# Patient Record
Sex: MALE | Race: WHITE | NOT HISPANIC OR LATINO | Employment: OTHER | URBAN - NONMETROPOLITAN AREA
[De-identification: names, ages, dates, MRNs, and addresses within clinical notes are randomized per-mention and may not be internally consistent; named-entity substitution may affect disease eponyms.]

---

## 2019-11-04 ENCOUNTER — HOSPITAL ENCOUNTER (INPATIENT)
Facility: HOSPITAL | Age: 64
LOS: 9 days | Discharge: HOME | End: 2019-11-13
Attending: EMERGENCY MEDICINE | Admitting: INTERNAL MEDICINE

## 2019-11-04 ENCOUNTER — APPOINTMENT (OUTPATIENT)
Dept: GENERAL RADIOLOGY | Facility: HOSPITAL | Age: 64
End: 2019-11-04

## 2019-11-04 DIAGNOSIS — I50.20 SYSTOLIC CONGESTIVE HEART FAILURE, UNSPECIFIED HF CHRONICITY (HCC): ICD-10-CM

## 2019-11-04 DIAGNOSIS — N17.9 ACUTE RENAL FAILURE, UNSPECIFIED ACUTE RENAL FAILURE TYPE (HCC): ICD-10-CM

## 2019-11-04 DIAGNOSIS — I21.4 NSTEMI (NON-ST ELEVATED MYOCARDIAL INFARCTION) (HCC): Primary | ICD-10-CM

## 2019-11-04 PROBLEM — E66.9 OBESITY (BMI 30-39.9): Chronic | Status: ACTIVE | Noted: 2019-11-04

## 2019-11-04 PROBLEM — N19 RENAL FAILURE: Status: ACTIVE | Noted: 2019-11-04

## 2019-11-04 PROBLEM — I50.9 CHF EXACERBATION: Status: ACTIVE | Noted: 2019-11-04

## 2019-11-04 PROBLEM — J96.01 ACUTE RESPIRATORY FAILURE WITH HYPOXIA (HCC): Status: ACTIVE | Noted: 2019-11-04

## 2019-11-04 PROBLEM — R73.9 HYPERGLYCEMIA: Status: ACTIVE | Noted: 2019-11-04

## 2019-11-04 PROBLEM — R94.31 PROLONGED Q-T INTERVAL ON ECG: Status: ACTIVE | Noted: 2019-11-04

## 2019-11-04 LAB
ALBUMIN SERPL-MCNC: 4.05 G/DL (ref 3.5–5.2)
ALBUMIN/GLOB SERPL: 1.3 G/DL
ALP SERPL-CCNC: 74 U/L (ref 39–117)
ALT SERPL W P-5'-P-CCNC: 18 U/L (ref 1–41)
ANION GAP SERPL CALCULATED.3IONS-SCNC: 12.1 MMOL/L (ref 5–15)
APTT PPP: 30.9 SECONDS (ref 23.8–36.1)
AST SERPL-CCNC: 18 U/L (ref 1–40)
B PERT DNA SPEC QL NAA+PROBE: NOT DETECTED
BASOPHILS # BLD AUTO: 0.04 10*3/MM3 (ref 0–0.2)
BASOPHILS NFR BLD AUTO: 0.6 % (ref 0–1.5)
BILIRUB SERPL-MCNC: 0.9 MG/DL (ref 0.2–1.2)
BILIRUB UR QL STRIP: NEGATIVE
BUN BLD-MCNC: 18 MG/DL (ref 8–23)
BUN/CREAT SERPL: 14.6 (ref 7–25)
C PNEUM DNA NPH QL NAA+NON-PROBE: NOT DETECTED
CALCIUM SPEC-SCNC: 9.9 MG/DL (ref 8.6–10.5)
CHLORIDE SERPL-SCNC: 103 MMOL/L (ref 98–107)
CLARITY UR: CLEAR
CO2 SERPL-SCNC: 29.9 MMOL/L (ref 22–29)
COLOR UR: YELLOW
CREAT BLD-MCNC: 1.23 MG/DL (ref 0.76–1.27)
D-LACTATE SERPL-SCNC: 1.4 MMOL/L (ref 0.5–2)
DEPRECATED RDW RBC AUTO: 48.7 FL (ref 37–54)
EOSINOPHIL # BLD AUTO: 0.04 10*3/MM3 (ref 0–0.4)
EOSINOPHIL NFR BLD AUTO: 0.6 % (ref 0.3–6.2)
ERYTHROCYTE [DISTWIDTH] IN BLOOD BY AUTOMATED COUNT: 13.7 % (ref 12.3–15.4)
FLUAV AG NPH QL: NEGATIVE
FLUAV H1 2009 PAND RNA NPH QL NAA+PROBE: NOT DETECTED
FLUAV H1 HA GENE NPH QL NAA+PROBE: NOT DETECTED
FLUAV H3 RNA NPH QL NAA+PROBE: NOT DETECTED
FLUAV SUBTYP SPEC NAA+PROBE: NOT DETECTED
FLUBV AG NPH QL IA: NEGATIVE
FLUBV RNA ISLT QL NAA+PROBE: NOT DETECTED
GFR SERPL CREATININE-BSD FRML MDRD: 59 ML/MIN/1.73
GLOBULIN UR ELPH-MCNC: 3.2 GM/DL
GLUCOSE BLD-MCNC: 123 MG/DL (ref 65–99)
GLUCOSE UR STRIP-MCNC: NEGATIVE MG/DL
HADV DNA SPEC NAA+PROBE: NOT DETECTED
HBA1C MFR BLD: 6.7 % (ref 4.8–5.6)
HCOV 229E RNA SPEC QL NAA+PROBE: NOT DETECTED
HCOV HKU1 RNA SPEC QL NAA+PROBE: NOT DETECTED
HCOV NL63 RNA SPEC QL NAA+PROBE: NOT DETECTED
HCOV OC43 RNA SPEC QL NAA+PROBE: NOT DETECTED
HCT VFR BLD AUTO: 52.5 % (ref 37.5–51)
HGB BLD-MCNC: 16.1 G/DL (ref 13–17.7)
HGB UR QL STRIP.AUTO: NEGATIVE
HMPV RNA NPH QL NAA+NON-PROBE: NOT DETECTED
HPIV1 RNA SPEC QL NAA+PROBE: NOT DETECTED
HPIV2 RNA SPEC QL NAA+PROBE: NOT DETECTED
HPIV3 RNA NPH QL NAA+PROBE: NOT DETECTED
HPIV4 P GENE NPH QL NAA+PROBE: NOT DETECTED
IMM GRANULOCYTES # BLD AUTO: 0.01 10*3/MM3 (ref 0–0.05)
IMM GRANULOCYTES NFR BLD AUTO: 0.1 % (ref 0–0.5)
INR PPP: 1.11 (ref 0.9–1.1)
KETONES UR QL STRIP: NEGATIVE
L PNEUMO1 AG UR QL IA: NEGATIVE
LEUKOCYTE ESTERASE UR QL STRIP.AUTO: NEGATIVE
LYMPHOCYTES # BLD AUTO: 1.67 10*3/MM3 (ref 0.7–3.1)
LYMPHOCYTES NFR BLD AUTO: 24.2 % (ref 19.6–45.3)
M PNEUMO IGG SER IA-ACNC: NOT DETECTED
MAGNESIUM SERPL-MCNC: 2.1 MG/DL (ref 1.6–2.4)
MCH RBC QN AUTO: 29.6 PG (ref 26.6–33)
MCHC RBC AUTO-ENTMCNC: 30.7 G/DL (ref 31.5–35.7)
MCV RBC AUTO: 96.5 FL (ref 79–97)
MONOCYTES # BLD AUTO: 0.75 10*3/MM3 (ref 0.1–0.9)
MONOCYTES NFR BLD AUTO: 10.9 % (ref 5–12)
NEUTROPHILS # BLD AUTO: 4.38 10*3/MM3 (ref 1.7–7)
NEUTROPHILS NFR BLD AUTO: 63.6 % (ref 42.7–76)
NITRITE UR QL STRIP: NEGATIVE
NRBC BLD AUTO-RTO: 0 /100 WBC (ref 0–0.2)
NT-PROBNP SERPL-MCNC: 6938 PG/ML (ref 5–900)
PH UR STRIP.AUTO: 6 [PH] (ref 5–8)
PLATELET # BLD AUTO: 151 10*3/MM3 (ref 140–450)
PMV BLD AUTO: 11.5 FL (ref 6–12)
POTASSIUM BLD-SCNC: 4.6 MMOL/L (ref 3.5–5.2)
PROT SERPL-MCNC: 7.2 G/DL (ref 6–8.5)
PROT UR QL STRIP: NEGATIVE
PROTHROMBIN TIME: 14.9 SECONDS (ref 11–15.4)
RBC # BLD AUTO: 5.44 10*6/MM3 (ref 4.14–5.8)
RHINOVIRUS RNA SPEC NAA+PROBE: NOT DETECTED
RSV RNA NPH QL NAA+NON-PROBE: NOT DETECTED
SODIUM BLD-SCNC: 145 MMOL/L (ref 136–145)
SP GR UR STRIP: 1.01 (ref 1–1.03)
TROPONIN T SERPL-MCNC: 0.03 NG/ML (ref 0–0.03)
TROPONIN T SERPL-MCNC: 0.04 NG/ML (ref 0–0.03)
TSH SERPL DL<=0.05 MIU/L-ACNC: 5.07 UIU/ML (ref 0.27–4.2)
UROBILINOGEN UR QL STRIP: NORMAL
WBC NRBC COR # BLD: 6.89 10*3/MM3 (ref 3.4–10.8)

## 2019-11-04 PROCEDURE — 99223 1ST HOSP IP/OBS HIGH 75: CPT | Performed by: PHYSICIAN ASSISTANT

## 2019-11-04 PROCEDURE — 25010000002 FUROSEMIDE PER 20 MG: Performed by: EMERGENCY MEDICINE

## 2019-11-04 PROCEDURE — 84484 ASSAY OF TROPONIN QUANT: CPT | Performed by: EMERGENCY MEDICINE

## 2019-11-04 PROCEDURE — 71046 X-RAY EXAM CHEST 2 VIEWS: CPT | Performed by: RADIOLOGY

## 2019-11-04 PROCEDURE — 80053 COMPREHEN METABOLIC PANEL: CPT | Performed by: EMERGENCY MEDICINE

## 2019-11-04 PROCEDURE — 85025 COMPLETE CBC W/AUTO DIFF WBC: CPT | Performed by: EMERGENCY MEDICINE

## 2019-11-04 PROCEDURE — 87804 INFLUENZA ASSAY W/OPTIC: CPT | Performed by: PHYSICIAN ASSISTANT

## 2019-11-04 PROCEDURE — 85730 THROMBOPLASTIN TIME PARTIAL: CPT | Performed by: EMERGENCY MEDICINE

## 2019-11-04 PROCEDURE — 99254 IP/OBS CNSLTJ NEW/EST MOD 60: CPT | Performed by: INTERNAL MEDICINE

## 2019-11-04 PROCEDURE — 87899 AGENT NOS ASSAY W/OPTIC: CPT | Performed by: PHYSICIAN ASSISTANT

## 2019-11-04 PROCEDURE — 71046 X-RAY EXAM CHEST 2 VIEWS: CPT

## 2019-11-04 PROCEDURE — 83605 ASSAY OF LACTIC ACID: CPT | Performed by: EMERGENCY MEDICINE

## 2019-11-04 PROCEDURE — 83880 ASSAY OF NATRIURETIC PEPTIDE: CPT | Performed by: EMERGENCY MEDICINE

## 2019-11-04 PROCEDURE — 25010000002 FUROSEMIDE PER 20 MG: Performed by: PHYSICIAN ASSISTANT

## 2019-11-04 PROCEDURE — 93005 ELECTROCARDIOGRAM TRACING: CPT | Performed by: EMERGENCY MEDICINE

## 2019-11-04 PROCEDURE — 93005 ELECTROCARDIOGRAM TRACING: CPT | Performed by: PHYSICIAN ASSISTANT

## 2019-11-04 PROCEDURE — 81003 URINALYSIS AUTO W/O SCOPE: CPT | Performed by: EMERGENCY MEDICINE

## 2019-11-04 PROCEDURE — 25010000002 ENOXAPARIN PER 10 MG: Performed by: FAMILY MEDICINE

## 2019-11-04 PROCEDURE — 83036 HEMOGLOBIN GLYCOSYLATED A1C: CPT | Performed by: PHYSICIAN ASSISTANT

## 2019-11-04 PROCEDURE — 87040 BLOOD CULTURE FOR BACTERIA: CPT | Performed by: EMERGENCY MEDICINE

## 2019-11-04 PROCEDURE — 0099U HC BIOFIRE FILMARRAY RESP PANEL 1: CPT | Performed by: PHYSICIAN ASSISTANT

## 2019-11-04 PROCEDURE — 85610 PROTHROMBIN TIME: CPT | Performed by: EMERGENCY MEDICINE

## 2019-11-04 PROCEDURE — 99284 EMERGENCY DEPT VISIT MOD MDM: CPT

## 2019-11-04 PROCEDURE — 83735 ASSAY OF MAGNESIUM: CPT | Performed by: EMERGENCY MEDICINE

## 2019-11-04 PROCEDURE — 84443 ASSAY THYROID STIM HORMONE: CPT | Performed by: PHYSICIAN ASSISTANT

## 2019-11-04 PROCEDURE — 84484 ASSAY OF TROPONIN QUANT: CPT | Performed by: PHYSICIAN ASSISTANT

## 2019-11-04 RX ORDER — ACETAMINOPHEN 650 MG/1
650 SUPPOSITORY RECTAL EVERY 4 HOURS PRN
Status: DISCONTINUED | OUTPATIENT
Start: 2019-11-04 | End: 2019-11-04

## 2019-11-04 RX ORDER — RAMIPRIL 2.5 MG/1
2.5 CAPSULE ORAL
Status: DISCONTINUED | OUTPATIENT
Start: 2019-11-04 | End: 2019-11-04

## 2019-11-04 RX ORDER — FUROSEMIDE 10 MG/ML
40 INJECTION INTRAMUSCULAR; INTRAVENOUS EVERY 12 HOURS
Status: DISCONTINUED | OUTPATIENT
Start: 2019-11-04 | End: 2019-11-05

## 2019-11-04 RX ORDER — SODIUM CHLORIDE 0.9 % (FLUSH) 0.9 %
10 SYRINGE (ML) INJECTION AS NEEDED
Status: DISCONTINUED | OUTPATIENT
Start: 2019-11-04 | End: 2019-11-13 | Stop reason: HOSPADM

## 2019-11-04 RX ORDER — NITROGLYCERIN 0.4 MG/1
0.4 TABLET SUBLINGUAL
Status: DISCONTINUED | OUTPATIENT
Start: 2019-11-04 | End: 2019-11-13 | Stop reason: HOSPADM

## 2019-11-04 RX ORDER — FAMOTIDINE 20 MG/1
20 TABLET, FILM COATED ORAL DAILY
Status: DISCONTINUED | OUTPATIENT
Start: 2019-11-04 | End: 2019-11-13 | Stop reason: HOSPADM

## 2019-11-04 RX ORDER — ACETAMINOPHEN 160 MG/5ML
650 SOLUTION ORAL EVERY 4 HOURS PRN
Status: DISCONTINUED | OUTPATIENT
Start: 2019-11-04 | End: 2019-11-04

## 2019-11-04 RX ORDER — ATORVASTATIN CALCIUM 20 MG/1
20 TABLET, FILM COATED ORAL NIGHTLY
Status: DISCONTINUED | OUTPATIENT
Start: 2019-11-04 | End: 2019-11-12

## 2019-11-04 RX ORDER — CALCIUM CARBONATE 200(500)MG
2 TABLET,CHEWABLE ORAL 3 TIMES DAILY PRN
Status: DISCONTINUED | OUTPATIENT
Start: 2019-11-04 | End: 2019-11-13 | Stop reason: HOSPADM

## 2019-11-04 RX ORDER — ACETAMINOPHEN 325 MG/1
650 TABLET ORAL EVERY 4 HOURS PRN
Status: DISCONTINUED | OUTPATIENT
Start: 2019-11-04 | End: 2019-11-04

## 2019-11-04 RX ORDER — METOPROLOL SUCCINATE 25 MG/1
25 TABLET, EXTENDED RELEASE ORAL
Status: DISCONTINUED | OUTPATIENT
Start: 2019-11-04 | End: 2019-11-12

## 2019-11-04 RX ORDER — SODIUM CHLORIDE 0.9 % (FLUSH) 0.9 %
10 SYRINGE (ML) INJECTION EVERY 12 HOURS SCHEDULED
Status: DISCONTINUED | OUTPATIENT
Start: 2019-11-04 | End: 2019-11-13 | Stop reason: HOSPADM

## 2019-11-04 RX ORDER — ONDANSETRON 2 MG/ML
4 INJECTION INTRAMUSCULAR; INTRAVENOUS EVERY 6 HOURS PRN
Status: DISCONTINUED | OUTPATIENT
Start: 2019-11-04 | End: 2019-11-04

## 2019-11-04 RX ORDER — ASPIRIN 81 MG/1
81 TABLET, CHEWABLE ORAL DAILY
Status: DISCONTINUED | OUTPATIENT
Start: 2019-11-05 | End: 2019-11-13 | Stop reason: HOSPADM

## 2019-11-04 RX ORDER — ACETAMINOPHEN 325 MG/1
650 TABLET ORAL EVERY 6 HOURS PRN
Status: DISCONTINUED | OUTPATIENT
Start: 2019-11-04 | End: 2019-11-13 | Stop reason: HOSPADM

## 2019-11-04 RX ORDER — ASPIRIN 81 MG/1
324 TABLET, CHEWABLE ORAL ONCE
Status: COMPLETED | OUTPATIENT
Start: 2019-11-04 | End: 2019-11-04

## 2019-11-04 RX ORDER — FUROSEMIDE 10 MG/ML
40 INJECTION INTRAMUSCULAR; INTRAVENOUS ONCE
Status: COMPLETED | OUTPATIENT
Start: 2019-11-04 | End: 2019-11-04

## 2019-11-04 RX ADMIN — SODIUM CHLORIDE, PRESERVATIVE FREE 10 ML: 5 INJECTION INTRAVENOUS at 20:10

## 2019-11-04 RX ADMIN — ATORVASTATIN CALCIUM 20 MG: 20 TABLET, FILM COATED ORAL at 20:09

## 2019-11-04 RX ADMIN — FUROSEMIDE 40 MG: 10 INJECTION, SOLUTION INTRAMUSCULAR; INTRAVENOUS at 20:09

## 2019-11-04 RX ADMIN — FUROSEMIDE 40 MG: 10 INJECTION, SOLUTION INTRAMUSCULAR; INTRAVENOUS at 10:39

## 2019-11-04 RX ADMIN — ASPIRIN 324 MG: 81 TABLET, CHEWABLE ORAL at 11:51

## 2019-11-04 RX ADMIN — ACETAMINOPHEN 650 MG: 325 TABLET ORAL at 17:41

## 2019-11-04 RX ADMIN — ENOXAPARIN SODIUM 40 MG: 40 INJECTION SUBCUTANEOUS at 20:09

## 2019-11-04 RX ADMIN — FAMOTIDINE 20 MG: 20 TABLET, FILM COATED ORAL at 17:34

## 2019-11-04 RX ADMIN — RAMIPRIL 2.5 MG: 2.5 CAPSULE ORAL at 11:51

## 2019-11-04 RX ADMIN — METOPROLOL SUCCINATE 25 MG: 25 TABLET, FILM COATED, EXTENDED RELEASE ORAL at 17:34

## 2019-11-04 NOTE — PROGRESS NOTES
Discharge Planning Assessment   [facility]     Patient Name: Damián Myers  MRN: [id number]  Today's Date: 11/4/2019    Admit Date: 11/4/2019    Discharge Needs Assessment     Row Name 11/04/19 1418       Living Environment    Lives With  spouse    Name(s) of Who Lives With Patient  LIVES WITH SPOUSE MICHELLE MYERS    Current Living Arrangements  home/apartment/condo    Primary Care Provided by  self    Quality of Family Relationships  helpful;involved;supportive       Resource/Environmental Concerns    Resource/Environmental Concerns  none       Transition Planning    Patient/Family Anticipates Transition to  home with family    Patient/Family Anticipated Services at Transition  none    Transportation Anticipated  family or friend will provide       Discharge Needs Assessment    Readmission Within the Last 30 Days  no previous admission in last 30 days    Concerns to be Addressed  no discharge needs identified;denies needs/concerns at this time    Equipment Currently Used at Home  none    Anticipated Changes Related to Illness  none    Equipment Needed After Discharge  none        Discharge Plan     Row Name 11/04/19 1419       Plan    Plan  PT LIVES AT HOME WITH SPOUSE MICHELLE MYERS WHO IS AT BEDSIDE AND PLANS TO RETURN HOME UPON DISCHARGE, FAMILY TO PROVIDE TRANSPORTATION. PCP IS DR PARSONS, HE USES [facility] AND DOES NOT CURRENTLY HAVE INSURANCE. PT WAS GIVEN INFORMATION ON INSURANCE BY [employer] PER PT. PT IS INDEPENDENT WITH ADL'S AND DOES NOT USE ANY DME'S, HOME HEALTH OR HOME O2. PT DOES HAVE A POA/LIVING WILL BUT DOES NOT HAVE COPIES. NO NEEDS IDENTIFIED AT THIS TIME.     Patient/Family in Agreement with Plan  yes        Destination      No service coordination in this encounter.      Durable Medical Equipment      No service coordination in this encounter.      Dialysis/Infusion      No service coordination in this encounter.      Home Medical Care      No service coordination in this encounter.      Therapy       No service coordination in this encounter.      Community Resources      No service coordination in this encounter.          Demographic Summary     Row Name 11/04/19 1417       General Information    Admission Type  inpatient    Arrived From  home    Referral Source  emergency department    Reason for Consult  discharge planning    Preferred Language  English        Functional Status     Row Name 11/04/19 1418       Functional Status    Usual Activity Tolerance  good    Current Activity Tolerance  good       Mental Status    General Appearance WDL  WDL        Psychosocial     Row Name 11/04/19 1418       Behavior WDL    Behavior WDL  WDL       Emotion Mood WDL    Emotion/Mood/Affect WDL  WDL       Speech WDL    Speech WDL  WDL        Abuse/Neglect    No documentation.       Legal    No documentation.       Substance Abuse    No documentation.       Patient Forms    No documentation.           Zara Dejesus RN

## 2019-11-04 NOTE — ED PROVIDER NOTES
Subjective   Patient sent to ER by family doctor for evaluation of his shortness of breath        Shortness of Breath   Severity:  Moderate  Onset quality:  Gradual  Timing:  Constant  Progression:  Worsening  Chronicity:  New  Context: activity    Relieved by:  Nothing  Worsened by:  Exertion and movement  Ineffective treatments:  Position changes and sitting up      Review of Systems   Constitutional: Positive for activity change and fatigue.   HENT: Negative.    Eyes: Negative.    Respiratory: Positive for shortness of breath.    Cardiovascular: Positive for palpitations and leg swelling.   Gastrointestinal: Negative.    Endocrine: Negative.    Genitourinary: Negative.    Musculoskeletal: Negative.    Skin: Negative.    Allergic/Immunologic: Negative.    Neurological: Negative.    Hematological: Negative.    Psychiatric/Behavioral: Negative.        Past Medical History:   Diagnosis Date   • Obesity (BMI 30-39.9) 11/4/2019       No Known Allergies    History reviewed. No pertinent surgical history.    Family History   Problem Relation Age of Onset   • Heart valve disorder Mother    • Heart valve disorder Brother    • Diabetes type I Son        Social History     Socioeconomic History   • Marital status:      Spouse name: Not on file   • Number of children: Not on file   • Years of education: Not on file   • Highest education level: Not on file   Tobacco Use   • Smoking status: Never Smoker   • Smokeless tobacco: Never Used   Substance and Sexual Activity   • Alcohol use: No     Frequency: Never   • Drug use: No   • Sexual activity: Defer           Objective   Physical Exam   Constitutional: He appears well-developed and well-nourished.   HENT:   Head: Normocephalic and atraumatic.   Mouth/Throat: Oropharynx is clear and moist.   Eyes: Pupils are equal, round, and reactive to light.   Neck: Normal range of motion. Neck supple.   Cardiovascular:   Tachycardic upon initial presentation   Pulmonary/Chest: He is  in respiratory distress. He has decreased breath sounds in the right lower field and the left lower field. He has rales in the right middle field and the left middle field.   Abdominal: Soft.   Musculoskeletal: Normal range of motion.        Right lower leg: He exhibits edema.        Left lower leg: He exhibits edema.   Neurological: He is alert.   Skin: Skin is warm.   Psychiatric: He has a normal mood and affect.   Nursing note and vitals reviewed.      Procedures           ED Course                  MDM    Final diagnoses:   NSTEMI (non-ST elevated myocardial infarction) (CMS/[facility])              Robert Mayo MD  11/05/19 1301       Robert Mayo MD  11/05/19 1309

## 2019-11-04 NOTE — PLAN OF CARE
Problem: Fall Risk (Adult)  Goal: Identify Related Risk Factors and Signs and Symptoms  Outcome: Ongoing (interventions implemented as appropriate)    Goal: Absence of Fall  Outcome: Ongoing (interventions implemented as appropriate)      Problem: Patient Care Overview  Goal: Plan of Care Review  Outcome: Ongoing (interventions implemented as appropriate)    Goal: Individualization and Mutuality  Outcome: Ongoing (interventions implemented as appropriate)    Goal: Discharge Needs Assessment  Outcome: Ongoing (interventions implemented as appropriate)    Goal: Interprofessional Rounds/Family Conf  Outcome: Ongoing (interventions implemented as appropriate)      Problem: Cardiac: ACS (Acute Coronary Syndrome) (Adult)  Goal: Signs and Symptoms of Listed Potential Problems Will be Absent, Minimized or Managed (Cardiac: ACS)  Outcome: Ongoing (interventions implemented as appropriate)

## 2019-11-04 NOTE — CONSULTS
Date of Admit: 11/4/2019  Date of Consult: 11/04/19  No ref. provider found        CHF exacerbation (CMS/[facility])    NSTEMI (non-ST elevated myocardial infarction) (CMS/[facility])    Hyperglycemia    Obesity (BMI 30-39.9)    Prolonged Q-T interval on ECG    Renal failure    Acute respiratory failure with hypoxia (CMS/[facility])      Assessment      1. Acute decompensated heart failure (diastolic versus systolic)  2. No previous history of known coronary artery disease, hypertension or diabetes mellitus.  3. Mildly elevated troponin of up to 0.037 with no anginal symptoms or any ischemic changes on EKG.  4. Acute kidney injury versus chronic kidney disease.  5. Mild elevated TSH, questionable hypothyroidism.      Recommendations     1. Treat with IV diuretics as needed and tolerated and monitor the kidney function closely.  2. Treat his troponin elevation with aspirin and carvedilol and statin.  3. Evaluate his heart failure further with an echo Doppler study.  4. Continue to follow the troponin trend and consider further cardiac evaluation for ischemic heart disease/coronary artery disease with a Lexiscan sestamibi study.  5. I have discussed with him and his wife about salt restricted diet and the foods to avoid such as canned vegetables, canned soups, potato chips, pickles, processed meats etc.  They expressed understanding.        Reason for consultation: Acute heart failure.    Subjective       Subjective     Damián Myers is a 64 y.o. male with problems as listed above presents with    History of Present Illness: Mr. Myers is a pleasant 64-year  male with no history of known heart disease or coronary artery disease or previous history of congestive heart failure, presented to the emergency department with complaints of progressive dyspnea for about a year but has gotten worse in the last few weeks.  He was noted to be in acute decompensated heart failure by clinical and radiological criteria as well as an elevated proBNP  of more than 6000.  He is hence admitted for further cardiac evaluation management.  I have already discussed with Dr. Mayo in the emergency department.  He has received a dose of IV furosemide and has been initiated on aspirin and carvedilol.  He denies any complaints of chest pains.  His EKG has revealed normal sinus rhythm with no electric cardiographic changes of myocardial ischemia.  His chest x-ray is consistent with acute decompensated heart failure.  His troponin has revealed mild elevation up to 0.037.  He is nonhypertensive and nondiabetic.  He says he has never had any previous history of medical problems and has not seen a doctor in 12 years.  He denies any fever, chills, cough, wheezing or hemoptysis.    Cardiac risk factors:Age and male gender.      Past Medical History:   Diagnosis Date   • Obesity (BMI 30-39.9) 11/4/2019     History reviewed. No pertinent surgical history.  Family History   Problem Relation Age of Onset   • Heart valve disorder Mother    • Heart valve disorder Brother    • Diabetes type I Son      Social History     Tobacco Use   • Smoking status: Never Smoker   • Smokeless tobacco: Never Used   Substance Use Topics   • Alcohol use: No     Frequency: Never   • Drug use: No     No medications prior to admission.     Allergies:  Patient has no known allergies.    Review of Systems   Constitutional: Negative for appetite change, chills and fever.   HENT: Negative for congestion, ear discharge, ear pain and sore throat.    Eyes: Negative for pain and redness.   Respiratory: Positive for shortness of breath. Negative for cough and wheezing.    Cardiovascular: Negative for palpitations and leg swelling.   Gastrointestinal: Negative for abdominal pain, diarrhea, nausea and vomiting.   Endocrine: Negative for cold intolerance, heat intolerance, polydipsia and polyuria.   Genitourinary: Negative for dysuria and hematuria.   Skin: Negative for rash.   Neurological: Negative for seizures,  syncope and headaches.   Psychiatric/Behavioral: Negative for confusion. The patient is not nervous/anxious.    All other systems reviewed and are negative.      Objective       Objective      Vital Signs  Temp:  [97.6 °F (36.4 °C)-97.9 °F (36.6 °C)] 97.6 °F (36.4 °C)  Heart Rate:  [90-99] 99  Resp:  [20] 20  BP: (115-149)/() 138/96  Vital Signs (last 72 hrs)       11/01 0700  -  11/02 0659 11/02 0700  -  11/03 0659 11/03 0700  -  11/04 0659 11/04 0700  -  11/04 1617   Most Recent    Temp (°F)       97.6 -  97.9     97.6 (36.4)    Heart Rate       90 -  99     99    Resp         20     20    BP       115/100 -  (!) 149/35     138/96    SpO2 (%)       (!)89 -  98     91        Body mass index is 35.95 kg/m².  No intake or output data in the 24 hours ending 11/04/19 1617  Physical Exam   Constitutional: He appears well-developed and well-nourished.   HENT:   Head: Normocephalic and atraumatic.   Eyes: EOM are normal. Right eye exhibits no discharge. Left eye exhibits no discharge.   Neck: No JVD present. No tracheal deviation present. No thyromegaly present.   Cardiovascular: S1 normal and S2 normal. PMI is not displaced. Exam reveals no gallop, no S3, no S4 and no friction rub.   Pulses:       Dorsalis pedis pulses are 1+ on the right side, and 1+ on the left side.        Posterior tibial pulses are 1+ on the right side, and 1+ on the left side.   Pulmonary/Chest: No stridor. No respiratory distress. He has no wheezes. He has rales (Has bilateral rales right worse than the left.). He exhibits no tenderness.   Abdominal: Soft. He exhibits no distension and no mass. There is no tenderness. There is no guarding.   Musculoskeletal: He exhibits edema (Has 2+ edema on both legs with pigmentation and scaling of skin.).   Neurological: He is alert.   Skin: Skin is warm and dry. No erythema.         Results review     Results Review:    I reviewed the patient's new clinical results.  Results from last 7 days   Lab Units  11/04/19  1256 11/04/19  1035   TROPONIN T ng/mL 0.037* 0.034*     Results from last 7 days   Lab Units 11/04/19  1035   WBC 10*3/mm3 6.89   HEMOGLOBIN g/dL 16.1   PLATELETS 10*3/mm3 151     Results from last 7 days   Lab Units 11/04/19  1035   SODIUM mmol/L 145   POTASSIUM mmol/L 4.6   CHLORIDE mmol/L 103   CO2 mmol/L 29.9*   BUN mg/dL 18   CREATININE mg/dL 1.23   CALCIUM mg/dL 9.9   GLUCOSE mg/dL 123*   ALT (SGPT) U/L 18   AST (SGOT) U/L 18     Lab Results   Component Value Date    INR 1.11 (H) 11/04/2019     Lab Results   Component Value Date    MG 2.1 11/04/2019     Lab Results   Component Value Date    TSH 5.070 (H) 11/04/2019      Lab Results   Component Value Date    PROBNP 6,938.0 (H) 11/04/2019       ECG        ECG/EMG Results (last 24 hours)     Procedure Component Value Units Date/Time    ECG 12 Lead [[id number]] Collected:  11/04/19 1000     Updated:  11/04/19 1029    Narrative:       Test Reason : shortness of breath  Blood Pressure : **/** mmHG  Vent. Rate : 106 BPM     Atrial Rate : 106 BPM     P-R Int : 184 ms          QRS Dur : 086 ms      QT Int : 348 ms       P-R-T Axes : 038 029 052 degrees     QTc Int : 462 ms    Sinus tachycardia with occasional premature ventricular complexes  Otherwise normal ECG  No previous ECGs available  Confirmed by Fercho Cornelius ([id number]) on 11/4/2019 10:29:22 AM    Referred By:  AMELIA           Confirmed By:Fercho Cornelius          I have personally reviewed the chest x-ray which reveals cephalization and bilateral interstitial pattern consistent with acute decompensated heart failure.    Imaging Results (Last 72 Hours)     Procedure Component Value Units Date/Time    XR Chest 2 View [[id number]] Collected:  11/04/19 1113     Updated:  11/04/19 1144    Narrative:       EXAMINATION: XR CHEST 2 VW-      CLINICAL INDICATION: shortness of breath        COMPARISON: None available      TECHNIQUE: XR CHEST 2 VW-      FINDINGS:   Minimal airspace disease in the right lung  base. I cannot exclude  right-sided pneumonia   Heart and mediastinal contours are unremarkable.   No pneumothorax.   Bony and soft tissue structures are unremarkable.          Impression:       Appearance concerning for right-sided pneumonia     This report was finalized on 11/4/2019 11:14 AM by Dr. Jordy Jhaveri MD.             I have discussed my impression and recommendations with the patient and family.    Thank you very much for asking us to be involved in this patient's care.  We will follow along with you.      Mahendra Amador MD,[facility]  11/04/19  4:17 PM    Please note that portions of this note were completed with a voice recognition program.

## 2019-11-04 NOTE — H&P
[facility] Medicine Services  HISTORY & PHYSICAL    Patient Identification:  Name:  Damián Myers  Age:  64 y.o.  Sex:  male  :  1955  MRN:  [id number]   Visit Number:  [id number]  Primary Care Physician:  Spencer Saeed MD     Subjective     Chief complaint:   Chief Complaint   Patient presents with   • Sent from PCP   • Shortness of Breath     History of presenting illness:   Patient is a 64 y.o. male with past medical history significant for obesity that presented to the [facility] emergency department for evaluation of shortness of breath at request of his PCP.  Patient states that for the past year, he has had progressively worsening dyspnea.  He reports significant dyspnea on exertion.  He states even if he tries to tie his shoes or eat his dyspnea is worsened.  Wife present at bedside also endorses this history.  His wife states that over the past week his symptoms have significantly worsened.  He reports dry cough.  No fevers or chills.  They were seen by their PCP earlier today who recommended ED evaluation.  Patient reports increasing lower extremity edema, pitting.  Denies any chest pain.  Does not wear oxygen at home.  He also reports difficulty sleeping and diaphoresis intermittently.  He states he has not been seen by  since .  Ports family history of valvular heart disease in his mother and brother, brother recently had open heart surgery for valvular repair.  He has never been treated for heart failure in the past, no heart stents.  He does not take any home medications.  He is a non-smoker.  After diuresis in ED, patient states he feels significantly better.  Denies any abdominal pain, nausea, vomiting or diarrhea.  He denies any urinary symptoms.    Upon arrival to the ED, vitals were temperature 97.9, heart rate 90, respiratory rate 20, blood pressure 140/113, and oxygen saturation 89% on room air.  Initial troponin 0 0.034 with repeat 0.037.  proBNP  six 938.0.  CMP with glucose 123, CO2 29.9, and EGFR 59.  Lactic acid within normal limits.  Magnesium 2.1.  CBC unremarkable.  Chest x-ray with minimal airspace disease in the right lung base, right-sided pneumonia cannot be excluded.  Urinalysis unremarkable.  EKG with sinus tachycardia with frequent PVC,  ms, heart rate 106 bpm, no significant ST-T wave abnormality.  While in the emergency department, patient was administered 324 mg p.o. aspirin as well as 40 mg IV Lasix.    Patient has been admitted to the telemetry floor for further evaluation and treatment.   ---------------------------------------------------------------------------------------------------------------------   Review of Systems   Constitutional: Positive for activity change, diaphoresis and fatigue. Negative for appetite change, chills and fever.   HENT: Negative for congestion, postnasal drip, rhinorrhea, sinus pain, sore throat and trouble swallowing.    Eyes: Negative for discharge and visual disturbance.   Respiratory: Positive for cough and shortness of breath. Negative for chest tightness and wheezing.    Cardiovascular: Positive for leg swelling. Negative for chest pain and palpitations.   Gastrointestinal: Negative for abdominal pain, constipation, diarrhea, nausea and vomiting.   Endocrine: Negative for cold intolerance and heat intolerance.   Genitourinary: Negative for decreased urine volume, dysuria, frequency and urgency.   Musculoskeletal: Negative for arthralgias, gait problem and myalgias.   Skin: Negative for color change, rash and wound.   Allergic/Immunologic: Negative for environmental allergies and immunocompromised state.   Neurological: Negative for dizziness, syncope, weakness, light-headedness and headaches.   Hematological: Negative for adenopathy. Does not bruise/bleed easily.   Psychiatric/Behavioral: Positive for sleep disturbance. Negative for confusion and decreased concentration. The patient is not  nervous/anxious.       ---------------------------------------------------------------------------------------------------------------------   Past Medical History:   Diagnosis Date   • Obesity (BMI 30-39.9) 11/4/2019     History reviewed. No pertinent surgical history.  Family History   Problem Relation Age of Onset   • Heart valve disorder Mother    • Heart valve disorder Brother    • Diabetes type I Son      Social History     Socioeconomic History   • Marital status:      Spouse name: Not on file   • Number of children: Not on file   • Years of education: Not on file   • Highest education level: Not on file   Tobacco Use   • Smoking status: Never Smoker   • Smokeless tobacco: Never Used   Substance and Sexual Activity   • Alcohol use: No     Frequency: Never   • Drug use: No   • Sexual activity: Defer     ---------------------------------------------------------------------------------------------------------------------   Allergies:  Patient has no known allergies.  ---------------------------------------------------------------------------------------------------------------------   Medications below are reported home medications pulling from within the system; at this time, these medications have not been reconciled unless otherwise specified and are in the verification process for further verifcation as current home medications.    Prior to Admission Medications     None        ---------------------------------------------------------------------------------------------------------------------    Objective     Hospital Scheduled Meds:    [START ON 11/5/2019] aspirin 81 mg Oral Daily   atorvastatin 20 mg Oral Nightly   enoxaparin 40 mg Subcutaneous Nightly   furosemide 40 mg Intravenous Q12H   metoprolol succinate XL 25 mg Oral Q24H   sodium chloride 10 mL Intravenous Q12H          Current listed hospital scheduled medications may not yet reflect those currently placed in orders that are signed and  held, awaiting patient's arrival to floor/unit.    ---------------------------------------------------------------------------------------------------------------------   Vital Signs:  Temp:  [97.6 °F (36.4 °C)-97.9 °F (36.6 °C)] 97.6 °F (36.4 °C)  Heart Rate:  [90-99] 99  Resp:  [20] 20  BP: (115-149)/() 138/96  Mean Arterial Pressure (Non-Invasive) for the past 24 hrs (Last 3 readings):   Noninvasive MAP (mmHg)   11/04/19 1401 109   11/04/19 1346 104   11/04/19 1247 117     SpO2 Percentage    11/04/19 1346 11/04/19 1401 11/04/19 1605   SpO2: 92% 92% 91%     SpO2:  [89 %-98 %] 91 %  on  Flow (L/min):  [2] 2;   Device (Oxygen Therapy): nasal cannula    Body mass index is 35.95 kg/m².  Wt Readings from Last 3 Encounters:   11/04/19 127 kg (280 lb)     ---------------------------------------------------------------------------------------------------------------------   Physical Exam:  Physical Exam   Constitutional: He is oriented to person, place, and time. He appears well-developed and well-nourished.  Non-toxic appearance. No distress. Nasal cannula in place.   HENT:   Head: Normocephalic and atraumatic.   Right Ear: External ear normal.   Left Ear: External ear normal.   Nose: Nose normal.   Mouth/Throat: Oropharynx is clear and moist and mucous membranes are normal. No oropharyngeal exudate.   Eyes: Conjunctivae and EOM are normal. Pupils are equal, round, and reactive to light. No scleral icterus.   Neck: Trachea normal and normal range of motion. Neck supple. No JVD present. No muscular tenderness present. Carotid bruit is not present. No tracheal deviation present. No thyromegaly present.   Cardiovascular: Regular rhythm, normal heart sounds and intact distal pulses. Tachycardia present. Exam reveals no gallop and no friction rub.   No murmur heard.  Pulses:       Dorsalis pedis pulses are 2+ on the right side, and 2+ on the left side.        Posterior tibial pulses are 2+ on the right side, and 2+ on  the left side.   Pulmonary/Chest: Effort normal. No accessory muscle usage. No tachypnea. No respiratory distress. He has decreased breath sounds in the right middle field, the right lower field, the left middle field and the left lower field. He has no wheezes. He has no rhonchi. He has no rales. He exhibits no tenderness.   Abdominal: Soft. Bowel sounds are normal. He exhibits distension. He exhibits no mass. There is no hepatosplenomegaly (Difficult to assess due to body habitus). There is no tenderness. There is no rebound and no guarding. No hernia.   Obese   Genitourinary:   Genitourinary Comments: No Rose catheter in place   Musculoskeletal: He exhibits no tenderness or deformity.        Right lower leg: He exhibits edema (2+ pitting ).        Left lower leg: He exhibits edema (2+ pitting).     Vascular Status -  His right foot exhibits abnormal foot edema. His right foot exhibits normal foot vasculature . His left foot exhibits abnormal foot edema. His left foot exhibits normal foot vasculature .  Neurological: He is alert and oriented to person, place, and time. He has normal strength. No cranial nerve deficit or sensory deficit. GCS eye subscore is 4. GCS verbal subscore is 5. GCS motor subscore is 6.   Health commands.  Answers questions appropriately.  Moves all extremities equally.  Sensation and strength intact.  No focal neuro deficits on exam.   Skin: Skin is warm and dry. Capillary refill takes less than 2 seconds. No rash noted. No erythema. No pallor.   Psychiatric: He has a normal mood and affect. His speech is normal and behavior is normal. Judgment and thought content normal. Cognition and memory are normal.   Nursing note and vitals reviewed.    ---------------------------------------------------------------------------------------------------------------------  EKG:      Telemetry:    Sinus/Sinus tach 90s-100s    I have personally reviewed the EKG/Telemetry  strip  ---------------------------------------------------------------------------------------------------------------------   Results from last 7 days   Lab Units 11/04/19  1256 11/04/19  1035   TROPONIN T ng/mL 0.037* 0.034*     Results from last 7 days   Lab Units 11/04/19  1035   PROBNP pg/mL 6,938.0*         Results from last 7 days   Lab Units 11/04/19  1035   LACTATE mmol/L 1.4   WBC 10*3/mm3 6.89   HEMOGLOBIN g/dL 16.1   HEMATOCRIT % 52.5*   MCV fL 96.5   MCHC g/dL 30.7*   PLATELETS 10*3/mm3 151   INR  1.11*     Results from last 7 days   Lab Units 11/04/19  1035   SODIUM mmol/L 145   POTASSIUM mmol/L 4.6   MAGNESIUM mg/dL 2.1   CHLORIDE mmol/L 103   CO2 mmol/L 29.9*   BUN mg/dL 18   CREATININE mg/dL 1.23   EGFR IF NONAFRICN AM mL/min/1.73 59*   CALCIUM mg/dL 9.9   GLUCOSE mg/dL 123*   ALBUMIN g/dL 4.05   BILIRUBIN mg/dL 0.9   ALK PHOS U/L 74   AST (SGOT) U/L 18   ALT (SGPT) U/L 18   Estimated Creatinine Clearance: 85.8 mL/min (by C-G formula based on SCr of 1.23 mg/dL).    Lab Results   Component Value Date    HGBA1C 6.70 (H) 11/04/2019     No results found for: TSH, FREET4    Pain Management Panel     There is no flowsheet data to display.        I have personally reviewed the above laboratory results.   ---------------------------------------------------------------------------------------------------------------------  Imaging Results (Last 7 Days)     Procedure Component Value Units Date/Time    XR Chest 2 View [[id number]] Collected:  11/04/19 1113     Updated:  11/04/19 1144    Narrative:       EXAMINATION: XR CHEST 2 VW-      CLINICAL INDICATION: shortness of breath        COMPARISON: None available      TECHNIQUE: XR CHEST 2 VW-      FINDINGS:   Minimal airspace disease in the right lung base. I cannot exclude  right-sided pneumonia   Heart and mediastinal contours are unremarkable.   No pneumothorax.   Bony and soft tissue structures are unremarkable.          Impression:       Appearance concerning  for right-sided pneumonia     This report was finalized on 11/4/2019 11:14 AM by Dr. Jordy Jhaveri MD.           I have personally reviewed the above radiology results.   ---------------------------------------------------------------------------------------------------------------------    Assessment & Plan      Active Hospital Problems    Diagnosis POA   • **CHF exacerbation (CMS/[facility]) [I50.9] Yes   • NSTEMI (non-ST elevated myocardial infarction) (CMS/[facility]) [I21.4] Yes   • Hyperglycemia [R73.9] Yes   • Obesity (BMI 30-39.9) [E66.9] Yes   • Prolonged Q-T interval on ECG [R94.31] Yes   • Renal failure [N19] Yes   • Acute respiratory failure with hypoxia (CMS/[facility]) [J96.01] Yes     · Acute exacerbation of CHF: Undetermined type. Lasix given in ED, continue with IV lasix BID. Strict Is and Os, daily weights. CXR with mention of right sided airspace disease, likely fluid, no other signs of infection. Will rule out atypical pneumonia with respiratory panel and legionella testing. Obtain transthoracic echocardiogram to evaluate LVEF and cardiac wall motion. Pt also with elevated troponins, will likely benefit from ischemic work up. Cardiology on case in ED, input/assistance is appreciated. Start PO Metoprolol XL for now. He does not have any home meds. Tele monitoring. Supplemental O2 as necessary to titrate SpO2 > 90%. Incentive spirometry.   · NSTEMI: Trend troponin. Cont daily aspirin, add statin while lipid panel pending. Tele monitoring. Serial EKG, no ST-T wave abnormality prominent on admission H&P. Cardiology on board, input/assistance appreciated. Echo ordered.   · Acute hypoxic respiratory failure: Likely due to volume overload. Diuresis as previously mentioned. Supp O2 as necessary to titrate SpO2 > 90%.   · Renal failure: Unknown baseline creatinine. EGFR on admission 59. Obtain records from PCP office. Likely acute renal failure in setting of volume overload. Avoid nephrotoxins. Closely monitor UOP, repeat  chem panel in AM.   · Prolonged QTc: Serial EKG. Avoid any further prolonging agents. Monitor electrolytes. Cont tele monitoring.   · Obesity, BMI 35.95   · F/E/N: No IV fluids. Replace electrolytes per protocol as necessary. Cardiac diet.    ---------------------------------------------------  DVT Prophylaxis: Sq Lovenox   GI Prophylaxis: Pepcid   Activity: Up with assistance as tolerated    The patient is considered to be a high risk patient due to: CHF exacerbation, NSTEMI, respiratory failure, renal failure, obesity, prolonged QTC    INPATIENT status due to the need for care which can only be reasonably provided in an hospital setting such as aggressive/expedited ancillary services and/or consultation services, the necessity for IV medications, close physician monitoring and/or the possible need for procedures.  In such, I feel patient’s risk for adverse outcomes and need for care warrant INPATIENT evaluation and predict the patient’s care encounter to likely last beyond 2 midnights.    Code Status: FULL CODE     I have discussed the patient's assessment and plan with the patient, patient's wife present at bedside, and nursing staff.       Joelle Morales PA-C  Hospitalist Service -- [facility]   Pager: [phone]    11/04/19  4:11 PM    Attending Physician: Roel Martines MD      ---------------------------------------------------------------------------------------------------------------------

## 2019-11-04 NOTE — ED NOTES
Patient refusing aspirin and raimpril at this time. Dr. Mayo aware. To bedside and speaking with patient regarding test results.      Sandra Gastelum RN  11/04/19 1124

## 2019-11-05 ENCOUNTER — APPOINTMENT (OUTPATIENT)
Dept: CARDIOLOGY | Facility: HOSPITAL | Age: 64
End: 2019-11-05

## 2019-11-05 LAB
ALBUMIN SERPL-MCNC: 3.81 G/DL (ref 3.5–5.2)
ALBUMIN/GLOB SERPL: 1.2 G/DL
ALP SERPL-CCNC: 70 U/L (ref 39–117)
ALT SERPL W P-5'-P-CCNC: 17 U/L (ref 1–41)
ANION GAP SERPL CALCULATED.3IONS-SCNC: 13.8 MMOL/L (ref 5–15)
AST SERPL-CCNC: 17 U/L (ref 1–40)
BASOPHILS # BLD AUTO: 0.04 10*3/MM3 (ref 0–0.2)
BASOPHILS NFR BLD AUTO: 0.7 % (ref 0–1.5)
BH CV ECHO MEAS - % IVS THICK: 24.8 %
BH CV ECHO MEAS - % LVPW THICK: -8.3 %
BH CV ECHO MEAS - ACS: 2.5 CM
BH CV ECHO MEAS - AO ROOT AREA (BSA CORRECTED): 1.6
BH CV ECHO MEAS - AO ROOT AREA: 11.9 CM^2
BH CV ECHO MEAS - AO ROOT DIAM: 3.9 CM
BH CV ECHO MEAS - BSA(HAYCOCK): 2.6 M^2
BH CV ECHO MEAS - BSA: 2.5 M^2
BH CV ECHO MEAS - BZI_BMI: 36.1 KILOGRAMS/M^2
BH CV ECHO MEAS - BZI_METRIC_HEIGHT: 188 CM
BH CV ECHO MEAS - BZI_METRIC_WEIGHT: 127.5 KG
BH CV ECHO MEAS - EDV(CUBED): 199.2 ML
BH CV ECHO MEAS - EDV(MOD-SP4): 126 ML
BH CV ECHO MEAS - EDV(TEICH): 169.2 ML
BH CV ECHO MEAS - EF(CUBED): 28.6 %
BH CV ECHO MEAS - EF(MOD-SP4): 22.2 %
BH CV ECHO MEAS - EF(TEICH): 22.8 %
BH CV ECHO MEAS - ESV(CUBED): 142.2 ML
BH CV ECHO MEAS - ESV(MOD-SP4): 98 ML
BH CV ECHO MEAS - ESV(TEICH): 130.7 ML
BH CV ECHO MEAS - FS: 10.6 %
BH CV ECHO MEAS - IVS/LVPW: 1.1
BH CV ECHO MEAS - IVSD: 1.4 CM
BH CV ECHO MEAS - IVSS: 1.7 CM
BH CV ECHO MEAS - LA DIMENSION: 5.7 CM
BH CV ECHO MEAS - LA/AO: 1.4
BH CV ECHO MEAS - LV DIASTOLIC VOL/BSA (35-75): 50.2 ML/M^2
BH CV ECHO MEAS - LV MASS(C)D: 331.5 GRAMS
BH CV ECHO MEAS - LV MASS(C)DI: 132 GRAMS/M^2
BH CV ECHO MEAS - LV MASS(C)S: 314.7 GRAMS
BH CV ECHO MEAS - LV MASS(C)SI: 125.3 GRAMS/M^2
BH CV ECHO MEAS - LV SYSTOLIC VOL/BSA (12-30): 39 ML/M^2
BH CV ECHO MEAS - LVIDD: 5.8 CM
BH CV ECHO MEAS - LVIDS: 5.2 CM
BH CV ECHO MEAS - LVLD AP4: 9 CM
BH CV ECHO MEAS - LVLS AP4: 8.6 CM
BH CV ECHO MEAS - LVOT AREA (M): 3.5 CM^2
BH CV ECHO MEAS - LVOT AREA: 3.5 CM^2
BH CV ECHO MEAS - LVOT DIAM: 2.1 CM
BH CV ECHO MEAS - LVPWD: 1.2 CM
BH CV ECHO MEAS - LVPWS: 1.1 CM
BH CV ECHO MEAS - MV A MAX VEL: 25.7 CM/SEC
BH CV ECHO MEAS - MV E MAX VEL: 101 CM/SEC
BH CV ECHO MEAS - MV E/A: 3.9
BH CV ECHO MEAS - PA ACC SLOPE: 1049 CM/SEC^2
BH CV ECHO MEAS - PA ACC TIME: 0.06 SEC
BH CV ECHO MEAS - PA PR(ACCEL): 50.7 MMHG
BH CV ECHO MEAS - RAP SYSTOLE: 10 MMHG
BH CV ECHO MEAS - RVDD: 4.4 CM
BH CV ECHO MEAS - RVSP: 38.5 MMHG
BH CV ECHO MEAS - SI(CUBED): 22.7 ML/M^2
BH CV ECHO MEAS - SI(MOD-SP4): 11.2 ML/M^2
BH CV ECHO MEAS - SI(TEICH): 15.3 ML/M^2
BH CV ECHO MEAS - SV(CUBED): 56.9 ML
BH CV ECHO MEAS - SV(MOD-SP4): 28 ML
BH CV ECHO MEAS - SV(TEICH): 38.5 ML
BH CV ECHO MEAS - TR MAX VEL: 267 CM/SEC
BILIRUB SERPL-MCNC: 0.8 MG/DL (ref 0.2–1.2)
BUN BLD-MCNC: 20 MG/DL (ref 8–23)
BUN/CREAT SERPL: 14.9 (ref 7–25)
CALCIUM SPEC-SCNC: 9.3 MG/DL (ref 8.6–10.5)
CHLORIDE SERPL-SCNC: 101 MMOL/L (ref 98–107)
CHOLEST SERPL-MCNC: 120 MG/DL (ref 0–200)
CO2 SERPL-SCNC: 30.2 MMOL/L (ref 22–29)
CREAT BLD-MCNC: 1.34 MG/DL (ref 0.76–1.27)
DEPRECATED RDW RBC AUTO: 49.5 FL (ref 37–54)
EOSINOPHIL # BLD AUTO: 0.04 10*3/MM3 (ref 0–0.4)
EOSINOPHIL NFR BLD AUTO: 0.7 % (ref 0.3–6.2)
ERYTHROCYTE [DISTWIDTH] IN BLOOD BY AUTOMATED COUNT: 13.6 % (ref 12.3–15.4)
GFR SERPL CREATININE-BSD FRML MDRD: 54 ML/MIN/1.73
GLOBULIN UR ELPH-MCNC: 3.2 GM/DL
GLUCOSE BLD-MCNC: 137 MG/DL (ref 65–99)
HCT VFR BLD AUTO: 52 % (ref 37.5–51)
HDLC SERPL-MCNC: 31 MG/DL (ref 40–60)
HGB BLD-MCNC: 15.4 G/DL (ref 13–17.7)
IMM GRANULOCYTES # BLD AUTO: 0.02 10*3/MM3 (ref 0–0.05)
IMM GRANULOCYTES NFR BLD AUTO: 0.3 % (ref 0–0.5)
LDLC SERPL CALC-MCNC: 73 MG/DL (ref 0–100)
LDLC/HDLC SERPL: 2.37 {RATIO}
LYMPHOCYTES # BLD AUTO: 1.24 10*3/MM3 (ref 0.7–3.1)
LYMPHOCYTES NFR BLD AUTO: 21.1 % (ref 19.6–45.3)
MAGNESIUM SERPL-MCNC: 2 MG/DL (ref 1.6–2.4)
MAXIMAL PREDICTED HEART RATE: 156 BPM
MCH RBC QN AUTO: 29.3 PG (ref 26.6–33)
MCHC RBC AUTO-ENTMCNC: 29.6 G/DL (ref 31.5–35.7)
MCV RBC AUTO: 98.9 FL (ref 79–97)
MONOCYTES # BLD AUTO: 0.59 10*3/MM3 (ref 0.1–0.9)
MONOCYTES NFR BLD AUTO: 10.1 % (ref 5–12)
NEUTROPHILS # BLD AUTO: 3.94 10*3/MM3 (ref 1.7–7)
NEUTROPHILS NFR BLD AUTO: 67.1 % (ref 42.7–76)
NRBC BLD AUTO-RTO: 0 /100 WBC (ref 0–0.2)
NT-PROBNP SERPL-MCNC: 6124 PG/ML (ref 5–900)
PHOSPHATE SERPL-MCNC: 6 MG/DL (ref 2.5–4.5)
PLATELET # BLD AUTO: 131 10*3/MM3 (ref 140–450)
PMV BLD AUTO: 11.2 FL (ref 6–12)
POTASSIUM BLD-SCNC: 4.3 MMOL/L (ref 3.5–5.2)
PROT SERPL-MCNC: 7 G/DL (ref 6–8.5)
RBC # BLD AUTO: 5.26 10*6/MM3 (ref 4.14–5.8)
SODIUM BLD-SCNC: 145 MMOL/L (ref 136–145)
STRESS TARGET HR: 133 BPM
T4 FREE SERPL-MCNC: 1.05 NG/DL (ref 0.93–1.7)
TRIGL SERPL-MCNC: 78 MG/DL (ref 0–150)
TROPONIN T SERPL-MCNC: 0.03 NG/ML (ref 0–0.03)
TROPONIN T SERPL-MCNC: 0.04 NG/ML (ref 0–0.03)
TROPONIN T SERPL-MCNC: 0.05 NG/ML (ref 0–0.03)
VLDLC SERPL-MCNC: 15.6 MG/DL
WBC NRBC COR # BLD: 5.87 10*3/MM3 (ref 3.4–10.8)

## 2019-11-05 PROCEDURE — 25010000002 FUROSEMIDE PER 20 MG: Performed by: INTERNAL MEDICINE

## 2019-11-05 PROCEDURE — 93306 TTE W/DOPPLER COMPLETE: CPT

## 2019-11-05 PROCEDURE — 99232 SBSQ HOSP IP/OBS MODERATE 35: CPT | Performed by: FAMILY MEDICINE

## 2019-11-05 PROCEDURE — 99233 SBSQ HOSP IP/OBS HIGH 50: CPT | Performed by: INTERNAL MEDICINE

## 2019-11-05 PROCEDURE — 93005 ELECTROCARDIOGRAM TRACING: CPT | Performed by: FAMILY MEDICINE

## 2019-11-05 PROCEDURE — 93005 ELECTROCARDIOGRAM TRACING: CPT | Performed by: PHYSICIAN ASSISTANT

## 2019-11-05 PROCEDURE — 84484 ASSAY OF TROPONIN QUANT: CPT | Performed by: PHYSICIAN ASSISTANT

## 2019-11-05 PROCEDURE — 84439 ASSAY OF FREE THYROXINE: CPT | Performed by: PHYSICIAN ASSISTANT

## 2019-11-05 PROCEDURE — 84484 ASSAY OF TROPONIN QUANT: CPT | Performed by: INTERNAL MEDICINE

## 2019-11-05 PROCEDURE — 83880 ASSAY OF NATRIURETIC PEPTIDE: CPT | Performed by: PHYSICIAN ASSISTANT

## 2019-11-05 PROCEDURE — 83735 ASSAY OF MAGNESIUM: CPT | Performed by: PHYSICIAN ASSISTANT

## 2019-11-05 PROCEDURE — 80053 COMPREHEN METABOLIC PANEL: CPT | Performed by: FAMILY MEDICINE

## 2019-11-05 PROCEDURE — 93306 TTE W/DOPPLER COMPLETE: CPT | Performed by: INTERNAL MEDICINE

## 2019-11-05 PROCEDURE — 25010000002 FUROSEMIDE PER 20 MG: Performed by: PHYSICIAN ASSISTANT

## 2019-11-05 PROCEDURE — 93005 ELECTROCARDIOGRAM TRACING: CPT | Performed by: INTERNAL MEDICINE

## 2019-11-05 PROCEDURE — 84100 ASSAY OF PHOSPHORUS: CPT | Performed by: PHYSICIAN ASSISTANT

## 2019-11-05 PROCEDURE — 85025 COMPLETE CBC W/AUTO DIFF WBC: CPT | Performed by: FAMILY MEDICINE

## 2019-11-05 PROCEDURE — 80061 LIPID PANEL: CPT | Performed by: PHYSICIAN ASSISTANT

## 2019-11-05 RX ORDER — FUROSEMIDE 10 MG/ML
20 INJECTION INTRAMUSCULAR; INTRAVENOUS EVERY 12 HOURS
Status: DISCONTINUED | OUTPATIENT
Start: 2019-11-05 | End: 2019-11-06

## 2019-11-05 RX ORDER — HYDRALAZINE HYDROCHLORIDE 10 MG/1
10 TABLET, FILM COATED ORAL EVERY 8 HOURS SCHEDULED
Status: DISCONTINUED | OUTPATIENT
Start: 2019-11-05 | End: 2019-11-13 | Stop reason: HOSPADM

## 2019-11-05 RX ORDER — ISOSORBIDE MONONITRATE 30 MG/1
30 TABLET, EXTENDED RELEASE ORAL
Status: DISCONTINUED | OUTPATIENT
Start: 2019-11-05 | End: 2019-11-09

## 2019-11-05 RX ADMIN — FAMOTIDINE 20 MG: 20 TABLET, FILM COATED ORAL at 08:50

## 2019-11-05 RX ADMIN — ACETAMINOPHEN 650 MG: 325 TABLET ORAL at 23:33

## 2019-11-05 RX ADMIN — ISOSORBIDE MONONITRATE 30 MG: 30 TABLET, EXTENDED RELEASE ORAL at 12:08

## 2019-11-05 RX ADMIN — FUROSEMIDE 20 MG: 10 INJECTION, SOLUTION INTRAMUSCULAR; INTRAVENOUS at 20:46

## 2019-11-05 RX ADMIN — SODIUM CHLORIDE, PRESERVATIVE FREE 10 ML: 5 INJECTION INTRAVENOUS at 08:50

## 2019-11-05 RX ADMIN — METOPROLOL SUCCINATE 25 MG: 25 TABLET, FILM COATED, EXTENDED RELEASE ORAL at 08:50

## 2019-11-05 RX ADMIN — SODIUM CHLORIDE, PRESERVATIVE FREE 10 ML: 5 INJECTION INTRAVENOUS at 20:48

## 2019-11-05 RX ADMIN — ATORVASTATIN CALCIUM 20 MG: 20 TABLET, FILM COATED ORAL at 20:44

## 2019-11-05 RX ADMIN — FUROSEMIDE 40 MG: 10 INJECTION, SOLUTION INTRAMUSCULAR; INTRAVENOUS at 08:50

## 2019-11-05 RX ADMIN — ASPIRIN 81 MG: 81 TABLET, CHEWABLE ORAL at 08:50

## 2019-11-05 RX ADMIN — HYDRALAZINE HYDROCHLORIDE 10 MG: 10 TABLET ORAL at 12:08

## 2019-11-05 NOTE — PROGRESS NOTES
LOS: 1 day     Name: Damián Myers  Age/Sex: 64 y.o. male  :  1955        PCP: Spencer Saeed MD  REF: No ref. provider found    Principal Problem:    CHF exacerbation (CMS/[facility])  Active Problems:    NSTEMI (non-ST elevated myocardial infarction) (CMS/[facility])    Hyperglycemia    Obesity (BMI 30-39.9)    Prolonged Q-T interval on ECG    Renal failure    Acute respiratory failure with hypoxia (CMS/[facility])      Reason for follow-up: Acute heart failure.    Subjective       Subjective  Mr. Myers is a pleasant 64-year  male with no history of known heart disease or coronary artery disease or previous history of congestive heart failure, presented to the emergency department with complaints of progressive dyspnea for about a year but has gotten worse in the last few weeks.  He was noted to be in acute decompensated heart failure by clinical and radiological criteria as well as an elevated proBNP of more than 6000.  He is hence admitted for further cardiac evaluation management.  I have already discussed with Dr. Mayo in the emergency department.  He has received a dose of IV furosemide and has been initiated on aspirin and carvedilol.  He denies any complaints of chest pains.  His EKG has revealed normal sinus rhythm with no electric cardiographic changes of myocardial ischemia.  His chest x-ray is consistent with acute decompensated heart failure.  His troponin has revealed mild elevation up to 0.037.  He is nonhypertensive and nondiabetic.  He says he has never had any previous history of medical problems and has not seen a doctor in 12 years.  He denies any fever, chills, cough, wheezing or hemoptysis.      Interval History: Patient states that he is breathing much better.  He denies any chest pains.  He had fairly good urine output in the last 18hours with a negative fluid balance of 1940 cc.    ROS    Vital Signs  Temp:  [97.6 °F (36.4 °C)-99 °F (37.2 °C)] 97.9 °F (36.6 °C)  Heart Rate:  [77-99] 77  Resp:   [18-20] 18  BP: (115-149)/() 123/56  Vital Signs (last 72 hrs)       11/02 0700  -  11/03 0659 11/03 0700  -  11/04 0659 11/04 0700  -  11/05 0659 11/05 0700  -  11/05 0911   Most Recent    Temp (°F)     97.6 -  99       97.9 (36.6)    Heart Rate     77 -  99       77    Resp     18 -  20       18    BP     115/71 -  (!) 149/35       123/56    SpO2 (%)     (!)89 -  98       97        Body mass index is 36.08 kg/m².    Intake/Output Summary (Last 24 hours) at 11/5/2019 0911  Last data filed at 11/5/2019 0700  Gross per 24 hour   Intake 960 ml   Output 2900 ml   Net -1940 ml     Objective    Objective       Physical Exam:     General Appearance:    Alert, cooperative, in no acute distress   Head:    Normocephalic, without obvious abnormality, atraumatic   Eyes:            Conjunctivae and sclerae normal, no   icterus, no pallor, corneas clear.   Neck:   No adenopathy, supple, trachea midline, no thyromegaly, no   carotid bruit,  JVD plus   Lungs:    Rales of the right base,respirations regular, even and                  unlabored    Heart:    Regular rhythm and normal rate, normal S1 and S2, no            murmur, no gallop, no rub, no click   Chest Wall:    No abnormalities observed   Abdomen:     Normal bowel sounds, no masses, no organomegaly, soft        non-tender, non-distended, no guarding, no rebound                tenderness   Extremities:   Moves all extremities well, 2+ edema both legs, no cyanosis, no             redness   Pulses:   Pulses palpable and equal bilaterally   Skin:   No bleeding, bruising or rash              Procedures    Results review       Results Review:   Results from last 7 days   Lab Units 11/05/19  0439 11/04/19  1035   WBC 10*3/mm3 5.87 6.89   HEMOGLOBIN g/dL 15.4 16.1   PLATELETS 10*3/mm3 131* 151     Results from last 7 days   Lab Units 11/05/19  0439 11/04/19  1035   SODIUM mmol/L 145 145   POTASSIUM mmol/L 4.3 4.6   CHLORIDE mmol/L 101 103   CO2 mmol/L 30.2* 29.9*   BUN  mg/dL 20 18   CREATININE mg/dL 1.34* 1.23   CALCIUM mg/dL 9.3 9.9   GLUCOSE mg/dL 137* 123*   ALT (SGPT) U/L 17 18   AST (SGOT) U/L 17 18     Results from last 7 days   Lab Units 19  0709 19  2214 19  1655 19  1256 19  1035   TROPONIN T ng/mL 0.032* 0.038* 0.041* 0.037* 0.034*     Lab Results   Component Value Date    INR 1.11 (H) 2019     Lab Results   Component Value Date    MG 2.0 2019    MG 2.1 2019     Lab Results   Component Value Date    TSH 5.070 (H) 2019    TRIG 78 2019    HDL 31 (L) 2019    LDL 73 2019      Imaging Results (Last 48 Hours)     Procedure Component Value Units Date/Time    XR Chest 2 View [[id number]] Collected:  19 1113     Updated:  19 1144    Narrative:       EXAMINATION: XR CHEST 2 VW-      CLINICAL INDICATION: shortness of breath        COMPARISON: None available      TECHNIQUE: XR CHEST 2 VW-      FINDINGS:   Minimal airspace disease in the right lung base. I cannot exclude  right-sided pneumonia   Heart and mediastinal contours are unremarkable.   No pneumothorax.   Bony and soft tissue structures are unremarkable.          Impression:       Appearance concerning for right-sided pneumonia     This report was finalized on 2019 11:14 AM by Dr. Jordy Jhaveri MD.                     Damián Myers   Echocardiogram   Order# [id number]   Reading physician: Mahendra Amador MD Ordering physician: Mahendra Amador MD Study date: 19   Patient Information     Patient Name  Damián Myers MRN  [id number] Sex  Male  (Age)  1955 (64 y.o.)   Admission Information     Admission Date/Time Discharge Date/Time Room/Bed   19  0955  3303/2S   Interpretation Summary     · The left ventricular cavity is mildly dilated.  · Left ventricular systolic function is severely decreased.  · Estimated EF appears to be in the range of 31 - 35%.  · Right ventricular cavity is mild-to-moderately dilated.  · Moderately reduced  right ventricular systolic function noted.  · The aortic valve is structurally normal. Trace aortic valve regurgitation is present. No aortic valve stenosis is present.  · The mitral valve is normal in structure. Moderate mitral valve regurgitation is present with an eccentric jet noted. No significant mitral valve stenosis is present.  · The tricuspid valve is normal. Trace tricuspid valve regurgitation is present. Estimated right ventricular systolic pressure from tricuspid regurgitation is mildly elevated (35-45 mmHg).  · There is a small (<1cm) pericardial effusion adjacent to the left ventricle. There is no evidence of cardiac tamponade.  · Comments: No previous studies available for comparison.           I reviewed the patient's new clinical results.    Telemetry: Sinus rhythm with one 15 beat run of possible atrial flutter with rapid conduction earlier this morning.       Medication Review:     aspirin 81 mg Oral Daily   atorvastatin 20 mg Oral Nightly   enoxaparin 40 mg Subcutaneous Nightly   famotidine 20 mg Oral Daily   furosemide 40 mg Intravenous Q12H   metoprolol succinate XL 25 mg Oral Q24H   sodium chloride 10 mL Intravenous Q12H            Assessment      1. Acute decompensated systolic heart failure (new onset), clinically doing better.  2. Dilated cardiomyopathy with severely depressed global LV systolic function with an estimated allergic fraction of about 30 to 35% of unclear etiology.  3. Acute kidney injury with a worsening of his creatinine from 1.23-1.34 since admission.  This could be due to diuresis.  Rule out any intrinsic renal disease or a prostate enlargement.  4. Short run( 15 beat) of possible atrial flutter with rapid AV conduction ventricular rate around 150 bpm earlier this morning at about 6:39 AM.  Patient apparently was asymptomatic during this episode.  5. Mildly elevated troponin which peaked at 0.041 and is trending down.      Recommendations     1. Continue with cautious  diuresis and continue to monitor the kidney function closely.  2. Given the worsening of his kidney function since admission, would hold off on ACE inhibitors, ARB or Entresto for now and add hydralazine and oral nitrates for his cardiomyopathy for now and consider switching to Entresto later on once his kidney function improves adequately.  3. Will consider adding spironolactone later on once his kidney function stabilizes.  4. I have discussed with him and his wife about the echocardiographic findings as well as his kidney status and the plan of management for his heart failure including further evaluation with cardiac catheterization at some point when his heart failure and kidney function improves adequately.  Patient expressed understanding.  5. He may need nephrology/urology evaluation for his abnormal kidney function at baseline.      I discussed the patients findings and my recommendations with patient and family      DINAH Oseguera  11/05/19  9:11 AM    Please note that portions of this note were completed with a voice recognition program.

## 2019-11-05 NOTE — PROGRESS NOTES
[facility] HOSPITALIST PROGRESS NOTE     Patient Identification:  Name:  Damián Myers  Age:  64 y.o.  Sex:  male  :  1955  MRN:  [id number]  Visit Number:  [id number]  ROOM: [address] [place] [address]     Primary Care Provider:  Spencer Saeed MD    Length of stay in inpatient status:  1    Subjective     Chief Compliant:    Chief Complaint   Patient presents with   • Sent from PCP   • Shortness of Breath       History of Presenting Illness: 64-year-old gentleman who presented with congestive heart failure.  Patient has no prior medical history of heart disease.  Patient is found to have a EF of 30 to 35%.  Patient had gray zone troponins as well.  Denies any chest pain at this time but did have some chest discomfort prior to coming into the hospital.  Patient states he is feeling some better today.    Objective     Current Hospital Meds:  aspirin 81 mg Oral Daily   atorvastatin 20 mg Oral Nightly   enoxaparin 40 mg Subcutaneous Nightly   famotidine 20 mg Oral Daily   furosemide 20 mg Intravenous Q12H   hydrALAZINE 10 mg Oral Q8H   isosorbide mononitrate 30 mg Oral Q24H   metoprolol succinate XL 25 mg Oral Q24H   sodium chloride 10 mL Intravenous Q12H      ----------------------------------------------------------------------------------------------------------------------  Vital Signs:  Temp:  [97.6 °F (36.4 °C)-99 °F (37.2 °C)] 97.9 °F (36.6 °C)  Heart Rate:  [77-99] 77  Resp:  [18-20] 18  BP: (115-138)/() 124/69  SpO2:  [91 %-97 %] 96 %  on  Flow (L/min):  [2] 2;   Device (Oxygen Therapy): nasal cannula  Body mass index is 36.06 kg/m².    Wt Readings from Last 3 Encounters:   19 127 kg (281 lb)     Intake & Output (last 3 days)       701 -  07 -  07 -  07 -  0700    P.O.   960     Total Intake(mL/kg)   960 (7.6)     Urine (mL/kg/hr)   2900 200 (0.3)    Total Output   2900 200    Net   -1940 -200                Diet Regular;  Cardiac  ----------------------------------------------------------------------------------------------------------------------  Physical exam:  Constitutional: No acute distress  HEENT: Normocephalic atraumatic  Neck: Supple  Cardiovascular: Regular rate and rhythm ; 1-2+ edema in the lower extremities  Pulmonary/Chest: Crackles noted; more so on the left  Abdominal: Positive bowel sounds soft.   Musculoskeletal: No arthropathy  Neurological: No focal deficits  Skin: No rashes  Peripheral vascular:  Genitourinary:  ----------------------------------------------------------------------------------------------------------------------    Last echocardiogram:  Results for orders placed during the hospital encounter of 11/04/19   Adult Transthoracic Echo Complete W/ Cont if Necessary Per Protocol    Narrative · The left ventricular cavity is mildly dilated.  · Left ventricular systolic function is severely decreased.  · Estimated EF appears to be in the range of 31 - 35%.  · Right ventricular cavity is mild-to-moderately dilated.  · Moderately reduced right ventricular systolic function noted.  · The aortic valve is structurally normal. Trace aortic valve   regurgitation is present. No aortic valve stenosis is present.  · The mitral valve is normal in structure. Moderate mitral valve   regurgitation is present with an eccentric jet noted. No significant   mitral valve stenosis is present.  · The tricuspid valve is normal. Trace tricuspid valve regurgitation is   present. Estimated right ventricular systolic pressure from tricuspid   regurgitation is mildly elevated (35-45 mmHg).  · There is a small (<1cm) pericardial effusion adjacent to the left   ventricle. There is no evidence of cardiac tamponade.  · Comments: No previous studies available for comparison.        ----------------------------------------------------------------------------------------------------------------------  Results from last 7 days   Lab Units  11/05/19  0439 11/04/19  1035   LACTATE mmol/L  --  1.4   WBC 10*3/mm3 5.87 6.89   HEMOGLOBIN g/dL 15.4 16.1   HEMATOCRIT % 52.0* 52.5*   MCV fL 98.9* 96.5   MCHC g/dL 29.6* 30.7*   PLATELETS 10*3/mm3 131* 151   INR   --  1.11*         Results from last 7 days   Lab Units 11/05/19  0439 11/04/19  1035   SODIUM mmol/L 145 145   POTASSIUM mmol/L 4.3 4.6   MAGNESIUM mg/dL 2.0 2.1   CHLORIDE mmol/L 101 103   CO2 mmol/L 30.2* 29.9*   BUN mg/dL 20 18   CREATININE mg/dL 1.34* 1.23   EGFR IF NONAFRICN AM mL/min/1.73 54* 59*   CALCIUM mg/dL 9.3 9.9   PHOSPHORUS mg/dL 6.0*  --    GLUCOSE mg/dL 137* 123*   ALBUMIN g/dL 3.81 4.05   BILIRUBIN mg/dL 0.8 0.9   ALK PHOS U/L 70 74   AST (SGOT) U/L 17 18   ALT (SGPT) U/L 17 18   Estimated Creatinine Clearance: 78.8 mL/min (A) (by C-G formula based on SCr of 1.34 mg/dL (H)).  No results found for: AMMONIA  Results from last 7 days   Lab Units 11/05/19  1150 11/05/19  0709 11/04/19  2214   TROPONIN T ng/mL 0.038* 0.032* 0.038*     Results from last 7 days   Lab Units 11/05/19  0439 11/04/19  1035   PROBNP pg/mL 6,124.0* 6,938.0*     Results from last 7 days   Lab Units 11/05/19  0439   CHOLESTEROL mg/dL 120   TRIGLYCERIDES mg/dL 78   HDL CHOL mg/dL 31*   LDL CHOL mg/dL 73     Hemoglobin A1C   Date/Time Value Ref Range Status   11/04/2019 1035 6.70 (H) 4.80 - 5.60 % Final     Lab Results   Component Value Date    TSH 5.070 (H) 11/04/2019    FREET4 1.05 11/05/2019     No results found for: PREGTESTUR, PREGSERUM, HCG, HCGQUANT  Pain Management Panel     There is no flowsheet data to display.        Brief Urine Lab Results  (Last result in the past 365 days)      Color   Clarity   Blood   Leuk Est   Nitrite   Protein   CREAT   Urine HCG        11/04/19 1147 Yellow Clear Negative Negative Negative Negative             Blood Culture   Date Value Ref Range Status   11/04/2019 No growth at 24 hours  Preliminary   11/04/2019 No growth at 24 hours  Preliminary     Results from last 7 days   Lab  Units 11/04/19  1147   NITRITE UA  Negative              Results from last 7 days   Lab Units 11/04/19  1035   LACTATE mmol/L 1.4       I have personally looked at the labs and they are summarized above.  ----------------------------------------------------------------------------------------------------------------------  Detailed radiology reports for the last 24 hours:    Imaging Results (Last 24 Hours)     ** No results found for the last 24 hours. **        Final impressions for the last 30 days of radiology reports:    Xr Chest 2 View    Result Date: 11/4/2019  Appearance concerning for right-sided pneumonia  This report was finalized on 11/4/2019 11:14 AM by Dr. Jordy Jhaveri MD.      I have personally looked at the radiology images and read the final radiology report.    Assessment & Plan    CHF secondary to systolic dysfunction--we will need to rule out ischemic cardiomyopathy.  Patient will have left heart catheterization near future.  Continue diuresis at this time.    Acute kidney injury--patient slight bump in creatinine overnight.  Decrease Lasix to 20 mg every 12 hours.  Will monitor closely    Non-STEMI--medical management at this time plan for left heart catheterization    VTE Prophylaxis:   Mechanical Order History:     None      Pharmalogical Order History:     Ordered     Dose Route Frequency Stop    11/04/19 1541  enoxaparin (LOVENOX) syringe 40 mg      40 mg SC Nightly --          The patient is high risk due to the following diagnoses/reasons: CHF with possible ischemic cardiomyopathy    Roel Martines MD  [facility]  11/05/19  12:58 PM

## 2019-11-05 NOTE — NURSING NOTE
Time In 0850 Time Out 0910      Order received for Cardiac Rehab Consultation.     Patient stated at this time he does not have insurance and cannot afford the Phase III program either. He stated he will walk at home as he had been doing before his heart attack.       Information discussed with: Patient/Spouse        Educated on: Benefits of Exercise,  Educated on Cardiac Rehab and Program Protocol, Brochure and/or educational material provided, Contact information given and Teach Back Verified            Thank you for the referral. Please contact the Cardiac Rehab Dept. (ext. [phone]) with any further questions or concerns.

## 2019-11-05 NOTE — PLAN OF CARE
Problem: Fall Risk (Adult)  Goal: Identify Related Risk Factors and Signs and Symptoms  Outcome: Ongoing (interventions implemented as appropriate)    Goal: Absence of Fall  Outcome: Ongoing (interventions implemented as appropriate)      Problem: Patient Care Overview  Goal: Plan of Care Review  Outcome: Ongoing (interventions implemented as appropriate)    Goal: Discharge Needs Assessment  Outcome: Ongoing (interventions implemented as appropriate)      Problem: Cardiac: ACS (Acute Coronary Syndrome) (Adult)  Goal: Signs and Symptoms of Listed Potential Problems Will be Absent, Minimized or Managed (Cardiac: ACS)  Outcome: Ongoing (interventions implemented as appropriate)      Problem: Patient Care Overview  Goal: Plan of Care Review  Outcome: Ongoing (interventions implemented as appropriate)    Goal: Discharge Needs Assessment  Outcome: Ongoing (interventions implemented as appropriate)    Goal: Interprofessional Rounds/Family Conf  Outcome: Ongoing (interventions implemented as appropriate)

## 2019-11-05 NOTE — PLAN OF CARE
Problem: Fall Risk (Adult)  Goal: Identify Related Risk Factors and Signs and Symptoms  Outcome: Outcome(s) achieved Date Met: 11/05/19    Goal: Absence of Fall  Outcome: Ongoing (interventions implemented as appropriate)      Problem: Patient Care Overview  Goal: Plan of Care Review  Outcome: Ongoing (interventions implemented as appropriate)      Problem: Cardiac: ACS (Acute Coronary Syndrome) (Adult)  Goal: Signs and Symptoms of Listed Potential Problems Will be Absent, Minimized or Managed (Cardiac: ACS)  Outcome: Outcome(s) achieved Date Met: 11/05/19      Problem: Patient Care Overview  Goal: Plan of Care Review  Outcome: Ongoing (interventions implemented as appropriate)

## 2019-11-06 LAB
ANION GAP SERPL CALCULATED.3IONS-SCNC: 10.2 MMOL/L (ref 5–15)
BASOPHILS # BLD AUTO: 0.03 10*3/MM3 (ref 0–0.2)
BASOPHILS NFR BLD AUTO: 0.5 % (ref 0–1.5)
BUN BLD-MCNC: 27 MG/DL (ref 8–23)
BUN/CREAT SERPL: 18.9 (ref 7–25)
CALCIUM SPEC-SCNC: 9.2 MG/DL (ref 8.6–10.5)
CHLORIDE SERPL-SCNC: 98 MMOL/L (ref 98–107)
CO2 SERPL-SCNC: 37.8 MMOL/L (ref 22–29)
CREAT BLD-MCNC: 1.43 MG/DL (ref 0.76–1.27)
DEPRECATED RDW RBC AUTO: 48.1 FL (ref 37–54)
EOSINOPHIL # BLD AUTO: 0.08 10*3/MM3 (ref 0–0.4)
EOSINOPHIL NFR BLD AUTO: 1.2 % (ref 0.3–6.2)
ERYTHROCYTE [DISTWIDTH] IN BLOOD BY AUTOMATED COUNT: 13.4 % (ref 12.3–15.4)
GFR SERPL CREATININE-BSD FRML MDRD: 50 ML/MIN/1.73
GLUCOSE BLD-MCNC: 138 MG/DL (ref 65–99)
HCT VFR BLD AUTO: 49.1 % (ref 37.5–51)
HGB BLD-MCNC: 15 G/DL (ref 13–17.7)
IMM GRANULOCYTES # BLD AUTO: 0.01 10*3/MM3 (ref 0–0.05)
IMM GRANULOCYTES NFR BLD AUTO: 0.2 % (ref 0–0.5)
LYMPHOCYTES # BLD AUTO: 1.49 10*3/MM3 (ref 0.7–3.1)
LYMPHOCYTES NFR BLD AUTO: 22.4 % (ref 19.6–45.3)
MCH RBC QN AUTO: 29.8 PG (ref 26.6–33)
MCHC RBC AUTO-ENTMCNC: 30.5 G/DL (ref 31.5–35.7)
MCV RBC AUTO: 97.6 FL (ref 79–97)
MONOCYTES # BLD AUTO: 0.78 10*3/MM3 (ref 0.1–0.9)
MONOCYTES NFR BLD AUTO: 11.7 % (ref 5–12)
NEUTROPHILS # BLD AUTO: 4.26 10*3/MM3 (ref 1.7–7)
NEUTROPHILS NFR BLD AUTO: 64 % (ref 42.7–76)
NRBC BLD AUTO-RTO: 0 /100 WBC (ref 0–0.2)
PLATELET # BLD AUTO: 143 10*3/MM3 (ref 140–450)
PMV BLD AUTO: 11.7 FL (ref 6–12)
POTASSIUM BLD-SCNC: 4 MMOL/L (ref 3.5–5.2)
RBC # BLD AUTO: 5.03 10*6/MM3 (ref 4.14–5.8)
SODIUM BLD-SCNC: 146 MMOL/L (ref 136–145)
WBC NRBC COR # BLD: 6.65 10*3/MM3 (ref 3.4–10.8)

## 2019-11-06 PROCEDURE — 25010000002 DOBUTAMINE PER 250 MG: Performed by: INTERNAL MEDICINE

## 2019-11-06 PROCEDURE — 80048 BASIC METABOLIC PNL TOTAL CA: CPT | Performed by: INTERNAL MEDICINE

## 2019-11-06 PROCEDURE — 84153 ASSAY OF PSA TOTAL: CPT | Performed by: INTERNAL MEDICINE

## 2019-11-06 PROCEDURE — 99232 SBSQ HOSP IP/OBS MODERATE 35: CPT | Performed by: FAMILY MEDICINE

## 2019-11-06 PROCEDURE — 85007 BL SMEAR W/DIFF WBC COUNT: CPT | Performed by: FAMILY MEDICINE

## 2019-11-06 PROCEDURE — 99232 SBSQ HOSP IP/OBS MODERATE 35: CPT | Performed by: NURSE PRACTITIONER

## 2019-11-06 PROCEDURE — 85025 COMPLETE CBC W/AUTO DIFF WBC: CPT | Performed by: FAMILY MEDICINE

## 2019-11-06 PROCEDURE — 25010000002 ENOXAPARIN PER 10 MG: Performed by: FAMILY MEDICINE

## 2019-11-06 RX ORDER — DOBUTAMINE HYDROCHLORIDE 200 MG/100ML
3 INJECTION INTRAVENOUS
Status: DISCONTINUED | OUTPATIENT
Start: 2019-11-06 | End: 2019-11-07

## 2019-11-06 RX ORDER — HYDROCODONE BITARTRATE AND ACETAMINOPHEN 5; 325 MG/1; MG/1
1 TABLET ORAL ONCE AS NEEDED
Status: DISCONTINUED | OUTPATIENT
Start: 2019-11-06 | End: 2019-11-13 | Stop reason: HOSPADM

## 2019-11-06 RX ORDER — CHOLECALCIFEROL (VITAMIN D3) 125 MCG
5 CAPSULE ORAL NIGHTLY PRN
Status: DISCONTINUED | OUTPATIENT
Start: 2019-11-06 | End: 2019-11-11

## 2019-11-06 RX ADMIN — Medication 5 MG: at 01:08

## 2019-11-06 RX ADMIN — ENOXAPARIN SODIUM 40 MG: 40 INJECTION SUBCUTANEOUS at 21:24

## 2019-11-06 RX ADMIN — ACETAMINOPHEN 650 MG: 325 TABLET ORAL at 05:20

## 2019-11-06 RX ADMIN — FAMOTIDINE 20 MG: 20 TABLET, FILM COATED ORAL at 09:27

## 2019-11-06 RX ADMIN — ACETAMINOPHEN 650 MG: 325 TABLET ORAL at 14:47

## 2019-11-06 RX ADMIN — ASPIRIN 81 MG: 81 TABLET, CHEWABLE ORAL at 09:27

## 2019-11-06 RX ADMIN — SODIUM CHLORIDE, PRESERVATIVE FREE 10 ML: 5 INJECTION INTRAVENOUS at 09:27

## 2019-11-06 RX ADMIN — DOBUTAMINE HYDROCHLORIDE 3 MCG/KG/MIN: 200 INJECTION INTRAVENOUS at 14:03

## 2019-11-06 RX ADMIN — METOPROLOL SUCCINATE 25 MG: 25 TABLET, FILM COATED, EXTENDED RELEASE ORAL at 09:26

## 2019-11-06 RX ADMIN — ATORVASTATIN CALCIUM 20 MG: 20 TABLET, FILM COATED ORAL at 21:24

## 2019-11-06 RX ADMIN — ISOSORBIDE MONONITRATE 30 MG: 30 TABLET, EXTENDED RELEASE ORAL at 09:27

## 2019-11-06 NOTE — PLAN OF CARE
Problem: Fall Risk (Adult)  Goal: Absence of Fall  Outcome: Ongoing (interventions implemented as appropriate)      Problem: Patient Care Overview  Goal: Plan of Care Review  Outcome: Ongoing (interventions implemented as appropriate)    Goal: Individualization and Mutuality  Outcome: Ongoing (interventions implemented as appropriate)    Goal: Discharge Needs Assessment  Outcome: Ongoing (interventions implemented as appropriate)    Goal: Interprofessional Rounds/Family Conf  Outcome: Ongoing (interventions implemented as appropriate)      Problem: Patient Care Overview  Goal: Plan of Care Review  Outcome: Ongoing (interventions implemented as appropriate)    Goal: Individualization and Mutuality  Outcome: Ongoing (interventions implemented as appropriate)    Goal: Discharge Needs Assessment  Outcome: Ongoing (interventions implemented as appropriate)    Goal: Interprofessional Rounds/Family Conf  Outcome: Ongoing (interventions implemented as appropriate)

## 2019-11-06 NOTE — SIGNIFICANT NOTE
I was contacted by the patient's RN that his O2sats have been sitting in the low 90's and at times, falling to the 80s during sleep. His O2 rate had been increased from 2 to 2.5L without improvement. I asked his RN to increase his O2 to 3L and went to see the patient at bedside. He denies any current complaints and does not feel more short of breath than usual. No chest pains or discomfort. He had about 550cc urine output after his last dose of IV lasix around 2100. O2sat at bedside is 93-95%. On exam, he is resting comfortably, no acute distress. Heart is RRR, no murmur, rubs or gallops. He has few rales at the right base. No wheezes or rhonchi bilaterally. Breath sounds present throughout.     Will continue O2 at 3L for now, I have instructed his RN, Richard to titrate O2 as needed. Initially, a STAT ABG was ordered, however, since the patient is awake, alert, maintaining O2sats, is in no acute distress, and is somewhat agitated about being woken up repeatedly, will hold off on this for now. His lasix had been reduced due to worsening renal function the day prior. He may have some degree of sleep apnea as well as he reports that he does snore at night and has never been evaluated, would consider referral for sleep study at discharge.     Labs this morning show a continued increase in BUN/Creatinine this morning, will hold his scheduled lasix for now. Will notify his attending physician on day shift when available.     KATHRINE Long  11/06/19  5:12 AM

## 2019-11-06 NOTE — PLAN OF CARE
Problem: Fall Risk (Adult)  Goal: Absence of Fall  Outcome: Ongoing (interventions implemented as appropriate)      Problem: Patient Care Overview  Goal: Individualization and Mutuality  Outcome: Ongoing (interventions implemented as appropriate)    Goal: Interprofessional Rounds/Family Conf  Outcome: Ongoing (interventions implemented as appropriate)      Problem: Patient Care Overview  Goal: Plan of Care Review  Outcome: Ongoing (interventions implemented as appropriate)    Goal: Individualization and Mutuality  Outcome: Ongoing (interventions implemented as appropriate)    Goal: Discharge Needs Assessment  Outcome: Ongoing (interventions implemented as appropriate)    Goal: Interprofessional Rounds/Family Conf  Outcome: Ongoing (interventions implemented as appropriate)

## 2019-11-06 NOTE — NURSING NOTE
NOTIFIED DR. COLLADO THAT PT'S BP IS 94/54 RIGHT ARM AND 95/55 HR 74 IN THE LEFT ARM. PT. IS ASYMPTOMATIC. THE ONLY THING HE IS C/O IS A HA THAT HE HAS HAD OFF AND ON SINCE YESTERDAY. OK AS LONG AS SYSTOLIC IS ABOVE 90.           NOTIFIED DR. LEIJA OF BP AS WELL AND NOTIFIED HIM THAT PT. IS C/O OF HA SINCE  YESTERDAY. HE STARTED THE IMDUR YESTERDAY. ORDERS FOR NORCO 5MG ONCE NOTED.     PT. REFUSES THE NORCO AT THIS TIME BUT WILL TAKE THE TYLENOL WHEN IT IS DUE.

## 2019-11-06 NOTE — PROGRESS NOTES
[facility] HOSPITALIST PROGRESS NOTE     Patient Identification:  Name:  Damián Myers  Age:  64 y.o.  Sex:  male  :  1955  MRN:  [id number]  Visit Number:  [id number]  ROOM: [address] [place] [address]     Primary Care Provider:  Spencer Saeed MD    Length of stay in inpatient status:  2    Subjective     Chief Compliant:    Chief Complaint   Patient presents with   • Sent from PCP   • Shortness of Breath       History of Presenting Illness: 64-year-old gentleman who was admitted with acute CHF secondary to systolic dysfunction.  Patient is being evaluated for possible ischemic cardiomyopathy.  Patient was diuresed yesterday and did have some increase in creatinine and patient states he is felt lightheaded as well.  No new complaints otherwise.  Patient did have an episode of hypoxemia when he was sleeping last evening but apparently tends to mouth breathe.  His wife states he is a heavy snorer.    Objective     Current Hospital Meds:  aspirin 81 mg Oral Daily   atorvastatin 20 mg Oral Nightly   enoxaparin 40 mg Subcutaneous Nightly   famotidine 20 mg Oral Daily   hydrALAZINE 10 mg Oral Q8H   isosorbide mononitrate 30 mg Oral Q24H   metoprolol succinate XL 25 mg Oral Q24H   sodium chloride 10 mL Intravenous Q12H     DOBUTamine 3 mcg/kg/min     ----------------------------------------------------------------------------------------------------------------------  Vital Signs:  Temp:  [97.4 °F (36.3 °C)-98.6 °F (37 °C)] 97.4 °F (36.3 °C)  Heart Rate:  [66-84] 68  Resp:  [18-20] 18  BP: (102-136)/(61-91) 136/80  SpO2:  [91 %-97 %] 96 %  on  Flow (L/min):  [2] 2;   Device (Oxygen Therapy): nasal cannula  Body mass index is 35.86 kg/m².    Wt Readings from Last 3 Encounters:   19 127 kg (279 lb 6.4 oz)     Intake & Output (last 3 days)       701 -  -  -  - 700    P.O.  960 360 360    Total Intake(mL/kg)  960 (7.6) 360 (2.8) 360 (2.8)     Urine (mL/kg/hr)  2900 1250 (0.4) 550 (0.7)    Total Output  2900 1250 550    Net  -1940 -890 -190                Diet Regular; Cardiac  ----------------------------------------------------------------------------------------------------------------------  Physical exam:  Constitutional: Overweight gentleman in no distress  HEENT: Cephalic atraumatic  Neck:   Supple  Cardiovascular: Regular rate and rhythm; has a trace to 1+ edema in the lower extremities  Pulmonary/Chest: Few scattered crackles  Abdominal: Positive bowel sounds soft.   Musculoskeletal: No arthropathy  Neurological: No focal deficits  Skin: No rashes  Peripheral vascular:  Genitourinary:  ----------------------------------------------------------------------------------------------------------------------    Last echocardiogram:  Results for orders placed during the hospital encounter of 11/04/19   Adult Transthoracic Echo Complete W/ Cont if Necessary Per Protocol    Narrative · The left ventricular cavity is mildly dilated.  · Left ventricular systolic function is severely decreased.  · Estimated EF appears to be in the range of 31 - 35%.  · Right ventricular cavity is mild-to-moderately dilated.  · Moderately reduced right ventricular systolic function noted.  · The aortic valve is structurally normal. Trace aortic valve   regurgitation is present. No aortic valve stenosis is present.  · The mitral valve is normal in structure. Moderate mitral valve   regurgitation is present with an eccentric jet noted. No significant   mitral valve stenosis is present.  · The tricuspid valve is normal. Trace tricuspid valve regurgitation is   present. Estimated right ventricular systolic pressure from tricuspid   regurgitation is mildly elevated (35-45 mmHg).  · There is a small (<1cm) pericardial effusion adjacent to the left   ventricle. There is no evidence of cardiac tamponade.  · Comments: No previous studies available for comparison.         ----------------------------------------------------------------------------------------------------------------------  Results from last 7 days   Lab Units 11/06/19 0021 11/05/19 0439 11/04/19  1035   LACTATE mmol/L  --   --  1.4   WBC 10*3/mm3 6.65 5.87 6.89   HEMOGLOBIN g/dL 15.0 15.4 16.1   HEMATOCRIT % 49.1 52.0* 52.5*   MCV fL 97.6* 98.9* 96.5   MCHC g/dL 30.5* 29.6* 30.7*   PLATELETS 10*3/mm3 143 131* 151   INR   --   --  1.11*         Results from last 7 days   Lab Units 11/06/19 0021 11/05/19 0439 11/04/19  1035   SODIUM mmol/L 146* 145 145   POTASSIUM mmol/L 4.0 4.3 4.6   MAGNESIUM mg/dL  --  2.0 2.1   CHLORIDE mmol/L 98 101 103   CO2 mmol/L 37.8* 30.2* 29.9*   BUN mg/dL 27* 20 18   CREATININE mg/dL 1.43* 1.34* 1.23   EGFR IF NONAFRICN AM mL/min/1.73 50* 54* 59*   CALCIUM mg/dL 9.2 9.3 9.9   PHOSPHORUS mg/dL  --  6.0*  --    GLUCOSE mg/dL 138* 137* 123*   ALBUMIN g/dL  --  3.81 4.05   BILIRUBIN mg/dL  --  0.8 0.9   ALK PHOS U/L  --  70 74   AST (SGOT) U/L  --  17 18   ALT (SGPT) U/L  --  17 18   Estimated Creatinine Clearance: 73.8 mL/min (A) (by C-G formula based on SCr of 1.43 mg/dL (H)).  No results found for: AMMONIA  Results from last 7 days   Lab Units 11/05/19  2300 11/05/19  1150 11/05/19  0709   TROPONIN T ng/mL 0.047* 0.038* 0.032*     Results from last 7 days   Lab Units 11/05/19  0439 11/04/19  1035   PROBNP pg/mL 6,124.0* 6,938.0*     Results from last 7 days   Lab Units 11/05/19  0439   CHOLESTEROL mg/dL 120   TRIGLYCERIDES mg/dL 78   HDL CHOL mg/dL 31*   LDL CHOL mg/dL 73     Hemoglobin A1C   Date/Time Value Ref Range Status   11/04/2019 1035 6.70 (H) 4.80 - 5.60 % Final     Lab Results   Component Value Date    TSH 5.070 (H) 11/04/2019    FREET4 1.05 11/05/2019     No results found for: PREGTESTUR, PREGSERUM, HCG, HCGQUANT  Pain Management Panel     There is no flowsheet data to display.        Brief Urine Lab Results  (Last result in the past 365 days)      Color   Clarity   Blood    Leuk Est   Nitrite   Protein   CREAT   Urine HCG        11/04/19 1147 Yellow Clear Negative Negative Negative Negative             Blood Culture   Date Value Ref Range Status   11/04/2019 No growth at 2 days  Preliminary   11/04/2019 No growth at 2 days  Preliminary     Results from last 7 days   Lab Units 11/04/19  1147   NITRITE UA  Negative              Results from last 7 days   Lab Units 11/04/19  1035   LACTATE mmol/L 1.4       I have personally looked at the labs and they are summarized above.  ----------------------------------------------------------------------------------------------------------------------  Detailed radiology reports for the last 24 hours:    Imaging Results (Last 24 Hours)     ** No results found for the last 24 hours. **        Final impressions for the last 30 days of radiology reports:    Xr Chest 2 View    Result Date: 11/4/2019  Appearance concerning for right-sided pneumonia  This report was finalized on 11/4/2019 11:14 AM by Dr. Jordy Jhaveri MD.      I have personally looked at the radiology images and read the final radiology report.    Assessment & Plan    Acute CHF secondary to systolic dysfunction--possible ischemic mild cardiomyopathy.  Plan for cardiac catheterization in the near future.  Patient be placed on dobutamine drip today.  Hold Lasix secondary to acute kidney injury.    Acute kidney injury--hold diuretic today.    Possible obstructive sleep apnea--would recommend outpatient sleep study    VTE Prophylaxis:   Mechanical Order History:     None      Pharmalogical Order History:     Ordered     Dose Route Frequency Stop    11/04/19 1541  enoxaparin (LOVENOX) syringe 40 mg      40 mg SC Nightly --          The patient is high risk due to the following diagnoses/reasons: CHF    Roel Martines MD  [facility]  11/06/19  12:55 PM

## 2019-11-06 NOTE — PHARMACY PATIENT ASSISTANCE
Pharmacy was asked to look into pricing for entresto.  The patient does not have prescription insurance.  [facility] is aware.    Thank You;  Rona Fernandez RPH  11/06/19  11:33 AM

## 2019-11-06 NOTE — PROGRESS NOTES
LOS: 2 days     Name: Damián Myers  Age/Sex: 64 y.o. male  :  1955        PCP: Spencer Saeed MD  REF: No ref. provider found    Principal Problem:    CHF exacerbation (CMS/[facility])  Active Problems:    NSTEMI (non-ST elevated myocardial infarction) (CMS/[facility])    Hyperglycemia    Obesity (BMI 30-39.9)    Prolonged Q-T interval on ECG    Renal failure    Acute respiratory failure with hypoxia (CMS/[facility])      Reason for follow-up: Acute heart failure    Subjective       Subjective     Mr. Myers is a pleasant 64-year  male with no history of known heart disease or coronary artery disease or previous history of congestive heart failure, presented to the emergency department with complaints of progressive dyspnea for about a year but has gotten worse in the last few weeks.  He was noted to be in acute decompensated heart failure by clinical and radiological criteria as well as an elevated proBNP of more than 6000.  He is hence admitted for further cardiac evaluation management.  I have already discussed with Dr. Mayo in the emergency department.  He has received a dose of IV furosemide and has been initiated on aspirin and carvedilol.  He denies any complaints of chest pains.  His EKG has revealed normal sinus rhythm with no electric cardiographic changes of myocardial ischemia.  His chest x-ray is consistent with acute decompensated heart failure.  His troponin has revealed mild elevation up to 0.037.  He is nonhypertensive and nondiabetic.  He says he has never had any previous history of medical problems and has not seen a doctor in 12 years.  He denies any fever, chills, cough, wheezing or hemoptysis.    Interval History: Patient is resting in bed. He remains short of breath but it has improved some. He currently cannot lay flat. Pedal edema has improved. He denies chest pain. Creatinine slightly worse today at 1.43.       Vital Signs  Temp:  [98.1 °F (36.7 °C)-98.6 °F (37 °C)] 98.1 °F (36.7 °C)  Heart  Rate:  [66-84] 81  Resp:  [18-20] 18  BP: (102-129)/(61-91) 128/88     Vital Signs (last 72 hrs)       11/03 0700  -  11/04 0659 11/04 0700  -  11/05 0659 11/05 0700  -  11/06 0659 11/06 0700  -  11/06 1025   Most Recent    Temp (°F)   97.6 -  99    98.1 -  98.6       98.1 (36.7)    Heart Rate   77 -  99    66 -  84       81    Resp   18 -  20    18 -  20       18    BP   115/71 -  (!) 149/35    102/67 -  129/91       128/88    SpO2 (%)   (!)89 -  98    91 -  97       95        Body mass index is 35.86 kg/m².    Intake/Output Summary (Last 24 hours) at 11/6/2019 1025  Last data filed at 11/6/2019 0900  Gross per 24 hour   Intake 720 ml   Output 1050 ml   Net -330 ml     Objective    Objective       Physical Exam:     General Appearance:    Alert, cooperative, in no acute distress   Head:    Normocephalic, without obvious abnormality, atraumatic   Eyes:            Conjunctivae and sclerae normal, no   icterus, no pallor, corneas clear.   Neck:   No adenopathy, supple, trachea midline, no thyromegaly, no   carotid bruit, JVD   Lungs:     Rales at the bases,respirations regular, even and                    unlabored    Heart:    Regular rhythm and normal rate, normal S1 and S2, no            murmur, no gallop, no rub, no click   Chest Wall:    No abnormalities observed   Abdomen:     Normal bowel sounds, no masses, no organomegaly, soft        non-tender, non-distended, no guarding, no rebound                tenderness   Extremities:   Moves all extremities well, 2+ edema BLE, no cyanosis, no       redness   Pulses:   Pulses palpable and equal bilaterally   Skin:   No bleeding, bruising or rash       Neurologic:  Alert and oriented          Procedures    Results review       Results Review:   Results from last 7 days   Lab Units 11/06/19  0021 11/05/19  0439 11/04/19  1035   WBC 10*3/mm3 6.65 5.87 6.89   HEMOGLOBIN g/dL 15.0 15.4 16.1   PLATELETS 10*3/mm3 143 131* 151     Results from last 7 days   Lab Units  11/06/19  0021 11/05/19  0439 11/04/19  1035   SODIUM mmol/L 146* 145 145   POTASSIUM mmol/L 4.0 4.3 4.6   CHLORIDE mmol/L 98 101 103   CO2 mmol/L 37.8* 30.2* 29.9*   BUN mg/dL 27* 20 18   CREATININE mg/dL 1.43* 1.34* 1.23   CALCIUM mg/dL 9.2 9.3 9.9   GLUCOSE mg/dL 138* 137* 123*   ALT (SGPT) U/L  --  17 18   AST (SGOT) U/L  --  17 18     Results from last 7 days   Lab Units 11/05/19  2300 11/05/19  1150 11/05/19  0709 11/04/19  2214 11/04/19  1655   TROPONIN T ng/mL 0.047* 0.038* 0.032* 0.038* 0.041*     Lab Results   Component Value Date    INR 1.11 (H) 11/04/2019     Lab Results   Component Value Date    MG 2.0 11/05/2019    MG 2.1 11/04/2019     Lab Results   Component Value Date    TSH 5.070 (H) 11/04/2019    TRIG 78 11/05/2019    HDL 31 (L) 11/05/2019    LDL 73 11/05/2019      Imaging Results (Last 48 Hours)     Procedure Component Value Units Date/Time    XR Chest 2 View [[id number]] Collected:  11/04/19 1113     Updated:  11/04/19 1144    Narrative:       EXAMINATION: XR CHEST 2 VW-      CLINICAL INDICATION: shortness of breath        COMPARISON: None available      TECHNIQUE: XR CHEST 2 VW-      FINDINGS:   Minimal airspace disease in the right lung base. I cannot exclude  right-sided pneumonia   Heart and mediastinal contours are unremarkable.   No pneumothorax.   Bony and soft tissue structures are unremarkable.          Impression:       Appearance concerning for right-sided pneumonia     This report was finalized on 11/4/2019 11:14 AM by Dr. Jordy Jhaveri MD.           Echo   Results for orders placed during the hospital encounter of 11/04/19   Adult Transthoracic Echo Complete W/ Cont if Necessary Per Protocol    Narrative · The left ventricular cavity is mildly dilated.  · Left ventricular systolic function is severely decreased.  · Estimated EF appears to be in the range of 31 - 35%.  · Right ventricular cavity is mild-to-moderately dilated.  · Moderately reduced right ventricular systolic function  noted.  · The aortic valve is structurally normal. Trace aortic valve   regurgitation is present. No aortic valve stenosis is present.  · The mitral valve is normal in structure. Moderate mitral valve   regurgitation is present with an eccentric jet noted. No significant   mitral valve stenosis is present.  · The tricuspid valve is normal. Trace tricuspid valve regurgitation is   present. Estimated right ventricular systolic pressure from tricuspid   regurgitation is mildly elevated (35-45 mmHg).  · There is a small (<1cm) pericardial effusion adjacent to the left   ventricle. There is no evidence of cardiac tamponade.  · Comments: No previous studies available for comparison.         I reviewed the patient's new clinical results.    Telemetry: Normal sinus rhythm 70-80 bpm        Medication Review:     aspirin 81 mg Oral Daily   atorvastatin 20 mg Oral Nightly   enoxaparin 40 mg Subcutaneous Nightly   famotidine 20 mg Oral Daily   hydrALAZINE 10 mg Oral Q8H   isosorbide mononitrate 30 mg Oral Q24H   metoprolol succinate XL 25 mg Oral Q24H   sodium chloride 10 mL Intravenous Q12H            Assessment      Assessment:  1. New onset acute systolic congestive heart failure, improving  2. Dilated cardiomyopathy with severely depressed global LV systolic function with an EF of 30 to 35%, ischemic versus nonischemic  3. Acute kidney injury, recent creatinine 1.43 today, worsening  4. Short run (15 beat ) of possible atrial flutter with RVR, no recurrence  5. Mildly elevated troponin, patient asymptomatic    Plan     Recommendations:  1. Since his kidney function is getting worse, we will hold the diuretics and try low-dose dobutamine infusion and monitor for any significant arrhythmias.  I have discussed this with the patient and his wife and expressed understanding.  2. With regards to cardiac catheterization, I do not think he is ready yet for this since he is still orthopneic and his kidney function is gradually  worsening.    I discussed the patients findings and my recommendations with patient and family and Dr. Martines.      [facility] Basilio, [facility]AN[facility]  11/06/19  10:25 AM     I, Mahendra Amador MD, [facility], personally performed the services described in this documentation as documented by the above named individual in my presence, made necessary changes and the note is both accurate and complete.     Mahendra Amador MD, [facility]  11/6/2019  11:50 AM.    Please note that portions of this note were completed with a voice recognition program.

## 2019-11-07 LAB
ALBUMIN SERPL-MCNC: 3.77 G/DL (ref 3.5–5.2)
ALBUMIN/GLOB SERPL: 1.2 G/DL
ALP SERPL-CCNC: 65 U/L (ref 39–117)
ALT SERPL W P-5'-P-CCNC: 16 U/L (ref 1–41)
ANION GAP SERPL CALCULATED.3IONS-SCNC: 10.7 MMOL/L (ref 5–15)
AST SERPL-CCNC: 17 U/L (ref 1–40)
BASOPHILS # BLD AUTO: 0.03 10*3/MM3 (ref 0–0.2)
BASOPHILS NFR BLD AUTO: 0.4 % (ref 0–1.5)
BILIRUB SERPL-MCNC: 0.7 MG/DL (ref 0.2–1.2)
BUN BLD-MCNC: 21 MG/DL (ref 8–23)
BUN/CREAT SERPL: 17.1 (ref 7–25)
CALCIUM SPEC-SCNC: 9.4 MG/DL (ref 8.6–10.5)
CHLORIDE SERPL-SCNC: 97 MMOL/L (ref 98–107)
CO2 SERPL-SCNC: 34.3 MMOL/L (ref 22–29)
CREAT BLD-MCNC: 1.23 MG/DL (ref 0.76–1.27)
DEPRECATED RDW RBC AUTO: 49 FL (ref 37–54)
EOSINOPHIL # BLD AUTO: 0.02 10*3/MM3 (ref 0–0.4)
EOSINOPHIL NFR BLD AUTO: 0.3 % (ref 0.3–6.2)
ERYTHROCYTE [DISTWIDTH] IN BLOOD BY AUTOMATED COUNT: 13.2 % (ref 12.3–15.4)
GFR SERPL CREATININE-BSD FRML MDRD: 59 ML/MIN/1.73
GLOBULIN UR ELPH-MCNC: 3.1 GM/DL
GLUCOSE BLD-MCNC: 133 MG/DL (ref 65–99)
HCT VFR BLD AUTO: 50.9 % (ref 37.5–51)
HGB BLD-MCNC: 14.9 G/DL (ref 13–17.7)
IMM GRANULOCYTES # BLD AUTO: 0.02 10*3/MM3 (ref 0–0.05)
IMM GRANULOCYTES NFR BLD AUTO: 0.3 % (ref 0–0.5)
LYMPHOCYTES # BLD AUTO: 1.09 10*3/MM3 (ref 0.7–3.1)
LYMPHOCYTES NFR BLD AUTO: 15.4 % (ref 19.6–45.3)
MCH RBC QN AUTO: 29.2 PG (ref 26.6–33)
MCHC RBC AUTO-ENTMCNC: 29.3 G/DL (ref 31.5–35.7)
MCV RBC AUTO: 99.6 FL (ref 79–97)
MONOCYTES # BLD AUTO: 0.83 10*3/MM3 (ref 0.1–0.9)
MONOCYTES NFR BLD AUTO: 11.7 % (ref 5–12)
NEUTROPHILS # BLD AUTO: 5.11 10*3/MM3 (ref 1.7–7)
NEUTROPHILS NFR BLD AUTO: 71.9 % (ref 42.7–76)
NRBC BLD AUTO-RTO: 0 /100 WBC (ref 0–0.2)
PLATELET # BLD AUTO: 128 10*3/MM3 (ref 140–450)
PMV BLD AUTO: 11.5 FL (ref 6–12)
POTASSIUM BLD-SCNC: 4.7 MMOL/L (ref 3.5–5.2)
PROT SERPL-MCNC: 6.9 G/DL (ref 6–8.5)
PSA SERPL-MCNC: 0.43 NG/ML (ref 0–4)
RBC # BLD AUTO: 5.11 10*6/MM3 (ref 4.14–5.8)
SODIUM BLD-SCNC: 142 MMOL/L (ref 136–145)
WBC NRBC COR # BLD: 7.1 10*3/MM3 (ref 3.4–10.8)

## 2019-11-07 PROCEDURE — 99232 SBSQ HOSP IP/OBS MODERATE 35: CPT | Performed by: FAMILY MEDICINE

## 2019-11-07 PROCEDURE — 25010000002 ENOXAPARIN PER 10 MG: Performed by: FAMILY MEDICINE

## 2019-11-07 PROCEDURE — 85025 COMPLETE CBC W/AUTO DIFF WBC: CPT | Performed by: FAMILY MEDICINE

## 2019-11-07 PROCEDURE — 99231 SBSQ HOSP IP/OBS SF/LOW 25: CPT | Performed by: INTERNAL MEDICINE

## 2019-11-07 PROCEDURE — 80053 COMPREHEN METABOLIC PANEL: CPT | Performed by: INTERNAL MEDICINE

## 2019-11-07 RX ORDER — BUMETANIDE 0.25 MG/ML
2 INJECTION INTRAMUSCULAR; INTRAVENOUS ONCE
Status: COMPLETED | OUTPATIENT
Start: 2019-11-07 | End: 2019-11-07

## 2019-11-07 RX ORDER — MILRINONE LACTATE 0.2 MG/ML
0.25 INJECTION, SOLUTION INTRAVENOUS CONTINUOUS
Status: DISCONTINUED | OUTPATIENT
Start: 2019-11-07 | End: 2019-11-07

## 2019-11-07 RX ADMIN — ACETAMINOPHEN 650 MG: 325 TABLET ORAL at 01:29

## 2019-11-07 RX ADMIN — MILRINONE LACTATE IN DEXTROSE 0.25 MCG/KG/MIN: 200 INJECTION, SOLUTION INTRAVENOUS at 09:42

## 2019-11-07 RX ADMIN — ACETAMINOPHEN 650 MG: 325 TABLET ORAL at 09:47

## 2019-11-07 RX ADMIN — BUMETANIDE 2 MG: 0.25 INJECTION INTRAMUSCULAR; INTRAVENOUS at 15:55

## 2019-11-07 RX ADMIN — ATORVASTATIN CALCIUM 20 MG: 20 TABLET, FILM COATED ORAL at 21:16

## 2019-11-07 RX ADMIN — SODIUM CHLORIDE, PRESERVATIVE FREE 10 ML: 5 INJECTION INTRAVENOUS at 09:30

## 2019-11-07 RX ADMIN — HYDRALAZINE HYDROCHLORIDE 10 MG: 10 TABLET ORAL at 06:10

## 2019-11-07 RX ADMIN — METOPROLOL SUCCINATE 25 MG: 25 TABLET, FILM COATED, EXTENDED RELEASE ORAL at 09:29

## 2019-11-07 RX ADMIN — FAMOTIDINE 20 MG: 20 TABLET, FILM COATED ORAL at 09:30

## 2019-11-07 RX ADMIN — ISOSORBIDE MONONITRATE 30 MG: 30 TABLET, EXTENDED RELEASE ORAL at 09:29

## 2019-11-07 RX ADMIN — ENOXAPARIN SODIUM 40 MG: 40 INJECTION SUBCUTANEOUS at 21:14

## 2019-11-07 RX ADMIN — ASPIRIN 81 MG: 81 TABLET, CHEWABLE ORAL at 09:29

## 2019-11-07 RX ADMIN — ACETAMINOPHEN 650 MG: 325 TABLET ORAL at 21:15

## 2019-11-07 NOTE — NURSING NOTE
NOTIFIED DR. CLARK THAT PT. HAS HAD A 5 BEAT RUN OF ABBERANT CONDUCTION UP TO 'S AND HE IS HAVING FREQUENT PVC'S ACCORDING TO TELE. ORDERS NOTED TO DISCONTINUE THE MILRINONE DRIP.

## 2019-11-07 NOTE — PROGRESS NOTES
LOS: 3 days     Name: Damián Myers  Age/Sex: 64 y.o. male  :  1955        PCP: Spencer Saeed MD  REF: No ref. provider found    Principal Problem:    CHF exacerbation (CMS/[facility])  Active Problems:    NSTEMI (non-ST elevated myocardial infarction) (CMS/[facility])    Hyperglycemia    Obesity (BMI 30-39.9)    Prolonged Q-T interval on ECG    Renal failure    Acute respiratory failure with hypoxia (CMS/[facility])      Reason for follow-up: Acute systolic heart failure.    Subjective       Subjective  Mr. Myers is a pleasant 64-year  male with no history of known heart disease or coronary artery disease or previous history of congestive heart failure, presented to the emergency department with complaints of progressive dyspnea for about a year but has gotten worse in the last few weeks.  He was noted to be in acute decompensated heart failure by clinical and radiological criteria as well as an elevated proBNP of more than 6000.  He is hence admitted for further cardiac evaluation management.  I have already discussed with Dr. Mayo in the emergency department.  He has received a dose of IV furosemide and has been initiated on aspirin and carvedilol.  He denies any complaints of chest pains.  His EKG has revealed normal sinus rhythm with no electric cardiographic changes of myocardial ischemia.  His chest x-ray is consistent with acute decompensated heart failure.  His troponin has revealed mild elevation up to 0.037.  He is nonhypertensive and nondiabetic.  He says he has never had any previous history of medical problems and has not seen a doctor in 12 years.  He denies any fever, chills, cough, wheezing or hemoptysis.    Interval History: Patient states that he is breathing better.  He had fairly good urine output in the last 24 hours on IV dobutamine.  His kidney function is doing better.  Patient developed frequent PVCs with ventricular bigeminy last night which necessitated discontinuation of dobutamine  infusion.  He was getting dobutamine at 3 mcg/kg/min.    ROS    Vital Signs  Temp:  [97.4 °F (36.3 °C)-98.3 °F (36.8 °C)] (P) 98.3 °F (36.8 °C)  Heart Rate:  [] (P) 78  Resp:  [18-22] (P) 18  BP: ()/(52-93) (P) 125/76  Vital Signs (last 72 hrs)       11/04 0700  -  11/05 0659 11/05 0700  -  11/06 0659 11/06 0700  -  11/07 0659 11/07 0700  -  11/07 0853   Most Recent    Temp (°F) 97.6 -  99    98.1 -  98.6    97.4 -  98.1       98 (36.7)    Heart Rate 77 -  99    66 -  84    65 -  105       82    Resp 18 -  20    18 -  20    18 -  22       20    /71 -  (!) 149/35    102/67 -  129/91    94/54 -  136/80       127/77    SpO2 (%) (!)89 -  98    91 -  97    90 -  97       97        Body mass index is 35.81 kg/m².    Intake/Output Summary (Last 24 hours) at 11/7/2019 0853  Last data filed at 11/7/2019 0048  Gross per 24 hour   Intake 720 ml   Output 1250 ml   Net -530 ml     Objective    Objective       Physical Exam:     General Appearance:    Alert, cooperative, in no acute distress   Head:    Normocephalic, without obvious abnormality, atraumatic   Eyes:            Conjunctivae and sclerae normal, no   icterus, no pallor, corneas clear.   Neck:   No adenopathy, supple, trachea midline, no thyromegaly, no   carotid bruit, no JVD   Lungs:     Clearer to auscultation,respirations regular, even and                  unlabored    Heart:    Regular rhythm and normal rate, normal S1 and S2, no            murmur, no gallop, no rub, no click   Chest Wall:    No abnormalities observed   Abdomen:     Normal bowel sounds, no masses, no organomegaly, soft        non-tender, non-distended, no guarding, no rebound                tenderness   Extremities:   Moves all extremities well, no edema, no cyanosis, no             redness   Pulses:   Pulses palpable and equal bilaterally   Skin:   No bleeding, bruising or rash              Procedures    Results review       Results Review:   Results from last 7 days   Lab Units  11/07/19  0431 11/06/19  0021 11/05/19  0439 11/04/19  1035   WBC 10*3/mm3 7.10 6.65 5.87 6.89   HEMOGLOBIN g/dL 14.9 15.0 15.4 16.1   PLATELETS 10*3/mm3 128* 143 131* 151     Results from last 7 days   Lab Units 11/07/19  0431 11/06/19  0021 11/05/19  0439 11/04/19  1035   SODIUM mmol/L 142 146* 145 145   POTASSIUM mmol/L 4.7 4.0 4.3 4.6   CHLORIDE mmol/L 97* 98 101 103   CO2 mmol/L 34.3* 37.8* 30.2* 29.9*   BUN mg/dL 21 27* 20 18   CREATININE mg/dL 1.23 1.43* 1.34* 1.23   CALCIUM mg/dL 9.4 9.2 9.3 9.9   GLUCOSE mg/dL 133* 138* 137* 123*   ALT (SGPT) U/L 16  --  17 18   AST (SGOT) U/L 17  --  17 18     Results from last 7 days   Lab Units 11/05/19  2300 11/05/19  1150 11/05/19  0709 11/04/19  2214 11/04/19  1655   TROPONIN T ng/mL 0.047* 0.038* 0.032* 0.038* 0.041*     Lab Results   Component Value Date    INR 1.11 (H) 11/04/2019     Lab Results   Component Value Date    MG 2.0 11/05/2019    MG 2.1 11/04/2019     Lab Results   Component Value Date    TSH 5.070 (H) 11/04/2019    PSA 0.433 11/06/2019    TRIG 78 11/05/2019    HDL 31 (L) 11/05/2019    LDL 73 11/05/2019      Imaging Results (Last 48 Hours)     ** No results found for the last 48 hours. **        ECG      Echo   Results for orders placed during the hospital encounter of 11/04/19   Adult Transthoracic Echo Complete W/ Cont if Necessary Per Protocol    Narrative · The left ventricular cavity is mildly dilated.  · Left ventricular systolic function is severely decreased.  · Estimated EF appears to be in the range of 31 - 35%.  · Right ventricular cavity is mild-to-moderately dilated.  · Moderately reduced right ventricular systolic function noted.  · The aortic valve is structurally normal. Trace aortic valve   regurgitation is present. No aortic valve stenosis is present.  · The mitral valve is normal in structure. Moderate mitral valve   regurgitation is present with an eccentric jet noted. No significant   mitral valve stenosis is present.  · The  tricuspid valve is normal. Trace tricuspid valve regurgitation is   present. Estimated right ventricular systolic pressure from tricuspid   regurgitation is mildly elevated (35-45 mmHg).  · There is a small (<1cm) pericardial effusion adjacent to the left   ventricle. There is no evidence of cardiac tamponade.  · Comments: No previous studies available for comparison.          Nuclear Stress Test  .img     I reviewed the patient's new clinical results.    Telemetry: Patient will frequent PVCs with ventricular bigeminy last night, his rhythm has been stable since the dobutamine infusion has been discontinued.       Medication Review:     aspirin 81 mg Oral Daily   atorvastatin 20 mg Oral Nightly   enoxaparin 40 mg Subcutaneous Nightly   famotidine 20 mg Oral Daily   hydrALAZINE 10 mg Oral Q8H   isosorbide mononitrate 30 mg Oral Q24H   metoprolol succinate XL 25 mg Oral Q24H   sodium chloride 10 mL Intravenous Q12H         DOBUTamine 3 mcg/kg/min Last Rate: Stopped (11/07/19 0115)       Assessment      1. New onset acute systolic congestive heart failure, improving  2. Dilated cardiomyopathy with severely depressed global LV systolic function with an EF of 30 to 35%, ischemic versus nonischemic  3. Acute kidney injury, recent creatinine 1.23, improving.  4. Short run (15 beat ) of possible atrial flutter with RVR, no recurrence.  5. Patient had frequent PVCs on dobutamine at 3 mics per kilo per minute  6. Mildly elevated troponin, patient asymptomatic      Plan     1. I would like to try to use pressors again perhaps at a lower dose of dobutamine or milrinone if we can give it on the floor.  2. If his orthopnea and kidney function continues to improve then will perhaps be able to proceed with cardiac catheterization tomorrow.  I have discussed this with the patient and he is agreeable with this plan.  3. Increase Toprol-XL to 50 mg daily.      I discussed the patients findings and my recommendations with patient and  family      KARENA Oseguera  11/07/19  8:53 AM    Please note that portions of this note were completed with a voice recognition program.

## 2019-11-07 NOTE — PROGRESS NOTES
Discharge Planning Assessment   [place]     Patient Name: Damián Myers  MRN: [id number]  Today's Date: 11/7/2019    Admit Date: 11/4/2019    Discharge Needs Assessment    No documentation.       Discharge Plan     Row Name 11/07/19 1157       Plan    Plan  CM follow up visit: Pt lives at home with spouse, will return home at dc. He does not utilize home DME or HH services. Pt is currently requiring O2 at [facility], he may require home O2 at dc, to be determined.  CM will cont. to follow.    Plan Comments  Pt cont's to require acute care tx and cardiac monitoring. Cardiology following with plans for possible cardiac cath, once pt is stable enough to lye flat d/t resp. issues. Pt  currently requiring O2 at [facility]. He has been unable to bertram. Dobutamine & Milrinone drip.         Destination      No service coordination in this encounter.      Durable Medical Equipment      No service coordination in this encounter.      Dialysis/Infusion      No service coordination in this encounter.      Home Medical Care      No service coordination in this encounter.      Therapy      No service coordination in this encounter.      Community Resources      No service coordination in this encounter.        Expected Discharge Date and Time     Expected Discharge Date Expected Discharge Time    Nov 8, 2019         Demographic Summary    No documentation.       Functional Status    No documentation.       Psychosocial    No documentation.       Abuse/Neglect    No documentation.       Legal    No documentation.       Substance Abuse    No documentation.       Patient Forms    No documentation.           Daija Phillip RN

## 2019-11-07 NOTE — PROGRESS NOTES
[facility] HOSPITALIST PROGRESS NOTE     Patient Identification:  Name:  Damián Myers  Age:  64 y.o.  Sex:  male  :  1955  MRN:  [id number]  Visit Number:  [id number]  ROOM: [address] [place] [address]     Primary Care Provider:  Spencer Saeed MD    Length of stay in inpatient status:  3    Subjective     Chief Compliant:    Chief Complaint   Patient presents with   • Sent from PCP   • Shortness of Breath       History of Presenting Illness: 64-year-old gentleman with acute systolic congestive heart failure, possible ischemic cardiomyopathy, non-STEMI.  Patient was given a trial of dobutamine and also milrinone.  Patient did not tolerate either medication well secondary to palpitations and multiple PVCs.  Patient states that he still has difficulty lying flat at this time.  Chest pain    Objective     Current Hospital Meds:  aspirin 81 mg Oral Daily   atorvastatin 20 mg Oral Nightly   enoxaparin 40 mg Subcutaneous Nightly   famotidine 20 mg Oral Daily   hydrALAZINE 10 mg Oral Q8H   isosorbide mononitrate 30 mg Oral Q24H   metoprolol succinate XL 25 mg Oral Q24H   sodium chloride 10 mL Intravenous Q12H      ----------------------------------------------------------------------------------------------------------------------  Vital Signs:  Temp:  [97.7 °F (36.5 °C)-98.3 °F (36.8 °C)] (P) 98.3 °F (36.8 °C)  Heart Rate:  [] 78  Resp:  [18-22] (P) 18  BP: ()/(52-93) 125/76  SpO2:  [90 %-98 %] (P) 98 %  on  Flow (L/min):  [3] 3;   Device (Oxygen Therapy): nasal cannula  Body mass index is 35.81 kg/m².    Wt Readings from Last 3 Encounters:   19 127 kg (279 lb)     Intake & Output (last 3 days)       701 -  07 -  -  -  07    P.O. 960 360 720 120    Total Intake(mL/kg) 960 (7.6) 360 (2.8) 720 (5.7) 120 (0.9)    Urine (mL/kg/hr) 2900 1250 (0.4) 1250 (0.4) 250 (0.4)    Total Output 2900 1250 1250 250    Net -1940 -890 -530 -130                 Diet Regular; Cardiac  NPO Diet  ----------------------------------------------------------------------------------------------------------------------  Physical exam:  Constitutional: No acute distress  HEENT: Normocephalic atraumatic  Neck:   Supple  Cardiovascular: Regular rate and rhythm  Pulmonary/Chest: Scattered rales  Abdominal:  .  Positive bowel sounds soft  Musculoskeletal: No arthropathy  Neurological: No focal deficits  Skin: No rashes   peripheral vascular:  Genitourinary:  ----------------------------------------------------------------------------------------------------------------------    Last echocardiogram:  Results for orders placed during the hospital encounter of 11/04/19   Adult Transthoracic Echo Complete W/ Cont if Necessary Per Protocol    Narrative · The left ventricular cavity is mildly dilated.  · Left ventricular systolic function is severely decreased.  · Estimated EF appears to be in the range of 31 - 35%.  · Right ventricular cavity is mild-to-moderately dilated.  · Moderately reduced right ventricular systolic function noted.  · The aortic valve is structurally normal. Trace aortic valve   regurgitation is present. No aortic valve stenosis is present.  · The mitral valve is normal in structure. Moderate mitral valve   regurgitation is present with an eccentric jet noted. No significant   mitral valve stenosis is present.  · The tricuspid valve is normal. Trace tricuspid valve regurgitation is   present. Estimated right ventricular systolic pressure from tricuspid   regurgitation is mildly elevated (35-45 mmHg).  · There is a small (<1cm) pericardial effusion adjacent to the left   ventricle. There is no evidence of cardiac tamponade.  · Comments: No previous studies available for comparison.        ----------------------------------------------------------------------------------------------------------------------  Results from last 7 days   Lab Units 11/07/19  0439  11/06/19  0021 11/05/19 0439 11/04/19  1035   LACTATE mmol/L  --   --   --  1.4   WBC 10*3/mm3 7.10 6.65 5.87 6.89   HEMOGLOBIN g/dL 14.9 15.0 15.4 16.1   HEMATOCRIT % 50.9 49.1 52.0* 52.5*   MCV fL 99.6* 97.6* 98.9* 96.5   MCHC g/dL 29.3* 30.5* 29.6* 30.7*   PLATELETS 10*3/mm3 128* 143 131* 151   INR   --   --   --  1.11*         Results from last 7 days   Lab Units 11/07/19  0431 11/06/19 0021 11/05/19 0439 11/04/19  1035   SODIUM mmol/L 142 146* 145 145   POTASSIUM mmol/L 4.7 4.0 4.3 4.6   MAGNESIUM mg/dL  --   --  2.0 2.1   CHLORIDE mmol/L 97* 98 101 103   CO2 mmol/L 34.3* 37.8* 30.2* 29.9*   BUN mg/dL 21 27* 20 18   CREATININE mg/dL 1.23 1.43* 1.34* 1.23   EGFR IF NONAFRICN AM mL/min/1.73 59* 50* 54* 59*   CALCIUM mg/dL 9.4 9.2 9.3 9.9   PHOSPHORUS mg/dL  --   --  6.0*  --    GLUCOSE mg/dL 133* 138* 137* 123*   ALBUMIN g/dL 3.77  --  3.81 4.05   BILIRUBIN mg/dL 0.7  --  0.8 0.9   ALK PHOS U/L 65  --  70 74   AST (SGOT) U/L 17  --  17 18   ALT (SGPT) U/L 16  --  17 18   Estimated Creatinine Clearance: 85.8 mL/min (by C-G formula based on SCr of 1.23 mg/dL).  No results found for: AMMONIA  Results from last 7 days   Lab Units 11/05/19  2300 11/05/19  1150 11/05/19  0709   TROPONIN T ng/mL 0.047* 0.038* 0.032*     Results from last 7 days   Lab Units 11/05/19 0439 11/04/19  1035   PROBNP pg/mL 6,124.0* 6,938.0*     Results from last 7 days   Lab Units 11/05/19  0439   CHOLESTEROL mg/dL 120   TRIGLYCERIDES mg/dL 78   HDL CHOL mg/dL 31*   LDL CHOL mg/dL 73     No results found for: HGBA1C, POCGLU  Lab Results   Component Value Date    TSH 5.070 (H) 11/04/2019    FREET4 1.05 11/05/2019     No results found for: PREGTESTUR, PREGSERUM, HCG, HCGQUANT  Pain Management Panel     There is no flowsheet data to display.        Brief Urine Lab Results  (Last result in the past 365 days)      Color   Clarity   Blood   Leuk Est   Nitrite   Protein   CREAT   Urine HCG        11/04/19 1147 Yellow Clear Negative Negative  Negative Negative             Blood Culture   Date Value Ref Range Status   11/04/2019 No growth at 3 days  Preliminary   11/04/2019 No growth at 3 days  Preliminary     Results from last 7 days   Lab Units 11/04/19  1147   NITRITE UA  Negative              Results from last 7 days   Lab Units 11/04/19  1035   LACTATE mmol/L 1.4       I have personally looked at the labs and they are summarized above.  ----------------------------------------------------------------------------------------------------------------------  Detailed radiology reports for the last 24 hours:    Imaging Results (Last 24 Hours)     ** No results found for the last 24 hours. **        Final impressions for the last 30 days of radiology reports:    Xr Chest 2 View    Result Date: 11/4/2019  Appearance concerning for right-sided pneumonia  This report was finalized on 11/4/2019 11:14 AM by Dr. Jordy Jhaveri MD.      I have personally looked at the radiology images and read the final radiology report.    Assessment & Plan    CHF exacerbation--systolic dysfunction--currently on Imdur and hydralazine.  Patient refuses Toprol at this time.  Did not tolerate inotropes    Acute kidney injury--improved    Non-STEMI--continue antiplatelet, statin therapy.  Plan for cardiac catheterization when able to tolerate.    VTE Prophylaxis:   Mechanical Order History:     None      Pharmalogical Order History:     Ordered     Dose Route Frequency Stop    11/04/19 1541  enoxaparin (LOVENOX) syringe 40 mg      40 mg SC Nightly --          The patient is high risk due to the following diagnoses/reasons: Coronary artery disease and CHF    Roel Martines MD  [facility]  11/07/19  12:21 PM

## 2019-11-08 PROBLEM — R77.8 ELEVATED TROPONIN: Status: ACTIVE | Noted: 2019-11-08

## 2019-11-08 PROBLEM — R79.89 ELEVATED TROPONIN: Status: ACTIVE | Noted: 2019-11-08

## 2019-11-08 PROBLEM — I50.20 SYSTOLIC CONGESTIVE HEART FAILURE: Status: ACTIVE | Noted: 2019-11-04

## 2019-11-08 PROBLEM — I50.21 ACUTE SYSTOLIC CONGESTIVE HEART FAILURE: Status: ACTIVE | Noted: 2019-11-08

## 2019-11-08 LAB
ACT BLD: 263 SECONDS (ref 82–152)
ANION GAP SERPL CALCULATED.3IONS-SCNC: 9.4 MMOL/L (ref 5–15)
BUN BLD-MCNC: 20 MG/DL (ref 8–23)
BUN/CREAT SERPL: 18.7 (ref 7–25)
CALCIUM SPEC-SCNC: 9.3 MG/DL (ref 8.6–10.5)
CHLORIDE SERPL-SCNC: 96 MMOL/L (ref 98–107)
CO2 SERPL-SCNC: 35.6 MMOL/L (ref 22–29)
CREAT BLD-MCNC: 1.07 MG/DL (ref 0.76–1.27)
GFR SERPL CREATININE-BSD FRML MDRD: 70 ML/MIN/1.73
GLUCOSE BLD-MCNC: 123 MG/DL (ref 65–99)
POTASSIUM BLD-SCNC: 4.2 MMOL/L (ref 3.5–5.2)
SODIUM BLD-SCNC: 141 MMOL/L (ref 136–145)

## 2019-11-08 PROCEDURE — B2111ZZ FLUOROSCOPY OF MULTIPLE CORONARY ARTERIES USING LOW OSMOLAR CONTRAST: ICD-10-PCS | Performed by: INTERNAL MEDICINE

## 2019-11-08 PROCEDURE — C9600 PERC DRUG-EL COR STENT SING: HCPCS | Performed by: INTERNAL MEDICINE

## 2019-11-08 PROCEDURE — C1769 GUIDE WIRE: HCPCS | Performed by: INTERNAL MEDICINE

## 2019-11-08 PROCEDURE — 93454 CORONARY ARTERY ANGIO S&I: CPT | Performed by: INTERNAL MEDICINE

## 2019-11-08 PROCEDURE — 92928 PRQ TCAT PLMT NTRAC ST 1 LES: CPT | Performed by: INTERNAL MEDICINE

## 2019-11-08 PROCEDURE — 027034Z DILATION OF CORONARY ARTERY, ONE ARTERY WITH DRUG-ELUTING INTRALUMINAL DEVICE, PERCUTANEOUS APPROACH: ICD-10-PCS | Performed by: INTERNAL MEDICINE

## 2019-11-08 PROCEDURE — 94799 UNLISTED PULMONARY SVC/PX: CPT

## 2019-11-08 PROCEDURE — 99232 SBSQ HOSP IP/OBS MODERATE 35: CPT | Performed by: FAMILY MEDICINE

## 2019-11-08 PROCEDURE — 85347 COAGULATION TIME ACTIVATED: CPT

## 2019-11-08 PROCEDURE — 25010000002 EPTIFIBATIDE 20 MG/10ML SOLUTION: Performed by: INTERNAL MEDICINE

## 2019-11-08 PROCEDURE — 25010000002 HEPARIN (PORCINE) PER 1000 UNITS: Performed by: INTERNAL MEDICINE

## 2019-11-08 PROCEDURE — 80048 BASIC METABOLIC PNL TOTAL CA: CPT | Performed by: INTERNAL MEDICINE

## 2019-11-08 PROCEDURE — C1894 INTRO/SHEATH, NON-LASER: HCPCS | Performed by: INTERNAL MEDICINE

## 2019-11-08 PROCEDURE — C1760 CLOSURE DEV, VASC: HCPCS | Performed by: INTERNAL MEDICINE

## 2019-11-08 PROCEDURE — 25010000002 FENTANYL CITRATE (PF) 100 MCG/2ML SOLUTION: Performed by: INTERNAL MEDICINE

## 2019-11-08 PROCEDURE — 25010000002 MIDAZOLAM PER 1 MG: Performed by: INTERNAL MEDICINE

## 2019-11-08 PROCEDURE — C1874 STENT, COATED/COV W/DEL SYS: HCPCS | Performed by: INTERNAL MEDICINE

## 2019-11-08 PROCEDURE — 4A023N7 MEASUREMENT OF CARDIAC SAMPLING AND PRESSURE, LEFT HEART, PERCUTANEOUS APPROACH: ICD-10-PCS | Performed by: INTERNAL MEDICINE

## 2019-11-08 PROCEDURE — B2151ZZ FLUOROSCOPY OF LEFT HEART USING LOW OSMOLAR CONTRAST: ICD-10-PCS | Performed by: INTERNAL MEDICINE

## 2019-11-08 PROCEDURE — C1887 CATHETER, GUIDING: HCPCS | Performed by: INTERNAL MEDICINE

## 2019-11-08 PROCEDURE — 25010000002 ONDANSETRON PER 1 MG: Performed by: INTERNAL MEDICINE

## 2019-11-08 PROCEDURE — 99232 SBSQ HOSP IP/OBS MODERATE 35: CPT | Performed by: INTERNAL MEDICINE

## 2019-11-08 PROCEDURE — 25010000002 NALOXONE PER 1 MG: Performed by: INTERNAL MEDICINE

## 2019-11-08 PROCEDURE — 25010000002 FUROSEMIDE PER 20 MG: Performed by: INTERNAL MEDICINE

## 2019-11-08 DEVICE — XIENCE SIERRA™ EVEROLIMUS ELUTING CORONARY STENT SYSTEM 3.50 MM X 15 MM / RAPID-EXCHANGE
Type: IMPLANTABLE DEVICE | Status: FUNCTIONAL
Brand: XIENCE SIERRA™

## 2019-11-08 RX ORDER — MIDAZOLAM HYDROCHLORIDE 1 MG/ML
INJECTION INTRAMUSCULAR; INTRAVENOUS AS NEEDED
Status: DISCONTINUED | OUTPATIENT
Start: 2019-11-08 | End: 2019-11-08 | Stop reason: HOSPADM

## 2019-11-08 RX ORDER — LIDOCAINE HYDROCHLORIDE 20 MG/ML
INJECTION, SOLUTION INFILTRATION; PERINEURAL AS NEEDED
Status: DISCONTINUED | OUTPATIENT
Start: 2019-11-08 | End: 2019-11-08 | Stop reason: HOSPADM

## 2019-11-08 RX ORDER — SODIUM CHLORIDE 9 MG/ML
INJECTION, SOLUTION INTRAVENOUS CONTINUOUS PRN
Status: COMPLETED | OUTPATIENT
Start: 2019-11-08 | End: 2019-11-08

## 2019-11-08 RX ORDER — SODIUM CHLORIDE 9 MG/ML
100 INJECTION, SOLUTION INTRAVENOUS CONTINUOUS
Status: DISCONTINUED | OUTPATIENT
Start: 2019-11-08 | End: 2019-11-08

## 2019-11-08 RX ORDER — FLUMAZENIL 0.1 MG/ML
0.2 INJECTION INTRAVENOUS ONCE
Status: COMPLETED | OUTPATIENT
Start: 2019-11-08 | End: 2019-11-08

## 2019-11-08 RX ORDER — ONDANSETRON 2 MG/ML
INJECTION INTRAMUSCULAR; INTRAVENOUS AS NEEDED
Status: DISCONTINUED | OUTPATIENT
Start: 2019-11-08 | End: 2019-11-08 | Stop reason: HOSPADM

## 2019-11-08 RX ORDER — HEPARIN SODIUM 1000 [USP'U]/ML
INJECTION, SOLUTION INTRAVENOUS; SUBCUTANEOUS AS NEEDED
Status: DISCONTINUED | OUTPATIENT
Start: 2019-11-08 | End: 2019-11-08 | Stop reason: HOSPADM

## 2019-11-08 RX ORDER — NALOXONE HCL 0.4 MG/ML
VIAL (ML) INJECTION AS NEEDED
Status: DISCONTINUED | OUTPATIENT
Start: 2019-11-08 | End: 2019-11-08 | Stop reason: HOSPADM

## 2019-11-08 RX ORDER — EPTIFIBATIDE 20 MG/10ML
INJECTION INTRAVENOUS AS NEEDED
Status: DISCONTINUED | OUTPATIENT
Start: 2019-11-08 | End: 2019-11-08 | Stop reason: HOSPADM

## 2019-11-08 RX ORDER — FUROSEMIDE 10 MG/ML
INJECTION INTRAMUSCULAR; INTRAVENOUS AS NEEDED
Status: DISCONTINUED | OUTPATIENT
Start: 2019-11-08 | End: 2019-11-08 | Stop reason: HOSPADM

## 2019-11-08 RX ORDER — FENTANYL CITRATE 50 UG/ML
INJECTION, SOLUTION INTRAMUSCULAR; INTRAVENOUS AS NEEDED
Status: DISCONTINUED | OUTPATIENT
Start: 2019-11-08 | End: 2019-11-08 | Stop reason: HOSPADM

## 2019-11-08 RX ADMIN — FLUMAZENIL 0.2 MG: 0.1 INJECTION, SOLUTION INTRAVENOUS at 10:48

## 2019-11-08 RX ADMIN — TICAGRELOR 90 MG: 90 TABLET ORAL at 22:02

## 2019-11-08 RX ADMIN — SODIUM CHLORIDE, PRESERVATIVE FREE 10 ML: 5 INJECTION INTRAVENOUS at 19:44

## 2019-11-08 RX ADMIN — ATORVASTATIN CALCIUM 20 MG: 20 TABLET, FILM COATED ORAL at 19:43

## 2019-11-08 RX ADMIN — METOPROLOL SUCCINATE 25 MG: 25 TABLET, FILM COATED, EXTENDED RELEASE ORAL at 12:45

## 2019-11-08 RX ADMIN — Medication 5 MG: at 00:43

## 2019-11-08 RX ADMIN — HYDRALAZINE HYDROCHLORIDE 10 MG: 10 TABLET ORAL at 15:09

## 2019-11-08 RX ADMIN — ISOSORBIDE MONONITRATE 30 MG: 30 TABLET, EXTENDED RELEASE ORAL at 12:44

## 2019-11-08 RX ADMIN — TICAGRELOR 90 MG: 90 TABLET ORAL at 15:09

## 2019-11-08 NOTE — PLAN OF CARE
Problem: Fall Risk (Adult)  Goal: Absence of Fall  Outcome: Ongoing (interventions implemented as appropriate)      Problem: Patient Care Overview  Goal: Plan of Care Review  Outcome: Ongoing (interventions implemented as appropriate)    Goal: Individualization and Mutuality  Outcome: Ongoing (interventions implemented as appropriate)    Goal: Discharge Needs Assessment  Outcome: Ongoing (interventions implemented as appropriate)    Goal: Interprofessional Rounds/Family Conf  Outcome: Ongoing (interventions implemented as appropriate)      Problem: Patient Care Overview  Goal: Plan of Care Review  Outcome: Ongoing (interventions implemented as appropriate)    Goal: Individualization and Mutuality  Outcome: Ongoing (interventions implemented as appropriate)    Goal: Discharge Needs Assessment  Outcome: Ongoing (interventions implemented as appropriate)    Goal: Interprofessional Rounds/Family Conf  Outcome: Ongoing (interventions implemented as appropriate)      Problem: Cardiac: Heart Failure (Adult)  Goal: Signs and Symptoms of Listed Potential Problems Will be Absent, Minimized or Managed (Cardiac: Heart Failure)  Outcome: Ongoing (interventions implemented as appropriate)

## 2019-11-08 NOTE — NURSING NOTE
Time In 1632 Time Out 1640      Order received for Cardiac Rehab Consultation.     Patient spouse stated if they get him insurance that will cover she will all us back. that as of right now they have no insurance.       Information discussed with: Patient/Spouse        Educated on: Benefits of Exercise,  Educated on Cardiac Rehab and Program Protocol, Brochure and/or educational material provided, Contact information given and Teach Back Verified         Thank you for the referral. Please contact the Cardiac Rehab Dept. (ext. [phone]) with any further questions or concerns.

## 2019-11-08 NOTE — PROGRESS NOTES
LOS: 4 days     Name: Damián Myers  Age/Sex: 64 y.o. male  :  1955        PCP: Spencer Saeed MD  REF: No ref. provider found    Principal Problem:    CHF exacerbation (CMS/[facility])  Active Problems:    NSTEMI (non-ST elevated myocardial infarction) (CMS/[facility])    Hyperglycemia    Obesity (BMI 30-39.9)    Prolonged Q-T interval on ECG    Renal failure    Acute respiratory failure with hypoxia (CMS/[facility])      Reason for follow-up: Acute Systolic Heart Failure     Subjective     Interval History: Patient is resting in bed. He has had great urine output with IV diuresis. Orthopnea and breathing have improved. Kidney function is stable. Scheduled to have left heart cath today.       Vital Signs  Temp:  [97.2 °F (36.2 °C)-98.3 °F (36.8 °C)] (P) 98.3 °F (36.8 °C)  Heart Rate:  [69-88] (P) 86  Resp:  [18-22] (P) 18  BP: (107-128)/(51-83) (P) 125/88     Vital Signs (last 72 hrs)        0700  -   0659  0700  -   0659  07  -   0659  07  -   0851   Most Recent    Temp (°F) 98.1 -  98.6    97.4 -  98.1    97.2 -  98.2       98.2 (36.8)    Heart Rate 66 -  84    65 -  105    69 -  88       88    Resp 18 -  20    18 -  22    18 -  22       22    /67 -  129/91    94/54 -  136/80    107/51 -  128/78       128/78    SpO2 (%) 91 -  97    90 -  97    91 -  98       94        Body mass index is 35.4 kg/m².    Intake/Output Summary (Last 24 hours) at 2019 0909  Last data filed at 2019 0300  Gross per 24 hour   Intake 240 ml   Output 3900 ml   Net -3660 ml     Objective    Objective       Physical Exam:     General Appearance:    Alert, cooperative, in no acute distress   Head:    Normocephalic, without obvious abnormality, atraumatic   Eyes:            Conjunctivae and sclerae normal, no   icterus, no pallor, corneas clear.   Neck:   No adenopathy, supple, trachea midline, no thyromegaly, no   carotid bruit, no JVD   Lungs:     Clear to auscultation,respirations regular, even and                   unlabored    Heart:    Regular rhythm and normal rate, normal S1 and S2, no            murmur, no gallop, no rub, no click   Chest Wall:    No abnormalities observed   Abdomen:     Normal bowel sounds, no masses, no organomegaly, soft        non-tender, non-distended, no guarding, no rebound                tenderness   Extremities:   Moves all extremities well, 1+ edema BLE improving, no cyanosis, no redness   Pulses:   Pulses palpable and equal bilaterally   Skin:   No bleeding, bruising or rash       Neurologic:   Alert and oriented    **I have seen and performed a physical examination today. Changes noted in exam.          Procedures    Results review       Results Review:   Results from last 7 days   Lab Units 11/07/19  0431 11/06/19  0021 11/05/19  0439 11/04/19  1035   WBC 10*3/mm3 7.10 6.65 5.87 6.89   HEMOGLOBIN g/dL 14.9 15.0 15.4 16.1   PLATELETS 10*3/mm3 128* 143 131* 151     Results from last 7 days   Lab Units 11/08/19  0535 11/07/19  0431 11/06/19  0021 11/05/19  0439 11/04/19  1035   SODIUM mmol/L 141 142 146* 145 145   POTASSIUM mmol/L 4.2 4.7 4.0 4.3 4.6   CHLORIDE mmol/L 96* 97* 98 101 103   CO2 mmol/L 35.6* 34.3* 37.8* 30.2* 29.9*   BUN mg/dL 20 21 27* 20 18   CREATININE mg/dL 1.07 1.23 1.43* 1.34* 1.23   CALCIUM mg/dL 9.3 9.4 9.2 9.3 9.9   GLUCOSE mg/dL 123* 133* 138* 137* 123*   ALT (SGPT) U/L  --  16  --  17 18   AST (SGOT) U/L  --  17  --  17 18     Results from last 7 days   Lab Units 11/05/19  2300 11/05/19  1150 11/05/19  0709 11/04/19  2214 11/04/19  1655   TROPONIN T ng/mL 0.047* 0.038* 0.032* 0.038* 0.041*     Lab Results   Component Value Date    INR 1.11 (H) 11/04/2019     Lab Results   Component Value Date    MG 2.0 11/05/2019    MG 2.1 11/04/2019     Lab Results   Component Value Date    TSH 5.070 (H) 11/04/2019    PSA 0.433 11/06/2019    TRIG 78 11/05/2019    HDL 31 (L) 11/05/2019    LDL 73 11/05/2019      Imaging Results (Last 48 Hours)     ** No results found for  the last 48 hours. **        Echo   Results for orders placed during the hospital encounter of 11/04/19   Adult Transthoracic Echo Complete W/ Cont if Necessary Per Protocol    Narrative · The left ventricular cavity is mildly dilated.  · Left ventricular systolic function is severely decreased.  · Estimated EF appears to be in the range of 31 - 35%.  · Right ventricular cavity is mild-to-moderately dilated.  · Moderately reduced right ventricular systolic function noted.  · The aortic valve is structurally normal. Trace aortic valve   regurgitation is present. No aortic valve stenosis is present.  · The mitral valve is normal in structure. Moderate mitral valve   regurgitation is present with an eccentric jet noted. No significant   mitral valve stenosis is present.  · The tricuspid valve is normal. Trace tricuspid valve regurgitation is   present. Estimated right ventricular systolic pressure from tricuspid   regurgitation is mildly elevated (35-45 mmHg).  · There is a small (<1cm) pericardial effusion adjacent to the left   ventricle. There is no evidence of cardiac tamponade.  · Comments: No previous studies available for comparison.         I reviewed the patient's new clinical results.    Telemetry: Normal Sinus Rhythm 70-80 bpm        Medication Review:     aspirin 81 mg Oral Daily   atorvastatin 20 mg Oral Nightly   enoxaparin 40 mg Subcutaneous Nightly   famotidine 20 mg Oral Daily   hydrALAZINE 10 mg Oral Q8H   isosorbide mononitrate 30 mg Oral Q24H   metoprolol succinate XL 25 mg Oral Q24H   sodium chloride 10 mL Intravenous Q12H            Assessment      Assessment:    CHF exacerbation (CMS/HCC)    Systolic congestive heart failure (CMS/HCC)    NSTEMI (non-ST elevated myocardial infarction) (CMS/HCC)    Hyperglycemia    Obesity (BMI 30-39.9)    Prolonged Q-T interval on ECG    Renal failure    Acute respiratory failure with hypoxia (CMS/HCC)    Acute systolic congestive heart failure (CMS/HCC)     Elevated troponin    Plan     Recommendations:  1. Acute systolic heart  Failure, ischemic vs. Non-ischemic  · He has had excellent urine output with IV diuresis.  Kidney function has improved. Undergoing left heart catheterization today to evaluate for underlying ischemia. I have discussed with the patient about the procedure of cardiac catheterization, potential risks, benefits and alternative methods of management.  Patient expressed understanding of the same and is wanting to proceed. Dr. Hart with interventional cardiology will be performing the procedure. If kidney function remains stable will consider adding ACE/ARB or entresto later on. Continue with hydralazine, Imdur and Toprol-XL for cardiomyopathy.    2. Acute renal failure  · Kidney function improved with dobutamine drip. Will continue to monitor kidney function closely.    3.  Elevated troponin   · He is currently chest pain-free. Proceed with left heart catheterization today.  Continue with low-dose aspirin, Lipitor and Toprol-XL. Will follow up on cardiac cath results.     I discussed the patients findings and my recommendations with patient and family      SREEKANTH Bhatia  11/08/19  9:09 AM    Please note that portions of this note were completed with a voice recognition program.

## 2019-11-08 NOTE — PROGRESS NOTES
[facility] HOSPITALIST PROGRESS NOTE     Patient Identification:  Name:  Damián Myers  Age:  64 y.o.  Sex:  male  :  1955  MRN:  [id number]  Visit Number:  [id number]  ROOM: [address] [place] [address]     Primary Care Provider:  Spencer Saeed MD    Length of stay in inpatient status:  4    Subjective     Chief Compliant:    Chief Complaint   Patient presents with   • Sent from PCP   • Shortness of Breath       History of Presenting Illness: 64-year-old gentleman who was admitted with CHF secondary to systolic dysfunction, ischemic cardiomyopathy, coronary artery disease with history of heart catheterization today.  Patient was found to have 85% lesion in the LAD and did have stent placed.  Patient is orthopneic at this time but has no other complaints    Objective     Current Hospital Meds:  aspirin 81 mg Oral Daily   atorvastatin 20 mg Oral Nightly   famotidine 20 mg Oral Daily   hydrALAZINE 10 mg Oral Q8H   isosorbide mononitrate 30 mg Oral Q24H   metoprolol succinate XL 25 mg Oral Q24H   sodium chloride 10 mL Intravenous Q12H   ticagrelor 90 mg Oral BID      ----------------------------------------------------------------------------------------------------------------------  Vital Signs:  Temp:  [97.2 °F (36.2 °C)-98.3 °F (36.8 °C)] (P) 98.3 °F (36.8 °C)  Heart Rate:  [] 100  Resp:  [6-22] 15  BP: (107-154)/(59-83) 154/69  FiO2 (%):  [100 %] 100 %  SpO2:  [92 %-98 %] (P) 96 %  on  Flow (L/min):  [3-4] 4;   Device (Oxygen Therapy): nasal cannula  Body mass index is 35.4 kg/m².    Wt Readings from Last 3 Encounters:   19 125 kg (275 lb 12.8 oz)     Intake & Output (last 3 days)       701 -  -  -  - 700    P.O. 360 720 240     I.V. (mL/kg)    58.3 (0.5)    Total Intake(mL/kg) 360 (2.8) 720 (5.7) 240 (1.9) 58.3 (0.5)    Urine (mL/kg/hr) 1250 (0.4) 1250 (0.4) 3900 (1.3) 500 (0.7)    Total Output 1250 1250 3900 500    Net -890  -530 -2030 -441.7                Diet Regular; Cardiac  ----------------------------------------------------------------------------------------------------------------------  Physical exam:  Constitutional: Overweight gentleman in no distress  HEENT: Normocephalic atraumatic  Neck: Supple  Cardiovascular: Regular rate and rhythm  Pulmonary/Chest: Few scattered crackles noted bilaterally  Abdominal: Positive bowel sounds soft.   Musculoskeletal: No arthropathy  Neurological: No focal deficits  Skin: No rashes  Peripheral vascular:  Genitourinary:  ----------------------------------------------------------------------------------------------------------------------    Last echocardiogram:  Results for orders placed during the hospital encounter of 11/04/19   Adult Transthoracic Echo Complete W/ Cont if Necessary Per Protocol    Narrative · The left ventricular cavity is mildly dilated.  · Left ventricular systolic function is severely decreased.  · Estimated EF appears to be in the range of 31 - 35%.  · Right ventricular cavity is mild-to-moderately dilated.  · Moderately reduced right ventricular systolic function noted.  · The aortic valve is structurally normal. Trace aortic valve   regurgitation is present. No aortic valve stenosis is present.  · The mitral valve is normal in structure. Moderate mitral valve   regurgitation is present with an eccentric jet noted. No significant   mitral valve stenosis is present.  · The tricuspid valve is normal. Trace tricuspid valve regurgitation is   present. Estimated right ventricular systolic pressure from tricuspid   regurgitation is mildly elevated (35-45 mmHg).  · There is a small (<1cm) pericardial effusion adjacent to the left   ventricle. There is no evidence of cardiac tamponade.  · Comments: No previous studies available for comparison.         ----------------------------------------------------------------------------------------------------------------------  Results from last 7 days   Lab Units 11/07/19 0431 11/06/19 0021 11/05/19 0439 11/04/19  1035   LACTATE mmol/L  --   --   --  1.4   WBC 10*3/mm3 7.10 6.65 5.87 6.89   HEMOGLOBIN g/dL 14.9 15.0 15.4 16.1   HEMATOCRIT % 50.9 49.1 52.0* 52.5*   MCV fL 99.6* 97.6* 98.9* 96.5   MCHC g/dL 29.3* 30.5* 29.6* 30.7*   PLATELETS 10*3/mm3 128* 143 131* 151   INR   --   --   --  1.11*         Results from last 7 days   Lab Units 11/08/19 0535 11/07/19 0431 11/06/19 0021 11/05/19 0439 11/04/19  1035   SODIUM mmol/L 141 142 146* 145 145   POTASSIUM mmol/L 4.2 4.7 4.0 4.3 4.6   MAGNESIUM mg/dL  --   --   --  2.0 2.1   CHLORIDE mmol/L 96* 97* 98 101 103   CO2 mmol/L 35.6* 34.3* 37.8* 30.2* 29.9*   BUN mg/dL 20 21 27* 20 18   CREATININE mg/dL 1.07 1.23 1.43* 1.34* 1.23   EGFR IF NONAFRICN AM mL/min/1.73 70 59* 50* 54* 59*   CALCIUM mg/dL 9.3 9.4 9.2 9.3 9.9   PHOSPHORUS mg/dL  --   --   --  6.0*  --    GLUCOSE mg/dL 123* 133* 138* 137* 123*   ALBUMIN g/dL  --  3.77  --  3.81 4.05   BILIRUBIN mg/dL  --  0.7  --  0.8 0.9   ALK PHOS U/L  --  65  --  70 74   AST (SGOT) U/L  --  17  --  17 18   ALT (SGPT) U/L  --  16  --  17 18   Estimated Creatinine Clearance: 98 mL/min (by C-G formula based on SCr of 1.07 mg/dL).  No results found for: AMMONIA  Results from last 7 days   Lab Units 11/05/19  2300 11/05/19  1150 11/05/19  0709   TROPONIN T ng/mL 0.047* 0.038* 0.032*     Results from last 7 days   Lab Units 11/05/19  0439 11/04/19  1035   PROBNP pg/mL 6,124.0* 6,938.0*     Results from last 7 days   Lab Units 11/05/19  0439   CHOLESTEROL mg/dL 120   TRIGLYCERIDES mg/dL 78   HDL CHOL mg/dL 31*   LDL CHOL mg/dL 73     No results found for: HGBA1C, POCGLU  Lab Results   Component Value Date    TSH 5.070 (H) 11/04/2019    FREET4 1.05 11/05/2019     No results found for: PREGTESTUR, PREGSERUM, HCG, HCGQUANT  Pain  Management Panel     There is no flowsheet data to display.        Brief Urine Lab Results  (Last result in the past 365 days)      Color   Clarity   Blood   Leuk Est   Nitrite   Protein   CREAT   Urine HCG        11/04/19 1147 Yellow Clear Negative Negative Negative Negative             Blood Culture   Date Value Ref Range Status   11/04/2019 No growth at 4 days  Preliminary   11/04/2019 No growth at 4 days  Preliminary     Results from last 7 days   Lab Units 11/04/19  1147   NITRITE UA  Negative              Results from last 7 days   Lab Units 11/04/19  1035   LACTATE mmol/L 1.4       I have personally looked at the labs and they are summarized above.  ----------------------------------------------------------------------------------------------------------------------  Detailed radiology reports for the last 24 hours:    Imaging Results (Last 24 Hours)     ** No results found for the last 24 hours. **        Final impressions for the last 30 days of radiology reports:    Xr Chest 2 View    Result Date: 11/4/2019  Appearance concerning for right-sided pneumonia  This report was finalized on 11/4/2019 11:14 AM by Dr. Jordy Jhaveri MD.      I have personally looked at the radiology images and read the final radiology report.    Assessment & Plan    CHF with systolic dysfunction secondary to ischemic cardiomyopathy--10 you hydralazine and nitrates.  We will look to add Aldactone if renal function will tolerate.    Coronary artery disease--continue statin, antiplatelet therapy, metoprolol    Acute kidney injury--improved    VTE Prophylaxis:   Mechanical Order History:     None      Pharmalogical Order History:     Ordered     Dose Route Frequency Stop    11/08/19 1120  heparin (porcine) injection  Status:  Discontinued      -- -- As Needed 11/08/19 1201    11/04/19 1541  enoxaparin (LOVENOX) syringe 40 mg  Status:  Discontinued      40 mg SC Nightly 11/08/19 1156          The patient is high risk due to the  following diagnoses/reasons: CHF//cad  Roel Martines MD  [facility] Hospitalist  11/08/19  1:07 PM

## 2019-11-09 LAB
ANION GAP SERPL CALCULATED.3IONS-SCNC: 7.2 MMOL/L (ref 5–15)
BACTERIA SPEC AEROBE CULT: NORMAL
BACTERIA SPEC AEROBE CULT: NORMAL
BUN BLD-MCNC: 18 MG/DL (ref 8–23)
BUN/CREAT SERPL: 17 (ref 7–25)
CALCIUM SPEC-SCNC: 9.1 MG/DL (ref 8.6–10.5)
CHLORIDE SERPL-SCNC: 93 MMOL/L (ref 98–107)
CHOLEST SERPL-MCNC: 92 MG/DL (ref 0–200)
CO2 SERPL-SCNC: 39.8 MMOL/L (ref 22–29)
CREAT BLD-MCNC: 1.06 MG/DL (ref 0.76–1.27)
DEPRECATED RDW RBC AUTO: 48 FL (ref 37–54)
ERYTHROCYTE [DISTWIDTH] IN BLOOD BY AUTOMATED COUNT: 12.8 % (ref 12.3–15.4)
GFR SERPL CREATININE-BSD FRML MDRD: 70 ML/MIN/1.73
GLUCOSE BLD-MCNC: 134 MG/DL (ref 65–99)
HCT VFR BLD AUTO: 51.2 % (ref 37.5–51)
HDLC SERPL-MCNC: 34 MG/DL (ref 40–60)
HGB BLD-MCNC: 15 G/DL (ref 13–17.7)
LDLC SERPL CALC-MCNC: 45 MG/DL (ref 0–100)
LDLC/HDLC SERPL: 1.34 {RATIO}
MCH RBC QN AUTO: 29.5 PG (ref 26.6–33)
MCHC RBC AUTO-ENTMCNC: 29.3 G/DL (ref 31.5–35.7)
MCV RBC AUTO: 100.8 FL (ref 79–97)
PLATELET # BLD AUTO: 123 10*3/MM3 (ref 140–450)
PMV BLD AUTO: 11.6 FL (ref 6–12)
POTASSIUM BLD-SCNC: 4.4 MMOL/L (ref 3.5–5.2)
RBC # BLD AUTO: 5.08 10*6/MM3 (ref 4.14–5.8)
SODIUM BLD-SCNC: 140 MMOL/L (ref 136–145)
TRIGL SERPL-MCNC: 63 MG/DL (ref 0–150)
VLDLC SERPL-MCNC: 12.6 MG/DL
WBC NRBC COR # BLD: 13.31 10*3/MM3 (ref 3.4–10.8)

## 2019-11-09 PROCEDURE — 94799 UNLISTED PULMONARY SVC/PX: CPT

## 2019-11-09 PROCEDURE — 80061 LIPID PANEL: CPT | Performed by: INTERNAL MEDICINE

## 2019-11-09 PROCEDURE — 85027 COMPLETE CBC AUTOMATED: CPT | Performed by: INTERNAL MEDICINE

## 2019-11-09 PROCEDURE — 99233 SBSQ HOSP IP/OBS HIGH 50: CPT | Performed by: INTERNAL MEDICINE

## 2019-11-09 PROCEDURE — 99232 SBSQ HOSP IP/OBS MODERATE 35: CPT | Performed by: FAMILY MEDICINE

## 2019-11-09 PROCEDURE — 25010000002 ENOXAPARIN PER 10 MG: Performed by: FAMILY MEDICINE

## 2019-11-09 PROCEDURE — 80048 BASIC METABOLIC PNL TOTAL CA: CPT | Performed by: INTERNAL MEDICINE

## 2019-11-09 PROCEDURE — 25010000002 PROMETHAZINE PER 50 MG: Performed by: PHYSICIAN ASSISTANT

## 2019-11-09 RX ORDER — ONDANSETRON 2 MG/ML
4 INJECTION INTRAMUSCULAR; INTRAVENOUS EVERY 6 HOURS PRN
Status: DISCONTINUED | OUTPATIENT
Start: 2019-11-09 | End: 2019-11-13 | Stop reason: HOSPADM

## 2019-11-09 RX ORDER — BUMETANIDE 1 MG/1
1 TABLET ORAL DAILY
Status: DISCONTINUED | OUTPATIENT
Start: 2019-11-09 | End: 2019-11-11

## 2019-11-09 RX ADMIN — ENOXAPARIN SODIUM 40 MG: 40 INJECTION SUBCUTANEOUS at 20:19

## 2019-11-09 RX ADMIN — SODIUM CHLORIDE, PRESERVATIVE FREE 10 ML: 5 INJECTION INTRAVENOUS at 20:18

## 2019-11-09 RX ADMIN — ASPIRIN 81 MG: 81 TABLET, CHEWABLE ORAL at 09:03

## 2019-11-09 RX ADMIN — Medication: at 20:18

## 2019-11-09 RX ADMIN — METOPROLOL SUCCINATE 25 MG: 25 TABLET, FILM COATED, EXTENDED RELEASE ORAL at 09:03

## 2019-11-09 RX ADMIN — PROMETHAZINE HYDROCHLORIDE 12.5 MG: 25 INJECTION INTRAMUSCULAR; INTRAVENOUS at 14:39

## 2019-11-09 RX ADMIN — FAMOTIDINE 20 MG: 20 TABLET, FILM COATED ORAL at 09:03

## 2019-11-09 RX ADMIN — ACETAMINOPHEN 650 MG: 325 TABLET ORAL at 20:18

## 2019-11-09 RX ADMIN — TICAGRELOR 90 MG: 90 TABLET ORAL at 09:03

## 2019-11-09 RX ADMIN — BUMETANIDE 1 MG: 1 TABLET ORAL at 14:39

## 2019-11-09 RX ADMIN — SODIUM CHLORIDE, PRESERVATIVE FREE 10 ML: 5 INJECTION INTRAVENOUS at 09:03

## 2019-11-09 RX ADMIN — TICAGRELOR 90 MG: 90 TABLET ORAL at 20:18

## 2019-11-09 RX ADMIN — ISOSORBIDE MONONITRATE 30 MG: 30 TABLET, EXTENDED RELEASE ORAL at 09:03

## 2019-11-09 RX ADMIN — HYDRALAZINE HYDROCHLORIDE 10 MG: 10 TABLET ORAL at 05:19

## 2019-11-09 RX ADMIN — ATORVASTATIN CALCIUM 20 MG: 20 TABLET, FILM COATED ORAL at 20:18

## 2019-11-09 NOTE — PROGRESS NOTES
Patient Identification:  Name:  Damián Myers  Age:  64 y.o.  Sex:  male  :  1955  MRN:  [id number]  Visit Number:  [id number]    Chief Complaint:   No chest pain  Little bit nauseated this morning  Did eat his lunch  No vomiting    Subjective: Not in any distress now  ----------------------------------------------------------------------------------------------------------------------  Current Hospital Meds:    aspirin 81 mg Oral Daily   atorvastatin 20 mg Oral Nightly   bumetanide 1 mg Oral Daily   famotidine 20 mg Oral Daily   hydrALAZINE 10 mg Oral Q8H   metoprolol succinate XL 25 mg Oral Q24H   sodium chloride 10 mL Intravenous Q12H   ticagrelor 90 mg Oral BID        ----------------------------------------------------------------------------------------------------------------------  Vital Signs:  Temp:  [97.4 °F (36.3 °C)-98.4 °F (36.9 °C)] 97.8 °F (36.6 °C)  Heart Rate:  [] 76  Resp:  [16-18] 18  BP: (101-178)/() 101/65      19  0455 19  0500 19  0500   Weight: 127 kg (279 lb 6.4 oz) 127 kg (279 lb) 125 kg (275 lb 12.8 oz)     Body mass index is 35.4 kg/m².    Intake/Output Summary (Last 24 hours) at 2019 1309  Last data filed at 2019 0900  Gross per 24 hour   Intake 480 ml   Output 2340 ml   Net -1860 ml     Diet Regular; Cardiac  ----------------------------------------------------------------------------------------------------------------------  Physical exam:  Constitutional:  Well-developed and well-nourished.  No respiratory distress.      HENT:  Head:  Normocephalic and atraumatic.  Mouth:  Moist mucous membranes.    Eyes:  Conjunctivae and EOM are normal.  Pupils are equal, round, and reactive to light.  No scleral icterus.    Neck:  Neck supple.  No JVD present.    Cardiovascular:  Normal rate, regular rhythm and normal heart sounds with no murmur.  Pulmonary/Chest:  No respiratory distress, no wheezes, no crackles, with normal breath sounds and good air  movement.  Abdominal:  Soft.  Bowel sounds are normal.  No distension and no tenderness.   Musculoskeletal:  No edema, no tenderness, and no deformity.  No red or swollen joints anywhere.    Neurological:  Alert and oriented to person, place, and time.  No cranial nerve deficit.  No tongue deviation.  No facial droop.  No slurred speech.   Skin:  Skin is warm and dry. No rash noted. No pallor.   Peripheral vascular:  Strong pulses in all 4 extremities with no clubbing, no cyanosis, 2 plus edema.  ----------------------------------------------------------------------------------------------------------------------  Tele: Sinus rhythm  ----------------------------------------------------------------------------------------------------------------------  Results from last 7 days   Lab Units 11/05/19  2300 11/05/19  1150 11/05/19  0709   TROPONIN T ng/mL 0.047* 0.038* 0.032*     Results from last 7 days   Lab Units 11/09/19  0429 11/07/19  0431 11/06/19  0021  11/04/19  1035   LACTATE mmol/L  --   --   --   --  1.4   WBC 10*3/mm3 13.31* 7.10 6.65   < > 6.89   HEMOGLOBIN g/dL 15.0 14.9 15.0   < > 16.1   HEMATOCRIT % 51.2* 50.9 49.1   < > 52.5*   MCV fL 100.8* 99.6* 97.6*   < > 96.5   MCHC g/dL 29.3* 29.3* 30.5*   < > 30.7*   PLATELETS 10*3/mm3 123* 128* 143   < > 151   INR   --   --   --   --  1.11*    < > = values in this interval not displayed.         Results from last 7 days   Lab Units 11/09/19  0429 11/08/19  0535 11/07/19  0431 11/05/19  0439 11/04/19  1035   SODIUM mmol/L 140 141 142   < > 145 145   POTASSIUM mmol/L 4.4 4.2 4.7   < > 4.3 4.6   MAGNESIUM mg/dL  --   --   --   --  2.0 2.1   CHLORIDE mmol/L 93* 96* 97*   < > 101 103   CO2 mmol/L 39.8* 35.6* 34.3*   < > 30.2* 29.9*   BUN mg/dL 18 20 21   < > 20 18   CREATININE mg/dL 1.06 1.07 1.23   < > 1.34* 1.23   EGFR IF NONAFRICN AM mL/min/1.73 70 70 59*   < > 54* 59*   CALCIUM mg/dL 9.1 9.3 9.4   < > 9.3 9.9   GLUCOSE mg/dL 134* 123* 133*   < > 137* 123*    ALBUMIN g/dL  --   --  3.77  --  3.81 4.05   BILIRUBIN mg/dL  --   --  0.7  --  0.8 0.9   ALK PHOS U/L  --   --  65  --  70 74   AST (SGOT) U/L  --   --  17  --  17 18   ALT (SGPT) U/L  --   --  16  --  17 18    < > = values in this interval not displayed.   Estimated Creatinine Clearance: 98.9 mL/min (by C-G formula based on SCr of 1.06 mg/dL).    No results found for: AMMONIA  Results from last 7 days   Lab Units 11/09/19  0429   CHOLESTEROL mg/dL 92   TRIGLYCERIDES mg/dL 63   HDL CHOL mg/dL 34*   LDL CHOL mg/dL 45     Blood Culture   Date Value Ref Range Status   11/04/2019 No growth at 5 days  Final   11/04/2019 No growth at 5 days  Final                I have personally looked at the labs and they are summarized above.  ----------------------------------------------------------------------------------------------------------------------  Imaging Results (Last 24 Hours)     ** No results found for the last 24 hours. **        ----------------------------------------------------------------------------------------------------------------------    Assessment:  CHF exacerbation  Left ventricular dysfunction  CAD  Status post left anterior descending artery stent    Plan:  Continue   aspirin  Brilinta  Statin  Beta-blocker  Strict fluid control and salt intake  DC Imdur  Once patient blood pressure stays more than 110 then add low-dose ACE inhibitors  Discussed with patient and family at bedside      Sherrell Hart MD  11/09/19  1:09 PM

## 2019-11-09 NOTE — PLAN OF CARE
Problem: Fall Risk (Adult)  Goal: Identify Related Risk Factors and Signs and Symptoms  Outcome: Ongoing (interventions implemented as appropriate)    Goal: Absence of Fall  Outcome: Ongoing (interventions implemented as appropriate)      Problem: Patient Care Overview  Goal: Plan of Care Review  Outcome: Ongoing (interventions implemented as appropriate)    Goal: Discharge Needs Assessment  Outcome: Ongoing (interventions implemented as appropriate)      Problem: Patient Care Overview  Goal: Plan of Care Review  Outcome: Ongoing (interventions implemented as appropriate)    Goal: Discharge Needs Assessment  Outcome: Ongoing (interventions implemented as appropriate)      Problem: Cardiac: Heart Failure (Adult)  Goal: Signs and Symptoms of Listed Potential Problems Will be Absent, Minimized or Managed (Cardiac: Heart Failure)  Outcome: Ongoing (interventions implemented as appropriate)

## 2019-11-09 NOTE — PROGRESS NOTES
[facility] HOSPITALIST PROGRESS NOTE     Patient Identification:  Name:  Damián Myers  Age:  64 y.o.  Sex:  male  :  1955  MRN:  [id number]  Visit Number:  [id number]  ROOM: [address] [place] [address]     Primary Care Provider:  Spencer Saeed MD    Length of stay in inpatient status:  5    Subjective     Chief Compliant:    Chief Complaint   Patient presents with   • Sent from PCP   • Shortness of Breath       History of Presenting Illness: 64-year-old gentleman who presented with new onset CHF secondary to systolic dysfunction found to be secondary to ischemic cardiomyopathy.  Patient also found to have coronary artery disease with a 85% lesion in the LAD and did have stent placement yesterday.  Patient slightly nauseated this morning.  Has continued to have some orthopnea.  No chest pain this morning    Objective     Current Hospital Meds:  aspirin 81 mg Oral Daily   atorvastatin 20 mg Oral Nightly   famotidine 20 mg Oral Daily   hydrALAZINE 10 mg Oral Q8H   isosorbide mononitrate 30 mg Oral Q24H   metoprolol succinate XL 25 mg Oral Q24H   sodium chloride 10 mL Intravenous Q12H   ticagrelor 90 mg Oral BID      ----------------------------------------------------------------------------------------------------------------------  Vital Signs:  Temp:  [97.4 °F (36.3 °C)-98.6 °F (37 °C)] 98.4 °F (36.9 °C)  Heart Rate:  [] 76  Resp:  [14-18] 18  BP: (101-178)/() 101/65  SpO2:  [92 %-98 %] 93 %  on  Flow (L/min):  [4.5-6] 4.5;   Device (Oxygen Therapy): humidified;nasal cannula  Body mass index is 35.4 kg/m².    Wt Readings from Last 3 Encounters:   11/08/19 125 kg (275 lb 12.8 oz)     Intake & Output (last 3 days)       701 -  07 -  -  - 11/10 07    P.O. 720 240 240 240    I.V. (mL/kg)   58.3 (0.5)     Total Intake(mL/kg) 720 (5.7) 240 (1.9) 298.3 (2.4) 240 (1.9)    Urine (mL/kg/hr) 1250 (0.4) 3900 (1.3) 2840 (0.9)     Total Output 1250  4254 1190     Net -243 -4677 -1770.7 +240            Urine Unmeasured Occurrence   1 x         Diet Regular; Cardiac  ----------------------------------------------------------------------------------------------------------------------  Physical exam:  Constitutional: Overweight gentleman in no distress  HEENT: Normocephalic atraumatic  Neck: Supple  Cardiovascular: Regular rate and rhythm without murmurs rubs gallops; patient has 1+ edema in the lower extremities  Pulmonary/Chest: Few scattered crackles  Abdominal: Positive bowel sounds soft.   Musculoskeletal: No arthropathy  Neurological: No focal deficits  Skin: No rashes  Peripheral vascular:  Genitourinary:  ----------------------------------------------------------------------------------------------------------------------    Last echocardiogram:  Results for orders placed during the hospital encounter of 11/04/19   Adult Transthoracic Echo Complete W/ Cont if Necessary Per Protocol    Narrative · The left ventricular cavity is mildly dilated.  · Left ventricular systolic function is severely decreased.  · Estimated EF appears to be in the range of 31 - 35%.  · Right ventricular cavity is mild-to-moderately dilated.  · Moderately reduced right ventricular systolic function noted.  · The aortic valve is structurally normal. Trace aortic valve   regurgitation is present. No aortic valve stenosis is present.  · The mitral valve is normal in structure. Moderate mitral valve   regurgitation is present with an eccentric jet noted. No significant   mitral valve stenosis is present.  · The tricuspid valve is normal. Trace tricuspid valve regurgitation is   present. Estimated right ventricular systolic pressure from tricuspid   regurgitation is mildly elevated (35-45 mmHg).  · There is a small (<1cm) pericardial effusion adjacent to the left   ventricle. There is no evidence of cardiac tamponade.  · Comments: No previous studies available for comparison.         ----------------------------------------------------------------------------------------------------------------------  Results from last 7 days   Lab Units 11/09/19 0429 11/07/19 0431 11/06/19 0021  11/04/19  1035   LACTATE mmol/L  --   --   --   --  1.4   WBC 10*3/mm3 13.31* 7.10 6.65   < > 6.89   HEMOGLOBIN g/dL 15.0 14.9 15.0   < > 16.1   HEMATOCRIT % 51.2* 50.9 49.1   < > 52.5*   MCV fL 100.8* 99.6* 97.6*   < > 96.5   MCHC g/dL 29.3* 29.3* 30.5*   < > 30.7*   PLATELETS 10*3/mm3 123* 128* 143   < > 151   INR   --   --   --   --  1.11*    < > = values in this interval not displayed.         Results from last 7 days   Lab Units 11/09/19 0429 11/08/19 0535 11/07/19 0431 11/05/19 0439 11/04/19  1035   SODIUM mmol/L 140 141 142   < > 145 145   POTASSIUM mmol/L 4.4 4.2 4.7   < > 4.3 4.6   MAGNESIUM mg/dL  --   --   --   --  2.0 2.1   CHLORIDE mmol/L 93* 96* 97*   < > 101 103   CO2 mmol/L 39.8* 35.6* 34.3*   < > 30.2* 29.9*   BUN mg/dL 18 20 21   < > 20 18   CREATININE mg/dL 1.06 1.07 1.23   < > 1.34* 1.23   EGFR IF NONAFRICN AM mL/min/1.73 70 70 59*   < > 54* 59*   CALCIUM mg/dL 9.1 9.3 9.4   < > 9.3 9.9   PHOSPHORUS mg/dL  --   --   --   --  6.0*  --    GLUCOSE mg/dL 134* 123* 133*   < > 137* 123*   ALBUMIN g/dL  --   --  3.77  --  3.81 4.05   BILIRUBIN mg/dL  --   --  0.7  --  0.8 0.9   ALK PHOS U/L  --   --  65  --  70 74   AST (SGOT) U/L  --   --  17  --  17 18   ALT (SGPT) U/L  --   --  16  --  17 18    < > = values in this interval not displayed.   Estimated Creatinine Clearance: 98.9 mL/min (by C-G formula based on SCr of 1.06 mg/dL).  No results found for: AMMONIA  Results from last 7 days   Lab Units 11/05/19  2300 11/05/19  1150 11/05/19  0709   TROPONIN T ng/mL 0.047* 0.038* 0.032*     Results from last 7 days   Lab Units 11/05/19  0439 11/04/19  1035   PROBNP pg/mL 6,124.0* 6,938.0*     Results from last 7 days   Lab Units 11/09/19  0429   CHOLESTEROL mg/dL 92   TRIGLYCERIDES mg/dL 63   HDL  CHOL mg/dL 34*   LDL CHOL mg/dL 45     No results found for: HGBA1C, POCGLU  Lab Results   Component Value Date    TSH 5.070 (H) 11/04/2019    FREET4 1.05 11/05/2019     No results found for: PREGTESTUR, PREGSERUM, HCG, HCGQUANT  Pain Management Panel     There is no flowsheet data to display.        Brief Urine Lab Results  (Last result in the past 365 days)      Color   Clarity   Blood   Leuk Est   Nitrite   Protein   CREAT   Urine HCG        11/04/19 1147 Yellow Clear Negative Negative Negative Negative             Blood Culture   Date Value Ref Range Status   11/04/2019 No growth at 4 days  Preliminary   11/04/2019 No growth at 4 days  Preliminary     Results from last 7 days   Lab Units 11/04/19  1147   NITRITE UA  Negative              Results from last 7 days   Lab Units 11/04/19  1035   LACTATE mmol/L 1.4       I have personally looked at the labs and they are summarized above.  ----------------------------------------------------------------------------------------------------------------------  Detailed radiology reports for the last 24 hours:    Imaging Results (Last 24 Hours)     ** No results found for the last 24 hours. **        Final impressions for the last 30 days of radiology reports:    Xr Chest 2 View    Result Date: 11/4/2019  Appearance concerning for right-sided pneumonia  This report was finalized on 11/4/2019 11:14 AM by Dr. Jordy Jhaveri MD.      I have personally looked at the radiology images and read the final radiology report.    Assessment & Plan    CHF with systolic dysfunction secondary to ischemic cardiomyopathy--currently on nitrate and hydralazine therapy.  Patient also on metoprolol.    Acute kidney injury--much improved    Coronary artery disease--patient is status post stent placement yesterday in the LAD.  Continue medical management.  This includes statin therapy, antiplatelet therapy, and beta-blocker.    VTE Prophylaxis:   Mechanical Order History:     None       Pharmalogical Order History:     Ordered     Dose Route Frequency Stop    11/08/19 1120  heparin (porcine) injection  Status:  Discontinued      -- -- As Needed 11/08/19 1201    11/04/19 1541  enoxaparin (LOVENOX) syringe 40 mg  Status:  Discontinued      40 mg SC Nightly 11/08/19 1156          The patient is high risk due to the following diagnoses/reasons: CAD    Roel Martines MD  [facility]  11/09/19  11:05 AM

## 2019-11-10 LAB
ANION GAP SERPL CALCULATED.3IONS-SCNC: 7.9 MMOL/L (ref 5–15)
BASOPHILS # BLD AUTO: 0.03 10*3/MM3 (ref 0–0.2)
BASOPHILS NFR BLD AUTO: 0.3 % (ref 0–1.5)
BUN BLD-MCNC: 21 MG/DL (ref 8–23)
BUN/CREAT SERPL: 18.9 (ref 7–25)
CALCIUM SPEC-SCNC: 9 MG/DL (ref 8.6–10.5)
CHLORIDE SERPL-SCNC: 90 MMOL/L (ref 98–107)
CO2 SERPL-SCNC: 40.1 MMOL/L (ref 22–29)
CREAT BLD-MCNC: 1.11 MG/DL (ref 0.76–1.27)
DEPRECATED RDW RBC AUTO: 47.8 FL (ref 37–54)
EOSINOPHIL # BLD AUTO: 0.02 10*3/MM3 (ref 0–0.4)
EOSINOPHIL NFR BLD AUTO: 0.2 % (ref 0.3–6.2)
ERYTHROCYTE [DISTWIDTH] IN BLOOD BY AUTOMATED COUNT: 12.9 % (ref 12.3–15.4)
GFR SERPL CREATININE-BSD FRML MDRD: 67 ML/MIN/1.73
GLUCOSE BLD-MCNC: 122 MG/DL (ref 65–99)
HCT VFR BLD AUTO: 46.9 % (ref 37.5–51)
HGB BLD-MCNC: 13.7 G/DL (ref 13–17.7)
IMM GRANULOCYTES # BLD AUTO: 0.03 10*3/MM3 (ref 0–0.05)
IMM GRANULOCYTES NFR BLD AUTO: 0.3 % (ref 0–0.5)
LYMPHOCYTES # BLD AUTO: 1.04 10*3/MM3 (ref 0.7–3.1)
LYMPHOCYTES NFR BLD AUTO: 11.2 % (ref 19.6–45.3)
MCH RBC QN AUTO: 29.4 PG (ref 26.6–33)
MCHC RBC AUTO-ENTMCNC: 29.2 G/DL (ref 31.5–35.7)
MCV RBC AUTO: 100.6 FL (ref 79–97)
MONOCYTES # BLD AUTO: 1.36 10*3/MM3 (ref 0.1–0.9)
MONOCYTES NFR BLD AUTO: 14.6 % (ref 5–12)
NEUTROPHILS # BLD AUTO: 6.83 10*3/MM3 (ref 1.7–7)
NEUTROPHILS NFR BLD AUTO: 73.4 % (ref 42.7–76)
NRBC BLD AUTO-RTO: 0 /100 WBC (ref 0–0.2)
PLATELET # BLD AUTO: 113 10*3/MM3 (ref 140–450)
PMV BLD AUTO: 12.2 FL (ref 6–12)
POTASSIUM BLD-SCNC: 4.3 MMOL/L (ref 3.5–5.2)
RBC # BLD AUTO: 4.66 10*6/MM3 (ref 4.14–5.8)
SODIUM BLD-SCNC: 138 MMOL/L (ref 136–145)
WBC NRBC COR # BLD: 9.31 10*3/MM3 (ref 3.4–10.8)

## 2019-11-10 PROCEDURE — 25010000002 ENOXAPARIN PER 10 MG: Performed by: FAMILY MEDICINE

## 2019-11-10 PROCEDURE — 80048 BASIC METABOLIC PNL TOTAL CA: CPT | Performed by: INTERNAL MEDICINE

## 2019-11-10 PROCEDURE — 94799 UNLISTED PULMONARY SVC/PX: CPT

## 2019-11-10 PROCEDURE — 25010000002 PROMETHAZINE PER 50 MG: Performed by: PHYSICIAN ASSISTANT

## 2019-11-10 PROCEDURE — 99232 SBSQ HOSP IP/OBS MODERATE 35: CPT | Performed by: FAMILY MEDICINE

## 2019-11-10 PROCEDURE — 99233 SBSQ HOSP IP/OBS HIGH 50: CPT | Performed by: INTERNAL MEDICINE

## 2019-11-10 PROCEDURE — 85025 COMPLETE CBC W/AUTO DIFF WBC: CPT | Performed by: FAMILY MEDICINE

## 2019-11-10 RX ORDER — OXYMETAZOLINE HYDROCHLORIDE 0.05 G/100ML
2 SPRAY NASAL 2 TIMES DAILY
Status: DISCONTINUED | OUTPATIENT
Start: 2019-11-10 | End: 2019-11-12

## 2019-11-10 RX ADMIN — TICAGRELOR 90 MG: 90 TABLET ORAL at 20:17

## 2019-11-10 RX ADMIN — HYDRALAZINE HYDROCHLORIDE 10 MG: 10 TABLET ORAL at 14:05

## 2019-11-10 RX ADMIN — ASPIRIN 81 MG: 81 TABLET, CHEWABLE ORAL at 09:13

## 2019-11-10 RX ADMIN — Medication: at 09:14

## 2019-11-10 RX ADMIN — TICAGRELOR 90 MG: 90 TABLET ORAL at 09:13

## 2019-11-10 RX ADMIN — SODIUM CHLORIDE, PRESERVATIVE FREE 10 ML: 5 INJECTION INTRAVENOUS at 09:14

## 2019-11-10 RX ADMIN — ATORVASTATIN CALCIUM 20 MG: 20 TABLET, FILM COATED ORAL at 20:17

## 2019-11-10 RX ADMIN — Medication 5 MG: at 23:32

## 2019-11-10 RX ADMIN — METOPROLOL SUCCINATE 25 MG: 25 TABLET, FILM COATED, EXTENDED RELEASE ORAL at 09:13

## 2019-11-10 RX ADMIN — FAMOTIDINE 20 MG: 20 TABLET, FILM COATED ORAL at 09:13

## 2019-11-10 RX ADMIN — Medication: at 17:13

## 2019-11-10 RX ADMIN — BUMETANIDE 1 MG: 1 TABLET ORAL at 09:13

## 2019-11-10 RX ADMIN — Medication: at 20:19

## 2019-11-10 RX ADMIN — PROMETHAZINE HYDROCHLORIDE 12.5 MG: 25 INJECTION INTRAMUSCULAR; INTRAVENOUS at 20:18

## 2019-11-10 RX ADMIN — Medication: at 12:00

## 2019-11-10 RX ADMIN — ENOXAPARIN SODIUM 40 MG: 40 INJECTION SUBCUTANEOUS at 20:16

## 2019-11-10 RX ADMIN — HYDRALAZINE HYDROCHLORIDE 10 MG: 10 TABLET ORAL at 20:17

## 2019-11-10 RX ADMIN — OXYMETAZOLINE HYDROCHLORIDE 2 SPRAY: 5 SPRAY NASAL at 17:13

## 2019-11-10 NOTE — PLAN OF CARE
Problem: Fall Risk (Adult)  Goal: Identify Related Risk Factors and Signs and Symptoms  Outcome: Ongoing (interventions implemented as appropriate)    Goal: Absence of Fall  Outcome: Ongoing (interventions implemented as appropriate)      Problem: Patient Care Overview  Goal: Plan of Care Review  Outcome: Ongoing (interventions implemented as appropriate)    Goal: Individualization and Mutuality  Outcome: Ongoing (interventions implemented as appropriate)    Goal: Discharge Needs Assessment  Outcome: Ongoing (interventions implemented as appropriate)    Goal: Interprofessional Rounds/Family Conf  Outcome: Ongoing (interventions implemented as appropriate)      Problem: Cardiac: ACS (Acute Coronary Syndrome) (Adult)  Goal: Signs and Symptoms of Listed Potential Problems Will be Absent, Minimized or Managed (Cardiac: ACS)  Outcome: Ongoing (interventions implemented as appropriate)      Problem: Patient Care Overview  Goal: Plan of Care Review  Outcome: Ongoing (interventions implemented as appropriate)    Goal: Individualization and Mutuality  Outcome: Ongoing (interventions implemented as appropriate)    Goal: Discharge Needs Assessment  Outcome: Ongoing (interventions implemented as appropriate)    Goal: Interprofessional Rounds/Family Conf  Outcome: Ongoing (interventions implemented as appropriate)      Problem: Cardiac: Heart Failure (Adult)  Goal: Signs and Symptoms of Listed Potential Problems Will be Absent, Minimized or Managed (Cardiac: Heart Failure)  Outcome: Ongoing (interventions implemented as appropriate)

## 2019-11-10 NOTE — PLAN OF CARE
Problem: Fall Risk (Adult)  Goal: Identify Related Risk Factors and Signs and Symptoms  Outcome: Ongoing (interventions implemented as appropriate)    Goal: Absence of Fall  Outcome: Ongoing (interventions implemented as appropriate)      Problem: Patient Care Overview  Goal: Plan of Care Review  Outcome: Ongoing (interventions implemented as appropriate)    Goal: Discharge Needs Assessment  Outcome: Ongoing (interventions implemented as appropriate)      Problem: Cardiac: ACS (Acute Coronary Syndrome) (Adult)  Goal: Signs and Symptoms of Listed Potential Problems Will be Absent, Minimized or Managed (Cardiac: ACS)  Outcome: Ongoing (interventions implemented as appropriate)      Problem: Patient Care Overview  Goal: Plan of Care Review  Outcome: Ongoing (interventions implemented as appropriate)    Goal: Discharge Needs Assessment  Outcome: Ongoing (interventions implemented as appropriate)      Problem: Cardiac: Heart Failure (Adult)  Goal: Signs and Symptoms of Listed Potential Problems Will be Absent, Minimized or Managed (Cardiac: Heart Failure)  Outcome: Ongoing (interventions implemented as appropriate)

## 2019-11-10 NOTE — PROGRESS NOTES
[facility] HOSPITALIST PROGRESS NOTE     Patient Identification:  Name:  Damián Myers  Age:  64 y.o.  Sex:  male  :  1955  MRN:  [id number]  Visit Number:  [id number]  ROOM: [address] [place] [address]     Primary Care Provider:  Spencer Saeed MD    Length of stay in inpatient status:  6    Subjective     Chief Compliant:    Chief Complaint   Patient presents with   • Sent from PCP   • Shortness of Breath       History of Presenting Illness: Patient 64-year-old gentleman with a history of coronary artery disease with status post stent placement, CHF secondary to systolic dysfunction likely secondary to ischemic cardiomyopathy.  Patient did have acute kidney injury earlier in the admission but this is since improved.  Patient is doing better today but did become slightly hypoxic with ambulation and he is a little bitAnxious about being discharged today.    Objective     Current Hospital Meds:  aspirin 81 mg Oral Daily   atorvastatin 20 mg Oral Nightly   bumetanide 1 mg Oral Daily   enoxaparin 40 mg Subcutaneous Nightly   famotidine 20 mg Oral Daily   hydrALAZINE 10 mg Oral Q8H   magic barrier cream  Topical 4x Daily   metoprolol succinate XL 25 mg Oral Q24H   sodium chloride 10 mL Intravenous Q12H   ticagrelor 90 mg Oral BID      ----------------------------------------------------------------------------------------------------------------------  Vital Signs:  Temp:  [97.5 °F (36.4 °C)-98.2 °F (36.8 °C)] 97.7 °F (36.5 °C)  Heart Rate:  [72-83] 78  Resp:  [18] 18  BP: ()/(59-73) 119/66  SpO2:  [93 %-97 %] 93 %  on  Flow (L/min):  [2.5-4.5] 2.5;   Device (Oxygen Therapy): nasal cannula;humidified  Body mass index is 34.32 kg/m².    Wt Readings from Last 3 Encounters:   11/10/19 121 kg (267 lb 6.4 oz)     Intake & Output (last 3 days)       701 -  -  - 11/10 0700 11/10 0701 - 700    P.O. 240 240 480 120    I.V. (mL/kg)  58.3 (0.5)      Total Intake(mL/kg)  240 (1.9) 298.3 (2.4) 480 (4) 120 (1)    Urine (mL/kg/hr) 3900 (1.3) 3190 (1.1) 1800 (0.6)     Total Output 3900 3190 1800     Net -3660 -2891.7 -1320 +120            Urine Unmeasured Occurrence  1 x          Diet Regular; Cardiac  ----------------------------------------------------------------------------------------------------------------------  Physical exam:  Constitutional: No acute distress  HEENT: Normocephalic atraumatic  Neck: Supple  Cardiovascular: Regular rate and rhythm without murmurs rubs gallops  Pulmonary/Chest: Few sparse crackles but much improved  Abdominal:  .  Positive bowel sounds soft nontender palpation  Musculoskeletal: No arthropathy  Neurological: No focal deficits  Skin: No rashes  Peripheral vascular:  Genitourinary:  ----------------------------------------------------------------------------------------------------------------------    Last echocardiogram:  Results for orders placed during the hospital encounter of 11/04/19   Adult Transthoracic Echo Complete W/ Cont if Necessary Per Protocol    Narrative · The left ventricular cavity is mildly dilated.  · Left ventricular systolic function is severely decreased.  · Estimated EF appears to be in the range of 31 - 35%.  · Right ventricular cavity is mild-to-moderately dilated.  · Moderately reduced right ventricular systolic function noted.  · The aortic valve is structurally normal. Trace aortic valve   regurgitation is present. No aortic valve stenosis is present.  · The mitral valve is normal in structure. Moderate mitral valve   regurgitation is present with an eccentric jet noted. No significant   mitral valve stenosis is present.  · The tricuspid valve is normal. Trace tricuspid valve regurgitation is   present. Estimated right ventricular systolic pressure from tricuspid   regurgitation is mildly elevated (35-45 mmHg).  · There is a small (<1cm) pericardial effusion adjacent to the left   ventricle. There is no evidence of  cardiac tamponade.  · Comments: No previous studies available for comparison.        ----------------------------------------------------------------------------------------------------------------------  Results from last 7 days   Lab Units 11/10/19  0052 11/09/19  0429 11/07/19  0431  11/04/19  1035   LACTATE mmol/L  --   --   --   --  1.4   WBC 10*3/mm3 9.31 13.31* 7.10   < > 6.89   HEMOGLOBIN g/dL 13.7 15.0 14.9   < > 16.1   HEMATOCRIT % 46.9 51.2* 50.9   < > 52.5*   MCV fL 100.6* 100.8* 99.6*   < > 96.5   MCHC g/dL 29.2* 29.3* 29.3*   < > 30.7*   PLATELETS 10*3/mm3 113* 123* 128*   < > 151   INR   --   --   --   --  1.11*    < > = values in this interval not displayed.         Results from last 7 days   Lab Units 11/10/19  0052 11/09/19  0429 11/08/19  0535 11/07/19 0431 11/05/19 0439 11/04/19  1035   SODIUM mmol/L 138 140 141 142   < > 145 145   POTASSIUM mmol/L 4.3 4.4 4.2 4.7   < > 4.3 4.6   MAGNESIUM mg/dL  --   --   --   --   --  2.0 2.1   CHLORIDE mmol/L 90* 93* 96* 97*   < > 101 103   CO2 mmol/L 40.1* 39.8* 35.6* 34.3*   < > 30.2* 29.9*   BUN mg/dL 21 18 20 21   < > 20 18   CREATININE mg/dL 1.11 1.06 1.07 1.23   < > 1.34* 1.23   EGFR IF NONAFRICN AM mL/min/1.73 67 70 70 59*   < > 54* 59*   CALCIUM mg/dL 9.0 9.1 9.3 9.4   < > 9.3 9.9   PHOSPHORUS mg/dL  --   --   --   --   --  6.0*  --    GLUCOSE mg/dL 122* 134* 123* 133*   < > 137* 123*   ALBUMIN g/dL  --   --   --  3.77  --  3.81 4.05   BILIRUBIN mg/dL  --   --   --  0.7  --  0.8 0.9   ALK PHOS U/L  --   --   --  65  --  70 74   AST (SGOT) U/L  --   --   --  17  --  17 18   ALT (SGPT) U/L  --   --   --  16  --  17 18    < > = values in this interval not displayed.   Estimated Creatinine Clearance: 92.9 mL/min (by C-G formula based on SCr of 1.11 mg/dL).  No results found for: AMMONIA  Results from last 7 days   Lab Units 11/05/19  2300 11/05/19  1150 11/05/19  0709   TROPONIN T ng/mL 0.047* 0.038* 0.032*     Results from last 7 days   Lab Units  11/05/19  0439 11/04/19  1035   PROBNP pg/mL 6,124.0* 6,938.0*     Results from last 7 days   Lab Units 11/09/19  0429   CHOLESTEROL mg/dL 92   TRIGLYCERIDES mg/dL 63   HDL CHOL mg/dL 34*   LDL CHOL mg/dL 45     No results found for: HGBA1C, POCGLU  Lab Results   Component Value Date    TSH 5.070 (H) 11/04/2019    FREET4 1.05 11/05/2019     No results found for: PREGTESTUR, PREGSERUM, HCG, HCGQUANT  Pain Management Panel     There is no flowsheet data to display.        Brief Urine Lab Results  (Last result in the past 365 days)      Color   Clarity   Blood   Leuk Est   Nitrite   Protein   CREAT   Urine HCG        11/04/19 1147 Yellow Clear Negative Negative Negative Negative             Blood Culture   Date Value Ref Range Status   11/04/2019 No growth at 5 days  Final   11/04/2019 No growth at 5 days  Final     Results from last 7 days   Lab Units 11/04/19  1147   NITRITE UA  Negative              Results from last 7 days   Lab Units 11/04/19  1035   LACTATE mmol/L 1.4       I have personally looked at the labs and they are summarized above.  ----------------------------------------------------------------------------------------------------------------------  Detailed radiology reports for the last 24 hours:    Imaging Results (Last 24 Hours)     ** No results found for the last 24 hours. **        Final impressions for the last 30 days of radiology reports:    Xr Chest 2 View    Result Date: 11/4/2019  Appearance concerning for right-sided pneumonia  This report was finalized on 11/4/2019 11:14 AM by Dr. Jordy Jhaveri MD.      I have personally looked at the radiology images and read the final radiology report.    Assessment & Plan     CHF secondary to systolic dysfunction--continue hydralazine,, Bumex, beta-blocker.  Would recommend low-dose ACE inhibitor in the near future    Coronary artery disease--status post stent placement    Recent was slightly hypoxic with ambulation today.  We will continue current  treatment.  Continue ambulation at this time.  Hopefully can be discharged in the next 24 hours    VTE Prophylaxis:   Mechanical Order History:     None      Pharmalogical Order History:     Ordered     Dose Route Frequency Stop    11/09/19 1710  enoxaparin (LOVENOX) syringe 40 mg      40 mg SC Nightly --    11/08/19 1120  heparin (porcine) injection  Status:  Discontinued      -- -- As Needed 11/08/19 1201    11/04/19 1541  enoxaparin (LOVENOX) syringe 40 mg  Status:  Discontinued      40 mg SC Nightly 11/08/19 1156          The patient is high risk due to the following diagnoses/reasons: CHF secondary to ischemic cardiomyopathy    Roel Martines MD  [facility]  11/10/19  12:43 PM

## 2019-11-10 NOTE — PHARMACY PATIENT ASSISTANCE
Pharmacy checked on pricing for new medication for brilinta.  Patient does not have prescription coverage.  We will profile a free trial card and discuss with him the assistance program available through the .  I have also discussed other alternative medication that may be used after the free trial of brilinta.    Thank you;  Rona Fernandez RPH  11/10/19  4:12 PM

## 2019-11-10 NOTE — PROGRESS NOTES
Patient Identification:  Name:  Damián Myers  Age:  64 y.o.  Sex:  male  :  1955  MRN:  [id number]  Visit Number:  [id number]    Chief Complaint:   No complaint of chest pain  Walking in the hallway without any symptoms    Subjective: Not in any distress  ----------------------------------------------------------------------------------------------------------------------  Current Hospital Meds:    aspirin 81 mg Oral Daily   atorvastatin 20 mg Oral Nightly   bumetanide 1 mg Oral Daily   enoxaparin 40 mg Subcutaneous Nightly   famotidine 20 mg Oral Daily   hydrALAZINE 10 mg Oral Q8H   magic barrier cream  Topical 4x Daily   metoprolol succinate XL 25 mg Oral Q24H   sodium chloride 10 mL Intravenous Q12H   ticagrelor 90 mg Oral BID        ----------------------------------------------------------------------------------------------------------------------  Vital Signs:  Temp:  [97.5 °F (36.4 °C)-98.2 °F (36.8 °C)] 97.7 °F (36.5 °C)  Heart Rate:  [72-83] 78  Resp:  [18] 18  BP: ()/(59-73) 119/66      19  0500 19  0500 11/10/19  0500   Weight: 127 kg (279 lb) 125 kg (275 lb 12.8 oz) 121 kg (267 lb 6.4 oz)     Body mass index is 34.32 kg/m².    Intake/Output Summary (Last 24 hours) at 11/10/2019 1113  Last data filed at 11/10/2019 0800  Gross per 24 hour   Intake 360 ml   Output 1800 ml   Net -1440 ml     Diet Regular; Cardiac  ----------------------------------------------------------------------------------------------------------------------  Physical exam:  Constitutional:  Well-developed and well-nourished.  No respiratory distress.      HENT:  Head:  Normocephalic and atraumatic.  Mouth:  Moist mucous membranes.    Eyes:  Conjunctivae and EOM are normal.  Pupils are equal, round, and reactive to light.  No scleral icterus.    Neck:  Neck supple.  No JVD present.    Cardiovascular:  Normal rate, regular rhythm and normal heart sounds with no murmur.  Pulmonary/Chest:  No respiratory distress,  no wheezes, no crackles, with normal breath sounds and good air movement.  Abdominal:  Soft.  Bowel sounds are normal.  No distension and no tenderness.   Musculoskeletal:  No edema, no tenderness, and no deformity.  No red or swollen joints anywhere.    Neurological:  Alert and oriented to person, place, and time.  No cranial nerve deficit.  No tongue deviation.  No facial droop.  No slurred speech.   Skin:  Skin is warm and dry. No rash noted. No pallor.   Peripheral vascular:  Strong pulses in all 4 extremities with no clubbing, no cyanosis, no edema.  ----------------------------------------------------------------------------------------------------------------------  Tele: Sinus rhythm  ----------------------------------------------------------------------------------------------------------------------  Results from last 7 days   Lab Units 11/05/19  2300 11/05/19  1150 11/05/19  0709   TROPONIN T ng/mL 0.047* 0.038* 0.032*     Results from last 7 days   Lab Units 11/10/19  0052 11/09/19  0429 11/07/19  0431  11/04/19  1035   LACTATE mmol/L  --   --   --   --  1.4   WBC 10*3/mm3 9.31 13.31* 7.10   < > 6.89   HEMOGLOBIN g/dL 13.7 15.0 14.9   < > 16.1   HEMATOCRIT % 46.9 51.2* 50.9   < > 52.5*   MCV fL 100.6* 100.8* 99.6*   < > 96.5   MCHC g/dL 29.2* 29.3* 29.3*   < > 30.7*   PLATELETS 10*3/mm3 113* 123* 128*   < > 151   INR   --   --   --   --  1.11*    < > = values in this interval not displayed.         Results from last 7 days   Lab Units 11/10/19  0052 11/09/19  0429 11/08/19  0535 11/07/19  0431  11/05/19  0439 11/04/19  1035   SODIUM mmol/L 138 140 141 142   < > 145 145   POTASSIUM mmol/L 4.3 4.4 4.2 4.7   < > 4.3 4.6   MAGNESIUM mg/dL  --   --   --   --   --  2.0 2.1   CHLORIDE mmol/L 90* 93* 96* 97*   < > 101 103   CO2 mmol/L 40.1* 39.8* 35.6* 34.3*   < > 30.2* 29.9*   BUN mg/dL 21 18 20 21   < > 20 18   CREATININE mg/dL 1.11 1.06 1.07 1.23   < > 1.34* 1.23   EGFR IF NONAFRICN AM mL/min/1.73 67 70 70  59*   < > 54* 59*   CALCIUM mg/dL 9.0 9.1 9.3 9.4   < > 9.3 9.9   GLUCOSE mg/dL 122* 134* 123* 133*   < > 137* 123*   ALBUMIN g/dL  --   --   --  3.77  --  3.81 4.05   BILIRUBIN mg/dL  --   --   --  0.7  --  0.8 0.9   ALK PHOS U/L  --   --   --  65  --  70 74   AST (SGOT) U/L  --   --   --  17  --  17 18   ALT (SGPT) U/L  --   --   --  16  --  17 18    < > = values in this interval not displayed.   Estimated Creatinine Clearance: 92.9 mL/min (by C-G formula based on SCr of 1.11 mg/dL).    No results found for: AMMONIA  Results from last 7 days   Lab Units 11/09/19  0429   CHOLESTEROL mg/dL 92   TRIGLYCERIDES mg/dL 63   HDL CHOL mg/dL 34*   LDL CHOL mg/dL 45     Blood Culture   Date Value Ref Range Status   11/04/2019 No growth at 5 days  Final   11/04/2019 No growth at 5 days  Final                I have personally looked at the labs and they are summarized above.  ----------------------------------------------------------------------------------------------------------------------  Imaging Results (Last 24 Hours)     ** No results found for the last 24 hours. **        ----------------------------------------------------------------------------------------------------------------------    Assessment:  CAD status post left anterior descending artery stent  Trace positive troponin  New onset CHF  Systolic CHF  Obesity    Plan:  Okay to DC home from cardiac standpoint  Aspirin  Brilinta  Statin  Beta-blocker  Patient blood pressure is borderline  Therefore will not recommend ACE inhibitors at the present time  On follow-up patient home blood pressure is good then patient should be started on low-dose ACE inhibitors  Discussed with wife at bedside the plan of care  Follow-up next week with local cardiologist      Sherrell Hart MD  11/10/19  11:13 AM     5

## 2019-11-11 LAB
ANION GAP SERPL CALCULATED.3IONS-SCNC: 8.7 MMOL/L (ref 5–15)
BUN BLD-MCNC: 20 MG/DL (ref 8–23)
BUN/CREAT SERPL: 18.7 (ref 7–25)
CALCIUM SPEC-SCNC: 9.1 MG/DL (ref 8.6–10.5)
CHLORIDE SERPL-SCNC: 91 MMOL/L (ref 98–107)
CO2 SERPL-SCNC: 37.3 MMOL/L (ref 22–29)
CREAT BLD-MCNC: 1.07 MG/DL (ref 0.76–1.27)
GFR SERPL CREATININE-BSD FRML MDRD: 70 ML/MIN/1.73
GLUCOSE BLD-MCNC: 133 MG/DL (ref 65–99)
MAGNESIUM SERPL-MCNC: 2 MG/DL (ref 1.6–2.4)
NT-PROBNP SERPL-MCNC: 3335 PG/ML (ref 5–900)
POTASSIUM BLD-SCNC: 4.9 MMOL/L (ref 3.5–5.2)
SODIUM BLD-SCNC: 137 MMOL/L (ref 136–145)

## 2019-11-11 PROCEDURE — 83880 ASSAY OF NATRIURETIC PEPTIDE: CPT | Performed by: HOSPITALIST

## 2019-11-11 PROCEDURE — 25010000002 PROMETHAZINE PER 50 MG: Performed by: PHYSICIAN ASSISTANT

## 2019-11-11 PROCEDURE — 25010000002 ACETAZOLAMIDE PER 500 MG: Performed by: HOSPITALIST

## 2019-11-11 PROCEDURE — 82607 VITAMIN B-12: CPT | Performed by: HOSPITALIST

## 2019-11-11 PROCEDURE — 83735 ASSAY OF MAGNESIUM: CPT | Performed by: HOSPITALIST

## 2019-11-11 PROCEDURE — 25010000002 ENOXAPARIN PER 10 MG: Performed by: FAMILY MEDICINE

## 2019-11-11 PROCEDURE — 99231 SBSQ HOSP IP/OBS SF/LOW 25: CPT | Performed by: INTERNAL MEDICINE

## 2019-11-11 PROCEDURE — 80048 BASIC METABOLIC PNL TOTAL CA: CPT | Performed by: FAMILY MEDICINE

## 2019-11-11 PROCEDURE — 99233 SBSQ HOSP IP/OBS HIGH 50: CPT | Performed by: HOSPITALIST

## 2019-11-11 PROCEDURE — 82746 ASSAY OF FOLIC ACID SERUM: CPT | Performed by: HOSPITALIST

## 2019-11-11 RX ORDER — BUMETANIDE 0.25 MG/ML
2 INJECTION INTRAMUSCULAR; INTRAVENOUS ONCE
Status: COMPLETED | OUTPATIENT
Start: 2019-11-11 | End: 2019-11-11

## 2019-11-11 RX ORDER — PROMETHAZINE HYDROCHLORIDE 12.5 MG/1
12.5 TABLET ORAL EVERY 6 HOURS PRN
Status: DISCONTINUED | OUTPATIENT
Start: 2019-11-11 | End: 2019-11-13 | Stop reason: HOSPADM

## 2019-11-11 RX ORDER — CHOLECALCIFEROL (VITAMIN D3) 125 MCG
5 CAPSULE ORAL NIGHTLY
Status: DISCONTINUED | OUTPATIENT
Start: 2019-11-11 | End: 2019-11-13 | Stop reason: HOSPADM

## 2019-11-11 RX ADMIN — OXYMETAZOLINE HYDROCHLORIDE 2 SPRAY: 5 SPRAY NASAL at 07:57

## 2019-11-11 RX ADMIN — TICAGRELOR 90 MG: 90 TABLET ORAL at 07:57

## 2019-11-11 RX ADMIN — Medication: at 07:57

## 2019-11-11 RX ADMIN — Medication: at 16:35

## 2019-11-11 RX ADMIN — ACETAZOLAMIDE 250 MG: 500 INJECTION, POWDER, LYOPHILIZED, FOR SOLUTION INTRAVENOUS at 12:35

## 2019-11-11 RX ADMIN — HYDRALAZINE HYDROCHLORIDE 10 MG: 10 TABLET ORAL at 05:30

## 2019-11-11 RX ADMIN — PROMETHAZINE HYDROCHLORIDE 12.5 MG: 25 INJECTION INTRAMUSCULAR; INTRAVENOUS at 21:20

## 2019-11-11 RX ADMIN — FAMOTIDINE 20 MG: 20 TABLET, FILM COATED ORAL at 07:57

## 2019-11-11 RX ADMIN — HYDRALAZINE HYDROCHLORIDE 10 MG: 10 TABLET ORAL at 22:03

## 2019-11-11 RX ADMIN — HYDRALAZINE HYDROCHLORIDE 10 MG: 10 TABLET ORAL at 14:13

## 2019-11-11 RX ADMIN — ATORVASTATIN CALCIUM 20 MG: 20 TABLET, FILM COATED ORAL at 20:54

## 2019-11-11 RX ADMIN — Medication: at 12:36

## 2019-11-11 RX ADMIN — BUMETANIDE 1 MG: 1 TABLET ORAL at 07:57

## 2019-11-11 RX ADMIN — BUMETANIDE 2 MG: 0.25 INJECTION INTRAMUSCULAR; INTRAVENOUS at 14:13

## 2019-11-11 RX ADMIN — Medication 5 MG: at 20:54

## 2019-11-11 RX ADMIN — SODIUM CHLORIDE, PRESERVATIVE FREE 10 ML: 5 INJECTION INTRAVENOUS at 20:55

## 2019-11-11 RX ADMIN — Medication: at 20:55

## 2019-11-11 RX ADMIN — ASPIRIN 81 MG: 81 TABLET, CHEWABLE ORAL at 07:56

## 2019-11-11 RX ADMIN — TICAGRELOR 90 MG: 90 TABLET ORAL at 20:54

## 2019-11-11 RX ADMIN — METOPROLOL SUCCINATE 25 MG: 25 TABLET, FILM COATED, EXTENDED RELEASE ORAL at 07:56

## 2019-11-11 NOTE — PROGRESS NOTES
LOS: 7 days     Name: Damián Myers  Age/Sex: 64 y.o. male  :  1955        PCP: Spencer Saeed MD  REF: No ref. provider found    Principal Problem:    CHF exacerbation (CMS/[facility])  Active Problems:    NSTEMI (non-ST elevated myocardial infarction) (CMS/HCC)    Hyperglycemia    Obesity (BMI 30-39.9)    Prolonged Q-T interval on ECG    Renal failure    Acute respiratory failure with hypoxia (CMS/HCC)    Acute systolic congestive heart failure (CMS/HCC)    Systolic congestive heart failure (CMS/[facility])    Elevated troponin      Reason for follow-up: Acute systolic heart failure and ASCVD , status post stenting    Subjective       Subjective Mr. Myers is a 64-year-old  male was admitted with acute decompensated systolic heart failure and was found to have newly diagnosed advanced dilated cardiomyopathy.  He has responded well to IV diuretic therapy.  He was subsequently found to have severe stenosis in the mid LAD which was stented on 2019.    Interval History: Says he is breathing much better and has been ambulating the hallway without oxygen and apparently done well.  He denies any chest pains.    ROS    Vital Signs  Temp:  [97.8 °F (36.6 °C)] 97.8 °F (36.6 °C)  Heart Rate:  [76-86] 81  Resp:  [16] 16  BP: (130-160)/(84-95) 130/95  Vital Signs (last 72 hrs)       700  -   0659 700  -  11/10 0659 11/10 0700  -   0659 700  -   1853   Most Recent    Temp (°F) 97.4 -  98.6    97.5 -  98.2    97.7 -  99.4       97.8 (36.6)    Heart Rate 81 -  104    72 -  83    76 -  89    81 -  86     81    Resp (!)6 -  18      18    16 -  18       16    /66 -  (!) 178/118    98/59 -  115/67    117/71 -  160/85    130/95 -  139/84     130/95    SpO2 (%) 92 -  98    93 -  97    91 -  96    93 -  96     93        Body mass index is 34.37 kg/m².    Intake/Output Summary (Last 24 hours) at 2019 1853  Last data filed at 2019 0900  Gross per 24 hour   Intake 240 ml   Output  1700 ml   Net -1460 ml     Objective    Objective       Physical Exam:     General Appearance:    Alert, cooperative, in no acute distress   Head:    Normocephalic, without obvious abnormality, atraumatic   Eyes:            Conjunctivae and sclerae normal, no   icterus, no pallor, corneas clear.   Neck:   No adenopathy, supple, trachea midline, no thyromegaly, no   carotid bruit, no JVD   Lungs:     Clear to auscultation,respirations regular, even and                  unlabored    Heart:    Regular rhythm and normal rate, normal S1 and S2, no            murmur, no gallop, no rub, no click   Chest Wall:    No abnormalities observed   Abdomen:     Normal bowel sounds, no masses, no organomegaly, soft        non-tender, non-distended, no guarding, no rebound                tenderness   Extremities:   Moves all extremities well, no edema, no cyanosis, no             redness   Pulses:   Pulses palpable and equal bilaterally   Skin:   No bleeding, bruising or rash              Procedures    Results review       Results Review:   Results from last 7 days   Lab Units 11/10/19  0052 11/09/19 0429 11/07/19 0431 11/06/19  0021 11/05/19 0439   WBC 10*3/mm3 9.31 13.31* 7.10 6.65 5.87   HEMOGLOBIN g/dL 13.7 15.0 14.9 15.0 15.4   PLATELETS 10*3/mm3 113* 123* 128* 143 131*     Results from last 7 days   Lab Units 11/11/19  0427 11/10/19  0052 11/09/19  0429 11/08/19  0535 11/07/19 0431 11/06/19  0021 11/05/19 0439   SODIUM mmol/L 137 138 140 141 142 146* 145   POTASSIUM mmol/L 4.9 4.3 4.4 4.2 4.7 4.0 4.3   CHLORIDE mmol/L 91* 90* 93* 96* 97* 98 101   CO2 mmol/L 37.3* 40.1* 39.8* 35.6* 34.3* 37.8* 30.2*   BUN mg/dL 20 21 18 20 21 27* 20   CREATININE mg/dL 1.07 1.11 1.06 1.07 1.23 1.43* 1.34*   CALCIUM mg/dL 9.1 9.0 9.1 9.3 9.4 9.2 9.3   GLUCOSE mg/dL 133* 122* 134* 123* 133* 138* 137*   ALT (SGPT) U/L  --   --   --   --  16  --  17   AST (SGOT) U/L  --   --   --   --  17  --  17     Results from last 7 days   Lab Units  11/05/19  2300 11/05/19  1150 11/05/19  0709 11/04/19  2214   TROPONIN T ng/mL 0.047* 0.038* 0.032* 0.038*     Lab Results   Component Value Date    INR 1.11 (H) 11/04/2019     Lab Results   Component Value Date    MG 2.0 11/11/2019    MG 2.0 11/05/2019    MG 2.1 11/04/2019     Lab Results   Component Value Date    TSH 5.070 (H) 11/04/2019    PSA 0.433 11/06/2019    TRIG 63 11/09/2019    HDL 34 (L) 11/09/2019    LDL 45 11/09/2019      Imaging Results (Last 48 Hours)     ** No results found for the last 48 hours. **        No results found for: BNP      ECG      Echo   Results for orders placed during the hospital encounter of 11/04/19   Adult Transthoracic Echo Complete W/ Cont if Necessary Per Protocol    Narrative · The left ventricular cavity is mildly dilated.  · Left ventricular systolic function is severely decreased.  · Estimated EF appears to be in the range of 31 - 35%.  · Right ventricular cavity is mild-to-moderately dilated.  · Moderately reduced right ventricular systolic function noted.  · The aortic valve is structurally normal. Trace aortic valve   regurgitation is present. No aortic valve stenosis is present.  · The mitral valve is normal in structure. Moderate mitral valve   regurgitation is present with an eccentric jet noted. No significant   mitral valve stenosis is present.  · The tricuspid valve is normal. Trace tricuspid valve regurgitation is   present. Estimated right ventricular systolic pressure from tricuspid   regurgitation is mildly elevated (35-45 mmHg).  · There is a small (<1cm) pericardial effusion adjacent to the left   ventricle. There is no evidence of cardiac tamponade.  · Comments: No previous studies available for comparison.           I reviewed the patient's new clinical results.    Telemetry: Sinus in the 80s.     Medication Review:     aspirin 81 mg Oral Daily   atorvastatin 20 mg Oral Nightly   enoxaparin 40 mg Subcutaneous Nightly   famotidine 20 mg Oral Daily    hydrALAZINE 10 mg Oral Q8H   magic barrier cream  Topical 4x Daily   melatonin 5 mg Oral Nightly   metoprolol succinate XL 25 mg Oral Q24H   oxymetazoline 2 spray Each Nare BID   sodium chloride 10 mL Intravenous Q12H   ticagrelor 90 mg Oral BID            Assessment      1. Acute decompensated systolic heart failure, resolved.  2. Advance dilated ischemic cardiomyopathy with LV ejection fraction of about 30 to 35%.  3. ASCVD with severe stenosis of the mid LAD, status post stenting on 11/8/2019, clinically stable.  4. Acute renal failure at admission, resolved.    Plan     1. Since his blood pressure has been more stable and kidney function is better, we will initiate him on an ARB such as losartan 25 mg daily for now and consider switching to Entresto if he can get this at affordable price for the patient.  2. We will have the pharmacy to check on the cost of ticagrelor regular for him to see if he can continue this for the long-term or if he have to switch this to clopidogrel.        I discussed the patients findings and my recommendations with patient and family      Mahendra Amador [facility]  11/11/19  6:53 PM    Please note that portions of this note were completed with a voice recognition program.

## 2019-11-11 NOTE — PROGRESS NOTES
Discharge Planning Assessment   [place]     Patient Name: Damián Myers  MRN: [id number]  Today's Date: 11/11/2019    Admit Date: 11/4/2019    Discharge Needs Assessment    No documentation.       Discharge Plan     Row Name 11/11/19 1219       Plan    Plan  CM follow up visit: Pt lives at home with spouse, has no DME or HH services. He plans to return home with supportive spouse at [place]. He cont's to require acute care tx and monitoring. Pt may require home O2, this can be arranged by SW, with qualifying O2 sats and MD order. CM will follow.     Plan Comments  Pt is s/p LHC on 11/8/19 with cardiac stent placement. Pt is sitting up on bedside chair, as he had been up to restroom. Pt had O2 off and sats noted around 84%. O2 reapplied and sats up to 92%. Pt reports good Uop with diuretics and excessive trips to restroom. CM suggested pt consider using urinal at bedside to prevent excessive trips to restroom and he wishes to wait until his wife arrives. CM assisted pt back to bed to comfort before CM departure. Pt expressed appreciation for CM assistance. Nurse,Jose Roberto DANIELS, made aware of desat on RA while up in chair..        Destination      No service coordination in this encounter.      Durable Medical Equipment      No service coordination in this encounter.      Dialysis/Infusion      No service coordination in this encounter.      Home Medical Care      No service coordination in this encounter.      Therapy      No service coordination in this encounter.      Community Resources      No service coordination in this encounter.        Expected Discharge Date and Time     Expected Discharge Date Expected Discharge Time    Nov 8, 2019         Demographic Summary    No documentation.       Functional Status    No documentation.       Psychosocial    No documentation.       Abuse/Neglect    No documentation.       Legal    No documentation.       Substance Abuse    No documentation.       Patient Forms    No  documentation.           Daija Phillip RN

## 2019-11-12 PROBLEM — R77.8 ELEVATED TROPONIN: Status: RESOLVED | Noted: 2019-11-08 | Resolved: 2019-11-12

## 2019-11-12 PROBLEM — I21.4 NSTEMI (NON-ST ELEVATED MYOCARDIAL INFARCTION): Status: RESOLVED | Noted: 2019-11-04 | Resolved: 2019-11-12

## 2019-11-12 PROBLEM — I50.9 CHF EXACERBATION: Status: RESOLVED | Noted: 2019-11-04 | Resolved: 2019-11-12

## 2019-11-12 PROBLEM — R94.31 PROLONGED Q-T INTERVAL ON ECG: Status: RESOLVED | Noted: 2019-11-04 | Resolved: 2019-11-12

## 2019-11-12 PROBLEM — J96.01 ACUTE RESPIRATORY FAILURE WITH HYPOXIA: Status: RESOLVED | Noted: 2019-11-04 | Resolved: 2019-11-12

## 2019-11-12 PROBLEM — I50.21 ACUTE SYSTOLIC CONGESTIVE HEART FAILURE: Status: RESOLVED | Noted: 2019-11-08 | Resolved: 2019-11-12

## 2019-11-12 PROBLEM — R79.89 ELEVATED TROPONIN: Status: RESOLVED | Noted: 2019-11-08 | Resolved: 2019-11-12

## 2019-11-12 PROBLEM — N19 RENAL FAILURE: Status: RESOLVED | Noted: 2019-11-04 | Resolved: 2019-11-12

## 2019-11-12 PROBLEM — I50.20 SYSTOLIC CONGESTIVE HEART FAILURE: Status: RESOLVED | Noted: 2019-11-04 | Resolved: 2019-11-12

## 2019-11-12 LAB
ANION GAP SERPL CALCULATED.3IONS-SCNC: 11.7 MMOL/L (ref 5–15)
BUN BLD-MCNC: 20 MG/DL (ref 8–23)
BUN/CREAT SERPL: 16.4 (ref 7–25)
CALCIUM SPEC-SCNC: 9.6 MG/DL (ref 8.6–10.5)
CHLORIDE SERPL-SCNC: 95 MMOL/L (ref 98–107)
CO2 SERPL-SCNC: 34.3 MMOL/L (ref 22–29)
CREAT BLD-MCNC: 1.22 MG/DL (ref 0.76–1.27)
FOLATE SERPL-MCNC: 7.47 NG/ML (ref 4.78–24.2)
GFR SERPL CREATININE-BSD FRML MDRD: 60 ML/MIN/1.73
GLUCOSE BLD-MCNC: 131 MG/DL (ref 65–99)
MAGNESIUM SERPL-MCNC: 2.1 MG/DL (ref 1.6–2.4)
NT-PROBNP SERPL-MCNC: 4445 PG/ML (ref 5–900)
POTASSIUM BLD-SCNC: 4 MMOL/L (ref 3.5–5.2)
SODIUM BLD-SCNC: 141 MMOL/L (ref 136–145)
VIT B12 BLD-MCNC: 317 PG/ML (ref 211–946)

## 2019-11-12 PROCEDURE — 83880 ASSAY OF NATRIURETIC PEPTIDE: CPT | Performed by: HOSPITALIST

## 2019-11-12 PROCEDURE — 94799 UNLISTED PULMONARY SVC/PX: CPT

## 2019-11-12 PROCEDURE — 83735 ASSAY OF MAGNESIUM: CPT | Performed by: HOSPITALIST

## 2019-11-12 PROCEDURE — 25010000002 ACETAZOLAMIDE PER 500 MG: Performed by: HOSPITALIST

## 2019-11-12 PROCEDURE — 99232 SBSQ HOSP IP/OBS MODERATE 35: CPT | Performed by: HOSPITALIST

## 2019-11-12 PROCEDURE — 25010000002 ENOXAPARIN PER 10 MG: Performed by: FAMILY MEDICINE

## 2019-11-12 PROCEDURE — 99231 SBSQ HOSP IP/OBS SF/LOW 25: CPT | Performed by: NURSE PRACTITIONER

## 2019-11-12 PROCEDURE — 80048 BASIC METABOLIC PNL TOTAL CA: CPT | Performed by: HOSPITALIST

## 2019-11-12 RX ORDER — CARVEDILOL 6.25 MG/1
12.5 TABLET ORAL 2 TIMES DAILY WITH MEALS
Status: DISCONTINUED | OUTPATIENT
Start: 2019-11-12 | End: 2019-11-13 | Stop reason: HOSPADM

## 2019-11-12 RX ORDER — BUMETANIDE 1 MG/1
1 TABLET ORAL DAILY
Qty: 30 TABLET | Refills: 0 | Status: CANCELLED | OUTPATIENT
Start: 2019-11-12 | End: 2019-12-12

## 2019-11-12 RX ORDER — ATORVASTATIN CALCIUM 40 MG/1
40 TABLET, FILM COATED ORAL NIGHTLY
Qty: 30 TABLET | Refills: 0 | Status: CANCELLED | OUTPATIENT
Start: 2019-11-12 | End: 2019-12-12

## 2019-11-12 RX ORDER — CLOPIDOGREL BISULFATE 75 MG/1
300 TABLET ORAL ONCE
Status: COMPLETED | OUTPATIENT
Start: 2019-11-12 | End: 2019-11-12

## 2019-11-12 RX ORDER — CLOPIDOGREL BISULFATE 75 MG/1
75 TABLET ORAL DAILY
Status: DISCONTINUED | OUTPATIENT
Start: 2019-11-13 | End: 2019-11-13 | Stop reason: HOSPADM

## 2019-11-12 RX ORDER — LOSARTAN POTASSIUM 25 MG/1
25 TABLET ORAL
Status: DISCONTINUED | OUTPATIENT
Start: 2019-11-12 | End: 2019-11-12

## 2019-11-12 RX ORDER — BUMETANIDE 1 MG/1
1 TABLET ORAL DAILY
Status: DISCONTINUED | OUTPATIENT
Start: 2019-11-12 | End: 2019-11-13 | Stop reason: HOSPADM

## 2019-11-12 RX ORDER — ATORVASTATIN CALCIUM 40 MG/1
40 TABLET, FILM COATED ORAL NIGHTLY
Status: DISCONTINUED | OUTPATIENT
Start: 2019-11-12 | End: 2019-11-13 | Stop reason: HOSPADM

## 2019-11-12 RX ADMIN — Medication: at 12:13

## 2019-11-12 RX ADMIN — ATORVASTATIN CALCIUM 40 MG: 20 TABLET, FILM COATED ORAL at 20:35

## 2019-11-12 RX ADMIN — CARVEDILOL 12.5 MG: 6.25 TABLET, FILM COATED ORAL at 17:30

## 2019-11-12 RX ADMIN — HYDRALAZINE HYDROCHLORIDE 10 MG: 10 TABLET ORAL at 05:31

## 2019-11-12 RX ADMIN — TICAGRELOR 90 MG: 90 TABLET ORAL at 07:48

## 2019-11-12 RX ADMIN — Medication: at 17:32

## 2019-11-12 RX ADMIN — ASPIRIN 81 MG: 81 TABLET, CHEWABLE ORAL at 07:48

## 2019-11-12 RX ADMIN — CLOPIDOGREL 300 MG: 75 TABLET, FILM COATED ORAL at 17:31

## 2019-11-12 RX ADMIN — FAMOTIDINE 20 MG: 20 TABLET, FILM COATED ORAL at 07:48

## 2019-11-12 RX ADMIN — Medication: at 07:49

## 2019-11-12 RX ADMIN — HYDRALAZINE HYDROCHLORIDE 10 MG: 10 TABLET ORAL at 12:13

## 2019-11-12 RX ADMIN — SODIUM CHLORIDE, PRESERVATIVE FREE 10 ML: 5 INJECTION INTRAVENOUS at 20:36

## 2019-11-12 RX ADMIN — METOPROLOL SUCCINATE 25 MG: 25 TABLET, FILM COATED, EXTENDED RELEASE ORAL at 07:48

## 2019-11-12 RX ADMIN — Medication 5 MG: at 20:36

## 2019-11-12 RX ADMIN — ACETAZOLAMIDE 250 MG: 500 INJECTION, POWDER, LYOPHILIZED, FOR SOLUTION INTRAVENOUS at 12:13

## 2019-11-12 RX ADMIN — BUMETANIDE 1 MG: 1 TABLET ORAL at 12:13

## 2019-11-12 RX ADMIN — Medication: at 20:36

## 2019-11-12 RX ADMIN — OXYMETAZOLINE HYDROCHLORIDE 2 SPRAY: 5 SPRAY NASAL at 07:49

## 2019-11-12 NOTE — PLAN OF CARE
Problem: Fall Risk (Adult)  Goal: Identify Related Risk Factors and Signs and Symptoms  Outcome: Ongoing (interventions implemented as appropriate)      Problem: Patient Care Overview  Goal: Plan of Care Review  Outcome: Ongoing (interventions implemented as appropriate)    Goal: Individualization and Mutuality  Outcome: Ongoing (interventions implemented as appropriate)    Goal: Discharge Needs Assessment  Outcome: Ongoing (interventions implemented as appropriate)    Goal: Interprofessional Rounds/Family Conf  Outcome: Ongoing (interventions implemented as appropriate)      Problem: Cardiac: ACS (Acute Coronary Syndrome) (Adult)  Goal: Signs and Symptoms of Listed Potential Problems Will be Absent, Minimized or Managed (Cardiac: ACS)  Outcome: Ongoing (interventions implemented as appropriate)      Problem: Patient Care Overview  Goal: Plan of Care Review  Outcome: Ongoing (interventions implemented as appropriate)    Goal: Individualization and Mutuality  Outcome: Ongoing (interventions implemented as appropriate)    Goal: Discharge Needs Assessment  Outcome: Ongoing (interventions implemented as appropriate)    Goal: Interprofessional Rounds/Family Conf  Outcome: Ongoing (interventions implemented as appropriate)      Problem: Cardiac: Heart Failure (Adult)  Goal: Signs and Symptoms of Listed Potential Problems Will be Absent, Minimized or Managed (Cardiac: Heart Failure)  Outcome: Ongoing (interventions implemented as appropriate)

## 2019-11-12 NOTE — PROGRESS NOTES
[facility] HOSPITALIST PROGRESS NOTE     Patient Identification:  Name:  Damián Myers  Age:  64 y.o.  Sex:  male  :  1955  MRN:  [id number]  Visit Number:  [id number]  ROOM: [address] [place] [address]     Primary Care Provider:  Spencer Saeed MD    Length of stay:  8    Subjective        Chief Compliant Follow up for CHF    Patient seen and examined with wife at the bedside. Doing better. [place] continues to improve. Ambulating. Denies chest pain. SOB at rest resolved. Denies nausea, vomiting. On RA. Afebrile and no events overnight.     Objective     Current Hospital Meds:    aspirin 81 mg Oral Daily   atorvastatin 40 mg Oral Nightly   bumetanide 1 mg Oral Daily   carvedilol 12.5 mg Oral BID With Meals   clopidogrel 300 mg Oral Once   [START ON 2019] clopidogrel 75 mg Oral Daily   enoxaparin 40 mg Subcutaneous Nightly   famotidine 20 mg Oral Daily   hydrALAZINE 10 mg Oral Q8H   magic barrier cream  Topical 4x Daily   melatonin 5 mg Oral Nightly   oxymetazoline 2 spray Each Nare BID   sodium chloride 10 mL Intravenous Q12H      ----------------------------------------------------------------------------------------------------------------------  Vital Signs:  Temp:  [97.8 °F (36.6 °C)-99.1 °F (37.3 °C)] 97.8 °F (36.6 °C)  Heart Rate:  [75-87] 85  Resp:  [18] 18  BP: (103-152)/(55-90) 103/62  SpO2:  [90 %-97 %] 97 %  on   ;   Device (Oxygen Therapy): room air  Body mass index is 32.21 kg/m².    Wt Readings from Last 3 Encounters:   19 114 kg (251 lb)       Intake/Output Summary (Last 24 hours) at 2019 1510  Last data filed at 2019 1300  Gross per 24 hour   Intake 600 ml   Output 450 ml   Net 150 ml     Diet Regular; Cardiac, Consistent Carbohydrate  ----------------------------------------------------------------------------------------------------------------------  Physical exam:  General: Comfortable, Not in distress.  Well-developed and well-nourished.   HENT:  Head:  Normocephalic and  atraumatic.  Mouth:  Moist mucous membranes.    Eyes:  Conjunctivae and EOM are normal.  Pupils are equal, round, and reactive to light.  No scleral icterus.    Neck:  Neck supple.  No JVD present.    Cardiovascular:  Normal rate, regular rhythm with no murmur.  Pulmonary/Chest:  No respiratory distress, no wheezes, + crackles b/l, improved, R>L, with normal breath sounds and good air movement.  Abdomen:  Soft.  Bowel sounds are normal.  No distension and no tenderness.   Musculoskeletal:  no tenderness, and no deformity.  No red or swollen joints anywhere.    Neurological:  Alert and oriented to person, place, and time.  No cranial nerve deficit. No focal deficits. No facial droop.  No slurred speech.   Skin:  Skin is warm and dry. No rash noted. No pallor.   Peripheral vascular:  Strong pulses in all 4 extremities with no clubbing, no cyanosis, NO edema.  Genitourinary: no dudley  ----------------------------------------------------------------------------------------------------------------------  ----------------------------------------------------------------------------------------------------------------------  Results from last 7 days   Lab Units 11/10/19  0052 11/09/19  0429 11/07/19  0431   WBC 10*3/mm3 9.31 13.31* 7.10   HEMOGLOBIN g/dL 13.7 15.0 14.9   HEMATOCRIT % 46.9 51.2* 50.9   MCV fL 100.6* 100.8* 99.6*   MCHC g/dL 29.2* 29.3* 29.3*   PLATELETS 10*3/mm3 113* 123* 128*     Results from last 7 days   Lab Units 11/12/19  0253 11/11/19  0427 11/10/19  0052  11/07/19  0431   SODIUM mmol/L 141 137 138   < > 142   POTASSIUM mmol/L 4.0 4.9 4.3   < > 4.7   MAGNESIUM mg/dL 2.1 2.0  --   --   --    CHLORIDE mmol/L 95* 91* 90*   < > 97*   CO2 mmol/L 34.3* 37.3* 40.1*   < > 34.3*   BUN mg/dL 20 20 21   < > 21   CREATININE mg/dL 1.22 1.07 1.11   < > 1.23   EGFR IF NONAFRICN AM mL/min/1.73 60* 70 67   < > 59*   CALCIUM mg/dL 9.6 9.1 9.0   < > 9.4   GLUCOSE mg/dL 131* 133* 122*   < > 133*   ALBUMIN g/dL  --   --    --   --  3.77   BILIRUBIN mg/dL  --   --   --   --  0.7   ALK PHOS U/L  --   --   --   --  65   AST (SGOT) U/L  --   --   --   --  17   ALT (SGPT) U/L  --   --   --   --  16    < > = values in this interval not displayed.   Estimated Creatinine Clearance: 82.1 mL/min (by C-G formula based on SCr of 1.22 mg/dL).  Results from last 7 days   Lab Units 11/05/19  2300   TROPONIN T ng/mL 0.047*     No results found for: HGBA1C, POCGLU  No results found for: AMMONIA  Results from last 7 days   Lab Units 11/09/19  0429   CHOLESTEROL mg/dL 92   TRIGLYCERIDES mg/dL 63   HDL CHOL mg/dL 34*   LDL CHOL mg/dL 45               I have personally looked at the labs and they are summarized above.  ----------------------------------------------------------------------------------------------------------------------  Imaging Results (Last 24 Hours)     ** No results found for the last 24 hours. **        I have personally reviewed the radiology images and read the final radiology report.    Assessment & Plan      Assessment:  Acute systolic CHF  NSTEMI, S/P PCI to LAD  Ischemic CMP with EF of 31-35%  Acute hypoxic respiratory failure  DAMIAN  Metabolic alkalosis  New onset DM2, A1C 6.7  Essential HTN  Macrocytosis  Obesity    Plan:  For acute systolic CHF exacerbation, start on p.o. Bumex 1 mg daily.  BNP trending up.  Weight trended down.  Continue to monitor renal function electrolytes second intake output.  2D echo with EF of 31 to 35%.  For ischemic cardia myopathy, currently on beta-blocker and hydralazine.  losartan on hold per Dr. Amador.  Does not have insurance for Entresto or the life vest.  Discussed in detail regarding diet and medication compliance and fluid restriction and weight monitoring.  Patient and his wife verbalized understanding.    NSTEMI status post PCI to LAD.  Continue with aspirin, plavix statin. Brilinta dced since no insurance. Will receive a loading dose this evening.     Acute hypoxic respiratory failure,  resolved.    Acute kidney injury, Cr trended up to 1.2.  Monitor    Metabolic alkalosis, improving. will repeat a dose of Diamox.    New onset DM2, A1C 6.7, currently blood sugar controlled.  Monitor. ADA diet and diabetic educator consult.     Essential hypertension, currently controlled.  Continue with current regimen. Toprol XL changed to coreg.    Lovenox subcu for DVT prophylaxis.    DC to home in am if stable.     Management discussed in detail with patient and wife and nursing.    The patient is high risk due to the following diagnoses/reasons:    Acute systolic CHF, NSTEMI, S/P PCI to LAD, Ischemic CMP with EF of 31-35%, Acute hypoxic respiratory failure    Tasha Whittington MD  11/12/19  3:10 PM

## 2019-11-12 NOTE — NURSING NOTE
Patient ambulated in hallway so that we could monitor his O2 sat with activity. Patient's O2 sat did not drop below 90% while up ambulating in hallway.    Robert ROBERTO

## 2019-11-12 NOTE — PROGRESS NOTES
Discharge Planning Assessment   [place]     Patient Name: Damián Myers  MRN: [id number]  Today's Date: 11/12/2019    Admit Date: 11/4/2019    Discharge Needs Assessment    No documentation.       Discharge Plan     Row Name 11/12/19 1300       Plan    Plan  CM follow up visit: Pt reports tolerated ambulating hallway without use of O2. Nurse, Jose Roberto, reports ambulatory pulse ox didnt drop below 90%. Pt reports feeling improvement since admission.  Pt is close to discharge. Cardiology is managing med adjustments, before dc. Pt & wife  wishes to fill meds here at [facility] and is enrolled with [facility] program. Pt will return home with spouse providing transportation at [facility]. Pt currently denies any new needs. CM will follow.     Plan Comments  Pharm checked cost of Entresto & Brilinta. Pt has no Rx drug coverage, cardiology aware and med changes noted.         Destination      No service coordination in this encounter.      Durable Medical Equipment      No service coordination in this encounter.      Dialysis/Infusion      No service coordination in this encounter.      Home Medical Care      No service coordination in this encounter.      Therapy      No service coordination in this encounter.      Community Resources      No service coordination in this encounter.        Expected Discharge Date and Time     Expected Discharge Date Expected Discharge Time    Nov 8, 2019         Demographic Summary    No documentation.       Functional Status    No documentation.       Psychosocial    No documentation.       Abuse/Neglect    No documentation.       Legal    No documentation.       Substance Abuse    No documentation.       Patient Forms    No documentation.           Daija Phillip RN

## 2019-11-12 NOTE — PROGRESS NOTES
[facility] HOSPITALIST PROGRESS NOTE     Patient Identification:  Name:  Damián Myers  Age:  64 y.o.  Sex:  male  :  1955  MRN:  [id number]  Visit Number:  [id number]  ROOM: [address] [place] [address]     Primary Care Provider:  Spencer Saeed MD    Length of stay:  7    Subjective        Chief Compliant Follow up for CHF    Patient seen and examined with wife at the bedside. [place] improving and continues to have orthopnea. Denies chest pain. SOB at rest resolved. Did not sleep well last night. Has nausea, but no vomiting. On 1.5 L NC. Afebrile and no events overnight.     Objective     Current Hospital Meds:  aspirin 81 mg Oral Daily   atorvastatin 20 mg Oral Nightly   enoxaparin 40 mg Subcutaneous Nightly   famotidine 20 mg Oral Daily   hydrALAZINE 10 mg Oral Q8H   magic barrier cream  Topical 4x Daily   melatonin 5 mg Oral Nightly   metoprolol succinate XL 25 mg Oral Q24H   oxymetazoline 2 spray Each Nare BID   sodium chloride 10 mL Intravenous Q12H   ticagrelor 90 mg Oral BID      ----------------------------------------------------------------------------------------------------------------------  Vital Signs:  Temp:  [97.8 °F (36.6 °C)-99.1 °F (37.3 °C)] 99.1 °F (37.3 °C)  Heart Rate:  [76-86] 84  Resp:  [16-18] 18  BP: (109-160)/(68-95) 109/68  SpO2:  [90 %-96 %] 90 %  on  Flow (L/min):  [2.5] 2.5;   Device (Oxygen Therapy): room air  Body mass index is 34.37 kg/m².    Wt Readings from Last 3 Encounters:   19 121 kg (267 lb 12.8 oz)       Intake/Output Summary (Last 24 hours) at 2019  Last data filed at 2019 0900  Gross per 24 hour   Intake 240 ml   Output 1700 ml   Net -1460 ml     Diet Regular; Cardiac  ----------------------------------------------------------------------------------------------------------------------  Physical exam:  General: Comfortable, Not in distress.  Well-developed and well-nourished.   HENT:  Head:  Normocephalic and atraumatic.  Mouth:  Moist mucous  membranes.    Eyes:  Conjunctivae and EOM are normal.  Pupils are equal, round, and reactive to light.  No scleral icterus.    Neck:  Neck supple.  No JVD present.    Cardiovascular:  Normal rate, regular rhythm with no murmur.  Pulmonary/Chest:  No respiratory distress, no wheezes, + crackles b/l, with normal breath sounds and good air movement.  Abdomen:  Soft.  Bowel sounds are normal.  No distension and no tenderness.   Musculoskeletal:  no tenderness, and no deformity.  No red or swollen joints anywhere.    Neurological:  Alert and oriented to person, place, and time.  No cranial nerve deficit. No focal deficits. No facial droop.  No slurred speech.   Skin:  Skin is warm and dry. No rash noted. No pallor.   Peripheral vascular:  Strong pulses in all 4 extremities with no clubbing, no cyanosis, trace edema.  Genitourinary: no dudley  ----------------------------------------------------------------------------------------------------------------------  ----------------------------------------------------------------------------------------------------------------------Results from last 7 days   Lab Units 11/10/19  0052 11/09/19  0429 11/07/19  0431   WBC 10*3/mm3 9.31 13.31* 7.10   HEMOGLOBIN g/dL 13.7 15.0 14.9   HEMATOCRIT % 46.9 51.2* 50.9   MCV fL 100.6* 100.8* 99.6*   MCHC g/dL 29.2* 29.3* 29.3*   PLATELETS 10*3/mm3 113* 123* 128*     Results from last 7 days   Lab Units 11/11/19  0427 11/10/19  0052 11/09/19  0429  11/07/19  0431  11/05/19  0439   SODIUM mmol/L 137 138 140   < > 142   < > 145   POTASSIUM mmol/L 4.9 4.3 4.4   < > 4.7   < > 4.3   MAGNESIUM mg/dL 2.0  --   --   --   --   --  2.0   CHLORIDE mmol/L 91* 90* 93*   < > 97*   < > 101   CO2 mmol/L 37.3* 40.1* 39.8*   < > 34.3*   < > 30.2*   BUN mg/dL 20 21 18   < > 21   < > 20   CREATININE mg/dL 1.07 1.11 1.06   < > 1.23   < > 1.34*   PHOSPHORUS mg/dL  --   --   --   --   --   --  6.0*   EGFR IF NONAFRICN AM mL/min/1.73 70 67 70   < > 59*   < > 54*    CALCIUM mg/dL 9.1 9.0 9.1   < > 9.4   < > 9.3   GLUCOSE mg/dL 133* 122* 134*   < > 133*   < > 137*   ALBUMIN g/dL  --   --   --   --  3.77  --  3.81   BILIRUBIN mg/dL  --   --   --   --  0.7  --  0.8   ALK PHOS U/L  --   --   --   --  65  --  70   AST (SGOT) U/L  --   --   --   --  17  --  17   ALT (SGPT) U/L  --   --   --   --  16  --  17    < > = values in this interval not displayed.   Estimated Creatinine Clearance: 96.4 mL/min (by C-G formula based on SCr of 1.07 mg/dL).  Results from last 7 days   Lab Units 11/05/19  2300 11/05/19  1150 11/05/19  0709   TROPONIN T ng/mL 0.047* 0.038* 0.032*     No results found for: HGBA1C, POCGLU  No results found for: AMMONIA  Results from last 7 days   Lab Units 11/09/19  0429   CHOLESTEROL mg/dL 92   TRIGLYCERIDES mg/dL 63   HDL CHOL mg/dL 34*   LDL CHOL mg/dL 45               I have personally looked at the labs and they are summarized above.  ----------------------------------------------------------------------------------------------------------------------  Imaging Results (Last 24 Hours)     ** No results found for the last 24 hours. **        I have personally reviewed the radiology images and read the final radiology report.    Assessment & Plan      Assessment:  Acute systolic CHF  NSTEMI, S/P PCI to LAD  Ischemic CMP with EF of 31-35%  Acute hypoxic respiratory failure  DAMIAN  Metabolic alkalosis  New onset DM2, A1C 6.7  Essential HTN  Obesity    Plan:  For acute systolic CHF exacerbation, currently on p.o. Bumex 1 mg daily.  We will do 1 dose of IV Bumex on this afternoon.  BNP trending down.  Weight trended down.  Continue to monitor renal function electrolytes second intake output.  2D echo with EF of 31 to 35%.  For ischemic cardia myopathy, currently on beta-blocker and hydralazine.  Started on losartan per Dr. Amador.  Does not have insurance for Entresto or the life vest.  Discussed in detail regarding diet and medication compliance and fluid restriction  and weight monitoring.  Patient and his wife verbalized understanding.    NSTEMI status post PCI to LAD.  Continue with aspirin Brilinta statin.     Acute hypoxic respiratory failure, continue to wean FiO2.    Acute kidney injury, resolved.  Monitor    Metabolic alkalosis, will do a dose of Diamox.    New onset DM2, A1C 6.7, currently blood sugar controlled.  Monitor.    Essential hypertension, currently controlled.  Continue with current regimen.    Lovenox subcu for DVT prophylaxis.    Scheduled melatonin for insomnia.    Management discussed in detail with patient and wife and nursing.    The patient is high risk due to the following diagnoses/reasons:    Acute systolic CHF, NSTEMI, S/P PCI to LAD, Ischemic CMP with EF of 31-35%, Acute hypoxic respiratory failure    Tasha Whittington MD  11/11/19  8:09 PM

## 2019-11-12 NOTE — PROGRESS NOTES
LOS: 8 days     Name: Damián Myers  Age/Sex: 64 y.o. male  :  1955        PCP: Spencer Saeed MD  REF: No ref. provider found    Principal Problem:    CHF exacerbation (CMS/[facility])  Active Problems:    Systolic congestive heart failure (CMS/HCC)    NSTEMI (non-ST elevated myocardial infarction) (CMS/[facility])    Hyperglycemia    Obesity (BMI 30-39.9)    Prolonged Q-T interval on ECG    Renal failure    Acute respiratory failure with hypoxia (CMS/HCC)    Acute systolic congestive heart failure (CMS/[facility])    Elevated troponin      Reason for follow-up: Acute systolic heart failure and ASCVD    Subjective       Subjective      Mr. Myers is a 64-year-old  male was admitted with acute decompensated systolic heart failure and was found to have newly diagnosed advanced dilated cardiomyopathy.  He has responded well to IV diuretic therapy.  He was subsequently found to have severe stenosis in the mid LAD which was stented on 2019.  He has been improving since receiving his PCI.     Interval History: Patient is sitting up in a chair.  He reports that he feels well.  He denies chest pain or shortness of breath.  It is noted that he has lost 36 pounds since admission.  Pedal edema greatly improved.  He has ambulated in the hallways and has not required his oxygen.  Oxygen saturation remained in the 90's with ambulation.       Vital Signs  Temp:  [97.8 °F (36.6 °C)-99.1 °F (37.3 °C)] 97.8 °F (36.6 °C)  Heart Rate:  [75-87] 75  Resp:  [18] 18  BP: (109-152)/(55-95) 152/90     Vital Signs (last 72 hrs)        07  -  11/10 0659 11/10 07  -   0659  07  -   0659  07  -   1025   Most Recent    Temp (°F) 97.5 -  98.2    97.7 -  99.4    97.8 -  99.1       97.8 (36.6)    Heart Rate 72 -  83    76 -  89    75 -  87       75    Resp   18    16 -  18      18       18    BP 98/59 -  115/67    117/71 -  160/85    109/68 -  152/90       152/90    SpO2 (%) 93 -  97    91 -  96    90 -  96        91        Body mass index is 32.21 kg/m².    Intake/Output Summary (Last 24 hours) at 11/12/2019 1025  Last data filed at 11/12/2019 0840  Gross per 24 hour   Intake 360 ml   Output 450 ml   Net -90 ml     Objective    Objective       Physical Exam:     General Appearance:    Alert, cooperative, in no acute distress   Head:    Normocephalic, without obvious abnormality, atraumatic   Eyes:            Conjunctivae and sclerae normal, no   icterus, no pallor, corneas clear.   Neck:   No adenopathy, supple, trachea midline, no thyromegaly, no   carotid bruit, no JVD   Lungs:     Clear to auscultation,respirations regular, even and                  unlabored    Heart:    Regular rhythm and normal rate, normal S1 and S2, no            murmur, no gallop, no rub, no click   Chest Wall:    No abnormalities observed   Abdomen:     Normal bowel sounds, no masses, no organomegaly, soft        non-tender, non-distended, no guarding, no rebound                tenderness   Extremities:   Moves all extremities well, no edema, no cyanosis, no             redness   Pulses:   Pulses palpable and equal bilaterally   Skin:   No bleeding, bruising or rash       Neurologic:   Alert and oriented    I have seen and performed a physical examination today. No changes noted from previous physical exam.          Procedures    Results review       Results Review:   Results from last 7 days   Lab Units 11/10/19  0052 11/09/19  0429 11/07/19  0431 11/06/19  0021   WBC 10*3/mm3 9.31 13.31* 7.10 6.65   HEMOGLOBIN g/dL 13.7 15.0 14.9 15.0   PLATELETS 10*3/mm3 113* 123* 128* 143     Results from last 7 days   Lab Units 11/12/19  0253 11/11/19  0427 11/10/19  0052 11/09/19  0429 11/08/19  0535 11/07/19  0431 11/06/19  0021   SODIUM mmol/L 141 137 138 140 141 142 146*   POTASSIUM mmol/L 4.0 4.9 4.3 4.4 4.2 4.7 4.0   CHLORIDE mmol/L 95* 91* 90* 93* 96* 97* 98   CO2 mmol/L 34.3* 37.3* 40.1* 39.8* 35.6* 34.3* 37.8*   BUN mg/dL 20 20 21 18 20 21 27*    CREATININE mg/dL 1.22 1.07 1.11 1.06 1.07 1.23 1.43*   CALCIUM mg/dL 9.6 9.1 9.0 9.1 9.3 9.4 9.2   GLUCOSE mg/dL 131* 133* 122* 134* 123* 133* 138*   ALT (SGPT) U/L  --   --   --   --   --  16  --    AST (SGOT) U/L  --   --   --   --   --  17  --      Results from last 7 days   Lab Units 11/05/19  2300 11/05/19  1150   TROPONIN T ng/mL 0.047* 0.038*     Lab Results   Component Value Date    INR 1.11 (H) 11/04/2019     Lab Results   Component Value Date    MG 2.1 11/12/2019    MG 2.0 11/11/2019    MG 2.0 11/05/2019     Lab Results   Component Value Date    TSH 5.070 (H) 11/04/2019    PSA 0.433 11/06/2019    TRIG 63 11/09/2019    HDL 34 (L) 11/09/2019    LDL 45 11/09/2019      Imaging Results (Last 48 Hours)     ** No results found for the last 48 hours. **        Echo   Results for orders placed during the hospital encounter of 11/04/19   Adult Transthoracic Echo Complete W/ Cont if Necessary Per Protocol    Narrative · The left ventricular cavity is mildly dilated.  · Left ventricular systolic function is severely decreased.  · Estimated EF appears to be in the range of 31 - 35%.  · Right ventricular cavity is mild-to-moderately dilated.  · Moderately reduced right ventricular systolic function noted.  · The aortic valve is structurally normal. Trace aortic valve   regurgitation is present. No aortic valve stenosis is present.  · The mitral valve is normal in structure. Moderate mitral valve   regurgitation is present with an eccentric jet noted. No significant   mitral valve stenosis is present.  · The tricuspid valve is normal. Trace tricuspid valve regurgitation is   present. Estimated right ventricular systolic pressure from tricuspid   regurgitation is mildly elevated (35-45 mmHg).  · There is a small (<1cm) pericardial effusion adjacent to the left   ventricle. There is no evidence of cardiac tamponade.  · Comments: No previous studies available for comparison.         I reviewed the patient's new clinical  results.    Telemetry: Normal sinus rhythm 70-80 bpm        Medication Review:     acetaZOLAMIDE (DIAMOX) IVPB 250 mg Intravenous Once   aspirin 81 mg Oral Daily   atorvastatin 20 mg Oral Nightly   enoxaparin 40 mg Subcutaneous Nightly   famotidine 20 mg Oral Daily   hydrALAZINE 10 mg Oral Q8H   magic barrier cream  Topical 4x Daily   melatonin 5 mg Oral Nightly   metoprolol succinate XL 25 mg Oral Q24H   oxymetazoline 2 spray Each Nare BID   sodium chloride 10 mL Intravenous Q12H   ticagrelor 90 mg Oral BID            Assessment      Assessment:  1. Acute decompensated systolic heart failure, resolved  2. Advance dilated ischemic cardiomyopathy with LV ejection fraction of about 30 to 35%.  3. ASCVD with severe stenosis of the mid LAD, status post stenting on 11/8/2019, clinically stable.  4. Acute renal failure at admission, improved     Plan     Recommendations:  1. Acute systolic congestive heart failure  · He appears compensated at this time.  Breathing and pedal edema have significantly improved.  Due to not having any insurance, Entresto is not affordable at this time.    · Will switch the metoprolol succinate to carvedilol to have a better effect on his blood pressure and probably hold off on the losartan for now due to worsening of his kidney function since yesterday.  We will try to reinitiate this as an outpatient if his kidney function improves and remains stable.     · Discussed with him about the importance of salt restricted diet.  He verbalizes understanding.  · He needs close monitoring of his kidney function as an outpatient.  May order BMP in a couple of days as an outpatient and have the report sent to our office.    2. Coronary artery disease with recent PCI  · He denies any chest pain.  We will continue with guideline directed medical therapy for his CAD with low-dose aspirin, Lipitor, losartan and metoprolol succinate.  · Brilinta is not affordable for the patient so we will switch him to  Plavix.  · Discussed with him but the importance of taking his medications as prescribed.  He verbalizes understanding.  · He is stable from a cardiac standpoint for discharge home once he receives his loading dose of plavix.   · He will need to follow-up in our office in 1 week.    I discussed the patients findings and my recommendations with patient and family      Keyana SREEKANTH [place]  11/12/19  10:25 AM     I, Mahendra Amador MD, FACC, personally performed the services described in this documentation as documented by the above named individual in my presence, made necessary changes and the note is both accurate and complete.     Mahendra Amador MD, FACC  11/12/2019  11:30 AM    Please note that portions of this note were completed with a voice recognition program.

## 2019-11-13 VITALS
BODY MASS INDEX: 32.03 KG/M2 | RESPIRATION RATE: 18 BRPM | HEART RATE: 74 BPM | TEMPERATURE: 97.9 F | DIASTOLIC BLOOD PRESSURE: 59 MMHG | OXYGEN SATURATION: 91 % | SYSTOLIC BLOOD PRESSURE: 137 MMHG | WEIGHT: 249.6 LBS | HEIGHT: 74 IN

## 2019-11-13 LAB
ANION GAP SERPL CALCULATED.3IONS-SCNC: 12.1 MMOL/L (ref 5–15)
BUN BLD-MCNC: 27 MG/DL (ref 8–23)
BUN/CREAT SERPL: 21.8 (ref 7–25)
CALCIUM SPEC-SCNC: 9.4 MG/DL (ref 8.6–10.5)
CHLORIDE SERPL-SCNC: 98 MMOL/L (ref 98–107)
CO2 SERPL-SCNC: 29.9 MMOL/L (ref 22–29)
CREAT BLD-MCNC: 1.24 MG/DL (ref 0.76–1.27)
GFR SERPL CREATININE-BSD FRML MDRD: 59 ML/MIN/1.73
GLUCOSE BLD-MCNC: 122 MG/DL (ref 65–99)
MAGNESIUM SERPL-MCNC: 2.3 MG/DL (ref 1.6–2.4)
NT-PROBNP SERPL-MCNC: 1181 PG/ML (ref 5–900)
POTASSIUM BLD-SCNC: 4.1 MMOL/L (ref 3.5–5.2)
SODIUM BLD-SCNC: 140 MMOL/L (ref 136–145)

## 2019-11-13 PROCEDURE — 83735 ASSAY OF MAGNESIUM: CPT | Performed by: HOSPITALIST

## 2019-11-13 PROCEDURE — 83880 ASSAY OF NATRIURETIC PEPTIDE: CPT | Performed by: HOSPITALIST

## 2019-11-13 PROCEDURE — 99239 HOSP IP/OBS DSCHRG MGMT >30: CPT | Performed by: HOSPITALIST

## 2019-11-13 PROCEDURE — 80048 BASIC METABOLIC PNL TOTAL CA: CPT | Performed by: HOSPITALIST

## 2019-11-13 PROCEDURE — 99231 SBSQ HOSP IP/OBS SF/LOW 25: CPT | Performed by: INTERNAL MEDICINE

## 2019-11-13 RX ORDER — ASPIRIN 81 MG/1
81 TABLET ORAL DAILY
Qty: 30 TABLET | Refills: 0 | Status: SHIPPED | OUTPATIENT
Start: 2019-11-13 | End: 2019-12-13

## 2019-11-13 RX ORDER — BUMETANIDE 1 MG/1
1 TABLET ORAL DAILY
Qty: 30 TABLET | Refills: 0 | Status: SHIPPED | OUTPATIENT
Start: 2019-11-13 | End: 2019-11-13

## 2019-11-13 RX ORDER — CARVEDILOL 6.25 MG/1
6.25 TABLET ORAL 2 TIMES DAILY WITH MEALS
Qty: 60 TABLET | Refills: 0 | OUTPATIENT
Start: 2019-11-13 | End: 2021-10-29 | Stop reason: HOSPADM

## 2019-11-13 RX ORDER — CLOPIDOGREL BISULFATE 75 MG/1
75 TABLET ORAL DAILY
Qty: 30 TABLET | Refills: 11 | Status: SHIPPED | OUTPATIENT
Start: 2019-11-13 | End: 2020-11-07

## 2019-11-13 RX ORDER — ISOSORBIDE MONONITRATE 30 MG/1
30 TABLET, EXTENDED RELEASE ORAL
Status: DISCONTINUED | OUTPATIENT
Start: 2019-11-13 | End: 2019-11-13 | Stop reason: HOSPADM

## 2019-11-13 RX ORDER — CARVEDILOL 12.5 MG/1
12.5 TABLET ORAL 2 TIMES DAILY WITH MEALS
Qty: 60 TABLET | Refills: 0 | Status: SHIPPED | OUTPATIENT
Start: 2019-11-13 | End: 2019-11-13 | Stop reason: HOSPADM

## 2019-11-13 RX ORDER — BUMETANIDE 1 MG/1
1 TABLET ORAL
Qty: 30 TABLET | Refills: 0 | OUTPATIENT
Start: 2019-11-13 | End: 2021-10-29 | Stop reason: HOSPADM

## 2019-11-13 RX ORDER — ISOSORBIDE MONONITRATE 30 MG/1
30 TABLET, EXTENDED RELEASE ORAL
Qty: 30 TABLET | Refills: 0 | Status: SHIPPED | OUTPATIENT
Start: 2019-11-13 | End: 2019-12-13

## 2019-11-13 RX ORDER — HYDRALAZINE HYDROCHLORIDE 10 MG/1
10 TABLET, FILM COATED ORAL EVERY 8 HOURS SCHEDULED
Qty: 90 TABLET | Refills: 0 | Status: SHIPPED | OUTPATIENT
Start: 2019-11-13 | End: 2019-12-13

## 2019-11-13 RX ORDER — ATORVASTATIN CALCIUM 40 MG/1
40 TABLET, FILM COATED ORAL NIGHTLY
Qty: 30 TABLET | Refills: 0 | Status: SHIPPED | OUTPATIENT
Start: 2019-11-13 | End: 2019-12-13

## 2019-11-13 RX ADMIN — CLOPIDOGREL 75 MG: 75 TABLET, FILM COATED ORAL at 09:09

## 2019-11-13 RX ADMIN — ASPIRIN 81 MG: 81 TABLET, CHEWABLE ORAL at 09:10

## 2019-11-13 RX ADMIN — HYDRALAZINE HYDROCHLORIDE 10 MG: 10 TABLET ORAL at 15:18

## 2019-11-13 RX ADMIN — Medication: at 12:00

## 2019-11-13 RX ADMIN — Medication: at 09:16

## 2019-11-13 RX ADMIN — CARVEDILOL 12.5 MG: 6.25 TABLET, FILM COATED ORAL at 09:10

## 2019-11-13 RX ADMIN — FAMOTIDINE 20 MG: 20 TABLET, FILM COATED ORAL at 09:10

## 2019-11-13 RX ADMIN — SODIUM CHLORIDE, PRESERVATIVE FREE 10 ML: 5 INJECTION INTRAVENOUS at 09:16

## 2019-11-13 RX ADMIN — BUMETANIDE 1 MG: 1 TABLET ORAL at 09:10

## 2019-11-13 NOTE — CONSULTS
"Patient reports, \" I do not need diabetes education, I am not a diabetic and even if I was, I know what to do.  My son has been a type one diabetic since he was 12.\"  "

## 2019-11-13 NOTE — NURSING NOTE
Telemetry called stating that patient has dropped 3x times to the low 80% and bounced back, placed 2L/NC on patient and patient kept sats above 90% the rest of the night

## 2019-11-13 NOTE — PROGRESS NOTES
LOS: 9 days     Name: Damián Myers  Age/Sex: 64 y.o. male  :  1955        PCP: Spencer Saeed MD  REF: No ref. provider found    Active Problems:    Hyperglycemia    Obesity (BMI 30-39.9)      Reason for follow-up: Acute systolic heart failure and coronary artery disease.    Subjective       Subjective  Mr. Myers is a 64-year-old  male was admitted with acute decompensated systolic heart failure and was found to have newly diagnosed advanced dilated cardiomyopathy.  He has responded well to IV diuretic therapy.  He was subsequently found to have severe stenosis in the mid LAD which was stented on 2019.  He has been improving since receiving his PCI.      Interval History: Patient denies any shortness of breath or chest pains.  He is very anxious to go home.  He apparently dropped his O2 sats last night into the 80s.  Patient said he slept good through the night.  He feels refreshed this morning.    ROS    Vital Signs  Temp:  [97.5 °F (36.4 °C)-99.2 °F (37.3 °C)] 97.5 °F (36.4 °C)  Heart Rate:  [69-85] 70  Resp:  [18] 18  BP: (101-116)/(49-74) 106/67  Vital Signs (last 72 hrs)       11/10 0700  -  0659 700  -   0659 700  -   0659  07  -   0903   Most Recent    Temp (°F) 97.7 -  99.4    97.8 -  99.1    97.5 -  99.2       97.5 (36.4)    Heart Rate 76 -  89    75 -  87    69 -  85       70    Resp 16 -  18      18      18       18    /71 -  160/85    109/68 -  152/90    101/69 -  116/49       106/67    SpO2 (%) 91 -  96    90 -  96    93 -  97       96        11/10 0700  11/11 0659 11/11 0700  11/12 0659  0659  07 0924  Most Recent      Temp (°F)     97.7-99.4 97.8-99.1 97.5-99.2    97.5 (36.4)    Heart Rate     76-89 75-87 69-85 77  77    Resp     16-18 18 18    18    BP     117//85 109//90 101//49 122/56  122/56            Body mass index is 32.03 kg/m².    Intake/Output Summary (Last 24 hours) at  11/13/2019 0903  Last data filed at 11/13/2019 0851  Gross per 24 hour   Intake 1200 ml   Output 800 ml   Net 400 ml     Objective    Objective       Physical Exam:    Patient is alert, oriented x3 and is in no apparent distress at this time.  Neck revealed no JVD or carotid bruits.  Lungs are clear to auscultation bilaterally.  Cardiac examination reveals regular rhythm with normal S1 and S2.  No S3 or S4 noted.  No significant murmur, gallop or rub noted.  Abdomen is soft and nontender.  Extremities reveal no edema.            Procedures    Results review       Results Review:   Results from last 7 days   Lab Units 11/10/19  0052 11/09/19 0429 11/07/19  0431   WBC 10*3/mm3 9.31 13.31* 7.10   HEMOGLOBIN g/dL 13.7 15.0 14.9   PLATELETS 10*3/mm3 113* 123* 128*     Results from last 7 days   Lab Units 11/13/19  0608 11/12/19  0253 11/11/19  0427 11/10/19  0052 11/09/19  0429 11/08/19  0535 11/07/19  0431   SODIUM mmol/L 140 141 137 138 140 141 142   POTASSIUM mmol/L 4.1 4.0 4.9 4.3 4.4 4.2 4.7   CHLORIDE mmol/L 98 95* 91* 90* 93* 96* 97*   CO2 mmol/L 29.9* 34.3* 37.3* 40.1* 39.8* 35.6* 34.3*   BUN mg/dL 27* 20 20 21 18 20 21   CREATININE mg/dL 1.24 1.22 1.07 1.11 1.06 1.07 1.23   CALCIUM mg/dL 9.4 9.6 9.1 9.0 9.1 9.3 9.4   GLUCOSE mg/dL 122* 131* 133* 122* 134* 123* 133*   ALT (SGPT) U/L  --   --   --   --   --   --  16   AST (SGOT) U/L  --   --   --   --   --   --  17         Lab Results   Component Value Date    INR 1.11 (H) 11/04/2019     Lab Results   Component Value Date    MG 2.3 11/13/2019    MG 2.1 11/12/2019    MG 2.0 11/11/2019     Lab Results   Component Value Date    TSH 5.070 (H) 11/04/2019    PSA 0.433 11/06/2019    TRIG 63 11/09/2019    HDL 34 (L) 11/09/2019    LDL 45 11/09/2019        ECG      Echo   Results for orders placed during the hospital encounter of 11/04/19   Adult Transthoracic Echo Complete W/ Cont if Necessary Per Protocol    Narrative · The left ventricular cavity is mildly dilated.  ·  Left ventricular systolic function is severely decreased.  · Estimated EF appears to be in the range of 31 - 35%.  · Right ventricular cavity is mild-to-moderately dilated.  · Moderately reduced right ventricular systolic function noted.  · The aortic valve is structurally normal. Trace aortic valve   regurgitation is present. No aortic valve stenosis is present.  · The mitral valve is normal in structure. Moderate mitral valve   regurgitation is present with an eccentric jet noted. No significant   mitral valve stenosis is present.  · The tricuspid valve is normal. Trace tricuspid valve regurgitation is   present. Estimated right ventricular systolic pressure from tricuspid   regurgitation is mildly elevated (35-45 mmHg).  · There is a small (<1cm) pericardial effusion adjacent to the left   ventricle. There is no evidence of cardiac tamponade.  · Comments: No previous studies available for comparison.             I reviewed the patient's new clinical results.    Telemetry:sinus rhythm 60s-70s.       Medication Review:     aspirin 81 mg Oral Daily   atorvastatin 40 mg Oral Nightly   bumetanide 1 mg Oral Daily   carvedilol 12.5 mg Oral BID With Meals   clopidogrel 75 mg Oral Daily   enoxaparin 40 mg Subcutaneous Nightly   famotidine 20 mg Oral Daily   hydrALAZINE 10 mg Oral Q8H   magic barrier cream  Topical 4x Daily   melatonin 5 mg Oral Nightly   sodium chloride 10 mL Intravenous Q12H            Assessment      1. Acute decompensated systolic heart failure, resolved  2. Advance dilated ischemic cardiomyopathy with LV ejection fraction of about 30 to 35%.  3. ASCVD with severe stenosis of the mid LAD, status post stenting on 11/8/2019, clinically stable.  4. Acute renal failure at admission, improved     Plan     1. For his acute systolic heart failure, since patient appears to be fairly well compensated at this time, would continue with carvedilol and hydralazine and add oral nitrate.  2. Continue with Bumex at 1  mg daily for now and monitor the kidney function as an outpatient.  3. I have discussed with him about the desaturations that he had at night and the need for nocturnal oxygen at home.  Patient does not want to have this as he is concerned about that this will add to his medical expenses.  This was discussed in front of his wife.  I also told him that he may potentially have some element of sleep apnea which may have to be studied as well as an outpatient and is also reluctant to have this for the same reason.  He is very anxious to go home today.  I have asked him to ambulate in the hallways again to see if he has any desaturation with ambulation and if he does okay then he could probably just be discharged home today and follow-up in our office in 1 to 2 weeks.  4. I have discussed several times with him and his wife about salt restriction in his diet and the foods to avoid.    I discussed the patients findings and my recommendations with patient and family      DINAH Oseguera  11/13/19  9:03 AM    Please note that portions of this note were completed with a voice recognition program.

## 2019-11-13 NOTE — PROGRESS NOTES
Discharge Planning Assessment   [facility]     Patient Name: Damián Myers  MRN: [id number]  Today's Date: 11/13/2019    Admit Date: 11/4/2019    Discharge Needs Assessment    No documentation.       Discharge Plan     Row Name 11/13/19 1241       Plan    Plan  Pt to be dc'd home today. MD has ordered home O2 for pt d/t desat. on ambulatory pulseox. SS has visited with pt and wife and currently following up for possible arrangements. No additional needs noted.         Destination      No service coordination in this encounter.      Durable Medical Equipment      No service coordination in this encounter.      Dialysis/Infusion      No service coordination in this encounter.      Home Medical Care      No service coordination in this encounter.      Therapy      No service coordination in this encounter.      Community Resources      No service coordination in this encounter.        Expected Discharge Date and Time     Expected Discharge Date Expected Discharge Time    Nov 13, 2019         Demographic Summary    No documentation.       Functional Status    No documentation.       Psychosocial    No documentation.       Abuse/Neglect    No documentation.       Legal    No documentation.       Substance Abuse    No documentation.       Patient Forms    No documentation.           Daija Phillip RN

## 2019-11-13 NOTE — DISCHARGE PLACEMENT REQUEST
"Damián Myers (64 y.o. Male)     Date of Birth Social Security Number Address Home Phone MRN    1955  [address]   [place] [id number] [phone] [id number]    Religious Marital Status          [place]        Admission Date Admission Type Admitting Provider Attending Provider Department, Room/Bed    19 Emergency Roel Martines MD Srinivas, Priyanka, MD [address], 3303/2S    Discharge Date Discharge Disposition Discharge Destination         Home or Self Care              Attending Provider:  Tasha Whittington MD    Allergies:  No Known Allergies    Isolation:  None   Infection:  None   Code Status:  CPR    Ht:  188 cm (74.02\")   Wt:  113 kg (249 lb 9.6 oz)    Admission Cmt:  None   Principal Problem:  CHF exacerbation (CMS/HCC) [I50.9]                 Active Insurance as of 2019     Patient has no active insurance coverage on file for 2019.          Emergency Contacts      (Rel.) Home Phone Work Phone Mobile Phone    MICHELLE MYERS (Spouse) -- -- [phone]        [address] [address]  Dept. Phone:  [phone]  Dept. Fax:   Date Ordered: 2019         Patient:  Damián Myers MRN:  [id number]   [address]   [facility] [address] :  1955  SSN:    Phone: [phone] Sex:  M     Weight: 113 kg (249 lb 9.6 oz)         Ht Readings from Last 1 Encounters:   19 188 cm (74.02\")         Home Oxygen Therapy          (Order ID: [id number])    Diagnosis:  Systolic congestive heart failure, unspecified HF chronicity (CMS/HCC) (I50.20 [ICD-10-CM] 428.20,428.0 [ICD-9-CM])   Quantity:  1     Delivery Modality: Nasal Cannula  Liters Per Minute: 2  Duration: Continuous  Equipment:  Oxygen Concentrator &  &  Portable Gaseous Oxygen System & Portable Oxygen Contents Gaseous &  Conserving Regulator  Length of Need (99 Months = Lifetime): 99 Months = Lifetime        Authorizing " Provider's Phone: [phone]   Authorizing Provider:Tasha Whittington MD  Authorizing Provider's NPI: [id number]  Order Entered By: Tasha Whittington MD 11/13/2019 11:07 AM     Electronically signed by: Tasha Whittington MD 11/13/2019 11:07 AM        Value Information      86 % Abnormal   during ambulation      Taken by:   Chantel Ludwig, RN at 11/13/19 1246 (today)   Mins/Maxes      Max: 100 %   Warn Max: 100 %   Warn Min: 90 %   Min: 0 %   Last Filed Values (24 hours)      86 % Abnormal   during ambulation   by Chantel Ludwig, RN at 11/13/19 1246   No Value  during ambulation   by Chantel Ludwig, RN at 11/13/19 1100   97 %   by Stephanie Ramos at 11/13/19 1059   96 %   by Stephanie Ramos at 11/13/19 0625   96 %   by Geraldine Timmons at 11/13/19 0253   94 %   by Yanna York, RRT at 11/12/19 1817   93 %   by Geraldine Timmons at 11/12/19 1815   97 %   by Leann Brink at 11/12/19 1600   97 %   by Jacquelin Low at 11/12/19 1416   First Filed Value      89 % Abnormal    by         Emergency Contact Information     Name Relation Home Work Mobile    MICHELLE LOVE Spouse   [phone]          Insurance Information          No coverages.          Discharge Order (From admission, onward)    Start     Ordered    11/13/19 0803  Discharge patient  Once     Expected Discharge Date:  11/13/19    Discharge Disposition:  Home or Self Care    Physician of Record for Attribution - Please select from Treatment Team:  TASHA WHITTINGTON [[id number]]    Review needed by CMO to determine Physician of Record:  No       Question Answer Comment   Physician of Record for Attribution - Please select from Treatment Team TASHA WHITTINGTON    Review needed by CMO to determine Physician of Record No        11/13/19 2634

## 2019-11-13 NOTE — PROGRESS NOTES
Discharge Planning Assessment  [facility]     Patient Name: Damián Myers  MRN: [id number]  Today's Date: 11/13/2019    Admit Date: 11/4/2019  Discharge Plan     Row Name 11/13/19 1328       Plan    Plan  Ss spoke with pt's spouse about his O2 needs. Spouse requesting [facility]. SS spoke with Marilee at [facility] and gave her pt's information. SS sent qualifying SAT and MD order. Marilee states that O2 will be brought to [facility]. No other needs identified.     Patient/Family in Agreement with Plan  yes    Row Name 11/13/19 6840     SHERYL PurvisW

## 2019-11-13 NOTE — DISCHARGE INSTR - APPOINTMENTS
Pt  Has  Apt  With  Dr Christophe Palmer  For  Nov 19  At  9  Am   With  Bunny  And  elroy  For  Nov 22 at  8 :30

## 2019-11-13 NOTE — DISCHARGE SUMMARY
[facility]    Patient Identification:  Name:  Damián Myers  Age:  64 y.o.  Sex:  male  :  1955  MRN:  [id number]  Visit Number:  [id number]    Date of Admission: 2019  Date of Discharge:  2019     PCP: Spencer Saeed MD    DISCHARGE DIAGNOSIS  Acute systolic CHF  NSTEMI, S/P PCI to LAD  Ischemic CMP with EF of 31-35%  Acute hypoxic respiratory failure  DAMIAN  Metabolic alkalosis  New onset DM2, A1C 6.7  Essential HTN  Macrocytosis  Obesity    CONSULTS   Cardiology    PROCEDURES PERFORMED  Cardiac Catheterization done on 2019    HOSPITAL COURSE  Mr. Myers 63 yo male with past medical history significant for obesity, admitted with NSTEMI, Acute systolic CHF with and Acute hypoxic respiratory failure.  Please see the admitting history and physical for further details. Patient was started on IV diuretics, aspirin and statin and BB for NSTEMI.  2D echocardiogram was done which showed EF of  31-35%. Did have DAMIAN but Cr improved and the patient underwent cardiac catheterization with PCI to LAD.  Started on Brilinta post PCI.  Oral hydralazine was added for Cardiomyopathy.  Patient continued to diurese well and IV diuretics changed to p.o. Bumex 1 mg daily.  Toprol-XL was changed to Coreg and dose adjusted.  ACE inhibitor/ARB was not started because of fluctuating renal function and borderline blood pressure.  Patient did not have insurance for Entresto and LifeVest. patient continued to require oxygen 2L nasal cannula. New onset DM2, A1C 6.7, ADA diet and diabetic educator consult done.    Patient continued to improve clinically and ambulating with resolution of pedal edema dyspnea on exertion. Brilinta was changed to Plavix before discharge since patient did not have insurance.  Patient and his wife were discussed in detail regarding lifestyle modification with diet control, medication compliance, weight monitoring, fluid restriction, exercise.  Patient was  also advised regarding getting a sleep study done as an outpatient. They verbalized understanding. Home O2 evaluation was done and he was desaturating to 87 to 88% on room air.  Also noted to have nocturnal hypoxemia.  Patient will have repeat blood work done BMP in 2 weeks for follow-up of creatinine. Since this patient was vitally stable, improved symptomatically and would like to go home and cleared by cardiology, it was decided to discharge the patient to home.  Discharge disposition stable.        VITAL SIGNS:  Temp:  [97.5 °F (36.4 °C)-99.2 °F (37.3 °C)] 97.8 °F (36.6 °C)  Heart Rate:  [69-85] 77  Resp:  [18] 18  BP: ()/(49-71) 92/60  SpO2:  [93 %-97 %] 97 %  on   ;   Device (Oxygen Therapy): nasal cannula    Body mass index is 32.03 kg/m².  Wt Readings from Last 3 Encounters:   11/13/19 113 kg (249 lb 9.6 oz)       PHYSICAL EXAM:  General: Comfortable, Not in distress.  Well-developed and well-nourished.   HENT:  Head:  Normocephalic and atraumatic.  Mouth:  Moist mucous membranes. Eyes:  Conjunctivae and EOM are normal.  Pupils are equal, round, and reactive to light.  No scleral icterus.    Neck:  Neck supple.  No JVD present.  trachea midline.   Cardiovascular:  Normal rate, regular rhythm with no murmur.  Pulmonary/Chest:  No respiratory distress, no wheezes, no crackles with normal breath sounds and good air movement.  Abdomen:  Soft.  Bowel sounds are normal.  No distension and no tenderness.   Musculoskeletal:  no tenderness, and no deformity.  No red or swollen joints anywhere.    Neurological:  Alert and oriented to person, place, and time.  No cranial nerve deficit. No focal deficits. No facial droop.  No slurred speech.   Skin:  Skin is warm and dry. No rash noted. No pallor.   Peripheral vascular:  Strong pulses in all 4 extremities with no clubbing, no cyanosis, NO edema.  Genitourinary: no dudley    DISCHARGE DISPOSITION   Home     DISCHARGE MEDICATIONS:     Discharge Medications      New  Medications      Instructions Start Date   ASPIRIN LOW DOSE 81 MG EC tablet  Generic drug:  aspirin   81 mg, Oral, Daily      atorvastatin 40 MG tablet  Commonly known as:  LIPITOR   40 mg, Oral, Nightly      bumetanide 1 MG tablet  Commonly known as:  BUMEX   1 mg, Oral, Daily With Lunch      carvedilol 6.25 MG tablet  Commonly known as:  COREG   6.25 mg, Oral, 2 Times Daily With Meals      clopidogrel 75 MG tablet  Commonly known as:  PLAVIX   75 mg, Oral, Daily      hydrALAZINE 10 MG tablet  Commonly known as:  APRESOLINE   10 mg, Oral, Every 8 Hours Scheduled      isosorbide mononitrate 30 MG 24 hr tablet  Commonly known as:  IMDUR   30 mg, Oral, Every 24 Hours Scheduled             Diet Instructions     Diet: Consistent Carbohydrate, Cardiac      Discharge Diet:   Consistent Carbohydrate  Cardiac       Fluid Restriction per day:  Other (See comments)    2000 ml/day        Activity Instructions     Activity as Tolerated          Future Appointments   Date Time Provider Department Center   11/22/2019  8:30 AM Elroy Leos PA-C [facility] None     Your Scheduled Appointments    Nov 22, 2019  8:30 AM EST  Follow Up with Elroy Leos PA-C  [facility] CARDIOLOGY (--) [address] [facility] [place] [address] [phone]   Arrive 15 minutes prior to appointment.    Additional instructions:      Pt  Has  Apt  With  Dr Christophe Palmer  For  Nov 19  At  9  Am   With  Bmp  And  elroy  For  Nov 22 at  8 :30                  Additional Instructions for the Follow-ups that You Need to Schedule     Discharge Follow-up with PCP   As directed       Currently Documented PCP:    Spencer Saeed MD    PCP Phone Number:    [phone]     Follow Up Details:  Within 1 week         Discharge Follow-up with Specialty: Dr. Amador Cardiology; 1 Week   As directed      Specialty:  Dr. [facility]iology    Follow Up:  1 Week         Basic Metabolic Panel    Nov 15, 2019 (Approximate)      Fax report To Dr. Mccray  [facility]    Order Comments:  Fax report To Dr. Mahendra Amador, Cardiologist            Follow-up Information     Spencer Saeed MD .    Specialty:  Family Medicine  Why:  Within 1 week  Contact information:  [address] [place] [address] [facility] [place] [address] [phone]                    TEST  RESULTS PENDING AT DISCHARGE       CODE STATUS  Code Status and Medical Interventions:   Ordered at: 11/04/19 1400     Level Of Support Discussed With:    Patient     Code Status:    CPR     Medical Interventions (Level of Support Prior to Arrest):    Full       Tasha Whittington MD  11/13/19  11:16 AM    Please note that this discharge summary required more than 30 minutes to complete.    Please send a copy of this dictation to the following providers:  Spencer Saeed MD

## 2019-11-14 ENCOUNTER — READMISSION MANAGEMENT (OUTPATIENT)
Dept: CALL CENTER | Facility: HOSPITAL | Age: 64
End: 2019-11-14

## 2019-11-14 NOTE — OUTREACH NOTE
Prep Survey      Responses   Facility patient discharged from?  [facility]   Is patient eligible?  Yes   Discharge diagnosis  CHF   Does the patient have one of the following disease processes/diagnoses(primary or secondary)?  CHF   Does the patient have Home health ordered?  No   Is there a DME ordered?  Yes   What DME was ordered?  O2 - [facility]   Comments regarding appointments  see AVS   Medication alerts for this patient  aspirin, lipitor, bumex, coreg, plavix, apresoline, imdur   Prep survey completed?  Yes          Virgie Shah RN

## 2019-11-15 ENCOUNTER — LAB (OUTPATIENT)
Dept: LAB | Facility: HOSPITAL | Age: 64
End: 2019-11-15

## 2019-11-15 ENCOUNTER — READMISSION MANAGEMENT (OUTPATIENT)
Dept: CALL CENTER | Facility: HOSPITAL | Age: 64
End: 2019-11-15

## 2019-11-15 DIAGNOSIS — N17.9 ACUTE RENAL FAILURE, UNSPECIFIED ACUTE RENAL FAILURE TYPE (HCC): ICD-10-CM

## 2019-11-15 LAB
ANION GAP SERPL CALCULATED.3IONS-SCNC: 10.6 MMOL/L (ref 5–15)
BUN BLD-MCNC: 27 MG/DL (ref 8–23)
BUN/CREAT SERPL: 23.7 (ref 7–25)
CALCIUM SPEC-SCNC: 9.7 MG/DL (ref 8.6–10.5)
CHLORIDE SERPL-SCNC: 96 MMOL/L (ref 98–107)
CO2 SERPL-SCNC: 35.4 MMOL/L (ref 22–29)
CREAT BLD-MCNC: 1.14 MG/DL (ref 0.76–1.27)
GFR SERPL CREATININE-BSD FRML MDRD: 65 ML/MIN/1.73
GLUCOSE BLD-MCNC: 121 MG/DL (ref 65–99)
POTASSIUM BLD-SCNC: 4.1 MMOL/L (ref 3.5–5.2)
SODIUM BLD-SCNC: 142 MMOL/L (ref 136–145)

## 2019-11-15 PROCEDURE — 80048 BASIC METABOLIC PNL TOTAL CA: CPT

## 2019-11-15 PROCEDURE — 36415 COLL VENOUS BLD VENIPUNCTURE: CPT

## 2019-11-15 NOTE — OUTREACH NOTE
CHF Week 1 Survey      Responses   Facility patient discharged from?  [facility]   Does the patient have one of the following disease processes/diagnoses(primary or secondary)?  CHF   Is there a successful TCM telephone encounter documented?  No   CHF Week 1 attempt successful?  Yes   Call start time  1558   Call end time  1604   Discharge diagnosis  CHF   Is patient permission given to speak with other caregiver?  Yes   List who call center can speak with  wife   Person spoke with today (if not patient) and relationship  wife   Meds reviewed with patient/caregiver?  Yes   Is the patient having any side effects they believe may be caused by any medication additions or changes?  No   Does the patient have all medications ordered at discharge?  Yes   Is the patient taking all medications as directed (includes completed medication regime)?  Yes   Does the patient have a primary care provider?   Yes   Does the patient have an appointment with their PCP within 7 days of discharge?  Yes   Has the patient kept scheduled appointments due by today?  N/A   Has home health visited the patient within 72 hours of discharge?  N/A   What DME was ordered?  O2 - [facility]   Has all DME been delivered?  Yes   Psychosocial issues?  No   Did the patient receive a copy of their discharge instructions?  Yes   Nursing interventions  Reviewed instructions with patient   What is the patient's perception of their health status since discharge?  Improving   Is the patient weighing daily?  Yes   Does the patient have scales?  Yes   Is the patient able to teach back Heart Failure diet management?  Yes   Is the patient able to teach back Heart Failure Zones?  Yes   Is the patient able to teach back signs and symptoms of worsening condition? (i.e. weight gain, shortness of air, etc.)  Yes   Is the patient/caregiver able to teach back the hierarchy of who to call/visit for symptoms/problems? PCP, Specialist, Home health nurse, Urgent  [facility], ED, 911  Yes    CHF Week 1 call completed?  Yes   Wrap up additional comments  Wife states that he is doing great.  He is weighing twice daily, watching his diet, and being compliant with his medicines.  She states that his edema has resolved.          Blanche Garcia RN

## 2019-11-22 ENCOUNTER — READMISSION MANAGEMENT (OUTPATIENT)
Dept: CALL CENTER | Facility: HOSPITAL | Age: 64
End: 2019-11-22

## 2019-11-22 ENCOUNTER — OFFICE VISIT (OUTPATIENT)
Dept: CARDIOLOGY | Facility: CLINIC | Age: 64
End: 2019-11-22

## 2019-11-22 VITALS
HEART RATE: 83 BPM | BODY MASS INDEX: 32.98 KG/M2 | HEIGHT: 74 IN | WEIGHT: 257 LBS | SYSTOLIC BLOOD PRESSURE: 101 MMHG | DIASTOLIC BLOOD PRESSURE: 74 MMHG

## 2019-11-22 DIAGNOSIS — I25.10 ASCVD (ARTERIOSCLEROTIC CARDIOVASCULAR DISEASE): ICD-10-CM

## 2019-11-22 DIAGNOSIS — I50.22 CHRONIC SYSTOLIC HEART FAILURE (HCC): Primary | ICD-10-CM

## 2019-11-22 PROCEDURE — 99213 OFFICE O/P EST LOW 20 MIN: CPT | Performed by: PHYSICIAN ASSISTANT

## 2019-11-22 NOTE — OUTREACH NOTE
CHF Week 2 Survey      Responses   Facility patient discharged from?  [facility]   Does the patient have one of the following disease processes/diagnoses(primary or secondary)?  CHF   Week 2 attempt successful?  Yes   Call start time  1124   Call end time  1129   Discharge diagnosis  CHF   Is patient permission given to speak with other caregiver?  Yes   List who call center can speak with  wife, Anette   Person spoke with today (if not patient) and relationship  wife   Meds reviewed with patient/caregiver?  Yes   Is the patient taking all medications as directed (includes completed medication regime)?  Yes   Medication comments  Wife denies any medication issues or questions.    Does the patient have a primary care provider?   Yes   Does the patient have an appointment with their PCP within 7 days of discharge?  Yes   Comments regarding PCP  Wife reports patient has seen PCP since discharge.    Has the patient kept scheduled appointments due by today?  Yes   Comments  Wife reports patient had cardiology appt this am.    Has home health visited the patient within 72 hours of discharge?  N/A   Psychosocial issues?  No   Did the patient receive a copy of their discharge instructions?  Yes   Nursing interventions  Reviewed instructions with patient   What is the patient's perception of their health status since discharge?  Improving   Nursing interventions  Nurse provided patient education   Is the patient weighing daily?  Yes   Does the patient have scales?  Yes   Daily weight interventions  Education provided on importance of daily weight   Is the patient able to teach back Heart Failure diet management?  Yes [Wife reports patient compliance with fluid restriction. ]   Is the patient able to teach back Heart Failure Zones?  Yes   Is the patient able to teach back signs and symptoms of worsening condition? (i.e. weight gain, shortness of air, etc.)  Yes   Is the patient/caregiver able to teach back the hierarchy of who  to call/visit for symptoms/problems? PCP, Specialist, Home health nurse, Urgent Care, ED, 911  Yes   Additional teach back comments  Wife reports patient has stable weight.    CHF Week 2 call completed?  Yes   Revoked  No further contact(revokes)-requires comment   Wrap up additional comments  Wife states patient doing very well. Patient has followed up with PCP and cardiology. No medication issues. Stable weight. Goals met.           Taylor Davidson RN

## 2019-11-22 NOTE — PROGRESS NOTES
Spencer Saeed MD  Damián Myers  1955 11/22/2019    Patient Active Problem List   Diagnosis   • Hyperglycemia   • Obesity (BMI 30-39.9)   • Chronic systolic heart failure (CMS/HCC)   • ASCVD (arteriosclerotic cardiovascular disease)       Dear Spencer Saeed MD:    Subjective     History of Present Illness:    Chief Complaint   Patient presents with   • Hospital follow up     [facility]- CHF; NSTEMI   • Med Management       Damián Myers is a pleasant 64 y.o. male being seen in our office for management of chronic systolic heart failure and coronary artery disease and dyslipidemia.  These were all recently diagnosed at hospitalization roughly 2 weeks ago where he received a stent in his LAD and also had some mild nonobstructive disease with 20% stenosis in the RCA.  He comes in today for hospital follow-up.    Patient reports since being discharged from the hospital he has been doing great and clinically has no concerns to bring up.  Upon further questioning he denies shortness of breath, orthopnea, pedal edema.  He also denies any PND, chest pains, dizziness, or syncope.  He reports he has been able to  tolerate all of his medications and has not missed any doses of his dual antiplatelet therapy and denies any bleeding issues with them.      No Known Allergies:      Current Outpatient Medications:   •  aspirin 81 MG EC tablet, Take 1 tablet by mouth Daily for 30 days., Disp: 30 tablet, Rfl: 0  •  atorvastatin (LIPITOR) 40 MG tablet, Take 1 tablet by mouth Every Night for 30 days., Disp: 30 tablet, Rfl: 0  •  bumetanide (BUMEX) 1 MG tablet, Take 1 tablet by mouth Daily With Lunch for 30 days., Disp: 30 tablet, Rfl: 0  •  carvedilol (COREG) 6.25 MG tablet, Take 1 tablet by mouth 2 (Two) Times a Day With Meals for 30 days., Disp: 60 tablet, Rfl: 0  •  clopidogrel (PLAVIX) 75 MG tablet, Take 1 tablet by mouth Daily for 360 days., Disp: 30 tablet, Rfl: 11  •  hydrALAZINE (APRESOLINE) 10 MG tablet, Take 1 tablet by mouth Every 8  "(Eight) Hours for 30 days., Disp: 90 tablet, Rfl: 0  •  isosorbide mononitrate (IMDUR) 30 MG 24 hr tablet, Take 1 tablet by mouth Daily for 30 days., Disp: 30 tablet, Rfl: 0    The following portions of the patient's history were reviewed and updated as appropriate: allergies, current medications, past family history, past medical history, past social history, past surgical history and problem list.    Social History     Tobacco Use   • Smoking status: Never Smoker   • Smokeless tobacco: Never Used   Substance Use Topics   • Alcohol use: No     Frequency: Never   • Drug use: No       Review of Systems   Constitution: Negative for weakness and malaise/fatigue.   Cardiovascular: Negative for chest pain, dyspnea on exertion and irregular heartbeat.   Respiratory: Negative for cough and shortness of breath.    Hematologic/Lymphatic: Negative for bleeding problem. Does not bruise/bleed easily.   Gastrointestinal: Negative for nausea and vomiting.       Objective   Vitals:    11/22/19 0832   BP: 101/74   BP Location: Right arm   Patient Position: Sitting   Cuff Size: Adult   Pulse: 83   Weight: 117 kg (257 lb)   Height: 188 cm (74.02\")     Body mass index is 32.98 kg/m².    Physical Exam   Constitutional: He is oriented to person, place, and time. He appears well-developed and well-nourished. No distress.   HENT:   Head: Normocephalic and atraumatic.   Cardiovascular: Normal rate, regular rhythm and normal heart sounds.   Pulmonary/Chest: Effort normal and breath sounds normal. No respiratory distress.   Musculoskeletal: He exhibits edema ( 1+ pedal edema bilaterally. ).   Neurological: He is alert and oriented to person, place, and time.   Skin: He is not diaphoretic.     Lab Results   Component Value Date     11/15/2019    K 4.1 11/15/2019    CL 96 (L) 11/15/2019    CO2 35.4 (H) 11/15/2019    BUN 27 (H) 11/15/2019    CREATININE 1.14 11/15/2019    GLUCOSE 121 (H) 11/15/2019    CALCIUM 9.7 11/15/2019    AST 17 " 11/07/2019    ALT 16 11/07/2019    ALKPHOS 65 11/07/2019     No results found for: CKTOTAL  Lab Results   Component Value Date    WBC 9.31 11/10/2019    HGB 13.7 11/10/2019    HCT 46.9 11/10/2019     (L) 11/10/2019     Lab Results   Component Value Date    INR 1.11 (H) 11/04/2019     Lab Results   Component Value Date    MG 2.3 11/13/2019     Lab Results   Component Value Date    TSH 5.070 (H) 11/04/2019    PSA 0.433 11/06/2019    TRIG 63 11/09/2019    HDL 34 (L) 11/09/2019    LDL 45 11/09/2019      No results found for: BNP    During this visit the following were done:  Labs Reviewed [x]    Labs Ordered []    Radiology Reports Reviewed [x]    Radiology Ordered []    PCP Records Reviewed []    Referring Provider Records Reviewed []    ER Records Reviewed []    Hospital Records Reviewed []    History Obtained From Family []    Radiology Images Reviewed []    Other Reviewed []    Records Requested []       Procedures    Assessment/Plan    Diagnosis Plan   1. Chronic systolic heart failure (CMS/HCC)     2. ASCVD (arteriosclerotic cardiovascular disease)              Recommendations:  1. From cardiac standpoint patient does appear to be stable.  I did review with him that his hemoglobin A1c was borderline between prediabetes and diabetes and encouraged him to adjust lifestyle to prevent diabetes such as walking daily with a goal of walking at least 30 minutes daily however I did encourage him to start out with only a few minutes at a time and slowly work up to 30 minutes.  I will continue Imdur, hydralazine, Plavix, Coreg, Bumex, Lipitor, and aspirin.  I did discuss with him plans in the future for his cardiac care such as repeating transthoracic echocardiogram to reevaluate his LV function he expressed understanding.    Patient's Body mass index is 32.98 kg/m². BMI is above normal parameters. Recommendations include: exercise counseling           Return in about 3 months (around 2/22/2020).    As always, I  appreciate very much the opportunity to participate in the cardiovascular care of your patients.      With Best Regards,    Yasmany Leos PA-C

## 2020-01-10 ENCOUNTER — APPOINTMENT (OUTPATIENT)
Dept: GENERAL RADIOLOGY | Facility: HOSPITAL | Age: 65
End: 2020-01-10

## 2020-01-10 ENCOUNTER — HOSPITAL ENCOUNTER (EMERGENCY)
Facility: HOSPITAL | Age: 65
Discharge: HOME | End: 2020-01-10
Attending: EMERGENCY MEDICINE | Admitting: EMERGENCY MEDICINE

## 2020-01-10 VITALS
HEART RATE: 76 BPM | DIASTOLIC BLOOD PRESSURE: 97 MMHG | SYSTOLIC BLOOD PRESSURE: 124 MMHG | BODY MASS INDEX: 34 KG/M2 | HEIGHT: 72 IN | RESPIRATION RATE: 18 BRPM | WEIGHT: 251 LBS | OXYGEN SATURATION: 96 % | TEMPERATURE: 98 F

## 2020-01-10 DIAGNOSIS — R07.89 CHEST DISCOMFORT: Primary | ICD-10-CM

## 2020-01-10 LAB
ALBUMIN SERPL-MCNC: 3.85 G/DL (ref 3.5–5.2)
ALBUMIN/GLOB SERPL: 1.3 G/DL
ALP SERPL-CCNC: 77 U/L (ref 39–117)
ALT SERPL W P-5'-P-CCNC: 15 U/L (ref 1–41)
ANION GAP SERPL CALCULATED.3IONS-SCNC: 8.4 MMOL/L (ref 5–15)
AST SERPL-CCNC: 16 U/L (ref 1–40)
BASOPHILS # BLD AUTO: 0.02 10*3/MM3 (ref 0–0.2)
BASOPHILS NFR BLD AUTO: 0.4 % (ref 0–1.5)
BILIRUB SERPL-MCNC: 0.5 MG/DL (ref 0.2–1.2)
BUN BLD-MCNC: 12 MG/DL (ref 8–23)
BUN/CREAT SERPL: 11.1 (ref 7–25)
CALCIUM SPEC-SCNC: 9 MG/DL (ref 8.6–10.5)
CHLORIDE SERPL-SCNC: 99 MMOL/L (ref 98–107)
CO2 SERPL-SCNC: 29.6 MMOL/L (ref 22–29)
CREAT BLD-MCNC: 1.08 MG/DL (ref 0.76–1.27)
DEPRECATED RDW RBC AUTO: 44.7 FL (ref 37–54)
EOSINOPHIL # BLD AUTO: 0.12 10*3/MM3 (ref 0–0.4)
EOSINOPHIL NFR BLD AUTO: 2.3 % (ref 0.3–6.2)
ERYTHROCYTE [DISTWIDTH] IN BLOOD BY AUTOMATED COUNT: 13 % (ref 12.3–15.4)
GFR SERPL CREATININE-BSD FRML MDRD: 69 ML/MIN/1.73
GLOBULIN UR ELPH-MCNC: 3.1 GM/DL
GLUCOSE BLD-MCNC: 95 MG/DL (ref 65–99)
HCT VFR BLD AUTO: 47.9 % (ref 37.5–51)
HGB BLD-MCNC: 14.8 G/DL (ref 13–17.7)
HOLD SPECIMEN: NORMAL
HOLD SPECIMEN: NORMAL
IMM GRANULOCYTES # BLD AUTO: 0.01 10*3/MM3 (ref 0–0.05)
IMM GRANULOCYTES NFR BLD AUTO: 0.2 % (ref 0–0.5)
LYMPHOCYTES # BLD AUTO: 1.62 10*3/MM3 (ref 0.7–3.1)
LYMPHOCYTES NFR BLD AUTO: 30.5 % (ref 19.6–45.3)
MCH RBC QN AUTO: 28.8 PG (ref 26.6–33)
MCHC RBC AUTO-ENTMCNC: 30.9 G/DL (ref 31.5–35.7)
MCV RBC AUTO: 93.2 FL (ref 79–97)
MONOCYTES # BLD AUTO: 0.65 10*3/MM3 (ref 0.1–0.9)
MONOCYTES NFR BLD AUTO: 12.2 % (ref 5–12)
NEUTROPHILS # BLD AUTO: 2.89 10*3/MM3 (ref 1.7–7)
NEUTROPHILS NFR BLD AUTO: 54.4 % (ref 42.7–76)
NRBC BLD AUTO-RTO: 0 /100 WBC (ref 0–0.2)
PLATELET # BLD AUTO: 154 10*3/MM3 (ref 140–450)
PMV BLD AUTO: 9.9 FL (ref 6–12)
POTASSIUM BLD-SCNC: 4.2 MMOL/L (ref 3.5–5.2)
PROT SERPL-MCNC: 6.9 G/DL (ref 6–8.5)
RBC # BLD AUTO: 5.14 10*6/MM3 (ref 4.14–5.8)
SODIUM BLD-SCNC: 137 MMOL/L (ref 136–145)
TROPONIN T SERPL-MCNC: 0.01 NG/ML (ref 0–0.03)
TROPONIN T SERPL-MCNC: <0.01 NG/ML (ref 0–0.03)
WBC NRBC COR # BLD: 5.31 10*3/MM3 (ref 3.4–10.8)
WHOLE BLOOD HOLD SPECIMEN: NORMAL
WHOLE BLOOD HOLD SPECIMEN: NORMAL

## 2020-01-10 PROCEDURE — 71045 X-RAY EXAM CHEST 1 VIEW: CPT | Performed by: RADIOLOGY

## 2020-01-10 PROCEDURE — 85025 COMPLETE CBC W/AUTO DIFF WBC: CPT | Performed by: PHYSICIAN ASSISTANT

## 2020-01-10 PROCEDURE — 80053 COMPREHEN METABOLIC PANEL: CPT | Performed by: PHYSICIAN ASSISTANT

## 2020-01-10 PROCEDURE — 93005 ELECTROCARDIOGRAM TRACING: CPT | Performed by: EMERGENCY MEDICINE

## 2020-01-10 PROCEDURE — 71045 X-RAY EXAM CHEST 1 VIEW: CPT

## 2020-01-10 PROCEDURE — 84484 ASSAY OF TROPONIN QUANT: CPT | Performed by: PHYSICIAN ASSISTANT

## 2020-01-10 PROCEDURE — 93005 ELECTROCARDIOGRAM TRACING: CPT | Performed by: PHYSICIAN ASSISTANT

## 2020-01-10 PROCEDURE — 99284 EMERGENCY DEPT VISIT MOD MDM: CPT

## 2020-01-10 PROCEDURE — 36415 COLL VENOUS BLD VENIPUNCTURE: CPT

## 2020-01-10 RX ORDER — ASPIRIN 81 MG/1
324 TABLET, CHEWABLE ORAL ONCE
Status: COMPLETED | OUTPATIENT
Start: 2020-01-10 | End: 2020-01-10

## 2020-01-10 RX ORDER — SODIUM CHLORIDE 0.9 % (FLUSH) 0.9 %
10 SYRINGE (ML) INJECTION AS NEEDED
Status: DISCONTINUED | OUTPATIENT
Start: 2020-01-10 | End: 2020-01-10 | Stop reason: HOSPADM

## 2020-01-10 RX ADMIN — ASPIRIN 243 MG: 81 TABLET, CHEWABLE ORAL at 11:58

## 2020-01-10 NOTE — ED PROVIDER NOTES
"Subjective   64-year-old white male presents secondary to chest discomfort.  Patient states that he had a stent placed 2 months ago.  He states that recently he has been more active.  He denies any exertional discomfort.  No exertional shortness of breath.  He denies any pain pressure tightness or squeezing though states his chest is felt \"odd\" a few times in the past week.  He states that he is also had a few episodes where it felt like his left face could tingle though had not had numbness or tingling.  No lateralizing weakness.  No fever cough or congestion.  No other complaints.          Review of Systems   Constitutional: Negative.  Negative for fever.   HENT: Negative.    Respiratory: Negative.    Cardiovascular: Negative.  Negative for chest pain.   Gastrointestinal: Negative.  Negative for abdominal pain.   Endocrine: Negative.    Genitourinary: Negative.  Negative for dysuria.   Skin: Negative.    Neurological: Negative.    Psychiatric/Behavioral: Negative.    All other systems reviewed and are negative.      Past Medical History:   Diagnosis Date   • Obesity (BMI 30-39.9) 11/4/2019       Allergies   Allergen Reactions   • Other Unknown - High Severity     Pt had a reaction to anesthesia when placing stents          Past Surgical History:   Procedure Laterality Date   • CARDIAC CATHETERIZATION N/A 11/8/2019    Procedure: Left Heart Cath;  Surgeon: Sherrell Hart MD;  Location: [facility] INVASIVE LOCATION;  Service: Cardiology       Family History   Problem Relation Age of Onset   • Heart valve disorder Mother    • Heart valve disorder Brother    • Diabetes type I Son        Social History     Socioeconomic History   • Marital status:      Spouse name: Not on file   • Number of children: Not on file   • Years of education: Not on file   • Highest education level: Not on file   Tobacco Use   • Smoking status: Never Smoker   • Smokeless tobacco: Never Used   Substance and Sexual Activity   • Alcohol " use: No     Frequency: Never   • Drug use: No   • Sexual activity: Defer           Objective   Physical Exam   Constitutional: He is oriented to person, place, and time. He appears well-developed and well-nourished. No distress.   HENT:   Head: Normocephalic and atraumatic.   Right Ear: External ear normal.   Left Ear: External ear normal.   Nose: Nose normal.   Eyes: Pupils are equal, round, and reactive to light. Conjunctivae and EOM are normal.   Neck: Normal range of motion. Neck supple. No JVD present. No tracheal deviation present.   Cardiovascular: Normal rate, regular rhythm and normal heart sounds.   No murmur heard.  Pulmonary/Chest: Effort normal and breath sounds normal. No respiratory distress. He has no wheezes.   Abdominal: Soft. Bowel sounds are normal. There is no tenderness.   Musculoskeletal: Normal range of motion. He exhibits no edema or deformity.   Neurological: He is alert and oriented to person, place, and time. No cranial nerve deficit.   Skin: Skin is warm and dry. No rash noted. He is not diaphoretic. No erythema. No pallor.   Psychiatric: He has a normal mood and affect. His behavior is normal. Thought content normal.   Nursing note and vitals reviewed.      Procedures           ED Course  ED Course as of Edwin 10 1445   Fri Edwin 10, 2020   1105 Normal sinus rhythm no acute ST changes per Dr. hawkins    [JI]   1234 Pt seen with PA.  Reviewed findings.  Agree with assessment and plan.    [BC]   1443 Patient remained asymptomatic throughout the entire ER course.  He was counseled at the signs and symptoms of worsening along with appropriate follow-up.  He voiced understanding.  He denied having actual chest pain or pressure.  He states he just had a few odd episodes.  No shortness of breath.  No sweats.  He was counseled about returning to the ER with any change of symptoms or concern.    [JI]      ED Course User Index  [BC] Jace Hawkins MD  [JI] Raul Healy PA                                                MDM  Number of Diagnoses or Management Options  Chest discomfort: new and requires workup     Amount and/or Complexity of Data Reviewed  Clinical lab tests: reviewed and ordered  Tests in the radiology section of CPT®: reviewed and ordered  Tests in the medicine section of CPT®: reviewed and ordered  Discuss the patient with other providers: yes  Independent visualization of images, tracings, or specimens: yes    Risk of Complications, Morbidity, and/or Mortality  Presenting problems: moderate        Final diagnoses:   Chest discomfort            Raul Healy PA  01/10/20 1441

## 2020-01-31 ENCOUNTER — TELEPHONE (OUTPATIENT)
Dept: CARDIOLOGY | Facility: CLINIC | Age: 65
End: 2020-01-31

## 2020-01-31 NOTE — TELEPHONE ENCOUNTER
Called patient no answer VM was full. We did not order his 02 after looking in chart he will need to contact who ever did and have them to cancel the order.

## 2020-01-31 NOTE — TELEPHONE ENCOUNTER
Patient called and said that he was put on oxygen. Patient says he don't need the oxygen anymore. Patient says in order for them to pick it up that we have to let them know.     Thanks!

## 2020-02-21 ENCOUNTER — OFFICE VISIT (OUTPATIENT)
Dept: CARDIOLOGY | Facility: CLINIC | Age: 65
End: 2020-02-21

## 2020-02-21 VITALS
SYSTOLIC BLOOD PRESSURE: 139 MMHG | WEIGHT: 260 LBS | DIASTOLIC BLOOD PRESSURE: 89 MMHG | OXYGEN SATURATION: 93 % | HEART RATE: 72 BPM | BODY MASS INDEX: 35.21 KG/M2 | HEIGHT: 72 IN

## 2020-02-21 DIAGNOSIS — I50.22 CHRONIC SYSTOLIC HEART FAILURE (HCC): ICD-10-CM

## 2020-02-21 DIAGNOSIS — I25.10 ASCVD (ARTERIOSCLEROTIC CARDIOVASCULAR DISEASE): Primary | ICD-10-CM

## 2020-02-21 PROCEDURE — 99214 OFFICE O/P EST MOD 30 MIN: CPT | Performed by: PHYSICIAN ASSISTANT

## 2020-02-21 RX ORDER — CARVEDILOL 6.25 MG/1
6.25 TABLET ORAL 2 TIMES DAILY WITH MEALS
Qty: 60 TABLET | Refills: 5 | OUTPATIENT
Start: 2020-02-21 | End: 2021-10-29 | Stop reason: HOSPADM

## 2020-02-21 RX ORDER — VALSARTAN 40 MG/1
40 TABLET ORAL DAILY
Qty: 90 TABLET | Refills: 3 | Status: SHIPPED | OUTPATIENT
Start: 2020-02-21 | End: 2020-02-21 | Stop reason: SDUPTHER

## 2020-02-21 RX ORDER — LOSARTAN POTASSIUM 25 MG/1
25 TABLET ORAL DAILY
Qty: 90 TABLET | Refills: 3 | Status: SHIPPED | OUTPATIENT
Start: 2020-02-21 | End: 2021-01-04

## 2020-02-21 NOTE — PROGRESS NOTES
Spencer Saeed MD  Damián Myers  1955 02/21/2020    Patient Active Problem List   Diagnosis   • Hyperglycemia   • Obesity (BMI 30-39.9)   • Chronic systolic heart failure (CMS/HCC)   • ASCVD (arteriosclerotic cardiovascular disease)       Dear Spencer Saeed MD:    Subjective     History of Present Illness:    Chief Complaint   Patient presents with   • Chronic systolic heart failure   • ASCVD (arteriosclerotic cardiovascular disease)       Damián Myers is a pleasant 64 y.o. male with a past medical history significant for CAD with recent stenting in the LAD on 11/8/2019.  He also has history of ischemic cardiomyopathy with most recent echocardiogram revealing left ventricular ejection fraction of 30 to 35%.  He was recommended to wear a LifeVest however declined this due to cost as he has no health insurance.  He comes in today for routine cardiology follow-up.    Clinically patient has no concerns today and has been essentially symptom-free.  He denies any shortness of breath including orthopnea, PND, or pedal edema.  He reports he is able to perform all of his activities of daily living around his house and outside he reports he recently attended a conference in [place] which required a significant amount of walking and denies any issues with this.  However he does still not have any health insurance which has been limiting us in his therapy.  He does report he has been taking aspirin and Plavix without missing any dosages.  He is also been taking carvedilol and Imdur.    Allergies   Allergen Reactions   • Other Unknown - High Severity     Pt had a reaction to anesthesia when placing stents      :      Current Outpatient Medications:   •  clopidogrel (PLAVIX) 75 MG tablet, Take 1 tablet by mouth Daily for 360 days., Disp: 30 tablet, Rfl: 11  •  aspirin 81 MG tablet, Take 1 tablet by mouth Daily., Disp: 30 tablet, Rfl: 11  •  carvedilol (COREG) 6.25 MG tablet, Take 1 tablet by mouth 2 (Two) Times a Day With  "Meals for 30 days., Disp: 60 tablet, Rfl: 5  •  losartan (COZAAR) 25 MG tablet, Take 1 tablet by mouth Daily., Disp: 90 tablet, Rfl: 3    The following portions of the patient's history were reviewed and updated as appropriate: allergies, current medications, past family history, past medical history, past social history, past surgical history and problem list.    Social History     Tobacco Use   • Smoking status: Never Smoker   • Smokeless tobacco: Never Used   Substance Use Topics   • Alcohol use: No     Frequency: Never   • Drug use: No       Review of Systems   Constitution: Negative for chills, fever and malaise/fatigue.   HENT: Negative for congestion, ear pain and sore throat.    Eyes: Negative for pain.   Cardiovascular: Negative for chest pain, claudication, cyanosis, dyspnea on exertion, irregular heartbeat, leg swelling, near-syncope, palpitations, paroxysmal nocturnal dyspnea and syncope.   Respiratory: Negative for cough and shortness of breath.    Endocrine: Negative for cold intolerance and heat intolerance.   Hematologic/Lymphatic: Does not bruise/bleed easily.   Neurological: Negative for dizziness, focal weakness, headaches, light-headedness, seizures and weakness.       Objective   Vitals:    02/21/20 0810   BP: 139/89   BP Location: Right arm   Patient Position: Sitting   Cuff Size: Adult   Pulse: 72   SpO2: 93%   Weight: 118 kg (260 lb)   Height: 182.9 cm (72.01\")     Body mass index is 35.25 kg/m².    Physical Exam   Constitutional: He is oriented to person, place, and time. He appears well-developed and well-nourished. No distress.   HENT:   Head: Normocephalic and atraumatic.   Cardiovascular: Normal rate, regular rhythm and normal heart sounds.   Soft systolic murmur 2 out of 6 best heard at the apex   Pulmonary/Chest: Effort normal and breath sounds normal. No respiratory distress.   Musculoskeletal: He exhibits edema ( 1+ pedal edema bilaterally).   Neurological: He is alert and oriented " to person, place, and time.   Skin: He is not diaphoretic.       Lab Results   Component Value Date     01/10/2020    K 4.2 01/10/2020    CL 99 01/10/2020    CO2 29.6 (H) 01/10/2020    BUN 12 01/10/2020    CREATININE 1.08 01/10/2020    GLUCOSE 95 01/10/2020    CALCIUM 9.0 01/10/2020    AST 16 01/10/2020    ALT 15 01/10/2020    ALKPHOS 77 01/10/2020     No results found for: CKTOTAL  Lab Results   Component Value Date    WBC 5.31 01/10/2020    HGB 14.8 01/10/2020    HCT 47.9 01/10/2020     01/10/2020     Lab Results   Component Value Date    INR 1.11 (H) 11/04/2019     Lab Results   Component Value Date    MG 2.3 11/13/2019     Lab Results   Component Value Date    TSH 5.070 (H) 11/04/2019    PSA 0.433 11/06/2019    TRIG 63 11/09/2019    HDL 34 (L) 11/09/2019    LDL 45 11/09/2019      No results found for: BNP    During this visit the following were done:  Labs Reviewed [x]    Labs Ordered []    Radiology Reports Reviewed [x]    Radiology Ordered []    PCP Records Reviewed []    Referring Provider Records Reviewed []    ER Records Reviewed []    Hospital Records Reviewed []    History Obtained From Family []    Radiology Images Reviewed []    Other Reviewed []    Records Requested []       Procedures    Assessment/Plan    Diagnosis Plan   1. ASCVD (arteriosclerotic cardiovascular disease)  Basic Metabolic Panel   2. Chronic systolic heart failure (CMS/HCC)              Recommendations:  1. I will initiate losartan 25 mg today to optimize patient's medications for his cardiomyopathy.  This was originally not prescribed due to his acute renal injury but this has since resolved.  I will obtain a BMP in 1 week.  2. I also recommended Lipitor given his recent NSTEMI however he declined to take this medication.  3. Patient would benefit from repeat echocardiogram to evaluate for improvement in his left ventricular ejection fraction however this would be difficult for him as he is still cash pay without health  insurance and will be difficult for him to pay for it.  He is willing to consider it however will hold off for another couple months as we continue to optimize medical therapy before ordering it.  4. Continue carvedilol, aspirin, and Plavix.  5. I advised patient he can hold hydralazine since we are initiating losartan.    Return in about 3 months (around 5/21/2020).    As always, I appreciate very much the opportunity to participate in the cardiovascular care of your patients.      With Best Regards,    Yasmany Leos PA-C

## 2020-03-11 DIAGNOSIS — I25.10 ASCVD (ARTERIOSCLEROTIC CARDIOVASCULAR DISEASE): Primary | ICD-10-CM

## 2020-05-23 ENCOUNTER — APPOINTMENT (OUTPATIENT)
Dept: GENERAL RADIOLOGY | Facility: HOSPITAL | Age: 65
End: 2020-05-23

## 2020-05-23 ENCOUNTER — HOSPITAL ENCOUNTER (OUTPATIENT)
Facility: HOSPITAL | Age: 65
Setting detail: OBSERVATION
LOS: 1 days | Discharge: HOME | End: 2020-05-24
Attending: INTERNAL MEDICINE | Admitting: INTERNAL MEDICINE

## 2020-05-23 ENCOUNTER — APPOINTMENT (OUTPATIENT)
Dept: CT IMAGING | Facility: HOSPITAL | Age: 65
End: 2020-05-23

## 2020-05-23 ENCOUNTER — HOSPITAL ENCOUNTER (EMERGENCY)
Facility: HOSPITAL | Age: 65
Discharge: ANOTHER HOSPITAL | End: 2020-05-23
Attending: EMERGENCY MEDICINE | Admitting: EMERGENCY MEDICINE

## 2020-05-23 VITALS
DIASTOLIC BLOOD PRESSURE: 88 MMHG | BODY MASS INDEX: 32.98 KG/M2 | SYSTOLIC BLOOD PRESSURE: 126 MMHG | OXYGEN SATURATION: 93 % | HEART RATE: 67 BPM | RESPIRATION RATE: 16 BRPM | TEMPERATURE: 98 F | HEIGHT: 74 IN | WEIGHT: 257 LBS

## 2020-05-23 DIAGNOSIS — R07.9 CHEST PAIN, UNSPECIFIED TYPE: ICD-10-CM

## 2020-05-23 DIAGNOSIS — G45.9 TIA (TRANSIENT ISCHEMIC ATTACK): Primary | ICD-10-CM

## 2020-05-23 PROBLEM — I63.9 STROKE (CEREBRUM): Status: ACTIVE | Noted: 2020-05-23

## 2020-05-23 PROBLEM — E78.5 HLD (HYPERLIPIDEMIA): Status: ACTIVE | Noted: 2020-05-23

## 2020-05-23 PROBLEM — I10 HTN (HYPERTENSION): Status: ACTIVE | Noted: 2020-05-23

## 2020-05-23 PROBLEM — J81.0 ACUTE PULMONARY EDEMA: Status: ACTIVE | Noted: 2020-05-23

## 2020-05-23 LAB
ALBUMIN SERPL-MCNC: 4.1 G/DL (ref 3.5–5.2)
ALBUMIN/GLOB SERPL: 1.3 G/DL
ALP SERPL-CCNC: 79 U/L (ref 39–117)
ALT SERPL W P-5'-P-CCNC: 18 U/L (ref 1–41)
ANION GAP SERPL CALCULATED.3IONS-SCNC: 11.1 MMOL/L (ref 5–15)
APTT PPP: 33.3 SECONDS (ref 23.8–36.1)
AST SERPL-CCNC: 17 U/L (ref 1–40)
BASOPHILS # BLD AUTO: 0.03 10*3/MM3 (ref 0–0.2)
BASOPHILS NFR BLD AUTO: 0.5 % (ref 0–1.5)
BILIRUB SERPL-MCNC: 0.6 MG/DL (ref 0.2–1.2)
BILIRUB UR QL STRIP: NEGATIVE
BUN BLD-MCNC: 12 MG/DL (ref 8–23)
BUN/CREAT SERPL: 10.8 (ref 7–25)
CALCIUM SPEC-SCNC: 9.3 MG/DL (ref 8.6–10.5)
CHLORIDE SERPL-SCNC: 101 MMOL/L (ref 98–107)
CLARITY UR: ABNORMAL
CO2 SERPL-SCNC: 28.9 MMOL/L (ref 22–29)
COLOR UR: YELLOW
CREAT BLD-MCNC: 1.11 MG/DL (ref 0.76–1.27)
DEPRECATED RDW RBC AUTO: 42.5 FL (ref 37–54)
EOSINOPHIL # BLD AUTO: 0.11 10*3/MM3 (ref 0–0.4)
EOSINOPHIL NFR BLD AUTO: 1.8 % (ref 0.3–6.2)
ERYTHROCYTE [DISTWIDTH] IN BLOOD BY AUTOMATED COUNT: 12.1 % (ref 12.3–15.4)
GFR SERPL CREATININE-BSD FRML MDRD: 67 ML/MIN/1.73
GLOBULIN UR ELPH-MCNC: 3.1 GM/DL
GLUCOSE BLD-MCNC: 104 MG/DL (ref 65–99)
GLUCOSE BLDC GLUCOMTR-MCNC: 96 MG/DL (ref 70–130)
GLUCOSE UR STRIP-MCNC: NEGATIVE MG/DL
HCT VFR BLD AUTO: 52 % (ref 37.5–51)
HGB BLD-MCNC: 16.3 G/DL (ref 13–17.7)
HGB UR QL STRIP.AUTO: NEGATIVE
HOLD SPECIMEN: NORMAL
HOLD SPECIMEN: NORMAL
IMM GRANULOCYTES # BLD AUTO: 0.02 10*3/MM3 (ref 0–0.05)
IMM GRANULOCYTES NFR BLD AUTO: 0.3 % (ref 0–0.5)
INR PPP: 0.88 (ref 0.9–1.1)
KETONES UR QL STRIP: NEGATIVE
LEUKOCYTE ESTERASE UR QL STRIP.AUTO: NEGATIVE
LYMPHOCYTES # BLD AUTO: 1.92 10*3/MM3 (ref 0.7–3.1)
LYMPHOCYTES NFR BLD AUTO: 30.8 % (ref 19.6–45.3)
MAGNESIUM SERPL-MCNC: 2 MG/DL (ref 1.6–2.4)
MCH RBC QN AUTO: 29.5 PG (ref 26.6–33)
MCHC RBC AUTO-ENTMCNC: 31.3 G/DL (ref 31.5–35.7)
MCV RBC AUTO: 94 FL (ref 79–97)
MONOCYTES # BLD AUTO: 0.67 10*3/MM3 (ref 0.1–0.9)
MONOCYTES NFR BLD AUTO: 10.8 % (ref 5–12)
NEUTROPHILS # BLD AUTO: 3.48 10*3/MM3 (ref 1.7–7)
NEUTROPHILS NFR BLD AUTO: 55.8 % (ref 42.7–76)
NITRITE UR QL STRIP: NEGATIVE
NRBC BLD AUTO-RTO: 0 /100 WBC (ref 0–0.2)
PH UR STRIP.AUTO: 5.5 [PH] (ref 5–8)
PLATELET # BLD AUTO: 165 10*3/MM3 (ref 140–450)
PMV BLD AUTO: 10.1 FL (ref 6–12)
POTASSIUM BLD-SCNC: 4.8 MMOL/L (ref 3.5–5.2)
PROT SERPL-MCNC: 7.2 G/DL (ref 6–8.5)
PROT UR QL STRIP: NEGATIVE
PROTHROMBIN TIME: 12.4 SECONDS (ref 11–15.4)
RBC # BLD AUTO: 5.53 10*6/MM3 (ref 4.14–5.8)
SARS-COV-2 RNA RESP QL NAA+PROBE: NOT DETECTED
SODIUM BLD-SCNC: 141 MMOL/L (ref 136–145)
SP GR UR STRIP: 1.02 (ref 1–1.03)
TROPONIN T SERPL-MCNC: <0.01 NG/ML (ref 0–0.03)
TROPONIN T SERPL-MCNC: <0.01 NG/ML (ref 0–0.03)
TSH SERPL DL<=0.05 MIU/L-ACNC: 4.16 UIU/ML (ref 0.27–4.2)
UROBILINOGEN UR QL STRIP: ABNORMAL
WBC NRBC COR # BLD: 6.23 10*3/MM3 (ref 3.4–10.8)
WHOLE BLOOD HOLD SPECIMEN: NORMAL
WHOLE BLOOD HOLD SPECIMEN: NORMAL

## 2020-05-23 PROCEDURE — 36415 COLL VENOUS BLD VENIPUNCTURE: CPT

## 2020-05-23 PROCEDURE — 85730 THROMBOPLASTIN TIME PARTIAL: CPT | Performed by: PHYSICIAN ASSISTANT

## 2020-05-23 PROCEDURE — 96360 HYDRATION IV INFUSION INIT: CPT

## 2020-05-23 PROCEDURE — 80053 COMPREHEN METABOLIC PANEL: CPT | Performed by: PHYSICIAN ASSISTANT

## 2020-05-23 PROCEDURE — G0378 HOSPITAL OBSERVATION PER HR: HCPCS

## 2020-05-23 PROCEDURE — 96361 HYDRATE IV INFUSION ADD-ON: CPT

## 2020-05-23 PROCEDURE — 84484 ASSAY OF TROPONIN QUANT: CPT | Performed by: PHYSICIAN ASSISTANT

## 2020-05-23 PROCEDURE — 83735 ASSAY OF MAGNESIUM: CPT | Performed by: PHYSICIAN ASSISTANT

## 2020-05-23 PROCEDURE — 93005 ELECTROCARDIOGRAM TRACING: CPT | Performed by: PHYSICIAN ASSISTANT

## 2020-05-23 PROCEDURE — 70450 CT HEAD/BRAIN W/O DYE: CPT

## 2020-05-23 PROCEDURE — 93010 ELECTROCARDIOGRAM REPORT: CPT | Performed by: INTERNAL MEDICINE

## 2020-05-23 PROCEDURE — 70498 CT ANGIOGRAPHY NECK: CPT

## 2020-05-23 PROCEDURE — 70496 CT ANGIOGRAPHY HEAD: CPT

## 2020-05-23 PROCEDURE — 71275 CT ANGIOGRAPHY CHEST: CPT

## 2020-05-23 PROCEDURE — 85025 COMPLETE CBC W/AUTO DIFF WBC: CPT | Performed by: PHYSICIAN ASSISTANT

## 2020-05-23 PROCEDURE — 81003 URINALYSIS AUTO W/O SCOPE: CPT | Performed by: PHYSICIAN ASSISTANT

## 2020-05-23 PROCEDURE — 71045 X-RAY EXAM CHEST 1 VIEW: CPT | Performed by: RADIOLOGY

## 2020-05-23 PROCEDURE — 93005 ELECTROCARDIOGRAM TRACING: CPT | Performed by: INTERNAL MEDICINE

## 2020-05-23 PROCEDURE — 87635 SARS-COV-2 COVID-19 AMP PRB: CPT | Performed by: PHYSICIAN ASSISTANT

## 2020-05-23 PROCEDURE — 85610 PROTHROMBIN TIME: CPT | Performed by: PHYSICIAN ASSISTANT

## 2020-05-23 PROCEDURE — 99285 EMERGENCY DEPT VISIT HI MDM: CPT

## 2020-05-23 PROCEDURE — 82962 GLUCOSE BLOOD TEST: CPT

## 2020-05-23 PROCEDURE — 99220 PR INITIAL OBSERVATION CARE/DAY 70 MINUTES: CPT | Performed by: INTERNAL MEDICINE

## 2020-05-23 PROCEDURE — 71045 X-RAY EXAM CHEST 1 VIEW: CPT

## 2020-05-23 PROCEDURE — 70450 CT HEAD/BRAIN W/O DYE: CPT | Performed by: RADIOLOGY

## 2020-05-23 PROCEDURE — 84443 ASSAY THYROID STIM HORMONE: CPT | Performed by: PHYSICIAN ASSISTANT

## 2020-05-23 RX ORDER — ATORVASTATIN CALCIUM 40 MG/1
80 TABLET, FILM COATED ORAL NIGHTLY
Status: DISCONTINUED | OUTPATIENT
Start: 2020-05-23 | End: 2020-05-24 | Stop reason: HOSPADM

## 2020-05-23 RX ORDER — CARVEDILOL 6.25 MG/1
6.25 TABLET ORAL 2 TIMES DAILY WITH MEALS
Status: ON HOLD | COMMUNITY
End: 2021-10-29 | Stop reason: SDUPTHER

## 2020-05-23 RX ORDER — SODIUM CHLORIDE 9 MG/ML
75 INJECTION, SOLUTION INTRAVENOUS CONTINUOUS
Status: DISCONTINUED | OUTPATIENT
Start: 2020-05-23 | End: 2020-05-23 | Stop reason: HOSPADM

## 2020-05-23 RX ORDER — MORPHINE SULFATE 2 MG/ML
2 INJECTION, SOLUTION INTRAMUSCULAR; INTRAVENOUS EVERY 4 HOURS PRN
Status: DISCONTINUED | OUTPATIENT
Start: 2020-05-23 | End: 2020-05-24 | Stop reason: HOSPADM

## 2020-05-23 RX ORDER — ACETAMINOPHEN 325 MG/1
650 TABLET ORAL EVERY 4 HOURS PRN
Status: DISCONTINUED | OUTPATIENT
Start: 2020-05-23 | End: 2020-05-24 | Stop reason: HOSPADM

## 2020-05-23 RX ORDER — SODIUM CHLORIDE 0.9 % (FLUSH) 0.9 %
10 SYRINGE (ML) INJECTION AS NEEDED
Status: DISCONTINUED | OUTPATIENT
Start: 2020-05-23 | End: 2020-05-24 | Stop reason: HOSPADM

## 2020-05-23 RX ORDER — ASPIRIN 81 MG/1
81 TABLET ORAL DAILY
Status: DISCONTINUED | OUTPATIENT
Start: 2020-05-24 | End: 2020-05-24 | Stop reason: HOSPADM

## 2020-05-23 RX ORDER — NALOXONE HCL 0.4 MG/ML
0.4 VIAL (ML) INJECTION
Status: DISCONTINUED | OUTPATIENT
Start: 2020-05-23 | End: 2020-05-24 | Stop reason: HOSPADM

## 2020-05-23 RX ORDER — SODIUM CHLORIDE 0.9 % (FLUSH) 0.9 %
10 SYRINGE (ML) INJECTION EVERY 12 HOURS SCHEDULED
Status: DISCONTINUED | OUTPATIENT
Start: 2020-05-23 | End: 2020-05-24 | Stop reason: HOSPADM

## 2020-05-23 RX ORDER — ONDANSETRON 2 MG/ML
4 INJECTION INTRAMUSCULAR; INTRAVENOUS EVERY 6 HOURS PRN
Status: DISCONTINUED | OUTPATIENT
Start: 2020-05-23 | End: 2020-05-24 | Stop reason: HOSPADM

## 2020-05-23 RX ORDER — ASPIRIN 81 MG/1
243 TABLET, CHEWABLE ORAL ONCE
Status: COMPLETED | OUTPATIENT
Start: 2020-05-23 | End: 2020-05-23

## 2020-05-23 RX ORDER — CLOPIDOGREL BISULFATE 75 MG/1
75 TABLET ORAL DAILY
Status: DISCONTINUED | OUTPATIENT
Start: 2020-05-24 | End: 2020-05-24 | Stop reason: HOSPADM

## 2020-05-23 RX ORDER — SODIUM CHLORIDE 0.9 % (FLUSH) 0.9 %
10 SYRINGE (ML) INJECTION AS NEEDED
Status: DISCONTINUED | OUTPATIENT
Start: 2020-05-23 | End: 2020-05-23 | Stop reason: HOSPADM

## 2020-05-23 RX ADMIN — SODIUM CHLORIDE 75 ML/HR: 9 INJECTION, SOLUTION INTRAVENOUS at 11:30

## 2020-05-23 RX ADMIN — ASPIRIN 243 MG: 81 TABLET, CHEWABLE ORAL at 11:29

## 2020-05-23 RX ADMIN — NITROGLYCERIN 1 INCH: 20 OINTMENT TOPICAL at 11:27

## 2020-05-24 ENCOUNTER — APPOINTMENT (OUTPATIENT)
Dept: CARDIOLOGY | Facility: HOSPITAL | Age: 65
End: 2020-05-24

## 2020-05-24 VITALS
BODY MASS INDEX: 33.32 KG/M2 | RESPIRATION RATE: 18 BRPM | SYSTOLIC BLOOD PRESSURE: 141 MMHG | HEIGHT: 75 IN | OXYGEN SATURATION: 95 % | HEART RATE: 83 BPM | DIASTOLIC BLOOD PRESSURE: 90 MMHG | WEIGHT: 268 LBS | TEMPERATURE: 97.4 F

## 2020-05-24 LAB
ASCENDING AORTA: 3.6 CM
BH CV ECHO MEAS - AO MAX PG (FULL): 3 MMHG
BH CV ECHO MEAS - AO MAX PG: 5.3 MMHG
BH CV ECHO MEAS - AO MEAN PG (FULL): 1.6 MMHG
BH CV ECHO MEAS - AO MEAN PG: 2.8 MMHG
BH CV ECHO MEAS - AO ROOT AREA (BSA CORRECTED): 1.8
BH CV ECHO MEAS - AO ROOT AREA: 15.4 CM^2
BH CV ECHO MEAS - AO ROOT DIAM: 4.4 CM
BH CV ECHO MEAS - AO V2 MAX: 115.6 CM/SEC
BH CV ECHO MEAS - AO V2 MEAN: 76.6 CM/SEC
BH CV ECHO MEAS - AO V2 VTI: 20.6 CM
BH CV ECHO MEAS - ASC AORTA: 3.6 CM
BH CV ECHO MEAS - AVA(I,A): 3.1 CM^2
BH CV ECHO MEAS - AVA(I,D): 3.1 CM^2
BH CV ECHO MEAS - AVA(V,A): 3.3 CM^2
BH CV ECHO MEAS - AVA(V,D): 3.3 CM^2
BH CV ECHO MEAS - BSA(HAYCOCK): 2.6 M^2
BH CV ECHO MEAS - BSA: 2.5 M^2
BH CV ECHO MEAS - BZI_BMI: 34.4 KILOGRAMS/M^2
BH CV ECHO MEAS - BZI_METRIC_HEIGHT: 188 CM
BH CV ECHO MEAS - BZI_METRIC_WEIGHT: 121.6 KG
BH CV ECHO MEAS - EDV(CUBED): 212.8 ML
BH CV ECHO MEAS - EDV(MOD-SP2): 146 ML
BH CV ECHO MEAS - EDV(MOD-SP4): 260 ML
BH CV ECHO MEAS - EDV(TEICH): 178 ML
BH CV ECHO MEAS - EF(CUBED): 67 %
BH CV ECHO MEAS - EF(MOD-BP): 62 %
BH CV ECHO MEAS - EF(MOD-SP2): 58.2 %
BH CV ECHO MEAS - EF(MOD-SP4): 64.2 %
BH CV ECHO MEAS - EF(TEICH): 57.7 %
BH CV ECHO MEAS - ESV(CUBED): 70.2 ML
BH CV ECHO MEAS - ESV(MOD-SP2): 61 ML
BH CV ECHO MEAS - ESV(MOD-SP4): 93 ML
BH CV ECHO MEAS - ESV(TEICH): 75.3 ML
BH CV ECHO MEAS - FS: 30.9 %
BH CV ECHO MEAS - IVS/LVPW: 1.1
BH CV ECHO MEAS - IVSD: 1.5 CM
BH CV ECHO MEAS - LA DIMENSION: 4.9 CM
BH CV ECHO MEAS - LA/AO: 1.1
BH CV ECHO MEAS - LAD MAJOR: 7.5 CM
BH CV ECHO MEAS - LAT PEAK E' VEL: 11.1 CM/SEC
BH CV ECHO MEAS - LATERAL E/E' RATIO: 6.4
BH CV ECHO MEAS - LV DIASTOLIC VOL/BSA (35-75): 105.6 ML/M^2
BH CV ECHO MEAS - LV MASS(C)D: 389.7 GRAMS
BH CV ECHO MEAS - LV MASS(C)DI: 158.4 GRAMS/M^2
BH CV ECHO MEAS - LV MAX PG: 2.4 MMHG
BH CV ECHO MEAS - LV MEAN PG: 1.1 MMHG
BH CV ECHO MEAS - LV SYSTOLIC VOL/BSA (12-30): 37.8 ML/M^2
BH CV ECHO MEAS - LV V1 MAX: 76.7 CM/SEC
BH CV ECHO MEAS - LV V1 MEAN: 48.6 CM/SEC
BH CV ECHO MEAS - LV V1 VTI: 12.7 CM
BH CV ECHO MEAS - LVIDD: 6 CM
BH CV ECHO MEAS - LVIDS: 4.1 CM
BH CV ECHO MEAS - LVLD AP2: 9 CM
BH CV ECHO MEAS - LVLD AP4: 10.4 CM
BH CV ECHO MEAS - LVLS AP2: 7.7 CM
BH CV ECHO MEAS - LVLS AP4: 8.7 CM
BH CV ECHO MEAS - LVOT AREA (M): 4.9 CM^2
BH CV ECHO MEAS - LVOT AREA: 5 CM^2
BH CV ECHO MEAS - LVOT DIAM: 2.5 CM
BH CV ECHO MEAS - LVPWD: 1.4 CM
BH CV ECHO MEAS - MED PEAK E' VEL: 8.8 CM/SEC
BH CV ECHO MEAS - MEDIAL E/E' RATIO: 8.1
BH CV ECHO MEAS - MV A MAX VEL: 72.5 CM/SEC
BH CV ECHO MEAS - MV DEC SLOPE: 373 CM/SEC^2
BH CV ECHO MEAS - MV E MAX VEL: 72.5 CM/SEC
BH CV ECHO MEAS - MV E/A: 1
BH CV ECHO MEAS - MV MAX PG: 3.9 MMHG
BH CV ECHO MEAS - MV MEAN PG: 1.8 MMHG
BH CV ECHO MEAS - MV V2 MAX: 98.2 CM/SEC
BH CV ECHO MEAS - MV V2 MEAN: 62.6 CM/SEC
BH CV ECHO MEAS - MV V2 VTI: 24.5 CM
BH CV ECHO MEAS - MVA(VTI): 2.6 CM^2
BH CV ECHO MEAS - PA ACC SLOPE: 765.4 CM/SEC^2
BH CV ECHO MEAS - PA ACC TIME: 0.08 SEC
BH CV ECHO MEAS - PA MAX PG: 4.1 MMHG
BH CV ECHO MEAS - PA PR(ACCEL): 44.5 MMHG
BH CV ECHO MEAS - PA V2 MAX: 101.6 CM/SEC
BH CV ECHO MEAS - RAP SYSTOLE: 3 MMHG
BH CV ECHO MEAS - RVSP: 13 MMHG
BH CV ECHO MEAS - SI(AO): 129 ML/M^2
BH CV ECHO MEAS - SI(CUBED): 57.9 ML/M^2
BH CV ECHO MEAS - SI(LVOT): 25.8 ML/M^2
BH CV ECHO MEAS - SI(MOD-SP2): 34.5 ML/M^2
BH CV ECHO MEAS - SI(MOD-SP4): 67.9 ML/M^2
BH CV ECHO MEAS - SI(TEICH): 41.7 ML/M^2
BH CV ECHO MEAS - SV(AO): 317.4 ML
BH CV ECHO MEAS - SV(CUBED): 142.6 ML
BH CV ECHO MEAS - SV(LVOT): 63.4 ML
BH CV ECHO MEAS - SV(MOD-SP2): 85 ML
BH CV ECHO MEAS - SV(MOD-SP4): 167 ML
BH CV ECHO MEAS - SV(TEICH): 102.7 ML
BH CV ECHO MEAS - TAPSE (>1.6): 2 CM2
BH CV ECHO MEAS - TR MAX PG: 10 MMHG
BH CV ECHO MEAS - TR MAX VEL: 159 CM/SEC
BH CV ECHO MEASUREMENTS AVERAGE E/E' RATIO: 7.29
BH CV VAS BP RIGHT ARM: NORMAL MMHG
BH CV XLRA - RV BASE: 3.4 CM
BH CV XLRA - RV LENGTH: 6 CM
BH CV XLRA - RV MID: 2.5 CM
BH CV XLRA - TDI S': 14.3 CM/SEC
CHOLEST SERPL-MCNC: 158 MG/DL (ref 0–200)
GLUCOSE BLDC GLUCOMTR-MCNC: 123 MG/DL (ref 70–130)
HBA1C MFR BLD: 6.2 % (ref 4.8–5.6)
HDLC SERPL-MCNC: 27 MG/DL (ref 40–60)
LDLC SERPL CALC-MCNC: 104 MG/DL (ref 0–100)
LDLC/HDLC SERPL: 3.87 {RATIO}
LEFT ATRIUM VOLUME INDEX: 46.7 ML/M^2
LEFT ATRIUM VOLUME: 115 ML
LV EF 2D ECHO EST: 65 %
MAXIMAL PREDICTED HEART RATE: 156 BPM
PA ADP PRP-ACNC: 129 PRU
STRESS TARGET HR: 133 BPM
TRIGL SERPL-MCNC: 133 MG/DL (ref 0–150)
TROPONIN T SERPL-MCNC: <0.01 NG/ML (ref 0–0.03)
VLDLC SERPL-MCNC: 26.6 MG/DL

## 2020-05-24 PROCEDURE — 92523 SPEECH SOUND LANG COMPREHEN: CPT

## 2020-05-24 PROCEDURE — G0378 HOSPITAL OBSERVATION PER HR: HCPCS

## 2020-05-24 PROCEDURE — 93306 TTE W/DOPPLER COMPLETE: CPT

## 2020-05-24 PROCEDURE — 83036 HEMOGLOBIN GLYCOSYLATED A1C: CPT | Performed by: INTERNAL MEDICINE

## 2020-05-24 PROCEDURE — 80061 LIPID PANEL: CPT | Performed by: INTERNAL MEDICINE

## 2020-05-24 PROCEDURE — 85576 BLOOD PLATELET AGGREGATION: CPT | Performed by: NURSE PRACTITIONER

## 2020-05-24 PROCEDURE — 99217 PR OBSERVATION CARE DISCHARGE MANAGEMENT: CPT | Performed by: INTERNAL MEDICINE

## 2020-05-24 PROCEDURE — 84484 ASSAY OF TROPONIN QUANT: CPT | Performed by: INTERNAL MEDICINE

## 2020-05-24 PROCEDURE — 82962 GLUCOSE BLOOD TEST: CPT

## 2020-05-24 PROCEDURE — 99204 OFFICE O/P NEW MOD 45 MIN: CPT | Performed by: PSYCHIATRY & NEUROLOGY

## 2020-05-24 PROCEDURE — 93306 TTE W/DOPPLER COMPLETE: CPT | Performed by: INTERNAL MEDICINE

## 2020-05-24 PROCEDURE — 0 IOPAMIDOL PER 1 ML: Performed by: INTERNAL MEDICINE

## 2020-05-24 PROCEDURE — 97161 PT EVAL LOW COMPLEX 20 MIN: CPT

## 2020-05-24 PROCEDURE — 97167 OT EVAL HIGH COMPLEX 60 MIN: CPT

## 2020-05-24 RX ADMIN — SODIUM CHLORIDE, PRESERVATIVE FREE 10 ML: 5 INJECTION INTRAVENOUS at 00:30

## 2020-05-24 RX ADMIN — ASPIRIN 81 MG: 81 TABLET, COATED ORAL at 08:56

## 2020-05-24 RX ADMIN — CLOPIDOGREL BISULFATE 75 MG: 75 TABLET ORAL at 08:56

## 2020-05-24 RX ADMIN — IOPAMIDOL 132 ML: 755 INJECTION, SOLUTION INTRAVENOUS at 00:45

## 2020-05-24 RX ADMIN — ATORVASTATIN CALCIUM 80 MG: 40 TABLET, FILM COATED ORAL at 00:13

## 2020-05-24 RX ADMIN — SODIUM CHLORIDE, PRESERVATIVE FREE 10 ML: 5 INJECTION INTRAVENOUS at 08:56

## 2020-05-24 NOTE — NURSING NOTE
"Patient refused to wear mask upon entry to hospital. Security stated to this nurse that he was unsure of policy and that patient reported respiratory issues as the reason for refusal. He refused to wear mask  Up transfer to 3E and stated, \"It will make me anxious and you don't want to see me get anxious.\" Patient was encouraged to wear mask and educated on hospital policy, yet continued to refuse. [facility] was notified and patient transferred without mask to 3E.  "

## 2020-05-24 NOTE — PROGRESS NOTES
Case Management Discharge Note      Final Note: Plan is home.         Destination      No service has been selected for the patient.      Durable Medical Equipment      No service has been selected for the patient.      Dialysis/Infusion      No service has been selected for the patient.      Home Medical Care      No service has been selected for the patient.      Therapy      No service has been selected for the patient.      Community Resources      No service has been selected for the patient.             Final Discharge Disposition Code: 01 - home or self-care

## 2020-05-24 NOTE — PLAN OF CARE
Problem: Patient Care Overview  Goal: Plan of Care Review  Outcome: Ongoing (interventions implemented as appropriate)  Flowsheets  Taken 5/24/2020 1417 by Geraldine Naqvi MS CCC-SLP  Progress: improving  Taken 5/24/2020 0850 by Blanche Herron OTR/L  Plan of Care Reviewed With: patient  Note:   SLP evaluation completed. Will sign-off communication. Please see note for further details and recommendations.

## 2020-05-24 NOTE — PLAN OF CARE
Problem: Patient Care Overview  Goal: Plan of Care Review  Flowsheets (Taken 5/24/2020 8915)  Outcome Summary: OT Eval complete: Pt does not demo any significant impairments or difficulty w/ ADL or mobility independence at this time. He was able to complete standing, functional mobility, and LBD independently w/o difficulty or LOB. Recommend home w/ family once medically stable for d/c.

## 2020-05-24 NOTE — DISCHARGE SUMMARY
[facility] Medicine Services  DISCHARGE SUMMARY    Patient Name: Damián Myers  : 1955  MRN: [id number]    Date of Admission: 2020  8:37 PM  Date of Discharge:  2020  Primary Care Physician: Spencer Saeed MD    Consults     Date and Time Order Name Status Description    2020 2209 Inpatient Neurology Consult Stroke Completed           Hospital Course     Presenting Problem:   Stroke (cerebrum) (CMS/HCC) [I63.9]  TIA (transient ischemic attack) [G45.9]    Active Hospital Problems    Diagnosis  POA   • **TIA (transient ischemic attack) [G45.9]  Yes   • Stroke (cerebrum) (CMS/HCC) [I63.9]  Yes   • Acute pulmonary edema (CMS/HCC) [J81.0]  Unknown   • HLD (hyperlipidemia) [E78.5]  Unknown   • HTN (hypertension) [I10]  Unknown   • HLD (hyperlipidemia) [E78.5]  Unknown   • ASCVD (arteriosclerotic cardiovascular disease) [I25.10]  Yes   • Chronic systolic heart failure (CMS/HCC) [I50.22]  Yes   • Obesity (BMI 30-39.9) [E66.9]  Yes      Resolved Hospital Problems    Diagnosis Date Resolved POA   • Transient ischemic attack (TIA) [G45.9] 2020 Unknown          Hospital Course:  Damián Myers is a 64 y.o. male  with past medical history of coronary artery disease with stenting in 2019, chronic systolic heart failure with EF to be 31 to 35%, hypertension, hyperlipidemia, and obesity.  Presented to [facility] with brief episode of sharp left-sided rib cage pain, mild persistent chest pressure, left sided neck pain and stiffness, left face and left arm numbness/tingling, and one episode of word finding difficulty.  He was transferred to [facility] for stroke workup.     He was asymptomatic on arrival and had no further pains or neurological symptoms.  Workup was reassuring.  Notably, his echo showed EF of 65%, improved from previous.  He has a history of intermittent neck/left shoulder pains for which he has seen a chiropractor in the past.  On reflection, he thinks that explains  his symptoms.     Hgb A1C 6.2.  This will need watching.       Discharge Follow Up Recommendations for outpatient labs/diagnostics   *PCP please recheck hgb A1C every 6 - 12 months.      Day of Discharge     HPI:   Feels fine, no chest pain or arm numbness    Review of Systems  Gen- No fevers, chills  CV- No chest pain, palpitations  Resp- No cough, dyspnea  GI- No N/V/D, abd pain        Vital Signs:   Temp:  [97.4 °F (36.3 °C)-98.6 °F (37 °C)] 97.4 °F (36.3 °C)  Heart Rate:  [62-84] 83  Resp:  [18] 18  BP: (112-170)/() 141/90     Physical Exam:  Gen:  WD/WN man sitting up in chair on RA, wife present.  NAD and cheerful  Neuro: alert and oriented, clear speech, follows commands, grossly nonfocal  HEENT:  NC/AT PERRL, OP benign  Neck:  Supple, no LAD  Heart RRR no murmur, rub, or gallop  Lungs cTA nonlabored  Abd:  Soft, nontender, no rebound or guarding, pos BS  Extrem:  No c/c/e      Pertinent  and/or Most Recent Results     Results from last 7 days   Lab Units 05/23/20  1113   WBC 10*3/mm3 6.23   HEMOGLOBIN g/dL 16.3   HEMATOCRIT % 52.0*   PLATELETS 10*3/mm3 165   SODIUM mmol/L 141   POTASSIUM mmol/L 4.8   CHLORIDE mmol/L 101   CO2 mmol/L 28.9   BUN mg/dL 12   CREATININE mg/dL 1.11   GLUCOSE mg/dL 104*   CALCIUM mg/dL 9.3     Results from last 7 days   Lab Units 05/23/20  1113   BILIRUBIN mg/dL 0.6   ALK PHOS U/L 79   ALT (SGPT) U/L 18   AST (SGOT) U/L 17   PROTIME Seconds 12.4   INR  0.88*   APTT seconds 33.3     Results from last 7 days   Lab Units 05/24/20  0513   CHOLESTEROL mg/dL 158   TRIGLYCERIDES mg/dL 133   HDL CHOL mg/dL 27*     Results from last 7 days   Lab Units 05/24/20  0513 05/23/20  1259 05/23/20  1113   TSH uIU/mL  --   --  4.160   HEMOGLOBIN A1C % 6.20*  --   --    TROPONIN T ng/mL <0.010 <0.010 <0.010       Brief Urine Lab Results  (Last result in the past 365 days)      Color   Clarity   Blood   Leuk Est   Nitrite   Protein   CREAT   Urine HCG        05/23/20 1131 Yellow Cloudy Negative  Negative Negative Negative               Microbiology Results Abnormal     None          Imaging Results (All)     Procedure Component Value Units Date/Time    CT Angiogram Head [[id number]] Collected:  05/24/20 0041     Updated:  05/24/20 0043    Narrative:       CT ANGIOGRAM, HEAD AND NECK, 5/23/2020    HISTORY:  64-year-old male hospital inpatient undergoing assessment for stroke versus TIA. Episode of left side numbness and left-sided chest pain.    TECHNIQUE:  CT angiogram of the head and neck with contrast. 3-D postprocessing was performed and reviewed. Evaluation for a significant carotid arterial stenosis is based on NASCET criteria. Radiation dose reduction techniques included automated exposure control.  Radiation audit for known CT and nuclear cardiology exams in the last 12 months: 0.    COMPARISON:  CT head, noncontrast, 5/23/2020.    CTA NECK:  Normal aortic arch branching pattern with no proximal great vessel stenosis. The vertebral arteries are widely patent and codominant. Cervical carotid bifurcations are normal, without evidence of carotid plaque and 0% stenosis in both internal carotid  arteries by NASCET criteria.    CTA HEAD:  Intracranially, there is symmetric distal vascular contrast distribution in the anterior, middle and posterior cerebral artery territories. No compelling evidence of intracranial arterial flow limiting stenosis. No intracranial aneurysm or vascular  malformation is identified. The dural venous sinuses appear normal. No intracranial mass or abnormal contrast enhancement.      Impression:       Negative CT angiogram of the head and neck. No intracranial or extracranial arterial flow limiting stenosis or aneurysm. Widely patent cervical carotid bifurcations bilaterally.    Signer Name: Kayode Conner MD   Signed: 5/24/2020 12:41 AM   Workstation Name: [email or web address]    [facility]    CT Angiogram Neck [[id number]] Collected:  05/24/20 0041      Updated:  05/24/20 0043    Narrative:       CT ANGIOGRAM, HEAD AND NECK, 5/23/2020    HISTORY:  64-year-old male hospital inpatient undergoing assessment for stroke versus TIA. Episode of left side numbness and left-sided chest pain.    TECHNIQUE:  CT angiogram of the head and neck with contrast. 3-D postprocessing was performed and reviewed. Evaluation for a significant carotid arterial stenosis is based on NASCET criteria. Radiation dose reduction techniques included automated exposure control.  Radiation audit for known CT and nuclear cardiology exams in the last 12 months: 0.    COMPARISON:  CT head, noncontrast, 5/23/2020.    CTA NECK:  Normal aortic arch branching pattern with no proximal great vessel stenosis. The vertebral arteries are widely patent and codominant. Cervical carotid bifurcations are normal, without evidence of carotid plaque and 0% stenosis in both internal carotid  arteries by NASCET criteria.    CTA HEAD:  Intracranially, there is symmetric distal vascular contrast distribution in the anterior, middle and posterior cerebral artery territories. No compelling evidence of intracranial arterial flow limiting stenosis. No intracranial aneurysm or vascular  malformation is identified. The dural venous sinuses appear normal. No intracranial mass or abnormal contrast enhancement.      Impression:       Negative CT angiogram of the head and neck. No intracranial or extracranial arterial flow limiting stenosis or aneurysm. Widely patent cervical carotid bifurcations bilaterally.    Signer Name: Kayode Conner MD   Signed: 5/24/2020 12:41 AM   Workstation Name: [email or web address]    [facility]    CT Angiogram Chest With & Without Contrast [[id number]] Collected:  05/24/20 0009     Updated:  05/24/20 0011    Narrative:       CT CHEST WITH CONTRAST, PE PROTOCOL, 5/23/2020    HISTORY:  64-year-old male admitted with chest pain and acute stroke presentation.    TECHNIQUE:  CT  examination of the chest with IV contrast. CTA MIP multiplanar pulmonary artery images were reformatted with 3-D postprocessing. Radiation dose reduction techniques included automated exposure control. Radiation audit for CT and nuclear cardiology  exams in the last 12 months: 0.    FINDINGS:  No pulmonary embolism is demonstrated. Thoracic aorta is normal in caliber with no aneurysm or dissection. Aortic arch vessels are normal in caliber and widely patent. The heart is mildly enlarged, but there is no pericardial effusion.    Likely chronic elevation right hemidiaphragm with scarring and atelectasis in the right lung base. The lungs are otherwise clear. No visible pulmonary edema, and no focal or multifocal pulmonary infiltrate. No CT findings present to indicate pneumonia.  Note: CT may be negative in the earliest stages of COVID-19.    No pleural effusion. No suspicious mass or adenopathy within the mediastinum or pulmonary yvette. No visible chest wall abnormality. Limited upper abdominal images show no active disease.      Impression:       1.  No evidence of pulmonary embolism.  2.  Normal caliber thoracic aorta. No aortic aneurysm or dissection. Widely patent aortic arch vessels.  3.  Chronic elevation right hemidiaphragm with right basilar scarring or atelectasis.    Signer Name: Kayode Conner MD   Signed: 5/24/2020 12:09 AM   [facility]station [facility]: [email or web address]    [facility]                    Results for orders placed during the hospital encounter of 05/23/20   Adult Transthoracic Echo Complete W/ Cont if Necessary Per Protocol (With Agitated Saline)    Narrative · Left ventricular systolic function is normal.  · Left atrial cavity size is moderately dilated.  · Left ventricular wall thickness is consistent with mild-to-moderate   concentric hypertrophy.  · Moderate-to-severe mitral valve regurgitation is present.  · Calculated right ventricular systolic pressure from tricuspid      regurgitation is 13 mmHg.  · Mild dilation of the aortic root and of the sinuses of Valsalva present.  · Estimated EF = 65%.  · There is mild calcification of the aortic valve mainly affecting the non   coronary cusp(s).  · There is mild bileaflet mitral valve prolapse present.  · Normal right ventricular cavity size, wall thickness, systolic function   and septal motion noted.  · No evidence of a patent foramen ovale.  · No evidence of pulmonary hypertension is present.  · There is no evidence of pericardial effusion.            Discharge Details        Discharge Medications      Continue These Medications      Instructions Start Date   aspirin 81 MG tablet   81 mg, Oral, Daily      carvedilol 6.25 MG tablet  Commonly known as:  COREG   6.25 mg, Oral, 2 Times Daily With Meals      clopidogrel 75 MG tablet  Commonly known as:  PLAVIX   75 mg, Oral, Daily      losartan 25 MG tablet  Commonly known as:  Cozaar   25 mg, Oral, Daily             Allergies   Allergen Reactions   • Other Unknown - High Severity     Pt had a reaction to anesthesia when placing stents            Discharge Disposition:  Home or Self Care    Diet:  Hospital:  Diet Order   Procedures   • Diet Regular; Cardiac, Consistent Carbohydrate       Activity:  Routine.   He may return to his chiropractor if desired.        CODE STATUS:    Code Status and Medical Interventions:   Ordered at: 05/23/20 1070     Level Of Support Discussed With:    Patient     Code Status:    CPR     Medical Interventions (Level of Support Prior to Arrest):    Full     Keep routine PCP appointments      Time Spent on Discharge:  30 minutes    Electronically signed by Cheli Harper MD, 05/24/20, 3:25 PM.

## 2020-05-24 NOTE — THERAPY DISCHARGE NOTE
Acute Care - Occupational Therapy Initial Eval/Discharge   Page     Patient Name: Damián Myers  : 1955  MRN: [id number]  Today's Date: 2020     Date of Referral to OT: 20         Admit Date: 2020     No diagnosis found.  Patient Active Problem List   Diagnosis   • Hyperglycemia   • Obesity (BMI 30-39.9)   • Chronic systolic heart failure (CMS/HCC)   • ASCVD (arteriosclerotic cardiovascular disease)   • Stroke (cerebrum) (CMS/HCC)   • Acute pulmonary edema (CMS/HCC)   • HLD (hyperlipidemia)   • TIA (transient ischemic attack)   • HTN (hypertension)   • HLD (hyperlipidemia)     Past Medical History:   Diagnosis Date   • Obesity (BMI 30-39.9) 2019     Past Surgical History:   Procedure Laterality Date   • CARDIAC CATHETERIZATION N/A 2019    Procedure: Left Heart Cath;  Surgeon: Sherrell Hart MD;  Location:  [facility];  Service: Cardiology          OT ASSESSMENT FLOWSHEET (last 12 hours)      Occupational Therapy Evaluation     Row Name 20 0850                   OT Evaluation Time/Intention    Document Type  discharge evaluation/summary  -SG        Mode of Treatment  occupational therapy  -SG           General Information    Patient Profile Reviewed?  yes  -SG        Prior Level of Function  independent:;ADL's;all household mobility;home management;driving;shopping  -SG        Existing Precautions/Restrictions  no known precautions/restrictions  -SG        Barriers to Rehab  none identified  -SG           Relationship/Environment    Lives With  spouse;child(yoel), adult  -SG           Resource/Environmental Concerns    Current Living Arrangements  home/apartment/condo  -SG           Home Main Entrance    Number of Stairs, Main Entrance  none  -SG           Stairs Within Home, Primary    Number of Stairs, Within Home, Primary  none  -SG           Cognitive Assessment/Intervention- PT/OT    Orientation Status (Cognition)  oriented x 4  -SG        Follows  Commands (Cognition)  WNL  -SG           Bed Mobility Assessment/Treatment    Bed Mobility Assessment/Treatment  supine-sit  -SG        Supine-Sit [place] (Bed Mobility)  independent  -SG           Functional Mobility    Functional Mobility- Ind. Level  independent  -SG        Functional Mobility- Comment  Pt simulated short household distances in room independently w/o any difficulty  -SG           Transfer Assessment/Treatment    Transfer Assessment/Treatment  sit-stand transfer  -SG        Comment (Transfers)  Pt stood from EOB w/o prompting, but no LOB or difficulty. After ambulation, transferred to chair.  -SG           Sit-Stand Transfer    Sit-Stand [place] (Transfers)  independent  -SG           ADL Assessment/Intervention    BADL Assessment/Intervention  lower body dressing  -SG        Additional Documentation  Comment, IADL Assessment/Training (Row)  -SG           Lower Body Dressing Assessment/Training    Lower Body Dressing [place] Level  doff;don;socks;independent  -SG        Lower Body Dressing Position  edge of bed sitting  -SG           General ROM    GENERAL ROM COMMENTS  BUE AROM WFL  -SG           MMT (Manual Muscle Testing)    General MMT Comments  BUE MMT WFL 5/5  -SG           Motor Assessment/Interventions    Additional Documentation  Balance (Group)  -SG           Balance    Balance  static sitting balance;static standing balance;dynamic sitting balance;dynamic standing balance  -SG           Static Sitting Balance    Level of [place] (Unsupported Sitting, Static Balance)  independent  -SG        Sitting Position (Unsupported Sitting, Static Balance)  sitting on edge of bed  -SG           Dynamic Sitting Balance    Level of [place], Reaches Outside Midline (Sitting, Dynamic Balance)  independent  -SG        Sitting Position, Reaches Outside Midline (Sitting, Dynamic Balance)  sitting on edge of bed  -SG           Static Standing Balance    Level of [place]  (Supported Standing, Static Balance)  independent  -SG        Assistive Device Utilized (Supported Standing, Static Balance)  other (see comments) gait belt  -SG           Dynamic Standing Balance    Level of [place], Reaches Outside Midline (Standing, Dynamic Balance)  independent  -SG        Assistive Device Utilized (Supported Standing, Dynamic Balance)  other (see comments) gait belt  -SG           Sensory Assessment/Intervention    Sensory General Assessment  no sensation deficits identified  -SG           Positioning and Restraints    Pre-Treatment Position  in bed  -SG        Post Treatment Position  chair  -SG        In Chair  notified nsg;sitting;call light within reach;encouraged to call for assist;exit alarm on  -SG           Pain Scale: Numbers Pre/Post-Treatment    Pain Scale: Numbers, Pretreatment  0/10 - no pain  -SG        Pain Scale: Numbers, Post-Treatment  0/10 - no pain  -SG           Plan of Care Review    Plan of Care Reviewed With  patient  -SG        Progress  no change OT eval  -SG           Clinical Impression (OT)    Date of Referral to OT  05/24/20  -SG        Therapy Frequency (OT Eval)  evaluation only  -SG        Anticipated Discharge Disposition (OT)  home;home with assist  -SG           Vital Signs    Pre Systolic BP Rehab  -- VSS; RN cleared for tx  -SG        Pre Patient Position  Supine  -SG        Intra Patient Position  Standing  -SG        Post Patient Position  Sitting  -SG           Discharge Summary (Occupational Therapy)    Additional Documentation  Discharge Summary, OT Eval (Group)  -SG           Discharge Summary, OT Eval    Reason for Discharge (OT Discharge Summary)  no further needs identified Pt is ind w/ ADLs and mobility   -SG          User Key  (r) = Recorded By, (t) = Taken By, (c) = Cosigned By    Initials Name Effective Dates    Blanche Mckee OTR/L 01/20/20 -           Occupational Therapy Education                 Title: PT OT SLP Therapies (In  Progress)     Topic: Occupational Therapy (In Progress)     Point: ADL training (Done)     Description:   Instruct learner(s) on proper safety adaptation and remediation techniques during self care or transfers.   Instruct in proper use of assistive devices.              Learning Progress Summary           Patient Acceptance, E, VU by  at 5/24/2020 0850                   Point: Home exercise program (Not Started)     Description:   Instruct learner(s) on appropriate technique for monitoring, assisting and/or progressing therapeutic exercises/activities.              Learner Progress:   Not documented in this visit.          Point: Precautions (Done)     Description:   Instruct learner(s) on prescribed precautions during self-care and functional transfers.              Learning Progress Summary           Patient Acceptance, E, VU by  at 5/24/2020 0850                   Point: Body mechanics (Done)     Description:   Instruct learner(s) on proper positioning and spine alignment during self-care, functional mobility activities and/or exercises.              Learning Progress Summary           Patient Acceptance, E, VU by  at 5/24/2020 0850                               User Key     Initials Effective Dates Name Provider Type Discipline     01/20/20 -  Blanche Herron OTR/L Occupational Therapist OT                OT Recommendation and Plan  Outcome Summary/Treatment Plan (OT)  Anticipated Discharge Disposition (OT): home, home with assist  Reason for Discharge (OT Discharge Summary): no further needs identified(Pt is ind w/ ADLs and mobility )  Therapy Frequency (OT Eval): evaluation only  Plan of Care Review  Plan of Care Reviewed With: patient  Plan of Care Reviewed With: patient  Outcome Summary: OT Eval complete: Pt does not demo any significant impairments or difficulty w/ ADL or mobility independence at this time. He was able to complete standing, functional mobility, and LBD independently w/o difficulty  or LOB. Recommend home w/ family once medically stable for d/c..         Outcome Measures     Row Name 05/24/20 0850             How much help from another is currently needed...    Putting on and taking off regular lower body clothing?  4  -SG      Bathing (including washing, rinsing, and drying)  4  -SG      Toileting (which includes using toilet bed pan or urinal)  4  -SG      Putting on and taking off regular upper body clothing  4  -SG      Taking care of personal grooming (such as brushing teeth)  4  -SG      Eating meals  4  -SG      AM-PAC 6 Clicks Score (OT)  24  -SG         Modified McKenney Scale    Modified McKenney Scale  1 - No significant disability despite symptoms.  Able to carry out all usual duties and activities.  -SG         Functional Assessment    Outcome Measure Options  AM-PAC 6 Clicks Daily Activity (OT);Modified McKenney  -SG        User Key  (r) = Recorded By, (t) = Taken By, (c) = Cosigned By    Initials Name Provider Type    Blanche Mckee OTR/L Occupational Therapist          Time Calculation:   Time Calculation- OT     Row Name 05/24/20 0850             Time Calculation- OT    OT Start Time  0850  -SG      OT Received On  05/24/20  -        User Key  (r) = Recorded By, (t) = Taken By, (c) = Cosigned By    Initials Name Provider Type    Blanche Mckee OTR/L Occupational Therapist        Therapy Suggested Charges     Code   Minutes Charges    None           Therapy Charges for Today     Code Description Service Date Service Provider Modifiers Qty    [id number]  OT EVAL HIGH COMPLEXITY 1 5/24/2020 Blanche Herron OTR/L GO 1               OT Discharge Summary  Anticipated Discharge Disposition (OT): home, home with assist    FROYLAN Borjas/CHILO  5/24/2020

## 2020-05-24 NOTE — THERAPY DISCHARGE NOTE
Patient Name: Damián Myers  : 1955    MRN: [id number]                              Today's Date: 2020       Admit Date: 2020    Visit Dx: No diagnosis found.  Patient Active Problem List   Diagnosis   • Hyperglycemia   • Obesity (BMI 30-39.9)   • Chronic systolic heart failure (CMS/HCC)   • ASCVD (arteriosclerotic cardiovascular disease)   • Stroke (cerebrum) (CMS/HCC)   • Acute pulmonary edema (CMS/HCC)   • HLD (hyperlipidemia)   • TIA (transient ischemic attack)   • HTN (hypertension)   • HLD (hyperlipidemia)     Past Medical History:   Diagnosis Date   • Obesity (BMI 30-39.9) 2019     Past Surgical History:   Procedure Laterality Date   • CARDIAC CATHETERIZATION N/A 2019    Procedure: Left Heart Cath;  Surgeon: Sherrell Hart MD;  Location: [facility] CATH INVASIVE LOCATION;  Service: Cardiology     General Information     Row Name 20 0845          PT Evaluation Time/Intention    Document Type  discharge evaluation/summary  -NS     Mode of Treatment  physical therapy  -NS     Row Name 20 0845          General Information    Patient Profile Reviewed?  yes  -NS     Prior Level of Function  independent:;all household mobility;community mobility;gait;transfer;bed mobility;ADL's  -NS     Existing Precautions/Restrictions  no known precautions/restrictions  -NS     Barriers to Rehab  none identified  -NS     Row Name 20 0845          Relationship/Environment    Lives With  spouse;child(yoel), adult  -NS     Row Name 20 0845          Resource/Environmental Concerns    Current Living Arrangements  home/apartment/condo  -NS     Row Name 20 0845          Home Main Entrance    Number of Stairs, Main Entrance  none  -NS     Row Name 20 0845          Stairs Within Home, Primary    Number of Stairs, Within Home, Primary  none  -NS       User Key  (r) = Recorded By, (t) = Taken By, (c) = Cosigned By    Initials Name Provider Type    Dayanara Ricks PT Physical Therapist         Mobility     Row Name 05/24/20 0845          Bed Mobility Assessment/Treatment    Bed Mobility Assessment/Treatment  supine-sit  -NS     Supine-Sit [place] (Bed Mobility)  independent  -NS     Row Name 05/24/20 0845          Transfer Assessment/Treatment    Comment (Transfers)  Patient stood impulsively prior to PT being ready. No unsteadiness or dizziness upon standing.   -NS     Row Name 05/24/20 0845          Sit-Stand Transfer    Sit-Stand [place] (Transfers)  independent  -NS     Row Name 05/24/20 0845          Gait/Stairs Assessment/Training    Gait/Stairs Assessment/Training  gait/ambulation independence  -NS     [place] Level (Gait)  independent  -NS     Assistive Device (Gait)  other (see comments) gait belt  -NS     Distance in Feet (Gait)  75  -NS     Pattern (Gait)  step-through  -NS     Deviations/Abnormal Patterns (Gait)  base of support, wide  -NS     Comment (Gait/Stairs)  Patient ambulated around room due to refusing to wear a mask and therefore, being unable to ambulate in hallway. He demonstrated no episodes of LOB or dizziness with foward/backwards walking or side stepping.   -NS       User Key  (r) = Recorded By, (t) = Taken By, (c) = Cosigned By    Initials Name Provider Type    Dayanara Ricks, PT Physical Therapist        Obj/Interventions     Row Name 05/24/20 0845          General ROM    GENERAL ROM COMMENTS  BLEs: WFL  -NS     [facility] Name 05/24/20 0845          MMT (Manual Muscle Testing)    General MMT Comments  BLEs: grossly 5/5  -NS     [facility] Name 05/24/20 0845          Static Sitting Balance    Level of [place] (Unsupported Sitting, Static Balance)  independent  -NS     Sitting Position (Unsupported Sitting, Static Balance)  sitting on edge of bed  -NS     Time Able to Maintain Position (Unsupported Sitting, Static Balance)  30 to 45 seconds  -NS     Row Name 05/24/20 0845          Dynamic Sitting Balance    Level of [place], Reaches Outside Midline  (Sitting, Dynamic Balance)  independent  -NS     Sitting Position, Reaches Outside Midline (Sitting, Dynamic Balance)  sitting on edge of bed  -NS     Comment, Reaches Outside Midline (Sitting, Dynamic Balance)  MMT  -NS     Row Name 05/24/20 08          Static Standing Balance    Level of [place] (Supported Standing, Static Balance)  independent  -NS     Time Able to Maintain Position (Supported Standing, Static Balance)  less than 15 seconds  -NS     Assistive Device Utilized (Supported Standing, Static Balance)  other (see comments) gait belt  -NS     Row Name 05/24/20 08          Dynamic Standing Balance    Level of [place], Reaches Outside Midline (Standing, Dynamic Balance)  independent  -NS     Time Able to Maintain Position, Reaches Outside Midline (Standing, Dynamic Balance)  less than 15 seconds  -NS     Assistive Device Utilized (Supported Standing, Dynamic Balance)  other (see comments) gait belt  -NS     Comment, Reaches Outside Midline (Standing, Dynamic Balance)  picking up object off the floor  -NS     Row Name 05/24/20 0849          Sensory Assessment/Intervention    Sensory General Assessment  no sensation deficits identified  -NS       User Key  (r) = Recorded By, (t) = Taken By, (c) = Cosigned By    Initials Name Provider Type    Dayanara Ricks, PT Physical Therapist        Goals/Plan    No documentation.       Clinical Impression     [facility] Name 05/24/20 0856          Pain Assessment    Additional Documentation  Pain Scale: Numbers Pre/Post-Treatment (Group)  -NS     Row Name 05/24/20 08          Pain Scale: Numbers Pre/Post-Treatment    Pain Scale: Numbers, Pretreatment  0/10 - no pain  -NS     Pain Scale: Numbers, Post-Treatment  0/10 - no pain  -NS     Row Name 05/24/20 0875          Plan of Care Review    Plan of Care Reviewed With  patient  -NS     Progress  no change PT eval  -NS     Row Name 05/24/20 67          Physical Therapy Clinical Impression    Criteria for Skilled  Interventions Met (PT Clinical Impression)  no;no problems identified which require skilled intervention  -NS     Row Name 05/24/20 0845          Vital Signs    Pre Systolic BP Rehab  -- VSS- RN cleared for PT  -NS     Pre Patient Position  Supine  -NS     Intra Patient Position  Standing  -NS     Post Patient Position  Sitting  -NS     Row Name 05/24/20 0845          Positioning and Restraints    Pre-Treatment Position  in bed  -NS     Post Treatment Position  chair  -NS     In Chair  sitting;call light within reach;encouraged to call for assist  -NS       User Key  (r) = Recorded By, (t) = Taken By, (c) = Cosigned By    Initials Name Provider Type    Dayanara Ricks PT Physical Therapist        Outcome Measures     Row Name 05/24/20 0845          How much help from another person do you currently need...    Turning from your back to your side while in flat bed without using bedrails?  4  -NS     Moving from lying on back to sitting on the side of a flat bed without bedrails?  4  -NS     Moving to and from a bed to a chair (including a wheelchair)?  4  -NS     Standing up from a chair using your arms (e.g., wheelchair, bedside chair)?  4  -NS     Climbing 3-5 steps with a railing?  4  -NS     To walk in hospital room?  4  -NS     AM-PAC 6 Clicks Score (PT)  24  -NS     Row Name 05/24/20 0845          Modified [place] Scale    Modified Burak Scale  1 - No significant disability despite symptoms.  Able to carry out all usual duties and activities.  -NS     Row Name 05/24/20 0845          Functional Assessment    Outcome Measure Options  AM-PAC 6 Clicks Basic Mobility (PT);Modified Burak  -NS       User Key  (r) = Recorded By, (t) = Taken By, (c) = Cosigned By    Initials Name Provider Type    Dayanara Ricks PT Physical Therapist        Physical Therapy Education                 Title: PT OT SLP Therapies (Done)     Topic: Physical Therapy (Done)     Point: Mobility training (Done)     Description:   Instruct  learner(s) on safety and technique for assisting patient out of bed, chair or wheelchair.  Instruct in the proper use of assistive devices, such as walker, crutches, cane or brace.              Patient Friendly Description:   It's important to get you on your feet again, but we need to do so in a way that is safe for you. Falling has serious consequences, and your personal safety is the most important thing of all.        When it's time to get out of bed, one of us or a family member will sit next to you on the bed to give you support.     If your doctor or nurse tells you to use a walker, crutches, a cane, or a brace, be sure you use it every time you get out of bed, even if you think you don't need it.    Learning Progress Summary           Patient Acceptance, E, SOLANGE,KRISTI by NS at 5/24/2020 1101    Comment:  Patient was educated about safety with mobility.                   Point: Body mechanics (Done)     Description:   Instruct learner(s) on proper positioning and spine alignment for patient and/or caregiver during mobility tasks and/or exercises.              Learning Progress Summary           Patient Acceptance, E, SOLANGE,DU by NS at 5/24/2020 1101    Comment:  Patient was educated about safety with mobility.                   Point: Precautions (Done)     Description:   Instruct learner(s) on prescribed precautions during mobility and gait tasks              Learning Progress Summary           Patient Acceptance, E, SOLANGE,KRISTI by NS at 5/24/2020 1101    Comment:  Patient was educated about safety with mobility.                               User Key     Initials Effective Dates Name Provider Type Discipline    NS 09/10/19 -  Dayanara Shah, PT Physical Therapist PT              PT Recommendation and Plan     Outcome Summary/Treatment Plan (PT)  Anticipated Discharge Disposition (PT): home, home with assist  Plan of Care Reviewed With: patient  Progress: no change(PT eval)  Outcome Summary: Patient presents with no  significant deficits in strength, balance, or motor control affecting mobility. He transferred supine>sit and STS independently and ambulated 75ft in room independently without LOB or fatigue. Skilled IPPT not warranted at this time. Recommend home with family at discharge.     Time Calculation:   PT Charges     Row Name 05/24/20 0845             Time Calculation    Start Time  0845  -NS      PT Received On  05/24/20  -NS        User Key  (r) = Recorded By, (t) = Taken By, (c) = Cosigned By    Initials Name Provider Type    Dayanara Ricks, MARITZA Physical Therapist        Therapy Charges for Today     Code Description Service Date Service Provider Modifiers Qty    31628660853  PT EVAL LOW COMPLEXITY 4 5/24/2020 Dayanara Shah, PT GP 1          PT G-Codes  Outcome Measure Options: AM-PAC 6 Clicks Basic Mobility (PT), Modified [place]  AM-PAC 6 Clicks Score (PT): 24  Modified Burak Scale: 1 - No significant disability despite symptoms.  Able to carry out all usual duties and activities.    PT Discharge Summary  Anticipated Discharge Disposition (PT): home, home with assist  Reason for Discharge: Independent    Dayanara Shah PT  5/24/2020

## 2020-05-24 NOTE — THERAPY EVALUATION
Acute Care - Speech Language Pathology Initial Evaluation   Radha     Patient Name: Damián Myers  : 1955  MRN: [id number]  Today's Date: 2020               Admit Date: 2020     Visit Dx:  No diagnosis found.  Patient Active Problem List   Diagnosis   • Hyperglycemia   • Obesity (BMI 30-39.9)   • Chronic systolic heart failure (CMS/HCC)   • ASCVD (arteriosclerotic cardiovascular disease)   • Stroke (cerebrum) (CMS/HCC)   • Acute pulmonary edema (CMS/HCC)   • HLD (hyperlipidemia)   • TIA (transient ischemic attack)   • HTN (hypertension)   • HLD (hyperlipidemia)     Past Medical History:   Diagnosis Date   • Obesity (BMI 30-39.9) 2019     Past Surgical History:   Procedure Laterality Date   • CARDIAC CATHETERIZATION N/A 2019    Procedure: Left Heart Cath;  Surgeon: Sherrell Hart MD;  Location:  [facility] CATH INVASIVE LOCATION;  Service: Cardiology        SLP EVALUATION (last 72 hours)      SLP SLC Evaluation     Row Name 20 1400                   Communication Assessment/Intervention    Document Type  evaluation  -AW        Subjective Information  no complaints  -AW        Patient Observations  alert;cooperative  -AW        Patient/Family Observations  wife present  -AW        Patient Effort  good  -AW        Symptoms Noted During/After Treatment  none  -AW           General Information    Patient Profile Reviewed  yes  -AW        Pertinent History Of Current Problem  TIA  -AW        Precautions/Limitations, Vision  WFL  -AW        Precautions/Limitations, Hearing  WFL  -AW        Prior Level of Function-Communication  WFL  -AW        Plans/Goals Discussed with  patient  -AW        Barriers to Rehab  none identified  -AW        Patient's Goals for Discharge  return to home  -AW           Pain Assessment    Additional Documentation  Pain Scale: Numbers Pre/Post-Treatment (Group)  -AW           Pain Scale: Numbers Pre/Post-Treatment    Pain Scale: Numbers, Pretreatment  0/10 - no  pain  -AW        Pain Scale: Numbers, Post-Treatment  0/10 - no pain  -AW           Comprehension Assessment/Intervention    Comprehension Assessment/Intervention  Auditory Comprehension  -AW           Auditory Comprehension Assessment/Intervention    Auditory Comprehension (Communication)  WFL  -AW        Answers Questions (Communication)  WFL;complex  -AW        Able to Follow Commands (Communication)  WFL  -AW        Narrative Discourse  WFL  -AW           Expression Assessment/Intervention    Expression Assessment/Intervention  verbal expression  -AW           Verbal Expression Assessment/Intervention    Verbal Expression  WFL  -AW        Sentence Formulation  WFL  -AW        Conversational Discourse/Fluency  WFL  -AW           Oral Motor Structure and Function    Oral Motor Structure and Function  WFL  -AW        Dentition Assessment  natural, present and adequate  -AW           Oral Musculature and Cranial Nerve Assessment    Oral Motor General Assessment  WFL  -AW           Motor Speech Assessment/Intervention    Motor Speech Function  WFL  -AW           Cognitive Assessment Intervention- SLP    Cognitive Function (Cognition)  WFL  -AW        Orientation Status (Cognition)  awareness of basic personal information;person;place;time;situation;WFL  -AW        Memory (Cognitive)  WFL  -AW        Attention (Cognitive)  WFL  -AW        Thought Organization (Cognitive)  WFL  -AW           SLP Clinical Impressions    SLP Diagnosis  WFL  -AW        SLC Criteria for Skilled Therapy Interventions Met  no problems identified which require skilled intervention  -AW        Functional Impact  no impact on function  -AW           Recommendations    Therapy Frequency (SLP SLC)  evaluation only  -AW        Anticipated Dischage Disposition (SLP)  home  -AW          User Key  (r) = Recorded By, (t) = Taken By, (c) = Cosigned By    Initials Name Effective Dates    Geraldine Hallman MS CCC-SLP 06/22/15 -               EDUCATION  The patient has been educated in the following areas:     Communication Impairment.    SLP Recommendation and Plan  SLP Diagnosis: WFL     [facility] Criteria for Skilled Therapy Interventions Met: no problems identified which require skilled intervention  Anticipated Dischage Disposition (SLP): home          Progress: improving                  Time Calculation:     Time Calculation- SLP     Row Name 05/24/20 1418             Time Calculation- SLP    SLP Start Time  1345  -AW      SLP Received On  05/24/20  -AW        User Key  (r) = Recorded By, (t) = Taken By, (c) = Cosigned By    Initials Name Provider Type     Geraldine Naqvi MS CCC-SLP Speech and Language Pathologist          Therapy Charges for Today     Code Description Service Date Service Provider Modifiers Qty    66486089061 HC ST EVAL SPEECH AND PROD W LANG  2 5/24/2020 Geraldine Naqvi MS CCC-SLP GN 1                     Geraldine Naqvi MS CCC-LYNNE  5/24/2020

## 2020-05-24 NOTE — CONSULTS
Stroke Consult Note    Patient Name: Damián Myers   MRN: [id number]  Age: 64 y.o.  Sex: male  : 1955    Primary Care Physician: Spencer Saeed MD  Referring Physician:  Dr. Abraham  TIME STROKE TEAM CALLED:  EST (transfer patient)    TIME PATIENT SEEN:  EST    Handedness: Right  Race:      Chief Complaint/Reason for Consultation: Left sided numbness to face and arm, aphasia    HPI: Santiago Hsu is a 64-year-old, right-handed,  male with known diagnosis of CAD, NSTEMI in , chronic systolic heart failure with EF 31 to 35%, hypertension, hyperlipidemia, and obesity who presented to [facility] ED with sudden onset left-sided numbness and intermittent expressive aphasia.  The patient reports that on 2020 he felt a sharp pain in the left side of his chest at approximately 0330 that lasted a few seconds and resolved, but he has subsequently had constant mild chest pressure since.  Patient states he awoke this morning at 0800 and noticed the left side of his face and arm felt numb with associated left neck numbness and pain.  This morning patient's wife had noted intermittent mild expressive aphasia described as difficulty finding my words.  Patient went to [facility] for evaluation.  CT head was obtained demonstrated no acute abnormality.  Patient was transferred to [facility] for further evaluation of possible stroke symptoms.    Patient states he has overall felt sluggish over the past 2 days.  He is on aspirin and Plavix and is consistent with taking his medications.  He tells me that per his cardiologist he was supposed to begin Entresto and wear a LifeVest which he was unable to do due to lack of insurance.  Patient reports that while at [facility] his symptoms completely resolved at approximately 1430.  He currently denies any numbness, tingling, or abnormal sensation, denies chest pain, denies neck pain.      Last Known Normal  Date/Time: 0800 on 5/23/2020 EST     Review of Systems   Constitutional: Negative.    HENT: Negative.    Eyes: Negative.    Respiratory: Negative.    Cardiovascular: Negative.    Gastrointestinal: Negative.    Endocrine: Negative.    Genitourinary: Negative.    Musculoskeletal: Negative.    Skin: Negative.    Neurological: Negative.    Psychiatric/Behavioral: Negative.         Temp:  [98 °F (36.7 °C)-98.6 °F (37 °C)] 98.6 °F (37 °C)  Heart Rate:  [64-78] 67  Resp:  [16-18] 18  BP: (109-170)/() 160/112    Neurological Exam  Mental Status  Alert. Oriented to person, place, time and situation. Speech is normal. Language is fluent with no aphasia. Attention and concentration are normal. Fund of knowledge is appropriate for level of education.    Cranial Nerves  CN II: Visual fields full to confrontation.  CN III, IV, VI: Extraocular movements intact bilaterally. Extraocular movements intact bilaterally. Normal lids and orbits bilaterally. Pupils equal round and reactive to light bilaterally.  CN V: Facial sensation is normal.  CN VII: Full and symmetric facial movement.  CN VIII: Hearing is normal.  CN IX, X: Palate elevates symmetrically  CN XI: Shoulder shrug strength is normal.  CN XII: Tongue midline without atrophy or fasciculations.    Motor  Normal muscle bulk throughout. No fasciculations present. Normal muscle tone. No abnormal involuntary movements. Strength is 5/5 throughout all four extremities.    Sensory  Sensation is intact to light touch, pinprick, vibration and proprioception in all four extremities.    Reflexes  Deep tendon reflexes are 2+ and symmetric in all four extremities with downgoing toes bilaterally.    Coordination  Finger-to-nose, rapid alternating movements and heel-to-shin normal bilaterally without dysmetria.    Gait  Defer.      Physical Exam   Constitutional: He is oriented to person, place, and time. He appears well-developed and well-nourished.   HENT:   Head: Normocephalic and  atraumatic.   Eyes: Pupils are equal, round, and reactive to light. EOM and lids are normal.   Neck: Normal range of motion. Neck supple.   Cardiovascular: Normal rate.   Pulmonary/Chest: Effort normal.   Abdominal: Soft.   Musculoskeletal: Normal range of motion.   Neurological: He is alert and oriented to person, place, and time. He has normal strength and normal reflexes. Coordination normal.   Skin: Skin is warm and dry.   Psychiatric: He has a normal mood and affect. His speech is normal and behavior is normal.   Vitals reviewed.      Acute Stroke Data    Alteplase (tPA) Inclusion / Exclusion Criteria    Time: 2100  Person Administering Scale: SREEKANTH Alejandre    Inclusion Criteria  [x]   18 years of age or greater   []   Onset of symptoms < 4.5 hours before beginning treatment (stroke onset = time patient was last seen well or without symptoms).   []   Diagnosis of acute ischemic stroke causing measurable disabling deficit (Complete Hemianopia, Any Aphasia, Visual or Sensory Extinction, Any weakness limiting sustained effort against gravity)   []   Any remaining deficit considered potentially disabling in view of patient and practitioner   Exclusion criteria (Do not proceed with Alteplase if any are checked under exclusion criteria)  [x]   Onset unknown or GREATER than 4.5 hours   []   ICH on CT/MRI   []   CT demonstrates hypodensity representing acute or subacute infarct   []   Significant head trauma or prior stroke in the previous 3 months   []   Symptoms suggestive of subarachnoid hemorrhage   []   History of un-ruptured intracranial aneurysm GREATER than 10 mm   []   Recent intracranial or intraspinal surgery within the last 3 months   []   Arterial puncture at a non-compressible site in the previous 7 days   []   Active internal bleeding   []   Acute bleeding tendency   []   Platelet count LESS than 100,000 for known hematological diseases such as leukemia, thrombocytopenia or chronic cirrhosis   []    Current use of anticoagulant with INR GREATER than 1.7 or PT GREATER than 15 seconds, aPTT GREATER than 40 seconds   []   Heparin received within 48 hours, resulting in abnormally elevated aPTT GREATER than upper limit of normal   []   Current use of direct thrombin inhibitors or direct factor Xa inhibitors in the past 48 hours   []   Elevated blood pressure refractory to treatment (systolic GREATER than 185 mm/Hg or diastolic  GREATER than 110 mm/Hg   []   Suspected infective endocarditis and aortic arch dissection   []   Current use of therapeutic treatment dose of low-molecular-weight heparin (LMWH) within the previous 24 hours   []   Structural GI malignancy or bleed   Relative exclusion for all patients  []   Only minor non-disabling symptoms   []   Pregnancy   []   Seizure at onset with postictal residual neurological impairments   []   Major surgery or previous trauma within past 14 days   []   History of previous spontaneous ICH, intracranial neoplasm, or AV malformation   []   Postpartum (within previous 14 days)   []   Recent GI or urinary tract hemorrhage (within previous 21 days)   []   Recent acute MI (within previous 3 months)   []   History of un-ruptured intracranial aneurysm LESS than 10 mm   []   History of ruptured intracranial aneurysm   []   Blood glucose LESS than 50 mg/dL (2.7 mmol/L)   []   Dural puncture within the last 7 days   []   Known GREATER than 10 cerebral microbleeds   Additional exclusions for patients with symptoms onset between 3 and 4.5 hours.  []   Age > 80.   []   On any anticoagulants regardless of INR  >>> Warfarin (Coumadin), Heparin, Enoxaparin (Lovenox), fondaparinux (Arixtra), bivalirudin (Angiomax), Argatroban, dabigatran (Pradaxa), rivaroxaban (Xarelto), or apixaban (Eliquis)   []   Severe stroke (NIHSS > 25).   []   History of BOTH diabetes and previous ischemic stroke.   []   The risks and benefits have been discussed with the patient or family related to the  administration of IV Alteplase for stroke symptoms.   []   I have discussed and reviewed the patient's case and imaging with the attending prior to IV Alteplase.    Time Alteplase administered       Past Medical History:   Diagnosis Date   • Obesity (BMI 30-39.9) 11/4/2019     Past Surgical History:   Procedure Laterality Date   • CARDIAC CATHETERIZATION N/A 11/8/2019    Procedure: Left Heart Cath;  Surgeon: Sherrell Hart MD;  Location: [facility] CATH INVASIVE LOC[facility];  Service: Cardiology     Family History   Problem Relation Age of Onset   • Heart valve disorder Mother    • Heart valve disorder Brother    • Diabetes type I Son      Social History     Socioeconomic History   • Marital status:      Spouse name: Not on file   • Number of children: Not on file   • Years of education: Not on file   • Highest education level: Not on file   Tobacco Use   • Smoking status: Never Smoker   • Smokeless tobacco: Never Used   Substance and Sexual Activity   • Alcohol use: No     Frequency: Never   • Drug use: No   • Sexual activity: Defer     Allergies   Allergen Reactions   • Other Unknown - High Severity     Pt had a reaction to anesthesia when placing stents        Prior to Admission medications    Medication Sig Start Date End Date Taking? Authorizing Provider   aspirin 81 MG tablet Take 1 tablet by mouth Daily.   Yes Yasmany Leos PA-C   carvedilol (COREG) 6.25 MG tablet Take 6.25 mg by mouth 2 (Two) Times a Day With Meals.   Yes Provider, MD Enrique   clopidogrel (PLAVIX) 75 MG tablet Take 1 tablet by mouth Daily for 360 days. 11/13/19 11/7/20 Yes Tasha Whittington MD   losartan (COZAAR) 25 MG tablet Take 1 tablet by mouth Daily. 2/21/20   Yasmany Leos PA-C       Hospital Meds:  Scheduled-   aspirin 81 mg Oral Daily   atorvastatin 80 mg Oral Nightly   clopidogrel 75 mg Oral Daily   enoxaparin 40 mg Subcutaneous Q24H   sodium chloride 10 mL Intravenous Q12H     Infusions-     PRNs- •   acetaminophen  •  Morphine **AND** naloxone  •  ondansetron  •  sodium chloride    Functional Status Prior to Current Stroke/Burak Score: 0    NIH Stroke Scale  Time: 2100  Person Administering Scale: SREEKANTH Alejandre    1a  Level of consciousness: 0=alert; keenly responsive   1b. LOC questions:  0=Performs both tasks correctly   1c. LOC commands: 0=Performs both tasks correctly   2.  Best Gaze: 0=normal   3.  Visual: 0=No visual loss   4. Facial Palsy: 0=Normal symmetric movement   5a.  Motor left arm: 0=No drift, limb holds 90 (or 45) degrees for full 10 seconds   5b.  Motor right arm: 0=No drift, limb holds 90 (or 45) degrees for full 10 seconds   6a. motor left le=No drift, limb holds 90 (or 45) degrees for full 10 seconds   6b  Motor right le=No drift, limb holds 90 (or 45) degrees for full 10 seconds   7. Limb Ataxia: 0=Absent   8.  Sensory: 0=Normal; no sensory loss   9. Best Language:  0=No aphasia, normal   10. Dysarthria: 0=Normal   11. Extinction and Inattention: 0=No abnormality    Total:   0       Results Reviewed:  I have personally reviewed current lab, radiology, and data and agree with results.  Lab Results (last 24 hours)     ** No results found for the last 24 hours. **        Imaging Results (Last 24 Hours)     Procedure Component Value Units Date/Time    CT Angiogram Head [[id number]] Collected:  20     Updated:  20    Narrative:       CT ANGIOGRAM, HEAD AND NECK, 2020    HISTORY:  64-year-old male hospital inpatient undergoing assessment for stroke versus TIA. Episode of left side numbness and left-sided chest pain.    TECHNIQUE:  CT angiogram of the head and neck with contrast. 3-D postprocessing was performed and reviewed. Evaluation for a significant carotid arterial stenosis is based on NASCET criteria. Radiation dose reduction techniques included automated exposure control.  Radiation audit for known CT and nuclear cardiology exams in the last 12  months: 0.    COMPARISON:  CT head, noncontrast, 5/23/2020.    CTA NECK:  Normal aortic arch branching pattern with no proximal great vessel stenosis. The vertebral arteries are widely patent and codominant. Cervical carotid bifurcations are normal, without evidence of carotid plaque and 0% stenosis in both internal carotid  arteries by NASCET criteria.    CTA HEAD:  Intracranially, there is symmetric distal vascular contrast distribution in the anterior, middle and posterior cerebral artery territories. No compelling evidence of intracranial arterial flow limiting stenosis. No intracranial aneurysm or vascular  malformation is identified. The dural venous sinuses appear normal. No intracranial mass or abnormal contrast enhancement.      Impression:       Negative CT angiogram of the head and neck. No intracranial or extracranial arterial flow limiting stenosis or aneurysm. Widely patent cervical carotid bifurcations bilaterally.    Signer Name: Kayode Conner MD   Signed: 5/24/2020 12:41 AM   Workstation Name: [email or web address]    [facility]    CT Angiogram Neck [[id number]] Collected:  05/24/20 0041     Updated:  05/24/20 0043    Narrative:       CT ANGIOGRAM, HEAD AND NECK, 5/23/2020    HISTORY:  64-year-old male hospital inpatient undergoing assessment for stroke versus TIA. Episode of left side numbness and left-sided chest pain.    TECHNIQUE:  CT angiogram of the head and neck with contrast. 3-D postprocessing was performed and reviewed. Evaluation for a significant carotid arterial stenosis is based on NASCET criteria. Radiation dose reduction techniques included automated exposure control.  Radiation audit for known CT and nuclear cardiology exams in the last 12 months: 0.    COMPARISON:  CT head, noncontrast, 5/23/2020.    CTA NECK:  Normal aortic arch branching pattern with no proximal great vessel stenosis. The vertebral arteries are widely patent and codominant. Cervical carotid  bifurcations are normal, without evidence of carotid plaque and 0% stenosis in both internal carotid  arteries by NASCET criteria.    CTA HEAD:  Intracranially, there is symmetric distal vascular contrast distribution in the anterior, middle and posterior cerebral artery territories. No compelling evidence of intracranial arterial flow limiting stenosis. No intracranial aneurysm or vascular  malformation is identified. The dural venous sinuses appear normal. No intracranial mass or abnormal contrast enhancement.      Impression:       Negative CT angiogram of the head and neck. No intracranial or extracranial arterial flow limiting stenosis or aneurysm. Widely patent cervical carotid bifurcations bilaterally.    Signer Name: Kayode Conner MD   Signed: 5/24/2020 12:41 AM   Workstation Name: [email or web address]    [facility]    CT Angiogram Chest With & Without Contrast [[id number]] Collected:  05/24/20 0009     Updated:  05/24/20 0011    Narrative:       CT CHEST WITH CONTRAST, PE PROTOCOL, 5/23/2020    HISTORY:  64-year-old male admitted with chest pain and acute stroke presentation.    TECHNIQUE:  CT examination of the chest with IV contrast. CTA MIP multiplanar pulmonary artery images were reformatted with 3-D postprocessing. Radiation dose reduction techniques included automated exposure control. Radiation audit for CT and nuclear cardiology  exams in the last 12 months: 0.    FINDINGS:  No pulmonary embolism is demonstrated. Thoracic aorta is normal in caliber with no aneurysm or dissection. Aortic arch vessels are normal in caliber and widely patent. The heart is mildly enlarged, but there is no pericardial effusion.    Likely chronic elevation right hemidiaphragm with scarring and atelectasis in the right lung base. The lungs are otherwise clear. No visible pulmonary edema, and no focal or multifocal pulmonary infiltrate. No CT findings present to indicate pneumonia.  Note: CT may be  negative in the earliest stages of COVID-19.    No pleural effusion. No suspicious mass or adenopathy within the mediastinum or pulmonary yvette. No visible chest wall abnormality. Limited upper abdominal images show no active disease.      Impression:       1.  No evidence of pulmonary embolism.  2.  Normal caliber thoracic aorta. No aortic aneurysm or dissection. Widely patent aortic arch vessels.  3.  Chronic elevation right hemidiaphragm with right basilar scarring or atelectasis.    Signer Name: Kayode Conner MD   Signed: 5/24/2020 12:09 AM   Workstation Name: [id number] Volume Wizard App    [facility]        Results for orders placed during the hospital encounter of 11/04/19   Adult Transthoracic Echo Complete W/ Cont if Necessary Per Protocol    Narrative · The left ventricular cavity is mildly dilated.  · Left ventricular systolic function is severely decreased.  · Estimated EF appears to be in the range of 31 - 35%.  · Right ventricular cavity is mild-to-moderately dilated.  · Moderately reduced right ventricular systolic function noted.  · The aortic valve is structurally normal. Trace aortic valve   regurgitation is present. No aortic valve stenosis is present.  · The mitral valve is normal in structure. Moderate mitral valve   regurgitation is present with an eccentric jet noted. No significant   mitral valve stenosis is present.  · The tricuspid valve is normal. Trace tricuspid valve regurgitation is   present. Estimated right ventricular systolic pressure from tricuspid   regurgitation is mildly elevated (35-45 mmHg).  · There is a small (<1cm) pericardial effusion adjacent to the left   ventricle. There is no evidence of cardiac tamponade.  · Comments: No previous studies available for comparison.          Assessment/Plan: 64-year-old, right-handed,  male with known diagnosis of CAD, NSTEMI, CHF hypertension, hyperlipidemia, obesity who presented to [facility] with one  episode of sharp left-sided chest pain, chest pressure, left face and arm numbness, neck pain left-sided, and intermittent expressive aphasia.  Transferred to [facility] for further workup.  At this time symptoms have all resolved, NIHSS 0.        1. Possible CVA/TIA:  CTA head and neck negative, no arterial flow-limiting stenosis or aneurysm.  Widely patent cervical carotid bifurcations.  Patient refused MRI tonight related to anxiety regarding enclosed spaces.  Patient reported requiring bag ventilation after receiving a benzo for heart cath.       -Initiate TIA/ischemic without thrombolytics order set   -Discuss possible MRI in the near future with patient   -Echo with bubble study   -Aspirin   -P2Y12 to check if Plavix hyporesponder   -High-dose statin     2.  Hypertension   -Permissive hypertension for now   -Hold home BP meds      I have discussed plan of care with patient, nursing, and hospitalist team.      Roseanne Swanson, SREEKANTH  May 24, 2020  3:04 AM

## 2020-05-24 NOTE — H&P
"    [facility] Medicine Services  HISTORY AND PHYSICAL    Patient Name: Damián Myers  : 1955  MRN: [id number]  Primary Care Physician: Spencer Saeed MD  Date of admission: 2020      Subjective   Subjective     Chief Complaint:  \" Mirella had numbness to my face and left arm, left sided neck pain, and speech difficulty.\"    HPI:  Damián Myers is a 64 y.o. male with past medical history of coronary artery disease with stenting in 2019, chronic systolic heart failure with EF to be 31 to 35%, hypertension, hyperlipidemia, and obesity who presents to [facility] with complaints of one episode of sharp left-sided rib cage pain, mild persistent chest pressure, left sided neck pain and stiffness, left face and left arm numbness/tingling, and one episode of word finding difficulty.    Patient states that he woke up at 3:30 AM on Thursday morning with sharp left-sided rib cage pain laterally that lasted several seconds and resolved.  He has had a constant mild chest pressure centrally since that time.  He is also noticed progressive worsening left-sided neck pain and stiffness with intermittent headache.  He reports experiencing left-sided facial numbness and tingling with left arm numbness that lasted several minutes and resolved on its own.  He reports that today he began having word finding difficulty lasting several minutes and again resolving on its own.  His wife urged him to go to [facility] for further evaluation.    CT head was without any acute abnormality.  Troponin negative x2.  EKG with no acute ischemic changes.  He was transferred to our facility for further evaluation.    Review of Systems   Gen- No fevers, chills  CV- No chest pain, palpitations  Resp- No cough, dyspnea  GI- No N/V/D, abd pain      All other systems reviewed and are negative.     Personal History     Past Medical History:   Diagnosis Date   • Obesity (BMI 30-39.9) 2019       Past Surgical History:   "   Procedure Laterality Date   • CARDIAC CATHETERIZATION N/A 11/8/2019    Procedure: Left Heart Cath;  Surgeon: Sherrell Hart MD;  Location: [facility] CATH INVASIVE LOC[facility];  Service: Cardiology       Family History: family history includes Diabetes type I in his son; Heart valve disorder in his brother and mother. Otherwise pertinent FHx was reviewed and unremarkable.     Social History:  reports that he has never smoked. He has never used smokeless tobacco. He reports that he does not drink alcohol or use drugs.  Social History     Social History Narrative   • Not on file       Medications:  Available home medication information reviewed.  Medications Prior to Admission   Medication Sig Dispense Refill Last Dose   • aspirin 81 MG tablet Take 1 tablet by mouth Daily. 30 tablet 11 5/23/2020 at Unknown time   • carvedilol (COREG) 6.25 MG tablet Take 6.25 mg by mouth 2 (Two) Times a Day With Meals.      • clopidogrel (PLAVIX) 75 MG tablet Take 1 tablet by mouth Daily for 360 days. 30 tablet 11 5/23/2020 at Unknown time   • losartan (COZAAR) 25 MG tablet Take 1 tablet by mouth Daily. 90 tablet 3        Allergies   Allergen Reactions   • Other Unknown - High Severity     Pt had a reaction to anesthesia when placing stents          Objective   Objective     Vital Signs:   Temp:  [98 °F (36.7 °C)-98.6 °F (37 °C)] 98.6 °F (37 °C)  Heart Rate:  [64-78] 67  Resp:  [16-18] 18  BP: (109-170)/() 160/112   Total (NIH Stroke Scale): 0    Physical Exam   Constitutional: Awake, alert  Eyes: PERRLA, sclerae anicteric, no conjunctival injection  HENT: NCAT, mucous membranes moist  Neck: Supple, no thyromegaly, no lymphadenopathy, trachea midline  Respiratory: Clear to auscultation bilaterally, nonlabored respirations   Cardiovascular: RRR, no murmurs, rubs, or gallops, palpable pedal pulses bilaterally  Gastrointestinal: Positive bowel sounds, soft, nontender, nondistended  Musculoskeletal: No bilateral ankle edema, no clubbing  or cyanosis to extremities  Psychiatric: Appropriate affect, cooperative, anxious, pressured speech  Neurologic: Oriented x 3, strength symmetric in all extremities, Cranial Nerves grossly intact to confrontation, speech clear  Skin: No rashes      Results Reviewed:  I have personally reviewed current lab and radiology data.    Results from last 7 days   Lab Units 05/23/20  1113   WBC 10*3/mm3 6.23   HEMOGLOBIN g/dL 16.3   HEMATOCRIT % 52.0*   PLATELETS 10*3/mm3 165   INR  0.88*     Results from last 7 days   Lab Units 05/23/20  1259 05/23/20  1113   SODIUM mmol/L  --  141   POTASSIUM mmol/L  --  4.8   CHLORIDE mmol/L  --  101   CO2 mmol/L  --  28.9   BUN mg/dL  --  12   CREATININE mg/dL  --  1.11   GLUCOSE mg/dL  --  104*   CALCIUM mg/dL  --  9.3   ALT (SGPT) U/L  --  18   AST (SGOT) U/L  --  17   TROPONIN T ng/mL <0.010 <0.010     Estimated Creatinine Clearance: 93.3 mL/min (by C-G formula based on SCr of 1.11 mg/dL).  Brief Urine Lab Results  (Last result in the past 365 days)      Color   Clarity   Blood   Leuk Est   Nitrite   Protein   CREAT   Urine HCG        05/23/20 1131 Yellow Cloudy Negative Negative Negative Negative             Imaging Results (Last 24 Hours)     ** No results found for the last 24 hours. **        Results for orders placed during the hospital encounter of 11/04/19   Adult Transthoracic Echo Complete W/ Cont if Necessary Per Protocol    Narrative · The left ventricular cavity is mildly dilated.  · Left ventricular systolic function is severely decreased.  · Estimated EF appears to be in the range of 31 - 35%.  · Right ventricular cavity is mild-to-moderately dilated.  · Moderately reduced right ventricular systolic function noted.  · The aortic valve is structurally normal. Trace aortic valve   regurgitation is present. No aortic valve stenosis is present.  · The mitral valve is normal in structure. Moderate mitral valve   regurgitation is present with an eccentric jet noted. No  significant   mitral valve stenosis is present.  · The tricuspid valve is normal. Trace tricuspid valve regurgitation is   present. Estimated right ventricular systolic pressure from tricuspid   regurgitation is mildly elevated (35-45 mmHg).  · There is a small (<1cm) pericardial effusion adjacent to the left   ventricle. There is no evidence of cardiac tamponade.  · Comments: No previous studies available for comparison.          Assessment/Plan   Assessment & Plan     Active Hospital Problems    Diagnosis POA   • **TIA (transient ischemic attack) [G45.9] Yes   • Stroke (cerebrum) (CMS/HCC) [I63.9] Yes   • Acute pulmonary edema (CMS/HCC) [J81.0] Unknown   • HLD (hyperlipidemia) [E78.5] Unknown   • ASCVD (arteriosclerotic cardiovascular disease) [I25.10] Yes   • Chronic systolic heart failure (CMS/HCC) [I50.22] Yes   • Obesity (BMI 30-39.9) [E66.9] Yes       Patient is a 64-year-old male with past medical history of coronary artery disease with stenting in November 2019, chronic systolic heart failure with EF to be 31 to 35%, hypertension, hyperlipidemia, and obesity who presents to [facility] with complaints of one episode of sharp left-sided rib cage pain, mild persistent chest pressure, left sided neck pain and stiffness, left face and left arm numbness/tingling, and one episode of word finding difficulty.    1.  Possible TIA  --Neuro consulted  --Patient unable to tolerate closed MRI (passes out and stops breathing when confined in closed spaces. Had to be bagged after given sedation for heart cath)  --CTA head and neck pending  --CTA chest to rule out PE and dissection  --Continue aspirin, Plavix, start statin   --Lipid panel, A1c  --Echo with bubble  --probable anxiety component    2.  Atypical Sharp left-sided rib cage pain, chest pressure centrally, left-sided neck pain, CAD with hx of stent  --CTA chest to rule out dissection or PE  --EKG without changes compared to prior with nonspecific ST changes in  "aVL  --Repeat troponin  --Consider stress test versus cardiology consult  --probable anxiety component    3.  Hypertension  --Hold antihypertensives until acute stroke ruled out    4.  Pulmonary edema, chronic systolic heart failure  --Repeat echo  --Patient declined Lasix    5.  Hyperlipidemia  --Statin for plaque stability acutely, reports he will not take this long-term as he \"does not like medications\"      DVT prophylaxis:  lovenox      Admission Communication  Due to current limited visitation policies, an attempt will be made to update patient's identified best point-of-contact(s) at admission to discuss plan of care.   Contact: defer to am   Relation:    Time of communication:    Notes (if applicable):          CODE STATUS:    Code Status and Medical Interventions:   Ordered at: 05/23/20 7103     Level Of Support Discussed With:    Patient     Code Status:    CPR     Medical Interventions (Level of Support Prior to Arrest):    Full       Admission Status:  I believe this patient meets INPATIENT status due to TIA VS STROKE, chest pain.  I feel patient’s risk for adverse outcomes and need for care warrant INPATIENT evaluation and I predict the patient’s care encounter to likely last beyond 2 midnights.      Electronically signed by Baljinder Abraham DO, 05/23/20, 10:35 PM.      COVID-19 RISK SCREEN     1. Has the patient had close contact without PPE with a lab confirmed COVID-19 (+) person or a person under investigation (PUI) for COVID-19 infection?  -- No     2. Has the patient had respiratory symptoms, worsened/new cough and/or SOA, unexplained fever, or sudden loss of smell and/or taste in the past 7 days? --  No    3. Does the patient have baseline higher exposure risk such as working in healthcare field, currently residing in healthcare facility, or ongoing hemodialysis?  --  No       "

## 2020-05-24 NOTE — CONSULTS
Consults    Patient Care Team:  Spencer Saeed MD as PCP - General (Family Medicine)    Chief complaint: numbness left face/arm, neck pain and speech difficulty     Subjective     History of Present Illness    64 y.o. male referred by Dr Abraham for stroke sx.  Presented to [facility] ED for sharp left sided rib pain, CP, left sided neck pain and stiffness, left F/A N/T.      Awoke at 0330 on 5/21/20 with left sided rib pain lasting for several seconds then resolved.  Also developed left sided neck pain and HA, left facial N/T.      Day of admission had several minutes of word finding difficulties.      HCT/CTA.H & N, my review of films, mild atrophy, small vessel disease, no sig stenosis     EF 31 - 35%, HTN, HLP, obesity     Unable to do MRI do to fear of confined spaces     Pt sx completely resolved at 1430 in [facility] ED.     Review of Systems   Constitutional: Negative for activity change, fatigue and unexpected weight change.   HENT: Negative for facial swelling, hearing loss, tinnitus, trouble swallowing and voice change.    Eyes: Negative for photophobia, pain and visual disturbance.   Respiratory: Negative for apnea, cough and choking.    Cardiovascular: Positive for chest pain.   Gastrointestinal: Negative for constipation, nausea and vomiting.   Endocrine: Negative for cold intolerance.   Genitourinary: Negative for difficulty urinating, frequency and urgency.   Musculoskeletal: Positive for neck pain. Negative for arthralgias, back pain, gait problem, myalgias and neck stiffness.   Skin: Negative for rash.   Allergic/Immunologic: Negative for immunocompromised state.   Neurological: Positive for speech difficulty, numbness and headaches. Negative for dizziness, tremors, seizures, syncope, facial asymmetry, weakness and light-headedness.   Hematological: Negative for adenopathy.   Psychiatric/Behavioral: Negative for confusion, decreased concentration, hallucinations and sleep disturbance. The patient is not  nervous/anxious.         Past Medical History:   Diagnosis Date   • Obesity (BMI 30-39.9) 11/4/2019   ,   Past Surgical History:   Procedure Laterality Date   • CARDIAC CATHETERIZATION N/A 11/8/2019    Procedure: Left Heart Cath;  Surgeon: Sherrell Hart MD;  Location:  [facility] CATH INVASIVE LOCATION;  Service: Cardiology   ,   Family History   Problem Relation Age of Onset   • Heart valve disorder Mother    • Heart valve disorder Brother    • Diabetes type I Son    ,   Social History     Tobacco Use   • Smoking status: Never Smoker   • Smokeless tobacco: Never Used   Substance Use Topics   • Alcohol use: No     Frequency: Never   • Drug use: No   ,   Medications Prior to Admission   Medication Sig Dispense Refill Last Dose   • aspirin 81 MG tablet Take 1 tablet by mouth Daily. 30 tablet 11 5/23/2020 at Unknown time   • carvedilol (COREG) 6.25 MG tablet Take 6.25 mg by mouth 2 (Two) Times a Day With Meals.      • clopidogrel (PLAVIX) 75 MG tablet Take 1 tablet by mouth Daily for 360 days. 30 tablet 11 5/23/2020 at Unknown time   • losartan (COZAAR) 25 MG tablet Take 1 tablet by mouth Daily. 90 tablet 3    , Scheduled Meds:      aspirin 81 mg Oral Daily   atorvastatin 80 mg Oral Nightly   clopidogrel 75 mg Oral Daily   enoxaparin 40 mg Subcutaneous Q24H   sodium chloride 10 mL Intravenous Q12H   , Continuous Infusions:   , PRN Meds:  •  acetaminophen  •  Morphine **AND** naloxone  •  ondansetron  •  sodium chloride and Allergies:  Other    Objective      Vital Signs  Temp:  [97.6 °F (36.4 °C)-98.6 °F (37 °C)] 97.6 °F (36.4 °C)  Heart Rate:  [62-84] 84  Resp:  [16-18] 18  BP: (109-170)/() 135/78    Neurologic Exam     Mental Status   Oriented to person, place, and time.   Registration: recalls 3 of 3 objects. Recall at 5 minutes: recalls 3 of 3 objects. Follows 3 step commands.   Attention: normal. Concentration: normal.   Speech: speech is normal   Level of consciousness: alert  Knowledge: good and consistent  with education.   Able to name object. Able to read. Able to repeat. Able to write. Normal comprehension.     Cranial Nerves     CN II   Visual fields full to confrontation.   Visual acuity: normal  Right visual field deficit: none  Left visual field deficit: none     CN III, IV, VI   Pupils are equal, round, and reactive to light.  Extraocular motions are normal.   Right pupil: Shape: regular. Reactivity: brisk. Consensual response: intact.   Left pupil: Shape: regular. Reactivity: brisk. Consensual response: intact.   Nystagmus: none   Diplopia: none  Ophthalmoparesis: none  Upgaze: normal  Downgaze: normal  Conjugate gaze: present  Vestibulo-ocular reflex: present    CN V   Facial sensation intact.   Right corneal reflex: normal  Left corneal reflex: normal    CN VII   Right facial weakness: none  Left facial weakness: none    CN VIII   Hearing: intact    CN IX, X   Palate: symmetric  Right gag reflex: normal  Left gag reflex: normal    CN XI   Right sternocleidomastoid strength: normal  Left sternocleidomastoid strength: normal    CN XII   Tongue: not atrophic  Fasciculations: absent  Tongue deviation: none    Motor Exam   Muscle bulk: normal  Overall muscle tone: normal  Right arm tone: normal  Left arm tone: normal  Right leg tone: normal  Left leg tone: normal    Strength   Strength 5/5 throughout.     Sensory Exam   Light touch normal.   Vibration normal.   Proprioception normal.   Pinprick normal.     Gait, Coordination, and Reflexes     Gait  Gait: normal    Coordination   Romberg: negative  Finger to nose coordination: normal  Heel to shin coordination: normal  Tandem walking coordination: normal    Tremor   Resting tremor: absent  Intention tremor: absent  Action tremor: absent    Reflexes   Reflexes 2+ except as noted.         Physical Exam   Constitutional: He is oriented to person, place, and time. Vital signs are normal. He appears well-developed and well-nourished.   HENT:   Head: Normocephalic and  atraumatic.   Eyes: Pupils are equal, round, and reactive to light. EOM and lids are normal.   Fundoscopic exam:       The right eye shows no exudate, no hemorrhage and no papilledema. The right eye shows venous pulsations.        The left eye shows no exudate, no hemorrhage and no papilledema. The left eye shows venous pulsations.   Neck: Normal range of motion and phonation normal. Normal carotid pulses present. Carotid bruit is not present. No thyroid mass and no thyromegaly present.   Cardiovascular: Normal rate, regular rhythm and normal heart sounds.   Pulmonary/Chest: Effort normal.   Neurological: He is oriented to person, place, and time. He has normal strength. He has a normal Finger-Nose-Finger Test, a normal Heel to Shin Test, a normal Romberg Test and a normal Tandem Gait Test. Gait normal.   Skin: Skin is warm and dry.   Psychiatric: He has a normal mood and affect. His speech is normal.   Nursing note and vitals reviewed.      Results Review:    I reviewed the patient's new clinical results.  I reviewed the patient's new imaging results and agree with the interpretation.  I reviewed the patient's other test results and agree with the interpretation    A1C 6.2, Chol 158   P2Y12 129    CMP, CBC - NCS       Assessment/Plan       TIA (transient ischemic attack)    Obesity (BMI 30-39.9)    Chronic systolic heart failure (CMS/HCC)    ASCVD (arteriosclerotic cardiovascular disease)    Stroke (cerebrum) (CMS/HCC)    Acute pulmonary edema (CMS/HCC)    HLD (hyperlipidemia)    HTN (hypertension)    HLD (hyperlipidemia)    1.  TIA - unable to tolerate MRI, Echo pending, ASA/plavix/statin.  No sig stenosis on CTA H & N,     2.  HTN - allow permissive HTN      Fawad Nunez MD  05/24/20  10:00

## 2020-05-24 NOTE — PLAN OF CARE
Problem: Patient Care Overview  Goal: Plan of Care Review  Outcome: Ongoing (interventions implemented as appropriate)  Flowsheets (Taken 5/24/2020 3802)  Outcome Summary: Patient presents with no significant deficits in strength, balance, or motor control affecting mobility. He transferred supine>sit and STS independently and ambulated 75ft in room independently without LOB or fatigue. Skilled IPPT not warranted at this time. Recommend home with family at discharge.

## 2020-05-25 ENCOUNTER — READMISSION MANAGEMENT (OUTPATIENT)
Dept: CALL CENTER | Facility: HOSPITAL | Age: 65
End: 2020-05-25

## 2020-05-25 NOTE — OUTREACH NOTE
Prep Survey      Responses   [facility] facility patient discharged from?  [place]   Is LACE score < 7 ?  No   Eligibility  Readm Mgmt   Discharge diagnosis  TiA, Stroke, Acute pulmonary edema, HLD, HTN, ASCVD, Chronic systolic HF, obesity   COVID-19 Test Status  Negative   Does the patient have one of the following disease processes/diagnoses(primary or secondary)?  Stroke (TIA) [CHF is chronic with no AE this admission]   Does the patient have Home health ordered?  No   Is there a DME ordered?  No   Comments regarding appointments  Pt to schedule F/U with PCP   Prep survey completed?  Yes          Taylor Dean RN

## 2020-05-27 ENCOUNTER — READMISSION MANAGEMENT (OUTPATIENT)
Dept: CALL CENTER | Facility: HOSPITAL | Age: 65
End: 2020-05-27

## 2020-05-27 NOTE — OUTREACH NOTE
Stroke Week 1 Survey      Responses   [facility] patient discharged from?  [place]   Does the patient have one of the following disease processes/diagnoses(primary or secondary)?  Stroke (TIA)   Is there a successful TCM telephone encounter documented?  No   Week 1 attempt successful?  Yes   Call start time  1133   Call end time  1138   Discharge diagnosis  TiA, Stroke, Acute pulmonary edema, HLD, HTN, ASCVD, Chronic systolic HF, obesity   Meds reviewed with patient/caregiver?  Yes   Is the patient having any side effects they believe may be caused by any medication additions or changes?  No   Does the patient have all medications ordered at discharge?  N/A   Prescription comments  no new meds   Is the patient taking all medications as directed (includes completed medication regime)?  Yes   Does the patient have an appointment with their PCP within 7 days of discharge?  No   What is preventing the patient from scheduling follow up appointments within 7 days of discharge?  Haven't had time   Nursing Interventions  Verified appointment date/time/provider, Educated patient on importance of making appointment   Has the patient kept scheduled appointments due by today?  N/A   Has home health visited the patient within 72 hours of discharge?  N/A   Psychosocial issues?  No   Does the patient require any assistance with activities of daily living such as eating, bathing, dressing, walking, etc.?  No   Does the patient have any residual symptoms from stroke/TIA?  No   Does the patient understand the diet ordered at discharge?  Yes   Comments  discussed low CHO diet and pt to get A1C rechecked in a few months   Did the patient receive a copy of their discharge instructions?  Yes   Nursing interventions  Reviewed instructions with patient   What is the patient's perception of their health status since discharge?  Improving   Nursing interventions  Nurse provided patient education   Is the patient able to teach back  FAST for Stroke?  Yes   Is the patient/caregiver able to teach back the risk factors for a stroke?  High blood pressure-goal below 120/80, High Cholesterol, Physical inactivity and obesity   Is the patient/caregiver able to teach back signs and symptoms related to disease process for when to call PCP?  Yes   Is the patient/caregiver able to teach back signs and symptoms related to disease process for when to call 911?  Yes   Is the patient/caregiver able to teach back the hierarchy of who to call/visit for symptoms/problems? PCP, Specialist, Home health nurse, Urgent Care, ED, 911  Yes   Week 1 call completed?  Yes          Brittany Pratt RN

## 2020-06-01 ENCOUNTER — READMISSION MANAGEMENT (OUTPATIENT)
Dept: CALL CENTER | Facility: HOSPITAL | Age: 65
End: 2020-06-01

## 2020-06-01 NOTE — OUTREACH NOTE
Stroke Week 2 Survey      Responses   [facility] patient discharged from?  [place]   Does the patient have one of the following disease processes/diagnoses(primary or secondary)?  Stroke (TIA)   Week 2 attempt successful?  Yes   Call start time  1549   Call end time  1554   Discharge diagnosis  TiA, Stroke, Acute pulmonary edema, HLD, HTN, ASCVD, Chronic systolic HF, obesity   Is patient permission given to speak with other caregiver?  Yes   Meds reviewed with patient/caregiver?  Yes   Is the patient having any side effects they believe may be caused by any medication additions or changes?  No   Does the patient have all medications ordered at discharge?  N/A   Prescription comments  no new meds   Is the patient taking all medications as directed (includes completed medication regime)?  Yes   Does the patient have a primary care provider?   Yes   Does the patient have an appointment with their PCP within 7 days of discharge?  Greater than 7 days   What is preventing the patient from scheduling follow up appointments within 7 days of discharge?  Haven't had time   Nursing Interventions  Educated patient on importance of making appointment   Has the patient kept scheduled appointments due by today?  N/A   Comments  He needs to make an appt.    Has home health visited the patient within 72 hours of discharge?  N/A   Psychosocial issues?  No   Does the patient require any assistance with activities of daily living such as eating, bathing, dressing, walking, etc.?  No   Does the patient have any residual symptoms from stroke/TIA?  No [He states he did not have a stroke. ]   Does the patient understand the diet ordered at discharge?  Yes   Did the patient receive a copy of their discharge instructions?  Yes   Nursing interventions  Reviewed instructions with patient   What is the patient's perception of their health status since discharge?  Improving   Nursing interventions  Nurse provided patient education   Is  the patient able to teach back FAST for Stroke?  Yes   Is the patient/caregiver able to teach back the risk factors for a stroke?  High blood pressure-goal below 120/80, High Cholesterol, Physical inactivity and obesity   Is the patient/caregiver able to teach back signs and symptoms related to disease process for when to call PCP?  Yes   Is the patient/caregiver able to teach back signs and symptoms related to disease process for when to call 911?  Yes   Is the patient/caregiver able to teach back the hierarchy of who to call/visit for symptoms/problems? PCP, Specialist, Home health nurse, Urgent Care, ED, 911  Yes   Week 2 call completed?  Yes          Leann Serna RN

## 2020-06-09 ENCOUNTER — READMISSION MANAGEMENT (OUTPATIENT)
Dept: CALL CENTER | Facility: HOSPITAL | Age: 65
End: 2020-06-09

## 2020-06-09 NOTE — OUTREACH NOTE
Stroke Week 3 Survey      Responses   [facility] patient discharged from?  [place]   Does the patient have one of the following disease processes/diagnoses(primary or secondary)?  Stroke (TIA)   Week 3 attempt successful?  Yes   Call start time  1207   Call end time  1208   Discharge diagnosis  TiA, Stroke, Acute pulmonary edema, HLD, HTN, ASCVD, Chronic systolic HF, obesity   Meds reviewed with patient/caregiver?  Yes   Is the patient taking all medications as directed (includes completed medication regime)?  Yes   Has the patient kept scheduled appointments due by today?  N/A   Comments  He needs to make an appt.    Does the patient require any assistance with activities of daily living such as eating, bathing, dressing, walking, etc.?  No   Does the patient have any residual symptoms from stroke/TIA?  No   Does the patient understand the diet ordered at discharge?  Yes   What is the patient's perception of their health status since discharge?  Returned to baseline/stable   Is the patient able to teach back FAST for Stroke?  Yes   Week 3 call completed?  Yes   Revoked  No further contact(revokes)-requires comment   Graduated/Revoked comments  baseline          Rubina Haddad RN

## 2021-01-04 ENCOUNTER — OFFICE VISIT (OUTPATIENT)
Dept: CARDIOLOGY | Facility: CLINIC | Age: 66
End: 2021-01-04

## 2021-01-04 VITALS
OXYGEN SATURATION: 94 % | WEIGHT: 262 LBS | HEIGHT: 74 IN | TEMPERATURE: 97.1 F | SYSTOLIC BLOOD PRESSURE: 152 MMHG | HEART RATE: 68 BPM | DIASTOLIC BLOOD PRESSURE: 98 MMHG | BODY MASS INDEX: 33.62 KG/M2

## 2021-01-04 DIAGNOSIS — I50.22 CHRONIC SYSTOLIC HEART FAILURE (HCC): Primary | ICD-10-CM

## 2021-01-04 PROCEDURE — 99213 OFFICE O/P EST LOW 20 MIN: CPT | Performed by: PHYSICIAN ASSISTANT

## 2021-01-04 PROCEDURE — 93000 ELECTROCARDIOGRAM COMPLETE: CPT | Performed by: PHYSICIAN ASSISTANT

## 2021-01-04 NOTE — PROGRESS NOTES
Spencer Saeed MD  Damián Myers  1955 01/04/2021    Patient Active Problem List   Diagnosis   • Hyperglycemia   • Obesity (BMI 30-39.9)   • Chronic systolic heart failure (CMS/HCC)   • ASCVD (arteriosclerotic cardiovascular disease)   • Stroke (cerebrum) (CMS/HCC)   • Acute pulmonary edema (CMS/HCC)   • HLD (hyperlipidemia)   • TIA (transient ischemic attack)   • HTN (hypertension)   • HLD (hyperlipidemia)       Dear Spencer Saeed MD:    Subjective     History of Present Illness:    Chief Complaint   Patient presents with   • Med Management     Verbal.        Damián Myers is a pleasant 65 y.o. male with a past medical history significant for coronary artery disease with stenting of the LAD on 11/8/2019, advanced ischemic cardiomyopathy with original left ventricular ejection fraction as low as 31 to 35% however has recently recovered to within normal limits at 65% on echocardiogram on 5/24/2020.  Recently discovered moderate to severe mitral valve regurgitation was also present on echo in May 2020.    Clinically patient reports he has been doing well and has no complaints with open questions.  He does deny any shortness of breath, orthopnea, or PND.  He reports that he was in the hospital in [place] in May due to rotator cuff injury however there were concern for possible TIA where he did have an extensive work-up all of which came back unremarkable.  His blood pressure is elevated in the office today however he does check his blood pressure regularly at least once weekly and reports that his average blood pressure is around 133/88.  He does deny any chest pains with me today or syncopal episodes.  He does admit that he is not been taking losartan.    Allergies   Allergen Reactions   • Other Unknown - High Severity     Pt had a reaction to anesthesia when placing stents      :      Current Outpatient Medications:   •  aspirin 81 MG tablet, Take 1 tablet by mouth Daily., Disp: 30 tablet, Rfl: 11  •  carvedilol  "(COREG) 6.25 MG tablet, Take 6.25 mg by mouth 2 (Two) Times a Day With Meals., Disp: , Rfl:     The following portions of the patient's history were reviewed and updated as appropriate: allergies, current medications, past family history, past medical history, past social history, past surgical history and problem list.    Social History     Tobacco Use   • Smoking status: Never Smoker   • Smokeless tobacco: Never Used   Substance Use Topics   • Alcohol use: No     Frequency: Never   • Drug use: No     Review of Systems   Constitution: Negative for malaise/fatigue.   Cardiovascular: Negative for chest pain, dyspnea on exertion and irregular heartbeat.   Respiratory: Negative for cough and shortness of breath.    Hematologic/Lymphatic: Negative for bleeding problem. Does not bruise/bleed easily.   Gastrointestinal: Negative for nausea and vomiting.   Neurological: Negative for weakness.     Objective   Vitals:    01/04/21 1135   BP: 152/98   BP Location: Left arm   Patient Position: Sitting   Cuff Size: Adult   Pulse: 68   Temp: 97.1 °F (36.2 °C)   TempSrc: Infrared   SpO2: 94%   Weight: 119 kg (262 lb)   Height: 188 cm (74\")     Body mass index is 33.64 kg/m².    Constitutional:       General: Not in acute distress.     Appearance: Healthy appearance. Well-developed and not in distress. Not diaphoretic.   Eyes:      Conjunctiva/sclera: Conjunctivae normal.      Pupils: Pupils are equal, round, and reactive to light.   HENT:      Head: Normocephalic and atraumatic.   Neck:      Vascular: No carotid bruit or JVD.   Pulmonary:      Effort: Pulmonary effort is normal. No respiratory distress.      Breath sounds: Normal breath sounds.   Cardiovascular:      Normal rate. Regular rhythm.   Skin:     General: Skin is cool.   Neurological:      Mental Status: Alert, oriented to person, place, and time and oriented to person, place and time.         Lab Results   Component Value Date     05/23/2020    K 4.8 05/23/2020 "     05/23/2020    CO2 28.9 05/23/2020    BUN 12 05/23/2020    CREATININE 1.11 05/23/2020    GLUCOSE 104 (H) 05/23/2020    CALCIUM 9.3 05/23/2020    AST 17 05/23/2020    ALT 18 05/23/2020    ALKPHOS 79 05/23/2020     No results found for: CKTOTAL  Lab Results   Component Value Date    WBC 6.23 05/23/2020    HGB 16.3 05/23/2020    HCT 52.0 (H) 05/23/2020     05/23/2020     Lab Results   Component Value Date    INR 0.88 (L) 05/23/2020    INR 1.11 (H) 11/04/2019     Lab Results   Component Value Date    MG 2.0 05/23/2020     Lab Results   Component Value Date    TSH 4.160 05/23/2020    PSA 0.433 11/06/2019    TRIG 133 05/24/2020    HDL 27 (L) 05/24/2020     (H) 05/24/2020      No results found for: BNP    During this visit the following were done:  Labs Reviewed [x]    Labs Ordered []    Radiology Reports Reviewed [x]    Radiology Ordered []    PCP Records Reviewed []    Referring Provider Records Reviewed []    ER Records Reviewed []    Hospital Records Reviewed []    History Obtained From Family []    Radiology Images Reviewed []    Other Reviewed []    Records Requested []         ECG 12 Lead    Date/Time: 1/4/2021 11:29 AM  Performed by: Yasmany Leos PA-C  Authorized by: Yasmany Leos PA-C   Comparison: compared with previous ECG   Similar to previous ECG  Rhythm: sinus rhythm  Conduction: conduction normal    Clinical impression: normal ECG            Assessment/Plan    Diagnosis Plan   1. Chronic systolic heart failure (CMS/[facility])              Recommendations:  1. Chronic systolic heart failure  1. Thankfully patient's left ventricular ejection fraction has recovered and is within normal limits now.  Clinically he is doing well and is asymptomatic.  I will continue aspirin and carvedilol.  2. Moderate to severe mitral valve regurgitation.  1. I did discuss this with him he was not aware that the echo showed mitral valve regurgitation.  I did offer possibilities of referral to  "cardiothoracic surgery as well as a KENNEY he is somewhat hesitant for KENNEY as he is completely asymptomatic and reports that he is even been walking over a mile every day.  We will continue to monitor for now but advised to contact our office if he develops any symptoms.  3. Medical noncompliance  1. Patient admits that he has been noncompliant with losartan reporting to me that he has never even filled the medication he does refuse any ACE/ARB medications or statins as he is \"not having any problems\".  I did discuss with him that these medications have been proven to reduce mortality & morbidity with coronary artery disease as well as heart failure. he still does not wish to be on either of these medications.      No follow-ups on file.    As always, I appreciate very much the opportunity to participate in the cardiovascular care of your patients.      With Best Regards,    Yasmany Leos PA-C          "

## 2021-01-06 ENCOUNTER — TELEPHONE (OUTPATIENT)
Dept: CARDIOLOGY | Facility: CLINIC | Age: 66
End: 2021-01-06

## 2021-01-06 NOTE — TELEPHONE ENCOUNTER
REQUESTED    ----- Message from Yasmany Leos PA-C sent at 1/4/2021 12:33 PM EST -----  Can we get recen tblood work from pcp?

## 2021-02-22 DIAGNOSIS — Z23 IMMUNIZATION DUE: ICD-10-CM

## 2021-06-14 NOTE — NURSING NOTE
Thank you for your referral to Cardiac Rehab.   Referral noted.  Will see patient today when Cardiac Rehab patient load allows.  If patient was to be discharged before being seen we will follow up with them at home.   "1/26/2021    Start time: 1:03 PM    Stop Time: 1:53 PM   Session # 4    Anand Felix is a 34 y.o. male who is being evaluated via a billable video visit.    Chief Complaint:    Major depression, severe   Generalized Anxiety Disorder  Chronic pain syndrome       The patient has been notified of the following:    \"This video visit will be conducted via a call between you and your provider. We have found that certain health care needs can be provided without the need for an in-person visit. This service lets us provide the care you need with a video conversation\".     \"Video visits are billed at different rates depending on your insurance coverage. Please reach out to your insurance provider with any questions\".     \"If during the course of the call the provider feels a video visit is not appropriate, you will not be charged for this service\".     Patient has been given verbal consent to a video visit: Yes    Patient would like the video invitation sent by: My Chart    Will anyone else be joining your video visit?: No    Video visit details    Type of service: Video visit    Originating Location (pt. Location): Home    Distant Location (provider location): [facility]    Platform used for Video Visit: [employer]    Functional Impairment:   Personal: 3  Family: 3  Work: 3  Social:3    Clinical assessment of mental status: Anand Felix presented on time.  He was open and cooperative, and dressed appropriately for this session and weather. His memory was in tact .  His  speech was appropriate.  Language was appropriate.  Concentration and focus is on task. Psychosis is not noted or reported. He reports his mood is depressed .  Affect is congruent with speech.  Fund of knowledge is adequate. Insight is adequate for therapy.     Suicidal/Homicidal Ideation present: Patient reports passive SI. He denies plan or means. He agrees to follow safety plan to call 911, go to the ER for evaluation and/or call the crisis line " if symptoms worsen or he is feeling unsafe. He describes protective factors, including his wife/family and spiritual belief.      Patient's impression of their current status: Patient reports continued difficulty with depression and anxiety symptoms. Patient struggles with stopping negative thoughts. Patient reports stressors related to social situations. Patient is working on acceptance of his chronic medical concerns. He is working on identifying future goals.     Therapist impression of patients current state:  Patient seems to continue to struggle with depression and anxiety/worry. Patient reports SI, but denies plan or means. He states SI is fleeting, but often.  He reports negative thoughts come and go, but are difficult to manage at times.  He continues to engage in some self study re: computer programming and he reports engaging in hobbies as he is able. Further discussed external and internal pressures to go back to work. Continued to discuss values clarification. Further discussed thoughts and feelings he has related to a recent talk with his cousin. Patient states it is difficult to sleep. He states medications help somewhat. Patient is working on exercising for short periods of time, as he is able. Fatigue and physical weakness persist, per his report. Patient is reaching out to his support system, as he is able. However, he reports he struggles in social situations as he has changed due to his medical issues. Patient seems to be making more effort towards using Mindfulness techniques in daily living, as well as in social situations. Gave patient praise for his efforts. Discussed challenges with acceptance and ways to re-evaluate what is important to him in his life now. Discussed grief/loss as related to physical changes in the past few years.    Type of psychotherapeutic technique provided: Client centered, CBT and Mindfulness    Progress toward short term goals: Patient is working on increasing coping  skills to manage symptoms related to mental health and medical concerns.       Review of long term goals: Not done at today's visit. Last review: 12/22/2020    Diagnosis: Major depression, severe   Generalized Anxiety Disorder  Chronic pain syndrome     Plan and Follow up: Follow pain center plan of care and overall medical plan. Schedule and attend psychotherapy every 1-2 weeks to further address mental health and chronic pain concerns. Patient understands that sessions are by telephone or video at this time, due to COVID-19 guidelines, until further notice. Practice meditations/coping resources as discussed. Continue to engage in hobbies and go outside daily, as appropriate. Continue to discuss future planning.       Discharge Criteria/Planning: Patient will continue with follow-up until therapy can be discontinued without return of signs and symptoms.    Alize Livingston 1/26/2021

## 2021-10-25 ENCOUNTER — HOSPITAL ENCOUNTER (OUTPATIENT)
Dept: INFUSION THERAPY | Facility: HOSPITAL | Age: 66
Discharge: HOME | End: 2021-10-25

## 2021-10-25 ENCOUNTER — APPOINTMENT (OUTPATIENT)
Dept: CT IMAGING | Facility: HOSPITAL | Age: 66
End: 2021-10-25

## 2021-10-25 ENCOUNTER — APPOINTMENT (OUTPATIENT)
Dept: GENERAL RADIOLOGY | Facility: HOSPITAL | Age: 66
End: 2021-10-25

## 2021-10-25 ENCOUNTER — HOSPITAL ENCOUNTER (INPATIENT)
Facility: HOSPITAL | Age: 66
LOS: 4 days | Discharge: HOME | End: 2021-10-29
Attending: EMERGENCY MEDICINE | Admitting: HOSPITALIST

## 2021-10-25 DIAGNOSIS — J96.01 ACUTE RESPIRATORY FAILURE WITH HYPOXIA AND HYPERCAPNIA (HCC): ICD-10-CM

## 2021-10-25 DIAGNOSIS — J96.02 ACUTE RESPIRATORY FAILURE WITH HYPOXIA AND HYPERCAPNIA (HCC): ICD-10-CM

## 2021-10-25 DIAGNOSIS — U07.1 COVID-19: ICD-10-CM

## 2021-10-25 DIAGNOSIS — I50.22 CHRONIC SYSTOLIC HEART FAILURE (HCC): ICD-10-CM

## 2021-10-25 DIAGNOSIS — J96.22 ACUTE ON CHRONIC RESPIRATORY FAILURE WITH HYPOXIA AND HYPERCAPNIA (HCC): Primary | ICD-10-CM

## 2021-10-25 DIAGNOSIS — J96.21 ACUTE ON CHRONIC RESPIRATORY FAILURE WITH HYPOXIA AND HYPERCAPNIA (HCC): Primary | ICD-10-CM

## 2021-10-25 DIAGNOSIS — U07.1 COVID-19: Primary | ICD-10-CM

## 2021-10-25 LAB
A-A DO2: 191.4 MMHG (ref 0–300)
A-A DO2: 193.3 MMHG (ref 0–300)
A-A DO2: 433.8 MMHG (ref 0–300)
A-A DO2: 521.2 MMHG (ref 0–300)
ALBUMIN SERPL-MCNC: 3.72 G/DL (ref 3.5–5.2)
ALBUMIN/GLOB SERPL: 1.2 G/DL
ALP SERPL-CCNC: 49 U/L (ref 39–117)
ALT SERPL W P-5'-P-CCNC: 22 U/L (ref 1–41)
ANION GAP SERPL CALCULATED.3IONS-SCNC: 9.1 MMOL/L (ref 5–15)
ARTERIAL PATENCY WRIST A: ABNORMAL
ARTERIAL PATENCY WRIST A: POSITIVE
AST SERPL-CCNC: 54 U/L (ref 1–40)
ATMOSPHERIC PRESS: 720 MMHG
ATMOSPHERIC PRESS: 721 MMHG
ATMOSPHERIC PRESS: 722 MMHG
ATMOSPHERIC PRESS: 724 MMHG
B PARAPERT DNA SPEC QL NAA+PROBE: NOT DETECTED
B PERT DNA SPEC QL NAA+PROBE: NOT DETECTED
BASE EXCESS BLDA CALC-SCNC: 5.4 MMOL/L (ref 0–2)
BASE EXCESS BLDA CALC-SCNC: 6.2 MMOL/L (ref 0–2)
BASE EXCESS BLDA CALC-SCNC: 6.4 MMOL/L (ref 0–2)
BASE EXCESS BLDA CALC-SCNC: 7.3 MMOL/L (ref 0–2)
BASOPHILS # BLD AUTO: 0.02 10*3/MM3 (ref 0–0.2)
BASOPHILS NFR BLD AUTO: 0.3 % (ref 0–1.5)
BDY SITE: ABNORMAL
BILIRUB SERPL-MCNC: 0.7 MG/DL (ref 0–1.2)
BODY TEMPERATURE: 0 C
BUN SERPL-MCNC: 18 MG/DL (ref 8–23)
BUN/CREAT SERPL: 18.9 (ref 7–25)
C PNEUM DNA NPH QL NAA+NON-PROBE: NOT DETECTED
CALCIUM SPEC-SCNC: 8.5 MG/DL (ref 8.6–10.5)
CHLORIDE SERPL-SCNC: 91 MMOL/L (ref 98–107)
CO2 BLDA-SCNC: 32.7 MMOL/L (ref 22–33)
CO2 BLDA-SCNC: 37.9 MMOL/L (ref 22–33)
CO2 BLDA-SCNC: 38.3 MMOL/L (ref 22–33)
CO2 BLDA-SCNC: 38.6 MMOL/L (ref 22–33)
CO2 SERPL-SCNC: 30.9 MMOL/L (ref 22–29)
COHGB MFR BLD: 0.7 % (ref 0–5)
COHGB MFR BLD: 1.3 % (ref 0–5)
COHGB MFR BLD: 1.4 % (ref 0–5)
COHGB MFR BLD: 1.4 % (ref 0–5)
CREAT SERPL-MCNC: 0.95 MG/DL (ref 0.76–1.27)
CRP SERPL-MCNC: 8.4 MG/DL (ref 0–0.5)
D DIMER PPP FEU-MCNC: 0.9 MCGFEU/ML (ref 0–0.5)
D-LACTATE SERPL-SCNC: 1.3 MMOL/L (ref 0.5–2)
DEPRECATED RDW RBC AUTO: 43.5 FL (ref 37–54)
EOSINOPHIL # BLD AUTO: 0.19 10*3/MM3 (ref 0–0.4)
EOSINOPHIL NFR BLD AUTO: 2.6 % (ref 0.3–6.2)
EPAP: 8
EPAP: 8
ERYTHROCYTE [DISTWIDTH] IN BLOOD BY AUTOMATED COUNT: 12.9 % (ref 12.3–15.4)
FERRITIN SERPL-MCNC: 394.4 NG/ML (ref 30–400)
FLUAV RNA RESP QL NAA+PROBE: NOT DETECTED
FLUAV SUBTYP SPEC NAA+PROBE: NOT DETECTED
FLUBV RNA ISLT QL NAA+PROBE: NOT DETECTED
FLUBV RNA RESP QL NAA+PROBE: NOT DETECTED
GAS FLOW AIRWAY: 15 LPM
GFR SERPL CREATININE-BSD FRML MDRD: 79 ML/MIN/1.73
GLOBULIN UR ELPH-MCNC: 3 GM/DL
GLUCOSE SERPL-MCNC: 107 MG/DL (ref 65–99)
HADV DNA SPEC NAA+PROBE: NOT DETECTED
HCO3 BLDA-SCNC: 31.5 MMOL/L (ref 20–26)
HCO3 BLDA-SCNC: 35.7 MMOL/L (ref 20–26)
HCO3 BLDA-SCNC: 36.1 MMOL/L (ref 20–26)
HCO3 BLDA-SCNC: 36.3 MMOL/L (ref 20–26)
HCOV 229E RNA SPEC QL NAA+PROBE: NOT DETECTED
HCOV HKU1 RNA SPEC QL NAA+PROBE: NOT DETECTED
HCOV NL63 RNA SPEC QL NAA+PROBE: NOT DETECTED
HCOV OC43 RNA SPEC QL NAA+PROBE: NOT DETECTED
HCT VFR BLD AUTO: 53.8 % (ref 37.5–51)
HCT VFR BLD CALC: 51.9 % (ref 38–51)
HCT VFR BLD CALC: 53.6 % (ref 38–51)
HCT VFR BLD CALC: 54 % (ref 38–51)
HCT VFR BLD CALC: 54.8 % (ref 38–51)
HGB BLD-MCNC: 17.1 G/DL (ref 13–17.7)
HGB BLDA-MCNC: 16.9 G/DL (ref 14–18)
HGB BLDA-MCNC: 17.5 G/DL (ref 14–18)
HGB BLDA-MCNC: 17.6 G/DL (ref 14–18)
HGB BLDA-MCNC: 17.9 G/DL (ref 14–18)
HMPV RNA NPH QL NAA+NON-PROBE: NOT DETECTED
HOLD SPECIMEN: NORMAL
HOLD SPECIMEN: NORMAL
HPIV1 RNA SPEC QL NAA+PROBE: NOT DETECTED
HPIV2 RNA SPEC QL NAA+PROBE: NOT DETECTED
HPIV3 RNA NPH QL NAA+PROBE: NOT DETECTED
HPIV4 P GENE NPH QL NAA+PROBE: NOT DETECTED
IMM GRANULOCYTES # BLD AUTO: 0.06 10*3/MM3 (ref 0–0.05)
IMM GRANULOCYTES NFR BLD AUTO: 0.8 % (ref 0–0.5)
INHALED O2 CONCENTRATION: 100 %
INHALED O2 CONCENTRATION: 50 %
INHALED O2 CONCENTRATION: 50 %
INHALED O2 CONCENTRATION: 80 %
IPAP: 24
IPAP: 24
L PNEUMO1 AG UR QL IA: NEGATIVE
LDH SERPL-CCNC: 380 U/L (ref 135–225)
LYMPHOCYTES # BLD AUTO: 0.81 10*3/MM3 (ref 0.7–3.1)
LYMPHOCYTES NFR BLD AUTO: 11.1 % (ref 19.6–45.3)
Lab: ABNORMAL
M PNEUMO IGG SER IA-ACNC: NOT DETECTED
MCH RBC QN AUTO: 29.4 PG (ref 26.6–33)
MCHC RBC AUTO-ENTMCNC: 31.8 G/DL (ref 31.5–35.7)
MCV RBC AUTO: 92.4 FL (ref 79–97)
METHGB BLD QL: 0.1 % (ref 0–3)
METHGB BLD QL: 0.2 % (ref 0–3)
METHGB BLD QL: 0.3 % (ref 0–3)
METHGB BLD QL: <-0.1 % (ref 0–3)
MODALITY: ABNORMAL
MONOCYTES # BLD AUTO: 0.79 10*3/MM3 (ref 0.1–0.9)
MONOCYTES NFR BLD AUTO: 10.9 % (ref 5–12)
NEUTROPHILS NFR BLD AUTO: 5.4 10*3/MM3 (ref 1.7–7)
NEUTROPHILS NFR BLD AUTO: 74.3 % (ref 42.7–76)
NOTE: ABNORMAL
NOTIFIED BY: ABNORMAL
NOTIFIED WHO: ABNORMAL
NRBC BLD AUTO-RTO: 0.4 /100 WBC (ref 0–0.2)
NT-PROBNP SERPL-MCNC: 1526 PG/ML (ref 0–900)
OXYHGB MFR BLDV: 88.7 % (ref 94–99)
OXYHGB MFR BLDV: 88.9 % (ref 94–99)
OXYHGB MFR BLDV: 92.4 % (ref 94–99)
OXYHGB MFR BLDV: 93.6 % (ref 94–99)
PCO2 BLDA: 41.3 MM HG (ref 35–45)
PCO2 BLDA: 71.3 MM HG (ref 35–45)
PCO2 BLDA: 73.5 MM HG (ref 35–45)
PCO2 BLDA: 74.7 MM HG (ref 35–45)
PCO2 TEMP ADJ BLD: ABNORMAL MM[HG]
PEEP RESPIRATORY: 5 CM[H2O]
PH BLDA: 7.29 PH UNITS (ref 7.35–7.45)
PH BLDA: 7.3 PH UNITS (ref 7.35–7.45)
PH BLDA: 7.31 PH UNITS (ref 7.35–7.45)
PH BLDA: 7.49 PH UNITS (ref 7.35–7.45)
PH, TEMP CORRECTED: ABNORMAL
PLATELET # BLD AUTO: 157 10*3/MM3 (ref 140–450)
PMV BLD AUTO: 10.5 FL (ref 6–12)
PO2 BLDA: 65.1 MM HG (ref 83–108)
PO2 BLDA: 65.2 MM HG (ref 83–108)
PO2 BLDA: 66 MM HG (ref 83–108)
PO2 BLDA: 79.1 MM HG (ref 83–108)
PO2 TEMP ADJ BLD: ABNORMAL MM[HG]
POTASSIUM SERPL-SCNC: 4.9 MMOL/L (ref 3.5–5.2)
PROT SERPL-MCNC: 6.7 G/DL (ref 6–8.5)
RBC # BLD AUTO: 5.82 10*6/MM3 (ref 4.14–5.8)
RHINOVIRUS RNA SPEC NAA+PROBE: NOT DETECTED
RSV RNA NPH QL NAA+NON-PROBE: NOT DETECTED
SAO2 % BLDCOA: 90.1 % (ref 94–99)
SAO2 % BLDCOA: 90.4 % (ref 94–99)
SAO2 % BLDCOA: 93.8 % (ref 94–99)
SAO2 % BLDCOA: 93.8 % (ref 94–99)
SARS-COV-2 RNA RESP QL NAA+PROBE: DETECTED
SET MECH RESP RATE: 24
SODIUM SERPL-SCNC: 131 MMOL/L (ref 136–145)
TROPONIN T SERPL-MCNC: <0.01 NG/ML (ref 0–0.03)
VENTILATOR MODE: ABNORMAL
VT ON VENT VENT: 550 ML
WBC # BLD AUTO: 7.27 10*3/MM3 (ref 3.4–10.8)
WHOLE BLOOD HOLD SPECIMEN: NORMAL
WHOLE BLOOD HOLD SPECIMEN: NORMAL

## 2021-10-25 PROCEDURE — 83615 LACTATE (LD) (LDH) ENZYME: CPT | Performed by: PHYSICIAN ASSISTANT

## 2021-10-25 PROCEDURE — 36600 WITHDRAWAL OF ARTERIAL BLOOD: CPT

## 2021-10-25 PROCEDURE — 82805 BLOOD GASES W/O2 SATURATION: CPT

## 2021-10-25 PROCEDURE — 0BH17EZ INSERTION OF ENDOTRACHEAL AIRWAY INTO TRACHEA, VIA NATURAL OR ARTIFICIAL OPENING: ICD-10-PCS | Performed by: EMERGENCY MEDICINE

## 2021-10-25 PROCEDURE — 71045 X-RAY EXAM CHEST 1 VIEW: CPT | Performed by: RADIOLOGY

## 2021-10-25 PROCEDURE — 94799 UNLISTED PULMONARY SVC/PX: CPT

## 2021-10-25 PROCEDURE — 25010000002 PROPOFOL 10 MG/ML EMULSION

## 2021-10-25 PROCEDURE — 80053 COMPREHEN METABOLIC PANEL: CPT | Performed by: PHYSICIAN ASSISTANT

## 2021-10-25 PROCEDURE — 84145 PROCALCITONIN (PCT): CPT | Performed by: PHYSICIAN ASSISTANT

## 2021-10-25 PROCEDURE — 5A1945Z RESPIRATORY VENTILATION, 24-96 CONSECUTIVE HOURS: ICD-10-PCS | Performed by: EMERGENCY MEDICINE

## 2021-10-25 PROCEDURE — 82728 ASSAY OF FERRITIN: CPT | Performed by: PHYSICIAN ASSISTANT

## 2021-10-25 PROCEDURE — 25010000002 DEXAMETHASONE PER 1 MG: Performed by: PHYSICIAN ASSISTANT

## 2021-10-25 PROCEDURE — 71045 X-RAY EXAM CHEST 1 VIEW: CPT

## 2021-10-25 PROCEDURE — 94002 VENT MGMT INPAT INIT DAY: CPT

## 2021-10-25 PROCEDURE — 99291 CRITICAL CARE FIRST HOUR: CPT

## 2021-10-25 PROCEDURE — 83880 ASSAY OF NATRIURETIC PEPTIDE: CPT | Performed by: PHYSICIAN ASSISTANT

## 2021-10-25 PROCEDURE — 70450 CT HEAD/BRAIN W/O DYE: CPT | Performed by: RADIOLOGY

## 2021-10-25 PROCEDURE — 82375 ASSAY CARBOXYHB QUANT: CPT

## 2021-10-25 PROCEDURE — 93005 ELECTROCARDIOGRAM TRACING: CPT | Performed by: PHYSICIAN ASSISTANT

## 2021-10-25 PROCEDURE — 83050 HGB METHEMOGLOBIN QUAN: CPT

## 2021-10-25 PROCEDURE — 94660 CPAP INITIATION&MGMT: CPT

## 2021-10-25 PROCEDURE — 25010000002 PROPOFOL 10 MG/ML EMULSION: Performed by: INTERNAL MEDICINE

## 2021-10-25 PROCEDURE — 87040 BLOOD CULTURE FOR BACTERIA: CPT | Performed by: INTERNAL MEDICINE

## 2021-10-25 PROCEDURE — 85025 COMPLETE CBC W/AUTO DIFF WBC: CPT | Performed by: PHYSICIAN ASSISTANT

## 2021-10-25 PROCEDURE — 83605 ASSAY OF LACTIC ACID: CPT | Performed by: PHYSICIAN ASSISTANT

## 2021-10-25 PROCEDURE — 99222 1ST HOSP IP/OBS MODERATE 55: CPT | Performed by: INTERNAL MEDICINE

## 2021-10-25 PROCEDURE — 70450 CT HEAD/BRAIN W/O DYE: CPT

## 2021-10-25 PROCEDURE — 71275 CT ANGIOGRAPHY CHEST: CPT | Performed by: RADIOLOGY

## 2021-10-25 PROCEDURE — 31500 INSERT EMERGENCY AIRWAY: CPT

## 2021-10-25 PROCEDURE — 85379 FIBRIN DEGRADATION QUANT: CPT | Performed by: PHYSICIAN ASSISTANT

## 2021-10-25 PROCEDURE — XW033E5 INTRODUCTION OF REMDESIVIR ANTI-INFECTIVE INTO PERIPHERAL VEIN, PERCUTANEOUS APPROACH, NEW TECHNOLOGY GROUP 5: ICD-10-PCS | Performed by: INTERNAL MEDICINE

## 2021-10-25 PROCEDURE — 86140 C-REACTIVE PROTEIN: CPT | Performed by: PHYSICIAN ASSISTANT

## 2021-10-25 PROCEDURE — 87899 AGENT NOS ASSAY W/OPTIC: CPT | Performed by: INTERNAL MEDICINE

## 2021-10-25 PROCEDURE — 25010000002 CEFTRIAXONE PER 250 MG: Performed by: INTERNAL MEDICINE

## 2021-10-25 PROCEDURE — 71275 CT ANGIOGRAPHY CHEST: CPT

## 2021-10-25 PROCEDURE — 84484 ASSAY OF TROPONIN QUANT: CPT | Performed by: PHYSICIAN ASSISTANT

## 2021-10-25 PROCEDURE — 0 IOPAMIDOL PER 1 ML: Performed by: EMERGENCY MEDICINE

## 2021-10-25 PROCEDURE — 93010 ELECTROCARDIOGRAM REPORT: CPT | Performed by: INTERNAL MEDICINE

## 2021-10-25 PROCEDURE — 25010000002 ENOXAPARIN PER 10 MG: Performed by: HOSPITALIST

## 2021-10-25 PROCEDURE — 87636 SARSCOV2 & INF A&B AMP PRB: CPT | Performed by: PHYSICIAN ASSISTANT

## 2021-10-25 PROCEDURE — 87633 RESP VIRUS 12-25 TARGETS: CPT | Performed by: INTERNAL MEDICINE

## 2021-10-25 RX ORDER — METHYLPREDNISOLONE SODIUM SUCCINATE 125 MG/2ML
125 INJECTION, POWDER, LYOPHILIZED, FOR SOLUTION INTRAMUSCULAR; INTRAVENOUS AS NEEDED
Status: DISCONTINUED | OUTPATIENT
Start: 2021-10-25 | End: 2021-10-25

## 2021-10-25 RX ORDER — PANTOPRAZOLE SODIUM 40 MG/10ML
40 INJECTION, POWDER, LYOPHILIZED, FOR SOLUTION INTRAVENOUS
Status: DISCONTINUED | OUTPATIENT
Start: 2021-10-26 | End: 2021-10-29 | Stop reason: HOSPADM

## 2021-10-25 RX ORDER — DIPHENHYDRAMINE HYDROCHLORIDE 50 MG/ML
50 INJECTION INTRAMUSCULAR; INTRAVENOUS ONCE AS NEEDED
Status: CANCELLED | OUTPATIENT
Start: 2021-10-25

## 2021-10-25 RX ORDER — DEXAMETHASONE SODIUM PHOSPHATE 4 MG/ML
6 INJECTION, SOLUTION INTRA-ARTICULAR; INTRALESIONAL; INTRAMUSCULAR; INTRAVENOUS; SOFT TISSUE ONCE
Status: COMPLETED | OUTPATIENT
Start: 2021-10-25 | End: 2021-10-25

## 2021-10-25 RX ORDER — EPINEPHRINE 1 MG/ML
0.3 INJECTION, SOLUTION INTRAMUSCULAR; SUBCUTANEOUS AS NEEDED
Status: DISCONTINUED | OUTPATIENT
Start: 2021-10-25 | End: 2021-10-25

## 2021-10-25 RX ORDER — SODIUM CHLORIDE 0.9 % (FLUSH) 0.9 %
10 SYRINGE (ML) INJECTION EVERY 12 HOURS SCHEDULED
Status: DISCONTINUED | OUTPATIENT
Start: 2021-10-25 | End: 2021-10-29 | Stop reason: HOSPADM

## 2021-10-25 RX ORDER — DEXAMETHASONE SODIUM PHOSPHATE 4 MG/ML
6 INJECTION, SOLUTION INTRA-ARTICULAR; INTRALESIONAL; INTRAMUSCULAR; INTRAVENOUS; SOFT TISSUE DAILY
Status: DISCONTINUED | OUTPATIENT
Start: 2021-10-26 | End: 2021-10-29 | Stop reason: HOSPADM

## 2021-10-25 RX ORDER — DIPHENHYDRAMINE HYDROCHLORIDE 50 MG/ML
50 INJECTION INTRAMUSCULAR; INTRAVENOUS ONCE AS NEEDED
Status: DISCONTINUED | OUTPATIENT
Start: 2021-10-25 | End: 2021-10-25

## 2021-10-25 RX ORDER — MIDAZOLAM IN NACL,ISO-OSMOT/PF 50 MG/50ML
1-10 INFUSION BOTTLE (ML) INTRAVENOUS
Status: DISCONTINUED | OUTPATIENT
Start: 2021-10-25 | End: 2021-10-28

## 2021-10-25 RX ORDER — EPINEPHRINE 1 MG/ML
0.3 INJECTION, SOLUTION INTRAMUSCULAR; SUBCUTANEOUS AS NEEDED
Status: CANCELLED | OUTPATIENT
Start: 2021-10-25

## 2021-10-25 RX ORDER — PROPOFOL 10 MG/ML
VIAL (ML) INTRAVENOUS
Status: COMPLETED
Start: 2021-10-25 | End: 2021-10-25

## 2021-10-25 RX ORDER — SODIUM CHLORIDE 0.9 % (FLUSH) 0.9 %
10 SYRINGE (ML) INJECTION AS NEEDED
Status: DISCONTINUED | OUTPATIENT
Start: 2021-10-25 | End: 2021-10-29 | Stop reason: HOSPADM

## 2021-10-25 RX ORDER — DIPHENHYDRAMINE HCL 50 MG
50 CAPSULE ORAL ONCE AS NEEDED
Status: CANCELLED | OUTPATIENT
Start: 2021-10-25

## 2021-10-25 RX ORDER — METHYLPREDNISOLONE SODIUM SUCCINATE 125 MG/2ML
125 INJECTION, POWDER, LYOPHILIZED, FOR SOLUTION INTRAMUSCULAR; INTRAVENOUS AS NEEDED
Status: CANCELLED | OUTPATIENT
Start: 2021-10-25

## 2021-10-25 RX ORDER — DIPHENHYDRAMINE HCL 50 MG
50 CAPSULE ORAL ONCE AS NEEDED
Status: DISCONTINUED | OUTPATIENT
Start: 2021-10-25 | End: 2021-10-25

## 2021-10-25 RX ADMIN — SODIUM CHLORIDE, PRESERVATIVE FREE 10 ML: 5 INJECTION INTRAVENOUS at 21:20

## 2021-10-25 RX ADMIN — PROPOFOL 50 MCG/KG/MIN: 10 INJECTION, EMULSION INTRAVENOUS at 21:17

## 2021-10-25 RX ADMIN — DEXAMETHASONE SODIUM PHOSPHATE 6 MG: 4 INJECTION, SOLUTION INTRA-ARTICULAR; INTRALESIONAL; INTRAMUSCULAR; INTRAVENOUS; SOFT TISSUE at 14:39

## 2021-10-25 RX ADMIN — REMDESIVIR 200 MG: 100 INJECTION, POWDER, LYOPHILIZED, FOR SOLUTION INTRAVENOUS at 22:34

## 2021-10-25 RX ADMIN — PROPOFOL 40 MCG/KG/MIN: 10 INJECTION, EMULSION INTRAVENOUS at 20:07

## 2021-10-25 RX ADMIN — Medication 1 MG/HR: at 21:16

## 2021-10-25 RX ADMIN — ENOXAPARIN SODIUM 60 MG: 100 INJECTION SUBCUTANEOUS at 22:34

## 2021-10-25 RX ADMIN — IOPAMIDOL 80 ML: 755 INJECTION, SOLUTION INTRAVENOUS at 15:42

## 2021-10-25 RX ADMIN — ETOMIDATE 15 MG: 2 INJECTION, SOLUTION INTRAVENOUS at 19:37

## 2021-10-25 RX ADMIN — SUCCINYLCHOLINE CHLORIDE 150 MG: 20 INJECTION, SOLUTION INTRAMUSCULAR; INTRAVENOUS at 19:39

## 2021-10-25 RX ADMIN — SODIUM CHLORIDE 2 G: 9 INJECTION, SOLUTION INTRAVENOUS at 21:41

## 2021-10-26 LAB
A-A DO2: 449.6 MMHG (ref 0–300)
A-A DO2: 449.7 MMHG (ref 0–300)
A-A DO2: 468.2 MMHG (ref 0–300)
A-A DO2: 480.2 MMHG (ref 0–300)
ALBUMIN SERPL-MCNC: 3.16 G/DL (ref 3.5–5.2)
ALBUMIN/GLOB SERPL: 1.1 G/DL
ALP SERPL-CCNC: 40 U/L (ref 39–117)
ALT SERPL W P-5'-P-CCNC: 22 U/L (ref 1–41)
ANION GAP SERPL CALCULATED.3IONS-SCNC: 15.9 MMOL/L (ref 5–15)
ARTERIAL PATENCY WRIST A: ABNORMAL
ARTERIAL PATENCY WRIST A: ABNORMAL
ARTERIAL PATENCY WRIST A: POSITIVE
ARTERIAL PATENCY WRIST A: POSITIVE
AST SERPL-CCNC: 58 U/L (ref 1–40)
ATMOSPHERIC PRESS: 725 MMHG
ATMOSPHERIC PRESS: 726 MMHG
ATMOSPHERIC PRESS: 726 MMHG
ATMOSPHERIC PRESS: 727 MMHG
BASE EXCESS BLDA CALC-SCNC: 6.9 MMOL/L (ref 0–2)
BASE EXCESS BLDA CALC-SCNC: 7.3 MMOL/L (ref 0–2)
BASE EXCESS BLDA CALC-SCNC: 7.6 MMOL/L (ref 0–2)
BASE EXCESS BLDA CALC-SCNC: 7.9 MMOL/L (ref 0–2)
BASOPHILS # BLD AUTO: 0.02 10*3/MM3 (ref 0–0.2)
BASOPHILS NFR BLD AUTO: 0.6 % (ref 0–1.5)
BDY SITE: ABNORMAL
BILIRUB SERPL-MCNC: 1 MG/DL (ref 0–1.2)
BODY TEMPERATURE: 0 C
BUN SERPL-MCNC: 17 MG/DL (ref 8–23)
BUN/CREAT SERPL: 17.2 (ref 7–25)
CALCIUM SPEC-SCNC: 8.4 MG/DL (ref 8.6–10.5)
CHLORIDE SERPL-SCNC: 96 MMOL/L (ref 98–107)
CK SERPL-CCNC: 110 U/L (ref 20–200)
CO2 BLDA-SCNC: 29.2 MMOL/L (ref 22–33)
CO2 BLDA-SCNC: 30.2 MMOL/L (ref 22–33)
CO2 BLDA-SCNC: 31.3 MMOL/L (ref 22–33)
CO2 BLDA-SCNC: 33.7 MMOL/L (ref 22–33)
CO2 SERPL-SCNC: 25.1 MMOL/L (ref 22–29)
COHGB MFR BLD: <0.2 % (ref 0–5)
CREAT SERPL-MCNC: 0.99 MG/DL (ref 0.76–1.27)
CRP SERPL-MCNC: 10.85 MG/DL (ref 0–0.5)
D DIMER PPP FEU-MCNC: 1.12 MCGFEU/ML (ref 0–0.5)
D-LACTATE SERPL-SCNC: 1.9 MMOL/L (ref 0.5–2)
DEPRECATED RDW RBC AUTO: 41.6 FL (ref 37–54)
EOSINOPHIL # BLD AUTO: 0 10*3/MM3 (ref 0–0.4)
EOSINOPHIL NFR BLD AUTO: 0 % (ref 0.3–6.2)
ERYTHROCYTE [DISTWIDTH] IN BLOOD BY AUTOMATED COUNT: 12.7 % (ref 12.3–15.4)
GFR SERPL CREATININE-BSD FRML MDRD: 76 ML/MIN/1.73
GLOBULIN UR ELPH-MCNC: 2.9 GM/DL
GLUCOSE BLDC GLUCOMTR-MCNC: 114 MG/DL (ref 70–130)
GLUCOSE BLDC GLUCOMTR-MCNC: 174 MG/DL (ref 70–130)
GLUCOSE SERPL-MCNC: 130 MG/DL (ref 65–99)
HBA1C MFR BLD: 6.6 % (ref 4.8–5.6)
HCO3 BLDA-SCNC: 28.4 MMOL/L (ref 20–26)
HCO3 BLDA-SCNC: 29.2 MMOL/L (ref 20–26)
HCO3 BLDA-SCNC: 30.2 MMOL/L (ref 20–26)
HCO3 BLDA-SCNC: 32.3 MMOL/L (ref 20–26)
HCT VFR BLD AUTO: 50.6 % (ref 37.5–51)
HCT VFR BLD CALC: 52.2 % (ref 38–51)
HCT VFR BLD CALC: 52.8 % (ref 38–51)
HCT VFR BLD CALC: 53.1 % (ref 38–51)
HCT VFR BLD CALC: 53.3 % (ref 38–51)
HGB BLD-MCNC: 16.5 G/DL (ref 13–17.7)
HGB BLDA-MCNC: 17 G/DL (ref 14–18)
HGB BLDA-MCNC: 17.2 G/DL (ref 14–18)
HGB BLDA-MCNC: 17.3 G/DL (ref 14–18)
HGB BLDA-MCNC: 17.4 G/DL (ref 14–18)
IMM GRANULOCYTES # BLD AUTO: 0.04 10*3/MM3 (ref 0–0.05)
IMM GRANULOCYTES NFR BLD AUTO: 1.2 % (ref 0–0.5)
INHALED O2 CONCENTRATION: 80 %
INHALED O2 CONCENTRATION: 85 %
LYMPHOCYTES # BLD AUTO: 0.65 10*3/MM3 (ref 0.7–3.1)
LYMPHOCYTES NFR BLD AUTO: 19.4 % (ref 19.6–45.3)
Lab: ABNORMAL
MCH RBC QN AUTO: 29.3 PG (ref 26.6–33)
MCHC RBC AUTO-ENTMCNC: 32.6 G/DL (ref 31.5–35.7)
MCV RBC AUTO: 89.9 FL (ref 79–97)
METHGB BLD QL: 0 % (ref 0–3)
METHGB BLD QL: 0.1 % (ref 0–3)
METHGB BLD QL: 0.1 % (ref 0–3)
METHGB BLD QL: <-0.1 % (ref 0–3)
MODALITY: ABNORMAL
MONOCYTES # BLD AUTO: 0.36 10*3/MM3 (ref 0.1–0.9)
MONOCYTES NFR BLD AUTO: 10.7 % (ref 5–12)
NEUTROPHILS NFR BLD AUTO: 2.28 10*3/MM3 (ref 1.7–7)
NEUTROPHILS NFR BLD AUTO: 68.1 % (ref 42.7–76)
NOTE: ABNORMAL
NOTIFIED BY: ABNORMAL
NOTIFIED WHO: ABNORMAL
NRBC BLD AUTO-RTO: 0 /100 WBC (ref 0–0.2)
NT-PROBNP SERPL-MCNC: 1068 PG/ML (ref 0–900)
NT-PROBNP SERPL-MCNC: 1302 PG/ML (ref 0–900)
OXYHGB MFR BLDV: 93.8 % (ref 94–99)
OXYHGB MFR BLDV: 94.3 % (ref 94–99)
OXYHGB MFR BLDV: 94.5 % (ref 94–99)
OXYHGB MFR BLDV: 94.7 % (ref 94–99)
PCO2 BLDA: 28.3 MM HG (ref 35–45)
PCO2 BLDA: 31.9 MM HG (ref 35–45)
PCO2 BLDA: 36.2 MM HG (ref 35–45)
PCO2 BLDA: 46.5 MM HG (ref 35–45)
PCO2 TEMP ADJ BLD: ABNORMAL MM[HG]
PEEP RESPIRATORY: 5 CM[H2O]
PH BLDA: 7.45 PH UNITS (ref 7.35–7.45)
PH BLDA: 7.53 PH UNITS (ref 7.35–7.45)
PH BLDA: 7.57 PH UNITS (ref 7.35–7.45)
PH BLDA: 7.61 PH UNITS (ref 7.35–7.45)
PH, TEMP CORRECTED: ABNORMAL
PLATELET # BLD AUTO: 151 10*3/MM3 (ref 140–450)
PMV BLD AUTO: 10.8 FL (ref 6–12)
PO2 BLDA: 63.6 MM HG (ref 83–108)
PO2 BLDA: 64.4 MM HG (ref 83–108)
PO2 BLDA: 72 MM HG (ref 83–108)
PO2 BLDA: 80.6 MM HG (ref 83–108)
PO2 TEMP ADJ BLD: ABNORMAL MM[HG]
POTASSIUM SERPL-SCNC: 4.3 MMOL/L (ref 3.5–5.2)
PROCALCITONIN SERPL-MCNC: 0.07 NG/ML (ref 0–0.25)
PROCALCITONIN SERPL-MCNC: 0.08 NG/ML (ref 0–0.25)
PROT SERPL-MCNC: 6.1 G/DL (ref 6–8.5)
QT INTERVAL: 380 MS
QTC INTERVAL: 472 MS
RBC # BLD AUTO: 5.63 10*6/MM3 (ref 4.14–5.8)
S PNEUM AG SPEC QL LA: NEGATIVE
SAO2 % BLDCOA: 93.7 % (ref 94–99)
SAO2 % BLDCOA: 94.3 % (ref 94–99)
SAO2 % BLDCOA: 94.3 % (ref 94–99)
SAO2 % BLDCOA: 94.8 % (ref 94–99)
SET MECH RESP RATE: 12
SET MECH RESP RATE: 16
SET MECH RESP RATE: 20
SET MECH RESP RATE: 24
SODIUM SERPL-SCNC: 137 MMOL/L (ref 136–145)
TROPONIN T SERPL-MCNC: <0.01 NG/ML (ref 0–0.03)
VENTILATOR MODE: ABNORMAL
VT ON VENT VENT: 550 ML
WBC # BLD AUTO: 3.35 10*3/MM3 (ref 3.4–10.8)

## 2021-10-26 PROCEDURE — 83880 ASSAY OF NATRIURETIC PEPTIDE: CPT | Performed by: INTERNAL MEDICINE

## 2021-10-26 PROCEDURE — 83036 HEMOGLOBIN GLYCOSYLATED A1C: CPT | Performed by: INTERNAL MEDICINE

## 2021-10-26 PROCEDURE — 94799 UNLISTED PULMONARY SVC/PX: CPT

## 2021-10-26 PROCEDURE — 86140 C-REACTIVE PROTEIN: CPT | Performed by: INTERNAL MEDICINE

## 2021-10-26 PROCEDURE — 85379 FIBRIN DEGRADATION QUANT: CPT | Performed by: INTERNAL MEDICINE

## 2021-10-26 PROCEDURE — 99222 1ST HOSP IP/OBS MODERATE 55: CPT | Performed by: INTERNAL MEDICINE

## 2021-10-26 PROCEDURE — 25010000002 PROPOFOL 10 MG/ML EMULSION: Performed by: EMERGENCY MEDICINE

## 2021-10-26 PROCEDURE — 94003 VENT MGMT INPAT SUBQ DAY: CPT

## 2021-10-26 PROCEDURE — 99291 CRITICAL CARE FIRST HOUR: CPT | Performed by: INTERNAL MEDICINE

## 2021-10-26 PROCEDURE — 82962 GLUCOSE BLOOD TEST: CPT

## 2021-10-26 PROCEDURE — 36600 WITHDRAWAL OF ARTERIAL BLOOD: CPT

## 2021-10-26 PROCEDURE — 84145 PROCALCITONIN (PCT): CPT | Performed by: INTERNAL MEDICINE

## 2021-10-26 PROCEDURE — 25010000002 ENOXAPARIN PER 10 MG: Performed by: HOSPITALIST

## 2021-10-26 PROCEDURE — 82550 ASSAY OF CK (CPK): CPT | Performed by: INTERNAL MEDICINE

## 2021-10-26 PROCEDURE — 83605 ASSAY OF LACTIC ACID: CPT | Performed by: INTERNAL MEDICINE

## 2021-10-26 PROCEDURE — 25010000002 DEXAMETHASONE PER 1 MG: Performed by: INTERNAL MEDICINE

## 2021-10-26 PROCEDURE — 25010000002 CEFTRIAXONE PER 250 MG: Performed by: INTERNAL MEDICINE

## 2021-10-26 PROCEDURE — 82375 ASSAY CARBOXYHB QUANT: CPT

## 2021-10-26 PROCEDURE — 82805 BLOOD GASES W/O2 SATURATION: CPT

## 2021-10-26 PROCEDURE — 83050 HGB METHEMOGLOBIN QUAN: CPT

## 2021-10-26 PROCEDURE — 25010000002 SUCCINYLCHOLINE PER 20 MG: Performed by: EMERGENCY MEDICINE

## 2021-10-26 PROCEDURE — 85025 COMPLETE CBC W/AUTO DIFF WBC: CPT | Performed by: INTERNAL MEDICINE

## 2021-10-26 PROCEDURE — 25010000002 PROPOFOL 10 MG/ML EMULSION: Performed by: INTERNAL MEDICINE

## 2021-10-26 PROCEDURE — 84484 ASSAY OF TROPONIN QUANT: CPT | Performed by: INTERNAL MEDICINE

## 2021-10-26 PROCEDURE — 99233 SBSQ HOSP IP/OBS HIGH 50: CPT | Performed by: INTERNAL MEDICINE

## 2021-10-26 PROCEDURE — 80053 COMPREHEN METABOLIC PANEL: CPT | Performed by: INTERNAL MEDICINE

## 2021-10-26 RX ORDER — ETOMIDATE 2 MG/ML
15 INJECTION INTRAVENOUS ONCE
Status: COMPLETED | OUTPATIENT
Start: 2021-10-26 | End: 2021-10-25

## 2021-10-26 RX ORDER — SUCCINYLCHOLINE CHLORIDE 20 MG/ML
150 INJECTION INTRAMUSCULAR; INTRAVENOUS ONCE
Status: COMPLETED | OUTPATIENT
Start: 2021-10-26 | End: 2021-10-25

## 2021-10-26 RX ORDER — ASPIRIN 81 MG/1
81 TABLET, CHEWABLE ORAL DAILY
Status: DISCONTINUED | OUTPATIENT
Start: 2021-10-26 | End: 2021-10-29 | Stop reason: HOSPADM

## 2021-10-26 RX ORDER — CARVEDILOL 6.25 MG/1
6.25 TABLET ORAL 2 TIMES DAILY WITH MEALS
Status: CANCELLED | OUTPATIENT
Start: 2021-10-26

## 2021-10-26 RX ORDER — ASPIRIN 81 MG/1
81 TABLET ORAL DAILY
Status: CANCELLED | OUTPATIENT
Start: 2021-10-26

## 2021-10-26 RX ADMIN — ENOXAPARIN SODIUM 60 MG: 100 INJECTION SUBCUTANEOUS at 19:33

## 2021-10-26 RX ADMIN — PROPOFOL 35 MCG/KG/MIN: 10 INJECTION, EMULSION INTRAVENOUS at 18:48

## 2021-10-26 RX ADMIN — DEXAMETHASONE SODIUM PHOSPHATE 6 MG: 4 INJECTION, SOLUTION INTRA-ARTICULAR; INTRALESIONAL; INTRAMUSCULAR; INTRAVENOUS; SOFT TISSUE at 08:43

## 2021-10-26 RX ADMIN — SODIUM CHLORIDE 2 G: 9 INJECTION, SOLUTION INTRAVENOUS at 19:32

## 2021-10-26 RX ADMIN — ASPIRIN 81 MG: 81 TABLET, CHEWABLE ORAL at 09:40

## 2021-10-26 RX ADMIN — SODIUM CHLORIDE, PRESERVATIVE FREE 10 ML: 5 INJECTION INTRAVENOUS at 08:42

## 2021-10-26 RX ADMIN — DOXYCYCLINE 100 MG: 100 INJECTION, POWDER, LYOPHILIZED, FOR SOLUTION INTRAVENOUS at 11:50

## 2021-10-26 RX ADMIN — PANTOPRAZOLE SODIUM 40 MG: 40 INJECTION, POWDER, FOR SOLUTION INTRAVENOUS at 04:29

## 2021-10-26 RX ADMIN — DOXYCYCLINE 100 MG: 100 INJECTION, POWDER, LYOPHILIZED, FOR SOLUTION INTRAVENOUS at 00:03

## 2021-10-26 RX ADMIN — PROPOFOL 40 MCG/KG/MIN: 10 INJECTION, EMULSION INTRAVENOUS at 08:42

## 2021-10-26 RX ADMIN — PROPOFOL 40 MCG/KG/MIN: 10 INJECTION, EMULSION INTRAVENOUS at 11:53

## 2021-10-26 RX ADMIN — REMDESIVIR 100 MG: 100 INJECTION, POWDER, LYOPHILIZED, FOR SOLUTION INTRAVENOUS at 19:32

## 2021-10-26 RX ADMIN — SODIUM CHLORIDE, PRESERVATIVE FREE 10 ML: 5 INJECTION INTRAVENOUS at 19:33

## 2021-10-26 RX ADMIN — PROPOFOL 40 MCG/KG/MIN: 10 INJECTION, EMULSION INTRAVENOUS at 04:28

## 2021-10-26 RX ADMIN — Medication 6 MG/HR: at 04:28

## 2021-10-26 RX ADMIN — PROPOFOL 35 MCG/KG/MIN: 10 INJECTION, EMULSION INTRAVENOUS at 23:05

## 2021-10-26 RX ADMIN — DOXYCYCLINE 100 MG: 100 INJECTION, POWDER, LYOPHILIZED, FOR SOLUTION INTRAVENOUS at 23:06

## 2021-10-26 RX ADMIN — PROPOFOL 50 MCG/KG/MIN: 10 INJECTION, EMULSION INTRAVENOUS at 01:19

## 2021-10-27 ENCOUNTER — APPOINTMENT (OUTPATIENT)
Dept: GENERAL RADIOLOGY | Facility: HOSPITAL | Age: 66
End: 2021-10-27

## 2021-10-27 ENCOUNTER — APPOINTMENT (OUTPATIENT)
Dept: CARDIOLOGY | Facility: HOSPITAL | Age: 66
End: 2021-10-27

## 2021-10-27 LAB
A-A DO2: 206.8 MMHG (ref 0–300)
A-A DO2: 321.1 MMHG (ref 0–300)
ALBUMIN SERPL-MCNC: 2.64 G/DL (ref 3.5–5.2)
ALBUMIN/GLOB SERPL: 1 G/DL
ALP SERPL-CCNC: 37 U/L (ref 39–117)
ALT SERPL W P-5'-P-CCNC: 17 U/L (ref 1–41)
ANION GAP SERPL CALCULATED.3IONS-SCNC: 8.9 MMOL/L (ref 5–15)
ARTERIAL PATENCY WRIST A: ABNORMAL
ARTERIAL PATENCY WRIST A: POSITIVE
AST SERPL-CCNC: 42 U/L (ref 1–40)
ATMOSPHERIC PRESS: 723 MMHG
ATMOSPHERIC PRESS: 726 MMHG
BASE EXCESS BLDA CALC-SCNC: 7.4 MMOL/L (ref 0–2)
BASE EXCESS BLDA CALC-SCNC: 7.8 MMOL/L (ref 0–2)
BASOPHILS # BLD AUTO: 0.01 10*3/MM3 (ref 0–0.2)
BASOPHILS NFR BLD AUTO: 0.2 % (ref 0–1.5)
BDY SITE: ABNORMAL
BDY SITE: ABNORMAL
BILIRUB SERPL-MCNC: 0.5 MG/DL (ref 0–1.2)
BODY TEMPERATURE: 0 C
BODY TEMPERATURE: 0 C
BUN SERPL-MCNC: 24 MG/DL (ref 8–23)
BUN/CREAT SERPL: 24.5 (ref 7–25)
CALCIUM SPEC-SCNC: 8.1 MG/DL (ref 8.6–10.5)
CHLORIDE SERPL-SCNC: 100 MMOL/L (ref 98–107)
CK SERPL-CCNC: 71 U/L (ref 20–200)
CO2 BLDA-SCNC: 34.9 MMOL/L (ref 22–33)
CO2 BLDA-SCNC: 37.5 MMOL/L (ref 22–33)
CO2 SERPL-SCNC: 28.1 MMOL/L (ref 22–29)
COHGB MFR BLD: 0.6 % (ref 0–5)
COHGB MFR BLD: <0.2 % (ref 0–5)
CREAT SERPL-MCNC: 0.98 MG/DL (ref 0.76–1.27)
CRP SERPL-MCNC: 7.37 MG/DL (ref 0–0.5)
DEPRECATED RDW RBC AUTO: 44.9 FL (ref 37–54)
EOSINOPHIL # BLD AUTO: 0 10*3/MM3 (ref 0–0.4)
EOSINOPHIL NFR BLD AUTO: 0 % (ref 0.3–6.2)
ERYTHROCYTE [DISTWIDTH] IN BLOOD BY AUTOMATED COUNT: 13.2 % (ref 12.3–15.4)
GAS FLOW AIRWAY: 8 LPM
GFR SERPL CREATININE-BSD FRML MDRD: 77 ML/MIN/1.73
GLOBULIN UR ELPH-MCNC: 2.8 GM/DL
GLUCOSE BLDC GLUCOMTR-MCNC: 133 MG/DL (ref 70–130)
GLUCOSE BLDC GLUCOMTR-MCNC: 136 MG/DL (ref 70–130)
GLUCOSE SERPL-MCNC: 179 MG/DL (ref 65–99)
HCO3 BLDA-SCNC: 33.4 MMOL/L (ref 20–26)
HCO3 BLDA-SCNC: 35.7 MMOL/L (ref 20–26)
HCT VFR BLD AUTO: 50.3 % (ref 37.5–51)
HCT VFR BLD CALC: 51.7 % (ref 38–51)
HCT VFR BLD CALC: 54.1 % (ref 38–51)
HGB BLD-MCNC: 16 G/DL (ref 13–17.7)
HGB BLDA-MCNC: 16.9 G/DL (ref 14–18)
HGB BLDA-MCNC: 17.7 G/DL (ref 14–18)
IMM GRANULOCYTES # BLD AUTO: 0.04 10*3/MM3 (ref 0–0.05)
IMM GRANULOCYTES NFR BLD AUTO: 0.7 % (ref 0–0.5)
INHALED O2 CONCENTRATION: 50 %
INHALED O2 CONCENTRATION: 65 %
LYMPHOCYTES # BLD AUTO: 0.54 10*3/MM3 (ref 0.7–3.1)
LYMPHOCYTES NFR BLD AUTO: 9.1 % (ref 19.6–45.3)
Lab: ABNORMAL
Lab: ABNORMAL
MCH RBC QN AUTO: 29.5 PG (ref 26.6–33)
MCHC RBC AUTO-ENTMCNC: 31.8 G/DL (ref 31.5–35.7)
MCV RBC AUTO: 92.8 FL (ref 79–97)
METHGB BLD QL: 0 % (ref 0–3)
METHGB BLD QL: 0.2 % (ref 0–3)
MODALITY: ABNORMAL
MODALITY: ABNORMAL
MONOCYTES # BLD AUTO: 0.5 10*3/MM3 (ref 0.1–0.9)
MONOCYTES NFR BLD AUTO: 8.4 % (ref 5–12)
NEUTROPHILS NFR BLD AUTO: 4.83 10*3/MM3 (ref 1.7–7)
NEUTROPHILS NFR BLD AUTO: 81.6 % (ref 42.7–76)
NOTE: ABNORMAL
NOTE: ABNORMAL
NRBC BLD AUTO-RTO: 0 /100 WBC (ref 0–0.2)
OXYHGB MFR BLDV: 91.4 % (ref 94–99)
OXYHGB MFR BLDV: 92.1 % (ref 94–99)
PCO2 BLDA: 49.6 MM HG (ref 35–45)
PCO2 BLDA: 59.7 MM HG (ref 35–45)
PCO2 TEMP ADJ BLD: ABNORMAL MM[HG]
PCO2 TEMP ADJ BLD: ABNORMAL MM[HG]
PEEP RESPIRATORY: 8 CM[H2O]
PH BLDA: 7.38 PH UNITS (ref 7.35–7.45)
PH BLDA: 7.44 PH UNITS (ref 7.35–7.45)
PH, TEMP CORRECTED: ABNORMAL
PH, TEMP CORRECTED: ABNORMAL
PLATELET # BLD AUTO: 160 10*3/MM3 (ref 140–450)
PMV BLD AUTO: 11.1 FL (ref 6–12)
PO2 BLDA: 66.6 MM HG (ref 83–108)
PO2 BLDA: 67.8 MM HG (ref 83–108)
PO2 TEMP ADJ BLD: ABNORMAL MM[HG]
PO2 TEMP ADJ BLD: ABNORMAL MM[HG]
POTASSIUM SERPL-SCNC: 4.2 MMOL/L (ref 3.5–5.2)
PROT SERPL-MCNC: 5.4 G/DL (ref 6–8.5)
RBC # BLD AUTO: 5.42 10*6/MM3 (ref 4.14–5.8)
SAO2 % BLDCOA: 92.1 % (ref 94–99)
SAO2 % BLDCOA: 92.2 % (ref 94–99)
SET MECH RESP RATE: 12
SODIUM SERPL-SCNC: 137 MMOL/L (ref 136–145)
VENTILATOR MODE: ABNORMAL
VENTILATOR MODE: ABNORMAL
VT ON VENT VENT: 550 ML
WBC # BLD AUTO: 5.92 10*3/MM3 (ref 3.4–10.8)

## 2021-10-27 PROCEDURE — 36600 WITHDRAWAL OF ARTERIAL BLOOD: CPT

## 2021-10-27 PROCEDURE — 85025 COMPLETE CBC W/AUTO DIFF WBC: CPT | Performed by: INTERNAL MEDICINE

## 2021-10-27 PROCEDURE — 94003 VENT MGMT INPAT SUBQ DAY: CPT

## 2021-10-27 PROCEDURE — 25010000002 PROPOFOL 10 MG/ML EMULSION: Performed by: INTERNAL MEDICINE

## 2021-10-27 PROCEDURE — 94799 UNLISTED PULMONARY SVC/PX: CPT

## 2021-10-27 PROCEDURE — 99291 CRITICAL CARE FIRST HOUR: CPT | Performed by: INTERNAL MEDICINE

## 2021-10-27 PROCEDURE — 82962 GLUCOSE BLOOD TEST: CPT

## 2021-10-27 PROCEDURE — 25010000002 DEXAMETHASONE PER 1 MG: Performed by: INTERNAL MEDICINE

## 2021-10-27 PROCEDURE — 25010000002 ENOXAPARIN PER 10 MG: Performed by: HOSPITALIST

## 2021-10-27 PROCEDURE — 99232 SBSQ HOSP IP/OBS MODERATE 35: CPT | Performed by: INTERNAL MEDICINE

## 2021-10-27 PROCEDURE — 71045 X-RAY EXAM CHEST 1 VIEW: CPT | Performed by: RADIOLOGY

## 2021-10-27 PROCEDURE — 82805 BLOOD GASES W/O2 SATURATION: CPT

## 2021-10-27 PROCEDURE — 94660 CPAP INITIATION&MGMT: CPT

## 2021-10-27 PROCEDURE — 82550 ASSAY OF CK (CPK): CPT | Performed by: INTERNAL MEDICINE

## 2021-10-27 PROCEDURE — 86140 C-REACTIVE PROTEIN: CPT | Performed by: INTERNAL MEDICINE

## 2021-10-27 PROCEDURE — 83050 HGB METHEMOGLOBIN QUAN: CPT

## 2021-10-27 PROCEDURE — 99232 SBSQ HOSP IP/OBS MODERATE 35: CPT | Performed by: SPECIALIST

## 2021-10-27 PROCEDURE — 25010000002 LORAZEPAM PER 2 MG

## 2021-10-27 PROCEDURE — 82375 ASSAY CARBOXYHB QUANT: CPT

## 2021-10-27 PROCEDURE — 25010000002 CEFTRIAXONE PER 250 MG: Performed by: INTERNAL MEDICINE

## 2021-10-27 PROCEDURE — 80053 COMPREHEN METABOLIC PANEL: CPT | Performed by: INTERNAL MEDICINE

## 2021-10-27 PROCEDURE — 71045 X-RAY EXAM CHEST 1 VIEW: CPT

## 2021-10-27 RX ORDER — ASCORBIC ACID 500 MG
1000 TABLET ORAL DAILY
Status: DISCONTINUED | OUTPATIENT
Start: 2021-10-27 | End: 2021-10-29 | Stop reason: HOSPADM

## 2021-10-27 RX ORDER — BUMETANIDE 0.25 MG/ML
1 INJECTION INTRAMUSCULAR; INTRAVENOUS ONCE
Status: DISCONTINUED | OUTPATIENT
Start: 2021-10-27 | End: 2021-10-28

## 2021-10-27 RX ORDER — LORAZEPAM 2 MG/ML
1 INJECTION INTRAMUSCULAR ONCE
Status: COMPLETED | OUTPATIENT
Start: 2021-10-27 | End: 2021-10-27

## 2021-10-27 RX ORDER — LORAZEPAM 2 MG/ML
INJECTION INTRAMUSCULAR
Status: COMPLETED
Start: 2021-10-27 | End: 2021-10-27

## 2021-10-27 RX ORDER — ZINC SULFATE 50(220)MG
220 CAPSULE ORAL 2 TIMES DAILY
Status: DISCONTINUED | OUTPATIENT
Start: 2021-10-27 | End: 2021-10-29 | Stop reason: HOSPADM

## 2021-10-27 RX ADMIN — DEXMEDETOMIDINE HYDROCHLORIDE 0.2 MCG/KG/HR: 100 INJECTION, SOLUTION INTRAVENOUS at 13:22

## 2021-10-27 RX ADMIN — REMDESIVIR 100 MG: 100 INJECTION, POWDER, LYOPHILIZED, FOR SOLUTION INTRAVENOUS at 21:02

## 2021-10-27 RX ADMIN — SODIUM CHLORIDE 2 G: 9 INJECTION, SOLUTION INTRAVENOUS at 21:03

## 2021-10-27 RX ADMIN — ASPIRIN 81 MG: 81 TABLET, CHEWABLE ORAL at 08:19

## 2021-10-27 RX ADMIN — PROPOFOL 35 MCG/KG/MIN: 10 INJECTION, EMULSION INTRAVENOUS at 01:27

## 2021-10-27 RX ADMIN — OXYCODONE HYDROCHLORIDE AND ACETAMINOPHEN 1000 MG: 500 TABLET ORAL at 09:45

## 2021-10-27 RX ADMIN — SODIUM CHLORIDE, PRESERVATIVE FREE 10 ML: 5 INJECTION INTRAVENOUS at 21:02

## 2021-10-27 RX ADMIN — SODIUM CHLORIDE, PRESERVATIVE FREE 10 ML: 5 INJECTION INTRAVENOUS at 08:31

## 2021-10-27 RX ADMIN — PROPOFOL 35 MCG/KG/MIN: 10 INJECTION, EMULSION INTRAVENOUS at 05:16

## 2021-10-27 RX ADMIN — LORAZEPAM 1 MG: 2 INJECTION INTRAMUSCULAR; INTRAVENOUS at 10:45

## 2021-10-27 RX ADMIN — LORAZEPAM 1 MG: 2 INJECTION INTRAMUSCULAR at 10:45

## 2021-10-27 RX ADMIN — Medication 220 MG: at 09:45

## 2021-10-27 RX ADMIN — DOXYCYCLINE 100 MG: 100 INJECTION, POWDER, LYOPHILIZED, FOR SOLUTION INTRAVENOUS at 11:25

## 2021-10-27 RX ADMIN — PANTOPRAZOLE SODIUM 40 MG: 40 INJECTION, POWDER, FOR SOLUTION INTRAVENOUS at 06:00

## 2021-10-27 RX ADMIN — DEXAMETHASONE SODIUM PHOSPHATE 6 MG: 4 INJECTION, SOLUTION INTRA-ARTICULAR; INTRALESIONAL; INTRAMUSCULAR; INTRAVENOUS; SOFT TISSUE at 08:19

## 2021-10-28 ENCOUNTER — APPOINTMENT (OUTPATIENT)
Dept: CARDIOLOGY | Facility: HOSPITAL | Age: 66
End: 2021-10-28

## 2021-10-28 LAB
ALBUMIN SERPL-MCNC: 3.08 G/DL (ref 3.5–5.2)
ALBUMIN/GLOB SERPL: 1.1 G/DL
ALP SERPL-CCNC: 42 U/L (ref 39–117)
ALT SERPL W P-5'-P-CCNC: 17 U/L (ref 1–41)
ANION GAP SERPL CALCULATED.3IONS-SCNC: 8.1 MMOL/L (ref 5–15)
AST SERPL-CCNC: 42 U/L (ref 1–40)
BH CV ECHO MEAS - % IVS THICK: -8 %
BH CV ECHO MEAS - % LVPW THICK: 21.1 %
BH CV ECHO MEAS - ACS: 2.6 CM
BH CV ECHO MEAS - AO MAX PG: 11.3 MMHG
BH CV ECHO MEAS - AO MEAN PG: 5 MMHG
BH CV ECHO MEAS - AO ROOT AREA (BSA CORRECTED): 1.7
BH CV ECHO MEAS - AO ROOT AREA: 13.2 CM^2
BH CV ECHO MEAS - AO ROOT DIAM: 4.1 CM
BH CV ECHO MEAS - AO V2 MAX: 168 CM/SEC
BH CV ECHO MEAS - AO V2 MEAN: 108 CM/SEC
BH CV ECHO MEAS - AO V2 VTI: 39.7 CM
BH CV ECHO MEAS - BSA(HAYCOCK): 2.5 M^2
BH CV ECHO MEAS - BSA: 2.5 M^2
BH CV ECHO MEAS - BZI_BMI: 33 KILOGRAMS/M^2
BH CV ECHO MEAS - BZI_METRIC_HEIGHT: 190.5 CM
BH CV ECHO MEAS - BZI_METRIC_WEIGHT: 119.8 KG
BH CV ECHO MEAS - EDV(CUBED): 165.5 ML
BH CV ECHO MEAS - EDV(MOD-SP4): 241 ML
BH CV ECHO MEAS - EDV(TEICH): 146.8 ML
BH CV ECHO MEAS - EF(CUBED): 74.1 %
BH CV ECHO MEAS - EF(MOD-SP4): 70.2 %
BH CV ECHO MEAS - EF(TEICH): 65.3 %
BH CV ECHO MEAS - ESV(CUBED): 42.9 ML
BH CV ECHO MEAS - ESV(MOD-SP4): 71.9 ML
BH CV ECHO MEAS - ESV(TEICH): 50.9 ML
BH CV ECHO MEAS - FS: 36.2 %
BH CV ECHO MEAS - IVS/LVPW: 0.94
BH CV ECHO MEAS - IVSD: 1.3 CM
BH CV ECHO MEAS - IVSS: 1.2 CM
BH CV ECHO MEAS - LA DIMENSION: 3.9 CM
BH CV ECHO MEAS - LA/AO: 0.94
BH CV ECHO MEAS - LV DIASTOLIC VOL/BSA (35-75): 97.6 ML/M^2
BH CV ECHO MEAS - LV MASS(C)D: 300.2 GRAMS
BH CV ECHO MEAS - LV MASS(C)DI: 121.6 GRAMS/M^2
BH CV ECHO MEAS - LV MASS(C)S: 169 GRAMS
BH CV ECHO MEAS - LV MASS(C)SI: 68.5 GRAMS/M^2
BH CV ECHO MEAS - LV SYSTOLIC VOL/BSA (12-30): 29.1 ML/M^2
BH CV ECHO MEAS - LVIDD: 5.5 CM
BH CV ECHO MEAS - LVIDS: 3.5 CM
BH CV ECHO MEAS - LVLD AP4: 8.6 CM
BH CV ECHO MEAS - LVLS AP4: 7.9 CM
BH CV ECHO MEAS - LVOT AREA (M): 5.7 CM^2
BH CV ECHO MEAS - LVOT AREA: 5.7 CM^2
BH CV ECHO MEAS - LVOT DIAM: 2.7 CM
BH CV ECHO MEAS - LVPWD: 1.3 CM
BH CV ECHO MEAS - LVPWS: 1.6 CM
BH CV ECHO MEAS - MV A MAX VEL: 50.1 CM/SEC
BH CV ECHO MEAS - MV E MAX VEL: 110 CM/SEC
BH CV ECHO MEAS - MV E/A: 2.2
BH CV ECHO MEAS - PA ACC TIME: 0.15 SEC
BH CV ECHO MEAS - PA PR(ACCEL): 12.4 MMHG
BH CV ECHO MEAS - RAP SYSTOLE: 10 MMHG
BH CV ECHO MEAS - RVSP: 27.5 MMHG
BH CV ECHO MEAS - SI(AO): 212.3 ML/M^2
BH CV ECHO MEAS - SI(CUBED): 49.6 ML/M^2
BH CV ECHO MEAS - SI(MOD-SP4): 68.5 ML/M^2
BH CV ECHO MEAS - SI(TEICH): 38.9 ML/M^2
BH CV ECHO MEAS - SV(AO): 524.1 ML
BH CV ECHO MEAS - SV(CUBED): 122.6 ML
BH CV ECHO MEAS - SV(MOD-SP4): 169.1 ML
BH CV ECHO MEAS - SV(TEICH): 95.9 ML
BH CV ECHO MEAS - TR MAX VEL: 209 CM/SEC
BILIRUB SERPL-MCNC: 0.4 MG/DL (ref 0–1.2)
BUN SERPL-MCNC: 22 MG/DL (ref 8–23)
BUN/CREAT SERPL: 26.8 (ref 7–25)
CALCIUM SPEC-SCNC: 8.4 MG/DL (ref 8.6–10.5)
CHLORIDE SERPL-SCNC: 104 MMOL/L (ref 98–107)
CK SERPL-CCNC: 68 U/L (ref 20–200)
CO2 SERPL-SCNC: 30.9 MMOL/L (ref 22–29)
CREAT SERPL-MCNC: 0.82 MG/DL (ref 0.76–1.27)
CRP SERPL-MCNC: 4.56 MG/DL (ref 0–0.5)
D DIMER PPP FEU-MCNC: 1.06 MCGFEU/ML (ref 0–0.5)
D-LACTATE SERPL-SCNC: 1.4 MMOL/L (ref 0.5–2)
DEPRECATED RDW RBC AUTO: 47 FL (ref 37–54)
ERYTHROCYTE [DISTWIDTH] IN BLOOD BY AUTOMATED COUNT: 13.5 % (ref 12.3–15.4)
GFR SERPL CREATININE-BSD FRML MDRD: 94 ML/MIN/1.73
GLOBULIN UR ELPH-MCNC: 2.8 GM/DL
GLUCOSE BLDC GLUCOMTR-MCNC: 115 MG/DL (ref 70–130)
GLUCOSE BLDC GLUCOMTR-MCNC: 124 MG/DL (ref 70–130)
GLUCOSE BLDC GLUCOMTR-MCNC: 126 MG/DL (ref 70–130)
GLUCOSE SERPL-MCNC: 135 MG/DL (ref 65–99)
HCT VFR BLD AUTO: 56 % (ref 37.5–51)
HGB BLD-MCNC: 16.8 G/DL (ref 13–17.7)
HYPOCHROMIA BLD QL: ABNORMAL
LYMPHOCYTES # BLD MANUAL: 0.71 10*3/MM3 (ref 0.7–3.1)
LYMPHOCYTES NFR BLD MANUAL: 10 % (ref 19.6–45.3)
LYMPHOCYTES NFR BLD MANUAL: 13 % (ref 5–12)
MCH RBC QN AUTO: 28.5 PG (ref 26.6–33)
MCHC RBC AUTO-ENTMCNC: 30 G/DL (ref 31.5–35.7)
MCV RBC AUTO: 95.1 FL (ref 79–97)
MONOCYTES # BLD AUTO: 0.92 10*3/MM3 (ref 0.1–0.9)
NEUTROPHILS # BLD AUTO: 5.47 10*3/MM3 (ref 1.7–7)
NEUTROPHILS NFR BLD MANUAL: 75 % (ref 42.7–76)
NEUTS BAND NFR BLD MANUAL: 2 % (ref 0–5)
NT-PROBNP SERPL-MCNC: 728.3 PG/ML (ref 0–900)
PLAT MORPH BLD: NORMAL
PLATELET # BLD AUTO: 178 10*3/MM3 (ref 140–450)
PMV BLD AUTO: 10.9 FL (ref 6–12)
POTASSIUM SERPL-SCNC: 4.8 MMOL/L (ref 3.5–5.2)
PROCALCITONIN SERPL-MCNC: 0.05 NG/ML (ref 0–0.25)
PROT SERPL-MCNC: 5.9 G/DL (ref 6–8.5)
RBC # BLD AUTO: 5.89 10*6/MM3 (ref 4.14–5.8)
SCAN SLIDE: NORMAL
SODIUM SERPL-SCNC: 143 MMOL/L (ref 136–145)
WBC # BLD AUTO: 7.11 10*3/MM3 (ref 3.4–10.8)

## 2021-10-28 PROCEDURE — 85379 FIBRIN DEGRADATION QUANT: CPT | Performed by: INTERNAL MEDICINE

## 2021-10-28 PROCEDURE — 94799 UNLISTED PULMONARY SVC/PX: CPT

## 2021-10-28 PROCEDURE — 92610 EVALUATE SWALLOWING FUNCTION: CPT

## 2021-10-28 PROCEDURE — 86140 C-REACTIVE PROTEIN: CPT | Performed by: INTERNAL MEDICINE

## 2021-10-28 PROCEDURE — 85007 BL SMEAR W/DIFF WBC COUNT: CPT | Performed by: INTERNAL MEDICINE

## 2021-10-28 PROCEDURE — 84145 PROCALCITONIN (PCT): CPT | Performed by: INTERNAL MEDICINE

## 2021-10-28 PROCEDURE — 82962 GLUCOSE BLOOD TEST: CPT

## 2021-10-28 PROCEDURE — 93306 TTE W/DOPPLER COMPLETE: CPT

## 2021-10-28 PROCEDURE — 99233 SBSQ HOSP IP/OBS HIGH 50: CPT | Performed by: INTERNAL MEDICINE

## 2021-10-28 PROCEDURE — 85025 COMPLETE CBC W/AUTO DIFF WBC: CPT | Performed by: INTERNAL MEDICINE

## 2021-10-28 PROCEDURE — 97162 PT EVAL MOD COMPLEX 30 MIN: CPT

## 2021-10-28 PROCEDURE — 80053 COMPREHEN METABOLIC PANEL: CPT | Performed by: INTERNAL MEDICINE

## 2021-10-28 PROCEDURE — 82550 ASSAY OF CK (CPK): CPT | Performed by: INTERNAL MEDICINE

## 2021-10-28 PROCEDURE — 83605 ASSAY OF LACTIC ACID: CPT | Performed by: INTERNAL MEDICINE

## 2021-10-28 PROCEDURE — 83880 ASSAY OF NATRIURETIC PEPTIDE: CPT | Performed by: INTERNAL MEDICINE

## 2021-10-28 PROCEDURE — 99232 SBSQ HOSP IP/OBS MODERATE 35: CPT | Performed by: SPECIALIST

## 2021-10-28 PROCEDURE — 99232 SBSQ HOSP IP/OBS MODERATE 35: CPT | Performed by: INTERNAL MEDICINE

## 2021-10-28 PROCEDURE — 25010000002 DEXAMETHASONE PER 1 MG: Performed by: INTERNAL MEDICINE

## 2021-10-28 PROCEDURE — 93306 TTE W/DOPPLER COMPLETE: CPT | Performed by: INTERNAL MEDICINE

## 2021-10-28 RX ORDER — ACETAMINOPHEN 325 MG/1
650 TABLET ORAL EVERY 6 HOURS PRN
Status: DISCONTINUED | OUTPATIENT
Start: 2021-10-28 | End: 2021-10-29 | Stop reason: HOSPADM

## 2021-10-28 RX ORDER — BUMETANIDE 1 MG/1
1 TABLET ORAL DAILY
Status: DISCONTINUED | OUTPATIENT
Start: 2021-10-28 | End: 2021-10-29 | Stop reason: HOSPADM

## 2021-10-28 RX ADMIN — PANTOPRAZOLE SODIUM 40 MG: 40 INJECTION, POWDER, FOR SOLUTION INTRAVENOUS at 05:00

## 2021-10-28 RX ADMIN — DEXAMETHASONE SODIUM PHOSPHATE 6 MG: 4 INJECTION, SOLUTION INTRA-ARTICULAR; INTRALESIONAL; INTRAMUSCULAR; INTRAVENOUS; SOFT TISSUE at 08:00

## 2021-10-28 RX ADMIN — SODIUM CHLORIDE, PRESERVATIVE FREE 10 ML: 5 INJECTION INTRAVENOUS at 08:00

## 2021-10-28 RX ADMIN — SODIUM CHLORIDE, PRESERVATIVE FREE 10 ML: 5 INJECTION INTRAVENOUS at 20:42

## 2021-10-28 RX ADMIN — BUMETANIDE 1 MG: 1 TABLET ORAL at 16:36

## 2021-10-28 RX ADMIN — Medication 220 MG: at 20:05

## 2021-10-28 RX ADMIN — Medication 220 MG: at 11:30

## 2021-10-28 RX ADMIN — ACETAMINOPHEN 650 MG: 325 TABLET ORAL at 17:56

## 2021-10-28 RX ADMIN — DOXYCYCLINE 100 MG: 100 INJECTION, POWDER, LYOPHILIZED, FOR SOLUTION INTRAVENOUS at 00:28

## 2021-10-28 RX ADMIN — ASPIRIN 81 MG: 81 TABLET, CHEWABLE ORAL at 11:30

## 2021-10-28 RX ADMIN — OXYCODONE HYDROCHLORIDE AND ACETAMINOPHEN 1000 MG: 500 TABLET ORAL at 11:30

## 2021-10-29 ENCOUNTER — READMISSION MANAGEMENT (OUTPATIENT)
Dept: CALL CENTER | Facility: HOSPITAL | Age: 66
End: 2021-10-29

## 2021-10-29 VITALS
HEART RATE: 73 BPM | HEIGHT: 75 IN | WEIGHT: 248 LBS | RESPIRATION RATE: 20 BRPM | TEMPERATURE: 98.1 F | SYSTOLIC BLOOD PRESSURE: 115 MMHG | OXYGEN SATURATION: 94 % | BODY MASS INDEX: 30.84 KG/M2 | DIASTOLIC BLOOD PRESSURE: 75 MMHG

## 2021-10-29 PROBLEM — D89.834 CYTOKINE RELEASE SYNDROME, GRADE 4: Status: ACTIVE | Noted: 2021-10-29

## 2021-10-29 LAB
ALBUMIN SERPL-MCNC: 3.18 G/DL (ref 3.5–5.2)
ALBUMIN/GLOB SERPL: 1.1 G/DL
ALP SERPL-CCNC: 44 U/L (ref 39–117)
ALT SERPL W P-5'-P-CCNC: 43 U/L (ref 1–41)
ANION GAP SERPL CALCULATED.3IONS-SCNC: 8 MMOL/L (ref 5–15)
AST SERPL-CCNC: 76 U/L (ref 1–40)
BASOPHILS # BLD AUTO: 0.03 10*3/MM3 (ref 0–0.2)
BASOPHILS NFR BLD AUTO: 0.4 % (ref 0–1.5)
BILIRUB SERPL-MCNC: 0.6 MG/DL (ref 0–1.2)
BUN SERPL-MCNC: 22 MG/DL (ref 8–23)
BUN/CREAT SERPL: 25 (ref 7–25)
CALCIUM SPEC-SCNC: 8.3 MG/DL (ref 8.6–10.5)
CHLORIDE SERPL-SCNC: 99 MMOL/L (ref 98–107)
CK SERPL-CCNC: 38 U/L (ref 20–200)
CO2 SERPL-SCNC: 31 MMOL/L (ref 22–29)
CREAT SERPL-MCNC: 0.88 MG/DL (ref 0.76–1.27)
CRP SERPL-MCNC: 2.28 MG/DL (ref 0–0.5)
DEPRECATED RDW RBC AUTO: 47 FL (ref 37–54)
EOSINOPHIL # BLD AUTO: 0 10*3/MM3 (ref 0–0.4)
EOSINOPHIL NFR BLD AUTO: 0 % (ref 0.3–6.2)
ERYTHROCYTE [DISTWIDTH] IN BLOOD BY AUTOMATED COUNT: 13.4 % (ref 12.3–15.4)
GFR SERPL CREATININE-BSD FRML MDRD: 87 ML/MIN/1.73
GLOBULIN UR ELPH-MCNC: 2.9 GM/DL
GLUCOSE BLDC GLUCOMTR-MCNC: 112 MG/DL (ref 70–130)
GLUCOSE SERPL-MCNC: 113 MG/DL (ref 65–99)
HCT VFR BLD AUTO: 55.8 % (ref 37.5–51)
HGB BLD-MCNC: 17.3 G/DL (ref 13–17.7)
HYPOCHROMIA BLD QL: NORMAL
IMM GRANULOCYTES # BLD AUTO: 0.03 10*3/MM3 (ref 0–0.05)
IMM GRANULOCYTES NFR BLD AUTO: 0.4 % (ref 0–0.5)
LYMPHOCYTES # BLD AUTO: 1.28 10*3/MM3 (ref 0.7–3.1)
LYMPHOCYTES NFR BLD AUTO: 17 % (ref 19.6–45.3)
MACROCYTES BLD QL SMEAR: NORMAL
MCH RBC QN AUTO: 29.3 PG (ref 26.6–33)
MCHC RBC AUTO-ENTMCNC: 31 G/DL (ref 31.5–35.7)
MCV RBC AUTO: 94.6 FL (ref 79–97)
MONOCYTES # BLD AUTO: 0.97 10*3/MM3 (ref 0.1–0.9)
MONOCYTES NFR BLD AUTO: 12.8 % (ref 5–12)
NEUTROPHILS NFR BLD AUTO: 5.24 10*3/MM3 (ref 1.7–7)
NEUTROPHILS NFR BLD AUTO: 69.4 % (ref 42.7–76)
NRBC BLD AUTO-RTO: 0.3 /100 WBC (ref 0–0.2)
PLAT MORPH BLD: NORMAL
PLATELET # BLD AUTO: 173 10*3/MM3 (ref 140–450)
PMV BLD AUTO: 10.9 FL (ref 6–12)
POTASSIUM SERPL-SCNC: 4.2 MMOL/L (ref 3.5–5.2)
PROT SERPL-MCNC: 6.1 G/DL (ref 6–8.5)
RBC # BLD AUTO: 5.9 10*6/MM3 (ref 4.14–5.8)
SODIUM SERPL-SCNC: 138 MMOL/L (ref 136–145)
WBC # BLD AUTO: 7.55 10*3/MM3 (ref 3.4–10.8)

## 2021-10-29 PROCEDURE — 82962 GLUCOSE BLOOD TEST: CPT

## 2021-10-29 PROCEDURE — 25010000002 DEXAMETHASONE PER 1 MG: Performed by: INTERNAL MEDICINE

## 2021-10-29 PROCEDURE — 85025 COMPLETE CBC W/AUTO DIFF WBC: CPT | Performed by: INTERNAL MEDICINE

## 2021-10-29 PROCEDURE — 99239 HOSP IP/OBS DSCHRG MGMT >30: CPT | Performed by: INTERNAL MEDICINE

## 2021-10-29 PROCEDURE — 86140 C-REACTIVE PROTEIN: CPT | Performed by: INTERNAL MEDICINE

## 2021-10-29 PROCEDURE — 85007 BL SMEAR W/DIFF WBC COUNT: CPT | Performed by: INTERNAL MEDICINE

## 2021-10-29 PROCEDURE — 80053 COMPREHEN METABOLIC PANEL: CPT | Performed by: INTERNAL MEDICINE

## 2021-10-29 PROCEDURE — 82550 ASSAY OF CK (CPK): CPT | Performed by: INTERNAL MEDICINE

## 2021-10-29 RX ORDER — CARVEDILOL 3.12 MG/1
3.12 TABLET ORAL 2 TIMES DAILY WITH MEALS
Qty: 60 TABLET | Refills: 0 | Status: SHIPPED | OUTPATIENT
Start: 2021-10-29 | End: 2022-06-28 | Stop reason: HOSPADM

## 2021-10-29 RX ORDER — BUMETANIDE 0.5 MG/1
TABLET ORAL
Qty: 10 TABLET | Refills: 0 | Status: ON HOLD | OUTPATIENT
Start: 2021-10-29 | End: 2022-06-25

## 2021-10-29 RX ORDER — DOXYCYCLINE HYCLATE 100 MG/1
100 CAPSULE ORAL 2 TIMES DAILY
Qty: 14 CAPSULE | Refills: 0 | Status: SHIPPED | OUTPATIENT
Start: 2021-10-29 | End: 2021-11-05

## 2021-10-29 RX ADMIN — OXYCODONE HYDROCHLORIDE AND ACETAMINOPHEN 1000 MG: 500 TABLET ORAL at 09:29

## 2021-10-29 RX ADMIN — ASPIRIN 81 MG: 81 TABLET, CHEWABLE ORAL at 09:28

## 2021-10-29 RX ADMIN — SODIUM CHLORIDE, PRESERVATIVE FREE 10 ML: 5 INJECTION INTRAVENOUS at 09:29

## 2021-10-29 RX ADMIN — DEXAMETHASONE SODIUM PHOSPHATE 6 MG: 4 INJECTION, SOLUTION INTRA-ARTICULAR; INTRALESIONAL; INTRAMUSCULAR; INTRAVENOUS; SOFT TISSUE at 09:32

## 2021-10-29 RX ADMIN — Medication 220 MG: at 09:29

## 2021-10-30 ENCOUNTER — READMISSION MANAGEMENT (OUTPATIENT)
Dept: CALL CENTER | Facility: HOSPITAL | Age: 66
End: 2021-10-30

## 2021-10-30 LAB
BACTERIA SPEC AEROBE CULT: NORMAL
BACTERIA SPEC AEROBE CULT: NORMAL

## 2021-10-30 NOTE — OUTREACH NOTE
COVID-19 Week 1 Survey      Responses   [facility] patient discharged from? [place]   Does the patient have one of the following disease processes/diagnoses(primary or secondary)? COVID-19   COVID-19 underlying condition? CHF   Call Number Call 1   Week 1 Call successful? Yes   Call start time 1000   Call end time 1004   Discharge diagnosis acute hypoxic resp failure d/t COVID-19 PNA, CHF, CAD   Is patient permission given to speak with other caregiver? Yes   List who call center can speak with MICHELLE   Person spoke with today (if not patient) and relationship WIFE   Medication alerts for this patient MARIBEL   Meds reviewed with patient/caregiver? Yes   Is the patient having any side effects they believe may be caused by any medication additions or changes? No   Does the patient have all medications ordered at discharge? Yes   Is the patient taking all medications as directed (includes completed medication regime)? Yes   Comments regarding appointments CALLING FOR APPT ON MONDAY   Does the patient have a primary care provider?  Yes   What is preventing the patient from scheduling follow up appointments within 7 days of discharge? Haven't had time   Nursing Interventions Educated patient on importance of making appointment,  Assisted with scheduling video appointment   Has the patient kept scheduled appointments due by today? N/A   What DME was ordered? O2 from [employer]   Has all DME been delivered? Yes   Psychosocial issues? No   Did the patient receive a copy of their discharge instructions? Yes   Nursing interventions Reviewed instructions with patient   What is the patient's perception of their health status since discharge? Improving   Does the patient have any of the following symptoms? Shortness of breath,  Cough  [SOA WITH ACTIVITY]   Pulse Ox monitoring Intermittent   Pulse Ox device source Hospital   O2 Sat comments SAT 94% ON 2l NC   O2 Sat: education provided Sat levels,   Monitoring frequency,  When to seek care   O2 Sat education comments <90% SEEK EMERGENT CARE OR CALL 911   Is the patient/caregiver able to teach back steps to recovery at home? Set small, achievable goals for return to baseline health,  Rest and rebuild strength, gradually increase activity,  Eat a well-balance diet   If the patient is a current smoker, are they able to teach back resources for cessation? Not a smoker   Is the patient/caregiver able to teach back the hierarchy of who to call/visit for symptoms/problems? PCP, Specialist, Home health nurse, Urgent Care, ED, 911 Yes   Does the patient have scales? Yes   Is the patient weighing daily? Yes   Daily weight interventions Education provided on importance of daily weight   Is the patient able to teach back Heart Failure diet management? Yes   Is the patient able to teach back Heart Failure Zones? Yes   Is the patient able to teach back signs and symptoms of worsening condition? (i.e. weight gain, shortness of air, etc.) Yes   COVID-19 call completed? Yes   Wrap up additional comments WIFE STATES PATIENT DOING WELL, REMAINING FATIGUE AND SOA WITH ACTIVITY, CONTINUES ON O2, CASLL MONDAY FOR FOLLOWUP APPT WITH PCP, MEDICATIONS AS ORDERED. TOLERATING PO INTAKEM DENIES QUESTIONS OR CONCERNS, NAD NOTED.          Ramona Ruiz RN

## 2021-10-31 ENCOUNTER — READMISSION MANAGEMENT (OUTPATIENT)
Dept: CALL CENTER | Facility: HOSPITAL | Age: 66
End: 2021-10-31

## 2021-10-31 NOTE — OUTREACH NOTE
COVID-19 Week 1 Survey      Responses   [facility] patient discharged from? [place]   Does the patient have one of the following disease processes/diagnoses(primary or secondary)? COVID-19   COVID-19 underlying condition? CHF   Call Number Call 2   Week 1 Call successful? No  [answering machine, no message left]   Discharge diagnosis acute hypoxic resp failure d/t COVID-19 PNA, CHF, CAD          Dilma Morse RN

## 2021-11-01 ENCOUNTER — READMISSION MANAGEMENT (OUTPATIENT)
Dept: CALL CENTER | Facility: HOSPITAL | Age: 66
End: 2021-11-01

## 2021-11-01 NOTE — OUTREACH NOTE
COVID-19 Week 1 Survey      Responses   [facility] patient discharged from? [place]   Does the patient have one of the following disease processes/diagnoses(primary or secondary)? COVID-19   COVID-19 underlying condition? CHF   Call Number Call 3   Week 1 Call successful? Yes   Call start time 0837   Call end time 0846   Meds reviewed with patient/caregiver? Yes   Is the patient having any side effects they believe may be caused by any medication additions or changes? No   Does the patient have all medications ordered at discharge? Yes   Is the patient taking all medications as directed (includes completed medication regime)? Yes   Does the patient have a primary care provider?  Yes   Does the patient have an appointment with their PCP or specialist within 7 days of discharge? No   What is preventing the patient from scheduling follow up appointments within 7 days of discharge? Haven't had time   Nursing Interventions Advised patient to make appointment,  Educated patient on importance of making appointment   Has the patient kept scheduled appointments due by today? N/A   Has home health visited the patient within 72 hours of discharge? N/A   Has all DME been delivered? Yes   DME comments Wearing home O2 PRN.   Psychosocial issues? No   Did the patient receive a copy of their discharge instructions? Yes   Did the patient receive a copy of COVID-19 specific instructions? Yes   Nursing interventions Reviewed instructions with patient   What is the patient's perception of their health status since discharge? Improving   Does the patient have any of the following symptoms? Cough   Nursing Interventions Nurse provided patient education   Pulse Ox monitoring Intermittent   Pulse Ox device source Hospital   O2 Sat comments 95% on RA at rest, using 2L home O2 PRN during day.   Is the patient/caregiver able to teach back the hierarchy of who to call/visit for symptoms/problems? PCP, Specialist, Home health nurse, Urgent  "Care, ED, 911 Yes   Does the patient have scales? Yes   Is the patient weighing daily? Yes   Is the patient able to teach back signs and symptoms of worsening condition? (i.e. weight gain, shortness of air, etc.) Yes   COVID-19 call completed? Yes   Wrap up additional comments States is feeling better-still occasional cough. Denies any SOA today. Denies any edema-states weight steady. Denies any needs today. States received \"really good care\" while in hospital.          Corrie Adames RN  "

## 2021-11-02 ENCOUNTER — TELEPHONE (OUTPATIENT)
Dept: CARDIOLOGY | Facility: HOSPITAL | Age: 66
End: 2021-11-02

## 2021-11-02 NOTE — TELEPHONE ENCOUNTER
Spoke to Ed in regards to referral to [facility]. He stated he did not want to be seen in [facility]. He had stents placed 3 years ago and see's cardiologist regularly, therefore he does not want to schedule to be seen here.

## 2021-11-05 ENCOUNTER — READMISSION MANAGEMENT (OUTPATIENT)
Dept: CALL CENTER | Facility: HOSPITAL | Age: 66
End: 2021-11-05

## 2021-11-05 NOTE — OUTREACH NOTE
COVID-19 Week 2 Survey      Responses   [facility] patient discharged from? [place]   Does the patient have one of the following disease processes/diagnoses(primary or secondary)? COVID-19   COVID-19 underlying condition? CHF   Call Number Call 1   COVID-19 Week 2: Call 1 attempt successful? Yes   Call start time 1152   Call end time 1157   Discharge diagnosis acute hypoxic resp failure d/t COVID-19 PNA, CHF, CAD   Meds reviewed with patient/caregiver? Yes   Is the patient having any side effects they believe may be caused by any medication additions or changes? No   Does the patient have all medications ordered at discharge? Yes   Is the patient taking all medications as directed (includes completed medication regime)? Yes   Does the patient have a primary care provider?  Yes   Comments regarding PCP 11/09/21   Does the patient have an appointment with their PCP or specialist within 7 days of discharge? Greater than 7 days   What is preventing the patient from scheduling follow up appointments within 7 days of discharge? Earlier appointment not available   Nursing Interventions Verified appointment date/time/provider   Has the patient kept scheduled appointments due by today? N/A   Has all DME been delivered? Yes   Psychosocial issues? No   Nursing interventions Reviewed instructions with patient   What is the patient's perception of their health status since discharge? Returned to baseline/stable   Does the patient have any of the following symptoms? Cough  [With activity or talks for a long time. ]   Nursing Interventions Nurse provided patient education   Pulse Ox monitoring Intermittent   Pulse Ox device source Hospital   O2 Sat comments 94% on RA uses O2 at 2L at night only.   O2 Sat: education provided Sat levels,  When to seek care,  Monitoring frequency   O2 Sat education comments Advised pt if O2 levels drop to 90% or below and does not rebound with deep breathing and rest to seek emergency medical  attention.   Is the patient/caregiver able to teach back steps to recovery at home? Eat a well-balance diet,  Set small, achievable goals for return to baseline health,  Rest and rebuild strength, gradually increase activity   COVID-19 call completed? Yes          Yoly Simental RN

## 2021-11-12 ENCOUNTER — READMISSION MANAGEMENT (OUTPATIENT)
Dept: CALL CENTER | Facility: HOSPITAL | Age: 66
End: 2021-11-12

## 2021-11-12 NOTE — OUTREACH NOTE
COVID-19 Week 3 Survey      Responses   [facility] patient discharged from? [place]   Does the patient have one of the following disease processes/diagnoses(primary or secondary)? COVID-19   COVID-19 underlying condition? CHF   Call Number Call 1   COVID-19 Week 3: Call 1 attempt successful? No  [Attempted both numbers listed]   Discharge diagnosis acute hypoxic resp failure d/t COVID-19 PNA, CHF, CAD          Melissa Lopez RN

## 2022-06-25 ENCOUNTER — HOSPITAL ENCOUNTER (OUTPATIENT)
Facility: HOSPITAL | Age: 67
Discharge: HOME | End: 2022-06-28
Attending: FAMILY MEDICINE | Admitting: INTERNAL MEDICINE

## 2022-06-25 ENCOUNTER — APPOINTMENT (OUTPATIENT)
Dept: GENERAL RADIOLOGY | Facility: HOSPITAL | Age: 67
End: 2022-06-25

## 2022-06-25 DIAGNOSIS — I50.22 CHRONIC SYSTOLIC HEART FAILURE: ICD-10-CM

## 2022-06-25 DIAGNOSIS — I50.9 ACUTE ON CHRONIC CONGESTIVE HEART FAILURE, UNSPECIFIED HEART FAILURE TYPE: Primary | ICD-10-CM

## 2022-06-25 DIAGNOSIS — I25.10 ASCVD (ARTERIOSCLEROTIC CARDIOVASCULAR DISEASE): ICD-10-CM

## 2022-06-25 LAB
A-A DO2: 33.7 MMHG (ref 0–300)
ABSOLUTE LUNG FLUID CONTENT: 39 % (ref 20–35)
ALBUMIN SERPL-MCNC: 4.25 G/DL (ref 3.5–5.2)
ALBUMIN/GLOB SERPL: 1.5 G/DL
ALP SERPL-CCNC: 72 U/L (ref 39–117)
ALT SERPL W P-5'-P-CCNC: 14 U/L (ref 1–41)
ANION GAP SERPL CALCULATED.3IONS-SCNC: 9.6 MMOL/L (ref 5–15)
ARTERIAL PATENCY WRIST A: POSITIVE
AST SERPL-CCNC: 31 U/L (ref 1–40)
ATMOSPHERIC PRESS: 729 MMHG
BASE EXCESS BLDA CALC-SCNC: 6.2 MMOL/L (ref 0–2)
BASOPHILS # BLD AUTO: 0.03 10*3/MM3 (ref 0–0.2)
BASOPHILS NFR BLD AUTO: 0.3 % (ref 0–1.5)
BDY SITE: ABNORMAL
BILIRUB SERPL-MCNC: 0.9 MG/DL (ref 0–1.2)
BODY TEMPERATURE: 0 C
BUN SERPL-MCNC: 14 MG/DL (ref 8–23)
BUN/CREAT SERPL: 14.6 (ref 7–25)
CALCIUM SPEC-SCNC: 9.5 MG/DL (ref 8.6–10.5)
CHLORIDE SERPL-SCNC: 103 MMOL/L (ref 98–107)
CO2 BLDA-SCNC: 34.3 MMOL/L (ref 22–33)
CO2 SERPL-SCNC: 27.4 MMOL/L (ref 22–29)
COHGB MFR BLD: 1.6 % (ref 0–5)
CREAT SERPL-MCNC: 0.96 MG/DL (ref 0.76–1.27)
D DIMER PPP FEU-MCNC: 0.31 MCGFEU/ML (ref 0–0.5)
DEPRECATED RDW RBC AUTO: 43.7 FL (ref 37–54)
EGFRCR SERPLBLD CKD-EPI 2021: 87.2 ML/MIN/1.73
EOSINOPHIL # BLD AUTO: 0.12 10*3/MM3 (ref 0–0.4)
EOSINOPHIL NFR BLD AUTO: 1.3 % (ref 0.3–6.2)
ERYTHROCYTE [DISTWIDTH] IN BLOOD BY AUTOMATED COUNT: 12.7 % (ref 12.3–15.4)
FLUAV SUBTYP SPEC NAA+PROBE: NOT DETECTED
FLUBV RNA ISLT QL NAA+PROBE: NOT DETECTED
GLOBULIN UR ELPH-MCNC: 2.8 GM/DL
GLUCOSE SERPL-MCNC: 109 MG/DL (ref 65–99)
HCO3 BLDA-SCNC: 32.7 MMOL/L (ref 20–26)
HCT VFR BLD AUTO: 49 % (ref 37.5–51)
HCT VFR BLD CALC: 47.9 % (ref 38–51)
HGB BLD-MCNC: 15.7 G/DL (ref 13–17.7)
HGB BLDA-MCNC: 15.6 G/DL (ref 14–18)
HOLD SPECIMEN: NORMAL
HOLD SPECIMEN: NORMAL
IMM GRANULOCYTES # BLD AUTO: 0.02 10*3/MM3 (ref 0–0.05)
IMM GRANULOCYTES NFR BLD AUTO: 0.2 % (ref 0–0.5)
INHALED O2 CONCENTRATION: 21 %
INR PPP: 1.01 (ref 0.9–1.1)
LYMPHOCYTES # BLD AUTO: 1.9 10*3/MM3 (ref 0.7–3.1)
LYMPHOCYTES NFR BLD AUTO: 21.2 % (ref 19.6–45.3)
Lab: ABNORMAL
Lab: ABNORMAL
MAGNESIUM SERPL-MCNC: 1.9 MG/DL (ref 1.6–2.4)
MCH RBC QN AUTO: 30.3 PG (ref 26.6–33)
MCHC RBC AUTO-ENTMCNC: 32 G/DL (ref 31.5–35.7)
MCV RBC AUTO: 94.4 FL (ref 79–97)
METHGB BLD QL: 0.3 % (ref 0–3)
MODALITY: ABNORMAL
MONOCYTES # BLD AUTO: 1 10*3/MM3 (ref 0.1–0.9)
MONOCYTES NFR BLD AUTO: 11.1 % (ref 5–12)
NEUTROPHILS NFR BLD AUTO: 5.91 10*3/MM3 (ref 1.7–7)
NEUTROPHILS NFR BLD AUTO: 65.9 % (ref 42.7–76)
NOTE: ABNORMAL
NOTIFIED BY: ABNORMAL
NOTIFIED WHO: ABNORMAL
NRBC BLD AUTO-RTO: 0 /100 WBC (ref 0–0.2)
NT-PROBNP SERPL-MCNC: 2085 PG/ML (ref 0–900)
OXYHGB MFR BLDV: 84.1 % (ref 94–99)
PCO2 BLDA: 52.7 MM HG (ref 35–45)
PCO2 TEMP ADJ BLD: ABNORMAL MM[HG]
PH BLDA: 7.4 PH UNITS (ref 7.35–7.45)
PH, TEMP CORRECTED: ABNORMAL
PLATELET # BLD AUTO: 166 10*3/MM3 (ref 140–450)
PMV BLD AUTO: 10.7 FL (ref 6–12)
PO2 BLDA: 50 MM HG (ref 83–108)
PO2 TEMP ADJ BLD: ABNORMAL MM[HG]
POTASSIUM SERPL-SCNC: 4.4 MMOL/L (ref 3.5–5.2)
PROT SERPL-MCNC: 7 G/DL (ref 6–8.5)
PROTHROMBIN TIME: 13.6 SECONDS (ref 12.1–14.7)
QT INTERVAL: 390 MS
QTC INTERVAL: 464 MS
RBC # BLD AUTO: 5.19 10*6/MM3 (ref 4.14–5.8)
SAO2 % BLDCOA: 85.7 % (ref 94–99)
SARS-COV-2 RNA PNL SPEC NAA+PROBE: NOT DETECTED
SODIUM SERPL-SCNC: 140 MMOL/L (ref 136–145)
TROPONIN T SERPL-MCNC: <0.01 NG/ML (ref 0–0.03)
VENTILATOR MODE: ABNORMAL
WBC NRBC COR # BLD: 8.98 10*3/MM3 (ref 3.4–10.8)
WHOLE BLOOD HOLD COAG: NORMAL
WHOLE BLOOD HOLD SPECIMEN: NORMAL

## 2022-06-25 PROCEDURE — 80053 COMPREHEN METABOLIC PANEL: CPT | Performed by: INTERNAL MEDICINE

## 2022-06-25 PROCEDURE — 83036 HEMOGLOBIN GLYCOSYLATED A1C: CPT | Performed by: INTERNAL MEDICINE

## 2022-06-25 PROCEDURE — 25010000002 FUROSEMIDE PER 20 MG: Performed by: FAMILY MEDICINE

## 2022-06-25 PROCEDURE — 99285 EMERGENCY DEPT VISIT HI MDM: CPT

## 2022-06-25 PROCEDURE — 94726 PLETHYSMOGRAPHY LUNG VOLUMES: CPT

## 2022-06-25 PROCEDURE — 85610 PROTHROMBIN TIME: CPT | Performed by: FAMILY MEDICINE

## 2022-06-25 PROCEDURE — 83735 ASSAY OF MAGNESIUM: CPT | Performed by: FAMILY MEDICINE

## 2022-06-25 PROCEDURE — 93005 ELECTROCARDIOGRAM TRACING: CPT | Performed by: FAMILY MEDICINE

## 2022-06-25 PROCEDURE — G0378 HOSPITAL OBSERVATION PER HR: HCPCS

## 2022-06-25 PROCEDURE — 82805 BLOOD GASES W/O2 SATURATION: CPT

## 2022-06-25 PROCEDURE — 83880 ASSAY OF NATRIURETIC PEPTIDE: CPT | Performed by: PHYSICIAN ASSISTANT

## 2022-06-25 PROCEDURE — 96374 THER/PROPH/DIAG INJ IV PUSH: CPT

## 2022-06-25 PROCEDURE — 84484 ASSAY OF TROPONIN QUANT: CPT | Performed by: PHYSICIAN ASSISTANT

## 2022-06-25 PROCEDURE — 71045 X-RAY EXAM CHEST 1 VIEW: CPT

## 2022-06-25 PROCEDURE — 80053 COMPREHEN METABOLIC PANEL: CPT | Performed by: PHYSICIAN ASSISTANT

## 2022-06-25 PROCEDURE — 99220 PR INITIAL OBSERVATION CARE/DAY 70 MINUTES: CPT | Performed by: INTERNAL MEDICINE

## 2022-06-25 PROCEDURE — 82375 ASSAY CARBOXYHB QUANT: CPT

## 2022-06-25 PROCEDURE — 85025 COMPLETE CBC W/AUTO DIFF WBC: CPT | Performed by: PHYSICIAN ASSISTANT

## 2022-06-25 PROCEDURE — 80061 LIPID PANEL: CPT | Performed by: INTERNAL MEDICINE

## 2022-06-25 PROCEDURE — 85379 FIBRIN DEGRADATION QUANT: CPT | Performed by: FAMILY MEDICINE

## 2022-06-25 PROCEDURE — 84484 ASSAY OF TROPONIN QUANT: CPT | Performed by: INTERNAL MEDICINE

## 2022-06-25 PROCEDURE — 36415 COLL VENOUS BLD VENIPUNCTURE: CPT

## 2022-06-25 PROCEDURE — C9803 HOPD COVID-19 SPEC COLLECT: HCPCS

## 2022-06-25 PROCEDURE — 83050 HGB METHEMOGLOBIN QUAN: CPT

## 2022-06-25 PROCEDURE — 84443 ASSAY THYROID STIM HORMONE: CPT | Performed by: INTERNAL MEDICINE

## 2022-06-25 PROCEDURE — 85025 COMPLETE CBC W/AUTO DIFF WBC: CPT | Performed by: INTERNAL MEDICINE

## 2022-06-25 PROCEDURE — 87636 SARSCOV2 & INF A&B AMP PRB: CPT | Performed by: FAMILY MEDICINE

## 2022-06-25 PROCEDURE — 36600 WITHDRAWAL OF ARTERIAL BLOOD: CPT

## 2022-06-25 RX ORDER — ACETAMINOPHEN 325 MG/1
650 TABLET ORAL EVERY 4 HOURS PRN
Status: DISCONTINUED | OUTPATIENT
Start: 2022-06-25 | End: 2022-06-28 | Stop reason: HOSPADM

## 2022-06-25 RX ORDER — ATORVASTATIN CALCIUM 40 MG/1
40 TABLET, FILM COATED ORAL NIGHTLY
Status: DISCONTINUED | OUTPATIENT
Start: 2022-06-25 | End: 2022-06-28 | Stop reason: HOSPADM

## 2022-06-25 RX ORDER — SODIUM CHLORIDE 0.9 % (FLUSH) 0.9 %
10 SYRINGE (ML) INJECTION AS NEEDED
Status: DISCONTINUED | OUTPATIENT
Start: 2022-06-25 | End: 2022-06-28 | Stop reason: HOSPADM

## 2022-06-25 RX ORDER — CARVEDILOL 3.12 MG/1
3.12 TABLET ORAL 2 TIMES DAILY WITH MEALS
Status: DISCONTINUED | OUTPATIENT
Start: 2022-06-25 | End: 2022-06-25

## 2022-06-25 RX ORDER — NITROGLYCERIN 0.4 MG/1
0.4 TABLET SUBLINGUAL
Status: DISCONTINUED | OUTPATIENT
Start: 2022-06-25 | End: 2022-06-28 | Stop reason: HOSPADM

## 2022-06-25 RX ORDER — ASPIRIN 81 MG/1
324 TABLET, CHEWABLE ORAL ONCE
Status: DISCONTINUED | OUTPATIENT
Start: 2022-06-25 | End: 2022-06-25

## 2022-06-25 RX ORDER — SODIUM CHLORIDE 0.9 % (FLUSH) 0.9 %
10 SYRINGE (ML) INJECTION EVERY 12 HOURS SCHEDULED
Status: DISCONTINUED | OUTPATIENT
Start: 2022-06-25 | End: 2022-06-28 | Stop reason: HOSPADM

## 2022-06-25 RX ORDER — FUROSEMIDE 10 MG/ML
40 INJECTION INTRAMUSCULAR; INTRAVENOUS ONCE
Status: COMPLETED | OUTPATIENT
Start: 2022-06-25 | End: 2022-06-25

## 2022-06-25 RX ORDER — CALCIUM CARBONATE 200(500)MG
2 TABLET,CHEWABLE ORAL 2 TIMES DAILY PRN
Status: DISCONTINUED | OUTPATIENT
Start: 2022-06-25 | End: 2022-06-28 | Stop reason: HOSPADM

## 2022-06-25 RX ORDER — ASPIRIN 81 MG/1
81 TABLET ORAL DAILY
Status: DISCONTINUED | OUTPATIENT
Start: 2022-06-26 | End: 2022-06-28 | Stop reason: HOSPADM

## 2022-06-25 RX ORDER — ASPIRIN 81 MG/1
324 TABLET, CHEWABLE ORAL ONCE
Status: COMPLETED | OUTPATIENT
Start: 2022-06-25 | End: 2022-06-25

## 2022-06-25 RX ORDER — ENOXAPARIN SODIUM 100 MG/ML
40 INJECTION SUBCUTANEOUS EVERY 24 HOURS
Status: DISCONTINUED | OUTPATIENT
Start: 2022-06-25 | End: 2022-06-27

## 2022-06-25 RX ADMIN — ASPIRIN 243 MG: 81 TABLET, CHEWABLE ORAL at 16:03

## 2022-06-25 RX ADMIN — Medication 10 ML: at 20:09

## 2022-06-25 RX ADMIN — ACETAMINOPHEN 650 MG: 325 TABLET ORAL at 20:09

## 2022-06-25 RX ADMIN — FUROSEMIDE 40 MG: 10 INJECTION, SOLUTION INTRAVENOUS at 16:42

## 2022-06-26 ENCOUNTER — APPOINTMENT (OUTPATIENT)
Dept: CARDIOLOGY | Facility: HOSPITAL | Age: 67
End: 2022-06-26

## 2022-06-26 LAB
ALBUMIN SERPL-MCNC: 4.24 G/DL (ref 3.5–5.2)
ALBUMIN/GLOB SERPL: 1.7 G/DL
ALP SERPL-CCNC: 73 U/L (ref 39–117)
ALT SERPL W P-5'-P-CCNC: 14 U/L (ref 1–41)
ANION GAP SERPL CALCULATED.3IONS-SCNC: 9.9 MMOL/L (ref 5–15)
AST SERPL-CCNC: 28 U/L (ref 1–40)
BASOPHILS # BLD AUTO: 0.04 10*3/MM3 (ref 0–0.2)
BASOPHILS NFR BLD AUTO: 0.5 % (ref 0–1.5)
BH CV ECHO MEAS - ACS: 2.8 CM
BH CV ECHO MEAS - AO MAX PG: 5.3 MMHG
BH CV ECHO MEAS - AO MEAN PG: 3 MMHG
BH CV ECHO MEAS - AO ROOT DIAM: 3.8 CM
BH CV ECHO MEAS - AO V2 MAX: 115 CM/SEC
BH CV ECHO MEAS - AO V2 VTI: 20.8 CM
BH CV ECHO MEAS - EDV(CUBED): 252.4 ML
BH CV ECHO MEAS - EDV(MOD-SP4): 210 ML
BH CV ECHO MEAS - EF(MOD-SP4): 64.8 %
BH CV ECHO MEAS - ESV(CUBED): 83.7 ML
BH CV ECHO MEAS - ESV(MOD-SP4): 73.9 ML
BH CV ECHO MEAS - FS: 30.8 %
BH CV ECHO MEAS - IVS/LVPW: 0.95 CM
BH CV ECHO MEAS - IVSD: 1.45 CM
BH CV ECHO MEAS - LA DIMENSION: 5.5 CM
BH CV ECHO MEAS - LAT PEAK E' VEL: 10.4 CM/SEC
BH CV ECHO MEAS - LV DIASTOLIC VOL/BSA (35-75): 87 CM2
BH CV ECHO MEAS - LV MASS(C)D: 457.6 GRAMS
BH CV ECHO MEAS - LV SYSTOLIC VOL/BSA (12-30): 30.6 CM2
BH CV ECHO MEAS - LVIDD: 6.3 CM
BH CV ECHO MEAS - LVIDS: 4.4 CM
BH CV ECHO MEAS - LVOT AREA: 4.5 CM2
BH CV ECHO MEAS - LVOT DIAM: 2.4 CM
BH CV ECHO MEAS - LVPWD: 1.52 CM
BH CV ECHO MEAS - MED PEAK E' VEL: 6.9 CM/SEC
BH CV ECHO MEAS - MV A MAX VEL: 59.6 CM/SEC
BH CV ECHO MEAS - MV E MAX VEL: 114 CM/SEC
BH CV ECHO MEAS - MV E/A: 1.91
BH CV ECHO MEAS - PA ACC TIME: 0.05 SEC
BH CV ECHO MEAS - PA PR(ACCEL): 55.2 MMHG
BH CV ECHO MEAS - RAP SYSTOLE: 10 MMHG
BH CV ECHO MEAS - RVSP: 66.9 MMHG
BH CV ECHO MEAS - SI(MOD-SP4): 56.4 ML/M2
BH CV ECHO MEAS - SV(MOD-SP4): 136.1 ML
BH CV ECHO MEAS - TAPSE (>1.6): 2.26 CM
BH CV ECHO MEAS - TR MAX PG: 56.9 MMHG
BH CV ECHO MEAS - TR MAX VEL: 377 CM/SEC
BH CV ECHO MEASUREMENTS AVERAGE E/E' RATIO: 13.18
BILIRUB SERPL-MCNC: 0.8 MG/DL (ref 0–1.2)
BUN SERPL-MCNC: 16 MG/DL (ref 8–23)
BUN/CREAT SERPL: 15.4 (ref 7–25)
CALCIUM SPEC-SCNC: 9.3 MG/DL (ref 8.6–10.5)
CHLORIDE SERPL-SCNC: 99 MMOL/L (ref 98–107)
CHOLEST SERPL-MCNC: 159 MG/DL (ref 0–200)
CO2 SERPL-SCNC: 33.1 MMOL/L (ref 22–29)
CREAT SERPL-MCNC: 1.04 MG/DL (ref 0.76–1.27)
DEPRECATED RDW RBC AUTO: 44.5 FL (ref 37–54)
EGFRCR SERPLBLD CKD-EPI 2021: 79.2 ML/MIN/1.73
EOSINOPHIL # BLD AUTO: 0.14 10*3/MM3 (ref 0–0.4)
EOSINOPHIL NFR BLD AUTO: 1.6 % (ref 0.3–6.2)
ERYTHROCYTE [DISTWIDTH] IN BLOOD BY AUTOMATED COUNT: 12.7 % (ref 12.3–15.4)
GLOBULIN UR ELPH-MCNC: 2.5 GM/DL
GLUCOSE BLDC GLUCOMTR-MCNC: 149 MG/DL (ref 70–130)
GLUCOSE SERPL-MCNC: 121 MG/DL (ref 65–99)
HBA1C MFR BLD: 6 % (ref 4.8–5.6)
HCT VFR BLD AUTO: 48.1 % (ref 37.5–51)
HDLC SERPL-MCNC: 36 MG/DL (ref 40–60)
HGB BLD-MCNC: 15.2 G/DL (ref 13–17.7)
IMM GRANULOCYTES # BLD AUTO: 0.03 10*3/MM3 (ref 0–0.05)
IMM GRANULOCYTES NFR BLD AUTO: 0.3 % (ref 0–0.5)
LDLC SERPL CALC-MCNC: 107 MG/DL (ref 0–100)
LDLC/HDLC SERPL: 2.94 {RATIO}
LYMPHOCYTES # BLD AUTO: 1.76 10*3/MM3 (ref 0.7–3.1)
LYMPHOCYTES NFR BLD AUTO: 20.1 % (ref 19.6–45.3)
MAXIMAL PREDICTED HEART RATE: 154 BPM
MCH RBC QN AUTO: 30 PG (ref 26.6–33)
MCHC RBC AUTO-ENTMCNC: 31.6 G/DL (ref 31.5–35.7)
MCV RBC AUTO: 95.1 FL (ref 79–97)
MONOCYTES # BLD AUTO: 0.92 10*3/MM3 (ref 0.1–0.9)
MONOCYTES NFR BLD AUTO: 10.5 % (ref 5–12)
NEUTROPHILS NFR BLD AUTO: 5.87 10*3/MM3 (ref 1.7–7)
NEUTROPHILS NFR BLD AUTO: 67 % (ref 42.7–76)
NRBC BLD AUTO-RTO: 0 /100 WBC (ref 0–0.2)
PLATELET # BLD AUTO: 165 10*3/MM3 (ref 140–450)
PMV BLD AUTO: 10.6 FL (ref 6–12)
POTASSIUM SERPL-SCNC: 4 MMOL/L (ref 3.5–5.2)
PROT SERPL-MCNC: 6.7 G/DL (ref 6–8.5)
RBC # BLD AUTO: 5.06 10*6/MM3 (ref 4.14–5.8)
SODIUM SERPL-SCNC: 142 MMOL/L (ref 136–145)
STRESS TARGET HR: 131 BPM
TRIGL SERPL-MCNC: 85 MG/DL (ref 0–150)
TSH SERPL DL<=0.05 MIU/L-ACNC: 3.17 UIU/ML (ref 0.27–4.2)
VLDLC SERPL-MCNC: 16 MG/DL (ref 5–40)
WBC NRBC COR # BLD: 8.76 10*3/MM3 (ref 3.4–10.8)

## 2022-06-26 PROCEDURE — 96372 THER/PROPH/DIAG INJ SC/IM: CPT

## 2022-06-26 PROCEDURE — 25010000002 ENOXAPARIN PER 10 MG: Performed by: INTERNAL MEDICINE

## 2022-06-26 PROCEDURE — G0378 HOSPITAL OBSERVATION PER HR: HCPCS

## 2022-06-26 PROCEDURE — 96376 TX/PRO/DX INJ SAME DRUG ADON: CPT

## 2022-06-26 PROCEDURE — 93306 TTE W/DOPPLER COMPLETE: CPT

## 2022-06-26 PROCEDURE — 99225 PR SBSQ OBSERVATION CARE/DAY 25 MINUTES: CPT | Performed by: INTERNAL MEDICINE

## 2022-06-26 PROCEDURE — 25010000002 FUROSEMIDE PER 20 MG: Performed by: INTERNAL MEDICINE

## 2022-06-26 PROCEDURE — 82962 GLUCOSE BLOOD TEST: CPT

## 2022-06-26 RX ORDER — CARVEDILOL 3.12 MG/1
3.12 TABLET ORAL 2 TIMES DAILY WITH MEALS
Status: DISCONTINUED | OUTPATIENT
Start: 2022-06-26 | End: 2022-06-28

## 2022-06-26 RX ORDER — IBUPROFEN 600 MG/1
600 TABLET ORAL EVERY 4 HOURS PRN
Status: DISCONTINUED | OUTPATIENT
Start: 2022-06-26 | End: 2022-06-28 | Stop reason: HOSPADM

## 2022-06-26 RX ORDER — SPIRONOLACTONE 25 MG/1
25 TABLET ORAL DAILY
Status: DISCONTINUED | OUTPATIENT
Start: 2022-06-26 | End: 2022-06-28

## 2022-06-26 RX ORDER — IBUPROFEN 600 MG/1
600 TABLET ORAL ONCE
Status: COMPLETED | OUTPATIENT
Start: 2022-06-26 | End: 2022-06-26

## 2022-06-26 RX ORDER — FUROSEMIDE 10 MG/ML
40 INJECTION INTRAMUSCULAR; INTRAVENOUS ONCE
Status: COMPLETED | OUTPATIENT
Start: 2022-06-26 | End: 2022-06-26

## 2022-06-26 RX ADMIN — Medication 10 ML: at 20:01

## 2022-06-26 RX ADMIN — ASPIRIN 81 MG: 81 TABLET, COATED ORAL at 09:05

## 2022-06-26 RX ADMIN — CARVEDILOL 3.12 MG: 3.12 TABLET, FILM COATED ORAL at 18:32

## 2022-06-26 RX ADMIN — CARVEDILOL 3.12 MG: 3.12 TABLET, FILM COATED ORAL at 09:43

## 2022-06-26 RX ADMIN — SACUBITRIL AND VALSARTAN 1 TABLET: 24; 26 TABLET, FILM COATED ORAL at 20:01

## 2022-06-26 RX ADMIN — FUROSEMIDE 40 MG: 10 INJECTION, SOLUTION INTRAVENOUS at 12:07

## 2022-06-26 RX ADMIN — IBUPROFEN 600 MG: 600 TABLET, FILM COATED ORAL at 03:54

## 2022-06-26 RX ADMIN — Medication 10 ML: at 09:06

## 2022-06-26 RX ADMIN — ATORVASTATIN CALCIUM 40 MG: 40 TABLET, FILM COATED ORAL at 20:01

## 2022-06-26 RX ADMIN — ENOXAPARIN SODIUM 40 MG: 40 INJECTION SUBCUTANEOUS at 20:01

## 2022-06-26 RX ADMIN — SPIRONOLACTONE 25 MG: 25 TABLET ORAL at 18:33

## 2022-06-27 LAB
ANION GAP SERPL CALCULATED.3IONS-SCNC: 8.7 MMOL/L (ref 5–15)
BASOPHILS # BLD AUTO: 0.02 10*3/MM3 (ref 0–0.2)
BASOPHILS NFR BLD AUTO: 0.3 % (ref 0–1.5)
BUN SERPL-MCNC: 19 MG/DL (ref 8–23)
BUN/CREAT SERPL: 19 (ref 7–25)
CALCIUM SPEC-SCNC: 9.2 MG/DL (ref 8.6–10.5)
CHLORIDE SERPL-SCNC: 100 MMOL/L (ref 98–107)
CO2 SERPL-SCNC: 32.3 MMOL/L (ref 22–29)
CREAT SERPL-MCNC: 1 MG/DL (ref 0.76–1.27)
DEPRECATED RDW RBC AUTO: 45.2 FL (ref 37–54)
EGFRCR SERPLBLD CKD-EPI 2021: 83 ML/MIN/1.73
EOSINOPHIL # BLD AUTO: 0.15 10*3/MM3 (ref 0–0.4)
EOSINOPHIL NFR BLD AUTO: 2.3 % (ref 0.3–6.2)
ERYTHROCYTE [DISTWIDTH] IN BLOOD BY AUTOMATED COUNT: 12.6 % (ref 12.3–15.4)
GLUCOSE SERPL-MCNC: 132 MG/DL (ref 65–99)
HCT VFR BLD AUTO: 47.7 % (ref 37.5–51)
HGB BLD-MCNC: 14.7 G/DL (ref 13–17.7)
IMM GRANULOCYTES # BLD AUTO: 0.03 10*3/MM3 (ref 0–0.05)
IMM GRANULOCYTES NFR BLD AUTO: 0.5 % (ref 0–0.5)
LYMPHOCYTES # BLD AUTO: 1.61 10*3/MM3 (ref 0.7–3.1)
LYMPHOCYTES NFR BLD AUTO: 24.3 % (ref 19.6–45.3)
MCH RBC QN AUTO: 30.2 PG (ref 26.6–33)
MCHC RBC AUTO-ENTMCNC: 30.8 G/DL (ref 31.5–35.7)
MCV RBC AUTO: 97.9 FL (ref 79–97)
MONOCYTES # BLD AUTO: 0.76 10*3/MM3 (ref 0.1–0.9)
MONOCYTES NFR BLD AUTO: 11.5 % (ref 5–12)
NEUTROPHILS NFR BLD AUTO: 4.05 10*3/MM3 (ref 1.7–7)
NEUTROPHILS NFR BLD AUTO: 61.1 % (ref 42.7–76)
NRBC BLD AUTO-RTO: 0 /100 WBC (ref 0–0.2)
PLATELET # BLD AUTO: 154 10*3/MM3 (ref 140–450)
PMV BLD AUTO: 10.9 FL (ref 6–12)
POTASSIUM SERPL-SCNC: 4.1 MMOL/L (ref 3.5–5.2)
RBC # BLD AUTO: 4.87 10*6/MM3 (ref 4.14–5.8)
SODIUM SERPL-SCNC: 141 MMOL/L (ref 136–145)
WBC NRBC COR # BLD: 6.62 10*3/MM3 (ref 3.4–10.8)

## 2022-06-27 PROCEDURE — C1769 GUIDE WIRE: HCPCS | Performed by: INTERNAL MEDICINE

## 2022-06-27 PROCEDURE — 0 IOPAMIDOL PER 1 ML: Performed by: INTERNAL MEDICINE

## 2022-06-27 PROCEDURE — 87102 FUNGUS ISOLATION CULTURE: CPT | Performed by: INTERNAL MEDICINE

## 2022-06-27 PROCEDURE — 80048 BASIC METABOLIC PNL TOTAL CA: CPT | Performed by: INTERNAL MEDICINE

## 2022-06-27 PROCEDURE — 99214 OFFICE O/P EST MOD 30 MIN: CPT | Performed by: SPECIALIST

## 2022-06-27 PROCEDURE — C1894 INTRO/SHEATH, NON-LASER: HCPCS | Performed by: INTERNAL MEDICINE

## 2022-06-27 PROCEDURE — 99225 PR SBSQ OBSERVATION CARE/DAY 25 MINUTES: CPT | Performed by: INTERNAL MEDICINE

## 2022-06-27 PROCEDURE — G0378 HOSPITAL OBSERVATION PER HR: HCPCS

## 2022-06-27 PROCEDURE — 85025 COMPLETE CBC W/AUTO DIFF WBC: CPT | Performed by: INTERNAL MEDICINE

## 2022-06-27 PROCEDURE — 93458 L HRT ARTERY/VENTRICLE ANGIO: CPT | Performed by: INTERNAL MEDICINE

## 2022-06-27 PROCEDURE — 25010000002 FUROSEMIDE PER 20 MG: Performed by: INTERNAL MEDICINE

## 2022-06-27 PROCEDURE — 25010000002 HEPARIN (PORCINE) PER 1000 UNITS: Performed by: INTERNAL MEDICINE

## 2022-06-27 RX ORDER — HEPARIN SODIUM 1000 [USP'U]/ML
INJECTION, SOLUTION INTRAVENOUS; SUBCUTANEOUS AS NEEDED
Status: DISCONTINUED | OUTPATIENT
Start: 2022-06-27 | End: 2022-06-27 | Stop reason: HOSPADM

## 2022-06-27 RX ORDER — SODIUM CHLORIDE 9 MG/ML
100 INJECTION, SOLUTION INTRAVENOUS CONTINUOUS
Status: ACTIVE | OUTPATIENT
Start: 2022-06-27 | End: 2022-06-27

## 2022-06-27 RX ORDER — FUROSEMIDE 10 MG/ML
80 INJECTION INTRAMUSCULAR; INTRAVENOUS ONCE
Status: COMPLETED | OUTPATIENT
Start: 2022-06-27 | End: 2022-06-27

## 2022-06-27 RX ORDER — SODIUM CHLORIDE 9 MG/ML
INJECTION, SOLUTION INTRAVENOUS CONTINUOUS PRN
Status: COMPLETED | OUTPATIENT
Start: 2022-06-27 | End: 2022-06-27

## 2022-06-27 RX ORDER — LIDOCAINE HYDROCHLORIDE 20 MG/ML
INJECTION, SOLUTION INFILTRATION; PERINEURAL AS NEEDED
Status: DISCONTINUED | OUTPATIENT
Start: 2022-06-27 | End: 2022-06-27 | Stop reason: HOSPADM

## 2022-06-27 RX ORDER — ACETAMINOPHEN 325 MG/1
650 TABLET ORAL EVERY 4 HOURS PRN
Status: DISCONTINUED | OUTPATIENT
Start: 2022-06-27 | End: 2022-06-28 | Stop reason: HOSPADM

## 2022-06-27 RX ADMIN — SODIUM CHLORIDE 100 ML/HR: 9 INJECTION, SOLUTION INTRAVENOUS at 16:29

## 2022-06-27 RX ADMIN — Medication 10 ML: at 21:28

## 2022-06-27 RX ADMIN — Medication 10 ML: at 16:32

## 2022-06-27 RX ADMIN — SODIUM CHLORIDE 500 ML: 9 INJECTION, SOLUTION INTRAVENOUS at 19:23

## 2022-06-27 RX ADMIN — FUROSEMIDE 80 MG: 10 INJECTION, SOLUTION INTRAVENOUS at 14:08

## 2022-06-27 RX ADMIN — ATORVASTATIN CALCIUM 40 MG: 40 TABLET, FILM COATED ORAL at 21:28

## 2022-06-28 ENCOUNTER — ANESTHESIA (OUTPATIENT)
Dept: CARDIOLOGY | Facility: HOSPITAL | Age: 67
End: 2022-06-28

## 2022-06-28 ENCOUNTER — ANESTHESIA EVENT (OUTPATIENT)
Dept: CARDIOLOGY | Facility: HOSPITAL | Age: 67
End: 2022-06-28

## 2022-06-28 ENCOUNTER — APPOINTMENT (OUTPATIENT)
Dept: CARDIOLOGY | Facility: HOSPITAL | Age: 67
End: 2022-06-28

## 2022-06-28 VITALS
WEIGHT: 253.8 LBS | SYSTOLIC BLOOD PRESSURE: 126 MMHG | HEART RATE: 78 BPM | TEMPERATURE: 99 F | OXYGEN SATURATION: 98 % | RESPIRATION RATE: 18 BRPM | BODY MASS INDEX: 31.56 KG/M2 | DIASTOLIC BLOOD PRESSURE: 72 MMHG | HEIGHT: 75 IN

## 2022-06-28 VITALS — OXYGEN SATURATION: 93 % | SYSTOLIC BLOOD PRESSURE: 90 MMHG | TEMPERATURE: 98 F | DIASTOLIC BLOOD PRESSURE: 60 MMHG

## 2022-06-28 LAB
ANION GAP SERPL CALCULATED.3IONS-SCNC: 9 MMOL/L (ref 5–15)
BASOPHILS # BLD AUTO: 0.02 10*3/MM3 (ref 0–0.2)
BASOPHILS NFR BLD AUTO: 0.3 % (ref 0–1.5)
BH CV ECHO MEAS - RAP SYSTOLE: 10 MMHG
BH CV ECHO MEAS - RVSP: 51.7 MMHG
BH CV ECHO MEAS - TR MAX PG: 41.7 MMHG
BH CV ECHO MEAS - TR MAX VEL: 323 CM/SEC
BUN SERPL-MCNC: 22 MG/DL (ref 8–23)
BUN/CREAT SERPL: 20.6 (ref 7–25)
CALCIUM SPEC-SCNC: 9.2 MG/DL (ref 8.6–10.5)
CHLORIDE SERPL-SCNC: 100 MMOL/L (ref 98–107)
CO2 SERPL-SCNC: 33 MMOL/L (ref 22–29)
CREAT SERPL-MCNC: 1.07 MG/DL (ref 0.76–1.27)
DEPRECATED RDW RBC AUTO: 44.6 FL (ref 37–54)
EGFRCR SERPLBLD CKD-EPI 2021: 76.5 ML/MIN/1.73
EOSINOPHIL # BLD AUTO: 0.07 10*3/MM3 (ref 0–0.4)
EOSINOPHIL NFR BLD AUTO: 1.2 % (ref 0.3–6.2)
ERYTHROCYTE [DISTWIDTH] IN BLOOD BY AUTOMATED COUNT: 12.6 % (ref 12.3–15.4)
GLUCOSE SERPL-MCNC: 129 MG/DL (ref 65–99)
HCT VFR BLD AUTO: 48.9 % (ref 37.5–51)
HGB BLD-MCNC: 15.3 G/DL (ref 13–17.7)
IMM GRANULOCYTES # BLD AUTO: 0.01 10*3/MM3 (ref 0–0.05)
IMM GRANULOCYTES NFR BLD AUTO: 0.2 % (ref 0–0.5)
LYMPHOCYTES # BLD AUTO: 1.47 10*3/MM3 (ref 0.7–3.1)
LYMPHOCYTES NFR BLD AUTO: 25.2 % (ref 19.6–45.3)
MAGNESIUM SERPL-MCNC: 1.9 MG/DL (ref 1.6–2.4)
MAXIMAL PREDICTED HEART RATE: 154 BPM
MCH RBC QN AUTO: 30.1 PG (ref 26.6–33)
MCHC RBC AUTO-ENTMCNC: 31.3 G/DL (ref 31.5–35.7)
MCV RBC AUTO: 96.3 FL (ref 79–97)
MONOCYTES # BLD AUTO: 0.71 10*3/MM3 (ref 0.1–0.9)
MONOCYTES NFR BLD AUTO: 12.2 % (ref 5–12)
NEUTROPHILS NFR BLD AUTO: 3.56 10*3/MM3 (ref 1.7–7)
NEUTROPHILS NFR BLD AUTO: 60.9 % (ref 42.7–76)
NRBC BLD AUTO-RTO: 0 /100 WBC (ref 0–0.2)
PLATELET # BLD AUTO: 153 10*3/MM3 (ref 140–450)
PMV BLD AUTO: 10.3 FL (ref 6–12)
POTASSIUM SERPL-SCNC: 4.1 MMOL/L (ref 3.5–5.2)
RBC # BLD AUTO: 5.08 10*6/MM3 (ref 4.14–5.8)
SODIUM SERPL-SCNC: 142 MMOL/L (ref 136–145)
STRESS TARGET HR: 131 BPM
WBC NRBC COR # BLD: 5.84 10*3/MM3 (ref 3.4–10.8)

## 2022-06-28 PROCEDURE — 85025 COMPLETE CBC W/AUTO DIFF WBC: CPT | Performed by: INTERNAL MEDICINE

## 2022-06-28 PROCEDURE — G0378 HOSPITAL OBSERVATION PER HR: HCPCS

## 2022-06-28 PROCEDURE — 93321 DOPPLER ECHO F-UP/LMTD STD: CPT

## 2022-06-28 PROCEDURE — 93312 ECHO TRANSESOPHAGEAL: CPT

## 2022-06-28 PROCEDURE — 25010000002 PROPOFOL 10 MG/ML EMULSION: Performed by: NURSE ANESTHETIST, CERTIFIED REGISTERED

## 2022-06-28 PROCEDURE — 93325 DOPPLER ECHO COLOR FLOW MAPG: CPT

## 2022-06-28 PROCEDURE — 99217 PR OBSERVATION CARE DISCHARGE MANAGEMENT: CPT | Performed by: INTERNAL MEDICINE

## 2022-06-28 PROCEDURE — 93321 DOPPLER ECHO F-UP/LMTD STD: CPT | Performed by: INTERNAL MEDICINE

## 2022-06-28 PROCEDURE — 99214 OFFICE O/P EST MOD 30 MIN: CPT | Performed by: INTERNAL MEDICINE

## 2022-06-28 PROCEDURE — 80048 BASIC METABOLIC PNL TOTAL CA: CPT | Performed by: INTERNAL MEDICINE

## 2022-06-28 PROCEDURE — 83735 ASSAY OF MAGNESIUM: CPT | Performed by: INTERNAL MEDICINE

## 2022-06-28 PROCEDURE — 93312 ECHO TRANSESOPHAGEAL: CPT | Performed by: INTERNAL MEDICINE

## 2022-06-28 PROCEDURE — 93325 DOPPLER ECHO COLOR FLOW MAPG: CPT | Performed by: INTERNAL MEDICINE

## 2022-06-28 RX ORDER — CARVEDILOL 6.25 MG/1
6.25 TABLET ORAL 2 TIMES DAILY WITH MEALS
Qty: 60 TABLET | Refills: 0 | Status: SHIPPED | OUTPATIENT
Start: 2022-06-29 | End: 2022-09-14 | Stop reason: HOSPADM

## 2022-06-28 RX ORDER — SODIUM CHLORIDE 9 MG/ML
INJECTION, SOLUTION INTRAVENOUS CONTINUOUS PRN
Status: DISCONTINUED | OUTPATIENT
Start: 2022-06-28 | End: 2022-06-28 | Stop reason: SURG

## 2022-06-28 RX ORDER — CARVEDILOL 6.25 MG/1
6.25 TABLET ORAL 2 TIMES DAILY WITH MEALS
Status: DISCONTINUED | OUTPATIENT
Start: 2022-06-29 | End: 2022-06-28 | Stop reason: HOSPADM

## 2022-06-28 RX ORDER — PROPOFOL 10 MG/ML
VIAL (ML) INTRAVENOUS AS NEEDED
Status: DISCONTINUED | OUTPATIENT
Start: 2022-06-28 | End: 2022-06-28 | Stop reason: SURG

## 2022-06-28 RX ORDER — FUROSEMIDE 40 MG/1
40 TABLET ORAL DAILY PRN
Qty: 30 TABLET | Refills: 0 | Status: SHIPPED | OUTPATIENT
Start: 2022-06-28 | End: 2022-06-30 | Stop reason: SDUPTHER

## 2022-06-28 RX ORDER — ATORVASTATIN CALCIUM 40 MG/1
40 TABLET, FILM COATED ORAL NIGHTLY
Qty: 30 TABLET | Refills: 0 | Status: SHIPPED | OUTPATIENT
Start: 2022-06-28 | End: 2022-07-28

## 2022-06-28 RX ORDER — ASPIRIN 81 MG/1
81 TABLET ORAL DAILY
Qty: 30 TABLET | Refills: 0 | Status: SHIPPED | OUTPATIENT
Start: 2022-06-29 | End: 2022-07-29

## 2022-06-28 RX ADMIN — PROPOFOL 30 MG: 10 INJECTION, EMULSION INTRAVENOUS at 09:49

## 2022-06-28 RX ADMIN — ASPIRIN 81 MG: 81 TABLET, COATED ORAL at 08:53

## 2022-06-28 RX ADMIN — SODIUM CHLORIDE: 0.9 INJECTION, SOLUTION INTRAVENOUS at 09:39

## 2022-06-28 RX ADMIN — PROPOFOL 60 MG: 10 INJECTION, EMULSION INTRAVENOUS at 09:48

## 2022-06-28 RX ADMIN — CARVEDILOL 3.12 MG: 3.12 TABLET, FILM COATED ORAL at 17:00

## 2022-06-28 RX ADMIN — Medication 10 ML: at 08:52

## 2022-06-28 RX ADMIN — PROPOFOL 20 MG: 10 INJECTION, EMULSION INTRAVENOUS at 09:53

## 2022-06-28 RX ADMIN — CARVEDILOL 3.12 MG: 3.12 TABLET, FILM COATED ORAL at 08:53

## 2022-06-28 RX ADMIN — SPIRONOLACTONE 25 MG: 25 TABLET ORAL at 08:53

## 2022-06-28 NOTE — ANESTHESIA PREPROCEDURE EVALUATION
Anesthesia Evaluation     Patient summary reviewed and Nursing notes reviewed   NPO Solid Status: > 8 hours  NPO Liquid Status: > 8 hours           Airway   Mallampati: I  TM distance: <3 FB  Neck ROM: full  no difficulty expected  Dental - normal exam     Pulmonary - negative pulmonary ROS and normal exam   Cardiovascular - normal exam  Exercise tolerance: excellent (>7 METS)    NYHA Classification: I    (+) hypertension poorly controlled, CHF Diastolic >=55%, hyperlipidemia,       Neuro/Psych  (+) TIA,    GI/Hepatic/Renal/Endo    (+) obesity, morbid obesity,      Musculoskeletal (-) negative ROS    Abdominal  - normal exam   Substance History - negative use     OB/GYN negative ob/gyn ROS         Other - negative ROS                       Anesthesia Plan    ASA 3     general   total IV anesthesia  intravenous induction     Anesthetic plan, risks, benefits, and alternatives have been provided, discussed and informed consent has been obtained with: patient.    Plan discussed with CRNA.        CODE STATUS:    Code Status (Patient has no pulse and is not breathing): CPR (Attempt to Resuscitate)  Medical Interventions (Patient has pulse or is breathing): Full Support

## 2022-06-28 NOTE — ANESTHESIA POSTPROCEDURE EVALUATION
Patient: Damián Myers    Procedure Summary     Date: 06/28/22 Room / Location: [facility] NONINVASIVE LAB    Anesthesia Start: 0939 Anesthesia Stop: 1001    Procedure: ADULT TRANSESOPHAGEAL ECHO (KENNEY) W/ CONT IF NECESSARY PER PROTOCOL Diagnosis: (Valvular Disease)    Scheduled Providers: Mahendra Amador MD Provider: Marcellus Solomon MD    Anesthesia Type: general ASA Status: 3          Anesthesia Type: general    Vitals  Vitals Value Taken Time   BP 94/57 06/28/22 1045   Temp 99 °F (37.2 °C) 06/28/22 1045   Pulse 63 06/28/22 1045   Resp 18 06/28/22 1045   SpO2 97 % 06/28/22 1045           Post Anesthesia Care and Evaluation    Patient location during evaluation: PHASE II  Patient participation: complete - patient participated  Level of consciousness: awake and alert  Pain score: 0  Pain management: adequate    Airway patency: patent  Anesthetic complications: No anesthetic complications    Cardiovascular status: acceptable  Respiratory status: acceptable  Hydration status: acceptable

## 2022-06-29 ENCOUNTER — READMISSION MANAGEMENT (OUTPATIENT)
Dept: CALL CENTER | Facility: HOSPITAL | Age: 67
End: 2022-06-29

## 2022-06-29 ENCOUNTER — TELEPHONE (OUTPATIENT)
Dept: TELEMETRY | Facility: HOSPITAL | Age: 67
End: 2022-06-29

## 2022-06-29 NOTE — OUTREACH NOTE
Prep Survey    Flowsheet Row Responses   [facility] patient discharged from? [facility]   Is LACE score < 7 ? No   Emergency Room discharge w/ pulse ox? No   Eligibility Readm Mgmt   Discharge diagnosis Acute on chronic diastolic CHF    Does the patient have one of the following disease processes/diagnoses(primary or secondary)? CHF   Does the patient have Home health ordered? No   Is there a DME ordered? No   Prep survey completed? Yes          SPARKLE BACON - Registered Nurse

## 2022-06-30 ENCOUNTER — OFFICE VISIT (OUTPATIENT)
Dept: CARDIOLOGY | Facility: CLINIC | Age: 67
End: 2022-06-30

## 2022-06-30 VITALS
SYSTOLIC BLOOD PRESSURE: 131 MMHG | WEIGHT: 251.4 LBS | BODY MASS INDEX: 31.26 KG/M2 | HEART RATE: 65 BPM | DIASTOLIC BLOOD PRESSURE: 84 MMHG | HEIGHT: 75 IN | OXYGEN SATURATION: 94 %

## 2022-06-30 DIAGNOSIS — I50.22 CHRONIC SYSTOLIC HEART FAILURE: Primary | ICD-10-CM

## 2022-06-30 DIAGNOSIS — I25.10 ASCVD (ARTERIOSCLEROTIC CARDIOVASCULAR DISEASE): ICD-10-CM

## 2022-06-30 PROBLEM — E78.5 HLD (HYPERLIPIDEMIA): Status: RESOLVED | Noted: 2020-05-23 | Resolved: 2022-06-30

## 2022-06-30 PROCEDURE — 99214 OFFICE O/P EST MOD 30 MIN: CPT | Performed by: PHYSICIAN ASSISTANT

## 2022-06-30 RX ORDER — FUROSEMIDE 40 MG/1
40 TABLET ORAL DAILY PRN
Qty: 30 TABLET | Refills: 3 | OUTPATIENT
Start: 2022-06-30 | End: 2022-08-05

## 2022-06-30 NOTE — PROGRESS NOTES
Spencer Saeed MD  Damián Myers  1955 06/30/2022    Patient Active Problem List   Diagnosis   • Hyperglycemia   • Obesity (BMI 30-39.9)   • Chronic systolic heart failure (HCC)   • ASCVD (arteriosclerotic cardiovascular disease)   • Stroke (cerebrum) (HCC)   • Acute pulmonary edema (HCC)   • TIA (transient ischemic attack)   • HTN (hypertension)   • HLD (hyperlipidemia)   • COVID-19   • Acute on chronic respiratory failure with hypoxia and hypercapnia (HCC)   • Cytokine release syndrome, grade 4   • Acute on chronic congestive heart failure, unspecified heart failure type (HCC)       Dear Spencer Saeed MD:    Subjective     History of Present Illness:    Chief Complaint   Patient presents with   • Follow-up     HOSPITAL F/U,SOA, FLUID ON LUNGS, LEAKY VALVE   • Congestive Heart Failure     F/U   • ASCVD     F/U   • Med Management     PT BROUGHT MED LIST       Damián Myers is a pleasant 66 y.o. male with a past medical history significant for coronary artery disease with stenting of the LAD on 11/8/2019, advanced ischemic cardiomyopathy with original left ventricular ejection fraction as low as 31 to 35%, he also has moderate to severe mitral valve regurgitation with recent KENNEY on 6/27/2022.  He also recently had coronary angiography with 40 to 50% stenosis in LAD and 30 to 40% in the RCA.  He is nondiabetic and does not smoke tobacco.  He comes in today for hospital follow-up.    And was recently hospitalized for acute on chronic heart failure he was diuresed well and was able to be discharged in stable condition.  During his hospitalization echocardiogram did reveal moderate to severe mitral valve regurgitation.  He subsequently underwent coronary angiography which did reveal nonobstructive CAD.  Clinically he reports his breathing has returned back to baseline prior to the hospitalization.  He does report his orthopnea has now resolved, denies PND, or pedal edema.  He also denies any chest pains, palpitations,  or syncope.           Left heart cath on 6/27/2022  Coronary anatomy findings:  LM: Is a large calibre vessel , normal take off from left cusp, divides into LAD and Lcx.  No significant stenosis     LAD: Large caliber artery with multiple tandem 30 to 40% stenosis in the proximal mid and distal, the stent in the mid LAD past the diagonal artery appeared patent, the distal stented had about like 40 to 50% stenosis which was unchanged from the previous cath.     Ramus intermedius: Large-caliber vessel with no significant stenosis, mild diffuse disease.     LCX: Moderate calibre vessel, mild luminal irregularities.  Large OM branch with no significant stenosis the AV groove circumflex had no significant stenosis     RCA: Large calibre, dominant artery, normal take off from right cusp.  The RCA appeared to be ectatic, multiple tandem 30 to 40% diffuse stenosis in the proximal mid and distal RCA, RPDA and PL branches had no significant stenosis.      Left Ventriculography:     LV systolic function was low normal, LV chamber appears dilated with visual estimated EF of 50 to 55%. No wall motion abnormalities.   3-4+ MR     LVEDP: 19 mmHg  No gradient across the aortic valve on pull back.            EBL: Less than 10cc        Final Impression:  Mild non obstructive CAD  3-4+ MR  LVEDP 19 mmHg.  LVEF 50 to 55%.        Recommendations:  Patient will need a transesophageal echocardiogram as he does have bileaflet mitral valve prolapse from transthoracic echo with moderate to severe MR, with eccentric jet, likely severe MR.                Greyson Balderas MD, [facility]  Interventional Cardiology            Allergies   Allergen Reactions   • Other Unknown - High Severity     Pt had a reaction to anesthesia when placing stents      :      Current Outpatient Medications:   •  aspirin 81 MG EC tablet, Take 1 tablet by mouth Daily for 30 days., Disp: 30 tablet, Rfl: 0  •  carvedilol (COREG) 6.25 MG tablet, Take 1 tablet by mouth 2  "(Two) Times a Day With Meals for 30 days., Disp: 60 tablet, Rfl: 0  •  furosemide (Lasix) 40 MG tablet, Take 1 tablet by mouth Daily As Needed (Worsening swelling or 3lb weight gain in 1 day. Please weigh yourself daily.) for up to 30 days., Disp: 30 tablet, Rfl: 3  •  atorvastatin (LIPITOR) 40 MG tablet, Take 1 tablet by mouth Every Night for 30 days., Disp: 30 tablet, Rfl: 0    The following portions of the patient's history were reviewed and updated as appropriate: allergies, current medications, past family history, past medical history, past social history, past surgical history and problem list.    Social History     Tobacco Use   • Smoking status: Never Smoker   • Smokeless tobacco: Never Used   Substance Use Topics   • Alcohol use: No   • Drug use: No         Objective   Vitals:    06/30/22 1033   BP: 131/84   Pulse: 65   SpO2: 94%   Weight: 114 kg (251 lb 6.4 oz)   Height: 190.5 cm (75\")     Body mass index is 31.42 kg/m².    Constitutional:       General: Not in acute distress.     Appearance: Healthy appearance. Well-developed and not in distress. Not diaphoretic.   Eyes:      Conjunctiva/sclera: Conjunctivae normal.      Pupils: Pupils are equal, round, and reactive to light.   HENT:      Head: Normocephalic and atraumatic.   Neck:      Vascular: No carotid bruit or JVD.   Pulmonary:      Effort: Pulmonary effort is normal. No respiratory distress.      Breath sounds: Normal breath sounds.   Cardiovascular:      Normal rate. Regular rhythm.      Murmurs: There is a high frequency blowing holosystolic murmur at the apex.   Skin:     General: Skin is cool.   Neurological:      Mental Status: Alert, oriented to person, place, and time and oriented to person, place and time.         Lab Results   Component Value Date     06/28/2022    K 4.1 06/28/2022     06/28/2022    CO2 33.0 (H) 06/28/2022    BUN 22 06/28/2022    CREATININE 1.07 06/28/2022    GLUCOSE 129 (H) 06/28/2022    CALCIUM 9.2 " 06/28/2022    AST 28 06/25/2022    ALT 14 06/25/2022    ALKPHOS 73 06/25/2022     Lab Results   Component Value Date    CKTOTAL 38 10/29/2021     Lab Results   Component Value Date    WBC 5.84 06/28/2022    HGB 15.3 06/28/2022    HCT 48.9 06/28/2022     06/28/2022     Lab Results   Component Value Date    INR 1.01 06/25/2022    INR 0.88 (L) 05/23/2020    INR 1.11 (H) 11/04/2019     Lab Results   Component Value Date    MG 1.9 06/28/2022     Lab Results   Component Value Date    TSH 3.170 06/25/2022    PSA 0.433 11/06/2019    TRIG 85 06/25/2022    HDL 36 (L) 06/25/2022     (H) 06/25/2022      No results found for: BNP    During this visit the following were done:  Labs Reviewed []    Labs Ordered []    Radiology Reports Reviewed []    Radiology Ordered []    PCP Records Reviewed []    Referring Provider Records Reviewed []    ER Records Reviewed []    Hospital Records Reviewed []    History Obtained From Family []    Radiology Images Reviewed []    Other Reviewed []    Records Requested []       Procedures    Assessment & Plan    Diagnosis Plan   1. Chronic systolic heart failure (HCC)  Ambulatory Referral to Cardiothoracic Surgery    Basic Metabolic Panel   2. ASCVD (arteriosclerotic cardiovascular disease)              Recommendations:  1. Chronic HFrEF  1. Clinically euvolemic today I will continue aspirin and carvedilol.  I did prescribe him Lasix 40 mg daily as needed.  I advised him to take this for increased shortness of breath, pedal edema, or weight gain greater than 3 pounds in a day or 5 pounds in a week.  He expressed understanding.  2. Moderate to severe mitral valve regurgitation.  1. Sent referral to cardiothoracic surgery for evaluation for possible replacement/MitraClip.  3. Coronary artery disease  1. No recent anginal symptoms continue aspirin and carvedilol.  Encourage statin patient declined at this time.      Return in about 3 months (around 9/30/2022).    As always, I appreciate  very much the opportunity to participate in the cardiovascular care of your patients.      With Best Regards,    Yasmany Leos PA-C

## 2022-07-01 ENCOUNTER — READMISSION MANAGEMENT (OUTPATIENT)
Dept: CALL CENTER | Facility: HOSPITAL | Age: 67
End: 2022-07-01

## 2022-07-01 NOTE — OUTREACH NOTE
CHF Week 1 Survey    Flowsheet Row Responses   [facility] patient discharged from? [facility]   Does the patient have one of the following disease processes/diagnoses(primary or secondary)? CHF   CHF Week 1 attempt successful? Yes   Call start time 1202   Call end time 1204   Discharge diagnosis Acute on chronic diastolic CHF    Is patient permission given to speak with other caregiver? Yes   List who call center can speak with wife   Meds reviewed with patient/caregiver? Yes   Is the patient having any side effects they believe may be caused by any medication additions or changes? No   Does the patient have all medications ordered at discharge? Yes   Prescription comments pt does not take the lipitor, does the clavidor.    Is the patient taking all medications as directed (includes completed medication regime)? Yes   Does the patient have a primary care provider?  Yes   Does the patient have an appointment with their PCP within 7 days of discharge? Yes   Has the patient kept scheduled appointments due by today? Yes   Pulse Ox monitoring Intermittent   Pulse Ox device source Patient   O2 Sat comments 96% RA   O2 Sat: education provided Sat levels, Monitoring frequency, When to seek care   Comments denies SOA/CP/edema   Did the patient receive a copy of their discharge instructions? Yes   Nursing interventions Reviewed instructions with patient   What is the patient's perception of their health status since discharge? Improving   Nursing interventions Nurse provided patient education   Is the patient weighing daily? Yes   Does the patient have scales? Yes   Daily weight interventions Education provided on importance of daily weight   Is the patient able to teach back Heart Failure diet management? Yes    CHF Week 1 call completed? Yes   Revoked No further contact(revokes)-requires comment   Is the patient interested in additional calls from an ambulatory ?  NOTE:  applies to high risk patients requiring  additional follow-up. No   Graduated/Revoked comments baseline          PARMINDER OG - Registered Nurse

## 2022-07-02 LAB — BACTERIA ISLT: NORMAL

## 2022-07-14 ENCOUNTER — DOCUMENTATION (OUTPATIENT)
Dept: CARDIAC REHAB | Facility: HOSPITAL | Age: 67
End: 2022-07-14

## 2022-07-14 NOTE — PROGRESS NOTES
Patient does not qualify for Cardiac Rehab at this time.  Noted dx of CHF, pt has to meet all the following criteria- Left Ventricular Ejection Fraction of 35% or less, NYHA class II to IV symptoms despite optimal heart failure therapy for at least 6 weeks and has not had recent (?6 Weeks) or planned (? 6 months) major cardiovascular hospitalizations or procedures. Please contact us at [phone] with questions.

## 2022-08-03 ENCOUNTER — OFFICE VISIT (OUTPATIENT)
Dept: CARDIAC SURGERY | Facility: CLINIC | Age: 67
End: 2022-08-03

## 2022-08-03 VITALS
HEIGHT: 75 IN | TEMPERATURE: 97.3 F | SYSTOLIC BLOOD PRESSURE: 130 MMHG | HEART RATE: 64 BPM | WEIGHT: 250 LBS | OXYGEN SATURATION: 98 % | DIASTOLIC BLOOD PRESSURE: 90 MMHG | BODY MASS INDEX: 31.08 KG/M2

## 2022-08-03 DIAGNOSIS — I34.0 SEVERE MITRAL REGURGITATION: Primary | ICD-10-CM

## 2022-08-03 PROCEDURE — 99204 OFFICE O/P NEW MOD 45 MIN: CPT | Performed by: THORACIC SURGERY (CARDIOTHORACIC VASCULAR SURGERY)

## 2022-08-03 RX ORDER — CARVEDILOL 6.25 MG/1
6.25 TABLET ORAL 2 TIMES DAILY WITH MEALS
COMMUNITY
End: 2022-09-14 | Stop reason: HOSPADM

## 2022-08-03 RX ORDER — ASPIRIN 81 MG/1
81 TABLET ORAL DAILY
Status: ON HOLD | COMMUNITY
End: 2022-09-19

## 2022-08-03 NOTE — PROGRESS NOTES
08/03/2022  Patient Information  Damián Myers                                                                                          [address] [facility] [address]   1955  'PCP/Referring Physician'  Spencer Saeed MD  [phone]  Yasmany Leos PA-C  [phone]  Chief Complaint   Patient presents with   • Consult     NP per Yasmany Leos PA-C for Mitral Valve Regurg-Dx in 2019       History of Present Illness: 66-year-old  male with history of hypertension, congestive heart failure and coronary artery disease who presents with mitral valve regurgitation.  The patient was admitted to [facility] in July for congestive heart failure.  He was diagnosed with pulmonary edema on CXR and was discharged after diuresis.  The patient does have significant fatigue.  He denies any shortness of breath with exertion, however his activity has been decreased more recently.  He does describe some upper chest tightness.      Patient Active Problem List   Diagnosis   • Hyperglycemia   • Obesity (BMI 30-39.9)   • Chronic systolic heart failure (HCC)   • ASCVD (arteriosclerotic cardiovascular disease)   • Stroke (cerebrum) ([facility])   • Acute pulmonary edema ([facility])   • TIA (transient ischemic attack)   • HTN (hypertension)   • HLD (hyperlipidemia)   • COVID-19   • Acute on chronic respiratory failure with hypoxia and hypercapnia ([facility])   • Cytokine release syndrome, grade 4   • Acute on chronic congestive heart failure, unspecified heart failure type ([facility])     Past Medical History:   Diagnosis Date   • CAD (coronary artery disease)    • Cardiac abnormality    • CHF (congestive heart failure) ([facility])    • Hypertension    • Mitral regurgitation    • Obesity (BMI 30-39.9) 11/04/2019     Past Surgical History:   Procedure Laterality Date   • CARDIAC CATHETERIZATION N/A 11/08/2019    Procedure: Left Heart Cath;  Surgeon: Sherrell Hart MD;  Location: [facility] INVASIVE LOCATION;  Service: Cardiology   •  CARDIAC CATHETERIZATION N/A 06/27/2022    Procedure: Left Heart Cath;  Surgeon: Greyson Balderas MD;  Location: [facility] CATH INVASIVE LOCATION;  Service: Cardiology;  Laterality: N/A;   • CORONARY STENT PLACEMENT         Current Outpatient Medications:   •  Ascorbic Acid (VITAMIN C PO), Take  by mouth Daily. 1000 mg, Disp: , Rfl:   •  aspirin 81 MG EC tablet, Take 81 mg by mouth Daily., Disp: , Rfl:   •  carvedilol (COREG) 6.25 MG tablet, Take 6.25 mg by mouth 2 (Two) Times a Day With Meals., Disp: , Rfl:   •  carvedilol (COREG) 6.25 MG tablet, Take 1 tablet by mouth 2 (Two) Times a Day With Meals for 30 days., Disp: 60 tablet, Rfl: 0  •  furosemide (Lasix) 40 MG tablet, Take 1 tablet by mouth Daily As Needed (Worsening swelling or 3lb weight gain in 1 day. Please weigh yourself daily.) for up to 30 days., Disp: 30 tablet, Rfl: 3  Allergies   Allergen Reactions   • Other Unknown - High Severity     Pt had a reaction to anesthesia when placing stents        Social History     Socioeconomic History   • Marital status:    • Number of children: 7   Tobacco Use   • Smoking status: Never Smoker   • Smokeless tobacco: Never Used   Vaping Use   • Vaping Use: Never used   Substance and Sexual Activity   • Alcohol use: No   • Drug use: No   • Sexual activity: Defer     Family History   Problem Relation Age of Onset   • Heart valve disorder Mother    • Heart valve disorder Brother    • Heart valve disorder Maternal Grandfather    • Heart disease Maternal Grandfather    • Diabetes type I Son      Review of Systems   Constitutional: Negative for chills, fever, malaise/fatigue, night sweats and weight loss.   HENT: Negative for hearing loss, odynophagia and sore throat.    Cardiovascular: Negative for chest pain, dyspnea on exertion, leg swelling, orthopnea and palpitations.   Respiratory: Negative for cough and hemoptysis.    Endocrine: Negative for cold intolerance, heat intolerance, polydipsia, polyphagia and  "polyuria.   Hematologic/Lymphatic: Bruises/bleeds easily.   Skin: Negative for itching and rash.   Musculoskeletal: Negative for joint pain, joint swelling and myalgias.   Gastrointestinal: Negative for abdominal pain, constipation, diarrhea, hematemesis, hematochezia, melena, nausea and vomiting.   Genitourinary: Negative for dysuria, frequency and hematuria.   Neurological: Negative for focal weakness, headaches, numbness and seizures.   Psychiatric/Behavioral: Negative for suicidal ideas.   All other systems reviewed and are negative.    Vitals:    08/03/22 1217   BP: 130/90   BP Location: Left arm   Patient Position: Sitting   Pulse: 64   Temp: 97.3 °F (36.3 °C)   SpO2: 98%   Weight: 113 kg (250 lb)   Height: 190.5 cm (75\")      Physical Exam  Vitals reviewed.   Constitutional:       General: He is not in acute distress.     Appearance: He is well-developed. He is not diaphoretic.      Comments:  male who appears stated age and is present with his wife Anette MOREIRA:      Head: Normocephalic and atraumatic.   Eyes:      General: No scleral icterus.     Conjunctiva/sclera: Conjunctivae normal.   Neck:      Vascular: No JVD.      Trachea: No tracheal deviation.   Cardiovascular:      Rate and Rhythm: Normal rate and regular rhythm.      Heart sounds: Normal heart sounds. No murmur heard.    No friction rub. No gallop.   Pulmonary:      Effort: Pulmonary effort is normal. No respiratory distress.      Breath sounds: Normal breath sounds. No wheezing or rales.   Abdominal:      General: There is no distension.      Palpations: Abdomen is soft. There is no mass.      Tenderness: There is no abdominal tenderness. There is no guarding or rebound.   Musculoskeletal:         General: Normal range of motion.      Cervical back: Neck supple.   Skin:     General: Skin is warm and dry.      Findings: No erythema or rash.   Neurological:      Mental Status: He is alert and oriented to person, place, and time. "   Psychiatric:         Behavior: Behavior normal.         Thought Content: Thought content normal.         Judgment: Judgment normal.         The ROS, past medical history, surgical history, family history, social history and vitals were reviewed by myself and corrected as needed.      Labs/Imaging:  -Cardiac catheterization performed 6/27/2022, personally reviewed, demonstrates 30 to 40% stenosis in the proximal, mid and distal LAD.  The mid LAD stent is patent with 40 to 50% stenosis.  30 to 40% stenosis of the RCA.  3-4+, RRR is appreciated on ventriculogram.  LVEDP 19 mmHg.  -Transesophageal echocardiogram performed 6/28/2022, personally reviewed, demonstrates ejection fraction 56 to 60%, mild right ventricular dilation, moderate mitral valve prolapse P3 with moderate to severe mitral regurgitation.  Moderate tricuspid regurgitation is present.  Moderate to severe pulmonary hypertension is present.    Assessment/Plan:   66-year-old  male with history of hypertension, congestive heart failure and coronary artery disease who presents with symptomatic mitral valve regurgitation.  The patient is a reasonable candidate for mitral valve repair/replacement.  The risks and benefits of surgery were discussed with the patient including pain, bleeding, infection, renal failure, stroke, heart block requiring a pacemaker, myocardial infarction and death.  He understood these risks and wished to proceed with surgery.          Patient Active Problem List   Diagnosis   • Hyperglycemia   • Obesity (BMI 30-39.9)   • Chronic systolic heart failure (HCC)   • ASCVD (arteriosclerotic cardiovascular disease)   • Stroke (cerebrum) (HCC)   • Acute pulmonary edema (HCC)   • TIA (transient ischemic attack)   • HTN (hypertension)   • HLD (hyperlipidemia)   • COVID-19   • Acute on chronic respiratory failure with hypoxia and hypercapnia (HCC)   • Cytokine release syndrome, grade 4   • Acute on chronic congestive heart failure,  unspecified heart failure type (HCC)

## 2022-08-05 ENCOUNTER — APPOINTMENT (OUTPATIENT)
Dept: GENERAL RADIOLOGY | Facility: HOSPITAL | Age: 67
End: 2022-08-05

## 2022-08-05 ENCOUNTER — APPOINTMENT (OUTPATIENT)
Dept: CT IMAGING | Facility: HOSPITAL | Age: 67
End: 2022-08-05

## 2022-08-05 ENCOUNTER — HOSPITAL ENCOUNTER (EMERGENCY)
Facility: HOSPITAL | Age: 67
Discharge: HOME | End: 2022-08-05
Attending: EMERGENCY MEDICINE | Admitting: EMERGENCY MEDICINE

## 2022-08-05 VITALS
OXYGEN SATURATION: 98 % | TEMPERATURE: 98.2 F | SYSTOLIC BLOOD PRESSURE: 138 MMHG | HEIGHT: 75 IN | DIASTOLIC BLOOD PRESSURE: 98 MMHG | RESPIRATION RATE: 18 BRPM | HEART RATE: 89 BPM | BODY MASS INDEX: 31.08 KG/M2 | WEIGHT: 250 LBS

## 2022-08-05 DIAGNOSIS — R07.9 CHEST PAIN, UNSPECIFIED TYPE: Primary | ICD-10-CM

## 2022-08-05 LAB
ALBUMIN SERPL-MCNC: 4.45 G/DL (ref 3.5–5.2)
ALBUMIN/GLOB SERPL: 1.9 G/DL
ALP SERPL-CCNC: 70 U/L (ref 39–117)
ALT SERPL W P-5'-P-CCNC: 15 U/L (ref 1–41)
ANION GAP SERPL CALCULATED.3IONS-SCNC: 9.6 MMOL/L (ref 5–15)
APTT PPP: 35.2 SECONDS (ref 26.5–34.5)
AST SERPL-CCNC: 33 U/L (ref 1–40)
BASOPHILS # BLD AUTO: 0.03 10*3/MM3 (ref 0–0.2)
BASOPHILS NFR BLD AUTO: 0.5 % (ref 0–1.5)
BILIRUB SERPL-MCNC: 0.5 MG/DL (ref 0–1.2)
BUN SERPL-MCNC: 17 MG/DL (ref 8–23)
BUN/CREAT SERPL: 15 (ref 7–25)
CALCIUM SPEC-SCNC: 9.6 MG/DL (ref 8.6–10.5)
CHLORIDE SERPL-SCNC: 102 MMOL/L (ref 98–107)
CO2 SERPL-SCNC: 28.4 MMOL/L (ref 22–29)
CREAT SERPL-MCNC: 1.13 MG/DL (ref 0.76–1.27)
D DIMER PPP FEU-MCNC: 0.29 MCGFEU/ML (ref 0–0.5)
DEPRECATED RDW RBC AUTO: 42.9 FL (ref 37–54)
EGFRCR SERPLBLD CKD-EPI 2021: 71.7 ML/MIN/1.73
EOSINOPHIL # BLD AUTO: 0.15 10*3/MM3 (ref 0–0.4)
EOSINOPHIL NFR BLD AUTO: 2.4 % (ref 0.3–6.2)
ERYTHROCYTE [DISTWIDTH] IN BLOOD BY AUTOMATED COUNT: 12.2 % (ref 12.3–15.4)
GLOBULIN UR ELPH-MCNC: 2.4 GM/DL
GLUCOSE SERPL-MCNC: 97 MG/DL (ref 65–99)
HCT VFR BLD AUTO: 48.5 % (ref 37.5–51)
HGB BLD-MCNC: 15.7 G/DL (ref 13–17.7)
HOLD SPECIMEN: NORMAL
HOLD SPECIMEN: NORMAL
IMM GRANULOCYTES # BLD AUTO: 0.01 10*3/MM3 (ref 0–0.05)
IMM GRANULOCYTES NFR BLD AUTO: 0.2 % (ref 0–0.5)
INR PPP: 0.95 (ref 0.9–1.1)
LYMPHOCYTES # BLD AUTO: 2.18 10*3/MM3 (ref 0.7–3.1)
LYMPHOCYTES NFR BLD AUTO: 35.1 % (ref 19.6–45.3)
MCH RBC QN AUTO: 30.5 PG (ref 26.6–33)
MCHC RBC AUTO-ENTMCNC: 32.4 G/DL (ref 31.5–35.7)
MCV RBC AUTO: 94.2 FL (ref 79–97)
MONOCYTES # BLD AUTO: 0.72 10*3/MM3 (ref 0.1–0.9)
MONOCYTES NFR BLD AUTO: 11.6 % (ref 5–12)
NEUTROPHILS NFR BLD AUTO: 3.12 10*3/MM3 (ref 1.7–7)
NEUTROPHILS NFR BLD AUTO: 50.2 % (ref 42.7–76)
NRBC BLD AUTO-RTO: 0 /100 WBC (ref 0–0.2)
NT-PROBNP SERPL-MCNC: 817.9 PG/ML (ref 0–900)
PLATELET # BLD AUTO: 151 10*3/MM3 (ref 140–450)
PMV BLD AUTO: 10.3 FL (ref 6–12)
POTASSIUM SERPL-SCNC: 4.5 MMOL/L (ref 3.5–5.2)
PROT SERPL-MCNC: 6.8 G/DL (ref 6–8.5)
PROTHROMBIN TIME: 12.9 SECONDS (ref 12.1–14.7)
QT INTERVAL: 358 MS
QT INTERVAL: 422 MS
QTC INTERVAL: 435 MS
QTC INTERVAL: 445 MS
RBC # BLD AUTO: 5.15 10*6/MM3 (ref 4.14–5.8)
SODIUM SERPL-SCNC: 140 MMOL/L (ref 136–145)
TROPONIN T SERPL-MCNC: <0.01 NG/ML (ref 0–0.03)
TROPONIN T SERPL-MCNC: <0.01 NG/ML (ref 0–0.03)
WBC NRBC COR # BLD: 6.21 10*3/MM3 (ref 3.4–10.8)
WHOLE BLOOD HOLD COAG: NORMAL
WHOLE BLOOD HOLD SPECIMEN: NORMAL

## 2022-08-05 PROCEDURE — 71045 X-RAY EXAM CHEST 1 VIEW: CPT | Performed by: RADIOLOGY

## 2022-08-05 PROCEDURE — 85379 FIBRIN DEGRADATION QUANT: CPT | Performed by: EMERGENCY MEDICINE

## 2022-08-05 PROCEDURE — 85025 COMPLETE CBC W/AUTO DIFF WBC: CPT | Performed by: EMERGENCY MEDICINE

## 2022-08-05 PROCEDURE — 80053 COMPREHEN METABOLIC PANEL: CPT | Performed by: EMERGENCY MEDICINE

## 2022-08-05 PROCEDURE — 96374 THER/PROPH/DIAG INJ IV PUSH: CPT

## 2022-08-05 PROCEDURE — 83880 ASSAY OF NATRIURETIC PEPTIDE: CPT | Performed by: EMERGENCY MEDICINE

## 2022-08-05 PROCEDURE — 85730 THROMBOPLASTIN TIME PARTIAL: CPT | Performed by: EMERGENCY MEDICINE

## 2022-08-05 PROCEDURE — 71045 X-RAY EXAM CHEST 1 VIEW: CPT

## 2022-08-05 PROCEDURE — 0 IOPAMIDOL PER 1 ML: Performed by: EMERGENCY MEDICINE

## 2022-08-05 PROCEDURE — 36415 COLL VENOUS BLD VENIPUNCTURE: CPT

## 2022-08-05 PROCEDURE — 84484 ASSAY OF TROPONIN QUANT: CPT | Performed by: EMERGENCY MEDICINE

## 2022-08-05 PROCEDURE — 70496 CT ANGIOGRAPHY HEAD: CPT

## 2022-08-05 PROCEDURE — 70450 CT HEAD/BRAIN W/O DYE: CPT

## 2022-08-05 PROCEDURE — 25010000002 LORAZEPAM PER 2 MG: Performed by: EMERGENCY MEDICINE

## 2022-08-05 PROCEDURE — 93005 ELECTROCARDIOGRAM TRACING: CPT | Performed by: EMERGENCY MEDICINE

## 2022-08-05 PROCEDURE — 70496 CT ANGIOGRAPHY HEAD: CPT | Performed by: RADIOLOGY

## 2022-08-05 PROCEDURE — 70450 CT HEAD/BRAIN W/O DYE: CPT | Performed by: RADIOLOGY

## 2022-08-05 PROCEDURE — 70498 CT ANGIOGRAPHY NECK: CPT

## 2022-08-05 PROCEDURE — 70498 CT ANGIOGRAPHY NECK: CPT | Performed by: RADIOLOGY

## 2022-08-05 PROCEDURE — 99284 EMERGENCY DEPT VISIT MOD MDM: CPT

## 2022-08-05 PROCEDURE — 85610 PROTHROMBIN TIME: CPT | Performed by: EMERGENCY MEDICINE

## 2022-08-05 RX ORDER — SODIUM CHLORIDE 0.9 % (FLUSH) 0.9 %
10 SYRINGE (ML) INJECTION AS NEEDED
Status: DISCONTINUED | OUTPATIENT
Start: 2022-08-05 | End: 2022-08-05 | Stop reason: HOSPADM

## 2022-08-05 RX ORDER — LORAZEPAM 2 MG/ML
1 INJECTION INTRAMUSCULAR ONCE
Status: COMPLETED | OUTPATIENT
Start: 2022-08-05 | End: 2022-08-05

## 2022-08-05 RX ORDER — ASPIRIN 81 MG/1
324 TABLET, CHEWABLE ORAL ONCE
Status: COMPLETED | OUTPATIENT
Start: 2022-08-05 | End: 2022-08-05

## 2022-08-05 RX ADMIN — ASPIRIN 243 MG: 81 TABLET, CHEWABLE ORAL at 10:17

## 2022-08-05 RX ADMIN — NITROGLYCERIN 0.5 INCH: 20 OINTMENT TOPICAL at 11:10

## 2022-08-05 RX ADMIN — LORAZEPAM 1 MG: 2 INJECTION, SOLUTION INTRAMUSCULAR; INTRAVENOUS at 12:26

## 2022-08-05 RX ADMIN — IOPAMIDOL 85 ML: 755 INJECTION, SOLUTION INTRAVENOUS at 13:21

## 2022-08-05 NOTE — ED PROVIDER NOTES
Subjective   Patient is a 66-year-old male who presents with a chief complaint of chest pain.  He states that his morning had been uneventful, he was driving his son to work when he developed some substernal chest tightness and a numb sensation in his left bicep area.  He had some nausea associated with this.  He denies shortness of breath, vomiting, diaphoresis.  He states that when he got home upon standing up from getting out of the car he felt dizzy/off balance for a few seconds and that quickly resolved and has not recurred.  Patient has a history of coronary artery disease, with 1 stent.  He had a cardiac catheterization in June of this year that showed no hemodynamically significant disease.  He has severe mitral valve regurgitation, for which he is scheduled for surgery with Dr. Chan on August 29.  Patient states that he feels much better now, he only feels a mild substernal chest tightness and all of his other symptoms have resolved.  Patient has Lasix on his medication list, states that this has been discontinued, all he takes his an aspirin and a blood pressure pill daily.          Review of Systems   All other systems reviewed and are negative.      Past Medical History:   Diagnosis Date   • CAD (coronary artery disease)    • Cardiac abnormality    • CHF (congestive heart failure) ([facility])    • Hypertension    • Mitral regurgitation    • Obesity (BMI 30-39.9) 11/04/2019       Allergies   Allergen Reactions   • Other Unknown - High Severity     Pt had a reaction to anesthesia when placing stents          Past Surgical History:   Procedure Laterality Date   • CARDIAC CATHETERIZATION N/A 11/08/2019    Procedure: Left Heart Cath;  Surgeon: Sherrell Hart MD;  Location:  [facility] CATH INVASIVE LOCATION;  Service: Cardiology   • CARDIAC CATHETERIZATION N/A 06/27/2022    Procedure: Left Heart Cath;  Surgeon: Greyson Balderas MD;  Location:  [facility] CATH INVASIVE LOCATION;  Service: Cardiology;   Laterality: N/A;   • CORONARY STENT PLACEMENT         Family History   Problem Relation Age of Onset   • Heart valve disorder Mother    • Heart valve disorder Brother    • Heart valve disorder Maternal Grandfather    • Heart disease Maternal Grandfather    • Diabetes type I Son        Social History     Socioeconomic History   • Marital status:    • Number of children: 7   Tobacco Use   • Smoking status: Never Smoker   • Smokeless tobacco: Never Used   Vaping Use   • Vaping Use: Never used   Substance and Sexual Activity   • Alcohol use: No   • Drug use: No   • Sexual activity: Defer           Objective   Physical Exam  Vitals and nursing note reviewed.   Constitutional:       General: He is not in acute distress.     Appearance: Normal appearance. He is well-developed. He is not ill-appearing, toxic-appearing or diaphoretic.      Comments: Pleasant male, alert and well-oriented, in good spirits.  He appears a bit anxious but not otherwise in acute distress.   HENT:      Head: Normocephalic and atraumatic.   Eyes:      General: No scleral icterus.     Pupils: Pupils are equal, round, and reactive to light.   Neck:      Trachea: No tracheal deviation.   Cardiovascular:      Rate and Rhythm: Normal rate and regular rhythm.   Pulmonary:      Effort: Pulmonary effort is normal. No respiratory distress.      Breath sounds: Normal breath sounds.   Chest:      Chest wall: No tenderness.   Abdominal:      General: Bowel sounds are normal.      Palpations: Abdomen is soft.      Tenderness: There is no abdominal tenderness. There is no guarding or rebound.   Musculoskeletal:         General: No tenderness. Normal range of motion.      Cervical back: Normal range of motion and neck supple. No rigidity or tenderness.      Right lower leg: No tenderness. No edema.      Left lower leg: No tenderness. No edema.   Skin:     General: Skin is warm and dry.      Capillary Refill: Capillary refill takes less than 2 seconds.       Coloration: Skin is not pale.   Neurological:      General: No focal deficit present.      Mental Status: He is alert and oriented to person, place, and time.      GCS: GCS eye subscore is 4. GCS verbal subscore is 5. GCS motor subscore is 6.      Motor: No abnormal muscle tone.   Psychiatric:         Mood and Affect: Mood is anxious.         Behavior: Behavior normal.         Procedures  CT Angiogram Carotids   Final Result     Partially imaged probably right middle lobe pneumonia.       This report was finalized on 8/5/2022 1:25 PM by Dr. Yonatan Marroquin MD.          CT Angiogram Head   Final Result     No acute findings in the arteries of the head/brain.       This report was finalized on 8/5/2022 1:26 PM by Dr. Yonatan Marroquin MD.          CT Head Without Contrast   Final Result     Unremarkable exam demonstrating no CT evidence of acute intracranial   findings.       This report was finalized on 8/5/2022 1:25 PM by Dr. Yonatan Marroquin MD.          XR Chest 1 View   Final Result     No acute cardiopulmonary findings identified.       This report was finalized on 8/5/2022 10:25 AM by Dr. Yonatan Marroquin MD.            Results for orders placed or performed during the hospital encounter of 08/05/22   Comprehensive Metabolic Panel    Specimen: Blood   Result Value Ref Range    Glucose 97 65 - 99 mg/dL    BUN 17 8 - 23 mg/dL    Creatinine 1.13 0.76 - 1.27 mg/dL    Sodium 140 136 - 145 mmol/L    Potassium 4.5 3.5 - 5.2 mmol/L    Chloride 102 98 - 107 mmol/L    CO2 28.4 22.0 - 29.0 mmol/L    Calcium 9.6 8.6 - 10.5 mg/dL    Total Protein 6.8 6.0 - 8.5 g/dL    Albumin 4.45 3.50 - 5.20 g/dL    ALT (SGPT) 15 1 - 41 U/L    AST (SGOT) 33 1 - 40 U/L    Alkaline Phosphatase 70 39 - 117 U/L    Total Bilirubin 0.5 0.0 - 1.2 mg/dL    Globulin 2.4 gm/dL    A/G Ratio 1.9 g/dL    BUN/Creatinine Ratio 15.0 7.0 - 25.0    Anion Gap 9.6 5.0 - 15.0 mmol/L    eGFR 71.7 >60.0 mL/min/1.73   Troponin    Specimen: Blood   Result Value Ref Range     Troponin T <0.010 0.000 - 0.030 ng/mL   Troponin    Specimen: Blood   Result Value Ref Range    Troponin T <0.010 0.000 - 0.030 ng/mL   CBC Auto Differential    Specimen: Blood   Result Value Ref Range    WBC 6.21 3.40 - 10.80 10*3/mm3    RBC 5.15 4.14 - 5.80 10*6/mm3    Hemoglobin 15.7 13.0 - 17.7 g/dL    Hematocrit 48.5 37.5 - 51.0 %    MCV 94.2 79.0 - 97.0 fL    MCH 30.5 26.6 - 33.0 pg    MCHC 32.4 31.5 - 35.7 g/dL    RDW 12.2 (L) 12.3 - 15.4 %    RDW-SD 42.9 37.0 - 54.0 fl    MPV 10.3 6.0 - 12.0 fL    Platelets 151 140 - 450 10*3/mm3    Neutrophil % 50.2 42.7 - 76.0 %    Lymphocyte % 35.1 19.6 - 45.3 %    Monocyte % 11.6 5.0 - 12.0 %    Eosinophil % 2.4 0.3 - 6.2 %    Basophil % 0.5 0.0 - 1.5 %    Immature Grans % 0.2 0.0 - 0.5 %    Neutrophils, Absolute 3.12 1.70 - 7.00 10*3/mm3    Lymphocytes, Absolute 2.18 0.70 - 3.10 10*3/mm3    Monocytes, Absolute 0.72 0.10 - 0.90 10*3/mm3    Eosinophils, Absolute 0.15 0.00 - 0.40 10*3/mm3    Basophils, Absolute 0.03 0.00 - 0.20 10*3/mm3    Immature Grans, Absolute 0.01 0.00 - 0.05 10*3/mm3    nRBC 0.0 0.0 - 0.2 /100 WBC   Protime-INR    Specimen: Blood   Result Value Ref Range    Protime 12.9 12.1 - 14.7 Seconds    INR 0.95 0.90 - 1.10   aPTT    Specimen: Blood   Result Value Ref Range    PTT 35.2 (H) 26.5 - 34.5 seconds   BNP    Specimen: Blood   Result Value Ref Range    proBNP 817.9 0.0 - 900.0 pg/mL   D-dimer, Quantitative    Specimen: Blood   Result Value Ref Range    D-Dimer, Quantitative 0.29 0.00 - 0.50 MCGFEU/mL   ECG 12 Lead   Result Value Ref Range    QT Interval 422 ms    QTC Interval 435 ms   ECG 12 Lead   Result Value Ref Range    QT Interval 358 ms    QTC Interval 445 ms   Green Top (Gel)   Result Value Ref Range    Extra Tube Hold for add-ons.    Lavender Top   Result Value Ref Range    Extra Tube hold for add-on    Gold Top - SST   Result Value Ref Range    Extra Tube Hold for add-ons.    Light Blue Top   Result Value Ref Range    Extra Tube Hold for  add-ons.                 ED Course  ED Course as of 08/05/22 1734   Fri Aug 05, 2022   0934 EKG at 0 931 shows sinus rhythm, rate 64.  NH interval 162, QRS duration 80, QTc 435 ms.  Some baseline artifact.  No apparent acute ischemia.  No evidence for STEMI. [CM]   1206 Patient has now asked if I can tell if he has an arterial blockage in the left side of his neck by the blood tests, as he intermittently has some discomfort here.  I discussed the case with stroke neurology, Dr. Faulkner.  He advises to obtain CT angiography of the head and neck, no need for perfusion study or MRI. [CM]   1447 Patient states he feels remarkably better after the IV Ativan.  States that he is completely asymptomatic, feels back to normal and would like to go home.  He tells me that he has been having a lot of stress lately, with the death of his parents, traveling back and forth to [place] to clean out their home, having 3 kids in college with school starting back, and just recently having found out that he has to have open heart surgery.  We discussed his test results and his plan of care.  He voices understanding and agreement.  He is discharged home in care of family.  He will return to the emergency department immediately if any problems.  We discussed the questionable finding of a right middle lung pneumonia, patient states that he has some chronic scarring in this area of his lung, and he has had no pneumonialike symptoms. [CM]      ED Course User Index  [CM] Patrick Mcguire MD                                           [facility]    Final diagnoses:   Chest pain, unspecified type       ED Disposition  ED Disposition     ED Disposition   Discharge    Condition   Stable    Comment   --             Spencer Saeed MD  [address] [place] [address]  [phone]    Go in 3 days  Monday    Dr. Chan      As scheduled    [facility] Emergency Department  [address] [id number] [phone]  Go to   If  symptoms worsen      Please note that portions of this note were completed with a voice recognition program.        Patrick Mcguire MD  08/05/22 2882

## 2022-08-05 NOTE — DISCHARGE INSTRUCTIONS
Home care family.  Rest.  Take your medication as prescribed.  See your family doctor in the office on Monday for recheck.  Follow-up with Dr. Chan as scheduled.  Return to the emergency department right away if symptoms worsen/any problems.

## 2022-08-18 ENCOUNTER — PREP FOR SURGERY (OUTPATIENT)
Dept: OTHER | Facility: HOSPITAL | Age: 67
End: 2022-08-18

## 2022-08-18 DIAGNOSIS — I34.0 SEVERE MITRAL REGURGITATION: Primary | ICD-10-CM

## 2022-08-18 RX ORDER — NITROGLYCERIN 0.4 MG/1
0.4 TABLET SUBLINGUAL
Status: CANCELLED | OUTPATIENT
Start: 2022-08-29

## 2022-08-18 RX ORDER — CHLORHEXIDINE GLUCONATE 500 MG/1
1 CLOTH TOPICAL EVERY 12 HOURS PRN
Status: CANCELLED | OUTPATIENT
Start: 2022-08-29

## 2022-08-18 RX ORDER — CEFAZOLIN SODIUM 2 G/100ML
2 INJECTION, SOLUTION INTRAVENOUS ONCE
Status: CANCELLED | OUTPATIENT
Start: 2022-08-29 | End: 2022-08-29

## 2022-08-18 RX ORDER — CHLORHEXIDINE GLUCONATE 500 MG/1
1 CLOTH TOPICAL EVERY 12 HOURS PRN
Status: CANCELLED | OUTPATIENT
Start: 2022-08-26

## 2022-08-18 RX ORDER — ASPIRIN 325 MG
325 TABLET ORAL NIGHTLY
Status: CANCELLED | OUTPATIENT
Start: 2022-08-26 | End: 2022-08-27

## 2022-08-18 RX ORDER — CHLORHEXIDINE GLUCONATE 0.12 MG/ML
15 RINSE ORAL ONCE
Status: CANCELLED | OUTPATIENT
Start: 2022-08-29 | End: 2022-08-29

## 2022-08-26 ENCOUNTER — APPOINTMENT (OUTPATIENT)
Dept: PREADMISSION TESTING | Facility: HOSPITAL | Age: 67
End: 2022-08-26

## 2022-08-30 ENCOUNTER — HOSPITAL ENCOUNTER (OUTPATIENT)
Dept: PULMONOLOGY | Facility: HOSPITAL | Age: 67
Discharge: HOME | End: 2022-08-30

## 2022-08-30 ENCOUNTER — HOSPITAL ENCOUNTER (OUTPATIENT)
Dept: GENERAL RADIOLOGY | Facility: HOSPITAL | Age: 67
Discharge: HOME | End: 2022-08-30

## 2022-08-30 ENCOUNTER — PRE-ADMISSION TESTING (OUTPATIENT)
Dept: PREADMISSION TESTING | Facility: HOSPITAL | Age: 67
End: 2022-08-30

## 2022-08-30 VITALS — BODY MASS INDEX: 31.65 KG/M2 | HEIGHT: 75 IN | WEIGHT: 254.52 LBS

## 2022-08-30 DIAGNOSIS — I34.0 SEVERE MITRAL REGURGITATION: ICD-10-CM

## 2022-08-30 LAB
ABO GROUP BLD: NORMAL
ALBUMIN SERPL-MCNC: 4.3 G/DL (ref 3.5–5.2)
ALBUMIN/GLOB SERPL: 1.5 G/DL
ALP SERPL-CCNC: 73 U/L (ref 39–117)
ALT SERPL W P-5'-P-CCNC: 14 U/L (ref 1–41)
AMPHET+METHAMPHET UR QL: NEGATIVE
AMPHETAMINES UR QL: NEGATIVE
ANION GAP SERPL CALCULATED.3IONS-SCNC: 9 MMOL/L (ref 5–15)
APTT PPP: 33.7 SECONDS (ref 22–39)
AST SERPL-CCNC: 37 U/L (ref 1–40)
BARBITURATES UR QL SCN: NEGATIVE
BASOPHILS # BLD AUTO: 0.03 10*3/MM3 (ref 0–0.2)
BASOPHILS NFR BLD AUTO: 0.5 % (ref 0–1.5)
BENZODIAZ UR QL SCN: NEGATIVE
BILIRUB SERPL-MCNC: 0.7 MG/DL (ref 0–1.2)
BLD GP AB SCN SERPL QL: NEGATIVE
BUN SERPL-MCNC: 14 MG/DL (ref 8–23)
BUN/CREAT SERPL: 13.5 (ref 7–25)
BUPRENORPHINE SERPL-MCNC: NEGATIVE NG/ML
CALCIUM SPEC-SCNC: 9.6 MG/DL (ref 8.6–10.5)
CANNABINOIDS SERPL QL: NEGATIVE
CHLORIDE SERPL-SCNC: 100 MMOL/L (ref 98–107)
CO2 SERPL-SCNC: 33 MMOL/L (ref 22–29)
COCAINE UR QL: NEGATIVE
CREAT SERPL-MCNC: 1.04 MG/DL (ref 0.76–1.27)
DEPRECATED RDW RBC AUTO: 43 FL (ref 37–54)
EGFRCR SERPLBLD CKD-EPI 2021: 79.2 ML/MIN/1.73
EOSINOPHIL # BLD AUTO: 0.14 10*3/MM3 (ref 0–0.4)
EOSINOPHIL NFR BLD AUTO: 2.2 % (ref 0.3–6.2)
ERYTHROCYTE [DISTWIDTH] IN BLOOD BY AUTOMATED COUNT: 12.4 % (ref 12.3–15.4)
GLOBULIN UR ELPH-MCNC: 2.8 GM/DL
GLUCOSE SERPL-MCNC: 102 MG/DL (ref 65–99)
HBA1C MFR BLD: 6.3 % (ref 4.8–5.6)
HCT VFR BLD AUTO: 50 % (ref 37.5–51)
HGB BLD-MCNC: 16.2 G/DL (ref 13–17.7)
IMM GRANULOCYTES # BLD AUTO: 0.02 10*3/MM3 (ref 0–0.05)
IMM GRANULOCYTES NFR BLD AUTO: 0.3 % (ref 0–0.5)
INR PPP: 0.97 (ref 0.84–1.13)
LYMPHOCYTES # BLD AUTO: 2.1 10*3/MM3 (ref 0.7–3.1)
LYMPHOCYTES NFR BLD AUTO: 33 % (ref 19.6–45.3)
MAGNESIUM SERPL-MCNC: 2.2 MG/DL (ref 1.6–2.4)
MCH RBC QN AUTO: 30.3 PG (ref 26.6–33)
MCHC RBC AUTO-ENTMCNC: 32.4 G/DL (ref 31.5–35.7)
MCV RBC AUTO: 93.5 FL (ref 79–97)
METHADONE UR QL SCN: NEGATIVE
MONOCYTES # BLD AUTO: 0.64 10*3/MM3 (ref 0.1–0.9)
MONOCYTES NFR BLD AUTO: 10 % (ref 5–12)
NEUTROPHILS NFR BLD AUTO: 3.44 10*3/MM3 (ref 1.7–7)
NEUTROPHILS NFR BLD AUTO: 54 % (ref 42.7–76)
NRBC BLD AUTO-RTO: 0 /100 WBC (ref 0–0.2)
OPIATES UR QL: NEGATIVE
OXYCODONE UR QL SCN: NEGATIVE
PA ADP PRP-ACNC: 160 PRU
PCP UR QL SCN: NEGATIVE
PLATELET # BLD AUTO: 151 10*3/MM3 (ref 140–450)
PMV BLD AUTO: 10.4 FL (ref 6–12)
POTASSIUM SERPL-SCNC: 4.6 MMOL/L (ref 3.5–5.2)
PROPOXYPH UR QL: NEGATIVE
PROT SERPL-MCNC: 7.1 G/DL (ref 6–8.5)
PROTHROMBIN TIME: 12.8 SECONDS (ref 11.4–14.4)
RBC # BLD AUTO: 5.35 10*6/MM3 (ref 4.14–5.8)
RH BLD: NEGATIVE
SODIUM SERPL-SCNC: 142 MMOL/L (ref 136–145)
T&S EXPIRATION DATE: NORMAL
TRICYCLICS UR QL SCN: NEGATIVE
WBC NRBC COR # BLD: 6.37 10*3/MM3 (ref 3.4–10.8)

## 2022-08-30 PROCEDURE — 80053 COMPREHEN METABOLIC PANEL: CPT

## 2022-08-30 PROCEDURE — 86900 BLOOD TYPING SEROLOGIC ABO: CPT

## 2022-08-30 PROCEDURE — 86901 BLOOD TYPING SEROLOGIC RH(D): CPT

## 2022-08-30 PROCEDURE — 71046 X-RAY EXAM CHEST 2 VIEWS: CPT

## 2022-08-30 PROCEDURE — 83735 ASSAY OF MAGNESIUM: CPT

## 2022-08-30 PROCEDURE — 94010 BREATHING CAPACITY TEST: CPT | Performed by: INTERNAL MEDICINE

## 2022-08-30 PROCEDURE — 83036 HEMOGLOBIN GLYCOSYLATED A1C: CPT

## 2022-08-30 PROCEDURE — 85730 THROMBOPLASTIN TIME PARTIAL: CPT

## 2022-08-30 PROCEDURE — 85576 BLOOD PLATELET AGGREGATION: CPT

## 2022-08-30 PROCEDURE — 86850 RBC ANTIBODY SCREEN: CPT

## 2022-08-30 PROCEDURE — 36415 COLL VENOUS BLD VENIPUNCTURE: CPT

## 2022-08-30 PROCEDURE — 94010 BREATHING CAPACITY TEST: CPT

## 2022-08-30 PROCEDURE — 85610 PROTHROMBIN TIME: CPT

## 2022-08-30 PROCEDURE — 85025 COMPLETE CBC W/AUTO DIFF WBC: CPT

## 2022-08-30 PROCEDURE — 86923 COMPATIBILITY TEST ELECTRIC: CPT

## 2022-08-30 PROCEDURE — 80306 DRUG TEST PRSMV INSTRMNT: CPT

## 2022-08-30 RX ORDER — ASPIRIN 325 MG
325 TABLET ORAL NIGHTLY
Status: SHIPPED | OUTPATIENT
Start: 2022-08-30 | End: 2022-08-31

## 2022-08-30 RX ORDER — CHLORHEXIDINE GLUCONATE 500 MG/1
1 CLOTH TOPICAL EVERY 12 HOURS PRN
Status: ACTIVE | OUTPATIENT
Start: 2022-08-30

## 2022-08-31 ENCOUNTER — ANESTHESIA EVENT (OUTPATIENT)
Dept: PERIOP | Facility: HOSPITAL | Age: 67
End: 2022-08-31

## 2022-09-01 ENCOUNTER — ANESTHESIA (OUTPATIENT)
Dept: PERIOP | Facility: HOSPITAL | Age: 67
End: 2022-09-01

## 2022-09-01 RX ORDER — FAMOTIDINE 10 MG/ML
20 INJECTION, SOLUTION INTRAVENOUS ONCE
Status: CANCELLED | OUTPATIENT
Start: 2022-09-01 | End: 2022-09-01

## 2022-09-02 ENCOUNTER — ANCILLARY PROCEDURE (OUTPATIENT)
Dept: PERIOP | Facility: HOSPITAL | Age: 67
End: 2022-09-02

## 2022-09-02 ENCOUNTER — APPOINTMENT (OUTPATIENT)
Dept: GENERAL RADIOLOGY | Facility: HOSPITAL | Age: 67
End: 2022-09-02

## 2022-09-02 ENCOUNTER — ANESTHESIA EVENT CONVERTED (OUTPATIENT)
Dept: ANESTHESIOLOGY | Facility: HOSPITAL | Age: 67
End: 2022-09-02

## 2022-09-02 ENCOUNTER — HOSPITAL ENCOUNTER (INPATIENT)
Facility: HOSPITAL | Age: 67
LOS: 12 days | Discharge: NURSING, REHAB OR LONG-TERM CARE | End: 2022-09-14
Attending: THORACIC SURGERY (CARDIOTHORACIC VASCULAR SURGERY) | Admitting: THORACIC SURGERY (CARDIOTHORACIC VASCULAR SURGERY)

## 2022-09-02 DIAGNOSIS — I48.0 PAROXYSMAL ATRIAL FIBRILLATION: ICD-10-CM

## 2022-09-02 DIAGNOSIS — I34.0 SEVERE MITRAL REGURGITATION: ICD-10-CM

## 2022-09-02 DIAGNOSIS — E78.5 HYPERLIPIDEMIA LDL GOAL <70: ICD-10-CM

## 2022-09-02 DIAGNOSIS — K25.0 ACUTE GASTRIC ULCER WITH HEMORRHAGE: ICD-10-CM

## 2022-09-02 DIAGNOSIS — I34.0 SEVERE MITRAL REGURGITATION: Primary | ICD-10-CM

## 2022-09-02 DIAGNOSIS — K92.1 GASTROINTESTINAL HEMORRHAGE WITH MELENA: ICD-10-CM

## 2022-09-02 DIAGNOSIS — I50.9 CHRONIC CONGESTIVE HEART FAILURE, UNSPECIFIED HEART FAILURE TYPE: ICD-10-CM

## 2022-09-02 PROBLEM — I25.118 CORONARY ARTERY DISEASE OF NATIVE ARTERY OF NATIVE HEART WITH STABLE ANGINA PECTORIS: Status: ACTIVE | Noted: 2019-11-22

## 2022-09-02 LAB
ABO GROUP BLD: NORMAL
ACT BLD: 121 SECONDS (ref 82–152)
ACT BLD: 127 SECONDS (ref 82–152)
ACT BLD: 549 SECONDS (ref 82–152)
ACT BLD: 677 SECONDS (ref 82–152)
ACT BLD: 683 SECONDS (ref 82–152)
ACT BLD: 706 SECONDS (ref 82–152)
ACT BLD: 764 SECONDS (ref 82–152)
ACT BLD: 822 SECONDS (ref 82–152)
ACT BLD: 868 SECONDS (ref 82–152)
ALBUMIN SERPL-MCNC: 3.4 G/DL (ref 3.5–5.2)
ALBUMIN SERPL-MCNC: 3.7 G/DL (ref 3.5–5.2)
ANION GAP SERPL CALCULATED.3IONS-SCNC: 11 MMOL/L (ref 5–15)
ANION GAP SERPL CALCULATED.3IONS-SCNC: 12 MMOL/L (ref 5–15)
ANION GAP SERPL CALCULATED.3IONS-SCNC: 8 MMOL/L (ref 5–15)
APTT PPP: 35.7 SECONDS (ref 22–39)
APTT PPP: 40.7 SECONDS (ref 22–39)
ARTERIAL PATENCY WRIST A: ABNORMAL
ATMOSPHERIC PRESS: ABNORMAL MM[HG]
BASE EXCESS BLDA CALC-SCNC: -1 MMOL/L (ref -5–5)
BASE EXCESS BLDA CALC-SCNC: 1.8 MMOL/L (ref 0–2)
BASE EXCESS BLDA CALC-SCNC: 10 MMOL/L (ref -5–5)
BASE EXCESS BLDA CALC-SCNC: 11 MMOL/L (ref -5–5)
BASE EXCESS BLDA CALC-SCNC: 11 MMOL/L (ref -5–5)
BASE EXCESS BLDA CALC-SCNC: 12 MMOL/L (ref -5–5)
BASE EXCESS BLDA CALC-SCNC: 12 MMOL/L (ref -5–5)
BASE EXCESS BLDA CALC-SCNC: 4 MMOL/L (ref -5–5)
BASE EXCESS BLDA CALC-SCNC: 5 MMOL/L (ref -5–5)
BASE EXCESS BLDA CALC-SCNC: 6 MMOL/L (ref -5–5)
BASE EXCESS BLDA CALC-SCNC: 9 MMOL/L (ref -5–5)
BDY SITE: ABNORMAL
BODY TEMPERATURE: 37 C
BUN SERPL-MCNC: 18 MG/DL (ref 8–23)
BUN/CREAT SERPL: 14.9 (ref 7–25)
BUN/CREAT SERPL: 15.4 (ref 7–25)
BUN/CREAT SERPL: 16.1 (ref 7–25)
CA-I BLDA-SCNC: 0.99 MMOL/L (ref 1.2–1.32)
CA-I BLDA-SCNC: 0.99 MMOL/L (ref 1.2–1.32)
CA-I BLDA-SCNC: 1.01 MMOL/L (ref 1.2–1.32)
CA-I BLDA-SCNC: 1.1 MMOL/L (ref 1.2–1.32)
CA-I BLDA-SCNC: 1.11 MMOL/L (ref 1.2–1.32)
CA-I BLDA-SCNC: 1.13 MMOL/L (ref 1.2–1.32)
CA-I BLDA-SCNC: 1.15 MMOL/L (ref 1.2–1.32)
CA-I BLDA-SCNC: 1.23 MMOL/L (ref 1.2–1.32)
CA-I BLDA-SCNC: 1.24 MMOL/L (ref 1.2–1.32)
CA-I BLDA-SCNC: 1.28 MMOL/L (ref 1.2–1.32)
CA-I SERPL ISE-MCNC: 1.15 MMOL/L (ref 1.12–1.32)
CALCIUM SPEC-SCNC: 8.2 MG/DL (ref 8.6–10.5)
CALCIUM SPEC-SCNC: 8.2 MG/DL (ref 8.6–10.5)
CALCIUM SPEC-SCNC: 8.4 MG/DL (ref 8.6–10.5)
CHLORIDE SERPL-SCNC: 106 MMOL/L (ref 98–107)
CO2 BLDA-SCNC: 23 MMOL/L (ref 24–29)
CO2 BLDA-SCNC: 28.2 MMOL/L (ref 22–33)
CO2 BLDA-SCNC: 29 MMOL/L (ref 24–29)
CO2 BLDA-SCNC: 31 MMOL/L (ref 24–29)
CO2 BLDA-SCNC: 31 MMOL/L (ref 24–29)
CO2 BLDA-SCNC: 34 MMOL/L (ref 24–29)
CO2 BLDA-SCNC: 34 MMOL/L (ref 24–29)
CO2 BLDA-SCNC: 35 MMOL/L (ref 24–29)
CO2 BLDA-SCNC: 37 MMOL/L (ref 24–29)
CO2 BLDA-SCNC: 37 MMOL/L (ref 24–29)
CO2 BLDA-SCNC: 39 MMOL/L (ref 24–29)
CO2 SERPL-SCNC: 25 MMOL/L (ref 22–29)
CO2 SERPL-SCNC: 26 MMOL/L (ref 22–29)
CO2 SERPL-SCNC: 29 MMOL/L (ref 22–29)
COHGB MFR BLD: 1.1 % (ref 0–2)
CREAT SERPL-MCNC: 1.12 MG/DL (ref 0.76–1.27)
CREAT SERPL-MCNC: 1.17 MG/DL (ref 0.76–1.27)
CREAT SERPL-MCNC: 1.21 MG/DL (ref 0.76–1.27)
DEPRECATED RDW RBC AUTO: 42.2 FL (ref 37–54)
DEPRECATED RDW RBC AUTO: 43.4 FL (ref 37–54)
DEPRECATED RDW RBC AUTO: 45.5 FL (ref 37–54)
EGFRCR SERPLBLD CKD-EPI 2021: 66 ML/MIN/1.73
EGFRCR SERPLBLD CKD-EPI 2021: 68.8 ML/MIN/1.73
EGFRCR SERPLBLD CKD-EPI 2021: 72.5 ML/MIN/1.73
EPAP: 0
ERYTHROCYTE [DISTWIDTH] IN BLOOD BY AUTOMATED COUNT: 12.5 % (ref 12.3–15.4)
ERYTHROCYTE [DISTWIDTH] IN BLOOD BY AUTOMATED COUNT: 12.9 % (ref 12.3–15.4)
ERYTHROCYTE [DISTWIDTH] IN BLOOD BY AUTOMATED COUNT: 13.5 % (ref 12.3–15.4)
FIBRINOGEN PPP-MCNC: 215 MG/DL (ref 220–470)
FIBRINOGEN PPP-MCNC: 237 MG/DL (ref 220–470)
FSP PPP LA-ACNC: NORMAL
FSP PPP LA-ACNC: NORMAL
GLUCOSE BLDC GLUCOMTR-MCNC: 100 MG/DL (ref 70–130)
GLUCOSE BLDC GLUCOMTR-MCNC: 102 MG/DL (ref 70–130)
GLUCOSE BLDC GLUCOMTR-MCNC: 102 MG/DL (ref 70–130)
GLUCOSE BLDC GLUCOMTR-MCNC: 103 MG/DL (ref 70–130)
GLUCOSE BLDC GLUCOMTR-MCNC: 103 MG/DL (ref 70–130)
GLUCOSE BLDC GLUCOMTR-MCNC: 104 MG/DL (ref 70–130)
GLUCOSE BLDC GLUCOMTR-MCNC: 104 MG/DL (ref 70–130)
GLUCOSE BLDC GLUCOMTR-MCNC: 105 MG/DL (ref 70–130)
GLUCOSE BLDC GLUCOMTR-MCNC: 106 MG/DL (ref 70–130)
GLUCOSE BLDC GLUCOMTR-MCNC: 131 MG/DL (ref 70–130)
GLUCOSE BLDC GLUCOMTR-MCNC: 140 MG/DL (ref 70–130)
GLUCOSE BLDC GLUCOMTR-MCNC: 148 MG/DL (ref 70–130)
GLUCOSE BLDC GLUCOMTR-MCNC: 159 MG/DL (ref 70–130)
GLUCOSE BLDC GLUCOMTR-MCNC: 164 MG/DL (ref 70–130)
GLUCOSE BLDC GLUCOMTR-MCNC: 170 MG/DL (ref 70–130)
GLUCOSE BLDC GLUCOMTR-MCNC: 174 MG/DL (ref 70–130)
GLUCOSE BLDC GLUCOMTR-MCNC: 194 MG/DL (ref 70–130)
GLUCOSE BLDC GLUCOMTR-MCNC: 210 MG/DL (ref 70–130)
GLUCOSE BLDC GLUCOMTR-MCNC: 84 MG/DL (ref 70–130)
GLUCOSE BLDC GLUCOMTR-MCNC: 95 MG/DL (ref 70–130)
GLUCOSE SERPL-MCNC: 103 MG/DL (ref 65–99)
GLUCOSE SERPL-MCNC: 173 MG/DL (ref 65–99)
GLUCOSE SERPL-MCNC: 176 MG/DL (ref 65–99)
HCO3 BLDA-SCNC: 22.4 MMOL/L (ref 22–26)
HCO3 BLDA-SCNC: 26.8 MMOL/L (ref 20–26)
HCO3 BLDA-SCNC: 28.2 MMOL/L (ref 22–26)
HCO3 BLDA-SCNC: 29.7 MMOL/L (ref 22–26)
HCO3 BLDA-SCNC: 29.7 MMOL/L (ref 22–26)
HCO3 BLDA-SCNC: 32.6 MMOL/L (ref 22–26)
HCO3 BLDA-SCNC: 33 MMOL/L (ref 22–26)
HCO3 BLDA-SCNC: 33.9 MMOL/L (ref 22–26)
HCO3 BLDA-SCNC: 35.4 MMOL/L (ref 22–26)
HCO3 BLDA-SCNC: 35.8 MMOL/L (ref 22–26)
HCO3 BLDA-SCNC: 37.4 MMOL/L (ref 22–26)
HCT VFR BLD AUTO: 25 % (ref 37.5–51)
HCT VFR BLD AUTO: 25.2 % (ref 37.5–51)
HCT VFR BLD AUTO: 34.3 % (ref 37.5–51)
HCT VFR BLD CALC: 39 % (ref 38–51)
HCT VFR BLDA CALC: 32 % (ref 38–51)
HCT VFR BLDA CALC: 33 % (ref 38–51)
HCT VFR BLDA CALC: 33 % (ref 38–51)
HCT VFR BLDA CALC: 35 % (ref 38–51)
HCT VFR BLDA CALC: 35 % (ref 38–51)
HCT VFR BLDA CALC: 37 % (ref 38–51)
HCT VFR BLDA CALC: 38 % (ref 38–51)
HCT VFR BLDA CALC: 44 % (ref 38–51)
HGB BLD-MCNC: 11.2 G/DL (ref 13–17.7)
HGB BLD-MCNC: 8.4 G/DL (ref 13–17.7)
HGB BLD-MCNC: 8.4 G/DL (ref 13–17.7)
HGB BLDA-MCNC: 10.9 G/DL (ref 12–17)
HGB BLDA-MCNC: 11.2 G/DL (ref 12–17)
HGB BLDA-MCNC: 11.2 G/DL (ref 12–17)
HGB BLDA-MCNC: 11.9 G/DL (ref 12–17)
HGB BLDA-MCNC: 11.9 G/DL (ref 12–17)
HGB BLDA-MCNC: 12.6 G/DL (ref 12–17)
HGB BLDA-MCNC: 12.7 G/DL (ref 13.5–17.5)
HGB BLDA-MCNC: 12.9 G/DL (ref 12–17)
HGB BLDA-MCNC: 15 G/DL (ref 12–17)
INHALED O2 CONCENTRATION: 100 %
INR PPP: 1.44 (ref 0.84–1.13)
INR PPP: 1.56 (ref 0.84–1.13)
IPAP: 0
MAGNESIUM SERPL-MCNC: 1.5 MG/DL (ref 1.6–2.4)
MAGNESIUM SERPL-MCNC: 1.8 MG/DL (ref 1.6–2.4)
MCH RBC QN AUTO: 30.3 PG (ref 26.6–33)
MCH RBC QN AUTO: 31.1 PG (ref 26.6–33)
MCH RBC QN AUTO: 31.2 PG (ref 26.6–33)
MCHC RBC AUTO-ENTMCNC: 32.7 G/DL (ref 31.5–35.7)
MCHC RBC AUTO-ENTMCNC: 33.3 G/DL (ref 31.5–35.7)
MCHC RBC AUTO-ENTMCNC: 33.6 G/DL (ref 31.5–35.7)
MCV RBC AUTO: 92.6 FL (ref 79–97)
MCV RBC AUTO: 92.7 FL (ref 79–97)
MCV RBC AUTO: 93.7 FL (ref 79–97)
METHGB BLD QL: 0.7 % (ref 0–1.5)
MODALITY: ABNORMAL
NOTE: ABNORMAL
OXYHGB MFR BLDV: 97.8 % (ref 94–99)
PAW @ PEAK INSP FLOW SETTING VENT: 0 CMH2O
PCO2 BLDA: 30.7 MM HG (ref 35–45)
PCO2 BLDA: 34.5 MM HG (ref 35–45)
PCO2 BLDA: 38.8 MM HG (ref 35–45)
PCO2 BLDA: 42.9 MM HG (ref 35–45)
PCO2 BLDA: 43 MM HG (ref 35–45)
PCO2 BLDA: 44.7 MM HG (ref 35–45)
PCO2 BLDA: 45.1 MM HG (ref 35–45)
PCO2 BLDA: 51.9 MM HG (ref 35–45)
PCO2 BLDA: 53.1 MM HG (ref 35–45)
PCO2 BLDA: 54.6 MM HG (ref 35–45)
PCO2 BLDA: 67.4 MM HG (ref 35–45)
PCO2 TEMP ADJ BLD: 42.9 MM HG (ref 35–48)
PEEP RESPIRATORY: 5 CM[H2O]
PH BLDA: 7.35 PH UNITS (ref 7.35–7.6)
PH BLDA: 7.36 PH UNITS (ref 7.35–7.6)
PH BLDA: 7.41 PH UNITS (ref 7.35–7.45)
PH BLDA: 7.42 PH UNITS (ref 7.35–7.6)
PH BLDA: 7.44 PH UNITS (ref 7.35–7.6)
PH BLDA: 7.45 PH UNITS (ref 7.35–7.6)
PH BLDA: 7.47 PH UNITS (ref 7.35–7.6)
PH BLDA: 7.47 PH UNITS (ref 7.35–7.6)
PH BLDA: 7.49 PH UNITS (ref 7.35–7.6)
PH BLDA: 7.52 PH UNITS (ref 7.35–7.6)
PH BLDA: 7.53 PH UNITS (ref 7.35–7.6)
PH, TEMP CORRECTED: 7.41 PH UNITS
PHOSPHATE SERPL-MCNC: 2.2 MG/DL (ref 2.5–4.5)
PHOSPHATE SERPL-MCNC: 2.7 MG/DL (ref 2.5–4.5)
PLATELET # BLD AUTO: 105 10*3/MM3 (ref 140–450)
PLATELET # BLD AUTO: 165 10*3/MM3 (ref 140–450)
PLATELET # BLD AUTO: 173 10*3/MM3 (ref 140–450)
PMV BLD AUTO: 10 FL (ref 6–12)
PMV BLD AUTO: 10 FL (ref 6–12)
PMV BLD AUTO: 10.4 FL (ref 6–12)
PO2 BLDA: 185 MM HG (ref 83–108)
PO2 BLDA: 327 MMHG (ref 80–105)
PO2 BLDA: 371 MMHG (ref 80–105)
PO2 BLDA: 374 MMHG (ref 80–105)
PO2 BLDA: 38 MMHG (ref 80–105)
PO2 BLDA: 386 MMHG (ref 80–105)
PO2 BLDA: 389 MMHG (ref 80–105)
PO2 BLDA: 390 MMHG (ref 80–105)
PO2 BLDA: 403 MMHG (ref 80–105)
PO2 BLDA: 408 MMHG (ref 80–105)
PO2 BLDA: 62 MMHG (ref 80–105)
PO2 TEMP ADJ BLD: 185 MM HG (ref 83–108)
POTASSIUM BLDA-SCNC: 3 MMOL/L (ref 3.5–4.9)
POTASSIUM BLDA-SCNC: 3.3 MMOL/L (ref 3.5–4.9)
POTASSIUM BLDA-SCNC: 3.7 MMOL/L (ref 3.5–4.9)
POTASSIUM BLDA-SCNC: 4.2 MMOL/L (ref 3.5–4.9)
POTASSIUM BLDA-SCNC: 4.2 MMOL/L (ref 3.5–4.9)
POTASSIUM BLDA-SCNC: 4.3 MMOL/L (ref 3.5–4.9)
POTASSIUM BLDA-SCNC: 4.3 MMOL/L (ref 3.5–4.9)
POTASSIUM BLDA-SCNC: 4.6 MMOL/L (ref 3.5–4.9)
POTASSIUM BLDA-SCNC: 4.9 MMOL/L (ref 3.5–4.9)
POTASSIUM BLDA-SCNC: 5.2 MMOL/L (ref 3.5–4.9)
POTASSIUM SERPL-SCNC: 3.9 MMOL/L (ref 3.5–5.2)
POTASSIUM SERPL-SCNC: 4.1 MMOL/L (ref 3.5–5.2)
POTASSIUM SERPL-SCNC: 4.3 MMOL/L (ref 3.5–5.2)
PROTHROMBIN TIME: 17.5 SECONDS (ref 11.4–14.4)
PROTHROMBIN TIME: 18.6 SECONDS (ref 11.4–14.4)
QT INTERVAL: 448 MS
QTC INTERVAL: 493 MS
RBC # BLD AUTO: 2.69 10*6/MM3 (ref 4.14–5.8)
RBC # BLD AUTO: 2.7 10*6/MM3 (ref 4.14–5.8)
RBC # BLD AUTO: 3.7 10*6/MM3 (ref 4.14–5.8)
RH BLD: NEGATIVE
SAO2 % BLDA: 100 % (ref 95–98)
SAO2 % BLDA: 74 % (ref 95–98)
SAO2 % BLDA: 90 % (ref 95–98)
SODIUM BLD-SCNC: 138 MMOL/L (ref 138–146)
SODIUM BLD-SCNC: 139 MMOL/L (ref 138–146)
SODIUM BLD-SCNC: 140 MMOL/L (ref 138–146)
SODIUM BLD-SCNC: 140 MMOL/L (ref 138–146)
SODIUM BLD-SCNC: 141 MMOL/L (ref 138–146)
SODIUM BLD-SCNC: 141 MMOL/L (ref 138–146)
SODIUM BLD-SCNC: 142 MMOL/L (ref 138–146)
SODIUM BLD-SCNC: 143 MMOL/L (ref 138–146)
SODIUM BLD-SCNC: 143 MMOL/L (ref 138–146)
SODIUM BLD-SCNC: 144 MMOL/L (ref 138–146)
SODIUM SERPL-SCNC: 143 MMOL/L (ref 136–145)
TOTAL RATE: 16 BREATHS/MINUTE
VENTILATOR MODE: ABNORMAL
WBC NRBC COR # BLD: 10.68 10*3/MM3 (ref 3.4–10.8)
WBC NRBC COR # BLD: 11.5 10*3/MM3 (ref 3.4–10.8)
WBC NRBC COR # BLD: 9.19 10*3/MM3 (ref 3.4–10.8)

## 2022-09-02 PROCEDURE — 33430 REPLACEMENT OF MITRAL VALVE: CPT | Performed by: THORACIC SURGERY (CARDIOTHORACIC VASCULAR SURGERY)

## 2022-09-02 PROCEDURE — 86901 BLOOD TYPING SEROLOGIC RH(D): CPT

## 2022-09-02 PROCEDURE — 86927 PLASMA FRESH FROZEN: CPT

## 2022-09-02 PROCEDURE — C1751 CATH, INF, PER/CENT/MIDLINE: HCPCS | Performed by: ANESTHESIOLOGY

## 2022-09-02 PROCEDURE — 25810000003 DEXTROSE 5 % WITH KCL 20 MEQ 20-5 MEQ/L-% SOLUTION: Performed by: PHYSICIAN ASSISTANT

## 2022-09-02 PROCEDURE — 85027 COMPLETE CBC AUTOMATED: CPT | Performed by: PHYSICIAN ASSISTANT

## 2022-09-02 PROCEDURE — 80069 RENAL FUNCTION PANEL: CPT | Performed by: PHYSICIAN ASSISTANT

## 2022-09-02 PROCEDURE — 93005 ELECTROCARDIOGRAM TRACING: CPT | Performed by: PHYSICIAN ASSISTANT

## 2022-09-02 PROCEDURE — 82330 ASSAY OF CALCIUM: CPT

## 2022-09-02 PROCEDURE — 84295 ASSAY OF SERUM SODIUM: CPT

## 2022-09-02 PROCEDURE — P9059 PLASMA, FRZ BETWEEN 8-24HOUR: HCPCS

## 2022-09-02 PROCEDURE — 71045 X-RAY EXAM CHEST 1 VIEW: CPT

## 2022-09-02 PROCEDURE — 85362 FIBRIN DEGRADATION PRODUCTS: CPT | Performed by: THORACIC SURGERY (CARDIOTHORACIC VASCULAR SURGERY)

## 2022-09-02 PROCEDURE — 25010000002 CEFAZOLIN IN DEXTROSE 2-4 GM/100ML-% SOLUTION: Performed by: PHYSICIAN ASSISTANT

## 2022-09-02 PROCEDURE — 36430 TRANSFUSION BLD/BLD COMPNT: CPT

## 2022-09-02 PROCEDURE — 25010000002 ANTI-INHIBITOR COAGULANT COMPLEX 1000 UNITS RECONSTITUTED SOLUTION: Performed by: THORACIC SURGERY (CARDIOTHORACIC VASCULAR SURGERY)

## 2022-09-02 PROCEDURE — 25010000002 FENTANYL CITRATE (PF) 50 MCG/ML SOLUTION: Performed by: THORACIC SURGERY (CARDIOTHORACIC VASCULAR SURGERY)

## 2022-09-02 PROCEDURE — 82803 BLOOD GASES ANY COMBINATION: CPT

## 2022-09-02 PROCEDURE — 82330 ASSAY OF CALCIUM: CPT | Performed by: PHYSICIAN ASSISTANT

## 2022-09-02 PROCEDURE — 85610 PROTHROMBIN TIME: CPT | Performed by: PHYSICIAN ASSISTANT

## 2022-09-02 PROCEDURE — 86923 COMPATIBILITY TEST ELECTRIC: CPT

## 2022-09-02 PROCEDURE — 99233 SBSQ HOSP IP/OBS HIGH 50: CPT | Performed by: INTERNAL MEDICINE

## 2022-09-02 PROCEDURE — C1889 IMPLANT/INSERT DEVICE, NOC: HCPCS | Performed by: THORACIC SURGERY (CARDIOTHORACIC VASCULAR SURGERY)

## 2022-09-02 PROCEDURE — 86900 BLOOD TYPING SEROLOGIC ABO: CPT

## 2022-09-02 PROCEDURE — 5A1221Z PERFORMANCE OF CARDIAC OUTPUT, CONTINUOUS: ICD-10-PCS | Performed by: THORACIC SURGERY (CARDIOTHORACIC VASCULAR SURGERY)

## 2022-09-02 PROCEDURE — 88305 TISSUE EXAM BY PATHOLOGIST: CPT | Performed by: THORACIC SURGERY (CARDIOTHORACIC VASCULAR SURGERY)

## 2022-09-02 PROCEDURE — 82805 BLOOD GASES W/O2 SATURATION: CPT

## 2022-09-02 PROCEDURE — 25010000002 ALBUMIN HUMAN 5% PER 50 ML: Performed by: PHYSICIAN ASSISTANT

## 2022-09-02 PROCEDURE — 25010000002 ANTI-INHIBITOR COAGULANT COMPLEX: Performed by: THORACIC SURGERY (CARDIOTHORACIC VASCULAR SURGERY)

## 2022-09-02 PROCEDURE — P9037 PLATE PHERES LEUKOREDU IRRAD: HCPCS

## 2022-09-02 PROCEDURE — P9035 PLATELET PHERES LEUKOREDUCED: HCPCS

## 2022-09-02 PROCEDURE — 93318 ECHO TRANSESOPHAGEAL INTRAOP: CPT | Performed by: ANESTHESIOLOGY

## 2022-09-02 PROCEDURE — 25010000002 PROTAMINE SULFATE PER 10 MG: Performed by: ANESTHESIOLOGY

## 2022-09-02 PROCEDURE — 25010000002 CEFAZOLIN PER 500 MG: Performed by: ANESTHESIOLOGY

## 2022-09-02 PROCEDURE — 85027 COMPLETE CBC AUTOMATED: CPT | Performed by: THORACIC SURGERY (CARDIOTHORACIC VASCULAR SURGERY)

## 2022-09-02 PROCEDURE — 85610 PROTHROMBIN TIME: CPT | Performed by: THORACIC SURGERY (CARDIOTHORACIC VASCULAR SURGERY)

## 2022-09-02 PROCEDURE — 99222 1ST HOSP IP/OBS MODERATE 55: CPT | Performed by: INTERNAL MEDICINE

## 2022-09-02 PROCEDURE — 82375 ASSAY CARBOXYHB QUANT: CPT

## 2022-09-02 PROCEDURE — 25010000002 PROPOFOL 10 MG/ML EMULSION

## 2022-09-02 PROCEDURE — 25010000002 PROPOFOL 10 MG/ML EMULSION: Performed by: THORACIC SURGERY (CARDIOTHORACIC VASCULAR SURGERY)

## 2022-09-02 PROCEDURE — 84132 ASSAY OF SERUM POTASSIUM: CPT

## 2022-09-02 PROCEDURE — P9017 PLASMA 1 DONOR FRZ W/IN 8 HR: HCPCS

## 2022-09-02 PROCEDURE — 85347 COAGULATION TIME ACTIVATED: CPT

## 2022-09-02 PROCEDURE — 25010000002 HEPARIN (PORCINE) PER 1000 UNITS: Performed by: ANESTHESIOLOGY

## 2022-09-02 PROCEDURE — 85730 THROMBOPLASTIN TIME PARTIAL: CPT | Performed by: THORACIC SURGERY (CARDIOTHORACIC VASCULAR SURGERY)

## 2022-09-02 PROCEDURE — P9041 ALBUMIN (HUMAN),5%, 50ML: HCPCS | Performed by: PHYSICIAN ASSISTANT

## 2022-09-02 PROCEDURE — 02RG08Z REPLACEMENT OF MITRAL VALVE WITH ZOOPLASTIC TISSUE, OPEN APPROACH: ICD-10-PCS | Performed by: THORACIC SURGERY (CARDIOTHORACIC VASCULAR SURGERY)

## 2022-09-02 PROCEDURE — 25010000002 MAGNESIUM SULFATE 2 GM/50ML SOLUTION: Performed by: NURSE PRACTITIONER

## 2022-09-02 PROCEDURE — 94002 VENT MGMT INPAT INIT DAY: CPT

## 2022-09-02 PROCEDURE — 85730 THROMBOPLASTIN TIME PARTIAL: CPT | Performed by: PHYSICIAN ASSISTANT

## 2022-09-02 PROCEDURE — 25010000002 CEFAZOLIN IN DEXTROSE 2-4 GM/100ML-% SOLUTION

## 2022-09-02 PROCEDURE — 25010000002 ALBUMIN HUMAN 5% PER 50 ML

## 2022-09-02 PROCEDURE — 82947 ASSAY GLUCOSE BLOOD QUANT: CPT

## 2022-09-02 PROCEDURE — 83050 HGB METHEMOGLOBIN QUAN: CPT

## 2022-09-02 PROCEDURE — 85384 FIBRINOGEN ACTIVITY: CPT | Performed by: THORACIC SURGERY (CARDIOTHORACIC VASCULAR SURGERY)

## 2022-09-02 PROCEDURE — P9012 CRYOPRECIPITATE EACH UNIT: HCPCS

## 2022-09-02 PROCEDURE — P9100 PATHOGEN TEST FOR PLATELETS: HCPCS

## 2022-09-02 PROCEDURE — 85014 HEMATOCRIT: CPT

## 2022-09-02 PROCEDURE — 94761 N-INVAS EAR/PLS OXIMETRY MLT: CPT

## 2022-09-02 PROCEDURE — 83735 ASSAY OF MAGNESIUM: CPT | Performed by: PHYSICIAN ASSISTANT

## 2022-09-02 PROCEDURE — 25010000002 EPINEPHRINE 1 MG/ML SOLUTION 30 ML VIAL: Performed by: PHYSICIAN ASSISTANT

## 2022-09-02 PROCEDURE — 25010000002 PROPOFOL 10 MG/ML EMULSION: Performed by: ANESTHESIOLOGY

## 2022-09-02 PROCEDURE — 25010000002 MIDAZOLAM PER 1 MG: Performed by: ANESTHESIOLOGY

## 2022-09-02 PROCEDURE — 80048 BASIC METABOLIC PNL TOTAL CA: CPT | Performed by: THORACIC SURGERY (CARDIOTHORACIC VASCULAR SURGERY)

## 2022-09-02 PROCEDURE — 25010000002 MAGNESIUM SULFATE 2 GM/50ML SOLUTION: Performed by: INTERNAL MEDICINE

## 2022-09-02 PROCEDURE — P9041 ALBUMIN (HUMAN),5%, 50ML: HCPCS

## 2022-09-02 PROCEDURE — 02L70CK OCCLUSION OF LEFT ATRIAL APPENDAGE WITH EXTRALUMINAL DEVICE, OPEN APPROACH: ICD-10-PCS | Performed by: THORACIC SURGERY (CARDIOTHORACIC VASCULAR SURGERY)

## 2022-09-02 PROCEDURE — 94799 UNLISTED PULMONARY SVC/PX: CPT

## 2022-09-02 PROCEDURE — P9016 RBC LEUKOCYTES REDUCED: HCPCS

## 2022-09-02 PROCEDURE — 25010000002 PROTAMINE SULFATE PER 10 MG: Performed by: PHYSICIAN ASSISTANT

## 2022-09-02 DEVICE — IMPLANTABLE DEVICE: Type: IMPLANTABLE DEVICE | Site: HEART | Status: FUNCTIONAL

## 2022-09-02 DEVICE — ATRICLIP® FLEX GILINOV-COSGROVE LAA EXCLUSION SYSTEM 35
Type: IMPLANTABLE DEVICE | Site: HEART | Status: FUNCTIONAL
Brand: ATRICLIP™ LAA EXCLUSION SYSTEM

## 2022-09-02 RX ORDER — DEXTROSE MONOHYDRATE 25 G/50ML
10-50 INJECTION, SOLUTION INTRAVENOUS
Status: DISCONTINUED | OUTPATIENT
Start: 2022-09-02 | End: 2022-09-14 | Stop reason: HOSPADM

## 2022-09-02 RX ORDER — MORPHINE SULFATE 2 MG/ML
2 INJECTION, SOLUTION INTRAMUSCULAR; INTRAVENOUS
Status: DISPENSED | OUTPATIENT
Start: 2022-09-02 | End: 2022-09-09

## 2022-09-02 RX ORDER — NITROGLYCERIN 0.4 MG/1
0.4 TABLET SUBLINGUAL
Status: DISCONTINUED | OUTPATIENT
Start: 2022-09-02 | End: 2022-09-02 | Stop reason: HOSPADM

## 2022-09-02 RX ORDER — ALBUMIN, HUMAN INJ 5% 5 %
SOLUTION INTRAVENOUS
Status: COMPLETED
Start: 2022-09-02 | End: 2022-09-02

## 2022-09-02 RX ORDER — MAGNESIUM HYDROXIDE 1200 MG/15ML
LIQUID ORAL AS NEEDED
Status: DISCONTINUED | OUTPATIENT
Start: 2022-09-02 | End: 2022-09-02 | Stop reason: HOSPADM

## 2022-09-02 RX ORDER — BUPIVACAINE HCL/0.9 % NACL/PF 0.125 %
PLASTIC BAG, INJECTION (ML) EPIDURAL AS NEEDED
Status: DISCONTINUED | OUTPATIENT
Start: 2022-09-02 | End: 2022-09-02 | Stop reason: SURG

## 2022-09-02 RX ORDER — MIDAZOLAM HYDROCHLORIDE 1 MG/ML
0.5 INJECTION INTRAMUSCULAR; INTRAVENOUS
Status: DISCONTINUED | OUTPATIENT
Start: 2022-09-02 | End: 2022-09-02 | Stop reason: HOSPADM

## 2022-09-02 RX ORDER — ASPIRIN 325 MG
325 TABLET, DELAYED RELEASE (ENTERIC COATED) ORAL DAILY
Status: DISCONTINUED | OUTPATIENT
Start: 2022-09-03 | End: 2022-09-09

## 2022-09-02 RX ORDER — MEPERIDINE HYDROCHLORIDE 25 MG/ML
25 INJECTION INTRAMUSCULAR; INTRAVENOUS; SUBCUTANEOUS EVERY 4 HOURS PRN
Status: ACTIVE | OUTPATIENT
Start: 2022-09-02 | End: 2022-09-03

## 2022-09-02 RX ORDER — ALBUTEROL SULFATE 2.5 MG/3ML
2.5 SOLUTION RESPIRATORY (INHALATION) EVERY 4 HOURS PRN
Status: ACTIVE | OUTPATIENT
Start: 2022-09-02 | End: 2022-09-03

## 2022-09-02 RX ORDER — ATORVASTATIN CALCIUM 40 MG/1
40 TABLET, FILM COATED ORAL NIGHTLY
Status: DISCONTINUED | OUTPATIENT
Start: 2022-09-02 | End: 2022-09-14 | Stop reason: HOSPADM

## 2022-09-02 RX ORDER — CHLORHEXIDINE GLUCONATE 0.12 MG/ML
15 RINSE ORAL EVERY 12 HOURS SCHEDULED
Status: DISCONTINUED | OUTPATIENT
Start: 2022-09-02 | End: 2022-09-08

## 2022-09-02 RX ORDER — DOPAMINE HYDROCHLORIDE 160 MG/100ML
2-20 INJECTION, SOLUTION INTRAVENOUS CONTINUOUS PRN
Status: DISCONTINUED | OUTPATIENT
Start: 2022-09-02 | End: 2022-09-04

## 2022-09-02 RX ORDER — HYDROCODONE BITARTRATE AND ACETAMINOPHEN 7.5; 325 MG/1; MG/1
1 TABLET ORAL EVERY 4 HOURS PRN
Status: DISCONTINUED | OUTPATIENT
Start: 2022-09-02 | End: 2022-09-06

## 2022-09-02 RX ORDER — SODIUM CHLORIDE 0.9 % (FLUSH) 0.9 %
10 SYRINGE (ML) INJECTION AS NEEDED
Status: DISCONTINUED | OUTPATIENT
Start: 2022-09-02 | End: 2022-09-02 | Stop reason: HOSPADM

## 2022-09-02 RX ORDER — HEPARIN SODIUM 1000 [USP'U]/ML
INJECTION, SOLUTION INTRAVENOUS; SUBCUTANEOUS AS NEEDED
Status: DISCONTINUED | OUTPATIENT
Start: 2022-09-02 | End: 2022-09-02 | Stop reason: SURG

## 2022-09-02 RX ORDER — POTASSIUM CHLORIDE 1.5 G/1.77G
40 POWDER, FOR SOLUTION ORAL AS NEEDED
Status: DISCONTINUED | OUTPATIENT
Start: 2022-09-02 | End: 2022-09-14 | Stop reason: HOSPADM

## 2022-09-02 RX ORDER — SODIUM CHLORIDE, SODIUM LACTATE, POTASSIUM CHLORIDE, CALCIUM CHLORIDE 600; 310; 30; 20 MG/100ML; MG/100ML; MG/100ML; MG/100ML
INJECTION, SOLUTION INTRAVENOUS CONTINUOUS PRN
Status: DISCONTINUED | OUTPATIENT
Start: 2022-09-02 | End: 2022-09-02 | Stop reason: SURG

## 2022-09-02 RX ORDER — AMINOCAPROIC ACID 250 MG/ML
INJECTION, SOLUTION INTRAVENOUS AS NEEDED
Status: DISCONTINUED | OUTPATIENT
Start: 2022-09-02 | End: 2022-09-02 | Stop reason: SURG

## 2022-09-02 RX ORDER — FAMOTIDINE 20 MG/1
20 TABLET, FILM COATED ORAL ONCE
Status: COMPLETED | OUTPATIENT
Start: 2022-09-02 | End: 2022-09-02

## 2022-09-02 RX ORDER — LIDOCAINE HYDROCHLORIDE 10 MG/ML
0.5 INJECTION, SOLUTION EPIDURAL; INFILTRATION; INTRACAUDAL; PERINEURAL ONCE AS NEEDED
Status: COMPLETED | OUTPATIENT
Start: 2022-09-02 | End: 2022-09-02

## 2022-09-02 RX ORDER — SUFENTANIL CITRATE 50 UG/ML
INJECTION EPIDURAL; INTRAVENOUS AS NEEDED
Status: DISCONTINUED | OUTPATIENT
Start: 2022-09-02 | End: 2022-09-02 | Stop reason: SURG

## 2022-09-02 RX ORDER — PROPOFOL 10 MG/ML
VIAL (ML) INTRAVENOUS
Status: COMPLETED
Start: 2022-09-02 | End: 2022-09-02

## 2022-09-02 RX ORDER — DEXMEDETOMIDINE HYDROCHLORIDE 4 UG/ML
.2-1.5 INJECTION, SOLUTION INTRAVENOUS CONTINUOUS PRN
Status: DISCONTINUED | OUTPATIENT
Start: 2022-09-02 | End: 2022-09-04

## 2022-09-02 RX ORDER — CEFAZOLIN SODIUM 1 G/3ML
INJECTION, POWDER, FOR SOLUTION INTRAMUSCULAR; INTRAVENOUS AS NEEDED
Status: DISCONTINUED | OUTPATIENT
Start: 2022-09-02 | End: 2022-09-02 | Stop reason: SURG

## 2022-09-02 RX ORDER — GABAPENTIN 100 MG/1
100 CAPSULE ORAL EVERY 8 HOURS
Status: DISCONTINUED | OUTPATIENT
Start: 2022-09-02 | End: 2022-09-02

## 2022-09-02 RX ORDER — MAGNESIUM SULFATE HEPTAHYDRATE 40 MG/ML
2 INJECTION, SOLUTION INTRAVENOUS ONCE
Status: COMPLETED | OUTPATIENT
Start: 2022-09-02 | End: 2022-09-02

## 2022-09-02 RX ORDER — CHLORHEXIDINE GLUCONATE 0.12 MG/ML
15 RINSE ORAL ONCE
Status: COMPLETED | OUTPATIENT
Start: 2022-09-02 | End: 2022-09-02

## 2022-09-02 RX ORDER — POTASSIUM CHLORIDE 750 MG/1
40 CAPSULE, EXTENDED RELEASE ORAL AS NEEDED
Status: DISCONTINUED | OUTPATIENT
Start: 2022-09-02 | End: 2022-09-14 | Stop reason: HOSPADM

## 2022-09-02 RX ORDER — ALBUMIN, HUMAN INJ 5% 5 %
500 SOLUTION INTRAVENOUS AS NEEDED
Status: COMPLETED | OUTPATIENT
Start: 2022-09-02 | End: 2022-09-02

## 2022-09-02 RX ORDER — PROTAMINE SULFATE 10 MG/ML
INJECTION, SOLUTION INTRAVENOUS AS NEEDED
Status: DISCONTINUED | OUTPATIENT
Start: 2022-09-02 | End: 2022-09-02 | Stop reason: SURG

## 2022-09-02 RX ORDER — POTASSIUM CHLORIDE, DEXTROSE MONOHYDRATE 150; 5 MG/100ML; G/100ML
30 INJECTION, SOLUTION INTRAVENOUS CONTINUOUS
Status: DISCONTINUED | OUTPATIENT
Start: 2022-09-02 | End: 2022-09-06

## 2022-09-02 RX ORDER — VECURONIUM BROMIDE 1 MG/ML
INJECTION, POWDER, LYOPHILIZED, FOR SOLUTION INTRAVENOUS AS NEEDED
Status: DISCONTINUED | OUTPATIENT
Start: 2022-09-02 | End: 2022-09-02 | Stop reason: SURG

## 2022-09-02 RX ORDER — NICOTINE POLACRILEX 4 MG
15 LOZENGE BUCCAL
Status: DISCONTINUED | OUTPATIENT
Start: 2022-09-02 | End: 2022-09-14 | Stop reason: HOSPADM

## 2022-09-02 RX ORDER — POTASSIUM CHLORIDE 29.8 MG/ML
20 INJECTION INTRAVENOUS
Status: DISCONTINUED | OUTPATIENT
Start: 2022-09-02 | End: 2022-09-09

## 2022-09-02 RX ORDER — ASPIRIN 325 MG
325 TABLET ORAL ONCE
Status: COMPLETED | OUTPATIENT
Start: 2022-09-02 | End: 2022-09-02

## 2022-09-02 RX ORDER — SODIUM CHLORIDE 0.9 % (FLUSH) 0.9 %
10 SYRINGE (ML) INJECTION EVERY 12 HOURS SCHEDULED
Status: DISCONTINUED | OUTPATIENT
Start: 2022-09-02 | End: 2022-09-02 | Stop reason: HOSPADM

## 2022-09-02 RX ORDER — FENTANYL CITRATE 50 UG/ML
25 INJECTION, SOLUTION INTRAMUSCULAR; INTRAVENOUS
Status: DISCONTINUED | OUTPATIENT
Start: 2022-09-02 | End: 2022-09-06

## 2022-09-02 RX ORDER — PROPOFOL 10 MG/ML
VIAL (ML) INTRAVENOUS AS NEEDED
Status: DISCONTINUED | OUTPATIENT
Start: 2022-09-02 | End: 2022-09-02 | Stop reason: SURG

## 2022-09-02 RX ORDER — PROTAMINE SULFATE 10 MG/ML
50 INJECTION, SOLUTION INTRAVENOUS ONCE
Status: COMPLETED | OUTPATIENT
Start: 2022-09-02 | End: 2022-09-02

## 2022-09-02 RX ORDER — OXYCODONE HYDROCHLORIDE 5 MG/1
10 TABLET ORAL EVERY 4 HOURS PRN
Status: DISCONTINUED | OUTPATIENT
Start: 2022-09-02 | End: 2022-09-06

## 2022-09-02 RX ORDER — DOBUTAMINE HYDROCHLORIDE 100 MG/100ML
2-20 INJECTION INTRAVENOUS CONTINUOUS PRN
Status: DISCONTINUED | OUTPATIENT
Start: 2022-09-02 | End: 2022-09-04

## 2022-09-02 RX ORDER — CEFAZOLIN SODIUM 2 G/100ML
2 INJECTION, SOLUTION INTRAVENOUS ONCE
Status: COMPLETED | OUTPATIENT
Start: 2022-09-02 | End: 2022-09-02

## 2022-09-02 RX ORDER — NITROGLYCERIN 20 MG/100ML
INJECTION INTRAVENOUS CONTINUOUS PRN
Status: DISCONTINUED | OUTPATIENT
Start: 2022-09-02 | End: 2022-09-02 | Stop reason: SURG

## 2022-09-02 RX ORDER — SODIUM CHLORIDE 9 MG/ML
INJECTION, SOLUTION INTRAVENOUS CONTINUOUS PRN
Status: DISCONTINUED | OUTPATIENT
Start: 2022-09-02 | End: 2022-09-02 | Stop reason: SURG

## 2022-09-02 RX ORDER — ROCURONIUM BROMIDE 10 MG/ML
INJECTION, SOLUTION INTRAVENOUS AS NEEDED
Status: DISCONTINUED | OUTPATIENT
Start: 2022-09-02 | End: 2022-09-02 | Stop reason: SURG

## 2022-09-02 RX ORDER — CHLORHEXIDINE GLUCONATE 500 MG/1
1 CLOTH TOPICAL EVERY 12 HOURS PRN
Status: DISCONTINUED | OUTPATIENT
Start: 2022-09-02 | End: 2022-09-02 | Stop reason: HOSPADM

## 2022-09-02 RX ORDER — CEFAZOLIN SODIUM 2 G/100ML
2 INJECTION, SOLUTION INTRAVENOUS EVERY 8 HOURS
Status: COMPLETED | OUTPATIENT
Start: 2022-09-02 | End: 2022-09-04

## 2022-09-02 RX ORDER — EPHEDRINE SULFATE 50 MG/ML
INJECTION, SOLUTION INTRAVENOUS AS NEEDED
Status: DISCONTINUED | OUTPATIENT
Start: 2022-09-02 | End: 2022-09-02 | Stop reason: SURG

## 2022-09-02 RX ORDER — POLYETHYLENE GLYCOL 3350 17 G/17G
17 POWDER, FOR SOLUTION ORAL DAILY PRN
Status: DISCONTINUED | OUTPATIENT
Start: 2022-09-02 | End: 2022-09-14 | Stop reason: HOSPADM

## 2022-09-02 RX ORDER — NOREPINEPHRINE BIT/0.9 % NACL 8 MG/250ML
.02-.3 INFUSION BOTTLE (ML) INTRAVENOUS CONTINUOUS PRN
Status: DISCONTINUED | OUTPATIENT
Start: 2022-09-02 | End: 2022-09-04

## 2022-09-02 RX ORDER — METOPROLOL TARTRATE 5 MG/5ML
2.5 INJECTION INTRAVENOUS EVERY 6 HOURS SCHEDULED
Status: ACTIVE | OUTPATIENT
Start: 2022-09-02 | End: 2022-09-03

## 2022-09-02 RX ORDER — SODIUM CHLORIDE, SODIUM LACTATE, POTASSIUM CHLORIDE, CALCIUM CHLORIDE 600; 310; 30; 20 MG/100ML; MG/100ML; MG/100ML; MG/100ML
9 INJECTION, SOLUTION INTRAVENOUS CONTINUOUS
Status: DISCONTINUED | OUTPATIENT
Start: 2022-09-02 | End: 2022-09-02

## 2022-09-02 RX ORDER — ONDANSETRON 2 MG/ML
4 INJECTION INTRAMUSCULAR; INTRAVENOUS EVERY 6 HOURS PRN
Status: DISCONTINUED | OUTPATIENT
Start: 2022-09-02 | End: 2022-09-14 | Stop reason: HOSPADM

## 2022-09-02 RX ORDER — BISACODYL 10 MG
10 SUPPOSITORY, RECTAL RECTAL DAILY PRN
Status: DISCONTINUED | OUTPATIENT
Start: 2022-09-02 | End: 2022-09-14 | Stop reason: HOSPADM

## 2022-09-02 RX ORDER — NITROGLYCERIN 20 MG/100ML
5-200 INJECTION INTRAVENOUS CONTINUOUS PRN
Status: DISCONTINUED | OUTPATIENT
Start: 2022-09-02 | End: 2022-09-04

## 2022-09-02 RX ORDER — ACETAMINOPHEN 325 MG/1
650 TABLET ORAL EVERY 8 HOURS SCHEDULED
Status: DISPENSED | OUTPATIENT
Start: 2022-09-02 | End: 2022-09-09

## 2022-09-02 RX ORDER — SODIUM CHLORIDE 9 MG/ML
INJECTION, SOLUTION INTRAVENOUS AS NEEDED
Status: DISCONTINUED | OUTPATIENT
Start: 2022-09-02 | End: 2022-09-02 | Stop reason: HOSPADM

## 2022-09-02 RX ORDER — MIDAZOLAM HYDROCHLORIDE 1 MG/ML
INJECTION INTRAMUSCULAR; INTRAVENOUS AS NEEDED
Status: DISCONTINUED | OUTPATIENT
Start: 2022-09-02 | End: 2022-09-02 | Stop reason: SURG

## 2022-09-02 RX ADMIN — ALBUMIN HUMAN 500 ML: 0.05 INJECTION, SOLUTION INTRAVENOUS at 14:20

## 2022-09-02 RX ADMIN — AMINOCAPROIC ACID 10 G: 250 INJECTION, SOLUTION INTRAVENOUS at 07:50

## 2022-09-02 RX ADMIN — PROPOFOL 25 MCG/KG/MIN: 10 INJECTION, EMULSION INTRAVENOUS at 12:37

## 2022-09-02 RX ADMIN — FAMOTIDINE 20 MG: 20 TABLET ORAL at 06:19

## 2022-09-02 RX ADMIN — CHLORHEXIDINE GLUCONATE 0.12% ORAL RINSE 15 ML: 1.2 LIQUID ORAL at 06:19

## 2022-09-02 RX ADMIN — ROCURONIUM BROMIDE 100 MG: 50 INJECTION, SOLUTION INTRAVENOUS at 07:07

## 2022-09-02 RX ADMIN — EPINEPHRINE 0.02 MCG/KG/MIN: 1 INJECTION INTRAMUSCULAR; INTRAVENOUS; SUBCUTANEOUS at 20:56

## 2022-09-02 RX ADMIN — PROPOFOL 50 MCG/KG/MIN: 10 INJECTION, EMULSION INTRAVENOUS at 18:25

## 2022-09-02 RX ADMIN — SUFENTANIL CITRATE 25 MCG: 50 INJECTION, SOLUTION EPIDURAL; INTRAVENOUS at 07:07

## 2022-09-02 RX ADMIN — VECURONIUM BROMIDE 5 MG: 1 INJECTION, POWDER, LYOPHILIZED, FOR SOLUTION INTRAVENOUS at 12:15

## 2022-09-02 RX ADMIN — FENTANYL CITRATE 25 MCG: 50 INJECTION, SOLUTION INTRAMUSCULAR; INTRAVENOUS at 15:54

## 2022-09-02 RX ADMIN — NOREPINEPHRINE BITARTRATE 0.03 MCG/KG/MIN: 1 INJECTION, SOLUTION, CONCENTRATE INTRAVENOUS at 07:34

## 2022-09-02 RX ADMIN — VECURONIUM BROMIDE 5 MG: 1 INJECTION, POWDER, LYOPHILIZED, FOR SOLUTION INTRAVENOUS at 09:25

## 2022-09-02 RX ADMIN — ACETAMINOPHEN 650 MG: 325 TABLET, FILM COATED ORAL at 21:16

## 2022-09-02 RX ADMIN — SODIUM CHLORIDE, POTASSIUM CHLORIDE, SODIUM LACTATE AND CALCIUM CHLORIDE: 600; 310; 30; 20 INJECTION, SOLUTION INTRAVENOUS at 07:25

## 2022-09-02 RX ADMIN — VECURONIUM BROMIDE 5 MG: 1 INJECTION, POWDER, LYOPHILIZED, FOR SOLUTION INTRAVENOUS at 08:20

## 2022-09-02 RX ADMIN — PROPOFOL 50 MCG/KG/MIN: 10 INJECTION, EMULSION INTRAVENOUS at 21:46

## 2022-09-02 RX ADMIN — AMINOCAPROIC ACID 10 G: 250 INJECTION, SOLUTION INTRAVENOUS at 12:33

## 2022-09-02 RX ADMIN — SUFENTANIL CITRATE 50 MCG: 50 INJECTION, SOLUTION EPIDURAL; INTRAVENOUS at 08:09

## 2022-09-02 RX ADMIN — MAGNESIUM SULFATE HEPTAHYDRATE 2 G: 2 INJECTION, SOLUTION INTRAVENOUS at 16:06

## 2022-09-02 RX ADMIN — CHLORHEXIDINE GLUCONATE 15 ML: 1.2 SOLUTION ORAL at 21:16

## 2022-09-02 RX ADMIN — HEPARIN SODIUM 40000 UNITS: 1000 INJECTION, SOLUTION INTRAVENOUS; SUBCUTANEOUS at 08:27

## 2022-09-02 RX ADMIN — LIDOCAINE HYDROCHLORIDE 0.5 ML: 10 INJECTION, SOLUTION EPIDURAL; INFILTRATION; INTRACAUDAL; PERINEURAL at 06:19

## 2022-09-02 RX ADMIN — SUFENTANIL CITRATE 75 MCG: 50 INJECTION, SOLUTION EPIDURAL; INTRAVENOUS at 07:56

## 2022-09-02 RX ADMIN — EPHEDRINE SULFATE 10 MG: 50 INJECTION INTRAVENOUS at 10:58

## 2022-09-02 RX ADMIN — PROTAMINE SULFATE 400 MG: 10 INJECTION, SOLUTION INTRAVENOUS at 12:28

## 2022-09-02 RX ADMIN — PROPOFOL 50 MCG/KG/MIN: 10 INJECTION, EMULSION INTRAVENOUS at 15:55

## 2022-09-02 RX ADMIN — Medication 100 MCG: at 07:14

## 2022-09-02 RX ADMIN — NITROGLYCERIN 0.5 MCG/KG/MIN: 20 INJECTION INTRAVENOUS at 11:02

## 2022-09-02 RX ADMIN — PROPOFOL 120 MG: 10 INJECTION, EMULSION INTRAVENOUS at 07:07

## 2022-09-02 RX ADMIN — PROPOFOL 70 MCG/KG/MIN: 10 INJECTION, EMULSION INTRAVENOUS at 19:26

## 2022-09-02 RX ADMIN — SODIUM CHLORIDE, POTASSIUM CHLORIDE, SODIUM LACTATE AND CALCIUM CHLORIDE 9 ML/HR: 600; 310; 30; 20 INJECTION, SOLUTION INTRAVENOUS at 06:19

## 2022-09-02 RX ADMIN — FENTANYL CITRATE 25 MCG: 50 INJECTION, SOLUTION INTRAMUSCULAR; INTRAVENOUS at 21:06

## 2022-09-02 RX ADMIN — CEFAZOLIN SODIUM 2 G: 2 INJECTION, SOLUTION INTRAVENOUS at 07:13

## 2022-09-02 RX ADMIN — INSULIN HUMAN 2.2 UNITS/HR: 1 INJECTION, SOLUTION INTRAVENOUS at 18:33

## 2022-09-02 RX ADMIN — ALBUMIN HUMAN 500 ML: 0.05 INJECTION, SOLUTION INTRAVENOUS at 15:59

## 2022-09-02 RX ADMIN — Medication 50 MCG: at 08:29

## 2022-09-02 RX ADMIN — CEFAZOLIN SODIUM 2 G: 1 INJECTION, POWDER, FOR SOLUTION INTRAMUSCULAR; INTRAVENOUS at 12:31

## 2022-09-02 RX ADMIN — MUPIROCIN 1 APPLICATION: 20 OINTMENT TOPICAL at 06:19

## 2022-09-02 RX ADMIN — MAGNESIUM SULFATE HEPTAHYDRATE 2 G: 2 INJECTION, SOLUTION INTRAVENOUS at 21:46

## 2022-09-02 RX ADMIN — POTASSIUM CHLORIDE AND DEXTROSE MONOHYDRATE 30 ML/HR: 150; 5 INJECTION, SOLUTION INTRAVENOUS at 14:24

## 2022-09-02 RX ADMIN — Medication 100 MCG: at 08:38

## 2022-09-02 RX ADMIN — Medication 100 MCG: at 08:31

## 2022-09-02 RX ADMIN — INSULIN HUMAN 2.2 UNITS/HR: 1 INJECTION, SOLUTION INTRAVENOUS at 18:31

## 2022-09-02 RX ADMIN — MIDAZOLAM HYDROCHLORIDE 2 MG: 1 INJECTION, SOLUTION INTRAMUSCULAR; INTRAVENOUS at 07:05

## 2022-09-02 RX ADMIN — EPHEDRINE SULFATE 5 MG: 50 INJECTION INTRAVENOUS at 07:07

## 2022-09-02 RX ADMIN — ATORVASTATIN CALCIUM 40 MG: 40 TABLET, FILM COATED ORAL at 21:16

## 2022-09-02 RX ADMIN — SUFENTANIL CITRATE 50 MCG: 50 INJECTION, SOLUTION EPIDURAL; INTRAVENOUS at 12:16

## 2022-09-02 RX ADMIN — ASPIRIN 325 MG ORAL TABLET 325 MG: 325 PILL ORAL at 15:54

## 2022-09-02 RX ADMIN — CEFAZOLIN SODIUM 2 G: 2 INJECTION, SOLUTION INTRAVENOUS at 21:14

## 2022-09-02 RX ADMIN — SUFENTANIL CITRATE 50 MCG: 50 INJECTION, SOLUTION EPIDURAL; INTRAVENOUS at 11:02

## 2022-09-02 RX ADMIN — SODIUM CHLORIDE: 9 INJECTION, SOLUTION INTRAVENOUS at 07:25

## 2022-09-02 RX ADMIN — EPHEDRINE SULFATE 10 MG: 50 INJECTION INTRAVENOUS at 07:13

## 2022-09-02 RX ADMIN — PROTAMINE SULFATE 50 MG: 10 INJECTION, SOLUTION INTRAVENOUS at 15:56

## 2022-09-02 NOTE — PROGRESS NOTES
Intensive Care Admission Note     Severe mitral regurgitation    History of Present Illness     Mr. Myers is a 65 yo male with PMH hypertension, congestive heart failure and coronary artery disease who presents to the ICU status post MVR with Dr. Chan.  Patient was admitted to [facility] in July for congestive heart failure.  During that admission he underwent left heart cath that revealed nonobstructive CAD but was suggestive of severe mitral valve regurgitation.  KENNEY revealed moderate MVR with mild prolapse.  For that he was sent to Dr. Chan's office for evaluation.      The patient underwent mitral valve replacement with left atrial appendage ligation per Dr. Chan.  He is brought in an intubated state to the intensive care unit postoperatively.      Problem List, Surgical History, Family, Social History, and ROS     Severe mitral regurgitation    Obesity (BMI 30-39.9)    Chronic systolic heart failure (HCC)    Coronary artery disease of native artery of native heart with stable angina pectoris (HCC)    Past Surgical History:   Procedure Laterality Date   • CARDIAC CATHETERIZATION N/A 11/08/2019    Procedure: Left Heart Cath;  Surgeon: Sherrell Hart MD;  Location: [facility] CATH INVASIVE LOCATION;  Service: Cardiology   • CARDIAC CATHETERIZATION N/A 06/27/2022    Procedure: Left Heart Cath;  Surgeon: Greyson Balderas MD;  Location: [facility] CATH INVASIVE LOCATION;  Service: Cardiology;  Laterality: N/A;   • COLONOSCOPY     • CORONARY STENT PLACEMENT  2019       Allergies   Allergen Reactions   • Other Unknown - High Severity     Pt had a reaction to anesthesia when placing stents        No current facility-administered medications on file prior to encounter.     Current Outpatient Medications on File Prior to Encounter   Medication Sig   • aspirin 81 MG EC tablet Take 81 mg by mouth Daily. Asa 81 x 4 @ 9/1/22 @ 2100   • carvedilol (COREG) 6.25 MG tablet Take 6.25 mg by mouth 2 (Two)  "Times a Day With Meals.   • carvedilol (COREG) 6.25 MG tablet Take 1 tablet by mouth 2 (Two) Times a Day With Meals for 30 days.   • [DISCONTINUED] furosemide (Lasix) 40 MG tablet Take 1 tablet by mouth Daily As Needed (Worsening swelling or 3lb weight gain in 1 day. Please weigh yourself daily.) for up to 30 days.     MEDICATION LIST AND ALLERGIES REVIEWED.    Family History   Problem Relation Age of Onset   • Heart valve disorder Mother    • Heart valve disorder Brother    • Heart valve disorder Maternal Grandfather    • Heart disease Maternal Grandfather    • Diabetes type I Son      Social History     Tobacco Use   • Smoking status: Never Smoker   • Smokeless tobacco: Never Used   Vaping Use   • Vaping Use: Never used   Substance Use Topics   • Alcohol use: No   • Drug use: No     Social History     Social History Narrative    Lives in [place].     FAMILY AND SOCIAL HISTORY REVIEWED.    Review of Systems  Unable to obtain review of systems secondary to the patient's intubated state.    Physical Exam and Clinical Information   BP 94/67   Pulse 72   Temp 97.3 °F (36.3 °C) (Core)   Resp 16   Ht 190.5 cm (75\")   Wt 115 kg (254 lb 8.3 oz)   SpO2 100%   BMI 31.81 kg/m²   Physical Exam  Vitals and nursing note reviewed.   Constitutional:       Comments: Sedated, well-developed, intubated   HENT:      Head: Normocephalic and atraumatic.      Mouth/Throat:      Mouth: Mucous membranes are moist.      Comments: Endotracheal tube in place.  Eyes:      General: No scleral icterus.        Right eye: No discharge.         Left eye: No discharge.      Conjunctiva/sclera: Conjunctivae normal.      Pupils: Pupils are equal, round, and reactive to light.   Neck:      Vascular: No carotid bruit.      Comments: Right internal jugular introducer with PA catheter.  Cardiovascular:      Rate and Rhythm: Normal rate and regular rhythm.      Pulses: Normal pulses.      Heart sounds:     No friction rub.      Comments: Status post " median sternotomy.  Subxiphoid mediastinal tubes in place.  Pulmonary:      Effort: Pulmonary effort is normal.      Breath sounds: Normal breath sounds.   Abdominal:      General: Abdomen is flat.      Palpations: Abdomen is soft.      Comments: Decreased bowel sounds.  No masses.   Musculoskeletal:      Cervical back: Neck supple. No rigidity.      Right lower leg: No edema.      Left lower leg: No edema.   Lymphadenopathy:      Cervical: No cervical adenopathy.   Skin:     General: Skin is warm.      Capillary Refill: Capillary refill takes less than 2 seconds.   Neurological:      Comments: Currently sedated         Results from last 7 days   Lab Units 09/02/22  1401 09/02/22  1243 09/02/22  1152 09/02/22  0709 08/30/22  1246   WBC 10*3/mm3 11.50*  --   --   --  6.37   HEMOGLOBIN g/dL 11.2*  --   --   --  16.2   HEMOGLOBIN, POC g/dL  --  11.2* 12.9   < >  --    PLATELETS 10*3/mm3 105*  --   --   --  151    < > = values in this interval not displayed.     Results from last 7 days   Lab Units 09/02/22  1401 08/30/22  1246   SODIUM mmol/L 143 142   POTASSIUM mmol/L 3.9 4.6   CO2 mmol/L 25.0 33.0*   BUN mg/dL 18 14   CREATININE mg/dL 1.12 1.04   MAGNESIUM mg/dL 1.5* 2.2   PHOSPHORUS mg/dL 2.2*  --    GLUCOSE mg/dL 103* 102*     Estimated Creatinine Clearance: 88.7 mL/min (by C-G formula based on SCr of 1.12 mg/dL).  Results from last 7 days   Lab Units 08/30/22  1246   HEMOGLOBIN A1C % 6.30*     Results from last 7 days   Lab Units 09/02/22  1354   PH, ARTERIAL pH units 7.405   PCO2, ARTERIAL mm Hg 42.9   PO2 ART mm Hg 185.0*     Lab Results   Component Value Date    LACTATE 1.4 10/28/2021          I reviewed the patient's results and images.     Impression       Severe mitral regurgitation    Obesity (BMI 30-39.9)    Chronic systolic heart failure (HCC)    Coronary artery disease of native artery of native heart with stable angina pectoris (HCC)    HTN (hypertension)     Postoperative respiratory insufficiency      Plan/Recommendations     The patient will be monitored closely in the intensive care unit.  Wean hemodynamic support.  We will wean ventilator as tolerated.  No underlying chronic lung disease evident on PFTs.  Perioperative glucose control with insulin infusion.  We will transition to subcutaneous coverage tomorrow.  Watch for any further sign of bleeding.  The patient remains at high risk of worsening secondary to postoperative respiratory insufficiency in the immediate postoperative phase.      Krishna Jackson MD, [facility]  Pulmonary and Critical Care Medicine  09/02/22 15:11 EDT       CC: Spencer Saeed MD

## 2022-09-02 NOTE — ANESTHESIA PROCEDURE NOTES
Central Line      Patient reassessed immediately prior to procedure    Patient location during procedure: OR  Start time: 9/2/2022 7:18 AM  Stop Time:9/2/2022 7:25 AM  Indications: vascular access  Staff  Anesthesiologist: Patrick Ruiz Jr., MD  Preanesthetic Checklist  Completed: patient identified, IV checked, site marked, risks and benefits discussed, surgical consent, monitors and equipment checked, pre-op evaluation and timeout performed  Central Line Prep  Sterile Tech:cap, gloves, gown, mask and sterile barriers  Prep: chloraprep  Patient monitoring: blood pressure monitoring, continuous pulse oximetry and EKG  Central Line Procedure  Laterality:right  Location:internal jugular  Catheter Type:double lumen, [place]-Sushant and MAC  Catheter Size:9 Fr  Guidance:ultrasound guided  PROCEDURE NOTE/ULTRASOUND INTERPRETATION.  Using ultrasound guidance the potential vascular sites for insertion of the catheter were visualized to determine the patency of the vessel to be used for vascular access.  After selecting the appropriate site for insertion, the needle was visualized under ultrasound being inserted into the internal jugular vein, followed by ultrasound confirmation of wire and catheter placement. There were no abnormalities seen on ultrasound; an image was taken; and the patient tolerated the procedure with no complications. Images: still images obtained, printed/placed on chart  Assessment  Post procedure:biopatch applied, line sutured, occlusive dressing applied and secured with tape  Assessement:blood return through all ports, free fluid flow and chest x-ray ordered  Complications:no  Patient Tolerance:patient tolerated the procedure well with no apparent complications  Additional Notes  Pt placed in Trendelenburg position. US used to identify right carotid artery and RIJ. Needle advanced into RIJ under directed visualization. Blood aspirated and wire passed easily. Wire visualized in RIJ prior to dilation  and advancement of catheter.

## 2022-09-02 NOTE — OP NOTE
DATE OF PROCEDURE: 9/2/2022    PREOPERATIVE DIAGNOSES:  1. Symptomatic severe mitral regurgitation  2. Hypertension  3. Coronary artery disease    POSTOPERATIVE DIAGNOSES:    1. Symptomatic severe mitral regurgitation  2. Hypertension  3. Coronary artery disease    PROCEDURES PERFORMED:    1. Mitral Valve Replacement (33 mm Magna Ease tissue mitral valve)  2. Left atrial appendage ligation (35 mm Atricure clip)    SURGEON: Lyle Chan MD      ASSISTANTS:    1. Gareth Choe MD was responsible for performing the following activities: Retraction and Suction and their skilled assistance was necessary for the success of this case.    2. Zaid Amaya PA was responsible for performing the following activities: Retraction, Suction, Closing and Placing Dressing and their skilled assistance was necessary for the success of this case.    Circulator: Veronica Chu RN; Meenakshi Mckinney RN  Perfusionist: Leo Galdamez  Scrub Person: Tonja Gallegos; Santiago Vidal  Nursing Assistant: Rachael Harper PCT; Siconolfi, Alec  Perfusion Tech: Meenakshi Green     ANESTHESIA: General endotracheal anesthesia with Dr. Patrick Ruiz MD    ESTIMATED BLOOD LOSS: 700 mL.      CROSSCLAMP TIME: 191 minutes.      TOTAL CARDIOPULMONARY BYPASS TIME: 206 minutes.       INDICATIONS: 66-year-old  male with history of hypertension, congestive heart failure and coronary artery disease who presented with symptomatic mitral valve regurgitation.  The patient was felt to be a reasonable candidate for mitral valve repair/replacement and the risks and benefits of surgery were discussed with the patient including pain, bleeding, infection, renal failure, stroke, heart block requiring a pacemaker and death.  The patient understood these risks and wished to proceed with surgery.     DESCRIPTION OF PROCEDURE: The patient was taken to the operating room and placed under general endotracheal anesthesia. A central line, Galena-Sushant  catheter, radial arterial line, and Rose catheter were placed. The patient was prepped and draped in the usual sterile fashion and a timeout was performed, including patient's name, procedure, consent, beta blockade administration, and antibiotics were verified.    A median sternotomy incision was made and electrocautery was utilized to gain access to the sternum. A midline sternotomy was performed after lung desufflation and hemostasis was achieved with electrocautery.    The pericardium was opened and stay sutures were placed to create a pericardial well. Next, 3-0 Prolene sutures were placed in the ascending aorta and systemic heparin was administered. Additional cannulation sutures were placed in the right atrial appendage, ascending aorta, and right atrium. After verification of satisfactory activated clotting time, the arterial cannula was placed and connected to the cardiopulmonary bypass circuit after being de-aired. The line was tested and a wrap was performed. The venous cannula was inserted followed by antegrade and retrograde cardioplegia lines. Field carbon dioxide was administered during the case.  Cardiopulmonary bypass was initiated and the patient was allowed to drift in temperature. Bypass flow was dropped and the aortic crossclamp was applied. Cardioplegia was administered in an antegrade fashion with immediate cessation of cardiac activity.  The root vent suction was then turned on high and a left atriotomy made along Mila's groove.    The mitral valve was examined and found to a torn chordal element at mid P2.  The valve was large in size and the regurgitation appeared to be central in location.  At this point, I elected to resect mid P2 as a triangular resection given the defect was less than one third of P2.  The secondary chordae adjacent to the defect were released.  The leaflet tissue was reapproximated with interrupted 5-0 Prolene suture with the knots on the ventricular side.  The  closure was inspected and no defects were present.  The valve was again tested and the anterior leaflet was now prolapsing above the valvular plane and there was diffuse regurgitation.  I did not feel that placing an annuloplasty ring to complete the repair would correct this regurgitation and elected to replace the valve.  The anterior leaflet was excised and the remaining posterior leaflet preserved.  This was sent for permanent pathology.  Two pledgeted valve sutures were placed at the commissures.  The valve was sized at 33 mm. The valve was very redundant with billowing floppy tissue.  The remaining circumferential pledgeted valve sutures were placed and connected to the mitral valve prosthesis.  The valve was lowered into position with adequate seating.  The pledgets could be visualized around the entire annulus.  The valve sutures were tied down with proper seating of the valve.  The atriotomy was then closed with two running 4-0 prolene sutures followed by another two simple running sutures.  The left atrium was deaired with respirations prior to closing the left atrium.    The left atrial appendage was sized and a 35 mm Atricure clip placed at the base of the appendage.     A hot shot of cardioplegia was given down the ascending aorta and the patient was placed in steep Trendelenburg position. The root vent was turned on high suction and cardiopulmonary bypass flow was turned down with the crossclamp subsequently removed. Bypass flows were returned to normal and the heart returned to spontaneous sinus rhythm.  Transesophageal echocardiogram revealed a significant paravalvular leak at the P2/P3 junction and a smaller leak at P1/P2 junction despite a significant amount of valvular sutures and no spacing between sutures.  I elected to go back on bypass to inspect and possibly repair these leaks versus replace the valve.  The cross clamp was applied and cardioplegia given via antegrade.  The previous left  atriotomy was openend and the valve inspected.  The valve appeared well seated and no sutures had pulled through the annulus.  Pledgeted valve sutures were placed along the posterior aspect of the prosthesis at both areas of concern and connected to the valve prosthesis.  The valve was tested and no leak could be appreciated.  The atriotomy was then closed with two running 4-0 prolene sutures followed by another two simple running sutures.  The left atrium was deaired with respirations prior to finalizing the left atrial closure.  A hot shot of cardioplegia was given down the ascending aorta and the patient was placed in steep Trendelenburg position. The root vent was turned on high suction and cardiopulmonary bypass flow was turned down with the crossclamp subsequently removed. Bypass flows were returned to normal and the heart returned to spontaneous sinus rhythm.  The patient was subsequently weaned from cardiopulmonary bypass and decannulation was successfully carried out. All cannulation sites were reinforced with additional 4-0 Prolene suture and inspected for hemostasis. The mitral valve paravalvular leak at P2/P3 was no longer present and a small paravalvular leak was present at P1.  Ventricular pacing wires were placed and secured using 0 silk suture. Four chest tubes were then placed within the left and right pleural spaces and mediastinum. These were secured using a 0 Ethibond suture. The sternum was reapproximated with #7 stainless steel wire and the linea alba was closed with a running 0 Vicryl suture. Subcutaneous tissues were closed with a 2-0 Vicryl suture and the inferior aspect of the incision was closed with additional interrupted 2-0 Vicryl sutures in the dermal layer. The skin was reapproximated with a 4-0 Monocryl subcuticular stitch and overlying skin glue was applied to the incision. Gauze and tape were applied to the chest tube sites and the patient was subsequently transported to the  cardiac ICU in stable condition, intubated.

## 2022-09-02 NOTE — INTERVAL H&P NOTE
"[facility] Pre-op    Full history and physical note from office is attached.    /91 (BP Location: Left arm, Patient Position: Sitting)   Pulse 64   Temp 97 °F (36.1 °C) (Temporal)   Resp 18   Ht 190.5 cm (75\")   Wt 115 kg (254 lb 8.3 oz)   SpO2 94%   BMI 31.81 kg/m²       LAB Results:  Lab Results   Component Value Date    WBC 6.37 08/30/2022    HGB 16.2 08/30/2022    HCT 50.0 08/30/2022    MCV 93.5 08/30/2022     08/30/2022    NEUTROABS 3.44 08/30/2022    GLUCOSE 102 (H) 08/30/2022    BUN 14 08/30/2022    CREATININE 1.04 08/30/2022    EGFRIFNONA 87 10/29/2021     08/30/2022    K 4.6 08/30/2022     08/30/2022    CO2 33.0 (H) 08/30/2022    MG 2.2 08/30/2022    PHOS 6.0 (H) 11/05/2019    CALCIUM 9.6 08/30/2022    ALBUMIN 4.30 08/30/2022    AST 37 08/30/2022    ALT 14 08/30/2022    BILITOT 0.7 08/30/2022    PTT 33.7 08/30/2022    INR 0.97 08/30/2022 6/28/22 ECHO:  Interpretation Summary    Normal left ventricular cavity size and wall thickness noted. All left ventricular wall segments contract normally.  Left ventricular ejection fraction appears to be 56 - 60%.  The right ventricular cavity is mildly dilated.  Mildly reduced right ventricular systolic function noted.  The aortic valve is structurally normal with no regurgitation or stenosis present.  There is moderate mitral valve prolapse located in the medial scallop (P3) of the posterior mitral leaflet.  Moderate to severe mitral valve regurgitation is present.  Moderate tricuspid valve regurgitation is present.  Estimated right ventricular systolic pressure from tricuspid regurgitation is moderately elevated (45-55 mmHg).  Moderate to severe pulmonary hypertension is present.  Saline test results are negative.  No echocardiographic evidence of intracardiac shunt.      Cancer Staging (if applicable)  Cancer Patient: __ yes __no __unknown__N/A; If yes, clinical stage T:__ N:__M:__, stage group or " __N/A      Impression: Severe mitral valve regurgitation       Plan: MITRAL VALVE REPAIR/REPLACEMENT, EV, KENNEY      Samra White, APRN   9/2/2022   06:48 EDT

## 2022-09-02 NOTE — ANESTHESIA PROCEDURE NOTES
Airway  Urgency: elective    Date/Time: 9/2/2022 7:10 AM  Airway not difficult (See note)    General Information and Staff    Patient location during procedure: OR  Anesthesiologist: Patrick Ruiz Jr., MD    Indications and Patient Condition  Indications for airway management: airway protection    Preoxygenated: yes  MILS not maintained throughout  Mask difficulty assessment: 2 - vent by mask + OA or adjuvant +/- NMBA    Final Airway Details  Final airway type: endotracheal airway      Successful airway: ETT  Cuffed: yes   Successful intubation technique: video laryngoscopy  Facilitating devices/methods: intubating stylet  Endotracheal tube insertion site: oral  Blade: Friedman  Blade size: 3  ETT size (mm): 8.0  Cormack-Lehane Classification: grade I - full view of glottis  Placement verified by: chest auscultation and capnometry   Measured from: lips  ETT/EBT  to lips (cm): 23  Number of attempts at approach: 2  Assessment: lips, teeth, and gum same as pre-op and atraumatic intubation    Additional Comments  First attempt: DL with MAC3, grade 3 view, unable to pass ETT into glottis. Second attempt with Friedman 3 blade, full view and glottic opening and ETT passed easily. Ventilated easily with oral airway between attempts. Negative epigastric sounds, Breath sound equal bilaterally with symmetric chest rise and fall

## 2022-09-02 NOTE — CONSULTS
[facility] at [facility]  CARDIOLOGY CONSULTATION NOTE    Damián Myers  : 1955  MRN:[id number]  Home Phone:[phone]    Date of Admission:2022  Date of Consultation: 22    PCP: Spencer Saeed MD    IDENTIFICATION: A 66 y.o. male resident of [place], [place]     CC/ reason for consultation: post-op management of HTN, HLD, CAD    PROBLEM LIST:   Active Hospital Problems    Diagnosis  POA   • **Severe mitral regurgitation [I34.0]  Yes     · Echo 22: EF 36-40%, global hypokinesis, mild bileaflet MVP with moderate MR; grade III diastolic dysfunction, severe PAH with RVSP 67mmHg  · KENNEY 22: LVEF 56-60%, MVP of posterior leaflet with mod-severe MR, modTR with RVSP 52mmHg  · MVR 22 Dr. Chan with 33 mm Magna Ease mitral valve      • Chronic systolic heart failure (HCC) [I50.22]  Yes   • Coronary artery disease of native artery of native heart with stable angina pectoris (HCC) [I25.118]  Yes     · [facility] 2019 BHC: 85% mid LAD stenosis, just after take-off of diagonal, s/p ARCHIE, mild nonobstructive disease otherwise  · [facility] 2022 BHC: mild nonobstructive CAD, 3-4+ MR, LVEF 50-55%         • Obesity (BMI 30-39.9) [E66.9]  Yes         ALLERGIES:   Allergies   Allergen Reactions   • Other Unknown - High Severity     Pt had a reaction to anesthesia when placing stents          HOME MEDICINES:   Current Outpatient Medications   Medication Instructions   • aspirin 81 mg, Oral, Daily, Asa 81 x 4 @ 22 @ 2100   • carvedilol (COREG) 6.25 mg, Oral, 2 Times Daily With Meals   • carvedilol (COREG) 6.25 mg, Oral, 2 Times Daily With Meals       HPI: Mr. Myers is a 65 y/o male with CAD, SHF, HTN, dyslipidemia, and mod-severe MR, whom we are asked to see in consultation post-op MVR. He has been followed by Cardiology in [place]. History of LAD stent placement in . Recently admitted in 2022 for CHF and found to have mod-severe MR related to MVP, as well as mod-severe PAH. Cardiac  "cath 06/2022 in [place] showed mild nonobstructive CAD, normal EF. KENNEY showed MVP of posterior leaflet with mod-severe MR. He was referred to Dr. Chan for MVR and underwent MVR today with 33mm Magna ease valve, as well as ANGELICA ligation. Cardiology is asked to see immediately post-op.      ROS: Unable to obtain     Surgical History:   Past Surgical History:   Procedure Laterality Date   • CARDIAC CATHETERIZATION N/A 11/08/2019    Procedure: Left Heart Cath;  Surgeon: Sherrell Hart MD;  Location: [facility] CATH INVASIVE LOCATION;  Service: Cardiology   • CARDIAC CATHETERIZATION N/A 06/27/2022    Procedure: Left Heart Cath;  Surgeon: Greyson Balderas MD;  Location: [facility] CATH INVASIVE LOCATION;  Service: Cardiology;  Laterality: N/A;   • COLONOSCOPY     • CORONARY STENT PLACEMENT  2019       Social History:   Social History     Socioeconomic History   • Marital status:    • Number of children: 7   Tobacco Use   • Smoking status: Never Smoker   • Smokeless tobacco: Never Used   Vaping Use   • Vaping Use: Never used   Substance and Sexual Activity   • Alcohol use: No   • Drug use: No   • Sexual activity: Defer       Family History:   Family History   Problem Relation Age of Onset   • Heart valve disorder Mother    • Heart valve disorder Brother    • Heart valve disorder Maternal Grandfather    • Heart disease Maternal Grandfather    • Diabetes type I Son        Objective     /69   Pulse 73   Temp 97.5 °F (36.4 °C) (Core)   Resp 16   Ht 190.5 cm (75\")   Wt 115 kg (254 lb 8.3 oz)   SpO2 98%   BMI 31.81 kg/m²     Intake/Output Summary (Last 24 hours) at 9/2/2022 1440  Last data filed at 9/2/2022 1340  Gross per 24 hour   Intake 2351 ml   Output 1600 ml   Net 751 ml       PHYSICAL EXAM:  CONSTITUTIONAL: Sedated, intubated   CARDIOVASCULAR:  Regular rhythm and normal rate, no murmur, gallop, rub.   RESPIRATORY: Intubated on mechanical ventilation, no wheezing, rales or rhonchi  EXTREMITIES: No " gross deformities, no edema.   NEUROLOGICAL: Sedated       Labs/Diagnostic Data  Results from last 7 days   Lab Units 09/02/22  1401 08/30/22  1246   SODIUM mmol/L 143 142   POTASSIUM mmol/L 3.9 4.6   CHLORIDE mmol/L 106 100   CO2 mmol/L 25.0 33.0*   BUN mg/dL 18 14   CREATININE mg/dL 1.12 1.04   GLUCOSE mg/dL 103* 102*   CALCIUM mg/dL 8.2* 9.6         Results from last 7 days   Lab Units 09/02/22  1401 09/02/22  1243 09/02/22  1152 09/02/22  0709 08/30/22  1246   WBC 10*3/mm3 11.50*  --   --   --  6.37   HEMOGLOBIN g/dL 11.2*  --   --   --  16.2   HEMOGLOBIN, POC g/dL  --  11.2* 12.9   < >  --    HEMATOCRIT % 34.3*  --   --   --  50.0   HEMATOCRIT POC %  --  33* 38   < >  --    PLATELETS 10*3/mm3 105*  --   --   --  151    < > = values in this interval not displayed.     Results from last 7 days   Lab Units 09/02/22  1401   MAGNESIUM mg/dL 1.5*       Results from last 7 days   Lab Units 08/30/22  1246   HEMOGLOBIN A1C % 6.30*         Results from last 7 days   Lab Units 09/02/22  1401 08/30/22  1246   PROTIME Seconds 18.6* 12.8   INR  1.56* 0.97   APTT seconds 40.7* 33.7     I personally reviewed the patient's EKG/Telemetry data    Radiology Data:   CXR 9/2/22:  IMPRESSION:  Postsurgical changes as detailed above with satisfactory appearance of  medical support devices. Mildly decreased lung volumes with  bronchovascular crowding versus interstitial edema and trace left apical  pneumothorax with thoracostomy tubes in place.  Likely small left pleural effusion.    KENNEY 6/28/22:  Interpretation Summary    · Normal left ventricular cavity size and wall thickness noted. All left ventricular wall segments contract normally.  · Left ventricular ejection fraction appears to be 56 - 60%.  · The right ventricular cavity is mildly dilated.  · Mildly reduced right ventricular systolic function noted.  · The aortic valve is structurally normal with no regurgitation or stenosis present.  · There is moderate mitral valve prolapse  located in the medial scallop (P3) of the posterior mitral leaflet.  · Moderate to severe mitral valve regurgitation is present.  · Moderate tricuspid valve regurgitation is present.  · Estimated right ventricular systolic pressure from tricuspid regurgitation is moderately elevated (45-55 mmHg).  · Moderate to severe pulmonary hypertension is present.  · Saline test results are negative.  · No echocardiographic evidence of intracardiac shunt.        Current Medications:    acetaminophen, 650 mg, Oral, Q8H  albumin human, , ,   [START ON 9/3/2022] aspirin, 325 mg, Oral, Daily  aspirin, 325 mg, Oral, Once  atorvastatin, 40 mg, Oral, Nightly  ceFAZolin, 2 g, Intravenous, Q8H  chlorhexidine, 15 mL, Mouth/Throat, Q12H  Insulin Regular(Human) in NaCl, 0-100 Units/hr, Intravenous, Once  [START ON 9/3/2022] metoprolol tartrate, 12.5 mg, Oral, Q12H  metoprolol tartrate, 2.5 mg, Intravenous, Q6H  protamine, 50 mg, Intravenous, Once      dexmedetomidine, 0.2-1.5 mcg/kg/hr  dextrose 5 % with KCl 20 mEq, 30 mL/hr  DOBUTamine, 2-20 mcg/kg/min  DOPamine, 2-20 mcg/kg/min  EPINEPHrine, 0.02-0.3 mcg/kg/min  insulin, 0-100 Units/hr  lactated ringers, 9 mL/hr, Last Rate: 9 mL/hr (09/02/22 0619)  niCARdipine, 5-15 mg/hr  nitroglycerin, 5-200 mcg/min  norepinephrine, 0.02-0.3 mcg/kg/min  phenylephrine, 0.5-3 mcg/kg/min        Assessment and Plan:     1. 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 Chan  - discordant EF measurements by echo pre-op, however 45-50% by intra-op KENNEY prior to surgery   - continue routine post-op care, currently on low dose NE with significant bleeding from CTs, surgery is aware    2. SHF  - EF reported as 36-40% by TTE in June and normal by KENNEY during same hospitalization   - will need repeat echo prior to discharge to re-look LVEF    3. CAD  - recent cath 06/2022 with mild nonobstructive CAD  -  continue ASA, statin     4. Dyslipidemia  - continue Lipitor 40mg to target LDL <70 given history of CAD and prior LAD stent      Scribed for Scott Bowden MD by Yamilet Trivedi PA-C. 9/2/2022  14:58 EDT  I,Scott Bowden M.D., personally performed the services described in this documentation as scribed by the above named individual in my presence, and it is both accurate and complete.    Scott Bowden MD, [facility] Cardiology  09/02/22  15:31 EDT

## 2022-09-02 NOTE — ANESTHESIA PREPROCEDURE EVALUATION
Anesthesia Evaluation     Patient summary reviewed and Nursing notes reviewed   no history of anesthetic complications:  NPO Solid Status: > 8 hours  NPO Liquid Status: > 2 hours           Airway   Mallampati: II  TM distance: >3 FB  Neck ROM: full  No difficulty expected  Dental - normal exam     Pulmonary - normal exam   (-) COPD, asthma, sleep apnea, not a smoker  Cardiovascular - normal exam    (+) hypertension, valvular problems/murmurs (severe mr) MR, CAD (mild, nonobstructive), CHF Systolic <55%, hyperlipidemia,       Neuro/Psych  (+) CVA,    GI/Hepatic/Renal/Endo    (+) obesity,   diabetes mellitus (elevated Alc),   (-) liver disease, no renal disease    Musculoskeletal     Abdominal    Substance History      OB/GYN          Other                        Anesthesia Plan    ASA 4     general, [place], CVL and PAC     (KENNEY)  intravenous induction   Postoperative Plan: Expected vent after surgery  Anesthetic plan, risks, benefits, and alternatives have been provided, discussed and informed consent has been obtained with: patient.  Pre-procedure education provided  Use of blood products discussed with patient  Consented to blood products.       CODE STATUS:

## 2022-09-02 NOTE — BRIEF OP NOTE
MITRAL VALVE REPAIR/REPLACEMENT  Progress Note    Damián Myers  9/2/2022    Pre-op Diagnosis:   Severe mitral regurgitation [I34.0]       Post-Op Diagnosis Codes:     * Severe mitral regurgitation [I34.0]    Procedure/CPT® Codes:    Procedure(s):  MEIDAN STERNOTOMY MITRAL VALVE REPLACEMENT, LEFT ATRIAL APPENDAGE LIGATION AND KENNEY PER ANESTHESIA    Surgeon(s):  Lyle Chan MD Earle, Gary F, MD    Anesthesia: General    Staff:   Circulator: Veronica Chu RN; Meenakshi Mckinney RN  Perfusionist: Leo Galdamez  Scrub Person: Tonja Gallegos; Santiago Vidal  Nursing Assistant: Rachael Harper PCT; Teja Last  Assistant: Zaid Amaya PA  Perfusion Tech: Meenakshi Green  Assistant: Zaid Amaya PA      Estimated Blood Loss: 700 mL    Urine Voided: 900 mL    Specimens:                          Drains:   Chest Tube 4 Left Mediastinal (Active)       Y Chest Tube 1, 2, and 3 1 Right Mediastinal 28 Fr. 2 Right Mediastinal 28 Fr. 3 Right Mediastinal 28 Fr. (Active)       Urethral Catheter Silicone;Temperature probe 16 Fr. (Active)       Findings: P2 chordal rupture, A2 prolapse after resection of P2 that made repair unsuccessful.  33 mm Magna Ease mitral krystyna placed.      Complications: None    Assistant: Zaid Amaya PA  was responsible for performing the following activities: Retraction, Suction, Closing and Placing Dressing and their skilled assistance was necessary for the success of this case.    Lyle Chan MD     Date: 9/2/2022  Time: 13:09 EDT

## 2022-09-02 NOTE — ANESTHESIA POSTPROCEDURE EVALUATION
Patient: Damián Myers    Procedure Summary     Date: 09/02/22 Room / Location:  [facility] [address] [place] [address] [facility]    Anesthesia Start: 0703 Anesthesia Stop: 1341    Procedure: MEIDAN STERNOTOMY MITRAL VALVE REPLACEMENT, LEFT ATRIAL APPENDAGE LIGATION AND KENNEY PER ANESTHESIA (N/A Chest) Diagnosis:       Severe mitral regurgitation      (Severe mitral regurgitation [I34.0])    Surgeons: Lyle Chan MD Provider: Patrick Ruiz Jr., MD    Anesthesia Type: general, [place], CVL, PAC ASA Status: 4          Anesthesia Type: general, [place], CVL, PAC    Vitals  No vitals data found for the desired time range.          Post Anesthesia Care and Evaluation    Patient location during evaluation: ICU  Patient participation: waiting for patient participation  Post-procedure mental status: Intubated and sedated.  Pain score: 0  Pain management: adequate    Airway patency: patent  Anesthetic complications: No anesthetic complications  PONV Status: none  Cardiovascular status: acceptable, hemodynamically stable and stable  Respiratory status: acceptable, ETT, intubated and ventilator  Hydration status: acceptable    Comments: Pt transported to ICU with transport monitor and 100% O2 by ambu bag. Report given to RN at the bedside.

## 2022-09-02 NOTE — ANESTHESIA PROCEDURE NOTES
Arterial Line      Patient reassessed immediately prior to procedure    Patient location during procedure: pre-op  Start time: 9/2/2022 6:35 AM  Stop Time:9/2/2022 6:40 AM       Line placed for hemodynamic monitoring.  Performed By   Anesthesiologist: Patrick Ruiz Jr., MD  Preanesthetic Checklist  Completed: patient identified, IV checked, site marked, risks and benefits discussed, surgical consent, monitors and equipment checked, pre-op evaluation and timeout performed  Arterial Line Prep   Sterile Tech: cap, gloves and sterile barriers  Prep: ChloraPrep  Patient monitoring: blood pressure monitoring, continuous pulse oximetry and EKG  Arterial Line Procedure   Laterality:right  Location:  radial artery  Catheter size: 20 G   Guidance: ultrasound guided  PROCEDURE NOTE/ULTRASOUND INTERPRETATION.  Using ultrasound guidance the potential vascular sites for insertion of the catheter were visualized to determine the patency of the vessel to be used for vascular access.  After selecting the appropriate site for insertion, the needle was visualized under ultrasound being inserted into the radial artery, followed by ultrasound confirmation of wire and catheter placement. There were no abnormalities seen on ultrasound; an image was taken; and the patient tolerated the procedure with no complications.   Number of attempts: 1  Successful placement: yes  Post Assessment   Dressing Type: line sutured, occlusive dressing applied, secured with tape, wrist guard applied and biopatch applied.   Complications no  Circ/Move/Sens Assessment: normal and unchanged.   Patient Tolerance: patient tolerated the procedure well with no apparent complications

## 2022-09-03 ENCOUNTER — APPOINTMENT (OUTPATIENT)
Dept: GENERAL RADIOLOGY | Facility: HOSPITAL | Age: 67
End: 2022-09-03

## 2022-09-03 LAB
ALBUMIN SERPL-MCNC: 3.8 G/DL (ref 3.5–5.2)
ANION GAP SERPL CALCULATED.3IONS-SCNC: 10 MMOL/L (ref 5–15)
ARTERIAL PATENCY WRIST A: ABNORMAL
ARTERIAL PATENCY WRIST A: ABNORMAL
ATMOSPHERIC PRESS: ABNORMAL MM[HG]
ATMOSPHERIC PRESS: ABNORMAL MM[HG]
BASE EXCESS BLDA CALC-SCNC: 0.6 MMOL/L (ref 0–2)
BASE EXCESS BLDA CALC-SCNC: 1.6 MMOL/L (ref 0–2)
BASOPHILS # BLD AUTO: 0.03 10*3/MM3 (ref 0–0.2)
BASOPHILS NFR BLD AUTO: 0.3 % (ref 0–1.5)
BDY SITE: ABNORMAL
BDY SITE: ABNORMAL
BH BB BLOOD EXPIRATION DATE: NORMAL
BH BB BLOOD TYPE BARCODE: 2800
BH BB BLOOD TYPE BARCODE: 5100
BH BB BLOOD TYPE BARCODE: 5100
BH BB BLOOD TYPE BARCODE: 6200
BH BB BLOOD TYPE BARCODE: 8400
BH BB BLOOD TYPE BARCODE: 9500
BH BB DISPENSE STATUS: NORMAL
BH BB PRODUCT CODE: NORMAL
BH BB UNIT NUMBER: NORMAL
BODY TEMPERATURE: 37 C
BODY TEMPERATURE: 37 C
BUN SERPL-MCNC: 17 MG/DL (ref 8–23)
BUN/CREAT SERPL: 13 (ref 7–25)
CALCIUM SPEC-SCNC: 8.2 MG/DL (ref 8.6–10.5)
CHLORIDE SERPL-SCNC: 104 MMOL/L (ref 98–107)
CO2 BLDA-SCNC: 28.6 MMOL/L (ref 22–33)
CO2 BLDA-SCNC: 30.1 MMOL/L (ref 22–33)
CO2 SERPL-SCNC: 27 MMOL/L (ref 22–29)
COHGB MFR BLD: 0.9 % (ref 0–2)
COHGB MFR BLD: 1.1 % (ref 0–2)
CREAT SERPL-MCNC: 1.31 MG/DL (ref 0.76–1.27)
CROSSMATCH INTERPRETATION: NORMAL
DEPRECATED RDW RBC AUTO: 46.5 FL (ref 37–54)
EGFRCR SERPLBLD CKD-EPI 2021: 60 ML/MIN/1.73
EOSINOPHIL # BLD AUTO: 0.04 10*3/MM3 (ref 0–0.4)
EOSINOPHIL NFR BLD AUTO: 0.4 % (ref 0.3–6.2)
EPAP: 0
ERYTHROCYTE [DISTWIDTH] IN BLOOD BY AUTOMATED COUNT: 13.8 % (ref 12.3–15.4)
GLUCOSE BLDC GLUCOMTR-MCNC: 115 MG/DL (ref 70–130)
GLUCOSE BLDC GLUCOMTR-MCNC: 118 MG/DL (ref 70–130)
GLUCOSE BLDC GLUCOMTR-MCNC: 122 MG/DL (ref 70–130)
GLUCOSE BLDC GLUCOMTR-MCNC: 123 MG/DL (ref 70–130)
GLUCOSE BLDC GLUCOMTR-MCNC: 133 MG/DL (ref 70–130)
GLUCOSE BLDC GLUCOMTR-MCNC: 138 MG/DL (ref 70–130)
GLUCOSE BLDC GLUCOMTR-MCNC: 144 MG/DL (ref 70–130)
GLUCOSE BLDC GLUCOMTR-MCNC: 145 MG/DL (ref 70–130)
GLUCOSE BLDC GLUCOMTR-MCNC: 146 MG/DL (ref 70–130)
GLUCOSE BLDC GLUCOMTR-MCNC: 146 MG/DL (ref 70–130)
GLUCOSE BLDC GLUCOMTR-MCNC: 150 MG/DL (ref 70–130)
GLUCOSE BLDC GLUCOMTR-MCNC: 150 MG/DL (ref 70–130)
GLUCOSE BLDC GLUCOMTR-MCNC: 162 MG/DL (ref 70–130)
GLUCOSE BLDC GLUCOMTR-MCNC: 166 MG/DL (ref 70–130)
GLUCOSE BLDC GLUCOMTR-MCNC: 172 MG/DL (ref 70–130)
GLUCOSE BLDC GLUCOMTR-MCNC: 183 MG/DL (ref 70–130)
GLUCOSE BLDC GLUCOMTR-MCNC: 185 MG/DL (ref 70–130)
GLUCOSE BLDC GLUCOMTR-MCNC: 186 MG/DL (ref 70–130)
GLUCOSE BLDC GLUCOMTR-MCNC: 195 MG/DL (ref 70–130)
GLUCOSE BLDC GLUCOMTR-MCNC: 200 MG/DL (ref 70–130)
GLUCOSE SERPL-MCNC: 175 MG/DL (ref 65–99)
HCO3 BLDA-SCNC: 27 MMOL/L (ref 20–26)
HCO3 BLDA-SCNC: 28.4 MMOL/L (ref 20–26)
HCT VFR BLD AUTO: 25.8 % (ref 37.5–51)
HCT VFR BLD CALC: 26.7 % (ref 38–51)
HCT VFR BLD CALC: 27.3 % (ref 38–51)
HGB BLD-MCNC: 8.5 G/DL (ref 13–17.7)
HGB BLDA-MCNC: 8.7 G/DL (ref 13.5–17.5)
HGB BLDA-MCNC: 8.9 G/DL (ref 13.5–17.5)
IMM GRANULOCYTES # BLD AUTO: 0.04 10*3/MM3 (ref 0–0.05)
IMM GRANULOCYTES NFR BLD AUTO: 0.4 % (ref 0–0.5)
INHALED O2 CONCENTRATION: 40 %
INHALED O2 CONCENTRATION: 40 %
INR PPP: 1.12 (ref 0.84–1.13)
IPAP: 0
LYMPHOCYTES # BLD AUTO: 1.19 10*3/MM3 (ref 0.7–3.1)
LYMPHOCYTES NFR BLD AUTO: 12.7 % (ref 19.6–45.3)
MAGNESIUM SERPL-MCNC: 2.3 MG/DL (ref 1.6–2.4)
MAGNESIUM SERPL-MCNC: 2.6 MG/DL (ref 1.6–2.4)
MCH RBC QN AUTO: 30.9 PG (ref 26.6–33)
MCHC RBC AUTO-ENTMCNC: 32.9 G/DL (ref 31.5–35.7)
MCV RBC AUTO: 93.8 FL (ref 79–97)
METHGB BLD QL: 1 % (ref 0–1.5)
METHGB BLD QL: 1.1 % (ref 0–1.5)
MODALITY: ABNORMAL
MODALITY: ABNORMAL
MONOCYTES # BLD AUTO: 0.87 10*3/MM3 (ref 0.1–0.9)
MONOCYTES NFR BLD AUTO: 9.3 % (ref 5–12)
NEUTROPHILS NFR BLD AUTO: 7.21 10*3/MM3 (ref 1.7–7)
NEUTROPHILS NFR BLD AUTO: 76.9 % (ref 42.7–76)
NOTE: ABNORMAL
NOTE: ABNORMAL
NRBC BLD AUTO-RTO: 0 /100 WBC (ref 0–0.2)
OXYHGB MFR BLDV: 91.7 % (ref 94–99)
OXYHGB MFR BLDV: 93.5 % (ref 94–99)
PAW @ PEAK INSP FLOW SETTING VENT: 0 CMH2O
PCO2 BLDA: 52 MM HG (ref 35–45)
PCO2 BLDA: 56 MM HG (ref 35–45)
PCO2 TEMP ADJ BLD: 52 MM HG (ref 35–48)
PCO2 TEMP ADJ BLD: 56 MM HG (ref 35–48)
PEEP RESPIRATORY: 5 CM[H2O]
PEEP RESPIRATORY: 5 CM[H2O]
PH BLDA: 7.31 PH UNITS (ref 7.35–7.45)
PH BLDA: 7.32 PH UNITS (ref 7.35–7.45)
PH, TEMP CORRECTED: 7.31 PH UNITS
PH, TEMP CORRECTED: 7.32 PH UNITS
PHOSPHATE SERPL-MCNC: 3.7 MG/DL (ref 2.5–4.5)
PLATELET # BLD AUTO: 178 10*3/MM3 (ref 140–450)
PMV BLD AUTO: 10.4 FL (ref 6–12)
PO2 BLDA: 71.2 MM HG (ref 83–108)
PO2 BLDA: 77.3 MM HG (ref 83–108)
PO2 TEMP ADJ BLD: 71.2 MM HG (ref 83–108)
PO2 TEMP ADJ BLD: 77.3 MM HG (ref 83–108)
POTASSIUM SERPL-SCNC: 4.2 MMOL/L (ref 3.5–5.2)
POTASSIUM SERPL-SCNC: 4.2 MMOL/L (ref 3.5–5.2)
PROTHROMBIN TIME: 14.3 SECONDS (ref 11.4–14.4)
RBC # BLD AUTO: 2.75 10*6/MM3 (ref 4.14–5.8)
SODIUM SERPL-SCNC: 141 MMOL/L (ref 136–145)
TOTAL RATE: 0 BREATHS/MINUTE
UNIT  ABO: NORMAL
UNIT  RH: NORMAL
VENTILATOR MODE: ABNORMAL
VENTILATOR MODE: ABNORMAL
WBC NRBC COR # BLD: 9.38 10*3/MM3 (ref 3.4–10.8)

## 2022-09-03 PROCEDURE — 71045 X-RAY EXAM CHEST 1 VIEW: CPT

## 2022-09-03 PROCEDURE — 94761 N-INVAS EAR/PLS OXIMETRY MLT: CPT

## 2022-09-03 PROCEDURE — 82375 ASSAY CARBOXYHB QUANT: CPT

## 2022-09-03 PROCEDURE — 25010000002 MORPHINE PER 10 MG: Performed by: THORACIC SURGERY (CARDIOTHORACIC VASCULAR SURGERY)

## 2022-09-03 PROCEDURE — 83735 ASSAY OF MAGNESIUM: CPT | Performed by: STUDENT IN AN ORGANIZED HEALTH CARE EDUCATION/TRAINING PROGRAM

## 2022-09-03 PROCEDURE — 94799 UNLISTED PULMONARY SVC/PX: CPT

## 2022-09-03 PROCEDURE — 99024 POSTOP FOLLOW-UP VISIT: CPT | Performed by: STUDENT IN AN ORGANIZED HEALTH CARE EDUCATION/TRAINING PROGRAM

## 2022-09-03 PROCEDURE — 93010 ELECTROCARDIOGRAM REPORT: CPT | Performed by: INTERNAL MEDICINE

## 2022-09-03 PROCEDURE — 25010000002 FENTANYL CITRATE (PF) 50 MCG/ML SOLUTION: Performed by: THORACIC SURGERY (CARDIOTHORACIC VASCULAR SURGERY)

## 2022-09-03 PROCEDURE — 25010000002 CEFAZOLIN IN DEXTROSE 2-4 GM/100ML-% SOLUTION: Performed by: PHYSICIAN ASSISTANT

## 2022-09-03 PROCEDURE — 82805 BLOOD GASES W/O2 SATURATION: CPT

## 2022-09-03 PROCEDURE — 83050 HGB METHEMOGLOBIN QUAN: CPT

## 2022-09-03 PROCEDURE — 83735 ASSAY OF MAGNESIUM: CPT | Performed by: PHYSICIAN ASSISTANT

## 2022-09-03 PROCEDURE — 85610 PROTHROMBIN TIME: CPT | Performed by: PHYSICIAN ASSISTANT

## 2022-09-03 PROCEDURE — 93005 ELECTROCARDIOGRAM TRACING: CPT | Performed by: PHYSICIAN ASSISTANT

## 2022-09-03 PROCEDURE — 25010000002 PROPOFOL 10 MG/ML EMULSION: Performed by: THORACIC SURGERY (CARDIOTHORACIC VASCULAR SURGERY)

## 2022-09-03 PROCEDURE — 25010000002 EPINEPHRINE 1 MG/ML SOLUTION 30 ML VIAL: Performed by: PHYSICIAN ASSISTANT

## 2022-09-03 PROCEDURE — 99233 SBSQ HOSP IP/OBS HIGH 50: CPT | Performed by: INTERNAL MEDICINE

## 2022-09-03 PROCEDURE — 85025 COMPLETE CBC W/AUTO DIFF WBC: CPT | Performed by: PHYSICIAN ASSISTANT

## 2022-09-03 PROCEDURE — 25010000002 ONDANSETRON PER 1 MG: Performed by: PHYSICIAN ASSISTANT

## 2022-09-03 PROCEDURE — 80069 RENAL FUNCTION PANEL: CPT | Performed by: PHYSICIAN ASSISTANT

## 2022-09-03 PROCEDURE — 84132 ASSAY OF SERUM POTASSIUM: CPT | Performed by: STUDENT IN AN ORGANIZED HEALTH CARE EDUCATION/TRAINING PROGRAM

## 2022-09-03 PROCEDURE — 82962 GLUCOSE BLOOD TEST: CPT

## 2022-09-03 RX ORDER — BUMETANIDE 0.25 MG/ML
2 INJECTION INTRAMUSCULAR; INTRAVENOUS ONCE
Status: COMPLETED | OUTPATIENT
Start: 2022-09-03 | End: 2022-09-03

## 2022-09-03 RX ADMIN — CHLORHEXIDINE GLUCONATE 15 ML: 1.2 SOLUTION ORAL at 08:27

## 2022-09-03 RX ADMIN — ONDANSETRON 4 MG: 2 INJECTION INTRAMUSCULAR; INTRAVENOUS at 11:35

## 2022-09-03 RX ADMIN — OXYCODONE 10 MG: 5 TABLET ORAL at 00:32

## 2022-09-03 RX ADMIN — FENTANYL CITRATE 25 MCG: 50 INJECTION, SOLUTION INTRAMUSCULAR; INTRAVENOUS at 04:36

## 2022-09-03 RX ADMIN — ACETAMINOPHEN 650 MG: 325 TABLET, FILM COATED ORAL at 21:04

## 2022-09-03 RX ADMIN — PROPOFOL 50 MCG/KG/MIN: 10 INJECTION, EMULSION INTRAVENOUS at 00:46

## 2022-09-03 RX ADMIN — ATORVASTATIN CALCIUM 40 MG: 40 TABLET, FILM COATED ORAL at 20:17

## 2022-09-03 RX ADMIN — EPINEPHRINE 0.05 MCG/KG/MIN: 1 INJECTION INTRAMUSCULAR; INTRAVENOUS; SUBCUTANEOUS at 21:42

## 2022-09-03 RX ADMIN — INSULIN HUMAN 8.6 UNITS/HR: 1 INJECTION, SOLUTION INTRAVENOUS at 07:19

## 2022-09-03 RX ADMIN — OXYCODONE 10 MG: 5 TABLET ORAL at 15:52

## 2022-09-03 RX ADMIN — PROPOFOL 25 MCG/KG/MIN: 10 INJECTION, EMULSION INTRAVENOUS at 05:54

## 2022-09-03 RX ADMIN — ACETAMINOPHEN 650 MG: 325 TABLET, FILM COATED ORAL at 14:05

## 2022-09-03 RX ADMIN — CEFAZOLIN SODIUM 2 G: 2 INJECTION, SOLUTION INTRAVENOUS at 20:17

## 2022-09-03 RX ADMIN — ACETAMINOPHEN 650 MG: 325 TABLET, FILM COATED ORAL at 05:06

## 2022-09-03 RX ADMIN — HYDROCODONE BITARTRATE AND ACETAMINOPHEN 1 TABLET: 7.5; 325 TABLET ORAL at 11:29

## 2022-09-03 RX ADMIN — OXYCODONE 10 MG: 5 TABLET ORAL at 08:41

## 2022-09-03 RX ADMIN — OXYCODONE 10 MG: 5 TABLET ORAL at 23:06

## 2022-09-03 RX ADMIN — CEFAZOLIN SODIUM 2 G: 2 INJECTION, SOLUTION INTRAVENOUS at 03:23

## 2022-09-03 RX ADMIN — FENTANYL CITRATE 25 MCG: 50 INJECTION, SOLUTION INTRAMUSCULAR; INTRAVENOUS at 02:43

## 2022-09-03 RX ADMIN — CEFAZOLIN SODIUM 2 G: 2 INJECTION, SOLUTION INTRAVENOUS at 11:28

## 2022-09-03 RX ADMIN — MORPHINE SULFATE 2 MG: 2 INJECTION, SOLUTION INTRAMUSCULAR; INTRAVENOUS at 21:04

## 2022-09-03 RX ADMIN — HYDROCODONE BITARTRATE AND ACETAMINOPHEN 1 TABLET: 7.5; 325 TABLET ORAL at 20:24

## 2022-09-03 RX ADMIN — CHLORHEXIDINE GLUCONATE 15 ML: 1.2 SOLUTION ORAL at 20:17

## 2022-09-03 RX ADMIN — BUMETANIDE 2 MG: 0.25 INJECTION INTRAMUSCULAR; INTRAVENOUS at 10:36

## 2022-09-03 RX ADMIN — ONDANSETRON 4 MG: 2 INJECTION INTRAMUSCULAR; INTRAVENOUS at 17:37

## 2022-09-03 NOTE — PROGRESS NOTES
[facility] at [facility]  INPATIENT PROGRESS NOTE         [facility]    9/3/2022      PATIENT IDENTIFICATION:   Name:  Damián Myers      MRN:  [id number]     66 y.o.  male             Reason for visit: MVR, SVT, hypertension      SUBJECTIVE:   Awake on the vent today, more dysrhythmia overnight, PACs, SVT, PVCs noted by nurse.  PA catheter found to be RVOT.  Appears volume overloaded.     OBJECTIVE:  Vitals:    09/03/22 1230 09/03/22 1245 09/03/22 1300 09/03/22 1315   BP: 111/60 113/55 111/58 109/76   BP Location:       Patient Position:       Pulse: 58 64 63 63   Resp:       Temp:       TempSrc:       SpO2: 96% 98% 97% 96%   Weight:       Height:         FiO2 (%): 40 %     Body mass index is 31.81 kg/m².    Intake/Output Summary (Last 24 hours) at 9/3/2022 1456  Last data filed at 9/3/2022 1000  Gross per 24 hour   Intake 6291.76 ml   Output 3240 ml   Net 3051.76 ml       Telemetry: Personally reviewed, normal sinus rhythm, frequent PACs and PVCs.    Exam:  General Appearance:   · well developed  · well nourished  Neck:  · thyroid not enlarged  · supple  Respiratory:  · no respiratory distress  · normal breath sounds  · no rales  Cardiovascular:  · no jugular venous distention  · regular rhythm  · apical impulse normal  · S1 normal, S2 normal  · no S3, no S4   · no murmur  · no rub, no thrill  · carotid pulses normal; no bruit  · pedal pulses normal  · lower extremity edema: 2+  Skin:   warm, dry      Allergies   Allergen Reactions   • Other Unknown - High Severity     Pt had a reaction to anesthesia when placing stents        Scheduled meds:       acetaminophen, 650 mg, Oral, Q8H  aspirin, 325 mg, Oral, Daily  atorvastatin, 40 mg, Oral, Nightly  ceFAZolin, 2 g, Intravenous, Q8H  chlorhexidine, 15 mL, Mouth/Throat, Q12H  metoprolol tartrate, 12.5 mg, Oral, Q12H      IV meds:                      dexmedetomidine, 0.2-1.5 mcg/kg/hr  dextrose 5 % with KCl 20 mEq, 30 mL/hr,  Last Rate: 30 mL/hr (09/02/22 1424)  DOBUTamine, 2-20 mcg/kg/min  DOPamine, 2-20 mcg/kg/min  EPINEPHrine, 0.02-0.3 mcg/kg/min, Last Rate: 0.07 mcg/kg/min (09/03/22 1002)  insulin, 0-100 Units/hr, Last Rate: 2.5 Units/hr (09/03/22 1413)  niCARdipine, 5-15 mg/hr  nitroglycerin, 5-200 mcg/min  norepinephrine, 0.02-0.3 mcg/kg/min, Last Rate: Stopped (09/02/22 2156)  phenylephrine, 0.5-3 mcg/kg/min  propofol, 5-50 mcg/kg/min, Last Rate: Stopped (09/03/22 0920)      Data Review:  Results from last 7 days   Lab Units 09/03/22 0215 09/02/22 2126 09/02/22 1704 09/02/22  1401   SODIUM mmol/L 141 143 143 143   BUN mg/dL 17 18 18 18   CREATININE mg/dL 1.31* 1.21 1.17 1.12   GLUCOSE mg/dL 175* 176* 173* 103*     Results from last 7 days   Lab Units 09/03/22 0215 09/02/22 2126 09/02/22 1704 09/02/22  1401 09/02/22  1243 09/02/22  0709 08/30/22  1246   WBC 10*3/mm3 9.38 10.68 9.19 11.50*  --   --  6.37   HEMOGLOBIN g/dL 8.5* 8.4* 8.4* 11.2*  --   --  16.2   HEMOGLOBIN, POC g/dL  --   --   --   --  11.2*   < >  --     < > = values in this interval not displayed.     Results from last 7 days   Lab Units 09/03/22 0215 09/02/22 1704 09/02/22  1401 08/30/22  1246   INR  1.12 1.44* 1.56* 0.97     Results from last 7 days   Lab Units 08/30/22  1246   ALT (SGPT) U/L 14   AST (SGOT) U/L 37     No results found for: DIGOXIN   Lab Results   Component Value Date    TSH 3.170 06/25/2022           Invalid input(s): LDLCALC    Estimated Creatinine Clearance: 75.9 mL/min (A) (by C-G formula based on SCr of 1.31 mg/dL (H)).        Imaging (last 24 hr):   I personally reviewed the most recent chest x-ray and other pertinent imaging studies.  Results for orders placed during the hospital encounter of 09/02/22    XR Chest 1 View    Narrative  DATE OF EXAM: 9/2/2022 10:07 PM    PROCEDURE: XR CHEST 1 VW-    INDICATIONS: post op heart surgery; I34.0-Nonrheumatic mitral (valve)  insufficiency    COMPARISON: 9/2/2022 5:24 PM    TECHNIQUE: Single  radiographic AP view of the chest was obtained.    FINDINGS:  Current image is timed 9:28 PM.    Sternotomy wires and mitral valve prosthesis are again noted as well as  left atrial appendage exclusion clip. ET tube, NG tube and PA catheter  remain in satisfactory position, the ET tube at the upper margin of the  clavicles. Elevated right hemidiaphragm, left basilar atelectasis and  effusion, and minimal right basilar atelectasis appear stable. There is  mild pulmonary venous hypertension but no evidence of overt congestive  failure. No pneumothorax is seen.    Impression  Stable chest radiograph, including focal left basilar atelectasis or  effusion. No new chest pathology is seen elsewhere.    This report was finalized on 9/2/2022 10:29 PM by Dr. Gonzales Herman MD.        Last ECHO:  Results for orders placed in visit on 09/02/22    Emergent/Open-Heart Anesthesia KENNEY  Baseline Exam:  - Lv is normal in size with mild global hypokinesis, worse in the anteroseptal regions. LVEF is 45-50%.  - RV is normal in size with mildly hypokinesis.l  - The MV is morphologically normal. There is redundant anterior leaflet tissue and flail P2 from ruptured chordae. There is resultant severe MR. VC is 0.78cm. Regurgitant volume is 59mL and EROA is 0.4cm^2.  - No other significant valvular abnormalities. There is trace to mild TR.  - No significant pathology in the visualized portions of the aorta.    Post-procedure Exam:  - Interval replacement of mitral valve with 33mm bioprosthetic. On initial separation from CPB there was large PVL necessitating closure with CPB.  - On second separation from there is slightly worse LV function. RV function appears stable.  - No inotropic support.  - There is a bioprosthetic valve in the native mitral location. It appears well seated and leaflets exhibit adequate excursion. There is a small PVL near ANGELICA and tiny posterior PVL. The mean PG is 1mmHg across the valve.  - No other new significant  valvular abnormalities.  - No evidence of dissection in the visualized portions of the aorta.        PROBLEM LIST:     Severe mitral regurgitation    Obesity (BMI 30-39.9)    Chronic systolic heart failure (HCC)    Coronary artery disease of native artery of native heart with stable angina pectoris (HCC)    HTN (hypertension)      Initial cardiac assessment: 66-year-old gentleman status post bioprosthetic MVR by Dr. Chan 9/2/2022 with left atrial pended clip.  Preop EF 45-50%, nonobstructive disease on preop cath.    ASSESSMENT/PLAN:  1. 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 Chan  - discordant EF measurements by echo pre-op, however 45-50% by intra-op KENNEY prior to surgery   - continue routine post-op care, currently on low dose epinephrine   Required multiple blood products POD 0    Bumex 2 mg IV x1 today  Maintain net negative balance.     2. SHF  - EF reported as 36-40% by TTE in June and normal by KENNEY during same hospitalization   - will need repeat echo prior to discharge to re-look LVEF     3. CAD  - recent cath 06/2022 with mild nonobstructive CAD  - continue ASA, statin      4. Dyslipidemia  - continue Lipitor 40mg to target LDL <70 given history of CAD and prior LAD stent    5.  Arrhythmia:  Agree with discontinuing PA catheter, discussed with Dr. Bernardo  Wean off epinephrine    Discussed with patient's nurse, Dr. Wiggins and Az Bowden MD  9/3/2022    14:56 EDT

## 2022-09-03 NOTE — PLAN OF CARE
Goal Outcome Evaluation:      Plan of care reviewed with patient and spouse    Neuro: alert and oriented, drowsy, [place]    Resp: extubated at 1105 to 4 LPM, RR 17-24, sats > 91%, lung sounds clear to coarse, loose cough, occasionally productive with thick brown sputum, MT output 1/2 - 70, 3 - 60,     Card: primarily SR with freq PAC/PVC's, occ. bigemeny, 2-3 episodes of short burst of SVT, 4 beat run of VT, HR 60's, BP 's/, PA catheter removed, V-wires in place and not in use    GI: BS hypoactive, nausea x 1, Zofran x 1, passed dysphagia, NGT removed     - Rose 2350, Bumex 2 mg x 1    Pain: pain managed with Norco and Oxycodone    Misc: Insulin drip remains on, EPI 0.05

## 2022-09-03 NOTE — PROGRESS NOTES
INTENSIVIST   PROGRESS NOTE     Hospital:  LOS: 1 day     Mr. Damián Myers, 66 y.o. male is followed for a Chief Complaint of: Postoperative medical management      Subjective   S     Interval History:  Remained on mechanical ventilation secondary to hypercapnia on ABG.        The patient's relevant past medical, surgical and social history were reviewed and updated in Epic as appropriate.      ROS: Unable to obtain secondary to sedation and mechanical ventilation.     Objective   O     Vitals:  Temp  Min: 97.3 °F (36.3 °C)  Max: 100.5 °F (38.1 °C)  BP  Min: 80/54  Max: 146/70  Pulse  Min: 49  Max: 126  Resp  Min: 16  Max: 26  SpO2  Min: 88 %  Max: 100 % Flow (L/min)  Min: 4  Max: 4    Intake/Ouptut 24 hrs (7:00AM - 6:59 AM)  Intake & Output (last 3 days)       08/31 0701 09/01 0700 09/01 0701  09/02 0700 09/02 0701 09/03 0700 09/03 0701 09/04 0700    I.V. (mL/kg)   5080.8 (44.2) 419 (3.6)    Blood   3214     NG/GT   120     IV Piggyback   100     Total Intake(mL/kg)   8514.8 (74) 419 (3.6)    Urine (mL/kg/hr)   2480 (0.9) 235 (0.4)    Emesis/NG output   20     Chest Tube   2065 40    Total Output   4565 275    Net   +3949.8 +144                  Medications (drips):  dexmedetomidine  dextrose 5 % with KCl 20 mEq, Last Rate: 30 mL/hr (09/02/22 1424)  DOBUTamine  DOPamine  EPINEPHrine, Last Rate: 0.07 mcg/kg/min (09/03/22 1002)  insulin, Last Rate: 3.4 Units/hr (09/03/22 1216)  niCARdipine  nitroglycerin  norepinephrine, Last Rate: Stopped (09/02/22 2156)  phenylephrine  propofol, Last Rate: Stopped (09/03/22 0920)        Mechanical Ventilator Settings:          Resp Rate (Set): 16  Pressure Support (cm H2O): 10 cm H20  FiO2 (%): 40 %  PEEP/CPAP (cm H2O): 5 cm H20    Minute Ventilation (L/min) (Obs): 11 L/min  Resp Rate (Observed) Vent: 19  I:E Ratio (Set): 1:0.49  I:E Ratio (Obs): 1:1.70    PIP Observed (cm H2O): 18 cm H2O  Plateau Pressure (cm H2O): (S) 23 cm H2O    Physical Examination  Telemetry:  Normal sinus  rhythm. PACs.    Constitutional:  No acute distress.  ETT in place on mechanical ventilation.    Eyes: No scleral icterus.   PERRL, EOM intact.    Neck:  Supple, FROM   Cardiovascular: Normal rate, regular and rhythm. Normal heart sounds.  No murmurs, gallop or rub.   Respiratory: No respiratory distress. Normal respiratory effort.  Normal breath sounds  Clear to auscultation   Abdominal:  Soft. No masses. Nontender. No distension. No HSM.   Extremities: No digital cyanosis. No clubbing.  No peripheral edema.   Skin: No rashes, lesions or ulcers   Neurological:   Arouses to stimulation and follows commands.                  Results from last 7 days   Lab Units 09/03/22 0215 09/02/22 2126 09/02/22  1704   WBC 10*3/mm3 9.38 10.68 9.19   HEMOGLOBIN g/dL 8.5* 8.4* 8.4*   MCV fL 93.8 92.6 93.7   PLATELETS 10*3/mm3 178 173 165     Results from last 7 days   Lab Units 09/03/22 0215 09/02/22 2126 09/02/22  1704 09/02/22  1401   SODIUM mmol/L 141 143 143 143   POTASSIUM mmol/L 4.2 4.3 4.1 3.9   CO2 mmol/L 27.0 29.0 26.0 25.0   CREATININE mg/dL 1.31* 1.21 1.17 1.12   GLUCOSE mg/dL 175* 176* 173* 103*   MAGNESIUM mg/dL 2.6*  --  1.8 1.5*   PHOSPHORUS mg/dL 3.7  --  2.7 2.2*     Estimated Creatinine Clearance: 75.9 mL/min (A) (by C-G formula based on SCr of 1.31 mg/dL (H)).  Results from last 7 days   Lab Units 08/30/22  1246   ALK PHOS U/L 73   BILIRUBIN mg/dL 0.7   ALT (SGPT) U/L 14   AST (SGOT) U/L 37       Results from last 7 days   Lab Units 09/03/22  1001 09/03/22  0154 09/02/22  1354 09/02/22  1243 09/02/22  1152   PH, ARTERIAL pH units 7.314* 7.324* 7.405 7.52 7.47   PCO2, ARTERIAL mm Hg 56.0* 52.0* 42.9  --   --    PO2 ART mm Hg 77.3* 71.2* 185.0*  --   --    FIO2 % 40 40 100  --   --        Images:  Imaging Results (Last 24 Hours)     Procedure Component Value Units Date/Time    XR Chest 1 View [[id number]] Collected: 09/03/22 0713     Updated: 09/03/22 0719    Narrative:      DATE OF EXAM: 9/3/2022 3:01 AM      PROCEDURE: XR CHEST 1 VW-     INDICATIONS: Post-Op Heart Surgery; I34.0-Nonrheumatic mitral (valve)  insufficiency     COMPARISON: September 2, 2022     TECHNIQUE: Single radiographic AP view of the chest was obtained.     FINDINGS:  There is a [place]-Sushant catheter present with its tip in the pulmonary  outflow tract. Sternotomy wires are noted. A prosthetic heart valve is  present. There are suggested thoracotomy tubes. Bibasilar densities are  noted which could relate to atelectasis and/or effusions. Pneumothorax  is not definitely identified. There is slight prominence of central  pulmonary vasculature. ET tube is present with its tip above the awais.  A nasogastric tube is noted with its tip at least within the stomach.        Impression:      1.  Bibasilar densities which could relate to atelectasis and/or  underlying effusions.  2.  Slight prominence of pulmonary vascularity that could reflect some  congestion.     This report was finalized on 9/3/2022 7:16 AM by Jigar Garvey MD.       XR Chest 1 View [[id number]] Collected: 09/02/22 2227     Updated: 09/02/22 2233    Narrative:      DATE OF EXAM: 9/2/2022 10:07 PM     PROCEDURE: XR CHEST 1 VW-     INDICATIONS: post op heart surgery; I34.0-Nonrheumatic mitral (valve)  insufficiency     COMPARISON: 9/2/2022 5:24 PM     TECHNIQUE: Single radiographic AP view of the chest was obtained.     FINDINGS:  Current image is timed 9:28 PM.     Sternotomy wires and mitral valve prosthesis are again noted as well as  left atrial appendage exclusion clip. ET tube, NG tube and PA catheter  remain in satisfactory position, the ET tube at the upper margin of the  clavicles. Elevated right hemidiaphragm, left basilar atelectasis and  effusion, and minimal right basilar atelectasis appear stable. There is  mild pulmonary venous hypertension but no evidence of overt congestive  failure. No pneumothorax is seen.        Impression:      Stable chest radiograph, including focal left  basilar atelectasis or  effusion. No new chest pathology is seen elsewhere.     This report was finalized on 9/2/2022 10:29 PM by Dr. Gonzales Herman MD.       XR Chest 1 View [[id number]] Collected: 09/02/22 1812     Updated: 09/02/22 1833    Narrative:      DATE OF EXAM: 9/2/2022 6:04 PM     PROCEDURE: XR CHEST 1 VW-     INDICATIONS: CT sx; I34.0-Nonrheumatic mitral (valve) insufficiency     COMPARISON: 9/2/2022 2:04 PM     TECHNIQUE: Single radiographic AP view of the chest was obtained.     FINDINGS:  Current image is timed 5:24 PM. Questionable left apical pleural line  noted previously is not definitely identified on the current study. No  pneumothorax is seen elsewhere. ET tube remains in good position at the  upper margin of the clavicles. NG tube passes at least to the GE  junction. PA catheter tip lies the main pulmonary artery segment.  Elevation of the right hemidiaphragm is stable. There is mildly  increased left basilar atelectasis but no new pulmonary parenchymal  disease elsewhere. Heart is enlarged. Vasculature appears normal.             Impression:         1. Mildly increased left basilar atelectasis.  2. No new chest disease is seen elsewhere. In particular, no left apical  pneumothorax is appreciated.     This report was finalized on 9/2/2022 6:14 PM by Dr. Gonzales Herman MD.       XR Chest 1 View [[id number]] Collected: 09/02/22 1441     Updated: 09/02/22 1448    Narrative:      DATE OF EXAM: 9/2/2022 2:13 PM     PROCEDURE: XR CHEST 1 VW-     INDICATIONS: Post-Op Check Line & Tube Placement; I34.0-Nonrheumatic  mitral (valve) insufficiency.      COMPARISON: Chest x-ray 8/30/2022     TECHNIQUE: 2 frontal views of the chest     FINDINGS:  Interval postsurgical change of mitral valve replacement and left atrial  appendage clip placement. Median sternotomy wires are normal. The tip of  an endotracheal tube terminates in the upper mid trachea approximately  5.7 cm above the awais. An NG/OG tube courses into  the stomach with the  tip in the region of the gastric body. A right IJ approach pulmonary  arterial catheter tip is positioned at the right pulmonary outflow  tract. Bilateral midline thoracostomy tubes are noted. Mild decreased  lung volumes from preoperative chest x-ray with similar mild elevation  of the right hemidiaphragm. There is diffuse interstitial prominence  which may reflect vascular crowding versus a component of interstitial  pulmonary edema. There is likely a small left pleural effusion. There is  a trace left apical pneumothorax       Impression:      Postsurgical changes as detailed above with satisfactory appearance of  medical support devices. Mildly decreased lung volumes with  bronchovascular crowding versus interstitial edema and trace left apical  pneumothorax with thoracostomy tubes in place.  Likely small left pleural effusion.     This report was finalized on 9/2/2022 2:45 PM by Jackson Car MD.               Results: Reviewed.  I reviewed the patient's new laboratory and imaging results.  I independently reviewed the patient's new images.    Medications: Reviewed.    Assessment & Plan   A / P     Mr. Myers is a 67 yo male with PMH hypertension, congestive heart failure and coronary artery disease who presents to the ICU status post MVR with Dr. Chan.  Patient was admitted to [facility] in July for congestive heart failure.  During that admission he underwent left heart cath that revealed nonobstructive CAD but was suggestive of severe mitral valve regurgitation.  KENNEY revealed moderate MVR with mild prolapse.  For that he was sent to Dr. Chan's office for evaluation.       The patient underwent mitral valve replacement with left atrial appendage ligation per Dr. Chan.  He is brought in an intubated state to the intensive care unit postoperatively.      Nutrition:   NPO Diet NPO Type: Strict NPO  Advance Directives:   Code Status and Medical Interventions:   Ordered at:  09/02/22 1359     Code Status (Patient has no pulse and is not breathing):    CPR (Attempt to Resuscitate)     Medical Interventions (Patient has pulse or is breathing):    Full Support     Release to patient:    Routine Release       Active Hospital Problems    Diagnosis    • **Severe mitral regurgitation    • HTN (hypertension)    • Chronic systolic heart failure (HCC)    • Coronary artery disease of native artery of native heart with stable angina pectoris (HCC)    • Obesity (BMI 30-39.9)        Assessment / Plan:    1. Wean mechanical ventilation and extubate as tolerated. Monitor end-tidal CO2.   2. Transition insulin drip when tolerating PO intake.   3. Titrate Epinephrine.   4. Bumex IV x 1  5. Monitor for recurrent bleeding  6. AM labs and CXR    High risk secondary to management of mechanical ventilation.     High level of risk due to:  severe exacerbation of chronic illness and illness with threat to life or bodily function.    Plan of care and goals reviewed during interdisciplinary rounds.  I discussed the patient's findings and my recommendations with nursing staff      Dawna Wiggins DO    Intensive Care Medicine and Pulmonary Medicine

## 2022-09-04 ENCOUNTER — APPOINTMENT (OUTPATIENT)
Dept: GENERAL RADIOLOGY | Facility: HOSPITAL | Age: 67
End: 2022-09-04

## 2022-09-04 LAB
ANION GAP SERPL CALCULATED.3IONS-SCNC: 6 MMOL/L (ref 5–15)
BUN SERPL-MCNC: 17 MG/DL (ref 8–23)
BUN/CREAT SERPL: 15.7 (ref 7–25)
CALCIUM SPEC-SCNC: 8.2 MG/DL (ref 8.6–10.5)
CHLORIDE SERPL-SCNC: 102 MMOL/L (ref 98–107)
CO2 SERPL-SCNC: 33 MMOL/L (ref 22–29)
CREAT SERPL-MCNC: 1.08 MG/DL (ref 0.76–1.27)
DEPRECATED RDW RBC AUTO: 46.7 FL (ref 37–54)
EGFRCR SERPLBLD CKD-EPI 2021: 75.7 ML/MIN/1.73
ERYTHROCYTE [DISTWIDTH] IN BLOOD BY AUTOMATED COUNT: 13.7 % (ref 12.3–15.4)
GLUCOSE BLDC GLUCOMTR-MCNC: 129 MG/DL (ref 70–130)
GLUCOSE BLDC GLUCOMTR-MCNC: 142 MG/DL (ref 70–130)
GLUCOSE BLDC GLUCOMTR-MCNC: 143 MG/DL (ref 70–130)
GLUCOSE BLDC GLUCOMTR-MCNC: 144 MG/DL (ref 70–130)
GLUCOSE BLDC GLUCOMTR-MCNC: 154 MG/DL (ref 70–130)
GLUCOSE BLDC GLUCOMTR-MCNC: 162 MG/DL (ref 70–130)
GLUCOSE BLDC GLUCOMTR-MCNC: 186 MG/DL (ref 70–130)
GLUCOSE BLDC GLUCOMTR-MCNC: 195 MG/DL (ref 70–130)
GLUCOSE SERPL-MCNC: 155 MG/DL (ref 65–99)
HCT VFR BLD AUTO: 25.9 % (ref 37.5–51)
HGB BLD-MCNC: 8.5 G/DL (ref 13–17.7)
MAGNESIUM SERPL-MCNC: 2.2 MG/DL (ref 1.6–2.4)
MCH RBC QN AUTO: 30.9 PG (ref 26.6–33)
MCHC RBC AUTO-ENTMCNC: 32.8 G/DL (ref 31.5–35.7)
MCV RBC AUTO: 94.2 FL (ref 79–97)
PLATELET # BLD AUTO: 142 10*3/MM3 (ref 140–450)
PMV BLD AUTO: 10.9 FL (ref 6–12)
POTASSIUM SERPL-SCNC: 5 MMOL/L (ref 3.5–5.2)
RBC # BLD AUTO: 2.75 10*6/MM3 (ref 4.14–5.8)
SODIUM SERPL-SCNC: 141 MMOL/L (ref 136–145)
WBC NRBC COR # BLD: 14.76 10*3/MM3 (ref 3.4–10.8)

## 2022-09-04 PROCEDURE — 85027 COMPLETE CBC AUTOMATED: CPT | Performed by: PHYSICIAN ASSISTANT

## 2022-09-04 PROCEDURE — 25010000002 ONDANSETRON PER 1 MG: Performed by: PHYSICIAN ASSISTANT

## 2022-09-04 PROCEDURE — 25010000002 FENTANYL CITRATE (PF) 50 MCG/ML SOLUTION: Performed by: THORACIC SURGERY (CARDIOTHORACIC VASCULAR SURGERY)

## 2022-09-04 PROCEDURE — 63710000001 INSULIN LISPRO (HUMAN) PER 5 UNITS: Performed by: INTERNAL MEDICINE

## 2022-09-04 PROCEDURE — 25010000002 AMIODARONE IN DEXTROSE 5% 150-4.21 MG/100ML-% SOLUTION: Performed by: STUDENT IN AN ORGANIZED HEALTH CARE EDUCATION/TRAINING PROGRAM

## 2022-09-04 PROCEDURE — 93005 ELECTROCARDIOGRAM TRACING: CPT | Performed by: PHYSICIAN ASSISTANT

## 2022-09-04 PROCEDURE — 63710000001 INSULIN DETEMIR PER 5 UNITS: Performed by: INTERNAL MEDICINE

## 2022-09-04 PROCEDURE — 25010000002 CEFAZOLIN IN DEXTROSE 2-4 GM/100ML-% SOLUTION: Performed by: PHYSICIAN ASSISTANT

## 2022-09-04 PROCEDURE — 83735 ASSAY OF MAGNESIUM: CPT | Performed by: THORACIC SURGERY (CARDIOTHORACIC VASCULAR SURGERY)

## 2022-09-04 PROCEDURE — 93005 ELECTROCARDIOGRAM TRACING: CPT | Performed by: STUDENT IN AN ORGANIZED HEALTH CARE EDUCATION/TRAINING PROGRAM

## 2022-09-04 PROCEDURE — 80048 BASIC METABOLIC PNL TOTAL CA: CPT | Performed by: PHYSICIAN ASSISTANT

## 2022-09-04 PROCEDURE — 93010 ELECTROCARDIOGRAM REPORT: CPT | Performed by: INTERNAL MEDICINE

## 2022-09-04 PROCEDURE — 97162 PT EVAL MOD COMPLEX 30 MIN: CPT

## 2022-09-04 PROCEDURE — 25010000002 AMIODARONE IN DEXTROSE 5% 360-4.14 MG/200ML-% SOLUTION: Performed by: STUDENT IN AN ORGANIZED HEALTH CARE EDUCATION/TRAINING PROGRAM

## 2022-09-04 PROCEDURE — 99024 POSTOP FOLLOW-UP VISIT: CPT | Performed by: STUDENT IN AN ORGANIZED HEALTH CARE EDUCATION/TRAINING PROGRAM

## 2022-09-04 PROCEDURE — 82962 GLUCOSE BLOOD TEST: CPT

## 2022-09-04 PROCEDURE — 99232 SBSQ HOSP IP/OBS MODERATE 35: CPT | Performed by: STUDENT IN AN ORGANIZED HEALTH CARE EDUCATION/TRAINING PROGRAM

## 2022-09-04 PROCEDURE — 71045 X-RAY EXAM CHEST 1 VIEW: CPT

## 2022-09-04 PROCEDURE — 99233 SBSQ HOSP IP/OBS HIGH 50: CPT | Performed by: INTERNAL MEDICINE

## 2022-09-04 RX ORDER — AMIODARONE HYDROCHLORIDE 200 MG/1
200 TABLET ORAL EVERY 12 HOURS
Status: DISCONTINUED | OUTPATIENT
Start: 2022-09-12 | End: 2022-09-14 | Stop reason: HOSPADM

## 2022-09-04 RX ORDER — AMIODARONE HYDROCHLORIDE 200 MG/1
200 TABLET ORAL ONCE
Status: COMPLETED | OUTPATIENT
Start: 2022-09-05 | End: 2022-09-05

## 2022-09-04 RX ORDER — AMIODARONE HYDROCHLORIDE 200 MG/1
200 TABLET ORAL EVERY 8 HOURS
Status: COMPLETED | OUTPATIENT
Start: 2022-09-05 | End: 2022-09-12

## 2022-09-04 RX ORDER — INSULIN LISPRO 100 [IU]/ML
0-7 INJECTION, SOLUTION INTRAVENOUS; SUBCUTANEOUS
Status: DISCONTINUED | OUTPATIENT
Start: 2022-09-04 | End: 2022-09-14 | Stop reason: HOSPADM

## 2022-09-04 RX ORDER — AMIODARONE HYDROCHLORIDE 200 MG/1
200 TABLET ORAL DAILY
Status: DISCONTINUED | OUTPATIENT
Start: 2022-09-26 | End: 2022-09-14 | Stop reason: HOSPADM

## 2022-09-04 RX ADMIN — CHLORHEXIDINE GLUCONATE 15 ML: 1.2 SOLUTION ORAL at 20:00

## 2022-09-04 RX ADMIN — FENTANYL CITRATE 25 MCG: 50 INJECTION, SOLUTION INTRAMUSCULAR; INTRAVENOUS at 09:40

## 2022-09-04 RX ADMIN — ASPIRIN 325 MG: 325 TABLET, COATED ORAL at 08:15

## 2022-09-04 RX ADMIN — ACETAMINOPHEN 650 MG: 325 TABLET, FILM COATED ORAL at 16:34

## 2022-09-04 RX ADMIN — ONDANSETRON 4 MG: 2 INJECTION INTRAMUSCULAR; INTRAVENOUS at 08:26

## 2022-09-04 RX ADMIN — HYDROCODONE BITARTRATE AND ACETAMINOPHEN 1 TABLET: 7.5; 325 TABLET ORAL at 03:45

## 2022-09-04 RX ADMIN — INSULIN LISPRO 2 UNITS: 100 INJECTION, SOLUTION INTRAVENOUS; SUBCUTANEOUS at 16:34

## 2022-09-04 RX ADMIN — FENTANYL CITRATE 25 MCG: 50 INJECTION, SOLUTION INTRAMUSCULAR; INTRAVENOUS at 12:49

## 2022-09-04 RX ADMIN — AMIODARONE HYDROCHLORIDE 0.5 MG/MIN: 1.8 INJECTION, SOLUTION INTRAVENOUS at 17:59

## 2022-09-04 RX ADMIN — CEFAZOLIN SODIUM 2 G: 2 INJECTION, SOLUTION INTRAVENOUS at 04:33

## 2022-09-04 RX ADMIN — ATORVASTATIN CALCIUM 40 MG: 40 TABLET, FILM COATED ORAL at 20:00

## 2022-09-04 RX ADMIN — INSULIN DETEMIR 10 UNITS: 100 INJECTION, SOLUTION SUBCUTANEOUS at 16:34

## 2022-09-04 RX ADMIN — OXYCODONE 10 MG: 5 TABLET ORAL at 19:53

## 2022-09-04 RX ADMIN — CHLORHEXIDINE GLUCONATE 15 ML: 1.2 SOLUTION ORAL at 08:15

## 2022-09-04 RX ADMIN — OXYCODONE 10 MG: 5 TABLET ORAL at 12:49

## 2022-09-04 RX ADMIN — OXYCODONE 10 MG: 5 TABLET ORAL at 06:39

## 2022-09-04 RX ADMIN — ONDANSETRON 4 MG: 2 INJECTION INTRAMUSCULAR; INTRAVENOUS at 17:03

## 2022-09-04 RX ADMIN — ACETAMINOPHEN 650 MG: 325 TABLET, FILM COATED ORAL at 20:00

## 2022-09-04 RX ADMIN — ACETAMINOPHEN 650 MG: 325 TABLET, FILM COATED ORAL at 05:02

## 2022-09-04 RX ADMIN — AMIODARONE HYDROCHLORIDE 1 MG/MIN: 1.8 INJECTION, SOLUTION INTRAVENOUS at 11:40

## 2022-09-04 RX ADMIN — INSULIN LISPRO 2 UNITS: 100 INJECTION, SOLUTION INTRAVENOUS; SUBCUTANEOUS at 20:10

## 2022-09-04 RX ADMIN — AMIODARONE HYDROCHLORIDE 150 MG: 1.5 INJECTION, SOLUTION INTRAVENOUS at 11:40

## 2022-09-04 RX ADMIN — HYDROCODONE BITARTRATE AND ACETAMINOPHEN 1 TABLET: 7.5; 325 TABLET ORAL at 09:41

## 2022-09-04 NOTE — PLAN OF CARE
Goal Outcome Evaluation:  Plan of Care Reviewed With: patient        Progress: improving  Outcome Evaluation: Pt neuro intact.  Pt ambulated x2.  Pt on 2-3L NC sats>92%.  UOP 525ml.  CT drainage 100ml.  Pain meds given for discomfort.  at 9/4/2022 1740

## 2022-09-04 NOTE — PLAN OF CARE
Goal Outcome Evaluation:  Plan of Care Reviewed With: patient           Outcome Evaluation: PT initial evaluation completed for pt s/p MVR presenting with generalized weakness, impaired balance, incisional pain, and decreased functional mobility. Pt ambulated 25ft with Julia x2 +1 and B UE support. Chair follow for safety. Pt's decreased independence warrants PT skilled care. Recommend D/C to IP rehab facility.

## 2022-09-04 NOTE — PROGRESS NOTES
[facility] at [facility]  INPATIENT PROGRESS NOTE         [facility]    9/4/2022      PATIENT IDENTIFICATION:   Name:  Damián Myers      MRN:  [id number]     66 y.o.  male             Reason for visit: MVR, SVT, hypertension      SUBJECTIVE:   Doing well this morning.  Extubated and breathing well.  Does continue to have some chest pain but overall its been tolerable.  Continues to have frequent PVCs on the monitor at times.  PA catheter was removed but continues to have PVCs.     OBJECTIVE:  Vitals:    09/04/22 0300 09/04/22 0400 09/04/22 0500 09/04/22 0600   BP: 122/63 109/49 106/67 121/49   BP Location:  Left arm  Left arm   Patient Position:  Sitting  Lying   Pulse: 65 74 77 73   Resp:  15  14   Temp:    98.4 °F (36.9 °C)   TempSrc:    Axillary   SpO2: 98% 99% 99% 99%   Weight:       Height:         FiO2 (%): 40 %     Body mass index is 31.81 kg/m².    Intake/Output Summary (Last 24 hours) at 9/4/2022 0954  Last data filed at 9/4/2022 0600  Gross per 24 hour   Intake 1249.6 ml   Output 3210 ml   Net -1960.4 ml       Telemetry: Personally reviewed, normal sinus rhythm, frequent PACs and PVCs.    Exam:  General Appearance:   · well developed  · well nourished  Neck:  · thyroid not enlarged  · supple  Respiratory:  · no respiratory distress  · normal breath sounds  · no rales  Cardiovascular:  · no jugular venous distention  · regular rhythm  · apical impulse normal  · S1 normal, S2 normal  · no S3, no S4   · no murmur  · no rub, no thrill  · carotid pulses normal; no bruit  · pedal pulses normal  · lower extremity edema: 2+  Skin:   warm, dry      Allergies   Allergen Reactions   • Other Unknown - High Severity     Pt had a reaction to anesthesia when placing stents        Scheduled meds:       acetaminophen, 650 mg, Oral, Q8H  aspirin, 325 mg, Oral, Daily  atorvastatin, 40 mg, Oral, Nightly  chlorhexidine, 15 mL, Mouth/Throat, Q12H  metoprolol tartrate, 12.5 mg, Oral,  Q12H      IV meds:                      dexmedetomidine, 0.2-1.5 mcg/kg/hr  dextrose 5 % with KCl 20 mEq, 30 mL/hr, Last Rate: 30 mL/hr (09/02/22 1424)  DOBUTamine, 2-20 mcg/kg/min  DOPamine, 2-20 mcg/kg/min  EPINEPHrine, 0.02-0.3 mcg/kg/min, Last Rate: 0.05 mcg/kg/min (09/03/22 2142)  insulin, 0-100 Units/hr, Last Rate: 2.1 Units/hr (09/04/22 0340)  niCARdipine, 5-15 mg/hr  nitroglycerin, 5-200 mcg/min  norepinephrine, 0.02-0.3 mcg/kg/min, Last Rate: Stopped (09/02/22 2156)  phenylephrine, 0.5-3 mcg/kg/min  propofol, 5-50 mcg/kg/min, Last Rate: Stopped (09/03/22 0920)      Data Review:  Results from last 7 days   Lab Units 09/04/22 0226 09/03/22 0215 09/02/22 2126 09/02/22  1704   SODIUM mmol/L 141 141 143 143   BUN mg/dL 17 17 18 18   CREATININE mg/dL 1.08 1.31* 1.21 1.17   GLUCOSE mg/dL 155* 175* 176* 173*     Results from last 7 days   Lab Units 09/04/22 0226 09/03/22 0215 09/02/22 2126 09/02/22  1704 09/02/22  1401   WBC 10*3/mm3 14.76* 9.38 10.68 9.19 11.50*   HEMOGLOBIN g/dL 8.5* 8.5* 8.4* 8.4* 11.2*     Results from last 7 days   Lab Units 09/03/22 0215 09/02/22  1704 09/02/22  1401 08/30/22  1246   INR  1.12 1.44* 1.56* 0.97     Results from last 7 days   Lab Units 08/30/22  1246   ALT (SGPT) U/L 14   AST (SGOT) U/L 37     No results found for: DIGOXIN   Lab Results   Component Value Date    TSH 3.170 06/25/2022           Invalid input(s): LDLCALC    Estimated Creatinine Clearance: 92 mL/min (by C-G formula based on SCr of 1.08 mg/dL).        Imaging (last 24 hr):   I personally reviewed the most recent chest x-ray and other pertinent imaging studies.  Results for orders placed during the hospital encounter of 09/02/22    XR Chest 1 View    Narrative  DATE OF EXAM: 9/2/2022 10:07 PM    PROCEDURE: XR CHEST 1 VW-    INDICATIONS: post op heart surgery; I34.0-Nonrheumatic mitral (valve)  insufficiency    COMPARISON: 9/2/2022 5:24 PM    TECHNIQUE: Single radiographic AP view of the chest was  obtained.    FINDINGS:  Current image is timed 9:28 PM.    Sternotomy wires and mitral valve prosthesis are again noted as well as  left atrial appendage exclusion clip. ET tube, NG tube and PA catheter  remain in satisfactory position, the ET tube at the upper margin of the  clavicles. Elevated right hemidiaphragm, left basilar atelectasis and  effusion, and minimal right basilar atelectasis appear stable. There is  mild pulmonary venous hypertension but no evidence of overt congestive  failure. No pneumothorax is seen.    Impression  Stable chest radiograph, including focal left basilar atelectasis or  effusion. No new chest pathology is seen elsewhere.    This report was finalized on 9/2/2022 10:29 PM by Dr. Gonzales Herman MD.        Last ECHO:  Results for orders placed in visit on 09/02/22    Emergent/Open-Heart Anesthesia [facility]  Baseline Exam:  - Lv is normal in size with mild global hypokinesis, worse in the anteroseptal regions. LVEF is 45-50%.  - RV is normal in size with mildly hypokinesis.l  - The MV is morphologically normal. There is redundant anterior leaflet tissue and flail P2 from ruptured chordae. There is resultant severe MR. VC is 0.78cm. Regurgitant volume is 59mL and EROA is 0.4cm^2.  - No other significant valvular abnormalities. There is trace to mild TR.  - No significant pathology in the visualized portions of the aorta.    Post-procedure Exam:  - Interval replacement of mitral valve with 33mm bioprosthetic. On initial separation from CPB there was large PVL necessitating closure with CPB.  - On second separation from there is slightly worse LV function. RV function appears stable.  - No inotropic support.  - There is a bioprosthetic valve in the native mitral location. It appears well seated and leaflets exhibit adequate excursion. There is a small PVL near ANGELICA and tiny posterior PVL. The mean PG is 1mmHg across the valve.  - No other new significant valvular abnormalities.  - No evidence of  dissection in the visualized portions of the aorta.        PROBLEM LIST:     Severe mitral regurgitation    Obesity (BMI 30-39.9)    Chronic systolic heart failure (HCC)    Coronary artery disease of native artery of native heart with stable angina pectoris (HCC)    HTN (hypertension)      Initial cardiac assessment: 66-year-old gentleman status post bioprosthetic MVR by Dr. Chan 9/2/2022 with left atrial pended clip.  Preop EF 45-50%, nonobstructive disease on preop cath.    ASSESSMENT/PLAN:  1. 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 Chan  - discordant EF measurements by echo pre-op, however 45-50% by intra-op [facility]ENNEY prior to surgery   - continue routine post-op care, currently on low dose epinephrine   Required multiple blood products POD 0  -Received 1 dose of Bumex 2 mg yesterday.  Was net negative about 2 L.  We will hold off on additional dose today and continue to monitor ins and outs.     2. SHF  - EF reported as 36-40% by TTE in June and normal by [facility] during same hospitalization   - will need repeat echo prior to discharge to re-look LVEF     3. CAD  - recent cath 06/2022 with mild nonobstructive CAD  - continue ASA, statin      4. Dyslipidemia  - continue Lipitor 40mg to target LDL <70 given history of CAD and prior LAD stent    5.  PVCs  -He was noted to have frequent PVCs on his monitor postoperatively.  PA catheter was found to be in the RVOT so this was removed.  However looking at his EKG from September 2 showing frequent PVCs, the morphology of those was not consistent with RVOT origin.  The morphology of these PVCs is somewhat unusual with qrS complex in V1.  The unusual morphology of the PVC may be somewhat related to recent heart surgery.  He was noted to have frequent PVCs dating back to 2019 with bigeminy at times.  EKG from then shows possible mitral valve annular or papillary  muscle site of PVCs.  -He remains asymptomatic with these PVCs and I am not overly concerned by them currently.  It is possible that they are being worsened by vasopressors.  If there is concern could try switching to a vasopressor that does not have beta agonist effect such as phenylephrine.  Was started on metoprolol low-dose this morning but I think is reasonable.        Tyson Villalobos MD  9/4/2022    09:54 EDT

## 2022-09-04 NOTE — THERAPY EVALUATION
Patient Name: Damián Myers  : 1955    MRN: [id number]                              Today's Date: 2022       Admit Date: 2022    Visit Dx:     ICD-10-CM ICD-9-CM   1. Severe mitral regurgitation  I34.0 424.0     Patient Active Problem List   Diagnosis   • Hyperglycemia   • Obesity (BMI 30-39.9)   • Chronic systolic heart failure (HCC)   • Coronary artery disease of native artery of native heart with stable angina pectoris ([facility])   • Stroke (cerebrum) ([facility])   • Acute pulmonary edema ([facility])   • TIA (transient ischemic attack)   • HTN (hypertension)   • HLD (hyperlipidemia)   • COVID-19   • Acute on chronic respiratory failure with hypoxia and hypercapnia ([facility])   • Cytokine release syndrome, grade 4   • Acute on chronic congestive heart failure, unspecified heart failure type ([facility])   • Severe mitral regurgitation     Past Medical History:   Diagnosis Date   • CAD (coronary artery disease)    • Cardiac abnormality    • CHF (congestive heart failure) ([facility])    • Coma of unknown cause ([facility])     Pt states he was in a coma for one week, unknown cause   • Hypertension    • Mitral regurgitation    • Obesity (BMI 30-39.9) 2019   • Spinal headache      Past Surgical History:   Procedure Laterality Date   • CARDIAC CATHETERIZATION N/A 2019    Procedure: Left Heart Cath;  Surgeon: Sherrell Hart MD;  Location:  [facility];  Service: Cardiology   • CARDIAC CATHETERIZATION N/A 2022    Procedure: Left Heart Cath;  Surgeon: Greyson Balderas MD;  Location:  [facility];  Service: Cardiology;  Laterality: N/A;   • COLONOSCOPY     • CORONARY STENT PLACEMENT        General Information     Row Name 22 1118          Physical Therapy Time and Intention    Document Type evaluation  -KG     Mode of Treatment physical therapy  -KG     Row Name 22 1118          General Information    Patient Profile Reviewed yes  -KG     Prior Level of Function  independent:;all household mobility;gait;transfer;ADL's;dressing;bathing  -KG     Existing Precautions/Restrictions cardiac;fall;oxygen therapy device and L/min;sternal;other (see comments)  monitor BP  -KG     Barriers to Rehab medically complex  -KG     Row Name 09/04/22 1118          Living Environment    People in Home spouse  -KG     Row Name 09/04/22 1118          Home Main Entrance    Number of Stairs, Main Entrance three  -KG     Stair Railings, Main Entrance none  -KG     Row Name 09/04/22 1118          Stairs Within Home, Primary    Number of Stairs, Within Home, Primary two  -KG     Stair Railings, Within Home, Primary none  -KG     Row Name 09/04/22 1118          Cognition    Orientation Status (Cognition) oriented x 3  -KG     Row Name 09/04/22 1118          Safety Issues, Functional Mobility    Safety Issues Affecting Function (Mobility) ability to follow commands;awareness of need for assistance;insight into deficits/self-awareness;safety precaution awareness;safety precautions follow-through/compliance  -KG     Impairments Affecting Function (Mobility) balance;coordination;endurance/activity tolerance;postural/trunk control;pain;shortness of breath;strength  -KG           User Key  (r) = Recorded By, (t) = Taken By, (c) = Cosigned By    Initials Name Provider Type    KG Hansa Deal, PT Physical Therapist               Mobility     Row Name 09/04/22 1119          Bed Mobility    Comment, (Bed Mobility) UIC  -KG     Row Name 09/04/22 1119          Transfers    Comment, (Transfers) Increased cueing for sequencing and technique. Cues for safe hand placement to maintain sternal precautions. Pt requires increased time to complete.  -KG     Row Name 09/04/22 1119          Sit-Stand Transfer    Sit-Stand [place] (Transfers) minimum assist (75% patient effort);2 person assist;verbal cues  -KG     Row Name 09/04/22 1119          Gait/Stairs (Locomotion)    [place] Level (Gait) minimum  assist (75% patient effort);2 person assist;1 person to manage equipment;verbal cues  chair follow  -KG     Assistive Device (Gait) other (see comments)  B UE support on tripod monitor  -KG     Distance in Feet (Gait) 25  -KG     Deviations/Abnormal Patterns (Gait) bilateral deviations;base of support, narrow;david decreased;festinating/shuffling;stride length decreased  -KG     Bilateral Gait Deviations forward flexed posture;heel strike decreased  -KG     Comment, (Gait/Stairs) Pt demonstrated step to gait pattern with very slow david and short, shuffled steps. Frequent cues for upright posture with increased stride length and gait speed. Pt required multiple standing rest breaks. Increased encouragement provided for continued forward ambulation. Pt with c/o dizziness and lightheadedness. Returned to room in chair. RN present and aware. BP monitored.  -KG           User Key  (r) = Recorded By, (t) = Taken By, (c) = Cosigned By    Initials Name Provider Type    KG Hansa Deal, PT Physical Therapist               Obj/Interventions     Row Name 09/04/22 1121          Range of Motion Comprehensive    General Range of Motion no range of motion deficits identified  -KG     Comment, General Range of Motion B LE WFL  -KG     Row Name 09/04/22 1121          Strength Comprehensive (MMT)    Comment, General Manual Muscle Testing (MMT) Assessment BLE grossly 4-/5  -KG     Row Name 09/04/22 1121          Balance    Balance Assessment sitting static balance;standing static balance;standing dynamic balance  -KG     Static Sitting Balance contact guard  -KG     Position, Sitting Balance unsupported;sitting in chair  -KG     Static Standing Balance minimal assist;2-person assist  -KG     Dynamic Standing Balance moderate assist;2-person assist  -KG     Position/Device Used, Standing Balance supported  -KG     Row Name 09/04/22 1121          Sensory Assessment (Somatosensory)    Sensory Assessment (Somatosensory) LE  sensation intact  -KG           User Key  (r) = Recorded By, (t) = Taken By, (c) = Cosigned By    Initials Name Provider Type    KG Hansa Deal, PT Physical Therapist               Goals/Plan     Row Name 09/04/22 1123          Bed Mobility Goal 1 (PT)    Activity/Assistive Device (Bed Mobility Goal 1, PT) sit to supine;supine to sit  -KG     [place] Level/Cues Needed (Bed Mobility Goal 1, PT) minimum assist (75% or more patient effort)  -KG     Time Frame (Bed Mobility Goal 1, PT) 2 weeks  -KG     Progress/Outcomes (Bed Mobility Goal 1, PT) goal ongoing  -KG     Row Name 09/04/22 1123          Transfer Goal 1 (PT)    Activity/Assistive Device (Transfer Goal 1, PT) sit-to-stand/stand-to-sit;bed-to-chair/chair-to-bed  -KG     [place] Level/Cues Needed (Transfer Goal 1, PT) contact guard required  -KG     Time Frame (Transfer Goal 1, PT) 2 weeks  -KG     Progress/Outcome (Transfer Goal 1, PT) goal ongoing  -KG     Row Name 09/04/22 1123          Gait Training Goal 1 (PT)    Activity/Assistive Device (Gait Training Goal 1, PT) gait (walking locomotion)  -KG     [place] Level (Gait Training Goal 1, PT) minimum assist (75% or more patient effort)  -KG     Distance (Gait Training Goal 1, PT) 200 feet  -KG     Time Frame (Gait Training Goal 1, PT) 2 weeks  -KG     Progress/Outcome (Gait Training Goal 1, PT) goal ongoing  -KG     Row Name 09/04/22 1123          Therapy Assessment/Plan (PT)    Planned Therapy Interventions (PT) balance training;bed mobility training;gait training;strengthening;transfer training  -KG           User Key  (r) = Recorded By, (t) = Taken By, (c) = Cosigned By    Initials Name Provider Type    DIANA Hansa Deal, PT Physical Therapist               Clinical Impression     Row Name 09/04/22 1121          Pain    Additional Documentation Pain Scale: FACES Pre/Post-Treatment (Group)  -KG     Row Name 09/04/22 1121          Pain Scale: FACES Pre/Post-Treatment    Pain:  FACES Scale, Pretreatment 4-->hurts little more  -KG     Posttreatment Pain Rating 6-->hurts even more  -KG     Pain Location incisional  -KG     Pain Location - chest  -KG     Row Name 09/04/22 1121          Plan of Care Review    Plan of Care Reviewed With patient  -KG     Outcome Evaluation PT initial evaluation completed for pt s/p MVR presenting with generalized weakness, impaired balance, incisional pain, and decreased functional mobility. Pt ambulated 25ft with Julia x2 +1 and B UE support. Chair follow for safety. Pt's decreased independence warrants PT skilled care. Recommend D/C to IP rehab facility.  -KG     Row Name 09/04/22 1121          Therapy Assessment/Plan (PT)    Patient/Family Therapy Goals Statement (PT) return to PLOF  -KG     Rehab Potential (PT) good, to achieve stated therapy goals  -KG     Criteria for Skilled Interventions Met (PT) yes;skilled treatment is necessary  -KG     Therapy Frequency (PT) daily  -KG     Row Name 09/04/22 1121          Vital Signs    Pre Systolic BP Rehab 91  -KG     Pre Treatment Diastolic BP 45  -KG     Intra Systolic BP Rehab 97  -KG     Intra Treatment Diastolic BP 38  -KG     Post Systolic BP Rehab 51  RN present and aware  -KG     Post Treatment Diastolic BP 36  -KG     Pretreatment Heart Rate (beats/min) 80  -KG     Posttreatment Heart Rate (beats/min) 86  -KG     Pre SpO2 (%) 98  -KG     O2 Delivery Pre Treatment supplemental O2  -KG     Post SpO2 (%) 91  -KG     O2 Delivery Post Treatment supplemental O2  -KG     Pre Patient Position Sitting  -KG     Intra Patient Position Standing  -KG     Post Patient Position Sitting  -KG     Row Name 09/04/22 1121          Positioning and Restraints    Pre-Treatment Position sitting in chair/recliner  -KG     Post Treatment Position chair  -KG     In Chair reclined;call light within reach;encouraged to call for assist;with family/caregiver;with nsg;RUE elevated;LUE elevated;legs elevated  -KG           User Key  (r) =  Recorded By, (t) = Taken By, (c) = Cosigned By    Initials Name Provider Type    KG Hansa Deal PT Physical Therapist               Outcome Measures     Row Name 09/04/22 1124          How much help from another person do you currently need...    Turning from your back to your side while in flat bed without using bedrails? 2  -KG     Moving from lying on back to sitting on the side of a flat bed without bedrails? 2  -KG     Moving to and from a bed to a chair (including a wheelchair)? 3  -KG     Standing up from a chair using your arms (e.g., wheelchair, bedside chair)? 3  -KG     Climbing 3-5 steps with a railing? 1  -KG     To walk in hospital room? 2  -KG     AM-PAC 6 Clicks Score (PT) 13  -KG     Highest level of mobility 4 --> Transferred to chair/commode  -KG     Row Name 09/04/22 1124          Functional Assessment    Outcome Measure Options AM-PAC 6 Clicks Basic Mobility (PT)  -KG           User Key  (r) = Recorded By, (t) = Taken By, (c) = Cosigned By    Initials Name Provider Type    KG Hansa Deal PT Physical Therapist                             Physical Therapy Education                 Title: PT OT SLP Therapies (In Progress)     Topic: Physical Therapy (In Progress)     Point: Mobility training (In Progress)     Learning Progress Summary           Patient Acceptance, E, NR by KG at 9/4/2022 0914                   Point: Home exercise program (Not Started)     Learner Progress:  Not documented in this visit.          Point: Body mechanics (In Progress)     Learning Progress Summary           Patient Acceptance, E, NR by KG at 9/4/2022 0914                   Point: Precautions (In Progress)     Learning Progress Summary           Patient Acceptance, E, NR by KG at 9/4/2022 0914                               User Key     Initials Effective Dates Name Provider Type Discipline    KG 05/22/20 -  Hansa Deal PT Physical Therapist PT              PT Recommendation and  Plan  Planned Therapy Interventions (PT): balance training, bed mobility training, gait training, strengthening, transfer training  Plan of Care Reviewed With: patient  Outcome Evaluation: PT initial evaluation completed for pt s/p MVR presenting with generalized weakness, impaired balance, incisional pain, and decreased functional mobility. Pt ambulated 25ft with Julia x2 +1 and B UE support. Chair follow for safety. Pt's decreased independence warrants PT skilled care. Recommend D/C to IP rehab facility.     Time Calculation:    PT Charges     Row Name 09/04/22 0914             Time Calculation    Start Time 0914  -KG      PT Received On 09/04/22  -KG      PT Goal Re-Cert Due Date 09/14/22  -KG              Untimed Charges    PT Eval/Re-eval Minutes 48  -KG              Total Minutes    Untimed Charges Total Minutes 48  -KG       Total Minutes 48  -KG            User Key  (r) = Recorded By, (t) = Taken By, (c) = Cosigned By    Initials Name Provider Type    KG Hansa Deal, PT Physical Therapist              Therapy Charges for Today     Code Description Service Date Service Provider Modifiers Qty    24624363274 HC PT EVAL MOD COMPLEXITY 4 9/4/2022 Hansa Deal, PT GP 1    82260781314 HC PT THER SUPP EA 15 MIN 9/4/2022 Hansa Deal, PT GP 3          PT G-Codes  Outcome Measure Options: AM-PAC 6 Clicks Basic Mobility (PT)  AM-PAC 6 Clicks Score (PT): 13    Ana Deal PT  9/4/2022

## 2022-09-04 NOTE — PLAN OF CARE
Goal Outcome Evaluation:  Plan of Care Reviewed With: patient        Progress: improving  Outcome Evaluation: Pt in afib amiodarone protocol with loading dose per Dr. Tyson Villalobos. Epi gtt continues.  at 9/4/2022 2798

## 2022-09-04 NOTE — PROGRESS NOTES
I examined the patient at 7 AM on September 3, 2022  He is intubated and on epinephrine 0.08 with a MAP of 80  His chest tube output is not concerning for active bleeding  He is in a sinus rhythm    Plan for extubation today  Wean inotropes  Discontinue chest tubes September 4, 2022    Discussed with Dr. Chan his surgeon    Krishna Bernardo M.D.   Cardiothoracic and Vascular Surgeon  [facility]

## 2022-09-04 NOTE — PROGRESS NOTES
INTENSIVIST   PROGRESS NOTE     Hospital:  LOS: 2 days     Mr. Damián Myers, 66 y.o. male is followed for a Chief Complaint of: Postoperative medical management      Subjective   S     Interval History:  Extubated yesterday. Still with some junctional arrhythmias and resulting hypotension. Remains on epinephrine.      The patient's relevant past medical, surgical and social history were reviewed and updated in Epic as appropriate.      ROS:   Constitutional: Negative for fever.   Respiratory: Negative for dyspnea.   Cardiovascular: Negative for chest pain.   Gastrointestinal: Negative for  nausea, vomiting and diarrhea.       Objective   O     Vitals:  Temp  Min: 97.9 °F (36.6 °C)  Max: 100.2 °F (37.9 °C)  BP  Min: 74/45  Max: 122/63  Pulse  Min: 58  Max: 77  Resp  Min: 14  Max: 24  SpO2  Min: 93 %  Max: 100 % Flow (L/min)  Min: 4  Max: 4    Intake/Ouptut 24 hrs (7:00AM - 6:59 AM)  Intake & Output (last 3 days)       09/01 0701 09/02 0700 09/02 0701 09/03 0700 09/03 0701  09/04 0700 09/04 0701  09/05 0700    P.O.   200     I.V. (mL/kg)  5080.8 (44.2) 1009.6 (8.8)     Blood  3214      NG/GT  120      IV Piggyback  100 100     Total Intake(mL/kg)  8514.8 (74) 1309.6 (11.4)     Urine (mL/kg/hr)  2480 (0.9) 3065 (1.1)     Emesis/NG output  20      Chest Tube  2065 290     Total Output  4565 3355     Net  +3949.8 -2045.4                   Medications (drips):  amiodarone, Last Rate: 1 mg/min (09/04/22 1140)   Followed by  amiodarone  dexmedetomidine  dextrose 5 % with KCl 20 mEq, Last Rate: 30 mL/hr (09/02/22 1424)  DOBUTamine  DOPamine  EPINEPHrine, Last Rate: 0.05 mcg/kg/min (09/03/22 2142)  insulin, Last Rate: 2.1 Units/hr (09/04/22 0340)  niCARdipine  nitroglycerin  norepinephrine, Last Rate: Stopped (09/02/22 2156)  phenylephrine  propofol, Last Rate: Stopped (09/03/22 0920)        Physical Examination  Telemetry:  Normal sinus rhythm/Junctional arrhythmia   Constitutional:  No acute distress.  Sitting up in a  chair on nasal cannula.    Eyes: No scleral icterus.   PERRL, EOM intact.    Neck:  Supple, FROM   Cardiovascular: Normal rate, regular and rhythm. Normal heart sounds.  No murmurs, gallop or rub.   Respiratory: No respiratory distress. Normal respiratory effort.  Normal breath sounds  Clear to auscultation   Abdominal:  Soft. No masses. Nontender. No distension. No HSM.   Extremities: No digital cyanosis. No clubbing.  No peripheral edema.   Skin: No rashes, lesions or ulcers   Neurological:   Alert and interactive.                 Results from last 7 days   Lab Units 09/04/22 0226 09/03/22 0215 09/02/22 2126   WBC 10*3/mm3 14.76* 9.38 10.68   HEMOGLOBIN g/dL 8.5* 8.5* 8.4*   MCV fL 94.2 93.8 92.6   PLATELETS 10*3/mm3 142 178 173     Results from last 7 days   Lab Units 09/04/22  0226 09/03/22  1352 09/03/22  0215 09/02/22 2126 09/02/22  1704 09/02/22  1401   SODIUM mmol/L 141  --  141 143 143 143   POTASSIUM mmol/L 5.0 4.2 4.2 4.3 4.1 3.9   CO2 mmol/L 33.0*  --  27.0 29.0 26.0 25.0   CREATININE mg/dL 1.08  --  1.31* 1.21 1.17 1.12   GLUCOSE mg/dL 155*  --  175* 176* 173* 103*   MAGNESIUM mg/dL 2.2 2.3 2.6*  --  1.8 1.5*   PHOSPHORUS mg/dL  --   --  3.7  --  2.7 2.2*     Estimated Creatinine Clearance: 92 mL/min (by C-G formula based on SCr of 1.08 mg/dL).  Results from last 7 days   Lab Units 08/30/22  1246   ALK PHOS U/L 73   BILIRUBIN mg/dL 0.7   ALT (SGPT) U/L 14   AST (SGOT) U/L 37       Results from last 7 days   Lab Units 09/03/22  1001 09/03/22  0154 09/02/22  1354 09/02/22  1243 09/02/22  1152   PH, ARTERIAL pH units 7.314* 7.324* 7.405 7.52 7.47   PCO2, ARTERIAL mm Hg 56.0* 52.0* 42.9  --   --    PO2 ART mm Hg 77.3* 71.2* 185.0*  --   --    FIO2 % 40 40 100  --   --        Images:  Imaging Results (Last 24 Hours)     Procedure Component Value Units Date/Time    XR Chest 1 View [[id number]] Collected: 09/04/22 0636     Updated: 09/04/22 0641    Narrative:      DATE OF EXAM: 9/4/2022 4:39 AM      PROCEDURE: XR CHEST 1 VW-     INDICATIONS: Post-Op Heart Surgery; I34.0-Nonrheumatic mitral (valve)  insufficiency.      COMPARISON: Chest x-ray 9/3/2022     TECHNIQUE: Single frontal view of the chest.     FINDINGS:  Interval extubation and removal of NG/OG tube. Removal of pulmonary  arterial catheter. The right IJ introducer sheath remains in place.  Thoracostomy tubes remain in place. Stable cardiomegaly and low lung  volumes. Similar small bilateral pleural effusions and bibasilar  parenchymal opacities, likely associated atelectasis. Stable perihilar  vascular congestion and/or bronchovascular crowding in setting of low  lung volumes. No pneumothorax.       Impression:      Interval extubation and removal of NG/OG tube. Removal of pulmonary  arterial catheter. The right IJ introducer sheath remains in place.  Thoracostomy tubes remain in place. Stable cardiomegaly and low lung  volumes. Similar small bilateral pleural effusions and bibasilar  parenchymal opacities, likely associated atelectasis. Stable perihilar  vascular congestion and/or bronchovascular crowding in setting of low  lung volumes. No pneumothorax.     This report was finalized on 9/4/2022 6:38 AM by Jackson Car MD.               Results: Reviewed.  I reviewed the patient's new laboratory and imaging results.  I independently reviewed the patient's new images.    Medications: Reviewed.    Assessment & Plan   A / P     Mr. Myers is a 67 yo male with PMH hypertension, congestive heart failure and coronary artery disease who presents to the ICU status post MVR with Dr. Chan.  Patient was admitted to [facility] in July for congestive heart failure.  During that admission he underwent left heart cath that revealed nonobstructive CAD but was suggestive of severe mitral valve regurgitation.  KENNEY revealed moderate MVR with mild prolapse.  For that he was sent to Dr. Chan's office for evaluation.       The patient underwent mitral  valve replacement with left atrial appendage ligation per Dr. Chan.  He is brought in an intubated state to the intensive care unit postoperatively.    He was extubated on 9/3.       Nutrition:   Diet Regular; Cardiac, Consistent Carbohydrate  Advance Directives:   Code Status and Medical Interventions:   Ordered at: 09/02/22 3562     Code Status (Patient has no pulse and is not breathing):    CPR (Attempt to Resuscitate)     Medical Interventions (Patient has pulse or is breathing):    Full Support     Release to patient:    Routine Release       Active Hospital Problems    Diagnosis    • **Severe mitral regurgitation    • HTN (hypertension)    • Chronic systolic heart failure (HCC)    • Coronary artery disease of native artery of native heart with stable angina pectoris (HCC)    • Obesity (BMI 30-39.9)        Assessment / Plan:    1. Wean supplemental O2.    2. Transition insulin drip today.   3. Titrate Epinephrine. May need to switch to phenylephrine secondary to PVCs.   4. Hold diuretics today.   5. Chest tube management per CTS.   6. Monitor for recurrent bleeding  7. AM labs and CXR    High risk secondary to management of vasopressor titration.     High level of risk due to:  severe exacerbation of chronic illness and illness with threat to life or bodily function.    Plan of care and goals reviewed during interdisciplinary rounds.  I discussed the patient's findings and my recommendations with patient, family and nursing staff      Dawna Wiggins, DO    Intensive Care Medicine and Pulmonary Medicine

## 2022-09-05 ENCOUNTER — APPOINTMENT (OUTPATIENT)
Dept: GENERAL RADIOLOGY | Facility: HOSPITAL | Age: 67
End: 2022-09-05

## 2022-09-05 LAB
ALBUMIN SERPL-MCNC: 3.5 G/DL (ref 3.5–5.2)
ALBUMIN SERPL-MCNC: 3.9 G/DL (ref 3.5–5.2)
ANION GAP SERPL CALCULATED.3IONS-SCNC: 7 MMOL/L (ref 5–15)
ANION GAP SERPL CALCULATED.3IONS-SCNC: 8 MMOL/L (ref 5–15)
ANION GAP SERPL CALCULATED.3IONS-SCNC: 9 MMOL/L (ref 5–15)
ARTERIAL PATENCY WRIST A: ABNORMAL
ARTERIAL PATENCY WRIST A: ABNORMAL
ATMOSPHERIC PRESS: ABNORMAL MM[HG]
ATMOSPHERIC PRESS: ABNORMAL MM[HG]
BASE EXCESS BLDA CALC-SCNC: 3.2 MMOL/L (ref 0–2)
BASE EXCESS BLDA CALC-SCNC: 4.9 MMOL/L (ref 0–2)
BDY SITE: ABNORMAL
BDY SITE: ABNORMAL
BODY TEMPERATURE: 37 C
BODY TEMPERATURE: 37 C
BUN SERPL-MCNC: 33 MG/DL (ref 8–23)
BUN SERPL-MCNC: 36 MG/DL (ref 8–23)
BUN SERPL-MCNC: 43 MG/DL (ref 8–23)
BUN/CREAT SERPL: 18 (ref 7–25)
BUN/CREAT SERPL: 18.9 (ref 7–25)
BUN/CREAT SERPL: 21.3 (ref 7–25)
CA-I SERPL ISE-MCNC: 1.21 MMOL/L (ref 1.12–1.32)
CA-I SERPL ISE-MCNC: 1.26 MMOL/L (ref 1.12–1.32)
CALCIUM SPEC-SCNC: 8.8 MG/DL (ref 8.6–10.5)
CALCIUM SPEC-SCNC: 8.8 MG/DL (ref 8.6–10.5)
CALCIUM SPEC-SCNC: 9.4 MG/DL (ref 8.6–10.5)
CHLORIDE SERPL-SCNC: 93 MMOL/L (ref 98–107)
CHLORIDE SERPL-SCNC: 96 MMOL/L (ref 98–107)
CHLORIDE SERPL-SCNC: 96 MMOL/L (ref 98–107)
CO2 BLDA-SCNC: 35.2 MMOL/L (ref 22–33)
CO2 BLDA-SCNC: 35.9 MMOL/L (ref 22–33)
CO2 SERPL-SCNC: 31 MMOL/L (ref 22–29)
CO2 SERPL-SCNC: 32 MMOL/L (ref 22–29)
CO2 SERPL-SCNC: 35 MMOL/L (ref 22–29)
COHGB MFR BLD: 1 % (ref 0–2)
COHGB MFR BLD: 1.2 % (ref 0–2)
CREAT SERPL-MCNC: 1.75 MG/DL (ref 0.76–1.27)
CREAT SERPL-MCNC: 2 MG/DL (ref 0.76–1.27)
CREAT SERPL-MCNC: 2.02 MG/DL (ref 0.76–1.27)
DEPRECATED RDW RBC AUTO: 47.7 FL (ref 37–54)
EGFRCR SERPLBLD CKD-EPI 2021: 35.7 ML/MIN/1.73
EGFRCR SERPLBLD CKD-EPI 2021: 36.1 ML/MIN/1.73
EGFRCR SERPLBLD CKD-EPI 2021: 42.4 ML/MIN/1.73
EPAP: 0
EPAP: 8
ERYTHROCYTE [DISTWIDTH] IN BLOOD BY AUTOMATED COUNT: 13.4 % (ref 12.3–15.4)
GLUCOSE BLDC GLUCOMTR-MCNC: 102 MG/DL (ref 70–130)
GLUCOSE BLDC GLUCOMTR-MCNC: 107 MG/DL (ref 70–130)
GLUCOSE BLDC GLUCOMTR-MCNC: 145 MG/DL (ref 70–130)
GLUCOSE BLDC GLUCOMTR-MCNC: 169 MG/DL (ref 70–130)
GLUCOSE BLDC GLUCOMTR-MCNC: 219 MG/DL (ref 70–130)
GLUCOSE SERPL-MCNC: 107 MG/DL (ref 65–99)
GLUCOSE SERPL-MCNC: 133 MG/DL (ref 65–99)
GLUCOSE SERPL-MCNC: 138 MG/DL (ref 65–99)
HCO3 BLDA-SCNC: 32.7 MMOL/L (ref 20–26)
HCO3 BLDA-SCNC: 33.5 MMOL/L (ref 20–26)
HCT VFR BLD AUTO: 30.2 % (ref 37.5–51)
HCT VFR BLD CALC: 28.6 % (ref 38–51)
HCT VFR BLD CALC: 29.4 % (ref 38–51)
HGB BLD-MCNC: 9.5 G/DL (ref 13–17.7)
HGB BLDA-MCNC: 9.3 G/DL (ref 13.5–17.5)
HGB BLDA-MCNC: 9.6 G/DL (ref 13.5–17.5)
INHALED O2 CONCENTRATION: 36 %
INHALED O2 CONCENTRATION: 40 %
IPAP: 0
IPAP: 22
Lab: ABNORMAL
Lab: ABNORMAL
MAGNESIUM SERPL-MCNC: 2.4 MG/DL (ref 1.6–2.4)
MAGNESIUM SERPL-MCNC: 2.5 MG/DL (ref 1.6–2.4)
MCH RBC QN AUTO: 30.4 PG (ref 26.6–33)
MCHC RBC AUTO-ENTMCNC: 31.5 G/DL (ref 31.5–35.7)
MCV RBC AUTO: 96.5 FL (ref 79–97)
METHGB BLD QL: 0.5 % (ref 0–1.5)
METHGB BLD QL: 0.6 % (ref 0–1.5)
MODALITY: ABNORMAL
MODALITY: ABNORMAL
NOTE: ABNORMAL
NOTE: ABNORMAL
NOTIFIED BY: ABNORMAL
NOTIFIED BY: ABNORMAL
NOTIFIED WHO: ABNORMAL
NOTIFIED WHO: ABNORMAL
OXYHGB MFR BLDV: 93.8 % (ref 94–99)
OXYHGB MFR BLDV: 96.1 % (ref 94–99)
PAW @ PEAK INSP FLOW SETTING VENT: 0 CMH2O
PAW @ PEAK INSP FLOW SETTING VENT: 0 CMH2O
PCO2 BLDA: 75.8 MM HG (ref 35–45)
PCO2 BLDA: 82.6 MM HG (ref 35–45)
PCO2 TEMP ADJ BLD: 75.8 MM HG (ref 35–48)
PCO2 TEMP ADJ BLD: 82.6 MM HG (ref 35–48)
PH BLDA: 7.21 PH UNITS (ref 7.35–7.45)
PH BLDA: 7.25 PH UNITS (ref 7.35–7.45)
PH, TEMP CORRECTED: 7.21 PH UNITS
PH, TEMP CORRECTED: 7.25 PH UNITS
PHOSPHATE SERPL-MCNC: 4.7 MG/DL (ref 2.5–4.5)
PHOSPHATE SERPL-MCNC: 5.1 MG/DL (ref 2.5–4.5)
PLATELET # BLD AUTO: 124 10*3/MM3 (ref 140–450)
PMV BLD AUTO: 11.7 FL (ref 6–12)
PO2 BLDA: 101 MM HG (ref 83–108)
PO2 BLDA: 83.2 MM HG (ref 83–108)
PO2 TEMP ADJ BLD: 101 MM HG (ref 83–108)
PO2 TEMP ADJ BLD: 83.2 MM HG (ref 83–108)
POTASSIUM SERPL-SCNC: 5.1 MMOL/L (ref 3.5–5.2)
POTASSIUM SERPL-SCNC: 5.5 MMOL/L (ref 3.5–5.2)
POTASSIUM SERPL-SCNC: 5.5 MMOL/L (ref 3.5–5.2)
QT INTERVAL: 452 MS
QT INTERVAL: 488 MS
QTC INTERVAL: 474 MS
QTC INTERVAL: 515 MS
RBC # BLD AUTO: 3.13 10*6/MM3 (ref 4.14–5.8)
SODIUM SERPL-SCNC: 132 MMOL/L (ref 136–145)
SODIUM SERPL-SCNC: 137 MMOL/L (ref 136–145)
SODIUM SERPL-SCNC: 138 MMOL/L (ref 136–145)
TOTAL RATE: 0 BREATHS/MINUTE
TOTAL RATE: 0 BREATHS/MINUTE
WBC NRBC COR # BLD: 16.8 10*3/MM3 (ref 3.4–10.8)

## 2022-09-05 PROCEDURE — 99233 SBSQ HOSP IP/OBS HIGH 50: CPT | Performed by: STUDENT IN AN ORGANIZED HEALTH CARE EDUCATION/TRAINING PROGRAM

## 2022-09-05 PROCEDURE — 25010000002 ONDANSETRON PER 1 MG: Performed by: PHYSICIAN ASSISTANT

## 2022-09-05 PROCEDURE — 94660 CPAP INITIATION&MGMT: CPT

## 2022-09-05 PROCEDURE — 85027 COMPLETE CBC AUTOMATED: CPT | Performed by: PHYSICIAN ASSISTANT

## 2022-09-05 PROCEDURE — 25010000002 PHENYLEPHRINE 10 MG/ML SOLUTION 5 ML VIAL: Performed by: PHYSICIAN ASSISTANT

## 2022-09-05 PROCEDURE — 71045 X-RAY EXAM CHEST 1 VIEW: CPT

## 2022-09-05 PROCEDURE — 25010000002 EPINEPHRINE 1 MG/ML SOLUTION 30 ML VIAL: Performed by: PHYSICIAN ASSISTANT

## 2022-09-05 PROCEDURE — 83050 HGB METHEMOGLOBIN QUAN: CPT

## 2022-09-05 PROCEDURE — 63710000001 INSULIN LISPRO (HUMAN) PER 5 UNITS: Performed by: INTERNAL MEDICINE

## 2022-09-05 PROCEDURE — 63710000001 INSULIN REGULAR HUMAN PER 5 UNITS: Performed by: NURSE PRACTITIONER

## 2022-09-05 PROCEDURE — 80069 RENAL FUNCTION PANEL: CPT | Performed by: THORACIC SURGERY (CARDIOTHORACIC VASCULAR SURGERY)

## 2022-09-05 PROCEDURE — 93010 ELECTROCARDIOGRAM REPORT: CPT | Performed by: STUDENT IN AN ORGANIZED HEALTH CARE EDUCATION/TRAINING PROGRAM

## 2022-09-05 PROCEDURE — 80048 BASIC METABOLIC PNL TOTAL CA: CPT | Performed by: PHYSICIAN ASSISTANT

## 2022-09-05 PROCEDURE — 97530 THERAPEUTIC ACTIVITIES: CPT

## 2022-09-05 PROCEDURE — 25010000002 CALCIUM GLUCONATE-NACL 1-0.675 GM/50ML-% SOLUTION: Performed by: NURSE PRACTITIONER

## 2022-09-05 PROCEDURE — 74018 RADEX ABDOMEN 1 VIEW: CPT

## 2022-09-05 PROCEDURE — 99024 POSTOP FOLLOW-UP VISIT: CPT | Performed by: PHYSICIAN ASSISTANT

## 2022-09-05 PROCEDURE — 82962 GLUCOSE BLOOD TEST: CPT

## 2022-09-05 PROCEDURE — 82330 ASSAY OF CALCIUM: CPT | Performed by: THORACIC SURGERY (CARDIOTHORACIC VASCULAR SURGERY)

## 2022-09-05 PROCEDURE — 83735 ASSAY OF MAGNESIUM: CPT | Performed by: THORACIC SURGERY (CARDIOTHORACIC VASCULAR SURGERY)

## 2022-09-05 PROCEDURE — 99233 SBSQ HOSP IP/OBS HIGH 50: CPT | Performed by: INTERNAL MEDICINE

## 2022-09-05 PROCEDURE — 36600 WITHDRAWAL OF ARTERIAL BLOOD: CPT

## 2022-09-05 PROCEDURE — 93005 ELECTROCARDIOGRAM TRACING: CPT | Performed by: STUDENT IN AN ORGANIZED HEALTH CARE EDUCATION/TRAINING PROGRAM

## 2022-09-05 PROCEDURE — 82375 ASSAY CARBOXYHB QUANT: CPT

## 2022-09-05 PROCEDURE — 82805 BLOOD GASES W/O2 SATURATION: CPT

## 2022-09-05 RX ORDER — BUMETANIDE 0.25 MG/ML
1 INJECTION INTRAMUSCULAR; INTRAVENOUS ONCE
Status: COMPLETED | OUTPATIENT
Start: 2022-09-05 | End: 2022-09-05

## 2022-09-05 RX ORDER — PROCHLORPERAZINE EDISYLATE 5 MG/ML
5 INJECTION INTRAMUSCULAR; INTRAVENOUS EVERY 6 HOURS PRN
Status: DISCONTINUED | OUTPATIENT
Start: 2022-09-05 | End: 2022-09-06

## 2022-09-05 RX ORDER — SIMETHICONE 80 MG
80 TABLET,CHEWABLE ORAL 4 TIMES DAILY PRN
Status: DISCONTINUED | OUTPATIENT
Start: 2022-09-05 | End: 2022-09-14 | Stop reason: HOSPADM

## 2022-09-05 RX ORDER — DEXTROSE MONOHYDRATE 25 G/50ML
50 INJECTION, SOLUTION INTRAVENOUS ONCE
Status: COMPLETED | OUTPATIENT
Start: 2022-09-05 | End: 2022-09-05

## 2022-09-05 RX ORDER — CALCIUM GLUCONATE 20 MG/ML
1 INJECTION, SOLUTION INTRAVENOUS ONCE
Status: COMPLETED | OUTPATIENT
Start: 2022-09-05 | End: 2022-09-05

## 2022-09-05 RX ORDER — AMOXICILLIN 250 MG
2 CAPSULE ORAL 2 TIMES DAILY
Status: DISCONTINUED | OUTPATIENT
Start: 2022-09-05 | End: 2022-09-14 | Stop reason: HOSPADM

## 2022-09-05 RX ADMIN — ACETAMINOPHEN 650 MG: 325 TABLET, FILM COATED ORAL at 13:02

## 2022-09-05 RX ADMIN — ASPIRIN 325 MG: 325 TABLET, COATED ORAL at 09:16

## 2022-09-05 RX ADMIN — SODIUM CHLORIDE 500 ML: 9 INJECTION, SOLUTION INTRAVENOUS at 06:34

## 2022-09-05 RX ADMIN — ATORVASTATIN CALCIUM 40 MG: 40 TABLET, FILM COATED ORAL at 21:14

## 2022-09-05 RX ADMIN — CHLORHEXIDINE GLUCONATE 15 ML: 1.2 SOLUTION ORAL at 09:15

## 2022-09-05 RX ADMIN — BUMETANIDE 1 MG: 0.25 INJECTION INTRAMUSCULAR; INTRAVENOUS at 23:24

## 2022-09-05 RX ADMIN — OXYCODONE 10 MG: 5 TABLET ORAL at 00:14

## 2022-09-05 RX ADMIN — CHLORHEXIDINE GLUCONATE 15 ML: 1.2 SOLUTION ORAL at 21:14

## 2022-09-05 RX ADMIN — PHENYLEPHRINE HYDROCHLORIDE 0.5 MCG/KG/MIN: 10 INJECTION INTRAVENOUS at 02:20

## 2022-09-05 RX ADMIN — EPINEPHRINE 0.05 MCG/KG/MIN: 1 INJECTION INTRAMUSCULAR; INTRAVENOUS; SUBCUTANEOUS at 17:42

## 2022-09-05 RX ADMIN — OXYCODONE 10 MG: 5 TABLET ORAL at 04:09

## 2022-09-05 RX ADMIN — ACETAMINOPHEN 650 MG: 325 TABLET, FILM COATED ORAL at 21:14

## 2022-09-05 RX ADMIN — PHENYLEPHRINE HYDROCHLORIDE 0.5 MCG/KG/MIN: 10 INJECTION INTRAVENOUS at 14:34

## 2022-09-05 RX ADMIN — ONDANSETRON 4 MG: 2 INJECTION INTRAMUSCULAR; INTRAVENOUS at 02:11

## 2022-09-05 RX ADMIN — SODIUM BICARBONATE 50 MEQ: 84 INJECTION, SOLUTION INTRAVENOUS at 06:34

## 2022-09-05 RX ADMIN — INSULIN HUMAN 10 UNITS: 100 INJECTION, SOLUTION PARENTERAL at 21:14

## 2022-09-05 RX ADMIN — SODIUM ZIRCONIUM CYCLOSILICATE 10 G: 10 POWDER, FOR SUSPENSION ORAL at 21:15

## 2022-09-05 RX ADMIN — CALCIUM GLUCONATE 1 G: 20 INJECTION, SOLUTION INTRAVENOUS at 21:15

## 2022-09-05 RX ADMIN — OXYCODONE 10 MG: 5 TABLET ORAL at 09:15

## 2022-09-05 RX ADMIN — INSULIN HUMAN 10 UNITS: 100 INJECTION, SOLUTION PARENTERAL at 06:34

## 2022-09-05 RX ADMIN — SIMETHICONE 80 MG: 80 TABLET, CHEWABLE ORAL at 18:40

## 2022-09-05 RX ADMIN — INSULIN LISPRO 2 UNITS: 100 INJECTION, SOLUTION INTRAVENOUS; SUBCUTANEOUS at 09:16

## 2022-09-05 RX ADMIN — SENNOSIDES AND DOCUSATE SODIUM 2 TABLET: 50; 8.6 TABLET ORAL at 13:01

## 2022-09-05 RX ADMIN — SODIUM ZIRCONIUM CYCLOSILICATE 10 G: 10 POWDER, FOR SUSPENSION ORAL at 06:33

## 2022-09-05 RX ADMIN — DEXTROSE MONOHYDRATE 50 ML: 25 INJECTION, SOLUTION INTRAVENOUS at 06:34

## 2022-09-05 RX ADMIN — AMIODARONE HYDROCHLORIDE 200 MG: 200 TABLET ORAL at 13:02

## 2022-09-05 RX ADMIN — DEXTROSE MONOHYDRATE 50 ML: 25 INJECTION, SOLUTION INTRAVENOUS at 21:15

## 2022-09-05 RX ADMIN — SENNOSIDES AND DOCUSATE SODIUM 2 TABLET: 50; 8.6 TABLET ORAL at 21:14

## 2022-09-05 RX ADMIN — CALCIUM GLUCONATE 1 G: 20 INJECTION, SOLUTION INTRAVENOUS at 06:33

## 2022-09-05 RX ADMIN — SODIUM BICARBONATE 50 MEQ: 84 INJECTION, SOLUTION INTRAVENOUS at 21:14

## 2022-09-05 RX ADMIN — AMIODARONE HYDROCHLORIDE 200 MG: 200 TABLET ORAL at 18:34

## 2022-09-05 NOTE — PROGRESS NOTES
Cardiothoracic Surgery Progress Note      POD #3  s/p MVR, LAAL     LOS: 3 days      Subjective:  Afib, on jared. Excessive belching    Objective:  Vital Signs  Temp:  [97.5 °F (36.4 °C)-99.1 °F (37.3 °C)] 99.1 °F (37.3 °C)  Heart Rate:  [53-84] 53  Resp:  [15-26] 16  BP: ()/(45-87) 96/74  Arterial Line BP: ()/(37-56) 71/56    Physical Exam:   General Appearance: alert, appears stated age and cooperative   Lungs: clear to auscultation, respirations regular, respirations even and respirations unlabored   Heart: irregularly irregular rhythm   Skin: Incision c/d/i    Output by Drain (mL) 09/04/22 0701 - 09/04/22 1900 09/04/22 1901 - 09/05/22 0700 09/05/22 0701 - 09/05/22 0736 Range Total   Y Chest Tube 3 and 4 3 Anterior 4 Anterior 100 110  210        Results:  Results from last 7 days   Lab Units 09/05/22  0407   WBC 10*3/mm3 16.80*   HEMOGLOBIN g/dL 9.5*   HEMATOCRIT % 30.2*   PLATELETS 10*3/mm3 124*     Results from last 7 days   Lab Units 09/05/22  0407   SODIUM mmol/L 132*   POTASSIUM mmol/L 5.5*   CHLORIDE mmol/L 93*   CO2 mmol/L 31.0*   BUN mg/dL 33*   CREATININE mg/dL 1.75*   GLUCOSE mg/dL 133*   CALCIUM mg/dL 8.8       Assessment:  POD #3  s/p MVR, LAAL  Elevated creatinine    Plan:  Place NG   Keep right chest tube one more day      Carleen Torres PA-C  09/05/22  07:35 EDT

## 2022-09-05 NOTE — PROGRESS NOTES
[facility] at [facility]  INPATIENT PROGRESS NOTE         [facility]    9/5/2022      PATIENT IDENTIFICATION:   Name:  Damián Myers      MRN:  [id number]     66 y.o.  male             Reason for visit: MVR, SVT, hypertension      SUBJECTIVE:   Doing well this morning.  Went into atrial fibrillation was started on amiodarone protocol.  Was noted to have some pauses and amiodarone was switched to oral this morning.  Is not having any palpitations.  Incisional pain is doing okay.  He is having some stomach upset and frequent belching this morning.     OBJECTIVE:  Vitals:    09/05/22 0830 09/05/22 0845 09/05/22 0900 09/05/22 0915   BP: 113/50 115/61 118/52 103/61   BP Location:       Patient Position:       Pulse: 55 69 63 61   Resp:       Temp:       TempSrc:       SpO2: 95% 99% 100% 98%   Weight:       Height:         FiO2 (%): 40 %     Body mass index is 31.81 kg/m².    Intake/Output Summary (Last 24 hours) at 9/5/2022 0941  Last data filed at 9/5/2022 0634  Gross per 24 hour   Intake 1419 ml   Output 920 ml   Net 499 ml       Telemetry: Personally reviewed, normal sinus rhythm, frequent PACs and PVCs.    Exam:  General Appearance:   · well developed  · well nourished  Neck:  · thyroid not enlarged  · supple  Respiratory:  · no respiratory distress  · normal breath sounds  · no rales  Cardiovascular:  · no jugular venous distention  · regular rhythm  · apical impulse normal  · S1 normal, S2 normal  · no S3, no S4   · no murmur  · no rub, no thrill  · carotid pulses normal; no bruit  · pedal pulses normal  · lower extremity edema: 2+  Skin:   warm, dry      Allergies   Allergen Reactions   • Other Unknown - High Severity     Pt had a reaction to anesthesia when placing stents        Scheduled meds:       acetaminophen, 650 mg, Oral, Q8H  amiodarone, 200 mg, Oral, Once   Followed by  amiodarone, 200 mg, Oral, Q8H   Followed by  [START ON 9/12/2022] amiodarone, 200 mg, Oral,  Q12H   Followed by  [START ON 9/26/2022] amiodarone, 200 mg, Oral, Daily  aspirin, 325 mg, Oral, Daily  atorvastatin, 40 mg, Oral, Nightly  chlorhexidine, 15 mL, Mouth/Throat, Q12H  insulin lispro, 0-7 Units, Subcutaneous, 4x Daily With Meals & Nightly  metoprolol tartrate, 12.5 mg, Oral, Q12H  sodium chloride, 500 mL, Intravenous, Once      IV meds:                      amiodarone, 0.5 mg/min, Last Rate: Stopped (09/05/22 0634)  dextrose 5 % with KCl 20 mEq, 30 mL/hr, Last Rate: 30 mL/hr (09/02/22 1424)  EPINEPHrine, 0.02-0.3 mcg/kg/min, Last Rate: 0.02 mcg/kg/min (09/05/22 0810)  phenylephrine, 0.5-3 mcg/kg/min, Last Rate: 0.5 mcg/kg/min (09/05/22 0941)      Data Review:  Results from last 7 days   Lab Units 09/05/22  0407 09/04/22  0226 09/03/22  0215 09/02/22  2126   SODIUM mmol/L 132* 141 141 143   BUN mg/dL 33* 17 17 18   CREATININE mg/dL 1.75* 1.08 1.31* 1.21   GLUCOSE mg/dL 133* 155* 175* 176*     Results from last 7 days   Lab Units 09/05/22  0407 09/04/22  0226 09/03/22  0215 09/02/22  2126 09/02/22  1704   WBC 10*3/mm3 16.80* 14.76* 9.38 10.68 9.19   HEMOGLOBIN g/dL 9.5* 8.5* 8.5* 8.4* 8.4*     Results from last 7 days   Lab Units 09/03/22  0215 09/02/22  1704 09/02/22  1401 08/30/22  1246   INR  1.12 1.44* 1.56* 0.97     Results from last 7 days   Lab Units 08/30/22  1246   ALT (SGPT) U/L 14   AST (SGOT) U/L 37     No results found for: DIGOXIN   Lab Results   Component Value Date    TSH 3.170 06/25/2022           Invalid input(s): LDLCALC    Estimated Creatinine Clearance: 56.8 mL/min (A) (by C-G formula based on SCr of 1.75 mg/dL (H)).        Imaging (last 24 hr):   I personally reviewed the most recent chest x-ray and other pertinent imaging studies.  Results for orders placed during the hospital encounter of 09/02/22    XR Abdomen KUB    Narrative  DATE OF EXAM: 9/5/2022 7:43 AM    PROCEDURE: XR ABDOMEN KUB-    INDICATIONS: r/o ileus; I34.0-Nonrheumatic mitral (valve) insufficiency    COMPARISON: No  comparisons available.    TECHNIQUE: 2 frontal views of the supine abdomen    FINDINGS:  Lower thorax redemonstrates mitral valve replacement, median sternotomy  wires, bilateral thoracostomy tubes, and bilateral pleural effusions. A  Rose catheter is noted within the bladder. Nonobstructive, nonspecific  bowel gas pattern. There is mild gaseous distention of the stomach.  There is scattered gas in small amount of stool seen throughout the  colon. Unremarkable appearance of nondilated scattered gas-filled small  bowel loops without small bowel dilatation. No evidence of  pneumoperitoneum although supine radiographs are insensitive for this  finding. There are no abnormal abdominal calcifications. No acute  osseous findings.    Impression  Nonobstructive, nonspecific bowel gas pattern. Mild gaseous distention  of the stomach.    This report was finalized on 9/5/2022 8:11 AM by Jackson Car MD.        Last ECHO:  Results for orders placed in visit on 09/02/22    Emergent/Open-Heart Anesthesia KENNEY  Baseline Exam:  - Lv is normal in size with mild global hypokinesis, worse in the anteroseptal regions. LVEF is 45-50%.  - RV is normal in size with mildly hypokinesis.l  - The MV is morphologically normal. There is redundant anterior leaflet tissue and flail P2 from ruptured chordae. There is resultant severe MR. VC is 0.78cm. Regurgitant volume is 59mL and EROA is 0.4cm^2.  - No other significant valvular abnormalities. There is trace to mild TR.  - No significant pathology in the visualized portions of the aorta.    Post-procedure Exam:  - Interval replacement of mitral valve with 33mm bioprosthetic. On initial separation from CPB there was large PVL necessitating closure with CPB.  - On second separation from there is slightly worse LV function. RV function appears stable.  - No inotropic support.  - There is a bioprosthetic valve in the native mitral location. It appears well seated and leaflets exhibit adequate  excursion. There is a small PVL near ANGELICA and tiny posterior PVL. The mean PG is 1mmHg across the valve.  - No other new significant valvular abnormalities.  - No evidence of dissection in the visualized portions of the aorta.        PROBLEM LIST:     Severe mitral regurgitation    Obesity (BMI 30-39.9)    Chronic systolic heart failure (HCC)    Coronary artery disease of native artery of native heart with stable angina pectoris (HCC)    HTN (hypertension)      Initial cardiac assessment: 66-year-old gentleman status post bioprosthetic MVR by Dr. Chan 9/2/2022 with left atrial pended clip.  Preop EF 45-50%, nonobstructive disease on preop cath.    ASSESSMENT/PLAN:  1. 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 Chan  - discordant EF measurements by echo pre-op, however 45-50% by intra-op KENNEY prior to surgery   - continue routine post-op care, currently on low dose epinephrine   Required multiple blood products POD 0  -Appears fairly euvolemic today will not give any other diuretics today.     2. SHF  - EF reported as 36-40% by TTE in June and normal by KENNEY during same hospitalization   - will need repeat echo prior to discharge to re-look LVEF     3. CAD  - recent cath 06/2022 with mild nonobstructive CAD  - continue ASA, statin      4. Dyslipidemia  - continue Lipitor 40mg to target LDL <70 given history of CAD and prior LAD stent    5.  PVCs  -He was noted to have frequent PVCs on his monitor postoperatively.  PA catheter was found to be in the RVOT so this was removed.  However looking at his EKG from September 2 showing frequent PVCs, the morphology of those was not consistent with RVOT origin.  The morphology of these PVCs is somewhat unusual with qrS complex in V1.  The unusual morphology of the PVC may be somewhat related to recent heart surgery.  He was noted to have frequent PVCs dating back to  2019 with bigeminy at times.  EKG from then shows possible mitral valve annular or papillary muscle site of PVCs.  -He remains asymptomatic with these PVCs and I am not overly concerned by them currently.     6.  Postoperative atrial fibrillation  -He did go into atrial fibrillation on September 4.  He was started on IV amiodarone protocol for this.  Amiodarone was switched to p.o. this morning due to pauses seen on telemetry.  He did not seem to have any symptoms related to his run of atrial fibrillation.  He does have a family history as his mother had A. Fib.  -He currently scheduled on p.o. amiodarone which I think is reasonable to continue.  On review of his telemetry his longest pause was approximately 2.4 seconds.  Would not worry about pauses less than 3.5 seconds in atrial fibrillation, especially if he is not having any symptoms.  - Would continue to titrate his vasopressors.  Any vasopressor with beta agonist activity may potentially worsen episodes of atrial fibrillation.    Tyson Villalobos MD  9/5/2022    09:41 EDT

## 2022-09-05 NOTE — PLAN OF CARE
Goal Outcome Evaluation:    Neuro intact. Sats > 92% on 2L NC. Remains afib rate controlled. Amio gtt stopped this am due to continued ectopy/pauses. Maintaining hemodynamics on low dose jared. Marginal UOP. Frequently c/o nausea and noted to be belching frequently throughout the shift. No relief with prn zofran. Notified SREEKANTH Marie, awaiting KUB.

## 2022-09-05 NOTE — PROGRESS NOTES
INTENSIVIST   PROGRESS NOTE     Hospital:  LOS: 3 days     Mr. Damián Myers, 66 y.o. male is followed for a Chief Complaint of: Postoperative medical management      Subjective   S     Interval History:  Afib overnight and started on Amiodarone protocol. IV Amiodarone discontinued secondary to bradycardia with pauses. He remains on Neosynephrine. He had 1 episode of emesis yesterday. He continues to have frequent belching.      The patient's relevant past medical, surgical and social history were reviewed and updated in Epic as appropriate.      ROS:   Constitutional: Negative for fever.   Respiratory: Negative for dyspnea.   Cardiovascular: Negative for chest pain.   Gastrointestinal: Positive for nausea and vomiting.        Objective   O     Vitals:  Temp  Min: 97.5 °F (36.4 °C)  Max: 99.1 °F (37.3 °C)  BP  Min: 81/58  Max: 125/59  Pulse  Min: 53  Max: 84  Resp  Min: 15  Max: 26  SpO2  Min: 83 %  Max: 100 % Flow (L/min)  Min: 2  Max: 3    Intake/Ouptut 24 hrs (7:00AM - 6:59 AM)  Intake & Output (last 3 days)       09/02 0701  09/03 0700 09/03 0701  09/04 0700 09/04 0701  09/05 0700 09/05 0701  09/06 0700    P.O.  200      I.V. (mL/kg) 5080.8 (44.2) 1009.6 (8.8) 919 (8)     Blood 3214       NG/       IV Piggyback 100 100 500     Total Intake(mL/kg) 8514.8 (74) 1309.6 (11.4) 1419 (12.3)     Urine (mL/kg/hr) 2480 (0.9) 3065 (1.1) 830 (0.3)     Emesis/NG output 20       Chest Tube 2065 290 260     Total Output 4565 3355 1090     Net +3949.8 -2045.4 +329                   Medications (drips):  amiodarone, Last Rate: Stopped (09/05/22 0634)  dextrose 5 % with KCl 20 mEq, Last Rate: 30 mL/hr (09/02/22 1424)  EPINEPHrine, Last Rate: 0.02 mcg/kg/min (09/05/22 0810)  phenylephrine, Last Rate: 0.5 mcg/kg/min (09/05/22 0941)        Physical Examination  Telemetry:  Sinus bradycardia.    Constitutional:  No acute distress.  Sitting up in a chair on nasal cannula.    Eyes: No scleral icterus.   PERRL, EOM intact.    Neck:   Supple, FROM   Cardiovascular: Normal rate, regular and rhythm. Normal heart sounds.  No murmurs, gallop or rub.   Respiratory: No respiratory distress. Normal respiratory effort.  Normal breath sounds  Clear to auscultation  Chest tube with serosanguinous drainage. No air leak.    Abdominal:  Soft. No masses. Nontender. No distension. No HSM.   Extremities: No digital cyanosis. No clubbing.  No peripheral edema.   Skin: No rashes, lesions or ulcers   Neurological:   Alert and interactive.                 Results from last 7 days   Lab Units 09/05/22  0407 09/04/22  0226 09/03/22  0215   WBC 10*3/mm3 16.80* 14.76* 9.38   HEMOGLOBIN g/dL 9.5* 8.5* 8.5*   MCV fL 96.5 94.2 93.8   PLATELETS 10*3/mm3 124* 142 178     Results from last 7 days   Lab Units 09/05/22  0959 09/05/22  0407 09/04/22  0226 09/03/22  1352 09/03/22  0215 09/02/22  2126 09/02/22  1704   SODIUM mmol/L 138 132* 141  --  141   < > 143   POTASSIUM mmol/L 5.1 5.5* 5.0 4.2 4.2   < > 4.1   CO2 mmol/L 35.0* 31.0* 33.0*  --  27.0   < > 26.0   CREATININE mg/dL 2.00* 1.75* 1.08  --  1.31*   < > 1.17   GLUCOSE mg/dL 107* 133* 155*  --  175*   < > 173*   MAGNESIUM mg/dL 2.5*  --  2.2 2.3 2.6*  --  1.8   PHOSPHORUS mg/dL 5.1*  --   --   --  3.7  --  2.7    < > = values in this interval not displayed.     Estimated Creatinine Clearance: 49.7 mL/min (A) (by C-G formula based on SCr of 2 mg/dL (H)).  Results from last 7 days   Lab Units 08/30/22  1246   ALK PHOS U/L 73   BILIRUBIN mg/dL 0.7   ALT (SGPT) U/L 14   AST (SGOT) U/L 37       Results from last 7 days   Lab Units 09/03/22  1001 09/03/22  0154 09/02/22  1354 09/02/22  1243 09/02/22  1152   PH, ARTERIAL pH units 7.314* 7.324* 7.405 7.52 7.47   PCO2, ARTERIAL mm Hg 56.0* 52.0* 42.9  --   --    PO2 ART mm Hg 77.3* 71.2* 185.0*  --   --    FIO2 % 40 40 100  --   --        Images:  Imaging Results (Last 24 Hours)     Procedure Component Value Units Date/Time    XR Abdomen KUEARLE [[id number]] Collected: 09/05/22 0808      Updated: 09/05/22 0814    Narrative:      DATE OF EXAM: 9/5/2022 7:43 AM     PROCEDURE: XR ABDOMEN KUB-     INDICATIONS: r/o ileus; I34.0-Nonrheumatic mitral (valve) insufficiency     COMPARISON: No comparisons available.     TECHNIQUE: 2 frontal views of the supine abdomen     FINDINGS:  Lower thorax redemonstrates mitral valve replacement, median sternotomy  wires, bilateral thoracostomy tubes, and bilateral pleural effusions. A  Rose catheter is noted within the bladder. Nonobstructive, nonspecific  bowel gas pattern. There is mild gaseous distention of the stomach.  There is scattered gas in small amount of stool seen throughout the  colon. Unremarkable appearance of nondilated scattered gas-filled small  bowel loops without small bowel dilatation. No evidence of  pneumoperitoneum although supine radiographs are insensitive for this  finding. There are no abnormal abdominal calcifications. No acute  osseous findings.        Impression:      Nonobstructive, nonspecific bowel gas pattern. Mild gaseous distention  of the stomach.     This report was finalized on 9/5/2022 8:11 AM by Jackson Car MD.       XR Chest 1 View [[id number]] Collected: 09/05/22 0658     Updated: 09/05/22 0702    Narrative:      DATE OF EXAM: 9/5/2022 4:03 AM     PROCEDURE: XR CHEST 1 VW-     INDICATIONS: chest tube management; I34.0-Nonrheumatic mitral (valve)  insufficiency.      COMPARISON: Chest x-ray 9/4/2022     TECHNIQUE: Single frontal view of the chest.     FINDINGS:  Stable medical support devices. Stable cardiomediastinal silhouette with  mild cardiomegaly. Similar small bilateral pleural effusions and  associated bibasilar atelectasis. No definite pneumothorax.       Impression:      No significant interval change.     This report was finalized on 9/5/2022 6:59 AM by Jackson Car MD.               Results: Reviewed.  I reviewed the patient's new laboratory and imaging results.  I independently reviewed the patient's new  images.    Medications: Reviewed.    Assessment & Plan   A / P     Mr. Myers is a 65 yo male with PMH hypertension, congestive heart failure and coronary artery disease who presents to the ICU status post MVR with Dr. Chan.  Patient was admitted to [facility] in July for congestive heart failure.  During that admission he underwent left heart cath that revealed nonobstructive CAD but was suggestive of severe mitral valve regurgitation.  KENNEY revealed moderate MVR with mild prolapse.  For that he was sent to Dr. Chan's office for evaluation.       The patient underwent mitral valve replacement with left atrial appendage ligation per Dr. Chan.  He is brought in an intubated state to the intensive care unit postoperatively.    He was extubated on 9/3.     He developed Afib on 9/4 and was started on IV Amiodarone. This was discontinued secondary to bradycardia with sinus pauses. He is on PO Amiodarone. He remains on Neosynephrine.     Renal function is slightly worse this AM.     He is having dyspepsia and had 1 episode of vomiting on 9/4. He does not want to have an NG tube placed.       Nutrition:   Diet Regular; Cardiac, Consistent Carbohydrate  Advance Directives:   Code Status and Medical Interventions:   Ordered at: 09/02/22 1351     Code Status (Patient has no pulse and is not breathing):    CPR (Attempt to Resuscitate)     Medical Interventions (Patient has pulse or is breathing):    Full Support     Release to patient:    Routine Release       Active Hospital Problems    Diagnosis    • **Severe mitral regurgitation    • HTN (hypertension)    • Chronic systolic heart failure (HCC)    • Coronary artery disease of native artery of native heart with stable angina pectoris (HCC)    • Obesity (BMI 30-39.9)        Assessment / Plan:    1. Wean supplemental O2.    2. Continue current insulin regimen.    3. Titrate Neosynephrine. Target SBP > 100 to help renal perfusion.   4. Hold diuretics again today.    5. Chest tube management per CTS.   6. Zofran and Compazine for nausea.   7. Start Simethicone.   8. CLD until dyspepsia improves.   9. If he has another episode of vomiting, I have discussed with the patient that he will need to have the NG tube replaced.   10. Continue to monitor renal function. Treat hyperkalemia as needed.   11. AM labs and CXR    High risk secondary to management of vasopressor titration.     High level of risk due to:  severe exacerbation of chronic illness and illness with threat to life or bodily function.    Plan of care and goals reviewed during interdisciplinary rounds.  I discussed the patient's findings and my recommendations with patient, family and nursing staff      Dawna Wiggins, DO    Intensive Care Medicine and Pulmonary Medicine

## 2022-09-05 NOTE — PLAN OF CARE
Goal Outcome Evaluation:  Plan of Care Reviewed With: patient, spouse        Progress: improving  Outcome Evaluation: Pt improved ambulation distance to 60ft with min A x1 +1 for equipment management. Pt continues to demo decreased david and short shuffled steps during session despite VCs to improve. Near end of session, pt was able to improve step length and gait speed but unable to ambulate further d/t incisional pain and fatigue. Continue to progress per pt tolerance.

## 2022-09-05 NOTE — THERAPY TREATMENT NOTE
Patient Name: Damián Myers  : 1955    MRN: [id number]                              Today's Date: 2022       Admit Date: 2022    Visit Dx:     ICD-10-CM ICD-9-CM   1. Severe mitral regurgitation  I34.0 424.0     Patient Active Problem List   Diagnosis   • Hyperglycemia   • Obesity (BMI 30-39.9)   • Chronic systolic heart failure (HCC)   • Coronary artery disease of native artery of native heart with stable angina pectoris ([facility])   • Stroke (cerebrum) ([facility])   • Acute pulmonary edema ([facility])   • TIA (transient ischemic attack)   • HTN (hypertension)   • HLD (hyperlipidemia)   • COVID-19   • Acute on chronic respiratory failure with hypoxia and hypercapnia ([facility])   • Cytokine release syndrome, grade 4   • Acute on chronic congestive heart failure, unspecified heart failure type ([facility])   • Severe mitral regurgitation     Past Medical History:   Diagnosis Date   • CAD (coronary artery disease)    • Cardiac abnormality    • CHF (congestive heart failure) ([facility])    • Coma of unknown cause ([facility])     Pt states he was in a coma for one week, unknown cause   • Hypertension    • Mitral regurgitation    • Obesity (BMI 30-39.9) 2019   • Spinal headache      Past Surgical History:   Procedure Laterality Date   • CARDIAC CATHETERIZATION N/A 2019    Procedure: Left Heart Cath;  Surgeon: Sherrell Hart MD;  Location:  [facility];  Service: Cardiology   • CARDIAC CATHETERIZATION N/A 2022    Procedure: Left Heart Cath;  Surgeon: Greyson Balderas MD;  Location:  [facility];  Service: Cardiology;  Laterality: N/A;   • COLONOSCOPY     • CORONARY STENT PLACEMENT        General Information     Row Name 22 1333          Physical Therapy Time and Intention    Document Type therapy note (daily note)  -AE     Mode of Treatment physical therapy  -AE     Row Name 22 1333          General Information    Existing Precautions/Restrictions  cardiac;fall;oxygen therapy device and L/min;sternal;other (see comments)  -AE     Barriers to Rehab medically complex  -AE     Row Name 09/05/22 1333          Cognition    Orientation Status (Cognition) oriented x 3  -AE     Row Name 09/05/22 1333          Safety Issues, Functional Mobility    Safety Issues Affecting Function (Mobility) awareness of need for assistance;insight into deficits/self-awareness;safety precaution awareness;safety precautions follow-through/compliance;sequencing abilities  -AE     Impairments Affecting Function (Mobility) balance;coordination;endurance/activity tolerance;pain;postural/trunk control;shortness of breath;strength  -AE     Comment, Safety Issues/Impairments (Mobility) Delayed processing at times.  -AE           User Key  (r) = Recorded By, (t) = Taken By, (c) = Cosigned By    Initials Name Provider Type    AE Andrew Shearer, PT Physical Therapist               Mobility     Row Name 09/05/22 1333          Bed Mobility    Comment, (Bed Mobility) Pt received and left UI.  -AE     Row Name 09/05/22 1333          Transfers    Comment, (Transfers) VCs for hand placement and sequencing to maintain sternal precautions. Pt requires increased time to complete all transfers.  -AE     Row Name 09/05/22 1333          Sit-Stand Transfer    Sit-Stand Finley (Transfers) minimum assist (75% patient effort);1 person assist;verbal cues  -AE     Row Name 09/05/22 1333          Gait/Stairs (Locomotion)    Finley Level (Gait) minimum assist (75% patient effort);1 person assist;1 person to manage equipment;verbal cues  -AE     Assistive Device (Gait) other (see comments)  BUE support on tele monitor  -AE     Distance in Feet (Gait) 60  -AE     Deviations/Abnormal Patterns (Gait) bilateral deviations;base of support, narrow;david decreased;gait speed decreased;festinating/shuffling;stride length decreased  -AE     Bilateral Gait Deviations forward flexed posture;heel strike decreased   -AE     Comment, (Gait/Stairs) Pt demo step to gait pattern with slowed david and short shuffled steps that could not be corrected despite VCs. Pt reports decreased nausea/dizziness this session but continues to demo decreased balance and sequencing during gait. Pt requires frequent cues for encouragement with multiple standing rest breaks throughout session d/t increased incisional pain.  -AE           User Key  (r) = Recorded By, (t) = Taken By, (c) = Cosigned By    Initials Name Provider Type    AE Andrew Shearer PT Physical Therapist               Obj/Interventions     Row Name 09/05/22 9150          Balance    Balance Assessment sitting static balance;sitting dynamic balance;sit to stand dynamic balance;standing static balance;standing dynamic balance  -AE     Static Sitting Balance contact guard  -AE     Dynamic Sitting Balance contact guard  -AE     Position, Sitting Balance unsupported;sitting in chair  -AE     Sit to Stand Dynamic Balance minimal assist;1-person assist;verbal cues  -AE     Static Standing Balance contact guard;1-person assist;1 person to manage equipment;verbal cues  -AE     Dynamic Standing Balance minimal assist;1-person assist;1 person to manage equipment;verbal cues  -AE     Position/Device Used, Standing Balance supported  -AE           User Key  (r) = Recorded By, (t) = Taken By, (c) = Cosigned By    Initials Name Provider Type    Andrew Patten PT Physical Therapist               Goals/Plan    No documentation.                Clinical Impression     Row Name 09/05/22 1346          Pain Scale: FACES Pre/Post-Treatment    Pain: FACES Scale, Pretreatment 4-->hurts little more  -AE     Posttreatment Pain Rating 4-->hurts little more  -AE     Pain Location incisional  -AE     Pain Location - chest  -AE     Pre/Posttreatment Pain Comment tolerated; RN aware  -AE     Row Name 09/05/22 1348          Plan of Care Review    Plan of Care Reviewed With patient;spouse  -AE      Progress improving  -AE     Outcome Evaluation Pt improved ambulation distance to 60ft with min A x1 +1 for equipment management. Pt continues to demo decreased david and short shuffled steps during session despite VCs to improve. Near end of session, pt was able to improve step length and gait speed but unable to ambulate further d/t incisional pain and fatigue. Continue to progress per pt tolerance.  -AE     Row Name 09/05/22 1340          Vital Signs    Pre Systolic BP Rehab 108  -AE     Pre Treatment Diastolic BP 62  -AE     Post Systolic BP Rehab 104  -AE     Post Treatment Diastolic BP 54  -AE     Pretreatment Heart Rate (beats/min) 64  -AE     Posttreatment Heart Rate (beats/min) 53  -AE     Pre SpO2 (%) 100  -AE     O2 Delivery Pre Treatment nasal cannula  -AE     O2 Delivery Intra Treatment nasal cannula  -AE     Post SpO2 (%) 92  -AE     O2 Delivery Post Treatment nasal cannula  -AE     Pre Patient Position Sitting  -AE     Intra Patient Position Standing  -AE     Post Patient Position Sitting  -AE     Row Name 09/05/22 1340          Positioning and Restraints    Pre-Treatment Position sitting in chair/recliner  -AE     Post Treatment Position chair  -AE     In Chair reclined;call light within reach;encouraged to call for assist;with family/caregiver;with nsg;RUE elevated;LUE elevated;waffle cushion;legs elevated  -AE           User Key  (r) = Recorded By, (t) = Taken By, (c) = Cosigned By    Initials Name Provider Type    AE Andrew Shearer, PT Physical Therapist               Outcome Measures     Row Name 09/05/22 1344 09/05/22 0800       How much help from another person do you currently need...    Turning from your back to your side while in flat bed without using bedrails? 2  -AE 2  -KK    Moving from lying on back to sitting on the side of a flat bed without bedrails? 2  -AE 2  -KK    Moving to and from a bed to a chair (including a wheelchair)? 3  -AE 3  -KK    Standing up from a chair using your  arms (e.g., wheelchair, bedside chair)? 3  -AE 3  -KK    Climbing 3-5 steps with a railing? 2  -AE 1  -KK    To walk in hospital room? 3  -AE 2  -KK    AM-PAC 6 Clicks Score (PT) 15  -AE 13  -KK    Highest level of mobility 4 --> Transferred to chair/commode  -AE 4 --> Transferred to chair/commode  -KK    Row Name 09/05/22 1344          Functional Assessment    Outcome Measure Options AM-PAC 6 Clicks Basic Mobility (PT)  -AE           User Key  (r) = Recorded By, (t) = Taken By, (c) = Cosigned By    Initials Name Provider Type    KK Neida Smith RN Registered Nurse    AE Andrew Shearer, PT Physical Therapist                             Physical Therapy Education                 Title: PT OT SLP Therapies (In Progress)     Topic: Physical Therapy (In Progress)     Point: Mobility training (In Progress)     Learning Progress Summary           Patient Acceptance, E, NR by AE at 9/5/2022 1035    Acceptance, E, NR by KG at 9/4/2022 0914                   Point: Home exercise program (Not Started)     Learner Progress:  Not documented in this visit.          Point: Body mechanics (In Progress)     Learning Progress Summary           Patient Acceptance, E, NR by AE at 9/5/2022 1035    Acceptance, E, NR by KG at 9/4/2022 0914                   Point: Precautions (In Progress)     Learning Progress Summary           Patient Acceptance, E, NR by AE at 9/5/2022 1035    Acceptance, E, NR by KG at 9/4/2022 0914                               User Key     Initials Effective Dates Name Provider Type Discipline    KG 05/22/20 -  Hansa Deal, PT Physical Therapist PT    AE 09/21/21 -  Andrew Shearer PT Physical Therapist PT              PT Recommendation and Plan     Plan of Care Reviewed With: patient, spouse  Progress: improving  Outcome Evaluation: Pt improved ambulation distance to 60ft with min A x1 +1 for equipment management. Pt continues to demo decreased david and short shuffled steps during  session despite VCs to improve. Near end of session, pt was able to improve step length and gait speed but unable to ambulate further d/t incisional pain and fatigue. Continue to progress per pt tolerance.     Time Calculation:    PT Charges     Row Name 09/05/22 1345             Time Calculation    Start Time 1035  -AE      PT Received On 09/05/22  -AE      PT Goal Re-Cert Due Date 09/14/22  -AE              Timed Charges    06469 - PT Therapeutic Activity Minutes 25  -AE              Total Minutes    Timed Charges Total Minutes 25  -AE       Total Minutes 25  -AE            User Key  (r) = Recorded By, (t) = Taken By, (c) = Cosigned By    Initials Name Provider Type    AE Andrew Shearer, PT Physical Therapist              Therapy Charges for Today     Code Description Service Date Service Provider Modifiers Qty    [id number]  PT THERAPEUTIC ACT EA 15 MIN 9/5/2022 Andrew Shearer PT GP 2          PT G-Codes  Outcome Measure Options: AM-PAC 6 Clicks Basic Mobility (PT)  AM-PAC 6 Clicks Score (PT): 15    Andrew Shearer PT  9/5/2022

## 2022-09-06 ENCOUNTER — APPOINTMENT (OUTPATIENT)
Dept: GENERAL RADIOLOGY | Facility: HOSPITAL | Age: 67
End: 2022-09-06

## 2022-09-06 LAB
ANION GAP SERPL CALCULATED.3IONS-SCNC: 7 MMOL/L (ref 5–15)
ARTERIAL PATENCY WRIST A: ABNORMAL
ATMOSPHERIC PRESS: ABNORMAL MM[HG]
BASE EXCESS BLDA CALC-SCNC: 5.6 MMOL/L (ref 0–2)
BASE EXCESS BLDA CALC-SCNC: 7 MMOL/L (ref 0–2)
BASE EXCESS BLDA CALC-SCNC: 8.2 MMOL/L (ref 0–2)
BDY SITE: ABNORMAL
BODY TEMPERATURE: 37 C
BUN SERPL-MCNC: 44 MG/DL (ref 8–23)
BUN/CREAT SERPL: 24 (ref 7–25)
CALCIUM SPEC-SCNC: 9 MG/DL (ref 8.6–10.5)
CHLORIDE SERPL-SCNC: 96 MMOL/L (ref 98–107)
CO2 BLDA-SCNC: 35.9 MMOL/L (ref 22–33)
CO2 BLDA-SCNC: 36.9 MMOL/L (ref 22–33)
CO2 BLDA-SCNC: 37.9 MMOL/L (ref 22–33)
CO2 SERPL-SCNC: 34 MMOL/L (ref 22–29)
COHGB MFR BLD: 1 % (ref 0–2)
COHGB MFR BLD: 1.2 % (ref 0–2)
COHGB MFR BLD: 1.3 % (ref 0–2)
CREAT SERPL-MCNC: 1.83 MG/DL (ref 0.76–1.27)
CYTO UR: NORMAL
DEPRECATED RDW RBC AUTO: 46.1 FL (ref 37–54)
EGFRCR SERPLBLD CKD-EPI 2021: 39.9 ML/MIN/1.73
EPAP: 0
EPAP: 0
EPAP: 6
ERYTHROCYTE [DISTWIDTH] IN BLOOD BY AUTOMATED COUNT: 13.2 % (ref 12.3–15.4)
GLUCOSE BLDC GLUCOMTR-MCNC: 131 MG/DL (ref 70–130)
GLUCOSE BLDC GLUCOMTR-MCNC: 161 MG/DL (ref 70–130)
GLUCOSE BLDC GLUCOMTR-MCNC: 165 MG/DL (ref 70–130)
GLUCOSE BLDC GLUCOMTR-MCNC: 201 MG/DL (ref 70–130)
GLUCOSE SERPL-MCNC: 150 MG/DL (ref 65–99)
HCO3 BLDA-SCNC: 33.7 MMOL/L (ref 20–26)
HCO3 BLDA-SCNC: 34.7 MMOL/L (ref 20–26)
HCO3 BLDA-SCNC: 35.8 MMOL/L (ref 20–26)
HCT VFR BLD AUTO: 27.9 % (ref 37.5–51)
HCT VFR BLD CALC: 26.7 % (ref 38–51)
HCT VFR BLD CALC: 27.7 % (ref 38–51)
HCT VFR BLD CALC: 28.2 % (ref 38–51)
HGB BLD-MCNC: 8.8 G/DL (ref 13–17.7)
HGB BLDA-MCNC: 8.7 G/DL (ref 13.5–17.5)
HGB BLDA-MCNC: 9 G/DL (ref 13.5–17.5)
HGB BLDA-MCNC: 9.2 G/DL (ref 13.5–17.5)
INHALED O2 CONCENTRATION: 35 %
INHALED O2 CONCENTRATION: 35 %
INHALED O2 CONCENTRATION: 40 %
IPAP: 0
IPAP: 0
IPAP: 22
LAB AP CASE REPORT: NORMAL
LAB AP CLINICAL INFORMATION: NORMAL
Lab: ABNORMAL
MAGNESIUM SERPL-MCNC: 2.3 MG/DL (ref 1.6–2.4)
MCH RBC QN AUTO: 30.3 PG (ref 26.6–33)
MCHC RBC AUTO-ENTMCNC: 31.5 G/DL (ref 31.5–35.7)
MCV RBC AUTO: 96.2 FL (ref 79–97)
METHGB BLD QL: 0.4 % (ref 0–1.5)
METHGB BLD QL: 0.5 % (ref 0–1.5)
METHGB BLD QL: 0.6 % (ref 0–1.5)
MODALITY: ABNORMAL
NOTE: ABNORMAL
NOTIFIED BY: ABNORMAL
NOTIFIED WHO: ABNORMAL
OXYHGB MFR BLDV: 93.4 % (ref 94–99)
OXYHGB MFR BLDV: 95.3 % (ref 94–99)
OXYHGB MFR BLDV: 97 % (ref 94–99)
PATH REPORT.FINAL DX SPEC: NORMAL
PATH REPORT.GROSS SPEC: NORMAL
PAW @ PEAK INSP FLOW SETTING VENT: 0 CMH2O
PCO2 BLDA: 69.1 MM HG (ref 35–45)
PCO2 BLDA: 70.2 MM HG (ref 35–45)
PCO2 BLDA: 71.2 MM HG (ref 35–45)
PCO2 TEMP ADJ BLD: 69.1 MM HG (ref 35–48)
PCO2 TEMP ADJ BLD: 70.2 MM HG (ref 35–48)
PCO2 TEMP ADJ BLD: 71.2 MM HG (ref 35–48)
PH BLDA: 7.28 PH UNITS (ref 7.35–7.45)
PH BLDA: 7.3 PH UNITS (ref 7.35–7.45)
PH BLDA: 7.32 PH UNITS (ref 7.35–7.45)
PH, TEMP CORRECTED: 7.28 PH UNITS
PH, TEMP CORRECTED: 7.3 PH UNITS
PH, TEMP CORRECTED: 7.32 PH UNITS
PHOSPHATE SERPL-MCNC: 3.7 MG/DL (ref 2.5–4.5)
PLATELET # BLD AUTO: 146 10*3/MM3 (ref 140–450)
PMV BLD AUTO: 11.4 FL (ref 6–12)
PO2 BLDA: 117 MM HG (ref 83–108)
PO2 BLDA: 74.7 MM HG (ref 83–108)
PO2 BLDA: 85.1 MM HG (ref 83–108)
PO2 TEMP ADJ BLD: 117 MM HG (ref 83–108)
PO2 TEMP ADJ BLD: 74.7 MM HG (ref 83–108)
PO2 TEMP ADJ BLD: 85.1 MM HG (ref 83–108)
POTASSIUM SERPL-SCNC: 4.8 MMOL/L (ref 3.5–5.2)
QT INTERVAL: 380 MS
QT INTERVAL: 414 MS
QT INTERVAL: 440 MS
QTC INTERVAL: 417 MS
QTC INTERVAL: 433 MS
QTC INTERVAL: 468 MS
RBC # BLD AUTO: 2.9 10*6/MM3 (ref 4.14–5.8)
SODIUM SERPL-SCNC: 137 MMOL/L (ref 136–145)
TOTAL RATE: 0 BREATHS/MINUTE
WBC NRBC COR # BLD: 12.06 10*3/MM3 (ref 3.4–10.8)

## 2022-09-06 PROCEDURE — 94799 UNLISTED PULMONARY SVC/PX: CPT

## 2022-09-06 PROCEDURE — 25010000002 EPINEPHRINE 1 MG/ML SOLUTION 30 ML VIAL: Performed by: PHYSICIAN ASSISTANT

## 2022-09-06 PROCEDURE — 99233 SBSQ HOSP IP/OBS HIGH 50: CPT | Performed by: INTERNAL MEDICINE

## 2022-09-06 PROCEDURE — 82962 GLUCOSE BLOOD TEST: CPT

## 2022-09-06 PROCEDURE — 97166 OT EVAL MOD COMPLEX 45 MIN: CPT

## 2022-09-06 PROCEDURE — 82375 ASSAY CARBOXYHB QUANT: CPT

## 2022-09-06 PROCEDURE — 99024 POSTOP FOLLOW-UP VISIT: CPT | Performed by: STUDENT IN AN ORGANIZED HEALTH CARE EDUCATION/TRAINING PROGRAM

## 2022-09-06 PROCEDURE — 97530 THERAPEUTIC ACTIVITIES: CPT

## 2022-09-06 PROCEDURE — 93010 ELECTROCARDIOGRAM REPORT: CPT | Performed by: STUDENT IN AN ORGANIZED HEALTH CARE EDUCATION/TRAINING PROGRAM

## 2022-09-06 PROCEDURE — 83735 ASSAY OF MAGNESIUM: CPT | Performed by: INTERNAL MEDICINE

## 2022-09-06 PROCEDURE — 84100 ASSAY OF PHOSPHORUS: CPT | Performed by: INTERNAL MEDICINE

## 2022-09-06 PROCEDURE — 99232 SBSQ HOSP IP/OBS MODERATE 35: CPT | Performed by: INTERNAL MEDICINE

## 2022-09-06 PROCEDURE — 80048 BASIC METABOLIC PNL TOTAL CA: CPT | Performed by: PHYSICIAN ASSISTANT

## 2022-09-06 PROCEDURE — 85027 COMPLETE CBC AUTOMATED: CPT | Performed by: INTERNAL MEDICINE

## 2022-09-06 PROCEDURE — 36600 WITHDRAWAL OF ARTERIAL BLOOD: CPT

## 2022-09-06 PROCEDURE — 63710000001 INSULIN LISPRO (HUMAN) PER 5 UNITS: Performed by: INTERNAL MEDICINE

## 2022-09-06 PROCEDURE — 25010000002 PROCHLORPERAZINE 10 MG/2ML SOLUTION: Performed by: INTERNAL MEDICINE

## 2022-09-06 PROCEDURE — 93005 ELECTROCARDIOGRAM TRACING: CPT | Performed by: STUDENT IN AN ORGANIZED HEALTH CARE EDUCATION/TRAINING PROGRAM

## 2022-09-06 PROCEDURE — 83050 HGB METHEMOGLOBIN QUAN: CPT

## 2022-09-06 PROCEDURE — 74018 RADEX ABDOMEN 1 VIEW: CPT

## 2022-09-06 PROCEDURE — 82805 BLOOD GASES W/O2 SATURATION: CPT

## 2022-09-06 PROCEDURE — 25010000002 ONDANSETRON PER 1 MG: Performed by: PHYSICIAN ASSISTANT

## 2022-09-06 PROCEDURE — 71045 X-RAY EXAM CHEST 1 VIEW: CPT

## 2022-09-06 PROCEDURE — 94660 CPAP INITIATION&MGMT: CPT

## 2022-09-06 RX ORDER — BUMETANIDE 0.25 MG/ML
1 INJECTION INTRAMUSCULAR; INTRAVENOUS ONCE
Status: COMPLETED | OUTPATIENT
Start: 2022-09-06 | End: 2022-09-06

## 2022-09-06 RX ORDER — OXYCODONE HYDROCHLORIDE 5 MG/1
5 TABLET ORAL EVERY 4 HOURS PRN
Status: DISPENSED | OUTPATIENT
Start: 2022-09-06 | End: 2022-09-09

## 2022-09-06 RX ADMIN — PROCHLORPERAZINE EDISYLATE 5 MG: 5 INJECTION INTRAMUSCULAR; INTRAVENOUS at 09:28

## 2022-09-06 RX ADMIN — CHLORHEXIDINE GLUCONATE 15 ML: 1.2 SOLUTION ORAL at 08:21

## 2022-09-06 RX ADMIN — OXYCODONE 10 MG: 5 TABLET ORAL at 02:38

## 2022-09-06 RX ADMIN — SENNOSIDES AND DOCUSATE SODIUM 2 TABLET: 50; 8.6 TABLET ORAL at 08:23

## 2022-09-06 RX ADMIN — ONDANSETRON 4 MG: 2 INJECTION INTRAMUSCULAR; INTRAVENOUS at 22:06

## 2022-09-06 RX ADMIN — SENNOSIDES AND DOCUSATE SODIUM 2 TABLET: 50; 8.6 TABLET ORAL at 20:07

## 2022-09-06 RX ADMIN — CHLORHEXIDINE GLUCONATE 15 ML: 1.2 SOLUTION ORAL at 20:07

## 2022-09-06 RX ADMIN — AMIODARONE HYDROCHLORIDE 200 MG: 200 TABLET ORAL at 02:40

## 2022-09-06 RX ADMIN — ONDANSETRON 4 MG: 2 INJECTION INTRAMUSCULAR; INTRAVENOUS at 15:35

## 2022-09-06 RX ADMIN — ASPIRIN 325 MG: 325 TABLET, COATED ORAL at 08:22

## 2022-09-06 RX ADMIN — ATORVASTATIN CALCIUM 40 MG: 40 TABLET, FILM COATED ORAL at 20:07

## 2022-09-06 RX ADMIN — ACETAMINOPHEN 650 MG: 325 TABLET, FILM COATED ORAL at 20:49

## 2022-09-06 RX ADMIN — EPINEPHRINE 0.05 MCG/KG/MIN: 1 INJECTION INTRAMUSCULAR; INTRAVENOUS; SUBCUTANEOUS at 15:44

## 2022-09-06 RX ADMIN — AMIODARONE HYDROCHLORIDE 200 MG: 200 TABLET ORAL at 12:16

## 2022-09-06 RX ADMIN — AMIODARONE HYDROCHLORIDE 200 MG: 200 TABLET ORAL at 20:07

## 2022-09-06 RX ADMIN — INSULIN LISPRO 3 UNITS: 100 INJECTION, SOLUTION INTRAVENOUS; SUBCUTANEOUS at 22:11

## 2022-09-06 RX ADMIN — BUMETANIDE 1 MG: 0.25 INJECTION, SOLUTION INTRAMUSCULAR; INTRAVENOUS at 08:22

## 2022-09-06 RX ADMIN — INSULIN LISPRO 2 UNITS: 100 INJECTION, SOLUTION INTRAVENOUS; SUBCUTANEOUS at 08:20

## 2022-09-06 RX ADMIN — OXYCODONE 5 MG: 5 TABLET ORAL at 22:11

## 2022-09-06 RX ADMIN — ACETAMINOPHEN 650 MG: 325 TABLET, FILM COATED ORAL at 14:46

## 2022-09-06 RX ADMIN — INSULIN LISPRO 2 UNITS: 100 INJECTION, SOLUTION INTRAVENOUS; SUBCUTANEOUS at 12:16

## 2022-09-06 NOTE — PROGRESS NOTES
"                    Clinical Nutrition       Patient Name: Damián Myers  YOB: 1955  MRN: [id number]  Date of Encounter: 22 18:29 EDT  Admission date: 2022      Reason for Visit   Poor intake post op      EMR  Reviewed   Yes    Height: Height: 190.5 cm (75\")  Weight: Weight: 115 kg (254 lb 8.3 oz) (22)  BMI: BMI (Calculated): 31.8    Problem:    Severe mitral regurgitation    Obesity (BMI 30-39.9)    Chronic systolic heart failure (HCC)    Coronary artery disease of native artery of native heart with stable angina pectoris (HCC)    HTN (hypertension)    MVR     Extubated 9/3       Reported/Observed/Food/Nutrition Related - Comments     Was on solid food - but experienced belching, emesis. Now trying to bertram clr liq.      Current Nutrition Prescription     Diet Clear Liquid; Cardiac, Consistent Carbohydrate  Orders Placed This Encounter      Dietary Nutrition Supplements Boost Breeze (Clear Liquid); peach  2x/da added per RD    Average Intake from [place]% x 2 most recent recorded meals on clr liq   Previously 25% x 4 meals on solid food.    Nutrition Diagnosis     Problem Inadequate oral intake   Etiology Post op GI distress   Signs/Symptoms 18% intake of 6 meals   Status:    Actions     Follow treatment progress, Care plan reviewed, Advise alternate selection, Supplement provided    Monitor Per Protocol      Thalia Quintanilla RD,   Time Spent: 25 min        "

## 2022-09-06 NOTE — THERAPY TREATMENT NOTE
Patient Name: Damián Myers  : 1955    MRN: [id number]                              Today's Date: 2022       Admit Date: 2022    Visit Dx:     ICD-10-CM ICD-9-CM   1. Severe mitral regurgitation  I34.0 424.0     Patient Active Problem List   Diagnosis   • Hyperglycemia   • Obesity (BMI 30-39.9)   • Chronic systolic heart failure (HCC)   • Coronary artery disease of native artery of native heart with stable angina pectoris ([facility])   • Stroke (cerebrum) ([facility])   • Acute pulmonary edema ([facility])   • TIA (transient ischemic attack)   • HTN (hypertension)   • HLD (hyperlipidemia)   • COVID-19   • Acute on chronic respiratory failure with hypoxia and hypercapnia ([facility])   • Cytokine release syndrome, grade 4   • Acute on chronic congestive heart failure, unspecified heart failure type ([facility])   • Severe mitral regurgitation     Past Medical History:   Diagnosis Date   • CAD (coronary artery disease)    • Cardiac abnormality    • CHF (congestive heart failure) ([facility])    • Coma of unknown cause ([facility])     Pt states he was in a coma for one week, unknown cause   • Hypertension    • Mitral regurgitation    • Obesity (BMI 30-39.9) 2019   • Spinal headache      Past Surgical History:   Procedure Laterality Date   • CARDIAC CATHETERIZATION N/A 2019    Procedure: Left Heart Cath;  Surgeon: Sherrell Hart MD;  Location:  [facility];  Service: Cardiology   • CARDIAC CATHETERIZATION N/A 2022    Procedure: Left Heart Cath;  Surgeon: Greyson Balderas MD;  Location:  [facility];  Service: Cardiology;  Laterality: N/A;   • COLONOSCOPY     • CORONARY STENT PLACEMENT        General Information     Row Name 22 1440          Physical Therapy Time and Intention    Document Type therapy note (daily note)  -ADELINE     Mode of Treatment physical therapy  -ADELINE     Row Name 22 1440          General Information    Patient Profile Reviewed yes  -ADELINE     Prior  Level of Function independent:;bed mobility;ADL's;gait;transfer  -ADELINE     Existing Precautions/Restrictions cardiac;oxygen therapy device and L/min  -ADELINE     Barriers to Rehab medically complex  -ADELINE     Row Name 09/06/22 1440          Living Environment    People in Home spouse  -ADELINE     Row Name 09/06/22 1440          Cognition    Orientation Status (Cognition) oriented x 4  -ADELINE     Row Name 09/06/22 1440          Safety Issues, Functional Mobility    Safety Issues Affecting Function (Mobility) safety precautions follow-through/compliance;insight into deficits/self-awareness;awareness of need for assistance;safety precaution awareness  -ADELINE     Impairments Affecting Function (Mobility) balance;endurance/activity tolerance;shortness of breath;strength  -ADELINE           User Key  (r) = Recorded By, (t) = Taken By, (c) = Cosigned By    Initials Name Provider Type    Brittany Lara, PT Physical Therapist               Mobility     Row Name 09/06/22 1440          Bed Mobility    Comment, (Bed Mobility) patient is OOB and returns to the chair  -ADELINE     Row Name 09/06/22 1440          Transfers    Comment, (Transfers) cues for sternal precautions  -ADELINE     Row Name 09/06/22 1440          Bed-Chair Transfer    Bed-Chair [place] (Transfers) contact guard  -ADELINE     Assistive Device (Bed-Chair Transfers) walker, front-wheeled  -ADELINE     Row Name 09/06/22 1440          Sit-Stand Transfer    Sit-Stand [place] (Transfers) contact guard  -ADELINE     Assistive Device (Sit-Stand Transfers) walker, front-wheeled  -ADELINE     Row Name 09/06/22 1440          Gait/Stairs (Locomotion)    [place] Level (Gait) minimum assist (75% patient effort);2 person assist;1 person to manage equipment  -ADELINE     Assistive Device (Gait) walker, front-wheeled  -ADELINE     Distance in Feet (Gait) 2x 40  -ADELINE     Deviations/Abnormal Patterns (Gait) stride length decreased  -ADELINE     Bilateral Gait Deviations forward flexed posture  -ADELINE     Comment,  (Gait/Stairs) patient ambulates on 80% non rebreather. with stable sats patient had one sitting rest break  -ADELINE           User Key  (r) = Recorded By, (t) = Taken By, (c) = Cosigned By    Initials Name Provider Type    Brittany Lara, PT Physical Therapist               Obj/Interventions     Row Name 09/06/22 1445          Balance    Balance Assessment sitting static balance;sitting dynamic balance;standing static balance;standing dynamic balance  -ADELINE     Static Sitting Balance independent  -ADELINE     Dynamic Sitting Balance independent  -ADELINE     Position, Sitting Balance unsupported;sitting in chair  -ADELINE     Static Standing Balance minimal assist  -ADELINE     Dynamic Standing Balance minimal assist  -ADELINE     Position/Device Used, Standing Balance supported;walker, front-wheeled  -ADELINE     Balance Interventions standing;dynamic;weight shifting activity  -ADELINE           User Key  (r) = Recorded By, (t) = Taken By, (c) = Cosigned By    Initials Name Provider Type    Brittany Lara, PT Physical Therapist               Goals/Plan     Row Name 09/06/22 1449          Therapy Assessment/Plan (PT)    Planned Therapy Interventions (PT) balance training;bed mobility training;gait training;home exercise program;strengthening;transfer training  -ADELINE           User Key  (r) = Recorded By, (t) = Taken By, (c) = Cosigned By    Initials Name Provider Type    Brittany Lara, PT Physical Therapist               Clinical Impression     Row Name 09/06/22 1446          Pain    Pretreatment Pain Rating 5/10  -ADELINE     Posttreatment Pain Rating 7/10  -ADELINE     Pain Location incisional  -ADELINE     Pain Location - chest  -ADELINE     Pain Intervention(s) Repositioned;Ambulation/increased activity;Splinting  -ADELINE     Row Name 09/06/22 1446          Plan of Care Review    Plan of Care Reviewed With patient;spouse  -ADELINE     Progress improving  -ADELINE     Outcome Evaluation patient was able to increase ambulation to 80 ft with min assist using rolling  walker patient is progressing slowly toward mobility goals. he is limited by resp status slow to process commands  -ADELINE     Row Name 09/06/22 1446          Therapy Assessment/Plan (PT)    Patient/Family Therapy Goals Statement (PT) return to PLOF  -ADELINE     Rehab Potential (PT) good, to achieve stated therapy goals  -ADELINE     Criteria for Skilled Interventions Met (PT) yes;skilled treatment is necessary  -ADELINE     Therapy Frequency (PT) daily  -ADELINE     Row Name 09/06/22 1446          Vital Signs    Pre Systolic BP Rehab 97  -ADELINE     Pre Treatment Diastolic BP 64  -ADELINE     Post Systolic BP Rehab 123  -ADELINE     Post Treatment Diastolic BP 90  -ADELINE     Pretreatment Heart Rate (beats/min) 73  -ADELINE     Posttreatment Heart Rate (beats/min) 80  -ADELINE     Pre SpO2 (%) 98  -ADELINE     O2 Delivery Pre Treatment hi-flow  -ADELINE     Intra SpO2 (%) 92  -ADELINE     O2 Delivery Intra Treatment non-rebreather  -ADELINE     Post SpO2 (%) 98  -ADELINE     O2 Delivery Post Treatment hi-flow  -ADELINE     Pre Patient Position Sitting  -ADELINE     Intra Patient Position Standing  -ADELINE     Post Patient Position Sitting  -ADELINE     Row Name 09/06/22 1446          Positioning and Restraints    Pre-Treatment Position sitting in chair/recliner  -ADELINE     Post Treatment Position chair  -ADELINE     In Chair notified nsg;reclined;sitting;call light within reach;encouraged to call for assist;with family/caregiver;with nsg  -ADELINE           User Key  (r) = Recorded By, (t) = Taken By, (c) = Cosigned By    Initials Name Provider Type    Brittany Lara, PT Physical Therapist               Outcome Measures     Row Name 09/06/22 1449          How much help from another person do you currently need...    Turning from your back to your side while in flat bed without using bedrails? 3  -ADELINE     Moving from lying on back to sitting on the side of a flat bed without bedrails? 2  -ADELINE     Moving to and from a bed to a chair (including a wheelchair)? 3  -ADELINE     Standing up from a chair using your arms (e.g.,  wheelchair, bedside chair)? 3  -ADELINE     Climbing 3-5 steps with a railing? 2  -ADELINE     To walk in hospital room? 3  -ADELINE     AM-PAC 6 Clicks Score (PT) 16  -ADELINE     Highest level of mobility 5 --> Static standing  -ADELINE           User Key  (r) = Recorded By, (t) = Taken By, (c) = Cosigned By    Initials Name Provider Type    ADELINE Brittany Gomez, PT Physical Therapist                             Physical Therapy Education                 Title: PT OT SLP Therapies (In Progress)     Topic: Physical Therapy (In Progress)     Point: Mobility training (In Progress)     Learning Progress Summary           Patient Acceptance, E, NR by ADELINE at 9/6/2022 1400    Acceptance, E, NR by AE at 9/5/2022 1035    Acceptance, E, NR by KG at 9/4/2022 0914                   Point: Home exercise program (In Progress)     Learning Progress Summary           Patient Acceptance, E, NR by ADELINE at 9/6/2022 1400                   Point: Body mechanics (In Progress)     Learning Progress Summary           Patient Acceptance, E, NR by ADELINE at 9/6/2022 1400    Acceptance, E, NR by AE at 9/5/2022 1035    Acceptance, E, NR by KG at 9/4/2022 0914                   Point: Precautions (In Progress)     Learning Progress Summary           Patient Acceptance, E, NR by ADELINE at 9/6/2022 1400    Acceptance, E, NR by AE at 9/5/2022 1035    Acceptance, E, NR by KG at 9/4/2022 0914                               User Key     Initials Effective Dates Name Provider Type Discipline    ADELINE 06/16/21 -  Brittany Gomez, PT Physical Therapist PT    KG 05/22/20 -  Hansa eDal PT Physical Therapist PT    AE 09/21/21 -  Andrew Shearer PT Physical Therapist PT              PT Recommendation and Plan  Planned Therapy Interventions (PT): balance training, bed mobility training, gait training, home exercise program, strengthening, transfer training  Plan of Care Reviewed With: patient, spouse  Progress: improving  Outcome Evaluation: patient was able to increase  ambulation to 80 ft with min assist using rolling walker patient is progressing slowly toward mobility goals. he is limited by resp status slow to process commands     Time Calculation:    PT Charges     Row Name 09/06/22 1450             Time Calculation    Start Time 1400  -ADELINE      PT Received On 09/06/22  -ADELINE      PT Goal Re-Cert Due Date 09/14/22  -ADELINE              Time Calculation- PT    Total Timed Code Minutes- PT 28 minute(s)  -ADELINE              Timed Charges    11558 - PT Therapeutic Activity Minutes 28  -ADELINE              Total Minutes    Timed Charges Total Minutes 28  -ADELINE       Total Minutes 28  -ADELINE            User Key  (r) = Recorded By, (t) = Taken By, (c) = Cosigned By    Initials Name Provider Type    Brittany Lara, PT Physical Therapist              Therapy Charges for Today     Code Description Service Date Service Provider Modifiers Qty    [id number] HC PT THERAPEUTIC ACT EA 15 MIN 9/6/2022 Brittany Gomez PT GP 2          PT G-Codes  Outcome Measure Options: AM-PAC 6 Clicks Basic Mobility (PT)  AM-PAC 6 Clicks Score (PT): 16    Brittany Gomez PT  9/6/2022

## 2022-09-06 NOTE — PLAN OF CARE
Goal Outcome Evaluation:  Plan of Care Reviewed With: patient, spouse        Progress: improving  Outcome Evaluation: patient was able to increase ambulation to 80 ft with min assist using rolling walker patient is progressing slowly toward mobility goals. he is limited by resp status slow to process commands

## 2022-09-06 NOTE — THERAPY EVALUATION
Patient Name: Damián Myers  : 1955    MRN: [id number]                              Today's Date: 2022       Admit Date: 2022    Visit Dx:     ICD-10-CM ICD-9-CM   1. Severe mitral regurgitation  I34.0 424.0     Patient Active Problem List   Diagnosis   • Hyperglycemia   • Obesity (BMI 30-39.9)   • Chronic systolic heart failure (HCC)   • Coronary artery disease of native artery of native heart with stable angina pectoris ([facility])   • Stroke (cerebrum) ([facility])   • Acute pulmonary edema ([facility])   • TIA (transient ischemic attack)   • HTN (hypertension)   • HLD (hyperlipidemia)   • COVID-19   • Acute on chronic respiratory failure with hypoxia and hypercapnia ([facility])   • Cytokine release syndrome, grade 4   • Acute on chronic congestive heart failure, unspecified heart failure type ([facility])   • Severe mitral regurgitation     Past Medical History:   Diagnosis Date   • CAD (coronary artery disease)    • Cardiac abnormality    • CHF (congestive heart failure) ([facility])    • Coma of unknown cause ([facility])     Pt states he was in a coma for one week, unknown cause   • Hypertension    • Mitral regurgitation    • Obesity (BMI 30-39.9) 2019   • Spinal headache      Past Surgical History:   Procedure Laterality Date   • CARDIAC CATHETERIZATION N/A 2019    Procedure: Left Heart Cath;  Surgeon: Sherrell Hart MD;  Location:  [facility];  Service: Cardiology   • CARDIAC CATHETERIZATION N/A 2022    Procedure: Left Heart Cath;  Surgeon: Greyson Balderas MD;  Location:  [facility];  Service: Cardiology;  Laterality: N/A;   • COLONOSCOPY     • CORONARY STENT PLACEMENT        General Information     Row Name 22 1504          OT Time and Intention    Document Type evaluation  -KF     Mode of Treatment occupational therapy  -KF     Row Name 22 150          General Information    Patient Profile Reviewed yes  -KF     Prior Level of Function  independent:;transfer;ADL's;bed mobility;all household mobility;driving;using stairs  no DME; retired  -KF     Existing Precautions/Restrictions cardiac;oxygen therapy device and L/min;fall;other (see comments);sternal  NG; chest tube; HFNC  -KF     Barriers to Rehab medically complex  -     Row Name 09/06/22 1504          Occupational Profile    Environmental Supports and Barriers (Occupational Profile) no DME at baseline; has [place] shower with built in seat  -KF     Row Name 09/06/22 1504          Living Environment    People in Home spouse  -KF     Row Name 09/06/22 1504          Home Main Entrance    Number of Stairs, Main Entrance three  -KF     Stair Railings, Main Entrance none  -KF     Row Name 09/06/22 1504          Stairs Within Home, Primary    Stairs, Within Home, Primary sunken living area  -KF     Number of Stairs, Within Home, Primary four  -KF     Stair Railings, Within Home, Primary none  -KF     Row Name 09/06/22 1504          Cognition    Orientation Status (Cognition) oriented to;person;place;situation;disoriented to;time;verbal cues/prompts needed for orientation  -KF     Row Name 09/06/22 1504          Safety Issues, Functional Mobility    Safety Issues Affecting Function (Mobility) insight into deficits/self-awareness;awareness of need for assistance;safety precaution awareness  -KF     Impairments Affecting Function (Mobility) balance;endurance/activity tolerance;shortness of breath;strength;pain;range of motion (ROM);cognition  -KF     Cognitive Impairments, Mobility Safety/Performance awareness, need for assistance;insight into deficits/self-awareness  -KF     Comment, Safety Issues/Impairments (Mobility) delayed processing intermittently  -KF           User Key  (r) = Recorded By, (t) = Taken By, (c) = Cosigned By    Initials Name Provider Type    KF Areli Hernandez OT Occupational Therapist                 Mobility/ADL's     Row Name 09/06/22 1506          Bed Mobility    Comment, (Bed  Mobility) [facility] upon arrival and end of session  -KF     Row Name 09/06/22 1506          Transfers    Transfers sit-stand transfer;stand-sit transfer  -KF     Comment, (Transfers) from [facility] cues for seq and weighshifting without using UEs and education on how to manage sternal precautions during  -KF     Sit-Stand [place] (Transfers) contact guard;2 person assist  -KF     Stand-Sit [place] (Transfers) contact guard;2 person assist  -[facility] Name 09/06/22 1506          Sit-Stand Transfer    Assistive Device (Sit-Stand Transfers) other (see comments)  rolling walker with monitor  -[facility] Name 09/06/22 1506          Stand-Sit Transfer    Assistive Device (Stand-Sit Transfers) other (see comments)  -[facility] Name 09/06/22 1506          Functional Mobility    Functional Mobility- Safety Issues supplemental O2;step length decreased;weight-shifting ability decreased  -     Functional Mobility- Comment deferred to PT  -[facility] Name 09/06/22 1506          Activities of Daily Living    BADL Assessment/Intervention upper body dressing;lower body dressing;feeding  -[facility] Name 09/06/22 1506          Upper Body Dressing Assessment/Training    [place] Level (Upper Body Dressing) don;doff;front opening garment;maximum assist (25% patient effort);verbal cues;nonverbal cues (demo/gesture)  -KF     Position (Upper Body Dressing) unsupported sitting  -[facility] Name 09/06/22 1506          Lower Body Dressing Assessment/Training    [place] Level (Lower Body Dressing) don;doff;socks;dependent (less than 25% patient effort)  -KF     Position (Lower Body Dressing) supported sitting  -[facility] Name 09/06/22 1506          Self-Feeding Assessment/Training    [place] Level (Feeding) scoop food and bring to mouth;maximum assist (25% patient effort)  -KF     Position (Self-Feeding) supported sitting  -     Comment, (Feeding) ice chip  -           User Key  (r) = Recorded By, (t) = Taken By, (c) =  Cosigned By    Initials Name Provider Type    KF Areli Hernandez, OT Occupational Therapist               Obj/Interventions     Row Name 09/06/22 1508          Sensory Assessment (Somatosensory)    Sensory Assessment (Somatosensory) UE sensation intact  -     Row Name 09/06/22 1508          Vision Assessment/Intervention    Visual Impairment/Limitations WFL;corrective lenses for reading  -     Row Name 09/06/22 1508          Range of Motion Comprehensive    General Range of Motion upper extremity range of motion deficits identified  -     Comment, General Range of Motion limited shoulder flexion tolerance on LUE due to chest tube site tolerating approx 80 degrees; WFL RUE  -     Row Name 09/06/22 1508          Strength Comprehensive (MMT)    General Manual Muscle Testing (MMT) Assessment upper extremity strength deficits identified  -     Comment, General Manual Muscle Testing (MMT) Assessment generalized weakness demonstrated throughout use of UEs, B  3+/5  -[facility] Name 09/06/22 1508          Shoulder (Therapeutic Exercise)    Shoulder (Therapeutic Exercise) AAROM (active assistive range of motion)  -     Shoulder AAROM (Therapeutic Exercise) bilateral;flexion;extension;sitting;3 repetitions  to tolerated less than 90 degree  -[facility] Name 09/06/22 1508          Elbow/Forearm (Therapeutic Exercise)    Elbow/Forearm (Therapeutic Exercise) AAROM (active assistive range of motion)  -     Elbow/Forearm AAROM (Therapeutic Exercise) bilateral;flexion;extension;5 repetitions;sitting  -[facility] Name 09/06/22 1508          Motor Skills    Therapeutic Exercise elbow/forearm;shoulder  -[facility] Name 09/06/22 1508          Balance    Balance Assessment sitting static balance;sitting dynamic balance;standing static balance;standing dynamic balance  -     Static Sitting Balance supervision  -     Dynamic Sitting Balance standby assist  -     Position, Sitting Balance unsupported;sitting in  chair  -KF     Static Standing Balance contact guard  -KF     Dynamic Standing Balance minimal assist  -KF     Position/Device Used, Standing Balance supported;walker, rolling  -KF     Balance Interventions sit to stand;supported;minimal challenge  -KF     Comment, Balance requires BUE support for balance  -KF           User Key  (r) = Recorded By, (t) = Taken By, (c) = Cosigned By    Initials Name Provider Type    KF Areli Hernandez, OT Occupational Therapist               Goals/Plan     Row Name 09/06/22 1514          Bed Mobility Goal 1 (OT)    Activity/Assistive Device (Bed Mobility Goal 1, OT) sit to supine/supine to sit  -KF     Strategies/Barriers (Bed Mobility Goal 1, OT) with min VC's for seq to manage sternal precautions  -KF     Progress/Outcomes (Bed Mobility Goal 1, OT) goal ongoing;new goal  -KF     Row Name 09/06/22 1514          Transfer Goal 1 (OT)    Activity/Assistive Device (Transfer Goal 1, OT) bed-to-chair/chair-to-bed;sit-to-stand/stand-to-sit;walker, rolling;commode  -KF     [place] Level/Cues Needed (Transfer Goal 1, OT) standby assist  -KF     Time Frame (Transfer Goal 1, OT) long term goal (LTG);10 days  -KF     Progress/Outcome (Transfer Goal 1, OT) new goal;goal ongoing  -KF     Row Name 09/06/22 1514          Dressing Goal 1 (OT)    Activity/Device (Dressing Goal 1, OT) lower body dressing  socks, pants AE PRN reacher  -KF     [place]/Cues Needed (Dressing Goal 1, OT) standby assist  -KF     Time Frame (Dressing Goal 1, OT) long term goal (LTG);10 days  -KF     Progress/Outcome (Dressing Goal 1, OT) goal ongoing;new goal  -KF     Row Name 09/06/22 1514          Therapy Assessment/Plan (OT)    Planned Therapy Interventions (OT) activity tolerance training;adaptive equipment training;BADL retraining;occupation/activity based interventions;strengthening exercise;transfer/mobility retraining;functional balance retraining;ROM/therapeutic exercise;patient/caregiver  education/training  -           User Key  (r) = Recorded By, (t) = Taken By, (c) = Cosigned By    Initials Name Provider Type    KF Areli Hernandez, OT Occupational Therapist               Clinical Impression     Row Name 09/06/22 1512          Pain Assessment    Pretreatment Pain Rating 9/10  -KF     Posttreatment Pain Rating 9/10  -KF     Pre/Posttreatment Pain Comment tolerated  -     Pain Intervention(s) Repositioned;Ambulation/increased activity  -     Row Name 09/06/22 1512          Plan of Care Review    Plan of Care Reviewed With patient  -     Progress improving  -     Outcome Evaluation OT eval completed Pt presents with deficits in strength, BUE AROM, activity tolerance, and balance compared to baseline ADL/functional transfer completion, CGA STS with Julia intermittently for balance in standing, depA LBD, maxA UBD, tolerated gentle AROM BUEs incorporated within sternal precautions, recom IPOT d/c rehab  -     Row Name 09/06/22 1512          Therapy Assessment/Plan (OT)    Rehab Potential (OT) good, to achieve stated therapy goals  -     Criteria for Skilled Therapeutic Interventions Met (OT) yes;meets criteria;skilled treatment is necessary  -     Therapy Frequency (OT) daily  -     Row Name 09/06/22 1512          Therapy Plan Review/Discharge Plan (OT)    Anticipated Discharge Disposition (OT) inpatient rehabilitation facility  -     Row Name 09/06/22 1512          Vital Signs    Pre Systolic BP Rehab 97  RN cleared VSS  -KF     Pre Treatment Diastolic BP 64  -KF     Pre SpO2 (%) 98  -KF     O2 Delivery Pre Treatment hi-flow  -KF     Post SpO2 (%) 98  -KF     O2 Delivery Post Treatment hi-flow  -KF     Pre Patient Position Sitting  -KF     Post Patient Position Sitting  -     Row Name 09/06/22 1512          Positioning and Restraints    Pre-Treatment Position sitting in chair/recliner  -KF     Post Treatment Position chair  -KF     In Chair notified nsg;reclined;sitting;call  light within reach;encouraged to call for assist;with family/caregiver;with nsg;legs elevated;RUE elevated;LUE elevated;waffle cushion  -KF           User Key  (r) = Recorded By, (t) = Taken By, (c) = Cosigned By    Initials Name Provider Type    Areli Jorge OT Occupational Therapist               Outcome Measures     Row Name 09/06/22 1516          How much help from another is currently needed...    Putting on and taking off regular lower body clothing? 2  -KF     Bathing (including washing, rinsing, and drying) 2  -KF     Toileting (which includes using toilet bed pan or urinal) 2  -KF     Putting on and taking off regular upper body clothing 2  -KF     Taking care of personal grooming (such as brushing teeth) 2  -KF     Eating meals 2  -KF     AM-PAC 6 Clicks Score (OT) 12  -KF     Row Name 09/06/22 1449          How much help from another person do you currently need...    Turning from your back to your side while in flat bed without using bedrails? 3  -ADELINE     Moving from lying on back to sitting on the side of a flat bed without bedrails? 2  -ADELINE     Moving to and from a bed to a chair (including a wheelchair)? 3  -ADELINE     Standing up from a chair using your arms (e.g., wheelchair, bedside chair)? 3  -ADELINE     Climbing 3-5 steps with a railing? 2  -ADELINE     To walk in hospital room? 3  -ADELINE     AM-PAC 6 Clicks Score (PT) 16  -ADELINE     Highest level of mobility 5 --> Static standing  -ADELINE     Row Name 09/06/22 1516          Functional Assessment    Outcome Measure Options AM-PAC 6 Clicks Daily Activity (OT)  -KF           User Key  (r) = Recorded By, (t) = Taken By, (c) = Cosigned By    Initials Name Provider Type    Brittany Lara, PT Physical Therapist    Areli Jorge OT Occupational Therapist                Occupational Therapy Education                 Title: PT OT SLP Therapies (In Progress)     Topic: Occupational Therapy (Done)     Point: ADL training (Done)     Description:   Instruct  learner(s) on proper safety adaptation and remediation techniques during self care or transfers.   Instruct in proper use of assistive devices.              Learning Progress Summary           Patient Acceptance, E,TB,D, VU,DU,NR by  at 9/6/2022 1517                   Point: Home exercise program (Done)     Description:   Instruct learner(s) on appropriate technique for monitoring, assisting and/or progressing therapeutic exercises/activities.              Learning Progress Summary           Patient Acceptance, E,TB,D, VU,DU,NR by  at 9/6/2022 1517                   Point: Precautions (Done)     Description:   Instruct learner(s) on prescribed precautions during self-care and functional transfers.              Learning Progress Summary           Patient Acceptance, E,TB,D, VU,DU,NR by KF at 9/6/2022 1517                   Point: Body mechanics (Done)     Description:   Instruct learner(s) on proper positioning and spine alignment during self-care, functional mobility activities and/or exercises.              Learning Progress Summary           Patient Acceptance, E,TB,D, VU,DU,NR by  at 9/6/2022 1517                               User Key     Initials Effective Dates Name Provider Type Discipline     06/16/21 -  Areli Hernandez OT Occupational Therapist OT              OT Recommendation and Plan  Planned Therapy Interventions (OT): activity tolerance training, adaptive equipment training, BADL retraining, occupation/activity based interventions, strengthening exercise, transfer/mobility retraining, functional balance retraining, ROM/therapeutic exercise, patient/caregiver education/training  Therapy Frequency (OT): daily  Plan of Care Review  Plan of Care Reviewed With: patient  Progress: improving  Outcome Evaluation: OT eval completed Pt presents with deficits in strength, BUE AROM, activity tolerance, and balance compared to baseline ADL/functional transfer completion, CGA STS with Julia intermittently  for balance in standing, depA LBD, maxA UBD, tolerated gentle AROM BUEs incorporated within sternal precautions, recom IPOT d/c rehab     Time Calculation:    Time Calculation- OT     Row Name 09/06/22 1408             Time Calculation- OT    OT Start Time 1408  -KF      OT Received On 09/06/22  -KF      OT Goal Re-Cert Due Date 09/16/22  -KF              Untimed Charges    OT Eval/Re-eval Minutes 48  -KF              Total Minutes    Untimed Charges Total Minutes 48  -KF       Total Minutes 48  -KF            User Key  (r) = Recorded By, (t) = Taken By, (c) = Cosigned By    Initials Name Provider Type    KF Areli Hernandez, OT Occupational Therapist              Therapy Charges for Today     Code Description Service Date Service Provider Modifiers Qty    [id number] HC OT EVAL MOD COMPLEXITY 4 9/6/2022 Areli Hernandez OT GO 1               Areli Hernandez OT  9/6/2022

## 2022-09-06 NOTE — PROGRESS NOTES
INTENSIVIST   PROGRESS NOTE     Hospital:  LOS: 4 days     Mr. Damián Myers, 67 y.o. male is followed for a Chief Complaint of: Postoperative medical management      Subjective   S     Interval History:  Acute respiratory failure with hypercapnia last night and placed on Bipap. Remains on epinephrine.        The patient's relevant past medical, surgical and social history were reviewed and updated in Epic as appropriate.      ROS: Unable to obtain secondary to mental status.       Objective   O     Vitals:  Temp  Min: 97.5 °F (36.4 °C)  Max: 99.5 °F (37.5 °C)  BP  Min: 92/46  Max: 129/72  Pulse  Min: 58  Max: 78  Resp  Min: 16  Max: 25  SpO2  Min: 91 %  Max: 100 % Flow (L/min)  Min: 4  Max: 40    Intake/Ouptut 24 hrs (7:00AM - 6:59 AM)  Intake & Output (last 3 days)       09/03 0701  09/04 0700 09/04 0701  09/05 0700 09/05 0701 09/06 0700 09/06 0701  09/07 0700    P.O. 200  480     I.V. (mL/kg) 1009.6 (8.8) 919 (8) 952 (8.3)     Blood        NG/GT        IV Piggyback 100 500 50     Total Intake(mL/kg) 1309.6 (11.4) 1419 (12.3) 1482 (12.9)     Urine (mL/kg/hr) 3065 (1.1) 830 (0.3) 1140 (0.4) 550 (1.3)    Emesis/NG output        Chest Tube 290 260 420 50    Total Output 3355 1090 1560 600    Net -2045.4 +329 -78 -600                  Medications (drips):  dextrose 5 % with KCl 20 mEq, Last Rate: 30 mL/hr (09/02/22 1424)  EPINEPHrine, Last Rate: 0.07 mcg/kg/min (09/06/22 0851)  phenylephrine, Last Rate: Stopped (09/05/22 1910)        Physical Examination  Telemetry:  Sinus bradycardia.    Constitutional:  No acute distress.  Sitting up in a chair on HFNC.    Eyes: No scleral icterus.   PERRL, EOM intact.    Neck:  Supple, FROM   Cardiovascular: Normal rate, regular and rhythm. Normal heart sounds.  No murmurs, gallop or rub.   Respiratory: No respiratory distress. Normal respiratory effort.  Diminished bilaterally.   Chest tube with serosanguinous drainage. No air leak.    Abdominal:  Soft. No masses. Nontender. No  distension. No HSM.   Extremities: No digital cyanosis. No clubbing.  No peripheral edema.   Skin: No rashes, lesions or ulcers   Neurological:   Needs deep stimulation to awaken and rapidly falls back asleep.                  Results from last 7 days   Lab Units 09/06/22  0212 09/05/22  0407 09/04/22  0226   WBC 10*3/mm3 12.06* 16.80* 14.76*   HEMOGLOBIN g/dL 8.8* 9.5* 8.5*   MCV fL 96.2 96.5 94.2   PLATELETS 10*3/mm3 146 124* 142     Results from last 7 days   Lab Units 09/06/22  0212 09/05/22 2024 09/05/22  0959   SODIUM mmol/L 137 137 138   POTASSIUM mmol/L 4.8 5.5* 5.1   CO2 mmol/L 34.0* 32.0* 35.0*   CREATININE mg/dL 1.83* 2.02* 2.00*   GLUCOSE mg/dL 150* 138* 107*   MAGNESIUM mg/dL 2.3 2.4 2.5*   PHOSPHORUS mg/dL 3.7 4.7* 5.1*     Estimated Creatinine Clearance: 53.6 mL/min (A) (by C-G formula based on SCr of 1.83 mg/dL (H)).  Results from last 7 days   Lab Units 08/30/22  1246   ALK PHOS U/L 73   BILIRUBIN mg/dL 0.7   ALT (SGPT) U/L 14   AST (SGOT) U/L 37       Results from last 7 days   Lab Units 09/06/22  1017 09/06/22  0441 09/05/22  2348 09/05/22 2002 09/03/22  1001   PH, ARTERIAL pH units 7.303* 7.283* 7.254* 7.205* 7.314*   PCO2, ARTERIAL mm Hg 70.2* 71.2* 75.8* 82.6* 56.0*   PO2 ART mm Hg 74.7* 117.0* 101.0 83.2 77.3*   FIO2 % 35 40 40 36 40       Images:  Imaging Results (Last 24 Hours)     Procedure Component Value Units Date/Time    XR Abdomen 1 View [[id number]] Resulted: 09/06/22 1031     Updated: 09/06/22 1031    XR Chest 1 View [[id number]] Collected: 09/06/22 0736     Updated: 09/06/22 0740    Narrative:      DATE OF EXAM: 9/6/2022 4:15 AM     PROCEDURE: XR CHEST 1 VW-     INDICATIONS: chest tube management; I34.0-Nonrheumatic mitral (valve)  insufficiency     COMPARISON: One day prior     TECHNIQUE: Single radiographic AP view of the chest was obtained.     FINDINGS:  The patient's pleural drains are not well seen, either removed or  obscured by superimposed opacity at the lung bases and  upper abdomen.  Right-sided central venous catheter projects unchanged. There is no  distinct pneumothorax or new focal airspace consolidation. Unchanged  suspected trace effusions. Stable heart and mediastinal contours.        Impression:      The patient's pleural drains are not well seen, either removed or  obscured by superimposed opacity at the lung bases and upper abdomen.  Right-sided central venous catheter projects unchanged. There is no  distinct pneumothorax or new focal airspace consolidation. Unchanged  suspected trace effusions. Stable heart and mediastinal contours.      This report was finalized on 9/6/2022 7:37 AM by Vargas Moise.               Results: Reviewed.  I reviewed the patient's new laboratory and imaging results.  I independently reviewed the patient's new images.    Medications: Reviewed.    Assessment & Plan   A / P     Mr. Myers is a 67 yo male with PMH hypertension, congestive heart failure and coronary artery disease who presents to the ICU status post MVR with Dr. Chan.  Patient was admitted to [facility] in July for congestive heart failure.  During that admission he underwent left heart cath that revealed nonobstructive CAD but was suggestive of severe mitral valve regurgitation.  KENNEY revealed moderate MVR with mild prolapse.  For that he was sent to Dr. Chan's office for evaluation.       The patient underwent mitral valve replacement with left atrial appendage ligation per Dr. Chan.  He is brought in an intubated state to the intensive care unit postoperatively.    He was extubated on 9/3.     He developed Afib on 9/4 and was started on IV Amiodarone. This was discontinued secondary to bradycardia with sinus pauses. He is on PO Amiodarone. He remains on Epinephrine.      Renal function is slightly worse this AM.     He developed acute hypercapnic respiratory failure on the evening of 9/5 and was placed on Bipap. ABG is slowly improving.       Nutrition:    Diet Clear Liquid; Cardiac, Consistent Carbohydrate  Advance Directives:   Code Status and Medical Interventions:   Ordered at: 09/02/22 5626     Code Status (Patient has no pulse and is not breathing):    CPR (Attempt to Resuscitate)     Medical Interventions (Patient has pulse or is breathing):    Full Support     Release to patient:    Routine Release       Active Hospital Problems    Diagnosis    • **Severe mitral regurgitation    • HTN (hypertension)    • Chronic systolic heart failure (HCC)    • Coronary artery disease of native artery of native heart with stable angina pectoris (HCC)    • Obesity (BMI 30-39.9)        Assessment / Plan:    1. HFNC alternating with Bipap with sleep. Continue to monitor respiratory acidosis. He does not have underlying COPD so I suspect this is secondary to volume overload with atelectasis.   2. Minimize sedating agents.   3. Continue current insulin regimen.    4. Titrate Epinephrine. Target SBP > 100 to help renal perfusion.   5. Bumex IV   6. Chest tube management per CTS.   7. Place an NGT to LWS this AM to help with gastric distention.    8. CLD until dyspepsia improves.   9. Continue to monitor renal function. Treat hyperkalemia as needed.   10. Pulmonary toilet  11. Ambulation as tolerated.   12. AM labs and CXR    High risk secondary to management of vasopressor titration and acute respiratory failure.      High level of risk due to:  severe exacerbation of chronic illness and illness with threat to life or bodily function.    Plan of care and goals reviewed during interdisciplinary rounds.  I discussed the patient's findings and my recommendations with family and nursing staff      Dawna Wiggins, DO    Intensive Care Medicine and Pulmonary Medicine

## 2022-09-06 NOTE — PROGRESS NOTES
"  [facility]  PROGRESS NOTE    Date of Admission: 9/2/2022  Date of Service: 09/06/22    Primary Care Physician: Spencer Saeed MD    Chief Complaint: f/u VHD, PAF, hypotension   Problem List:   Severe mitral regurgitation    Obesity (BMI 30-39.9)    Chronic systolic heart failure (HCC)    Coronary artery disease of native artery of native heart with stable angina pectoris (HCC)    HTN (hypertension)      Subjective      HPI: Sitting up in chair, on high flow nasal cannula, wife at bedside, a flutter with 4-1 block.      Objective   Vitals: /72   Pulse 76   Temp 98.8 °F (37.1 °C) (Bladder)   Resp 23   Ht 190.5 cm (75\")   Wt 115 kg (254 lb 8.3 oz)   SpO2 99%   BMI 31.81 kg/m²     Physical Exam:  GENERAL: Alert, cooperative, in no acute distress.   HEENT: Normocephalic, no jugular venous distention  HEART: Regular rhythm, normal rate, and no murmurs, gallops, or rubs.   LUNGS: Clear to auscultation bilaterally. No wheezing, rales or rhonchi.  ABDOMEN: Soft, bowel sounds present, nontender   NEUROLOGIC: No focal abnormalities involving strength or sensation are noted.   EXTREMITIES: No clubbing, cyanosis, or edema noted.     Results:  Results from last 7 days   Lab Units 09/06/22 0212 09/05/22  0407 09/04/22  0226   WBC 10*3/mm3 12.06* 16.80* 14.76*   HEMOGLOBIN g/dL 8.8* 9.5* 8.5*   HEMATOCRIT % 27.9* 30.2* 25.9*   PLATELETS 10*3/mm3 146 124* 142     Results from last 7 days   Lab Units 09/06/22  0212 09/05/22 2024 09/05/22  0959   SODIUM mmol/L 137 137 138   POTASSIUM mmol/L 4.8 5.5* 5.1   CHLORIDE mmol/L 96* 96* 96*   CO2 mmol/L 34.0* 32.0* 35.0*   BUN mg/dL 44* 43* 36*   CREATININE mg/dL 1.83* 2.02* 2.00*   GLUCOSE mg/dL 150* 138* 107*      Lab Results   Component Value Date    CHOL 159 06/25/2022    TRIG 85 06/25/2022    HDL 36 (L) 06/25/2022     (H) 06/25/2022    AST 37 08/30/2022    ALT 14 08/30/2022     Results from last 7 days   Lab Units " 08/30/22  1246   HEMOGLOBIN A1C % 6.30*                 Results from last 7 days   Lab Units 09/03/22  0215 09/02/22  1704 09/02/22  1401 08/30/22  1246   PROTIME Seconds 14.3 17.5* 18.6* 12.8   INR  1.12 1.44* 1.56* 0.97   APTT seconds  --  35.7 40.7* 33.7       Intake/Output Summary (Last 24 hours) at 9/6/2022 0842  Last data filed at 9/6/2022 0600  Gross per 24 hour   Intake 1422 ml   Output 1425 ml   Net -3 ml     I personally reviewed the patient's EKG/Telemetry data    Radiology Data:   CXR 9/6/22:  IMPRESSION:  The patient's pleural drains are not well seen, either removed or  obscured by superimposed opacity at the lung bases and upper abdomen.  Right-sided central venous catheter projects unchanged. There is no  distinct pneumothorax or new focal airspace consolidation. Unchanged  suspected trace effusions. Stable heart and mediastinal contours.     Current Medications:  acetaminophen, 650 mg, Oral, Q8H  amiodarone, 200 mg, Oral, Q8H   Followed by  [START ON 9/12/2022] amiodarone, 200 mg, Oral, Q12H   Followed by  [START ON 9/26/2022] amiodarone, 200 mg, Oral, Daily  aspirin, 325 mg, Oral, Daily  atorvastatin, 40 mg, Oral, Nightly  chlorhexidine, 15 mL, Mouth/Throat, Q12H  insulin lispro, 0-7 Units, Subcutaneous, 4x Daily With Meals & Nightly  metoprolol tartrate, 12.5 mg, Oral, Q12H  senna-docusate sodium, 2 tablet, Oral, BID      dextrose 5 % with KCl 20 mEq, 30 mL/hr, Last Rate: 30 mL/hr (09/02/22 1424)  EPINEPHrine, 0.02-0.3 mcg/kg/min, Last Rate: 0.08 mcg/kg/min (09/06/22 0600)  phenylephrine, 0.5-3 mcg/kg/min, Last Rate: Stopped (09/05/22 1910)        Assessment and Plan:   1. 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 Chan  - discordant EF measurements by echo pre-op, however 45-50% by intra-op KENNEY prior to surgery   Required multiple blood products POD 0     2. SHF  - EF reported as 36-40%  by TTE in June and normal by KENNEY during same hospitalization   - will need repeat echo prior to discharge to re-look LVEF     3. CAD  - recent cath 06/2022 with mild nonobstructive CAD  - continue ASA, statin      4. Dyslipidemia  - continue Lipitor 40mg to target LDL <70 given history of CAD and prior LAD stent     5.  PVCs  -He remains asymptomatic with these PVCs and I am not overly concerned by them currently.      6.  Postoperative atrial flutter:  -onset 9/4, started on Amio protocol   -hopefully wean off epi  - ANGELICA ligated at time of surgery with 35 mm Atricure clip   Continue po amio, may need ecv      Electronically signed by Yamilet Trivedi PA-C, 09/06/22, 8:44 AM EDT.    I have seen and examined the patient, performing a face-to-face diagnostic evaluation with plan of care reviewed and developed with the Advanced Practice Clinician and nursing staff. I have addended and modified the above history of present illness, physical examination, and assessment and plan to reflect my findings and impressions. All medical decision making performed by Dr. Bowden.    Scott Bowden MD, [facility]  09/06/22

## 2022-09-06 NOTE — PROGRESS NOTES
Order received for Phase II Cardiac Rehab. Staff will follow up once patient is transferred to telemetry.

## 2022-09-06 NOTE — PLAN OF CARE
Goal Outcome Evaluation:      Plan of care reviewed with patient and spouse    Neuro - patient remains drowsy, A&O, DE LA TORRE, ambulated x 1, occasionally hard to arouse, slow to respond to questions, more alert this evening than earlier in the day    Resp - alternating between BIPAP and HFNC depending on level of alertness, sats > 93%, clear with diminished bases, -750, added flutter valve CT output 140        Card - A flutter with freq PVC's with rate 60-80's, BP /, able to wean Epi to 0.04    GI - poor intake, remains on clear liquids, intermittent periods of nausea, zofran x 1 along with compazine x 1 (compazine dcd due to increased drowsiness), NGT placed with 150 ml output, patient conts to ashu dudley with 1760 ml output, bumex x 1 today    Pain - no PRN pain meds today, hydrocodone dcd, oxycodone decreased to 5 mg, Fentanyl dcd    Misc - ambulated 80 ft with PT/OT

## 2022-09-06 NOTE — PLAN OF CARE
Goal Outcome Evaluation:  Plan of Care Reviewed With: patient        Progress: improving  Outcome Evaluation: OT eval completed Pt presents with deficits in strength, BUE AROM, activity tolerance, and balance compared to baseline ADL/functional transfer completion, CGA STS with Julia intermittently for balance in standing, depA LBD, maxA UBD, tolerated gentle AROM BUEs incorporated within sternal precautions, recom IPOT d/c rehab

## 2022-09-06 NOTE — CASE MANAGEMENT/SOCIAL WORK
Discharge Planning Assessment  [facility]     Patient Name: Damián Myers  MRN: [id number]  Today's Date: 9/6/2022    Admit Date: 9/2/2022     Discharge Needs Assessment     Row Name 09/06/22 1149       Living Environment    People in Home spouse    Current Living Arrangements home    Primary Care Provided by self    Provides Primary Care For no one    Family Caregiver if Needed spouse    Quality of Family Relationships unable to assess    Able to Return to Prior Arrangements yes       Transition Planning    Patient/Family Anticipates Transition to home with help/services;inpatient rehabilitation facility    Patient/Family Anticipated Services at Transition rehabilitation services;home health care    Transportation Anticipated family or friend will provide;health plan transportation       Discharge Needs Assessment    Equipment Currently Used at Home none    Discharge Facility/Level of Care Needs acute rehab;home with home health;nursing facility, skilled               Discharge Plan     Row Name 09/06/22 1732       Plan    Plan initial    Patient/Family in Agreement with Plan yes    Plan Comments I spoke with Mr. Myers in room regarding discharge planning.  He lives in [place] with  his spouse.   He is independent with ADL's and does not use any DME.  He has prescription coverage.  He is being alternated between Bipap and High flow nasal cannula for oxygen requirements and remains with EPi drip.  Therapy has recommended inpatient rehab at discharge and he is agreeable.  CM will follow up and make referrals when appropriate.    Final Discharge Disposition Code 30 - still a patient              Continued Care and Services - Admitted Since 9/2/2022    Coordination has not been started for this encounter.       Expected Discharge Date and Time     Expected Discharge Date Expected Discharge Time    Sep 11, 2022          Demographic Summary     Row Name 09/06/22 1147       General Information    Admission Type  inpatient    Reason for Consult discharge planning    Preferred Language English    General Information Comments PCP - Spencer Saeed               Functional Status     Row Name 09/06/22 1147       Functional Status    Usual Activity Tolerance good    Current Activity Tolerance moderate       Functional Status, IADL    Medications independent    Meal Preparation independent    Housekeeping independent    Laundry independent    Shopping independent       Employment/    Employment Status retired               Psychosocial    No documentation.                Abuse/Neglect    No documentation.                Legal    No documentation.                Substance Abuse    No documentation.                Patient Forms    No documentation.                   Yulissa Lyn RN

## 2022-09-06 NOTE — PLAN OF CARE
Goal Outcome Evaluation:    ABG obtained at start of shift, see labs. Pt placed on bipap for remainder of shift. Afib on the monitor for the majority of the night, briefly noted to be in NSR at times. Remains on epi gtt. Adequate UOP. 1 mg Bumex given per MD orders.

## 2022-09-06 NOTE — PROGRESS NOTES
Cardiothoracic Surgery Progress Note      POD # 4 s/p MVR, LAAL     LOS: 4 days      Subjective:  On bipap  On epi gtt  Some incisional pain    Objective:  Vital Signs  Temp:  [97.5 °F (36.4 °C)-99.5 °F (37.5 °C)] 98.8 °F (37.1 °C)  Heart Rate:  [55-78] 76  Resp:  [14-25] 23  BP: ()/(42-72) 116/67    Physical Exam:   General Appearance: alert, appears stated age and cooperative   Lungs: clear to auscultation, respirations regular, respirations even and respirations unlabored   Heart: irregular rhythm, flutter on EKG   Skin: Incision c/d/i    Output by Drain (mL) 09/05/22 0701 - 09/05/22 1900 09/05/22 1901 - 09/06/22 0650 Range Total   Y Chest Tube 3 and 4 3 Anterior 4 Anterior 260 160 420        Results:    Results from last 7 days   Lab Units 09/06/22  0212   WBC 10*3/mm3 12.06*   HEMOGLOBIN g/dL 8.8*   HEMATOCRIT % 27.9*   PLATELETS 10*3/mm3 146     Results from last 7 days   Lab Units 09/06/22  0212   SODIUM mmol/L 137   POTASSIUM mmol/L 4.8   CHLORIDE mmol/L 96*   CO2 mmol/L 34.0*   BUN mg/dL 44*   CREATININE mg/dL 1.83*   GLUCOSE mg/dL 150*   CALCIUM mg/dL 9.0       Assessment:  POD # 4 s/p MVR, LAAL  Creatinine-improving    Plan:  Continue chest tubes  Gentle diuresis  Ambulate  Pulmonary toilet  Wean epi as tolerated  Sofya as per cardiology    Lyle Chan MD  09/06/22  06:50 EDT

## 2022-09-07 ENCOUNTER — APPOINTMENT (OUTPATIENT)
Dept: GENERAL RADIOLOGY | Facility: HOSPITAL | Age: 67
End: 2022-09-07

## 2022-09-07 LAB
ANION GAP SERPL CALCULATED.3IONS-SCNC: 9 MMOL/L (ref 5–15)
ARTERIAL PATENCY WRIST A: ABNORMAL
ATMOSPHERIC PRESS: ABNORMAL MM[HG]
BASE EXCESS BLDA CALC-SCNC: 8.5 MMOL/L (ref 0–2)
BDY SITE: ABNORMAL
BODY TEMPERATURE: 37 C
BUN SERPL-MCNC: 45 MG/DL (ref 8–23)
BUN/CREAT SERPL: 30.6 (ref 7–25)
CALCIUM SPEC-SCNC: 8.7 MG/DL (ref 8.6–10.5)
CHLORIDE SERPL-SCNC: 96 MMOL/L (ref 98–107)
CO2 BLDA-SCNC: 37.9 MMOL/L (ref 22–33)
CO2 SERPL-SCNC: 32 MMOL/L (ref 22–29)
COHGB MFR BLD: 0.8 % (ref 0–2)
CREAT SERPL-MCNC: 1.47 MG/DL (ref 0.76–1.27)
DEPRECATED RDW RBC AUTO: 44.2 FL (ref 37–54)
EGFRCR SERPLBLD CKD-EPI 2021: 52 ML/MIN/1.73
EPAP: 6
ERYTHROCYTE [DISTWIDTH] IN BLOOD BY AUTOMATED COUNT: 12.8 % (ref 12.3–15.4)
GLUCOSE BLDC GLUCOMTR-MCNC: 114 MG/DL (ref 70–130)
GLUCOSE BLDC GLUCOMTR-MCNC: 141 MG/DL (ref 70–130)
GLUCOSE BLDC GLUCOMTR-MCNC: 150 MG/DL (ref 70–130)
GLUCOSE BLDC GLUCOMTR-MCNC: 94 MG/DL (ref 70–130)
GLUCOSE SERPL-MCNC: 138 MG/DL (ref 65–99)
HCO3 BLDA-SCNC: 35.8 MMOL/L (ref 20–26)
HCT VFR BLD AUTO: 26.6 % (ref 37.5–51)
HCT VFR BLD CALC: 27.5 % (ref 38–51)
HGB BLD-MCNC: 8.4 G/DL (ref 13–17.7)
HGB BLDA-MCNC: 9 G/DL (ref 13.5–17.5)
INHALED O2 CONCENTRATION: 35 %
IPAP: 22
Lab: ABNORMAL
MAGNESIUM SERPL-MCNC: 2.3 MG/DL (ref 1.6–2.4)
MCH RBC QN AUTO: 30.2 PG (ref 26.6–33)
MCHC RBC AUTO-ENTMCNC: 31.6 G/DL (ref 31.5–35.7)
MCV RBC AUTO: 95.7 FL (ref 79–97)
METHGB BLD QL: ABNORMAL
MODALITY: ABNORMAL
NOTE: ABNORMAL
NOTIFIED BY: ABNORMAL
NOTIFIED WHO: ABNORMAL
OXYHGB MFR BLDV: 98.6 % (ref 94–99)
PAW @ PEAK INSP FLOW SETTING VENT: 19 CMH2O
PCO2 BLDA: 67.1 MM HG (ref 35–45)
PCO2 TEMP ADJ BLD: 67.1 MM HG (ref 35–48)
PH BLDA: 7.33 PH UNITS (ref 7.35–7.45)
PH, TEMP CORRECTED: 7.33 PH UNITS
PHOSPHATE SERPL-MCNC: 3.5 MG/DL (ref 2.5–4.5)
PLATELET # BLD AUTO: 154 10*3/MM3 (ref 140–450)
PMV BLD AUTO: 10.5 FL (ref 6–12)
PO2 BLDA: 108 MM HG (ref 83–108)
PO2 TEMP ADJ BLD: 108 MM HG (ref 83–108)
POTASSIUM SERPL-SCNC: 4.3 MMOL/L (ref 3.5–5.2)
QT INTERVAL: 444 MS
QTC INTERVAL: 492 MS
RBC # BLD AUTO: 2.78 10*6/MM3 (ref 4.14–5.8)
SET MECH RESP RATE: 10
SODIUM SERPL-SCNC: 137 MMOL/L (ref 136–145)
TOTAL RATE: 24 BREATHS/MINUTE
VENTILATOR MODE: ABNORMAL
VT ON VENT VENT: 466 ML
WBC NRBC COR # BLD: 8.96 10*3/MM3 (ref 3.4–10.8)

## 2022-09-07 PROCEDURE — 97530 THERAPEUTIC ACTIVITIES: CPT

## 2022-09-07 PROCEDURE — 82805 BLOOD GASES W/O2 SATURATION: CPT

## 2022-09-07 PROCEDURE — 93010 ELECTROCARDIOGRAM REPORT: CPT | Performed by: STUDENT IN AN ORGANIZED HEALTH CARE EDUCATION/TRAINING PROGRAM

## 2022-09-07 PROCEDURE — 83050 HGB METHEMOGLOBIN QUAN: CPT

## 2022-09-07 PROCEDURE — 99233 SBSQ HOSP IP/OBS HIGH 50: CPT | Performed by: INTERNAL MEDICINE

## 2022-09-07 PROCEDURE — 94660 CPAP INITIATION&MGMT: CPT

## 2022-09-07 PROCEDURE — 82962 GLUCOSE BLOOD TEST: CPT

## 2022-09-07 PROCEDURE — 99232 SBSQ HOSP IP/OBS MODERATE 35: CPT | Performed by: INTERNAL MEDICINE

## 2022-09-07 PROCEDURE — 94799 UNLISTED PULMONARY SVC/PX: CPT

## 2022-09-07 PROCEDURE — 99024 POSTOP FOLLOW-UP VISIT: CPT | Performed by: STUDENT IN AN ORGANIZED HEALTH CARE EDUCATION/TRAINING PROGRAM

## 2022-09-07 PROCEDURE — 36600 WITHDRAWAL OF ARTERIAL BLOOD: CPT

## 2022-09-07 PROCEDURE — 93005 ELECTROCARDIOGRAM TRACING: CPT | Performed by: STUDENT IN AN ORGANIZED HEALTH CARE EDUCATION/TRAINING PROGRAM

## 2022-09-07 PROCEDURE — 83735 ASSAY OF MAGNESIUM: CPT | Performed by: INTERNAL MEDICINE

## 2022-09-07 PROCEDURE — 85027 COMPLETE CBC AUTOMATED: CPT | Performed by: INTERNAL MEDICINE

## 2022-09-07 PROCEDURE — 84100 ASSAY OF PHOSPHORUS: CPT | Performed by: INTERNAL MEDICINE

## 2022-09-07 PROCEDURE — 63710000001 INSULIN LISPRO (HUMAN) PER 5 UNITS: Performed by: INTERNAL MEDICINE

## 2022-09-07 PROCEDURE — 82375 ASSAY CARBOXYHB QUANT: CPT

## 2022-09-07 PROCEDURE — 80048 BASIC METABOLIC PNL TOTAL CA: CPT | Performed by: INTERNAL MEDICINE

## 2022-09-07 PROCEDURE — 71045 X-RAY EXAM CHEST 1 VIEW: CPT

## 2022-09-07 RX ADMIN — INSULIN LISPRO 2 UNITS: 100 INJECTION, SOLUTION INTRAVENOUS; SUBCUTANEOUS at 08:07

## 2022-09-07 RX ADMIN — SENNOSIDES AND DOCUSATE SODIUM 2 TABLET: 50; 8.6 TABLET ORAL at 08:07

## 2022-09-07 RX ADMIN — AMIODARONE HYDROCHLORIDE 200 MG: 200 TABLET ORAL at 20:50

## 2022-09-07 RX ADMIN — SENNOSIDES AND DOCUSATE SODIUM 2 TABLET: 50; 8.6 TABLET ORAL at 20:51

## 2022-09-07 RX ADMIN — APIXABAN 5 MG: 5 TABLET, FILM COATED ORAL at 20:51

## 2022-09-07 RX ADMIN — METOPROLOL TARTRATE 12.5 MG: 25 TABLET, FILM COATED ORAL at 20:52

## 2022-09-07 RX ADMIN — ASPIRIN 325 MG: 325 TABLET, COATED ORAL at 08:07

## 2022-09-07 RX ADMIN — ATORVASTATIN CALCIUM 40 MG: 40 TABLET, FILM COATED ORAL at 20:51

## 2022-09-07 RX ADMIN — CHLORHEXIDINE GLUCONATE 15 ML: 1.2 SOLUTION ORAL at 20:52

## 2022-09-07 RX ADMIN — OXYCODONE 5 MG: 5 TABLET ORAL at 02:17

## 2022-09-07 RX ADMIN — ACETAMINOPHEN 650 MG: 325 TABLET, FILM COATED ORAL at 20:51

## 2022-09-07 RX ADMIN — AMIODARONE HYDROCHLORIDE 200 MG: 200 TABLET ORAL at 10:26

## 2022-09-07 RX ADMIN — AMIODARONE HYDROCHLORIDE 200 MG: 200 TABLET ORAL at 02:18

## 2022-09-07 RX ADMIN — ACETAMINOPHEN 650 MG: 325 TABLET, FILM COATED ORAL at 06:20

## 2022-09-07 RX ADMIN — OXYCODONE 5 MG: 5 TABLET ORAL at 10:26

## 2022-09-07 NOTE — PROGRESS NOTES
Cardiothoracic Surgery Progress Note      POD # 5 s/p MVR, LAAL     LOS: 5 days      Subjective:  Up in chair, feels groggy, but otherwise no complaints.  Wants NG tube out.  Some belching.      Objective:  Vital Signs  Temp:  [98.6 °F (37 °C)-99.9 °F (37.7 °C)] 98.8 °F (37.1 °C)  Heart Rate:  [61-80] 74  Resp:  [14-22] 20  BP: ()/(49-90) 127/61    Physical Exam:   General Appearance: alert, appears stated age and cooperative   Lungs: clear to auscultation, respirations regular, respirations even and respirations unlabored   Heart: irregular rhythm, atrial flutter on EKG   Skin: Incision c/d/i    Output by Drain (mL) 09/06/22 0701 - 09/06/22 1900 09/06/22 1901 - 09/07/22 0645 Range Total   Y Chest Tube 3 and 4 3 Anterior 4 Anterior 140 40 180        Results:    Results from last 7 days   Lab Units 09/07/22  0341   WBC 10*3/mm3 8.96   HEMOGLOBIN g/dL 8.4*   HEMATOCRIT % 26.6*   PLATELETS 10*3/mm3 154     Results from last 7 days   Lab Units 09/07/22  0341   SODIUM mmol/L 137   POTASSIUM mmol/L 4.3   CHLORIDE mmol/L 96*   CO2 mmol/L 32.0*   BUN mg/dL 45*   CREATININE mg/dL 1.47*   GLUCOSE mg/dL 138*   CALCIUM mg/dL 8.7       Assessment:  POD # 5 s/p MVR, LAAL  Creatinine continues to improve    Plan:  D/C chest tubes  Ambulate  Pulmonary toilet  D/C dudley  Wean epi as tolerated - off today   D/C Central line once epi off  D/C NGT  SLP evaluation  Possible ECV with cardiology for A.flutter    Lyle Chan MD  09/07/22  06:45 EDT

## 2022-09-07 NOTE — THERAPY TREATMENT NOTE
How Severe Is Your Skin Lesion?: mild Patient Name: Damián Myers  : 1955    MRN: [id number]                              Today's Date: 2022       Admit Date: 2022    Visit Dx:     ICD-10-CM ICD-9-CM   1. Severe mitral regurgitation  I34.0 424.0     Patient Active Problem List   Diagnosis   • Hyperglycemia   • Obesity (BMI 30-39.9)   • Chronic systolic heart failure (HCC)   • Coronary artery disease of native artery of native heart with stable angina pectoris ([facility])   • Stroke (cerebrum) ([facility])   • Acute pulmonary edema ([facility])   • TIA (transient ischemic attack)   • HTN (hypertension)   • HLD (hyperlipidemia)   • COVID-19   • Acute on chronic respiratory failure with hypoxia and hypercapnia ([facility])   • Cytokine release syndrome, grade 4   • Acute on chronic congestive heart failure, unspecified heart failure type ([facility])   • Severe mitral regurgitation     Past Medical History:   Diagnosis Date   • CAD (coronary artery disease)    • Cardiac abnormality    • CHF (congestive heart failure) ([facility])    • Coma of unknown cause ([facility])     Pt states he was in a coma for one week, unknown cause   • Hypertension    • Mitral regurgitation    • Obesity (BMI 30-39.9) 2019   • Spinal headache      Past Surgical History:   Procedure Laterality Date   • CARDIAC CATHETERIZATION N/A 2019    Procedure: Left Heart Cath;  Surgeon: Sherrell Hart MD;  Location:  [facility];  Service: Cardiology   • CARDIAC CATHETERIZATION N/A 2022    Procedure: Left Heart Cath;  Surgeon: Greyson Balderas MD;  Location:  [facility];  Service: Cardiology;  Laterality: N/A;   • COLONOSCOPY     • CORONARY STENT PLACEMENT        General Information     Row Name 22 1124          Physical Therapy Time and Intention    Document Type therapy note (daily note)  -KG     Mode of Treatment physical therapy  -KG     Row Name 22 1124          General Information    Existing Precautions/Restrictions  Have Your Skin Lesions Been Treated?: not been treated cardiac;fall;sternal;other (see comments)  NG  -KG     Row Name 09/07/22 1124          Cognition    Orientation Status (Cognition) oriented x 3  -KG     Row Name 09/07/22 1124          Safety Issues, Functional Mobility    Safety Issues Affecting Function (Mobility) awareness of need for assistance;insight into deficits/self-awareness;safety precaution awareness;safety precautions follow-through/compliance  -KG     Impairments Affecting Function (Mobility) balance;coordination;endurance/activity tolerance;postural/trunk control;shortness of breath;strength  -KG           User Key  (r) = Recorded By, (t) = Taken By, (c) = Cosigned By    Initials Name Provider Type    KG Hansa Deal, PT Physical Therapist               Mobility     Row Name 09/07/22 1124          Bed Mobility    Comment, (Bed Mobility) UIC  -KG     Row Name 09/07/22 1124          Transfers    Comment, (Transfers) STS from [facility]. VC's for sequencing and safe hand placement to maintain sternal precautions.  -KG     Row Name 09/07/22 1124          Sit-Stand Transfer    Sit-Stand [place] (Transfers) minimum assist (75% patient effort);verbal cues  -KG     Row Name 09/07/22 1124          Gait/Stairs (Locomotion)    [place] Level (Gait) minimum assist (75% patient effort);1 person assist;1 person to manage equipment;verbal cues  -KG     Assistive Device (Gait) other (see comments)  B UE support on tele monitor  -KG     Distance in Feet (Gait) 80  -KG     Deviations/Abnormal Patterns (Gait) bilateral deviations;base of support, narrow;david decreased;festinating/shuffling;stride length decreased  -KG     Bilateral Gait Deviations forward flexed posture;heel strike decreased  -KG     Comment, (Gait/Stairs) Pt demonstrated step to gait pattern with very slow david and short, shuffled steps. Max cueing for upright posture with forward gaze and increased stride length. Pt with increased weight bearing through B UEs on monitor. Required  Is This A New Presentation, Or A Follow-Up?: Skin Lesions multiple standing rest breaks and max encouragement for continued forward ambulation. Limited by c/o weakness. Declined additional ambulation despite education on importance of mobility.  -KG           User Key  (r) = Recorded By, (t) = Taken By, (c) = Cosigned By    Initials Name Provider Type    Hansa Mitchell, MARITZA Physical Therapist               Obj/Interventions     Row Name 09/07/22 1126          Balance    Balance Assessment sitting static balance;standing static balance;standing dynamic balance  -KG     Static Sitting Balance supervision  -KG     Position, Sitting Balance unsupported;sitting in chair  -KG     Static Standing Balance contact guard  -KG     Dynamic Standing Balance minimal assist  -KG     Position/Device Used, Standing Balance supported  -KG           User Key  (r) = Recorded By, (t) = Taken By, (c) = Cosigned By    Initials Name Provider Type    Hansa Mitchell, PT Physical Therapist               Goals/Plan    No documentation.                Clinical Impression     Row Name 09/07/22 1126          Pain    Pretreatment Pain Rating 4/10  -KG     Posttreatment Pain Rating 5/10  -KG     Pain Location incisional  -KG     Pain Location - chest  -KG     Pain Intervention(s) Repositioned;Ambulation/increased activity  -KG     Row Name 09/07/22 1126          Plan of Care Review    Plan of Care Reviewed With patient  -KG     Progress no change  -KG     Outcome Evaluation Pt ambulated 80ft with Julia 1+1 and B UE support on tele monitor. Pt required max cueing for upright posture with increased stride length. Required multiple standing rest breaks due to c/o fatigue and weakness. Pt declined additional ambulation despite max encouragement. Continue to recommend D/C to IP rehab facility.  -KG     Row Name 09/07/22 1126          Vital Signs    Pre Systolic BP Rehab 117  -KG     Pre Treatment Diastolic BP 62  -KG     Post Systolic BP Rehab 102  -KG     Post Treatment Diastolic BP 78  -KG      Pretreatment Heart Rate (beats/min) 77  -KG     Posttreatment Heart Rate (beats/min) 73  -KG     Pre SpO2 (%) 95  -KG     O2 Delivery Pre Treatment room air  -KG     Post SpO2 (%) 90  -KG     O2 Delivery Post Treatment room air  -KG     Pre Patient Position Sitting  -KG     Intra Patient Position Standing  -KG     Post Patient Position Sitting  -KG     Row Name 09/07/22 1126          Positioning and Restraints    Pre-Treatment Position sitting in chair/recliner  -KG     Post Treatment Position chair  -KG     In Chair notified nsg;reclined;call light within reach;encouraged to call for assist;with family/caregiver;RUE elevated;LUE elevated;legs elevated  -KG           User Key  (r) = Recorded By, (t) = Taken By, (c) = Cosigned By    Initials Name Provider Type    Hansa Mitchell PT Physical Therapist               Outcome Measures     Row Name 09/07/22 1129          How much help from another person do you currently need...    Turning from your back to your side while in flat bed without using bedrails? 2  -KG     Moving from lying on back to sitting on the side of a flat bed without bedrails? 2  -KG     Moving to and from a bed to a chair (including a wheelchair)? 3  -KG     Standing up from a chair using your arms (e.g., wheelchair, bedside chair)? 3  -KG     Climbing 3-5 steps with a railing? 2  -KG     To walk in hospital room? 3  -KG     AM-PAC 6 Clicks Score (PT) 15  -KG     Highest level of mobility 4 --> Transferred to chair/commode  -KG     Row Name 09/07/22 1129          Functional Assessment    Outcome Measure Options AM-PAC 6 Clicks Basic Mobility (PT)  -KG           User Key  (r) = Recorded By, (t) = Taken By, (c) = Cosigned By    Initials Name Provider Type    Hansa Mitchell PT Physical Therapist                             Physical Therapy Education                 Title: PT OT SLP Therapies (In Progress)     Topic: Physical Therapy (In Progress)     Point: Mobility training (In  Progress)     Learning Progress Summary           Patient Acceptance, E, NR by KG at 9/7/2022 0915    Acceptance, E, NR by ADELINE at 9/6/2022 1400    Acceptance, E, NR by AE at 9/5/2022 1035    Acceptance, E, NR by KG at 9/4/2022 0914                   Point: Home exercise program (In Progress)     Learning Progress Summary           Patient Acceptance, E, NR by KG at 9/7/2022 0915    Acceptance, E, NR by ADELINE at 9/6/2022 1400                   Point: Body mechanics (In Progress)     Learning Progress Summary           Patient Acceptance, E, NR by KG at 9/7/2022 0915    Acceptance, E, NR by ADELINE at 9/6/2022 1400    Acceptance, E, NR by AE at 9/5/2022 1035    Acceptance, E, NR by KG at 9/4/2022 0914                   Point: Precautions (In Progress)     Learning Progress Summary           Patient Acceptance, E, NR by KG at 9/7/2022 0915    Acceptance, E, NR by ADELINE at 9/6/2022 1400    Acceptance, E, NR by AE at 9/5/2022 1035    Acceptance, E, NR by KG at 9/4/2022 0914                               User Key     Initials Effective Dates Name Provider Type Discipline    ADELINE 06/16/21 -  Brittany Gomez, PT Physical Therapist PT    KG 05/22/20 -  Hansa Deal, PT Physical Therapist PT    AE 09/21/21 -  Andrew Shearer PT Physical Therapist PT              PT Recommendation and Plan  Planned Therapy Interventions (PT): balance training, bed mobility training, gait training, strengthening, transfer training  Plan of Care Reviewed With: patient  Progress: no change  Outcome Evaluation: Pt ambulated 80ft with Julia 1+1 and B UE support on tele monitor. Pt required max cueing for upright posture with increased stride length. Required multiple standing rest breaks due to c/o fatigue and weakness. Pt declined additional ambulation despite max encouragement. Continue to recommend D/C to  rehab facility.     Time Calculation:    PT Charges     Row Name 09/07/22 0915             Time Calculation    Start Time 0915  -KG      PT  Received On 09/07/22  -KG      PT Goal Re-Cert Due Date 09/14/22  -KG              Time Calculation- PT    Total Timed Code Minutes- PT 23 minute(s)  -KG              Timed Charges    56626 - PT Therapeutic Activity Minutes 23  -KG              Total Minutes    Timed Charges Total Minutes 23  -KG       Total Minutes 23  -KG            User Key  (r) = Recorded By, (t) = Taken By, (c) = Cosigned By    Initials Name Provider Type    KG Hansa Deal, PT Physical Therapist              Therapy Charges for Today     Code Description Service Date Service Provider Modifiers Qty    27242804400 HC PT THERAPEUTIC ACT EA 15 MIN 9/7/2022 Hansa Deal, PT GP 2          PT G-Codes  Outcome Measure Options: AM-PAC 6 Clicks Basic Mobility (PT)  AM-PAC 6 Clicks Score (PT): 15  AM-PAC 6 Clicks Score (OT): 12    Ana Deal PT  9/7/2022

## 2022-09-07 NOTE — PROGRESS NOTES
"  [facility]  PROGRESS NOTE    Date of Admission: 9/2/2022  Date of Service: 09/07/22    Primary Care Physician: Spencer Saeed MD    Chief Complaint: f/u VHD, PAF, hypotension   Problem List:   Severe mitral regurgitation    Obesity (BMI 30-39.9)    Chronic systolic heart failure (HCC)    Coronary artery disease of native artery of native heart with stable angina pectoris (HCC)    HTN (hypertension)      Subjective      Ambulated 80ft in hallway, denies any significant incisional pain or dyspnea. Remains in rate controlled atrial flutter, still on low dose Epi.     Objective   Vitals: /49   Pulse 75   Temp 98.8 °F (37.1 °C) (Bladder)   Resp 20   Ht 190.5 cm (75\")   Wt 115 kg (254 lb 8.3 oz)   SpO2 96%   BMI 31.81 kg/m²     Physical Exam:  GENERAL: Alert, cooperative, in no acute distress.   HEENT: Normocephalic, no jugular venous distention  HEART: Irregular rhythm, normal rate, and no murmurs, gallops, or rubs.   LUNGS: No wheezing, rales or rhonchi anteriorly.   NEUROLOGIC: No focal abnormalities involving strength or sensation are noted.   EXTREMITIES: No clubbing, cyanosis, or edema noted.     Results:  Results from last 7 days   Lab Units 09/07/22  0341 09/06/22 0212 09/05/22  0407   WBC 10*3/mm3 8.96 12.06* 16.80*   HEMOGLOBIN g/dL 8.4* 8.8* 9.5*   HEMATOCRIT % 26.6* 27.9* 30.2*   PLATELETS 10*3/mm3 154 146 124*     Results from last 7 days   Lab Units 09/07/22  0341 09/06/22 0212 09/05/22 2024   SODIUM mmol/L 137 137 137   POTASSIUM mmol/L 4.3 4.8 5.5*   CHLORIDE mmol/L 96* 96* 96*   CO2 mmol/L 32.0* 34.0* 32.0*   BUN mg/dL 45* 44* 43*   CREATININE mg/dL 1.47* 1.83* 2.02*   GLUCOSE mg/dL 138* 150* 138*      Lab Results   Component Value Date    CHOL 159 06/25/2022    TRIG 85 06/25/2022    HDL 36 (L) 06/25/2022     (H) 06/25/2022    AST 37 08/30/2022    ALT 14 08/30/2022         Results from last 7 days   Lab Units 09/03/22  0215 09/02/22  1704 " 09/02/22  1401   PROTIME Seconds 14.3 17.5* 18.6*   INR  1.12 1.44* 1.56*   APTT seconds  --  35.7 40.7*       Intake/Output Summary (Last 24 hours) at 9/7/2022 0809  Last data filed at 9/7/2022 0600  Gross per 24 hour   Intake 476 ml   Output 2280 ml   Net -1804 ml       I personally reviewed the patient's EKG/Telemetry data    Radiology Data:   CXR today reviewed, report pending    Current Medications:  acetaminophen, 650 mg, Oral, Q8H  amiodarone, 200 mg, Oral, Q8H   Followed by  [START ON 9/12/2022] amiodarone, 200 mg, Oral, Q12H   Followed by  [START ON 9/26/2022] amiodarone, 200 mg, Oral, Daily  aspirin, 325 mg, Oral, Daily  atorvastatin, 40 mg, Oral, Nightly  chlorhexidine, 15 mL, Mouth/Throat, Q12H  insulin lispro, 0-7 Units, Subcutaneous, 4x Daily With Meals & Nightly  metoprolol tartrate, 12.5 mg, Oral, Q12H  senna-docusate sodium, 2 tablet, Oral, BID      EPINEPHrine, 0.02-0.3 mcg/kg/min, Last Rate: 0.04 mcg/kg/min (09/06/22 1711)  phenylephrine, 0.5-3 mcg/kg/min, Last Rate: Stopped (09/05/22 1910)        Assessment and Plan:     1. 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 Chan  - discordant EF measurements by echo pre-op, however 45-50% by intra-op KENNEY prior to surgery   Required multiple blood products POD 0     2. SHF  - EF reported as 36-40% by TTE in June and normal by KENNEY during same hospitalization   - will need repeat echo prior to discharge to re-look LVEF     3. CAD  - recent cath 06/2022 with mild nonobstructive CAD  - continue ASA, statin      4. Dyslipidemia  - continue Lipitor 40mg to target LDL <70 given history of CAD and prior LAD stent     5.  PVCs  -asymptomatic, wean Epi      6.  Postoperative atrial flutter:  -onset 9/4, started on Amio protocol   -hopefully wean off epi  - ANGELICA ligated at time of surgery with 35 mm Atricure clip   Continue po amio    Plan for KENNEY/ECV at  bedside 9/8/2022  Start Eliquis 5 mg twice daily this evening if okay with Dr. Chan.    7. DAMIAN  - Creatinine improving     Discussed in detail with the patient's wife at bedside    Electronically signed by Yamilet Trivedi PA-C, 09/07/22, 8:11 AM EDT.    I have seen and examined the patient, performing a face-to-face diagnostic evaluation with plan of care reviewed and developed with the Advanced Practice Clinician and nursing staff. I have addended and modified the above history of present illness, physical examination, and assessment and plan to reflect my findings and impressions. All medical decision making performed by Dr. Bowden.    Scott Bowden MD, [facility]  09/07/22

## 2022-09-07 NOTE — PROGRESS NOTES
Pt. Referred for Phase II Cardiac Rehab. Staff discussed benefits of exercise, program protocol, and educational material provided. Teach back verified.  Permission granted from patient for staff to fax referral information to outlying program at this time.  Staff faxed referral info to [facility].

## 2022-09-07 NOTE — PLAN OF CARE
Goal Outcome Evaluation:  Plan of Care Reviewed With: patient        Progress: no change  Outcome Evaluation: Pt ambulated 80ft with Julia 1+1 and B UE support on tele monitor. Pt required max cueing for upright posture with increased stride length. Required multiple standing rest breaks due to c/o fatigue and weakness. Pt declined additional ambulation despite max encouragement. Continue to recommend D/C to IP rehab facility.

## 2022-09-07 NOTE — CASE MANAGEMENT/SOCIAL WORK
Continued Stay Note  [facility]     Patient Name: Damián Myers  MRN: [id number]  Today's Date: 9/7/2022    Admit Date: 9/2/2022     Discharge Plan     Row Name 09/07/22 1132       Plan    Plan rehab    Patient/Family in Agreement with Plan yes    Plan Comments Therapy is recommeding inpatient rehab.  I discussed this with patient and spouse and both are agreeable.   Referral called to Nataliya at [facility] acute rehab.    Final Discharge Disposition Code 62 - inpatient rehab facility               Discharge Codes    No documentation.               Expected Discharge Date and Time     Expected Discharge Date Expected Discharge Time    Sep 11, 2022             Yulissa Lyn RN

## 2022-09-07 NOTE — PROGRESS NOTES
INTENSIVIST   PROGRESS NOTE     Hospital:  LOS: 5 days     Mr. Damián Myers, 67 y.o. male is followed for a Chief Complaint of: Postoperative medical management      Subjective   S     Interval History:  Mentation improved. Off Bipap this AM.  Still with a lot of belching. Passing gas but has not had a bowel movement yet.        The patient's relevant past medical, surgical and social history were reviewed and updated in Epic as appropriate.      ROS:   Constitutional: Negative for fever.   Respiratory: Negative for dyspnea.   Cardiovascular: Negative for chest pain.   Gastrointestinal: Negative for nausea, vomiting and diarrhea.         Objective   O     Vitals:  Temp  Min: 98.6 °F (37 °C)  Max: 99.3 °F (37.4 °C)  BP  Min: 92/61  Max: 127/61  Pulse  Min: 61  Max: 80  Resp  Min: 14  Max: 22  SpO2  Min: 90 %  Max: 100 % Flow (L/min)  Min: 2  Max: 40    Intake/Ouptut 24 hrs (7:00AM - 6:59 AM)  Intake & Output (last 3 days)       09/04 0701  09/05 0700 09/05 0701  09/06 0700 09/06 0701  09/07 0700 09/07 0701  09/08 0700    P.O.  480 450     I.V. (mL/kg) 919 (8) 952 (8.3) 126 (1.1)     IV Piggyback 500 50      Total Intake(mL/kg) 1419 (12.3) 1482 (12.9) 576 (5)     Urine (mL/kg/hr) 830 (0.3) 1140 (0.4) 2260 (0.8) 200 (0.3)    Chest Tube 260 420 180     Total Output 1090 1560 2440 200    Net +329 -78 -1864 -200                  Medications (drips):  EPINEPHrine, Last Rate: 0.04 mcg/kg/min (09/06/22 1711)  phenylephrine, Last Rate: Stopped (09/05/22 1910)        Physical Examination  Telemetry:  Atrial flutter.   Constitutional:  No acute distress.  Sitting up in a chair on nasal cannula. NGT in place.     Eyes: No scleral icterus.   PERRL, EOM intact.    Neck:  Supple, FROM   Cardiovascular: Normal rate, regular and rhythm. Normal heart sounds.  No murmurs, gallop or rub.   Respiratory: No respiratory distress. Normal respiratory effort.  Diminished bilaterally.   Chest tube with serosanguinous drainage. No air leak.     Abdominal:  Soft. Distended.  Non-tender.   Extremities: No digital cyanosis. No clubbing.  No peripheral edema.   Skin: No rashes, lesions or ulcers   Neurological:   Alert and interactive.                  Results from last 7 days   Lab Units 09/07/22  0341 09/06/22  0212 09/05/22  0407   WBC 10*3/mm3 8.96 12.06* 16.80*   HEMOGLOBIN g/dL 8.4* 8.8* 9.5*   MCV fL 95.7 96.2 96.5   PLATELETS 10*3/mm3 154 146 124*     Results from last 7 days   Lab Units 09/07/22  0341 09/06/22  0212 09/05/22 2024   SODIUM mmol/L 137 137 137   POTASSIUM mmol/L 4.3 4.8 5.5*   CO2 mmol/L 32.0* 34.0* 32.0*   CREATININE mg/dL 1.47* 1.83* 2.02*   GLUCOSE mg/dL 138* 150* 138*   MAGNESIUM mg/dL 2.3 2.3 2.4   PHOSPHORUS mg/dL 3.5 3.7 4.7*     Estimated Creatinine Clearance: 66.7 mL/min (A) (by C-G formula based on SCr of 1.47 mg/dL (H)).        Results from last 7 days   Lab Units 09/07/22  0339 09/06/22  1411 09/06/22  1017 09/06/22  0441 09/05/22  2348   PH, ARTERIAL pH units 7.335* 7.323* 7.303* 7.283* 7.254*   PCO2, ARTERIAL mm Hg 67.1* 69.1* 70.2* 71.2* 75.8*   PO2 ART mm Hg 108.0 85.1 74.7* 117.0* 101.0   FIO2 % 35 35 35 40 40       Images:  Imaging Results (Last 24 Hours)     Procedure Component Value Units Date/Time    XR Chest 1 View [[id number]] Collected: 09/07/22 0904     Updated: 09/07/22 0913    Narrative:      DATE OF EXAM: 9/7/2022 4:17 AM     PROCEDURE: XR CHEST 1 VW-     INDICATIONS: Respiratory failure, chest tube management;  I34.0-Nonrheumatic mitral (valve) insufficiency     COMPARISON: 9/6/2022     TECHNIQUE: Single radiographic AP view of the chest was obtained.     FINDINGS:  NG tube is seen below the left hemidiaphragm. Right IJ catheter tip lies  the SVC. Heart is mildly enlarged. Vasculature appears normal. Patchy  left basilar atelectasis and minimal right basilar atelectasis appear  stable. No pneumothorax or new pulmonary parenchymal disease is seen.        Impression:         1. NG tube below the left  diaphragm in good position.  2. Stable mild bibasilar atelectasis, left greater than right.     This report was finalized on 9/7/2022 9:09 AM by Dr. Gonzales Herman MD.               Results: Reviewed.  I reviewed the patient's new laboratory and imaging results.  I independently reviewed the patient's new images.    Medications: Reviewed.    Assessment & Plan   A / P     Mr. Myers is a 65 yo male with PMH hypertension, congestive heart failure and coronary artery disease who presents to the ICU status post MVR with Dr. Chan.  Patient was admitted to [facility] in July for congestive heart failure.  During that admission he underwent left heart cath that revealed nonobstructive CAD but was suggestive of severe mitral valve regurgitation.  KENNEY revealed moderate MVR with mild prolapse.  For that he was sent to Dr. Chan's office for evaluation.       The patient underwent mitral valve replacement with left atrial appendage ligation per Dr. Chan.  He is brought in an intubated state to the intensive care unit postoperatively.    He was extubated on 9/3.     He developed Afib on 9/4 and was started on IV Amiodarone. This was discontinued secondary to bradycardia with sinus pauses. He is on PO Amiodarone. He remains on Epinephrine.      He developed acute hypercapnic respiratory failure on the evening of 9/5 and was placed on Bipap. An NGT was placed to LWS to help with gaseous distention. He was continued on Bipap with improvement in his ABG.       Nutrition:   Diet Clear Liquid; Cardiac, Consistent Carbohydrate  Advance Directives:   Code Status and Medical Interventions:   Ordered at: 09/02/22 1359     Code Status (Patient has no pulse and is not breathing):    CPR (Attempt to Resuscitate)     Medical Interventions (Patient has pulse or is breathing):    Full Support     Release to patient:    Routine Release       Active Hospital Problems    Diagnosis    • **Severe mitral regurgitation    • HTN  (hypertension)    • Chronic systolic heart failure (HCC)    • Coronary artery disease of native artery of native heart with stable angina pectoris (HCC)    • Obesity (BMI 30-39.9)        Assessment / Plan:    1. Continue nasal cannula during the day and Bipap at night for now. He does not have underlying COPD so I suspect this is secondary to volume overload with atelectasis.   2. Minimize sedating agents.   3. Continue current insulin regimen.    4. Titrate Epinephrine. Target SBP > 100 to help renal perfusion.   5. Chest tubes out today.    6. Plan to remove NGT after a bowel movement as he continues to have gaseous distention with subsequent atelectasis and low lung volumes.   7. CLD until dyspepsia improves.   8. Continue to monitor renal function. Slowly improving.   9. Pulmonary toilet  10. Ambulation as tolerated.   11. AM labs and CXR    High risk secondary to management of vasopressor titration and acute respiratory failure.      High level of risk due to:  severe exacerbation of chronic illness and illness with threat to life or bodily function.    Plan of care and goals reviewed during interdisciplinary rounds.  I discussed the patient's findings and my recommendations with patient, family and nursing staff      Dawna Wiggins,     Intensive Care Medicine and Pulmonary Medicine

## 2022-09-08 ENCOUNTER — DOCUMENTATION (OUTPATIENT)
Dept: CARDIAC REHAB | Facility: HOSPITAL | Age: 67
End: 2022-09-08

## 2022-09-08 ENCOUNTER — APPOINTMENT (OUTPATIENT)
Dept: CARDIOLOGY | Facility: HOSPITAL | Age: 67
End: 2022-09-08

## 2022-09-08 LAB
ANION GAP SERPL CALCULATED.3IONS-SCNC: 6 MMOL/L (ref 5–15)
ARTERIAL PATENCY WRIST A: ABNORMAL
ATMOSPHERIC PRESS: ABNORMAL MM[HG]
BASE EXCESS BLDA CALC-SCNC: 12.3 MMOL/L (ref 0–2)
BDY SITE: ABNORMAL
BH CV ECHO MEAS - MV DEC TIME: 0.39 MSEC
BH CV ECHO MEAS - MV E MAX VEL: 149.6 CM/SEC
BH CV ECHO MEAS - MV MAX PG: 10.5 MMHG
BH CV ECHO MEAS - MV MEAN PG: 3.4 MMHG
BH CV ECHO MEAS - MV V2 VTI: 38.1 CM
BH CV ECHO MEAS - TR MAX PG: 47.2 MMHG
BH CV ECHO MEAS - TR MAX VEL: 343.5 CM/SEC
BH CV VAS BP LEFT ARM: NORMAL MMHG
BODY TEMPERATURE: 37 C
BUN SERPL-MCNC: 36 MG/DL (ref 8–23)
BUN/CREAT SERPL: 28.8 (ref 7–25)
CALCIUM SPEC-SCNC: 8.7 MG/DL (ref 8.6–10.5)
CHLORIDE SERPL-SCNC: 95 MMOL/L (ref 98–107)
CO2 BLDA-SCNC: 40.3 MMOL/L (ref 22–33)
CO2 SERPL-SCNC: 38 MMOL/L (ref 22–29)
COHGB MFR BLD: 1.3 % (ref 0–2)
CREAT SERPL-MCNC: 1.25 MG/DL (ref 0.76–1.27)
DEPRECATED RDW RBC AUTO: 42.4 FL (ref 37–54)
EGFRCR SERPLBLD CKD-EPI 2021: 63.1 ML/MIN/1.73
EPAP: 0
ERYTHROCYTE [DISTWIDTH] IN BLOOD BY AUTOMATED COUNT: 12.6 % (ref 12.3–15.4)
GLUCOSE BLDC GLUCOMTR-MCNC: 105 MG/DL (ref 70–130)
GLUCOSE BLDC GLUCOMTR-MCNC: 116 MG/DL (ref 70–130)
GLUCOSE BLDC GLUCOMTR-MCNC: 150 MG/DL (ref 70–130)
GLUCOSE BLDC GLUCOMTR-MCNC: 94 MG/DL (ref 70–130)
GLUCOSE SERPL-MCNC: 115 MG/DL (ref 65–99)
HCO3 BLDA-SCNC: 38.4 MMOL/L (ref 20–26)
HCT VFR BLD AUTO: 27 % (ref 37.5–51)
HCT VFR BLD CALC: 26.2 % (ref 38–51)
HGB BLD-MCNC: 8.8 G/DL (ref 13–17.7)
HGB BLDA-MCNC: 8.5 G/DL (ref 13.5–17.5)
INHALED O2 CONCENTRATION: 28 %
IPAP: 0
LV EF 2D ECHO EST: 60 %
MAGNESIUM SERPL-MCNC: 2.2 MG/DL (ref 1.6–2.4)
MAXIMAL PREDICTED HEART RATE: 153 BPM
MCH RBC QN AUTO: 30.3 PG (ref 26.6–33)
MCHC RBC AUTO-ENTMCNC: 32.6 G/DL (ref 31.5–35.7)
MCV RBC AUTO: 93.1 FL (ref 79–97)
METHGB BLD QL: 0.4 % (ref 0–1.5)
MODALITY: ABNORMAL
NOTE: ABNORMAL
OXYHGB MFR BLDV: 94.2 % (ref 94–99)
PAW @ PEAK INSP FLOW SETTING VENT: 0 CMH2O
PCO2 BLDA: 59.5 MM HG (ref 35–45)
PCO2 TEMP ADJ BLD: 59.5 MM HG (ref 35–48)
PH BLDA: 7.42 PH UNITS (ref 7.35–7.45)
PH, TEMP CORRECTED: 7.42 PH UNITS
PHOSPHATE SERPL-MCNC: 2.7 MG/DL (ref 2.5–4.5)
PLATELET # BLD AUTO: 182 10*3/MM3 (ref 140–450)
PMV BLD AUTO: 10.2 FL (ref 6–12)
PO2 BLDA: 74.8 MM HG (ref 83–108)
PO2 TEMP ADJ BLD: 74.8 MM HG (ref 83–108)
POTASSIUM SERPL-SCNC: 3.9 MMOL/L (ref 3.5–5.2)
QT INTERVAL: 450 MS
QTC INTERVAL: 489 MS
RBC # BLD AUTO: 2.9 10*6/MM3 (ref 4.14–5.8)
SODIUM SERPL-SCNC: 139 MMOL/L (ref 136–145)
STRESS TARGET HR: 130 BPM
TOTAL RATE: 0 BREATHS/MINUTE
WBC NRBC COR # BLD: 6.82 10*3/MM3 (ref 3.4–10.8)

## 2022-09-08 PROCEDURE — 92960 CARDIOVERSION ELECTRIC EXT: CPT

## 2022-09-08 PROCEDURE — 83050 HGB METHEMOGLOBIN QUAN: CPT

## 2022-09-08 PROCEDURE — 25010000002 ONDANSETRON PER 1 MG: Performed by: PHYSICIAN ASSISTANT

## 2022-09-08 PROCEDURE — 25010000002 MIDAZOLAM PER 1 MG

## 2022-09-08 PROCEDURE — 36600 WITHDRAWAL OF ARTERIAL BLOOD: CPT

## 2022-09-08 PROCEDURE — 85027 COMPLETE CBC AUTOMATED: CPT | Performed by: INTERNAL MEDICINE

## 2022-09-08 PROCEDURE — 93325 DOPPLER ECHO COLOR FLOW MAPG: CPT | Performed by: INTERNAL MEDICINE

## 2022-09-08 PROCEDURE — 25010000002 FENTANYL CITRATE (PF) 50 MCG/ML SOLUTION

## 2022-09-08 PROCEDURE — 99232 SBSQ HOSP IP/OBS MODERATE 35: CPT | Performed by: INTERNAL MEDICINE

## 2022-09-08 PROCEDURE — 97530 THERAPEUTIC ACTIVITIES: CPT

## 2022-09-08 PROCEDURE — 25010000002 MIDAZOLAM PER 1 MG: Performed by: INTERNAL MEDICINE

## 2022-09-08 PROCEDURE — 82962 GLUCOSE BLOOD TEST: CPT

## 2022-09-08 PROCEDURE — 25010000002 MORPHINE PER 10 MG: Performed by: THORACIC SURGERY (CARDIOTHORACIC VASCULAR SURGERY)

## 2022-09-08 PROCEDURE — 93312 ECHO TRANSESOPHAGEAL: CPT

## 2022-09-08 PROCEDURE — 83735 ASSAY OF MAGNESIUM: CPT | Performed by: INTERNAL MEDICINE

## 2022-09-08 PROCEDURE — 93320 DOPPLER ECHO COMPLETE: CPT

## 2022-09-08 PROCEDURE — 93320 DOPPLER ECHO COMPLETE: CPT | Performed by: INTERNAL MEDICINE

## 2022-09-08 PROCEDURE — 82805 BLOOD GASES W/O2 SATURATION: CPT

## 2022-09-08 PROCEDURE — 92610 EVALUATE SWALLOWING FUNCTION: CPT

## 2022-09-08 PROCEDURE — 80048 BASIC METABOLIC PNL TOTAL CA: CPT | Performed by: INTERNAL MEDICINE

## 2022-09-08 PROCEDURE — 25010000002 FENTANYL CITRATE (PF) 50 MCG/ML SOLUTION: Performed by: INTERNAL MEDICINE

## 2022-09-08 PROCEDURE — 99024 POSTOP FOLLOW-UP VISIT: CPT | Performed by: STUDENT IN AN ORGANIZED HEALTH CARE EDUCATION/TRAINING PROGRAM

## 2022-09-08 PROCEDURE — 93325 DOPPLER ECHO COLOR FLOW MAPG: CPT

## 2022-09-08 PROCEDURE — 93312 ECHO TRANSESOPHAGEAL: CPT | Performed by: INTERNAL MEDICINE

## 2022-09-08 PROCEDURE — 93005 ELECTROCARDIOGRAM TRACING: CPT | Performed by: INTERNAL MEDICINE

## 2022-09-08 PROCEDURE — 84100 ASSAY OF PHOSPHORUS: CPT | Performed by: INTERNAL MEDICINE

## 2022-09-08 PROCEDURE — 82375 ASSAY CARBOXYHB QUANT: CPT

## 2022-09-08 PROCEDURE — 5A2204Z RESTORATION OF CARDIAC RHYTHM, SINGLE: ICD-10-PCS | Performed by: INTERNAL MEDICINE

## 2022-09-08 PROCEDURE — 92960 CARDIOVERSION ELECTRIC EXT: CPT | Performed by: INTERNAL MEDICINE

## 2022-09-08 PROCEDURE — 99152 MOD SED SAME PHYS/QHP 5/>YRS: CPT | Performed by: INTERNAL MEDICINE

## 2022-09-08 PROCEDURE — 63710000001 INSULIN LISPRO (HUMAN) PER 5 UNITS: Performed by: PHYSICIAN ASSISTANT

## 2022-09-08 RX ORDER — SODIUM CHLORIDE 0.9 % (FLUSH) 0.9 %
10 SYRINGE (ML) INJECTION EVERY 12 HOURS SCHEDULED
Status: DISCONTINUED | OUTPATIENT
Start: 2022-09-08 | End: 2022-09-14 | Stop reason: HOSPADM

## 2022-09-08 RX ORDER — DOCUSATE SODIUM 100 MG/1
100 CAPSULE, LIQUID FILLED ORAL 2 TIMES DAILY PRN
Status: DISCONTINUED | OUTPATIENT
Start: 2022-09-08 | End: 2022-09-14 | Stop reason: HOSPADM

## 2022-09-08 RX ORDER — MIDAZOLAM HYDROCHLORIDE 1 MG/ML
2 INJECTION INTRAMUSCULAR; INTRAVENOUS ONCE
Status: COMPLETED | OUTPATIENT
Start: 2022-09-08 | End: 2022-09-08

## 2022-09-08 RX ORDER — BISACODYL 5 MG/1
10 TABLET, DELAYED RELEASE ORAL DAILY PRN
Status: DISCONTINUED | OUTPATIENT
Start: 2022-09-08 | End: 2022-09-14 | Stop reason: HOSPADM

## 2022-09-08 RX ORDER — FENTANYL CITRATE 50 UG/ML
25 INJECTION, SOLUTION INTRAMUSCULAR; INTRAVENOUS
Status: DISCONTINUED | OUTPATIENT
Start: 2022-09-08 | End: 2022-09-09

## 2022-09-08 RX ORDER — BUMETANIDE 0.25 MG/ML
1 INJECTION INTRAMUSCULAR; INTRAVENOUS ONCE
Status: COMPLETED | OUTPATIENT
Start: 2022-09-08 | End: 2022-09-08

## 2022-09-08 RX ORDER — AMOXICILLIN 250 MG
2 CAPSULE ORAL 2 TIMES DAILY PRN
Status: DISCONTINUED | OUTPATIENT
Start: 2022-09-08 | End: 2022-09-14 | Stop reason: HOSPADM

## 2022-09-08 RX ORDER — BISACODYL 10 MG
10 SUPPOSITORY, RECTAL RECTAL DAILY PRN
Status: DISCONTINUED | OUTPATIENT
Start: 2022-09-08 | End: 2022-09-08

## 2022-09-08 RX ORDER — FENTANYL CITRATE 50 UG/ML
INJECTION, SOLUTION INTRAMUSCULAR; INTRAVENOUS
Status: COMPLETED
Start: 2022-09-08 | End: 2022-09-08

## 2022-09-08 RX ORDER — MIDAZOLAM HYDROCHLORIDE 1 MG/ML
1 INJECTION INTRAMUSCULAR; INTRAVENOUS ONCE
Status: COMPLETED | OUTPATIENT
Start: 2022-09-08 | End: 2022-09-08

## 2022-09-08 RX ORDER — MIDAZOLAM HYDROCHLORIDE 1 MG/ML
INJECTION INTRAMUSCULAR; INTRAVENOUS
Status: COMPLETED
Start: 2022-09-08 | End: 2022-09-08

## 2022-09-08 RX ORDER — SODIUM CHLORIDE 0.9 % (FLUSH) 0.9 %
10 SYRINGE (ML) INJECTION EVERY 8 HOURS SCHEDULED
Status: DISCONTINUED | OUTPATIENT
Start: 2022-09-08 | End: 2022-09-14 | Stop reason: HOSPADM

## 2022-09-08 RX ADMIN — SIMETHICONE 80 MG: 80 TABLET, CHEWABLE ORAL at 21:27

## 2022-09-08 RX ADMIN — MORPHINE SULFATE 2 MG: 2 INJECTION, SOLUTION INTRAMUSCULAR; INTRAVENOUS at 02:24

## 2022-09-08 RX ADMIN — ONDANSETRON 4 MG: 2 INJECTION INTRAMUSCULAR; INTRAVENOUS at 07:29

## 2022-09-08 RX ADMIN — SENNOSIDES AND DOCUSATE SODIUM 2 TABLET: 50; 8.6 TABLET ORAL at 21:08

## 2022-09-08 RX ADMIN — ONDANSETRON 4 MG: 2 INJECTION INTRAMUSCULAR; INTRAVENOUS at 18:05

## 2022-09-08 RX ADMIN — MIDAZOLAM HYDROCHLORIDE 2 MG: 1 INJECTION INTRAMUSCULAR; INTRAVENOUS at 10:22

## 2022-09-08 RX ADMIN — APIXABAN 5 MG: 5 TABLET, FILM COATED ORAL at 21:09

## 2022-09-08 RX ADMIN — MIDAZOLAM 1 MG: 1 INJECTION INTRAMUSCULAR; INTRAVENOUS at 10:29

## 2022-09-08 RX ADMIN — ASPIRIN 325 MG: 325 TABLET, COATED ORAL at 07:46

## 2022-09-08 RX ADMIN — AMIODARONE HYDROCHLORIDE 200 MG: 200 TABLET ORAL at 02:58

## 2022-09-08 RX ADMIN — APIXABAN 5 MG: 5 TABLET, FILM COATED ORAL at 07:47

## 2022-09-08 RX ADMIN — AMIODARONE HYDROCHLORIDE 200 MG: 200 TABLET ORAL at 18:19

## 2022-09-08 RX ADMIN — MIDAZOLAM 2 MG: 1 INJECTION INTRAMUSCULAR; INTRAVENOUS at 10:22

## 2022-09-08 RX ADMIN — FENTANYL CITRATE 25 MCG: 50 INJECTION, SOLUTION INTRAMUSCULAR; INTRAVENOUS at 10:22

## 2022-09-08 RX ADMIN — AMIODARONE HYDROCHLORIDE 200 MG: 200 TABLET ORAL at 12:32

## 2022-09-08 RX ADMIN — ACETAMINOPHEN 650 MG: 325 TABLET, FILM COATED ORAL at 21:08

## 2022-09-08 RX ADMIN — ATORVASTATIN CALCIUM 40 MG: 40 TABLET, FILM COATED ORAL at 21:09

## 2022-09-08 RX ADMIN — METOPROLOL TARTRATE 12.5 MG: 25 TABLET, FILM COATED ORAL at 21:09

## 2022-09-08 RX ADMIN — FENTANYL CITRATE 25 MCG: 50 INJECTION, SOLUTION INTRAMUSCULAR; INTRAVENOUS at 10:29

## 2022-09-08 RX ADMIN — Medication 10 ML: at 22:00

## 2022-09-08 RX ADMIN — SENNOSIDES AND DOCUSATE SODIUM 2 TABLET: 50; 8.6 TABLET ORAL at 07:46

## 2022-09-08 RX ADMIN — BUMETANIDE 1 MG: 0.25 INJECTION, SOLUTION INTRAMUSCULAR; INTRAVENOUS at 07:44

## 2022-09-08 RX ADMIN — ONDANSETRON 4 MG: 2 INJECTION INTRAMUSCULAR; INTRAVENOUS at 01:16

## 2022-09-08 RX ADMIN — METOPROLOL TARTRATE 12.5 MG: 25 TABLET, FILM COATED ORAL at 07:46

## 2022-09-08 RX ADMIN — FENTANYL CITRATE 25 MCG: 50 INJECTION, SOLUTION INTRAMUSCULAR; INTRAVENOUS at 10:17

## 2022-09-08 RX ADMIN — MORPHINE SULFATE 2 MG: 2 INJECTION, SOLUTION INTRAMUSCULAR; INTRAVENOUS at 21:09

## 2022-09-08 RX ADMIN — MORPHINE SULFATE 2 MG: 2 INJECTION, SOLUTION INTRAMUSCULAR; INTRAVENOUS at 07:41

## 2022-09-08 RX ADMIN — METHOHEXITAL SODIUM 10 MG: 500 INJECTION, POWDER, LYOPHILIZED, FOR SOLUTION INTRAMUSCULAR; INTRAVENOUS; RECTAL at 10:47

## 2022-09-08 RX ADMIN — INSULIN LISPRO 2 UNITS: 100 INJECTION, SOLUTION INTRAVENOUS; SUBCUTANEOUS at 21:27

## 2022-09-08 RX ADMIN — ACETAMINOPHEN 650 MG: 325 TABLET, FILM COATED ORAL at 07:28

## 2022-09-08 RX ADMIN — ACETAMINOPHEN 650 MG: 325 TABLET, FILM COATED ORAL at 12:31

## 2022-09-08 NOTE — THERAPY EVALUATION
Acute Care - Speech Language Pathology   Swallow Initial Evaluation [facility]   Clinical Swallow Evaluation     Patient Name: Damián Myers  : 1955  MRN: [id number]  Today's Date: 2022               Admit Date: 2022    Visit Dx:     ICD-10-CM ICD-9-CM   1. Severe mitral regurgitation  I34.0 424.0     Patient Active Problem List   Diagnosis   • Hyperglycemia   • Obesity (BMI 30-39.9)   • Chronic systolic heart failure ([facility])   • Coronary artery disease of native artery of native heart with stable angina pectoris ([facility])   • Stroke (cerebrum) ([facility])   • Acute pulmonary edema ([facility])   • TIA (transient ischemic attack)   • HTN (hypertension)   • HLD (hyperlipidemia)   • COVID-19   • Acute on chronic respiratory failure with hypoxia and hypercapnia ([facility])   • Cytokine release syndrome, grade 4   • Acute on chronic congestive heart failure, unspecified heart failure type ([facility])   • Severe mitral regurgitation     Past Medical History:   Diagnosis Date   • CAD (coronary artery disease)    • Cardiac abnormality    • CHF (congestive heart failure) ([facility])    • Coma of unknown cause ([facility])     Pt states he was in a coma for one week, unknown cause   • Hypertension    • Mitral regurgitation    • Obesity (BMI 30-39.9) 2019   • Spinal headache      Past Surgical History:   Procedure Laterality Date   • CARDIAC CATHETERIZATION N/A 2019    Procedure: Left Heart Cath;  Surgeon: Sherrell Hart MD;  Location:  [facility];  Service: Cardiology   • CARDIAC CATHETERIZATION N/A 2022    Procedure: Left Heart Cath;  Surgeon: Greyson Balderas MD;  Location:  [facility];  Service: Cardiology;  Laterality: N/A;   • COLONOSCOPY     • CORONARY STENT PLACEMENT     • MITRAL VALVE REPAIR/REPLACEMENT N/A 2022    Procedure: MEDIAN STERNOTOMY MITRAL VALVE REPLACEMENT, LEFT ATRIAL APPENDAGE LIGATION AND KENNEY PER ANESTHESIA;  Surgeon: Lyle Chan MD;  Location:   [facility];  Service: Cardiothoracic;  Laterality: N/A;       SLP Recommendation and Plan  SLP Swallowing Diagnosis: swallow WFL, other (see comments) (no suspected pharyngeal dysphagia) (09/08/22 1525)  SLP Diet Recommendation: full liquid diet, other (see comments) (okay for regular diet & thin liquids per physician discretion) (09/08/22 1525)  Recommended Precautions and Strategies: upright posture during/after eating, general aspiration precautions, reflux precautions (09/08/22 1525)  SLP Rec. for Method of Medication Administration: meds whole, as tolerated (09/08/22 1525)     Monitor for Signs of Aspiration: notify SLP if any concerns (09/08/22 1525)     Swallow Criteria for Skilled Therapeutic Interventions Met: no problems identified which require skilled intervention, patient/family declined skilled intervention at this time (09/08/22 1525)  Anticipated Discharge Disposition (SLP): unknown (09/08/22 1525)  Rehab Potential/Prognosis, Swallowing: good, to achieve stated therapy goals (09/08/22 1525)  Therapy Frequency (Swallow): evaluation only (09/08/22 1525)                                     Plan of Care Reviewed With: patient      SWALLOW EVALUATION (last 72 hours)     SLP Adult Swallow Evaluation     Row Name 09/08/22 1525                   Rehab Evaluation    Document Type evaluation  -RD        Subjective Information no complaints  -RD        Patient Observations alert;cooperative;agree to therapy  -RD        Patient Effort good  -RD                  General Information    Patient Profile Reviewed yes  -RD        Pertinent History Of Current Problem Adm severe mitral regurgitation, obesity, CHF, CHF, CAD, HTN, CVA, hx covid, respiratory failure. Saw this PM after cardioversion. Consult for swallowing  -RD        Current Method of Nutrition full liquids  -RD        Precautions/Limitations, Vision WFL;for purposes of eval  -RD        Precautions/Limitations, Hearing WFL;for purposes of eval  -RD         Prior Level of Function-Communication unknown  -RD        Prior Level of Function-Swallowing no diet consistency restrictions  -RD        Plans/Goals Discussed with patient  -RD        Barriers to Rehab resistant to information  -RD        Patient's Goals for Discharge patient did not state  -RD                  Pain    Additional Documentation Pain Scale: Numbers Pre/Post-Treatment (Group)  -RD                  Pain Scale: Numbers Pre/Post-Treatment    Pretreatment Pain Rating 0/10 - no pain  -RD        Posttreatment Pain Rating 0/10 - no pain  -RD                  Oral Motor Structure and Function    Dentition Assessment natural, present and adequate  -RD        Secretion Management WNL/WFL  -RD        Mucosal Quality moist, healthy  -RD        Volitional Swallow WFL  -RD        Volitional Cough WFL  -RD                  Oral Musculature and Cranial Nerve Assessment    Oral Motor General Assessment WFL  -RD                  General Eating/Swallowing Observations    Respiratory Support Currently in Use nasal cannula;other (see comments)  2L  -RD        Eating/Swallowing Skills fed by SLP;self-fed  -RD        Positioning During Eating upright 90 degree;upright in chair  -RD        Utensils Used spoon;cup;straw  -RD        Consistencies Trialed thin liquids;pureed;regular textures  -RD                  Clinical Swallow Eval    Oral Prep Phase WFL  -RD        Oral Residue WFL  -RD        Pharyngeal Phase no overt signs/symptoms of pharyngeal impairment  -RD        Esophageal Phase suspected esophageal impairment  -RD        Clinical Swallow Evaluation Summary Pt currently on full liquid diet, but RN approved solid trials. No overt s/s of aspiration w/ thins, pureed, or solids, including consecutive sips of thins. Pt resistant to information & not cooperative for all trials, so unable to push w/ 3oz water swallow at this time. No immediate aspiration concerns. Pt reports no swallowing difficulty. Re-consult if further  concerns arise.  -RD                  Esophageal Phase Concerns    Esophageal Phase Concerns belching  -RD        Belching thin  -RD        Esophageal Phase Concerns, Comment vocal quality clear, pt reports no hx of esophageal dysphagia, but does not suspect any issues & has no complaints  -RD                  SLP Evaluation Clinical Impression    SLP Swallowing Diagnosis swallow WFL;other (see comments)  no suspected pharyngeal dysphagia  -RD        Functional Impact no impact on function  -RD        Rehab Potential/Prognosis, Swallowing good, to achieve stated therapy goals  -RD        Swallow Criteria for Skilled Therapeutic Interventions Met no problems identified which require skilled intervention;patient/family declined skilled intervention at this time  -RD                  Recommendations    Therapy Frequency (Swallow) evaluation only  -RD        SLP Diet Recommendation full liquid diet;other (see comments)  okay for regular diet & thin liquids per physician discretion  -RD        Recommended Precautions and Strategies upright posture during/after eating;general aspiration precautions;reflux precautions  -RD        Oral Care Recommendations Oral Care BID/PRN  -RD        SLP Rec. for Method of Medication Administration meds whole;as tolerated  -RD        Monitor for Signs of Aspiration notify SLP if any concerns  -RD        Anticipated Discharge Disposition (SLP) unknown  -RD              User Key  (r) = Recorded By, (t) = Taken By, (c) = Cosigned By    Initials Name Effective Dates    RD Carleen Garcia MS CCC-SLP 06/16/21 -                 EDUCATION  The patient has been educated in the following areas:   Dysphagia (Swallowing Impairment) Oral Care/Hydration Modified Diet Instruction.              Time Calculation:    Time Calculation- SLP     Row Name 09/08/22 6397             Time Calculation- SLP    SLP Start Time 1535  -RD      SLP Received On 09/08/22  -RD              Untimed Charges    SLP  Eval/Re-eval  ST Eval Oral Pharyng Swallow - 15356  -RD      [id number] Eval Oral Pharyng Swallow Minutes 30  -RD              Total Minutes    Untimed Charges Total Minutes 30  -RD       Total Minutes 30  -RD            User Key  (r) = Recorded By, (t) = Taken By, (c) = Cosigned By    Initials Name Provider Type    RD Carleen Garcia, MS CCC-SLP Speech and Language Pathologist                Therapy Charges for Today     Code Description Service Date Service Provider Modifiers Qty    [id number]  ST EVAL ORAL PHARYNG SWALLOW 2 9/8/2022 Carleen Garcia MS CCC-SLP GN 1            Patient was not wearing a face mask and did exhibit coughing during this therapy encounter.  Procedure performed was aerosolizing, involved close contact (within 6 feet for at least 15 minutes or longer), and did not involve contact with infectious secretions or specimens.  Therapist used appropriate personal protective equipment including gloves, standard procedure mask and eye protection.  Appropriate PPE was worn during the entire therapy session.  Hand hygiene was completed before and after therapy session.     Carleen Garcia MS CCC-SLP  9/8/2022

## 2022-09-08 NOTE — SIGNIFICANT NOTE
Prior to central line removal, order for the removal of catheter was verified, patient was assessed, necessary materials were gathered and patient was educated regarding procedure .    Patient was positioned flat to ensure that the insertion site was at or below the level of the heart.    Hands were washed, clean gloves were applied and central line dressing was gently removed. Catheter exit site was not cultured.     A new pair of clean gloves were then applied. Insertion site was cleansed with 2% Chlorhexidine swab using a circular motion beginning at the insertion site and moving outward for 30 seconds and allowed to dry.     Clamp on line was present and clamped.     Patient was instructed to perform Valsalva maneuver as catheter was withdrawn.     The central line was grasped at the insertion site and slowly pulled outward parallel to the skin. Resistance was not met.    After central line was completely removed, a sterile 4x4 gauze pad was used to apply light pressure until bleeding stopped. At that time, petroleum-based gauze and a sterile occlusive dressing was applied to exit site.     Patient was instructed to keep dressing in place for at least 24 hours and to remain in a flat or reclining position for 30 minutes post-removal.     Catheter was inspected after removal and was intact. Tip of central line was not sent for culture. Patient tolerated procedure.

## 2022-09-08 NOTE — PLAN OF CARE
Goal Outcome Evaluation:  Plan of Care Reviewed With: patient      SLP evaluation completed. Will sign-off as no further SLP dysphagia needs at this time. Please see note for further details and recommendations.

## 2022-09-08 NOTE — PROGRESS NOTES
"                    Clinical Nutrition       Patient Name: Damián Myers  YOB: 1955  MRN: [id number]  Date of Encounter: 09/08/22 10:58 EDT  Admission date: 9/2/2022      Reason for Visit   NPO/Clear Liquid x 3 days      EMR  Reviewed   Yes    Height: Height: 190.5 cm (75\")  Weight: Weight: 115 kg (254 lb 8.3 oz) (09/02/22 0624)  BMI: BMI (Calculated): 31.8    Problem:    Severe mitral regurgitation    Obesity (BMI 30-39.9)    Chronic systolic heart failure (HCC)    Coronary artery disease of native artery of native heart with stable angina pectoris (HCC)    HTN (hypertension)     (9/2) s/p MVR, intubated  (9/3) extubated     Reported/Observed/Food/Nutrition Related - Comments   Patient has been NPO/Clear liquids x 3 days. NPO this morning for today's scheduled cardioversion. Per intensivist instructions RN to order a full liquid diet when PO diet resumed after procedure. RD visited with patient to discuss ONS preferences. Patient reports he really likes the peach Boost Breeze. Patient also agreeable to strawberry full liquid ONS.     Current Nutrition Prescription     NPO Diet NPO Type: Sips with Meds    Average Intake from Charting: insufficient data    Nutrition Diagnosis     Problem Inadequate oral intake   Etiology Clinical condition   Signs/Symptoms NPO/Clear liquids x 3 days   Status:    Actions     Follow treatment progress, Care plan reviewed, Interview for preferences, Await begin PO     -Ordered ONS to begin when PO diet resumed.    Monitor Per Protocol    Samantha Kirkpatrick RD  Time Spent: 20 min        "

## 2022-09-08 NOTE — PLAN OF CARE
Goal Outcome Evaluation:  Plan of Care Reviewed With: patient        Progress: improving  Outcome Evaluation: patient able to increase ambulation to 110 ft. he continues to refuse wearing a mask during ambulation. we will continue to increase activity as tolerated.

## 2022-09-08 NOTE — PROGRESS NOTES
Cardiothoracic Surgery Progress Note      POD # 6 s/p MVR, LAAL     LOS: 6 days      Subjective:  Up in chair  No acute events overnight  No complaints    Objective:  Vital Signs  Temp:  [98.3 °F (36.8 °C)-99 °F (37.2 °C)] 98.8 °F (37.1 °C)  Heart Rate:  [65-89] 79  Resp:  [16-20] 16  BP: ()/(54-85) 110/67    Physical Exam:   General Appearance: alert, appears stated age and cooperative   Lungs: clear to auscultation, respirations regular, respirations even and respirations unlabored   Heart: irregular rhythm, atrial flutter on EKG   Skin: Incision c/d/i      Results:    Results from last 7 days   Lab Units 09/08/22  0305   WBC 10*3/mm3 6.82   HEMOGLOBIN g/dL 8.8*   HEMATOCRIT % 27.0*   PLATELETS 10*3/mm3 182     Results from last 7 days   Lab Units 09/08/22  0305   SODIUM mmol/L 139   POTASSIUM mmol/L 3.9   CHLORIDE mmol/L 95*   CO2 mmol/L 38.0*   BUN mg/dL 36*   CREATININE mg/dL 1.25   GLUCOSE mg/dL 115*   CALCIUM mg/dL 8.7       Assessment:  POD # 6 s/p MVR, LAAL    Plan:  Transfer to telemetry  Ambulate  Pulmonary toilet  SLP evaluation  Diuresis  Possible ECV with cardiology for A.flutter with cardiology    Lyle Chan MD  09/08/22  06:56 EDT

## 2022-09-08 NOTE — PROGRESS NOTES
INTENSIVIST   PROGRESS NOTE     Hospital:  LOS: 6 days     Mr. Damián Myers, 67 y.o. male is followed for a Chief Complaint of: Postoperative medical management      Subjective   S     Interval History:  No acute events overnight. Refused to wear Bipap overnight.        The patient's relevant past medical, surgical and social history were reviewed and updated in Epic as appropriate.      ROS:   Constitutional: Negative for fever.   Respiratory: Negative for dyspnea.   Cardiovascular: Negative for chest pain.   Gastrointestinal: Negative for nausea, vomiting and diarrhea.         Objective   O     Vitals:  Temp  Min: 97.9 °F (36.6 °C)  Max: 99 °F (37.2 °C)  BP  Min: 89/57  Max: 134/85  Pulse  Min: 61  Max: 91  Resp  Min: 16  Max: 20  SpO2  Min: 88 %  Max: 100 % Flow (L/min)  Min: 2  Max: 3    Intake/Ouptut 24 hrs (7:00AM - 6:59 AM)  Intake & Output (last 3 days)       09/05 0701 09/06 0700 09/06 0701 09/07 0700 09/07 0701 09/08 0700 09/08 0701  09/09 0700    P.O. 480 450  0    I.V. (mL/kg) 952 (8.3) 126 (1.1) 20 (0.2)     IV Piggyback 50       Total Intake(mL/kg) 1482 (12.9) 576 (5) 20 (0.2) 0 (0)    Urine (mL/kg/hr) 1140 (0.4) 2260 (0.8) 550 (0.2) 375 (0.6)    Stool    0    Chest Tube 420 180      Total Output 1560 2440 550 375    Net -78 -1864 -530 -375            Urine Unmeasured Occurrence   2 x     Stool Unmeasured Occurrence   1 x 1 x          Medications (drips):  EPINEPHrine, Last Rate: Stopped (09/07/22 1205)  phenylephrine, Last Rate: Stopped (09/05/22 1910)        Physical Examination  Telemetry:  Atrial flutter.   Constitutional:  No acute distress.  Resting in bed on nasal cannula.   Eyes: No scleral icterus.   PERRL, EOM intact.    Neck:  Supple, FROM   Cardiovascular: Normal rate, regular and rhythm. Normal heart sounds.  No murmurs, gallop or rub.   Respiratory: No respiratory distress. Normal respiratory effort.  Diminished bilaterally.    Abdominal:  Soft. Distended.  Non-tender.   Extremities: No  digital cyanosis. No clubbing.  No peripheral edema.   Skin: No rashes, lesions or ulcers   Neurological:   Alert and interactive.                  Results from last 7 days   Lab Units 09/08/22  0305 09/07/22  0341 09/06/22  0212   WBC 10*3/mm3 6.82 8.96 12.06*   HEMOGLOBIN g/dL 8.8* 8.4* 8.8*   MCV fL 93.1 95.7 96.2   PLATELETS 10*3/mm3 182 154 146     Results from last 7 days   Lab Units 09/08/22  0305 09/07/22  0341 09/06/22  0212   SODIUM mmol/L 139 137 137   POTASSIUM mmol/L 3.9 4.3 4.8   CO2 mmol/L 38.0* 32.0* 34.0*   CREATININE mg/dL 1.25 1.47* 1.83*   GLUCOSE mg/dL 115* 138* 150*   MAGNESIUM mg/dL 2.2 2.3 2.3   PHOSPHORUS mg/dL 2.7 3.5 3.7     Estimated Creatinine Clearance: 78.4 mL/min (by C-G formula based on SCr of 1.25 mg/dL).        Results from last 7 days   Lab Units 09/08/22  0614 09/07/22  0339 09/06/22  1411 09/06/22  1017 09/06/22  0441   PH, ARTERIAL pH units 7.419 7.335* 7.323* 7.303* 7.283*   PCO2, ARTERIAL mm Hg 59.5* 67.1* 69.1* 70.2* 71.2*   PO2 ART mm Hg 74.8* 108.0 85.1 74.7* 117.0*   FIO2 % 28 35 35 35 40       Images:  Imaging Results (Last 24 Hours)     ** No results found for the last 24 hours. **            Results: Reviewed.  I reviewed the patient's new laboratory and imaging results.  I independently reviewed the patient's new images.    Medications: Reviewed.    Assessment & Plan   A / P     Mr. Myers is a 67 yo male with PMH hypertension, congestive heart failure and coronary artery disease who presents to the ICU status post MVR with Dr. Chan.  Patient was admitted to [facility] in July for congestive heart failure.  During that admission he underwent left heart cath that revealed nonobstructive CAD but was suggestive of severe mitral valve regurgitation.  KENNEY revealed moderate MVR with mild prolapse.  For that he was sent to Dr. Chan's office for evaluation.       The patient underwent mitral valve replacement with left atrial appendage ligation per Dr. Chan.   He is brought in an intubated state to the intensive care unit postoperatively.    He was extubated on 9/3.     He developed Afib on 9/4 and was started on IV Amiodarone. This was discontinued secondary to bradycardia with sinus pauses. He is on PO Amiodarone. He remains on Epinephrine.      He developed acute hypercapnic respiratory failure on the evening of 9/5 and was placed on Bipap. An NGT was placed to LWS to help with gaseous distention. He was continued on Bipap with improvement in his ABG.     NGT was removed on the evening of 9/7 after a bowel movement. Chest tubes were removed on 9/7.     He refused to wear Bipap overnight. ABG this AM shows a pH of 7.42 and a pCO2 of 59.       Nutrition:   Diet Full Liquid  Advance Directives:   Code Status and Medical Interventions:   Ordered at: 09/02/22 2788     Code Status (Patient has no pulse and is not breathing):    CPR (Attempt to Resuscitate)     Medical Interventions (Patient has pulse or is breathing):    Full Support     Release to patient:    Routine Release       Active Hospital Problems    Diagnosis    • **Severe mitral regurgitation    • HTN (hypertension)    • Chronic systolic heart failure (HCC)    • Coronary artery disease of native artery of native heart with stable angina pectoris (HCC)    • Obesity (BMI 30-39.9)        Assessment / Plan:    1. Continue nasal cannula during the day and Bipap at night for now. He should continue to use Bipap at night as he continues to have what appears to be a chronic hypercapnic respiratory failure.   2. Minimize sedating agents.   3. Continue current insulin regimen.    4. Cardioversion today per Cardiology.   5. Eliquis started.    6. Full liquid diet with advancement as tolerated.    7. Continue to monitor renal function. Slowly improving.   8. Pulmonary toilet  9. Ambulation as tolerated.   10. AM labs   11. Okay to transfer to telemetry.       High level of risk due to:  severe exacerbation of chronic illness  and illness with threat to life or bodily function.    Plan of care and goals reviewed during interdisciplinary rounds.  I discussed the patient's findings and my recommendations with patient and nursing staff      Dawna Wiggins,     Intensive Care Medicine and Pulmonary Medicine

## 2022-09-08 NOTE — PROGRESS NOTES
"  [facility] at [facility]  PROGRESS NOTE    Date of Admission: 9/2/2022  Date of Service: 09/08/22    Primary Care Physician: Spencer Saeed MD    Chief Complaint: f/u PAF, hypotension   Problem List:   Severe mitral regurgitation    Obesity (BMI 30-39.9)    Chronic systolic heart failure (HCC)    Coronary artery disease of native artery of native heart with stable angina pectoris (HCC)    HTN (hypertension)      Subjective      HPI: Doing well this morning, on nasal cannula, off high flow .  Remains in atrial flutter with frequent PVCs      Objective   Vitals: /89 (BP Location: Right arm, Patient Position: Sitting)   Pulse 75   Temp 98 °F (36.7 °C) (Oral)   Resp 18   Ht 190.5 cm (75\")   Wt 115 kg (254 lb 8.3 oz)   SpO2 94%   BMI 31.81 kg/m²     Physical Exam:  GENERAL: Alert, cooperative, in no acute distress.   HEENT: Normocephalic, no jugular venous distention  HEART: Regular rhythm, normal rate, and no murmurs, gallops, or rubs.   LUNGS: Clear to auscultation bilaterally. No wheezing, rales or rhonchi.  ABDOMEN: Soft, bowel sounds present, nontender   NEUROLOGIC: No focal abnormalities involving strength or sensation are noted.   EXTREMITIES: No clubbing, cyanosis, or edema noted.     Results:  Results from last 7 days   Lab Units 09/08/22  0305 09/07/22  0341 09/06/22 0212   WBC 10*3/mm3 6.82 8.96 12.06*   HEMOGLOBIN g/dL 8.8* 8.4* 8.8*   HEMATOCRIT % 27.0* 26.6* 27.9*   PLATELETS 10*3/mm3 182 154 146     Results from last 7 days   Lab Units 09/08/22  0305 09/07/22  0341 09/06/22 0212   SODIUM mmol/L 139 137 137   POTASSIUM mmol/L 3.9 4.3 4.8   CHLORIDE mmol/L 95* 96* 96*   CO2 mmol/L 38.0* 32.0* 34.0*   BUN mg/dL 36* 45* 44*   CREATININE mg/dL 1.25 1.47* 1.83*   GLUCOSE mg/dL 115* 138* 150*      Lab Results   Component Value Date    CHOL 159 06/25/2022    TRIG 85 06/25/2022    HDL 36 (L) 06/25/2022     (H) 06/25/2022    AST 37 08/30/2022    ALT 14 08/30/2022       "   Results from last 7 days   Lab Units 09/03/22  0215 09/02/22  1704 09/02/22  1401   PROTIME Seconds 14.3 17.5* 18.6*   INR  1.12 1.44* 1.56*   APTT seconds  --  35.7 40.7*       Intake/Output Summary (Last 24 hours) at 9/8/2022 0813  Last data filed at 9/7/2022 1800  Gross per 24 hour   Intake 20 ml   Output 350 ml   Net -330 ml       I personally reviewed the patient's EKG/Telemetry data    Radiology Data: reviewed    Current Medications:  acetaminophen, 650 mg, Oral, Q8H  amiodarone, 200 mg, Oral, Q8H   Followed by  [START ON 9/12/2022] amiodarone, 200 mg, Oral, Q12H   Followed by  [START ON 9/26/2022] amiodarone, 200 mg, Oral, Daily  apixaban, 5 mg, Oral, Q12H  aspirin, 325 mg, Oral, Daily  atorvastatin, 40 mg, Oral, Nightly  chlorhexidine, 15 mL, Mouth/Throat, Q12H  insulin lispro, 0-7 Units, Subcutaneous, 4x Daily With Meals & Nightly  metoprolol tartrate, 12.5 mg, Oral, Q12H  senna-docusate sodium, 2 tablet, Oral, BID      EPINEPHrine, 0.02-0.3 mcg/kg/min, Last Rate: Stopped (09/07/22 1205)  phenylephrine, 0.5-3 mcg/kg/min, Last Rate: Stopped (09/05/22 1910)        Assessment and Plan:   1. 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 Chan  - discordant EF measurements by echo pre-op, however 45-50% by intra-op KENNEY prior to surgery, will reassess on today's KENNEY  Required multiple blood products POD 0     2. SHF  - EF reported as 36-40% by TTE in June and normal by KENNEY during same hospitalization   Continue beta-blocker, holding off on RAAS inhibitors due to low blood pressure  - KENNEY today      3. CAD  - recent cath 06/2022 with mild nonobstructive CAD  - continue ASA, statin      4. Dyslipidemia  - continue Lipitor 40mg to target LDL <70 given history of CAD and prior LAD stent     5.  PVCs  -asymptomatic, wean Epi      6.  Postoperative atrial flutter:  -onset 9/4, started on Amio  protocol   -hopefully wean off epi  - ANGELICA ligated at time of surgery with 35 mm Atricure clip   Continue po amio     Plan for KENNEY/ECV at bedside today   Started Eliquis 5 mg twice daily 9/7, continue for at least 30 days     7. DAMIAN  - resolved      Electronically signed by Yamilet Trivedi PA-C, 09/08/22, 8:14 AM EDT.     I have seen and examined the patient, performing a face-to-face diagnostic evaluation with plan of care reviewed and developed with the Advanced Practice Clinician and nursing staff. I have addended and modified the above history of present illness, physical examination, and assessment and plan to reflect my findings and impressions. All medical decision making performed by Dr. Bowden.    Scott Bowden MD, FACC  09/08/22

## 2022-09-08 NOTE — THERAPY TREATMENT NOTE
Patient Name: Damián Myers  : 1955    MRN: [id number]                              Today's Date: 2022       Admit Date: 2022    Visit Dx:     ICD-10-CM ICD-9-CM   1. Severe mitral regurgitation  I34.0 424.0     Patient Active Problem List   Diagnosis   • Hyperglycemia   • Obesity (BMI 30-39.9)   • Chronic systolic heart failure (HCC)   • Coronary artery disease of native artery of native heart with stable angina pectoris (HCC)   • Stroke (cerebrum) ([facility])   • Acute pulmonary edema ([facility])   • TIA (transient ischemic attack)   • HTN (hypertension)   • HLD (hyperlipidemia)   • COVID-19   • Acute on chronic respiratory failure with hypoxia and hypercapnia ([facility])   • Cytokine release syndrome, grade 4   • Acute on chronic congestive heart failure, unspecified heart failure type ([facility])   • Severe mitral regurgitation     Past Medical History:   Diagnosis Date   • CAD (coronary artery disease)    • Cardiac abnormality    • CHF (congestive heart failure) ([facility])    • Coma of unknown cause ([facility])     Pt states he was in a coma for one week, unknown cause   • Hypertension    • Mitral regurgitation    • Obesity (BMI 30-39.9) 2019   • Spinal headache      Past Surgical History:   Procedure Laterality Date   • CARDIAC CATHETERIZATION N/A 2019    Procedure: Left Heart Cath;  Surgeon: Sherrell Hart MD;  Location: [facility] INVASIVE LOCATION;  Service: Cardiology   • CARDIAC CATHETERIZATION N/A 2022    Procedure: Left Heart Cath;  Surgeon: Greyson Balderas MD;  Location: [facility] INVASIVE LOCATION;  Service: Cardiology;  Laterality: N/A;   • COLONOSCOPY     • CORONARY STENT PLACEMENT     • MITRAL VALVE REPAIR/REPLACEMENT N/A 2022    Procedure: MEDIAN STERNOTOMY MITRAL VALVE REPLACEMENT, LEFT ATRIAL APPENDAGE LIGATION AND KENNEY PER ANESTHESIA;  Surgeon: Lyle Chan MD;  Location: [facility];  Service: Cardiothoracic;  Laterality: N/A;      General Information     Row Name  09/08/22 1018          Physical Therapy Time and Intention    Document Type therapy note (daily note)  -ADELINE     Mode of Treatment physical therapy  -ADELINE     Row Name 09/08/22 1018          General Information    Patient Profile Reviewed yes  -ADELINE     Prior Level of Function independent:;bed mobility;ADL's;gait;transfer  -ADELINE     Existing Precautions/Restrictions cardiac;fall;oxygen therapy device and L/min  -ADELINE     Barriers to Rehab medically complex  -ADELINE     Row Name 09/08/22 1018          Living Environment    People in Home spouse  -ADELINE     Row Name 09/08/22 1018          Home Main Entrance    Number of Stairs, Main Entrance three  -ADELINE     Row Name 09/08/22 1018          Cognition    Orientation Status (Cognition) oriented x 4  -ADELINE     Row Name 09/08/22 1018          Safety Issues, Functional Mobility    Impairments Affecting Function (Mobility) balance;endurance/activity tolerance;shortness of breath;strength  -ADELINE     Comment, Safety Issues/Impairments (Mobility) patient with better processing. patient refuses to wear a mask in the hosp.   -ADELINE           User Key  (r) = Recorded By, (t) = Taken By, (c) = Cosigned By    Initials Name Provider Type    ADELINE Brittany Gomez PT Physical Therapist               Mobility     Row Name 09/08/22 1019          Bed Mobility    Bed Mobility rolling left;rolling right;scooting/bridging;sit-supine  -ADELINE     Rolling Left [place] (Bed Mobility) minimum assist (75% patient effort)  -ADELINE     Rolling Right [place] (Bed Mobility) minimum assist (75% patient effort)  -ADELINE     Scooting/Bridging [place] (Bed Mobility) minimum assist (75% patient effort)  -ADELINE     Sit-Supine [place] (Bed Mobility) minimum assist (75% patient effort)  -ADELINE     Comment, (Bed Mobility) patient requires cues for sequencing  -ADELINE     Row Name 09/08/22 1019          Bed-Chair Transfer    Bed-Chair [place] (Transfers) contact guard  -ADELINE     Assistive Device (Bed-Chair Transfers) mario  front-wheeled  -ADELINE     Row Name 09/08/22 1019          Sit-Stand Transfer    Sit-Stand [place] (Transfers) contact guard  -ADELINE     Assistive Device (Sit-Stand Transfers) walker, front-wheeled  -ADELINE     Row Name 09/08/22 1019          Gait/Stairs (Locomotion)    [place] Level (Gait) contact guard  -ADELINE     Assistive Device (Gait) walker, front-wheeled  -ADELINE     Distance in Feet (Gait) 110  -ADELINE     Deviations/Abnormal Patterns (Gait) stride length decreased  -ADELINE     Comment, (Gait/Stairs) patient with better posture today still needs cues for increased step length. patient continues to refuse to wear  mask during ambulation  -ADELINE           User Key  (r) = Recorded By, (t) = Taken By, (c) = Cosigned By    Initials Name Provider Type    Brittany Lara PT Physical Therapist               Obj/Interventions     Row Name 09/08/22 1021          Balance    Balance Assessment sitting static balance;sitting dynamic balance;standing static balance;standing dynamic balance  -ADELINE     Static Sitting Balance independent  -ADELINE     Dynamic Sitting Balance independent  -ADELINE     Position, Sitting Balance unsupported;sitting edge of bed  -ADELINE     Static Standing Balance contact guard  -ADELINE     Dynamic Standing Balance contact guard  -ADELINE     Position/Device Used, Standing Balance supported;walker, front-wheeled  -ADELINE     Balance Interventions standing;dynamic;weight shifting activity  -ADELINE           User Key  (r) = Recorded By, (t) = Taken By, (c) = Cosigned By    Initials Name Provider Type    Brittany Lara PT Physical Therapist               Goals/Plan     Row Name 09/08/22 1025          Therapy Assessment/Plan (PT)    Planned Therapy Interventions (PT) balance training;bed mobility training;gait training;home exercise program;strengthening;transfer training  -ADELINE           User Key  (r) = Recorded By, (t) = Taken By, (c) = Cosigned By    Initials Name Provider Type    Brittany Lara PT Physical Therapist                Clinical Impression     Row Name 09/08/22 1021          Pain    Pretreatment Pain Rating 4/10  -ADELINE     Posttreatment Pain Rating 4/10  -ADELINE     Pain Location incisional  -ADELINE     Pain Location - chest  -ADELINE     Pain Intervention(s) Repositioned;Ambulation/increased activity;Splinting  -ADELINE     Row Name 09/08/22 1021          Plan of Care Review    Plan of Care Reviewed With patient  -ADELINE     Progress improving  -ADELINE     Outcome Evaluation patient able to increase ambulation to 110 ft. he continues to refuse wearing a mask during ambulation. we will continue to increase activity as tolerated.  -ADELINE     Row Name 09/08/22 1021          Therapy Assessment/Plan (PT)    Patient/Family Therapy Goals Statement (PT) return to PLOF  -     Rehab Potential (PT) good, to achieve stated therapy goals  -     Criteria for Skilled Interventions Met (PT) yes;skilled treatment is necessary  -     Therapy Frequency (PT) daily  -ADELINE     Row Name 09/08/22 1021          Vital Signs    Pre Systolic BP Rehab 113  -ADELINE     Pre Treatment Diastolic BP 72  -ADELINE     Post Systolic BP Rehab 120  -ADELINE     Post Treatment Diastolic BP 76  -ADELINE     Pretreatment Heart Rate (beats/min) 74  -ADELINE     Posttreatment Heart Rate (beats/min) 80  -ADELINE     Pre SpO2 (%) 95  -ADELINE     O2 Delivery Pre Treatment nasal cannula  -ADELINE     Post SpO2 (%) 92  -ADELINE     O2 Delivery Post Treatment nasal cannula  -ADELINE     Pre Patient Position Sitting  -ADELINE     Intra Patient Position Standing  -ADELINE     Post Patient Position Supine  -ADELINE     Row Name 09/08/22 1021          Positioning and Restraints    Pre-Treatment Position sitting in chair/recliner  -ADELINE     Post Treatment Position bed  -ADELINE     In Bed notified nsg;supine;encouraged to call for assist;call light within reach;with family/caregiver  -           User Key  (r) = Recorded By, (t) = Taken By, (c) = Cosigned By    Initials Name Provider Type    Brittany Lara, PT Physical Therapist               Outcome Measures     [facility]  Name 09/08/22 1025 09/08/22 0800       How much help from another person do you currently need...    Turning from your back to your side while in flat bed without using bedrails? 3  -ADELINE 2  -CH    Moving from lying on back to sitting on the side of a flat bed without bedrails? 3  -ADELINE 2  -CH    Moving to and from a bed to a chair (including a wheelchair)? 3  -ADELINE 3  -CH    Standing up from a chair using your arms (e.g., wheelchair, bedside chair)? 3  -ADELINE 3  -CH    Climbing 3-5 steps with a railing? 2  -ADELINE 3  -CH    To walk in hospital room? 3  -ADELINE 3  -CH    AM-PAC 6 Clicks Score (PT) 17  -ADELINE 16  -CH    Highest level of mobility 5 --> Static standing  -ADELINE 5 --> Static standing  -CH          User Key  (r) = Recorded By, (t) = Taken By, (c) = Cosigned By    Initials Name Provider Type    Brittany Lara, PT Physical Therapist    Nishi Montoya RN Registered Nurse                             Physical Therapy Education                 Title: PT OT SLP Therapies (In Progress)     Topic: Physical Therapy (In Progress)     Point: Mobility training (In Progress)     Learning Progress Summary           Patient Acceptance, E, NR by ADELINE at 9/8/2022 0930    Acceptance, E,TB, VU,DU by  at 9/8/2022 0833    Acceptance, E, NR by KG at 9/7/2022 0915    Acceptance, E, NR by ADELINE at 9/6/2022 1400    Acceptance, E, NR by AE at 9/5/2022 1035    Acceptance, E, NR by KG at 9/4/2022 0914   Family Acceptance, E, NR by ADELINE at 9/8/2022 0930                   Point: Home exercise program (In Progress)     Learning Progress Summary           Patient Acceptance, E, NR by ADELINE at 9/8/2022 0930    Acceptance, E,TB, VU,DU by  at 9/8/2022 0833    Acceptance, E, NR by KG at 9/7/2022 0915    Acceptance, E, NR by ADELINE at 9/6/2022 1400   Family Acceptance, E, NR by ADELINE at 9/8/2022 0930                   Point: Body mechanics (In Progress)     Learning Progress Summary           Patient Acceptance, E, NR by ADELINE at 9/8/2022 0930    Acceptance, E,TB,  VU,DU by CH at 9/8/2022 0833    Acceptance, E, NR by KG at 9/7/2022 0915    Acceptance, E, NR by ADELINE at 9/6/2022 1400    Acceptance, E, NR by AE at 9/5/2022 1035    Acceptance, E, NR by KG at 9/4/2022 0914   Family Acceptance, E, NR by ADELINE at 9/8/2022 0930                   Point: Precautions (In Progress)     Learning Progress Summary           Patient Acceptance, E, NR by ADELINE at 9/8/2022 0930    Acceptance, E,TB, VU,DU by CH at 9/8/2022 0833    Acceptance, E, NR by KG at 9/7/2022 0915    Acceptance, E, NR by ADELINE at 9/6/2022 1400    Acceptance, E, NR by AE at 9/5/2022 1035    Acceptance, E, NR by KG at 9/4/2022 0914   Family Acceptance, E, NR by ADELINE at 9/8/2022 0930                               User Key     Initials Effective Dates Name Provider Type Discipline     06/16/21 -  Brittany Gomez, PT Physical Therapist PT     06/16/21 -  Nishi Elias, RN Registered Nurse Nurse    KG 05/22/20 -  Hansa Deal, PT Physical Therapist PT    AE 09/21/21 -  Andrew Shearer, MARITZA Physical Therapist PT              PT Recommendation and Plan  Planned Therapy Interventions (PT): balance training, bed mobility training, gait training, home exercise program, strengthening, transfer training  Plan of Care Reviewed With: patient  Progress: improving  Outcome Evaluation: patient able to increase ambulation to 110 ft. he continues to refuse wearing a mask during ambulation. we will continue to increase activity as tolerated.     Time Calculation:    PT Charges     Row Name 09/08/22 1026             Time Calculation    Start Time 0930  -ADELINE      PT Received On 09/08/22  -ADELINE      PT Goal Re-Cert Due Date 09/14/22  -              Time Calculation- PT    Total Timed Code Minutes- PT 23 minute(s)  -ADELINE              Timed Charges    48137 - PT Therapeutic Activity Minutes 23  -ADELINE              Total Minutes    Timed Charges Total Minutes 23  -ADELINE       Total Minutes 23  -ADELINE            User Key  (r) = Recorded By, (t) = Taken  By, (c) = Cosigned By    Initials Name Provider Type    Brittany Lara, PT Physical Therapist              Therapy Charges for Today     Code Description Service Date Service Provider Modifiers Qty    12100365636  PT THERAPEUTIC ACT EA 15 MIN 9/8/2022 Brittany Gomez, PT GP 2    04807289289 HC PT THER SUPP EA 15 MIN 9/8/2022 Brittany Gomez, PT GP 2          PT G-Codes  Outcome Measure Options: AM-PAC 6 Clicks Basic Mobility (PT)  AM-PAC 6 Clicks Score (PT): 17  AM-PAC 6 Clicks Score (OT): 12    Brittany Gomez, MARITZA  9/8/2022

## 2022-09-08 NOTE — PROCEDURES
Brief KENNEY-CV note    KENNEY performed at bedside with moderate sedation  LVEF normal  Bioprosthetic mitral valve appears well-seated with normal leaflet excursion.  There is mild perivalvular leak.  Left atrial appendage occluded with no significant residual pocket identified  Normal pulmonary vein flow with no proximal thrombus identified    Mild pericardial effusion without signs of increased intrapericardial pressure  Moderate left-sided pleural effusion    There are no intracardiac masses identified, the KENNEY probe was removed, and the patient was cardioverted with one 50 J synchronized shock with conversion from atrial flutter to normal sinus rhythm.    I was present with the patient for the duration of moderate sedation and supervised staff who had no other duties and monitored the patient for the entire procedure.   Total medications administered - midazolam 3mg IV, fentanyl 75mcg IV, methohexital 10mg IV  Intra-Service start time: 10:16  Intra-Service end time: 10:45    J. Jose Eduardo Fernandez MD, MS  09/08/22  15:10 EDT

## 2022-09-08 NOTE — PROGRESS NOTES
Thank you for your referral to [facility].    Staff will contact Patient and discuss the program. If they are interested we will schedule them for their initial appointment .

## 2022-09-08 NOTE — PLAN OF CARE
Goal Outcome Evaluation:               A&Ox4, walked 120ft. LS clear, remains on 2L NC, refused to wear bipap last night, see ABG results. A flutter with PVCs, BP stable. NPO since midnight in preparation for cardioversion. Gas discomfort, belching. Moderate BM last night, NG pulled.

## 2022-09-09 PROBLEM — J96.22 ACUTE ON CHRONIC RESPIRATORY FAILURE WITH HYPOXIA AND HYPERCAPNIA (HCC): Status: RESOLVED | Noted: 2021-10-25 | Resolved: 2022-09-09

## 2022-09-09 PROBLEM — U07.1 COVID-19: Status: RESOLVED | Noted: 2021-10-25 | Resolved: 2022-09-09

## 2022-09-09 PROBLEM — J81.0 ACUTE PULMONARY EDEMA: Status: RESOLVED | Noted: 2020-05-23 | Resolved: 2022-09-09

## 2022-09-09 PROBLEM — I25.119 CORONARY ARTERY DISEASE INVOLVING NATIVE CORONARY ARTERY OF NATIVE HEART WITH ANGINA PECTORIS: Status: ACTIVE | Noted: 2019-11-22

## 2022-09-09 PROBLEM — J96.21 ACUTE ON CHRONIC RESPIRATORY FAILURE WITH HYPOXIA AND HYPERCAPNIA: Status: RESOLVED | Noted: 2021-10-25 | Resolved: 2022-09-09

## 2022-09-09 PROBLEM — G45.9 TIA (TRANSIENT ISCHEMIC ATTACK): Status: RESOLVED | Noted: 2020-05-23 | Resolved: 2022-09-09

## 2022-09-09 PROBLEM — R73.9 HYPERGLYCEMIA: Status: RESOLVED | Noted: 2019-11-04 | Resolved: 2022-09-09

## 2022-09-09 PROBLEM — I38 DIASTOLIC CHF DUE TO VALVULAR DISEASE (HCC): Status: ACTIVE | Noted: 2022-06-25

## 2022-09-09 PROBLEM — I50.22 CHRONIC SYSTOLIC HEART FAILURE (HCC): Status: RESOLVED | Noted: 2019-11-22 | Resolved: 2022-09-09

## 2022-09-09 PROBLEM — D89.834 CYTOKINE RELEASE SYNDROME, GRADE 4: Status: RESOLVED | Noted: 2021-10-29 | Resolved: 2022-09-09

## 2022-09-09 PROBLEM — I50.30 DIASTOLIC CHF DUE TO VALVULAR DISEASE: Status: ACTIVE | Noted: 2022-06-25

## 2022-09-09 LAB
ANION GAP SERPL CALCULATED.3IONS-SCNC: 4 MMOL/L (ref 5–15)
BUN SERPL-MCNC: 30 MG/DL (ref 8–23)
BUN/CREAT SERPL: 25.9 (ref 7–25)
CALCIUM SPEC-SCNC: 8.9 MG/DL (ref 8.6–10.5)
CHLORIDE SERPL-SCNC: 97 MMOL/L (ref 98–107)
CO2 SERPL-SCNC: 41 MMOL/L (ref 22–29)
CREAT SERPL-MCNC: 1.16 MG/DL (ref 0.76–1.27)
DEPRECATED RDW RBC AUTO: 43.7 FL (ref 37–54)
EGFRCR SERPLBLD CKD-EPI 2021: 69 ML/MIN/1.73
ERYTHROCYTE [DISTWIDTH] IN BLOOD BY AUTOMATED COUNT: 12.6 % (ref 12.3–15.4)
GLUCOSE BLDC GLUCOMTR-MCNC: 111 MG/DL (ref 70–130)
GLUCOSE BLDC GLUCOMTR-MCNC: 112 MG/DL (ref 70–130)
GLUCOSE BLDC GLUCOMTR-MCNC: 124 MG/DL (ref 70–130)
GLUCOSE BLDC GLUCOMTR-MCNC: 133 MG/DL (ref 70–130)
GLUCOSE SERPL-MCNC: 106 MG/DL (ref 65–99)
HCT VFR BLD AUTO: 27.8 % (ref 37.5–51)
HGB BLD-MCNC: 8.8 G/DL (ref 13–17.7)
MAGNESIUM SERPL-MCNC: 2.1 MG/DL (ref 1.6–2.4)
MCH RBC QN AUTO: 30 PG (ref 26.6–33)
MCHC RBC AUTO-ENTMCNC: 31.7 G/DL (ref 31.5–35.7)
MCV RBC AUTO: 94.9 FL (ref 79–97)
PHOSPHATE SERPL-MCNC: 4.1 MG/DL (ref 2.5–4.5)
PLATELET # BLD AUTO: 202 10*3/MM3 (ref 140–450)
PMV BLD AUTO: 9.9 FL (ref 6–12)
POTASSIUM SERPL-SCNC: 3.5 MMOL/L (ref 3.5–5.2)
RBC # BLD AUTO: 2.93 10*6/MM3 (ref 4.14–5.8)
SODIUM SERPL-SCNC: 142 MMOL/L (ref 136–145)
WBC NRBC COR # BLD: 6.35 10*3/MM3 (ref 3.4–10.8)

## 2022-09-09 PROCEDURE — 82962 GLUCOSE BLOOD TEST: CPT

## 2022-09-09 PROCEDURE — 97530 THERAPEUTIC ACTIVITIES: CPT

## 2022-09-09 PROCEDURE — 85027 COMPLETE CBC AUTOMATED: CPT | Performed by: INTERNAL MEDICINE

## 2022-09-09 PROCEDURE — 84100 ASSAY OF PHOSPHORUS: CPT | Performed by: INTERNAL MEDICINE

## 2022-09-09 PROCEDURE — 80048 BASIC METABOLIC PNL TOTAL CA: CPT | Performed by: PHYSICIAN ASSISTANT

## 2022-09-09 PROCEDURE — 83735 ASSAY OF MAGNESIUM: CPT | Performed by: INTERNAL MEDICINE

## 2022-09-09 PROCEDURE — 99222 1ST HOSP IP/OBS MODERATE 55: CPT | Performed by: INTERNAL MEDICINE

## 2022-09-09 PROCEDURE — 99232 SBSQ HOSP IP/OBS MODERATE 35: CPT | Performed by: INTERNAL MEDICINE

## 2022-09-09 PROCEDURE — 25010000002 MORPHINE PER 10 MG: Performed by: THORACIC SURGERY (CARDIOTHORACIC VASCULAR SURGERY)

## 2022-09-09 PROCEDURE — 25010000002 ONDANSETRON PER 1 MG: Performed by: PHYSICIAN ASSISTANT

## 2022-09-09 PROCEDURE — 97535 SELF CARE MNGMENT TRAINING: CPT

## 2022-09-09 RX ORDER — CYCLOBENZAPRINE HCL 10 MG
5 TABLET ORAL 3 TIMES DAILY PRN
Status: DISCONTINUED | OUTPATIENT
Start: 2022-09-09 | End: 2022-09-11

## 2022-09-09 RX ORDER — OXYCODONE HYDROCHLORIDE AND ACETAMINOPHEN 5; 325 MG/1; MG/1
1 TABLET ORAL ONCE
Status: COMPLETED | OUTPATIENT
Start: 2022-09-09 | End: 2022-09-09

## 2022-09-09 RX ORDER — AMIODARONE HYDROCHLORIDE 200 MG/1
200 TABLET ORAL DAILY
Qty: 30 TABLET | Refills: 0 | OUTPATIENT
Start: 2022-09-15

## 2022-09-09 RX ORDER — ASPIRIN 81 MG/1
81 TABLET ORAL DAILY
Status: DISCONTINUED | OUTPATIENT
Start: 2022-09-10 | End: 2022-09-14 | Stop reason: HOSPADM

## 2022-09-09 RX ORDER — LIDOCAINE 50 MG/G
1 PATCH TOPICAL
Status: DISCONTINUED | OUTPATIENT
Start: 2022-09-09 | End: 2022-09-14 | Stop reason: HOSPADM

## 2022-09-09 RX ORDER — AMIODARONE HYDROCHLORIDE 200 MG/1
400 TABLET ORAL 2 TIMES DAILY
Qty: 20 TABLET | Refills: 0 | OUTPATIENT
Start: 2022-09-09 | End: 2022-09-14

## 2022-09-09 RX ORDER — ATORVASTATIN CALCIUM 40 MG/1
40 TABLET, FILM COATED ORAL NIGHTLY
Qty: 90 TABLET | Refills: 1 | OUTPATIENT
Start: 2022-09-09

## 2022-09-09 RX ADMIN — ONDANSETRON 4 MG: 2 INJECTION INTRAMUSCULAR; INTRAVENOUS at 04:09

## 2022-09-09 RX ADMIN — Medication 10 ML: at 22:00

## 2022-09-09 RX ADMIN — OXYCODONE 5 MG: 5 TABLET ORAL at 06:05

## 2022-09-09 RX ADMIN — LIDOCAINE 1 PATCH: 50 PATCH CUTANEOUS at 12:44

## 2022-09-09 RX ADMIN — POTASSIUM CHLORIDE 40 MEQ: 750 CAPSULE, EXTENDED RELEASE ORAL at 18:05

## 2022-09-09 RX ADMIN — Medication 10 ML: at 20:48

## 2022-09-09 RX ADMIN — METOPROLOL TARTRATE 12.5 MG: 25 TABLET, FILM COATED ORAL at 20:50

## 2022-09-09 RX ADMIN — MORPHINE SULFATE 2 MG: 2 INJECTION, SOLUTION INTRAMUSCULAR; INTRAVENOUS at 01:16

## 2022-09-09 RX ADMIN — Medication 10 ML: at 01:16

## 2022-09-09 RX ADMIN — AMIODARONE HYDROCHLORIDE 200 MG: 200 TABLET ORAL at 04:07

## 2022-09-09 RX ADMIN — OXYCODONE 5 MG: 5 TABLET ORAL at 12:22

## 2022-09-09 RX ADMIN — SENNOSIDES AND DOCUSATE SODIUM 2 TABLET: 50; 8.6 TABLET ORAL at 20:49

## 2022-09-09 RX ADMIN — ASPIRIN 325 MG: 325 TABLET, COATED ORAL at 08:36

## 2022-09-09 RX ADMIN — METOPROLOL TARTRATE 12.5 MG: 25 TABLET, FILM COATED ORAL at 08:36

## 2022-09-09 RX ADMIN — APIXABAN 5 MG: 5 TABLET, FILM COATED ORAL at 20:50

## 2022-09-09 RX ADMIN — ATORVASTATIN CALCIUM 40 MG: 40 TABLET, FILM COATED ORAL at 20:50

## 2022-09-09 RX ADMIN — Medication 10 ML: at 06:12

## 2022-09-09 RX ADMIN — AMIODARONE HYDROCHLORIDE 200 MG: 200 TABLET ORAL at 12:22

## 2022-09-09 RX ADMIN — ACETAMINOPHEN 650 MG: 325 TABLET, FILM COATED ORAL at 06:05

## 2022-09-09 RX ADMIN — POTASSIUM CHLORIDE 40 MEQ: 750 CAPSULE, EXTENDED RELEASE ORAL at 08:39

## 2022-09-09 RX ADMIN — CYCLOBENZAPRINE 5 MG: 10 TABLET, FILM COATED ORAL at 12:22

## 2022-09-09 RX ADMIN — SIMETHICONE 80 MG: 80 TABLET, CHEWABLE ORAL at 06:11

## 2022-09-09 RX ADMIN — OXYCODONE HYDROCHLORIDE AND ACETAMINOPHEN 1 TABLET: 5; 325 TABLET ORAL at 20:51

## 2022-09-09 RX ADMIN — Medication 10 ML: at 08:40

## 2022-09-09 RX ADMIN — APIXABAN 5 MG: 5 TABLET, FILM COATED ORAL at 08:36

## 2022-09-09 RX ADMIN — SENNOSIDES AND DOCUSATE SODIUM 2 TABLET: 50; 8.6 TABLET ORAL at 08:36

## 2022-09-09 RX ADMIN — ACETAMINOPHEN 650 MG: 325 TABLET, FILM COATED ORAL at 15:21

## 2022-09-09 RX ADMIN — MORPHINE SULFATE 2 MG: 2 INJECTION, SOLUTION INTRAMUSCULAR; INTRAVENOUS at 08:36

## 2022-09-09 RX ADMIN — AMIODARONE HYDROCHLORIDE 200 MG: 200 TABLET ORAL at 20:50

## 2022-09-09 RX ADMIN — Medication 10 ML: at 15:21

## 2022-09-09 NOTE — THERAPY TREATMENT NOTE
Patient Name: Damián Myers  : 1955    MRN: [id number]                              Today's Date: 2022       Admit Date: 2022    Visit Dx:     ICD-10-CM ICD-9-CM   1. Severe mitral regurgitation  I34.0 424.0     Patient Active Problem List   Diagnosis   • Hyperglycemia   • Obesity (BMI 30-39.9)   • Chronic systolic heart failure (HCC)   • Coronary artery disease of native artery of native heart with stable angina pectoris (HCC)   • Stroke (cerebrum) ([facility])   • Acute pulmonary edema ([facility])   • TIA (transient ischemic attack)   • HTN (hypertension)   • HLD (hyperlipidemia)   • COVID-19   • Acute on chronic respiratory failure with hypoxia and hypercapnia ([facility])   • Cytokine release syndrome, grade 4   • Acute on chronic congestive heart failure, unspecified heart failure type ([facility])   • Severe mitral regurgitation     Past Medical History:   Diagnosis Date   • CAD (coronary artery disease)    • Cardiac abnormality    • CHF (congestive heart failure) ([facility])    • Coma of unknown cause ([facility])     Pt states he was in a coma for one week, unknown cause   • Hypertension    • Mitral regurgitation    • Obesity (BMI 30-39.9) 2019   • Spinal headache      Past Surgical History:   Procedure Laterality Date   • CARDIAC CATHETERIZATION N/A 2019    Procedure: Left Heart Cath;  Surgeon: Sherrell Hart MD;  Location: [facility] INVASIVE LOCATION;  Service: Cardiology   • CARDIAC CATHETERIZATION N/A 2022    Procedure: Left Heart Cath;  Surgeon: Greyson Balderas MD;  Location: [facility] INVASIVE LOCATION;  Service: Cardiology;  Laterality: N/A;   • COLONOSCOPY     • CORONARY STENT PLACEMENT     • MITRAL VALVE REPAIR/REPLACEMENT N/A 2022    Procedure: MEDIAN STERNOTOMY MITRAL VALVE REPLACEMENT, LEFT ATRIAL APPENDAGE LIGATION AND KENNEY PER ANESTHESIA;  Surgeon: Lyle Chan MD;  Location: [facility];  Service: Cardiothoracic;  Laterality: N/A;      General Information     Row Name  09/09/22 1439          OT Time and Intention    Document Type therapy note (daily note)  -     Mode of Treatment occupational therapy  -[facility] Name 09/09/22 1439          General Information    Patient Profile Reviewed yes  -     Existing Precautions/Restrictions cardiac;oxygen therapy device and L/min;fall;other (see comments);sternal  NG; chest tube; HFNC  -[facility] Name 09/09/22 1439          Cognition    Orientation Status (Cognition) oriented to;person;place;situation;disoriented to;time;verbal cues/prompts needed for orientation  -[facility] Name 09/09/22 1439          Safety Issues, Functional Mobility    Impairments Affecting Function (Mobility) balance;endurance/activity tolerance;shortness of breath;strength;pain;range of motion (ROM);cognition  -           User Key  (r) = Recorded By, (t) = Taken By, (c) = Cosigned By    Initials Name Provider Type     Blanche Jackson, OT Occupational Therapist                 Mobility/ADL's     [facility] Name 09/09/22 1439          Bed Mobility    Comment, (Bed Mobility) UIC  -[facility] Name 09/09/22 1439          Transfers    Transfers sit-stand transfer  -     Sit-Stand [place] (Transfers) contact guard  -     Stand-Sit [place] (Transfers) contact guard  -SW     Row Name 09/09/22 1439          Sit-Stand Transfer    Assistive Device (Sit-Stand Transfers) walker, front-wheeled  -[facility] Name 09/09/22 1439          Stand-Sit Transfer    Assistive Device (Stand-Sit Transfers) walker, front-wheeled  -SW     Row Name 09/09/22 1439          Functional Mobility    Functional Mobility- Ind. Level contact guard assist;verbal cues required  -     Functional Mobility- Device walker, front-wheeled  -     Functional Mobility-Distance (Feet) 100  -[facility] Name 09/09/22 1439          Activities of Daily Living    BADL Assessment/Intervention bathing;upper body dressing;grooming  -SW     Row Name 09/09/22 1439          Upper Body Dressing  Assessment/Training    [place] Level (Upper Body Dressing) upper body dressing skills;front opening garment;minimum assist (75% patient effort)  -SW     Position (Upper Body Dressing) supported sitting  -SW     Row Name 09/09/22 1439          Bathing Assessment/Intervention    [place] Level (Bathing) bathing skills;upper body;perineal area;minimum assist (75% patient effort)  -SW     Position (Bathing) supported sitting  -SW     Row Name 09/09/22 1439          Grooming Assessment/Training    [place] Level (Grooming) grooming skills;set up  -SW     Position (Grooming) supported sitting  -SW           User Key  (r) = Recorded By, (t) = Taken By, (c) = Cosigned By    Initials Name Provider Type    Blanche Ley OT Occupational Therapist               Obj/Interventions     Row Name 09/09/22 1439          Balance    Balance Assessment sitting static balance;sitting dynamic balance;sit to stand dynamic balance;standing dynamic balance;standing static balance  -SW     Static Sitting Balance independent  -SW     Dynamic Sitting Balance independent  -SW     Position, Sitting Balance unsupported;sitting edge of bed  -SW     Sit to Stand Dynamic Balance contact guard  -SW     Static Standing Balance supervision  -SW     Dynamic Standing Balance contact guard  -SW     Position/Device Used, Standing Balance unsupported;walker, rolling  -SW     Balance Interventions sitting;standing;sit to stand;supported;static;dynamic;minimal challenge;occupation based/functional task  -SW           User Key  (r) = Recorded By, (t) = Taken By, (c) = Cosigned By    Initials Name Provider Type    Blanche Ley OT Occupational Therapist               Goals/Plan    No documentation.                Clinical Impression     Row Name 09/09/22 1540          Pain Assessment    Pretreatment Pain Rating 0/10 - no pain  -SW     Posttreatment Pain Rating 0/10 - no pain  -SW     Row Name 09/09/22 1549          Plan of Care Review    Plan of  Care Reviewed With patient  -SW     Progress improving  -SW     Outcome Evaluation OT promoted adl retraining and he has improved with self care tasks.  He was min assist for ubd and bathing.  Pt amb with RW and required cga for safety and took several rest breaks.  C/o neck hurting.  -SW     Row Name 09/09/22 1540          Vital Signs    Pre Systolic BP Rehab 115  -SW     Pre Treatment Diastolic BP 70  -SW     Pretreatment Heart Rate (beats/min) 73  -SW     Pre SpO2 (%) 98  -SW     O2 Delivery Pre Treatment supplemental O2  -SW     O2 Delivery Intra Treatment supplemental O2  -SW     O2 Delivery Post Treatment supplemental O2  -SW     Pre Patient Position Sitting  -SW     Intra Patient Position Standing  -SW     Post Patient Position Sitting  -SW     Row Name 09/09/22 1540          Positioning and Restraints    Pre-Treatment Position sitting in chair/recliner  -SW     Post Treatment Position chair  -SW     In Chair notified nsg;reclined;sitting;call light within reach;encouraged to call for assist;with family/caregiver;waffle cushion;legs elevated  -SW           User Key  (r) = Recorded By, (t) = Taken By, (c) = Cosigned By    Initials Name Provider Type    SW Blanche Jackson, OT Occupational Therapist               Outcome Measures     Row Name 09/09/22 1547          How much help from another is currently needed...    Putting on and taking off regular lower body clothing? 2  -SW     Bathing (including washing, rinsing, and drying) 3  -SW     Toileting (which includes using toilet bed pan or urinal) 2  -SW     Putting on and taking off regular upper body clothing 3  -SW     Taking care of personal grooming (such as brushing teeth) 4  -SW     Eating meals 4  -SW     AM-PAC 6 Clicks Score (OT) 18  -SW     Row Name 09/09/22 0836          How much help from another person do you currently need...    Turning from your back to your side while in flat bed without using bedrails? 3  -CJ     Moving from lying on back to  sitting on the side of a flat bed without bedrails? 3  -CJ     Moving to and from a bed to a chair (including a wheelchair)? 3  -CJ     Standing up from a chair using your arms (e.g., wheelchair, bedside chair)? 3  -CJ     Climbing 3-5 steps with a railing? 2  -CJ     To walk in hospital room? 3  -CJ     AM-PAC 6 Clicks Score (PT) 17  -CJ     Highest level of mobility 5 --> Static standing  -     Row Name 09/09/22 1547          Functional Assessment    Outcome Measure Options AM-PAC 6 Clicks Daily Activity (OT)  -           User Key  (r) = Recorded By, (t) = Taken By, (c) = Cosigned By    Initials Name Provider Type    Juliette Chvaez RN Registered Nurse    Blanche Ley OT Occupational Therapist                Occupational Therapy Education                 Title: PT OT SLP Therapies (In Progress)     Topic: Occupational Therapy (Done)     Point: ADL training (Done)     Description:   Instruct learner(s) on proper safety adaptation and remediation techniques during self care or transfers.   Instruct in proper use of assistive devices.              Learning Progress Summary           Patient Acceptance, E, VU by  at 9/9/2022 1548    Acceptance, E,TB, VU,DU by  at 9/8/2022 0833    Acceptance, E,TB,D, VU,DU,NR by  at 9/6/2022 1517                   Point: Home exercise program (Done)     Description:   Instruct learner(s) on appropriate technique for monitoring, assisting and/or progressing therapeutic exercises/activities.              Learning Progress Summary           Patient Acceptance, E,TB, VU,DU by  at 9/8/2022 0833    Acceptance, E,TB,D, VU,DU,NR by  at 9/6/2022 1517                   Point: Precautions (Done)     Description:   Instruct learner(s) on prescribed precautions during self-care and functional transfers.              Learning Progress Summary           Patient Acceptance, E, VU by  at 9/9/2022 1548    Acceptance, E,TB, VU,DU by  at 9/8/2022 0833    Acceptance, E,TB,D,  VU,DU,NR by  at 9/6/2022 1517                   Point: Body mechanics (Done)     Description:   Instruct learner(s) on proper positioning and spine alignment during self-care, functional mobility activities and/or exercises.              Learning Progress Summary           Patient Acceptance, E,TB, VU,DU by  at 9/8/2022 0833    Acceptance, E,TB,D, VU,DU,NR by  at 9/6/2022 1517                               User Key     Initials Effective Dates Name Provider Type Discipline     06/16/21 -  Nishi Elias, RN Registered Nurse Nurse     06/16/21 -  Areli Hernandez, OT Occupational Therapist OT     06/16/21 -  Blanche Jackson OT Occupational Therapist OT              OT Recommendation and Plan     Plan of Care Review  Plan of Care Reviewed With: patient  Progress: improving  Outcome Evaluation: OT promoted adl retraining and he has improved with self care tasks.  He was min assist for ubd and bathing.  Pt amb with RW and required cga for safety and took several rest breaks.  C/o neck hurting.     Time Calculation:    Time Calculation- OT     Row Name 09/09/22 1439             Time Calculation- OT    OT Start Time 1439  -SW      OT Received On 09/09/22  -SW              Timed Charges    53664 - OT Therapeutic Activity Minutes 15  -SW      05879 - OT Self Care/Mgmt Minutes 38  -SW              Total Minutes    Timed Charges Total Minutes 53  -SW       Total Minutes 53  -SW            User Key  (r) = Recorded By, (t) = Taken By, (c) = Cosigned By    Initials Name Provider Type     Blanche Jackson, MARIAMA Occupational Therapist              Therapy Charges for Today     Code Description Service Date Service Provider Modifiers Qty    25719182723 HC OT THERAPEUTIC ACT EA 15 MIN 9/9/2022 Blanche Jackson OT GO 1    00741231971 HC OT SELF CARE/MGMT/TRAIN EA 15 MIN 9/9/2022 Blanche Jackson OT GO 3               Blanche Jackson OT  9/9/2022

## 2022-09-09 NOTE — PLAN OF CARE
Goal Outcome Evaluation:              Outcome Evaluation: Pt axo, vss, 2L NC, afib rate controlled, ambulated in the hallway, incsion dry clean, complaints of neck pain throughout the day.

## 2022-09-09 NOTE — CASE MANAGEMENT/SOCIAL WORK
Continued Stay Note  [facility]     Patient Name: Damián Myers  MRN: [id number]  Today's Date: 9/9/2022    Admit Date: 9/2/2022     Discharge Plan     Row Name 09/09/22 0927       Plan    Plan Rehab    Patient/Family in Agreement with Plan yes    Plan Comments Plan is for rehab @ [facility]. CM received chat from RN, that ok to d/c from CT standpoint. CM reached out to [facility], wanted updated notes, can take pt on Monday. No ambulance transport availability today. CM sent request for ambulance transport on Monday. Will order Covid swab for Sunday. Nataliya Carter @[facility] will retrieve updated notes from  [facility] & will have bed available on Monday.    Final Discharge Disposition Code 62 - inpatient rehab facility               Discharge Codes    No documentation.               Expected Discharge Date and Time     Expected Discharge Date Expected Discharge Time    Sep 12, 2022             Herb Mckinney RN

## 2022-09-09 NOTE — CASE MANAGEMENT/SOCIAL WORK
Continued Stay Note  [facility]     Patient Name: Damián Myers  MRN: [id number]  Today's Date: 9/9/2022    Admit Date: 9/2/2022     Discharge Plan     Row Name 09/09/22 1214       Plan    Plan Rehab    Patient/Family in Agreement with Plan yes    Plan Comments [facility] scheduled for Monday Sept 12, 2022 @ noon.RN and family updated.    Row Name 09/09/22 0927       Plan    Plan Rehab    Patient/Family in Agreement with Plan yes    Plan Comments Plan is for rehab @ . CM received chat from RN, that ok to d/c from CT standpoint. CM reached out to , wanted updated notes, can take pt on Monday. No ambulance transport availability today. CM sent request for ambulance transport on Monday. Will order Covid swab for Sunday. Nataliya Carter @ will retrieve updated notes from  [facility] & will have bed available on Monday.    Final Discharge Disposition Code 62 - inpatient rehab facility               Discharge Codes    No documentation.               Expected Discharge Date and Time     Expected Discharge Date Expected Discharge Time    Sep 12, 2022             Herb Mckinney RN

## 2022-09-09 NOTE — PROGRESS NOTES
"  [facility] at [facility]  PROGRESS NOTE    Date of Admission: 9/2/2022  Date of Service: 09/09/22    Primary Care Physician: Spencer Saeed MD    Chief Complaint: f/u PAF, hypotension   Problem List:   Severe mitral regurgitation    Obesity (BMI 30-39.9)    Chronic systolic heart failure (HCC)    Coronary artery disease of native artery of native heart with stable angina pectoris (HCC)    HTN (hypertension)      Subjective      Stable, sitting in chair, 92% on room air. Remains in NSR following cardioversion yesterday.  Doing well, he is anxious for transfer to inpatient rehab.    Objective   Vitals: /66 (BP Location: Right arm, Patient Position: Sitting)   Pulse 69   Temp 98.4 °F (36.9 °C) (Oral)   Resp 16   Ht 190.5 cm (75\")   Wt 115 kg (254 lb)   SpO2 93%   BMI 31.75 kg/m²     Physical Exam:  General Appearance:   · well developed  · well nourished  HENT:   · oropharynx moist  · lips not cyanotic  Neck:  · thyroid not enlarged  · supple  Respiratory:  · no respiratory distress  · normal breath sounds  · no rales  Cardiovascular:  · no jugular venous distention  · regular rhythm  · apical impulse normal  · S1 normal, S2 normal  · no S3, no S4   · no murmur  · no rub, no thrill  · carotid pulses normal; no bruit  · pedal pulses normal  · lower extremity edema: none    Gastrointestinal:   · bowel sounds normal  · non-tender  · no hepatomegaly, no splenomegaly  Musculoskeletal:  · no clubbing of fingers.   · normocephalic, head atraumatic  Skin:   · warm  · dry  Psychiatric:  · judgement and insight appropriate  · normal mood and affect      Results:  Results from last 7 days   Lab Units 09/09/22  0535 09/08/22  0305 09/07/22  0341   WBC 10*3/mm3 6.35 6.82 8.96   HEMOGLOBIN g/dL 8.8* 8.8* 8.4*   HEMATOCRIT % 27.8* 27.0* 26.6*   PLATELETS 10*3/mm3 202 182 154     Results from last 7 days   Lab Units 09/09/22  0535 09/08/22  0305 09/07/22  0341   SODIUM mmol/L 142 139 137 "   POTASSIUM mmol/L 3.5 3.9 4.3   CHLORIDE mmol/L 97* 95* 96*   CO2 mmol/L 41.0* 38.0* 32.0*   BUN mg/dL 30* 36* 45*   CREATININE mg/dL 1.16 1.25 1.47*   GLUCOSE mg/dL 106* 115* 138*      Lab Results   Component Value Date    CHOL 159 06/25/2022    TRIG 85 06/25/2022    HDL 36 (L) 06/25/2022     (H) 06/25/2022    AST 37 08/30/2022    ALT 14 08/30/2022       Results from last 7 days   Lab Units 09/03/22  0215 09/02/22  1704 09/02/22  1401   PROTIME Seconds 14.3 17.5* 18.6*   INR  1.12 1.44* 1.56*   APTT seconds  --  35.7 40.7*       Intake/Output Summary (Last 24 hours) at 9/9/2022 0820  Last data filed at 9/9/2022 0200  Gross per 24 hour   Intake --   Output 1325 ml   Net -1325 ml       I personally reviewed the patient's EKG/Telemetry data    Radiology Data:   KENNEY 9/8/22:  Interpretation Summary    · Estimated left ventricular EF = 60%      Current Medications:  acetaminophen, 650 mg, Oral, Q8H  amiodarone, 200 mg, Oral, Q8H   Followed by  [START ON 9/12/2022] amiodarone, 200 mg, Oral, Q12H   Followed by  [START ON 9/26/2022] amiodarone, 200 mg, Oral, Daily  apixaban, 5 mg, Oral, Q12H  aspirin, 325 mg, Oral, Daily  atorvastatin, 40 mg, Oral, Nightly  insulin lispro, 0-7 Units, Subcutaneous, 4x Daily With Meals & Nightly  metoprolol tartrate, 12.5 mg, Oral, Q12H  senna-docusate sodium, 2 tablet, Oral, BID  sodium chloride, 10 mL, Intravenous, Q12H  sodium chloride, 10 mL, Intravenous, Q8H           Assessment and Plan:     1. 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 Chan  - KENNEY 9/7 with normal EF, bioprosthetic mitral valve appears well-seated with normal leaflet excursion.  There is mild perivalvular leak.  - discussed AHA SBE ppx instructions with the patient and his wife    2. CAD, nonobstructive   - recent cath 06/2022 with mild nonobstructive CAD  - continue ASA, statin      3. Dyslipidemia  -  continue Lipitor 40mg to target LDL <70 given history of CAD and prior LAD stent     4.  PVCs  -asymptomatic     5.  Postoperative atrial flutter:  - onset 9/4, started on Amio protocol   - ANGELICA ligated at time of surgery with 35 mm Atricure clip- KENNEY confirms occluded ANGELICA with no residual flow    - s/p successful KENNEY/ECV 9/7, continue amiodarone 200 mg twice daily for 10 days then 200 mg daily thereafter for least 30 days, continue Eliquis 5 mg twice daily for 30 days post discharge    Stable for discharge from cardiac standpoint, follow-up with Dr. Amador in [place] in 4 to 6 weeks.       Electronically signed by Yamilet Trivedi PA-C, 09/09/22, 8:23 AM EDT.    I have seen and examined the patient, performing a face-to-face diagnostic evaluation with plan of care reviewed and developed with the Advanced Practice Clinician and nursing staff. I have addended and modified the above history of present illness, physical examination, and assessment and plan to reflect my findings and impressions. All medical decision making performed by Dr. Bowden.    Scott Bowden MD, [facility]  09/09/22

## 2022-09-09 NOTE — PLAN OF CARE
Goal Outcome Evaluation:  Plan of Care Reviewed With: patient        Progress: improving  Outcome Evaluation: OT promoted adl retraining and he has improved with self care tasks.  He was min assist for ubd and bathing.  Pt amb with RW and required cga for safety and took several rest breaks.  C/o neck hurting.

## 2022-09-09 NOTE — PLAN OF CARE
Problem: Adult Inpatient Plan of Care  Goal: Plan of Care Review  Outcome: Ongoing, Progressing  Flowsheets (Taken 9/9/2022 0655)  Progress: improving  Plan of Care Reviewed With: patient  Goal: Patient-Specific Goal (Individualized)  Outcome: Ongoing, Progressing     Problem: Adult Inpatient Plan of Care  Goal: Absence of Hospital-Acquired Illness or Injury  Intervention: Identify and Manage Fall Risk  Recent Flowsheet Documentation  Taken 9/8/2022 2010 by Patt Dixon RN  Safety Promotion/Fall Prevention: activity supervised  Intervention: Prevent Skin Injury  Recent Flowsheet Documentation  Taken 9/8/2022 2220 by Patt Dixon RN  Body Position: position changed independently  Taken 9/8/2022 2010 by Patt Dixon RN  Skin Protection: skin sealant/moisture barrier applied  Goal: Optimal Comfort and Wellbeing  Intervention: Monitor Pain and Promote Comfort  Recent Flowsheet Documentation  Taken 9/9/2022 0600 by Patt Dixon RN  Pain Management Interventions:   position adjusted   pillow support provided   pain management plan reviewed with patient/caregiver  Taken 9/9/2022 0100 by Patt Dixon RN  Pain Management Interventions:   pain management plan reviewed with patient/caregiver   pillow support provided  Taken 9/8/2022 2010 by Patt Dixon RN  Pain Management Interventions:   pillow support provided   pain management plan reviewed with patient/caregiver     Problem: Ongoing Anesthesia Effects (Cardiovascular Surgery)  Goal: Anesthesia/Sedation Recovery  Intervention: Optimize Anesthesia Recovery  Recent Flowsheet Documentation  Taken 9/8/2022 2010 by Patt Dixon RN  Patient Tolerance (IS): fair  Safety Promotion/Fall Prevention: activity supervised  Administration (IS): proper technique demonstrated  Level Incentive Spirometer (mL): 800  Incentive Spirometer Predicted Level (mL): 1000  Number of Repetitions (IS): 6     Problem: Pain (Cardiovascular Surgery)  Goal:  Acceptable Pain Control  Intervention: Prevent or Manage Pain  Recent Flowsheet Documentation  Taken 9/9/2022 0600 by Patt Dixon RN  Pain Management Interventions:   position adjusted   pillow support provided   pain management plan reviewed with patient/caregiver  Taken 9/9/2022 0100 by Patt Dixon RN  Pain Management Interventions:   pain management plan reviewed with patient/caregiver   pillow support provided  Taken 9/8/2022 2010 by Patt Dixon RN  Pain Management Interventions:   pillow support provided   pain management plan reviewed with patient/caregiver  Diversional Activities: television     Problem: Postoperative Urinary Retention (Cardiovascular Surgery)  Goal: Effective Urinary Elimination (Cardiovascular Surgery)  Intervention: Monitor and Manage Urinary Retention  Recent Flowsheet Documentation  Taken 9/8/2022 2010 by Patt Dixon RN  Urinary Elimination Promotion: positioned for ease of voiding     Problem: Respiratory Compromise (Cardiovascular Surgery)  Goal: Effective Oxygenation and Ventilation (Cardiovascular Surgery)  Intervention: Promote Airway Secretion Clearance  Recent Flowsheet Documentation  Taken 9/8/2022 2220 by Patt Dixon RN  Cough And Deep Breathing: done independently per patient  Taken 9/8/2022 2010 by Patt Dixon RN  Patient Tolerance (IS): fair  Administration (IS): proper technique demonstrated  Level Incentive Spirometer (mL): 800  Cough And Deep Breathing: done independently per patient  Incentive Spirometer Predicted Level (mL): 1000  Number of Repetitions (IS): 6     Problem: Inability to Wean (Mechanical Ventilation, Invasive)  Goal: Mechanical Ventilation Liberation  Intervention: Promote Extubation and Mechanical Ventilation Liberation  Recent Flowsheet Documentation  Taken 9/8/2022 2010 by Patt Dixon RN  Medication Review/Management: medications reviewed     Problem: Skin and Tissue Injury (Mechanical Ventilation,  Invasive)  Goal: Absence of Device-Related Skin and Tissue Injury  Intervention: Maintain Skin and Tissue Health  Recent Flowsheet Documentation  Taken 9/8/2022 2010 by Patt Dixon RN  Device Skin Pressure Protection:   absorbent pad utilized/changed   skin-to-skin areas padded   positioning supports utilized     Problem: Skin Injury Risk Increased  Goal: Skin Health and Integrity  Intervention: Optimize Skin Protection  Recent Flowsheet Documentation  Taken 9/8/2022 2010 by Patt Dixon RN  Pressure Reduction Devices:   positioning supports utilized   feet on footrest/footstool  Skin Protection: skin sealant/moisture barrier applied     Problem: Fall Injury Risk  Goal: Absence of Fall and Fall-Related Injury  Intervention: Identify and Manage Contributors  Recent Flowsheet Documentation  Taken 9/8/2022 2010 by Patt Dixon RN  Medication Review/Management: medications reviewed  Intervention: Promote Injury-Free Environment  Recent Flowsheet Documentation  Taken 9/8/2022 2010 by Patt Dixon RN  Safety Promotion/Fall Prevention: activity supervised     Problem: Skin Injury Risk Increased  Goal: Skin Health and Integrity  Intervention: Optimize Skin Protection  Recent Flowsheet Documentation  Taken 9/8/2022 2010 by Patt Dixon RN  Pressure Reduction Devices:   positioning supports utilized   feet on footrest/footstool  Skin Protection: skin sealant/moisture barrier applied     Problem: Fall Injury Risk  Goal: Absence of Fall and Fall-Related Injury  Intervention: Identify and Manage Contributors  Recent Flowsheet Documentation  Taken 9/8/2022 2010 by Patt Dixon RN  Medication Review/Management: medications reviewed  Intervention: Promote Injury-Free Environment  Recent Flowsheet Documentation  Taken 9/8/2022 2010 by Patt Dixon RN  Safety Promotion/Fall Prevention: activity supervised   Goal Outcome Evaluation:  Plan of Care Reviewed With: patient        Progress:  improving

## 2022-09-09 NOTE — CONSULTS
[facility] Medicine Services  CONSULT NOTE      Patient Name: Damián Myers  : 1955  MRN: [id number]    Primary Care Physician: Spencer Saeed MD  Provider requesting consultation: Lyle Chan MD    Subjective   Subjective     Reason for Consultation:  ICU tx/s/p MVR, LAAL    HPI:  Doing ok. Sitting in chair. Main complaint is neck pain- he has this at home but is having a flare this morning. Added muscle relaxer and lidocaine patch. Wife present. Planning [facility] rehab at discharge      Review of Systems  Gen- No fevers, chills  CV- No chest pain, palpitations  Resp- No cough, dyspnea  GI- No N/V/D, abd pain    +neck pain  All other systems reviewed and are negative.     Personal History     Past Medical History:   Diagnosis Date   • CAD (coronary artery disease)    • Cardiac abnormality    • CHF (congestive heart failure) (formerly [facility])    • Coma of unknown cause (formerly [facility])     Pt states he was in a coma for one week, unknown cause   • Hypertension    • Mitral regurgitation    • Obesity (BMI 30-39.9) 2019   • Spinal headache        Past Surgical History:   Procedure Laterality Date   • CARDIAC CATHETERIZATION N/A 2019    Procedure: Left Heart Cath;  Surgeon: Sherrell Hart MD;  Location: [facility] INVASIVE LOCATION;  Service: Cardiology   • CARDIAC CATHETERIZATION N/A 2022    Procedure: Left Heart Cath;  Surgeon: Greyson Balderas MD;  Location: [facility] INVASIVE LOCATION;  Service: Cardiology;  Laterality: N/A;   • COLONOSCOPY     • CORONARY STENT PLACEMENT     • MITRAL VALVE REPAIR/REPLACEMENT N/A 2022    Procedure: MEDIAN STERNOTOMY MITRAL VALVE REPLACEMENT, LEFT ATRIAL APPENDAGE LIGATION AND KENNEY PER ANESTHESIA;  Surgeon: Lyle Chan MD;  Location: [facility];  Service: Cardiothoracic;  Laterality: N/A;       Family History:  family history includes Diabetes type I in his son; Heart disease in his maternal grandfather; Heart valve disorder  in his brother, maternal grandfather, and mother. Otherwise pertinent FHx was reviewed and unremarkable.     Social History:  reports that he has never smoked. He has never used smokeless tobacco. He reports that he does not drink alcohol and does not use drugs.    Medications:  aspirin, carvedilol, and furosemide    Scheduled Meds:acetaminophen, 650 mg, Oral, Q8H  amiodarone, 200 mg, Oral, Q8H   Followed by  [START ON 9/12/2022] amiodarone, 200 mg, Oral, Q12H   Followed by  [START ON 9/26/2022] amiodarone, 200 mg, Oral, Daily  apixaban, 5 mg, Oral, Q12H  aspirin, 325 mg, Oral, Daily  atorvastatin, 40 mg, Oral, Nightly  insulin lispro, 0-7 Units, Subcutaneous, 4x Daily With Meals & Nightly  metoprolol tartrate, 12.5 mg, Oral, Q12H  senna-docusate sodium, 2 tablet, Oral, BID  sodium chloride, 10 mL, Intravenous, Q12H  sodium chloride, 10 mL, Intravenous, Q8H      Continuous Infusions:   PRN Meds:.bisacodyl  •  bisacodyl  •  dextrose  •  dextrose  •  docusate sodium  •  fentaNYL citrate (PF)  •  glucagon (human recombinant)  •  magnesium hydroxide  •  Morphine  •  ondansetron  •  oxyCODONE  •  polyethylene glycol  •  potassium chloride **OR** potassium chloride  •  potassium chloride **OR** potassium chloride  •  senna-docusate sodium  •  simethicone    Allergies   Allergen Reactions   • Other Unknown - High Severity     Pt had a reaction to anesthesia when placing stents          Objective   Objective     Vital Signs:   Temp:  [97.9 °F (36.6 °C)-98.2 °F (36.8 °C)] 98.2 °F (36.8 °C)  Heart Rate:  [61-91] 73  Resp:  [16-20] 18  BP: ()/(56-89) 110/72  Flow (L/min):  [2-2.5] 2.5    Physical Exam  GEN: sitting in chair, in distress from neck pain  HEENT: on room air, atraumatic, normocephalic  NECK: supple, no masses  RESP: on room air, normal effort  CV: on tele, afib noted, irregular  PSYCH: normal affect, appropriate  NEURO: awake, alert, no focal deficits noted  MSK: no edema noted  SKIN: no rashes noted           Result Review:  I have personally reviewed the results from the time of admission and agree with these findings:  [x]  Laboratory  [x]  Radiology  [x]  EKG/Telemetry   []  Pathology  [x]  Old records  []  Other:  Most notable findings include:    See hpi    LAB RESULTS:      Lab 09/09/22 0535 09/08/22 0305 09/07/22 0341 09/06/22 0212 09/05/22  0407 09/04/22  0226 09/03/22 0215 09/02/22 2126 09/02/22  1704 09/02/22  1401   WBC 6.35 6.82 8.96 12.06* 16.80*   < > 9.38   < > 9.19 11.50*   HEMOGLOBIN 8.8* 8.8* 8.4* 8.8* 9.5*   < > 8.5*   < > 8.4* 11.2*   HEMATOCRIT 27.8* 27.0* 26.6* 27.9* 30.2*   < > 25.8*   < > 25.2* 34.3*   PLATELETS 202 182 154 146 124*   < > 178   < > 165 105*   NEUTROS ABS  --   --   --   --   --   --  7.21*  --   --   --    IMMATURE GRANS (ABS)  --   --   --   --   --   --  0.04  --   --   --    LYMPHS ABS  --   --   --   --   --   --  1.19  --   --   --    MONOS ABS  --   --   --   --   --   --  0.87  --   --   --    EOS ABS  --   --   --   --   --   --  0.04  --   --   --    MCV 94.9 93.1 95.7 96.2 96.5   < > 93.8   < > 93.7 92.7   PROTIME  --   --   --   --   --   --  14.3  --  17.5* 18.6*   APTT  --   --   --   --   --   --   --   --  35.7 40.7*    < > = values in this interval not displayed.         Lab 09/09/22 0535 09/08/22 0305 09/07/22 0341 09/06/22 0212 09/05/22 2024 09/05/22  0959 09/02/22  1704 09/02/22  1401   SODIUM 142 139 137 137 137 138   < > 143   POTASSIUM 3.5 3.9 4.3 4.8 5.5* 5.1   < > 3.9   CHLORIDE 97* 95* 96* 96* 96* 96*   < > 106   CO2 41.0* 38.0* 32.0* 34.0* 32.0* 35.0*   < > 25.0   ANION GAP 4.0* 6.0 9.0 7.0 9.0 7.0   < > 12.0   BUN 30* 36* 45* 44* 43* 36*   < > 18   CREATININE 1.16 1.25 1.47* 1.83* 2.02* 2.00*   < > 1.12   EGFR 69.0 63.1 52.0* 39.9* 35.7* 36.1*   < > 72.5   GLUCOSE 106* 115* 138* 150* 138* 107*   < > 103*   CALCIUM 8.9 8.7 8.7 9.0 8.8 9.4   < > 8.2*   IONIZED CALCIUM  --   --   --   --  1.21 1.26  --  1.15   MAGNESIUM 2.1 2.2 2.3 2.3 2.4  2.5*   < > 1.5*   PHOSPHORUS 4.1 2.7 3.5 3.7 4.7* 5.1*   < > 2.2*    < > = values in this interval not displayed.         Lab 09/05/22 2024 09/05/22  0959 09/03/22  0215 09/02/22  1704 09/02/22  1401   ALBUMIN 3.50 3.90 3.80 3.70 3.40*         Lab 09/03/22  0215 09/02/22  1704 09/02/22  1401   PROTIME 14.3 17.5* 18.6*   INR 1.12 1.44* 1.56*                 Lab 09/08/22  0614 09/07/22  0339 09/06/22  1411   PH, ARTERIAL 7.419 7.335* 7.323*   PCO2, ARTERIAL 59.5* 67.1* 69.1*   PO2 ART 74.8* 108.0 85.1   FIO2 28 35 35   HCO3 ART 38.4* 35.8* 35.8*   BASE EXCESS ART 12.3* 8.5* 8.2*   CARBOXYHEMOGLOBIN 1.3 0.8 1.2     Brief Urine Lab Results     None        Microbiology Results (last 10 days)     ** No results found for the last 240 hours. **          Adult Transesophageal Echo (KENNEY) W/ Cont if Necessary Per Protocol (Cardiology Department)    Result Date: 9/8/2022  · Estimated left ventricular EF = 60%        Results for orders placed during the hospital encounter of 09/02/22    Adult Transesophageal Echo (KENNEY) W/ Cont if Necessary Per Protocol (Cardiology Department)    Interpretation Summary  · Estimated left ventricular EF = 60%      Assessment & Plan   Assessment & Plan     Active Hospital Problems    Diagnosis  POA   • **Severe mitral regurgitation [I34.0]  Yes   • HTN (hypertension) [I10]  Yes   • Chronic systolic heart failure (HCC) [I50.22]  Yes   • Coronary artery disease of native artery of native heart with stable angina pectoris (HCC) [I25.118]  Yes   • Obesity (BMI 30-39.9) [E66.9]  Yes      Resolved Hospital Problems   No resolved problems to display.          Mr. Myers is a 67 yo male with PMH hypertension, congestive heart failure and coronary artery disease who presents to the ICU status post MVR with Dr. Chan.  Patient was admitted to [facility] in July for congestive heart failure.  During that admission he underwent left heart cath that revealed nonobstructive CAD but was suggestive of  severe mitral valve regurgitation.  KENNEY revealed moderate MVR with mild prolapse.  Patient ultimately underwent MV replacement and left atrial appendage ligation with Dr Chan. Patient was placed in the ICU post operatively and ultimately extubated 9.3. Post-op course complicated by afib which cardiology follows. Patient transitioned to floor 9/9 with hospitalists consulting.    MVR/LAAL with Dr Chan  CAD/CHF  --management post op per CTS  --CTX removed  --pain control as needed, IS/ambulate    Afib/flutter post operatively  --was started on amio protocol and kishor had successful KENNEY/ECV 9/7  --on amio/eliquis- continue for at least 30 days per cards  --f/u per cardiology    HLD  --continue statin    Neck pain  --chronic issue at home as well- added lidocaine patch/flexeril today-  monitor      PT/OT- recommending [facility] with plan to transfer Monday if stable        Thank you for allowing [facility] [place], operated by [facility] to provide consultative care for your patient, we will continue to follow while clinically appropriate.    Crista Hubbard MD  09/09/22

## 2022-09-09 NOTE — PROGRESS NOTES
Cardiothoracic Surgery Progress Note      POD # 7 s/p MVR, LAAL     LOS: 7 days      Subjective:  No complaints other than neck pain.  Passing flatus and BM.  Denies abdominal pain    Objective:  Vital Signs  Temp:  [97.9 °F (36.6 °C)-98.4 °F (36.9 °C)] 98.4 °F (36.9 °C)  Heart Rate:  [61-91] 69  Resp:  [16-20] 16  BP: ()/(56-85) 106/66    Physical Exam:   General Appearance: alert, appears stated age and cooperative   Lungs: clear to auscultation, respirations regular, respirations even and respirations unlabored   Heart:RRR   Skin: Incision c/d/i      Results:    Results from last 7 days   Lab Units 09/09/22  0535   WBC 10*3/mm3 6.35   HEMOGLOBIN g/dL 8.8*   HEMATOCRIT % 27.8*   PLATELETS 10*3/mm3 202     Results from last 7 days   Lab Units 09/09/22  0535   SODIUM mmol/L 142   POTASSIUM mmol/L 3.5   CHLORIDE mmol/L 97*   CO2 mmol/L 41.0*   BUN mg/dL 30*   CREATININE mg/dL 1.16   GLUCOSE mg/dL 106*   CALCIUM mg/dL 8.9       Assessment:  POD # 7 s/p MVR, LAAL  Back in NSR after cardioversion for A.flutter    Plan:  Ambulate  Pulmonary toilet  PT/OT  Patient wants to go to inpatient rehab in [place]   to arrange     Lyle Chan MD  09/09/22  07:53 EDT

## 2022-09-10 ENCOUNTER — APPOINTMENT (OUTPATIENT)
Dept: CARDIOLOGY | Facility: HOSPITAL | Age: 67
End: 2022-09-10

## 2022-09-10 ENCOUNTER — APPOINTMENT (OUTPATIENT)
Dept: GENERAL RADIOLOGY | Facility: HOSPITAL | Age: 67
End: 2022-09-10

## 2022-09-10 PROBLEM — I48.0 PAROXYSMAL ATRIAL FIBRILLATION: Status: ACTIVE | Noted: 2022-09-10

## 2022-09-10 LAB
ABO GROUP BLD: NORMAL
ALBUMIN SERPL-MCNC: 2.3 G/DL (ref 3.5–5.2)
ALBUMIN/GLOB SERPL: 1 G/DL
ALP SERPL-CCNC: 45 U/L (ref 39–117)
ALT SERPL W P-5'-P-CCNC: 9 U/L (ref 1–41)
ANION GAP SERPL CALCULATED.3IONS-SCNC: 6 MMOL/L (ref 5–15)
AST SERPL-CCNC: 24 U/L (ref 1–40)
BASOPHILS # BLD AUTO: 0.04 10*3/MM3 (ref 0–0.2)
BASOPHILS NFR BLD AUTO: 0.6 % (ref 0–1.5)
BH CV ECHO MEAS - AO MAX PG: 9.5 MMHG
BH CV ECHO MEAS - AO MEAN PG: 5.5 MMHG
BH CV ECHO MEAS - AO ROOT DIAM: 4.1 CM
BH CV ECHO MEAS - AO V2 MAX: 154 CM/SEC
BH CV ECHO MEAS - AO V2 VTI: 24.9 CM
BH CV ECHO MEAS - AVA(I,D): 2.8 CM2
BH CV ECHO MEAS - EDV(CUBED): 166.4 ML
BH CV ECHO MEAS - EDV(MOD-SP2): 148 ML
BH CV ECHO MEAS - EDV(MOD-SP4): 125 ML
BH CV ECHO MEAS - EF(MOD-BP): 61.7 %
BH CV ECHO MEAS - EF(MOD-SP2): 66.8 %
BH CV ECHO MEAS - EF(MOD-SP4): 59.7 %
BH CV ECHO MEAS - ESV(CUBED): 103.8 ML
BH CV ECHO MEAS - ESV(MOD-SP2): 49.2 ML
BH CV ECHO MEAS - ESV(MOD-SP4): 50.4 ML
BH CV ECHO MEAS - FS: 14.5 %
BH CV ECHO MEAS - IVS/LVPW: 1.08 CM
BH CV ECHO MEAS - IVSD: 1.3 CM
BH CV ECHO MEAS - LA DIMENSION: 3.6 CM
BH CV ECHO MEAS - LAT PEAK E' VEL: 8.2 CM/SEC
BH CV ECHO MEAS - LV MASS(C)D: 288.2 GRAMS
BH CV ECHO MEAS - LV MAX PG: 4.1 MMHG
BH CV ECHO MEAS - LV MEAN PG: 2 MMHG
BH CV ECHO MEAS - LV V1 MAX: 101.3 CM/SEC
BH CV ECHO MEAS - LV V1 VTI: 15.7 CM
BH CV ECHO MEAS - LVIDD: 5.5 CM
BH CV ECHO MEAS - LVIDS: 4.7 CM
BH CV ECHO MEAS - LVOT AREA: 4.5 CM2
BH CV ECHO MEAS - LVOT DIAM: 2.4 CM
BH CV ECHO MEAS - LVPWD: 1.2 CM
BH CV ECHO MEAS - MED PEAK E' VEL: 7.6 CM/SEC
BH CV ECHO MEAS - MV A MAX VEL: 131 CM/SEC
BH CV ECHO MEAS - MV DEC SLOPE: 622 CM/SEC2
BH CV ECHO MEAS - MV DEC TIME: 0.24 MSEC
BH CV ECHO MEAS - MV E MAX VEL: 155 CM/SEC
BH CV ECHO MEAS - MV E/A: 1.18
BH CV ECHO MEAS - MV MAX PG: 12.7 MMHG
BH CV ECHO MEAS - MV MEAN PG: 8 MMHG
BH CV ECHO MEAS - MV P1/2T: 88.1 MSEC
BH CV ECHO MEAS - MV V2 VTI: 46.3 CM
BH CV ECHO MEAS - MVA(P1/2T): 2.5 CM2
BH CV ECHO MEAS - MVA(VTI): 1.53 CM2
BH CV ECHO MEAS - PA ACC TIME: 0.09 SEC
BH CV ECHO MEAS - PA PR(ACCEL): 38.1 MMHG
BH CV ECHO MEAS - PA V2 MAX: 127.5 CM/SEC
BH CV ECHO MEAS - PI END-D VEL: 57.8 CM/SEC
BH CV ECHO MEAS - SV(LVOT): 70.8 ML
BH CV ECHO MEAS - SV(MOD-SP2): 98.8 ML
BH CV ECHO MEAS - SV(MOD-SP4): 74.6 ML
BH CV ECHO MEAS - TAPSE (>1.6): 1.12 CM
BH CV ECHO MEAS - TR MAX PG: 37.7 MMHG
BH CV ECHO MEAS - TR MAX VEL: 306.3 CM/SEC
BH CV ECHO MEASUREMENTS AVERAGE E/E' RATIO: 19.62
BH CV XLRA - RV BASE: 3.5 CM
BH CV XLRA - RV LENGTH: 8 CM
BH CV XLRA - RV MID: 3 CM
BH CV XLRA - TDI S': 9.3 CM/SEC
BILIRUB SERPL-MCNC: 0.4 MG/DL (ref 0–1.2)
BLD GP AB SCN SERPL QL: NEGATIVE
BUN SERPL-MCNC: 46 MG/DL (ref 8–23)
BUN/CREAT SERPL: 39.3 (ref 7–25)
CALCIUM SPEC-SCNC: 8.1 MG/DL (ref 8.6–10.5)
CHLORIDE SERPL-SCNC: 101 MMOL/L (ref 98–107)
CO2 SERPL-SCNC: 36 MMOL/L (ref 22–29)
CREAT SERPL-MCNC: 1.17 MG/DL (ref 0.76–1.27)
DEPRECATED RDW RBC AUTO: 44.7 FL (ref 37–54)
EGFRCR SERPLBLD CKD-EPI 2021: 68.3 ML/MIN/1.73
EOSINOPHIL # BLD AUTO: 0.12 10*3/MM3 (ref 0–0.4)
EOSINOPHIL NFR BLD AUTO: 1.7 % (ref 0.3–6.2)
ERYTHROCYTE [DISTWIDTH] IN BLOOD BY AUTOMATED COUNT: 13.1 % (ref 12.3–15.4)
FERRITIN SERPL-MCNC: 365.4 NG/ML (ref 30–400)
GLOBULIN UR ELPH-MCNC: 2.4 GM/DL
GLUCOSE BLDC GLUCOMTR-MCNC: 112 MG/DL (ref 70–130)
GLUCOSE BLDC GLUCOMTR-MCNC: 130 MG/DL (ref 70–130)
GLUCOSE BLDC GLUCOMTR-MCNC: 131 MG/DL (ref 70–130)
GLUCOSE BLDC GLUCOMTR-MCNC: 160 MG/DL (ref 70–130)
GLUCOSE SERPL-MCNC: 132 MG/DL (ref 65–99)
HCT VFR BLD AUTO: 18.6 % (ref 37.5–51)
HCT VFR BLD AUTO: 21 % (ref 37.5–51)
HGB BLD-MCNC: 6 G/DL (ref 13–17.7)
HGB BLD-MCNC: 6.7 G/DL (ref 13–17.7)
IMM GRANULOCYTES # BLD AUTO: 0.1 10*3/MM3 (ref 0–0.05)
IMM GRANULOCYTES NFR BLD AUTO: 1.4 % (ref 0–0.5)
LEFT ATRIUM VOLUME INDEX: 44 ML/M2
LV EF 2D ECHO EST: 55 %
LYMPHOCYTES # BLD AUTO: 1.16 10*3/MM3 (ref 0.7–3.1)
LYMPHOCYTES NFR BLD AUTO: 16 % (ref 19.6–45.3)
MAXIMAL PREDICTED HEART RATE: 153 BPM
MCH RBC QN AUTO: 30.7 PG (ref 26.6–33)
MCHC RBC AUTO-ENTMCNC: 31.9 G/DL (ref 31.5–35.7)
MCV RBC AUTO: 96.3 FL (ref 79–97)
MONOCYTES # BLD AUTO: 0.68 10*3/MM3 (ref 0.1–0.9)
MONOCYTES NFR BLD AUTO: 9.4 % (ref 5–12)
NEUTROPHILS NFR BLD AUTO: 5.17 10*3/MM3 (ref 1.7–7)
NEUTROPHILS NFR BLD AUTO: 70.9 % (ref 42.7–76)
NRBC BLD AUTO-RTO: 0 /100 WBC (ref 0–0.2)
PLATELET # BLD AUTO: 209 10*3/MM3 (ref 140–450)
PMV BLD AUTO: 10.3 FL (ref 6–12)
POTASSIUM SERPL-SCNC: 4.1 MMOL/L (ref 3.5–5.2)
PROT SERPL-MCNC: 4.7 G/DL (ref 6–8.5)
RBC # BLD AUTO: 2.18 10*6/MM3 (ref 4.14–5.8)
RH BLD: NEGATIVE
SODIUM SERPL-SCNC: 143 MMOL/L (ref 136–145)
STRESS TARGET HR: 130 BPM
T&S EXPIRATION DATE: NORMAL
WBC NRBC COR # BLD: 7.27 10*3/MM3 (ref 3.4–10.8)

## 2022-09-10 PROCEDURE — 85025 COMPLETE CBC W/AUTO DIFF WBC: CPT | Performed by: INTERNAL MEDICINE

## 2022-09-10 PROCEDURE — 80053 COMPREHEN METABOLIC PANEL: CPT | Performed by: INTERNAL MEDICINE

## 2022-09-10 PROCEDURE — 63710000001 INSULIN LISPRO (HUMAN) PER 5 UNITS: Performed by: PHYSICIAN ASSISTANT

## 2022-09-10 PROCEDURE — P9016 RBC LEUKOCYTES REDUCED: HCPCS

## 2022-09-10 PROCEDURE — 86850 RBC ANTIBODY SCREEN: CPT | Performed by: INTERNAL MEDICINE

## 2022-09-10 PROCEDURE — 86923 COMPATIBILITY TEST ELECTRIC: CPT

## 2022-09-10 PROCEDURE — 85014 HEMATOCRIT: CPT | Performed by: INTERNAL MEDICINE

## 2022-09-10 PROCEDURE — 86900 BLOOD TYPING SEROLOGIC ABO: CPT | Performed by: INTERNAL MEDICINE

## 2022-09-10 PROCEDURE — 99024 POSTOP FOLLOW-UP VISIT: CPT | Performed by: PHYSICIAN ASSISTANT

## 2022-09-10 PROCEDURE — 82728 ASSAY OF FERRITIN: CPT | Performed by: INTERNAL MEDICINE

## 2022-09-10 PROCEDURE — 86901 BLOOD TYPING SEROLOGIC RH(D): CPT | Performed by: INTERNAL MEDICINE

## 2022-09-10 PROCEDURE — 93306 TTE W/DOPPLER COMPLETE: CPT

## 2022-09-10 PROCEDURE — 36430 TRANSFUSION BLD/BLD COMPNT: CPT

## 2022-09-10 PROCEDURE — 85018 HEMOGLOBIN: CPT | Performed by: INTERNAL MEDICINE

## 2022-09-10 PROCEDURE — 86900 BLOOD TYPING SEROLOGIC ABO: CPT

## 2022-09-10 PROCEDURE — 99233 SBSQ HOSP IP/OBS HIGH 50: CPT | Performed by: INTERNAL MEDICINE

## 2022-09-10 PROCEDURE — 93306 TTE W/DOPPLER COMPLETE: CPT | Performed by: INTERNAL MEDICINE

## 2022-09-10 PROCEDURE — 82962 GLUCOSE BLOOD TEST: CPT

## 2022-09-10 PROCEDURE — 71045 X-RAY EXAM CHEST 1 VIEW: CPT

## 2022-09-10 PROCEDURE — 99232 SBSQ HOSP IP/OBS MODERATE 35: CPT | Performed by: NURSE PRACTITIONER

## 2022-09-10 RX ORDER — ACETAMINOPHEN 500 MG
500 TABLET ORAL 4 TIMES DAILY PRN
Status: DISCONTINUED | OUTPATIENT
Start: 2022-09-10 | End: 2022-09-14 | Stop reason: HOSPADM

## 2022-09-10 RX ORDER — OXYCODONE HYDROCHLORIDE AND ACETAMINOPHEN 5; 325 MG/1; MG/1
1 TABLET ORAL EVERY 6 HOURS PRN
Status: DISCONTINUED | OUTPATIENT
Start: 2022-09-10 | End: 2022-09-14 | Stop reason: HOSPADM

## 2022-09-10 RX ADMIN — Medication 10 ML: at 10:06

## 2022-09-10 RX ADMIN — ASPIRIN 81 MG: 81 TABLET, COATED ORAL at 10:05

## 2022-09-10 RX ADMIN — SENNOSIDES AND DOCUSATE SODIUM 2 TABLET: 50; 8.6 TABLET ORAL at 10:03

## 2022-09-10 RX ADMIN — SIMETHICONE 80 MG: 80 TABLET, CHEWABLE ORAL at 18:54

## 2022-09-10 RX ADMIN — AMIODARONE HYDROCHLORIDE 200 MG: 200 TABLET ORAL at 03:21

## 2022-09-10 RX ADMIN — METOPROLOL TARTRATE 12.5 MG: 25 TABLET, FILM COATED ORAL at 10:05

## 2022-09-10 RX ADMIN — CYCLOBENZAPRINE 5 MG: 10 TABLET, FILM COATED ORAL at 18:54

## 2022-09-10 RX ADMIN — CYCLOBENZAPRINE 5 MG: 10 TABLET, FILM COATED ORAL at 07:31

## 2022-09-10 RX ADMIN — SIMETHICONE 80 MG: 80 TABLET, CHEWABLE ORAL at 07:31

## 2022-09-10 RX ADMIN — OXYCODONE HYDROCHLORIDE AND ACETAMINOPHEN 1 TABLET: 5; 325 TABLET ORAL at 10:02

## 2022-09-10 RX ADMIN — AMIODARONE HYDROCHLORIDE 200 MG: 200 TABLET ORAL at 12:47

## 2022-09-10 RX ADMIN — APIXABAN 5 MG: 5 TABLET, FILM COATED ORAL at 10:02

## 2022-09-10 RX ADMIN — ATORVASTATIN CALCIUM 40 MG: 40 TABLET, FILM COATED ORAL at 21:39

## 2022-09-10 RX ADMIN — OXYCODONE HYDROCHLORIDE AND ACETAMINOPHEN 1 TABLET: 5; 325 TABLET ORAL at 23:24

## 2022-09-10 RX ADMIN — LIDOCAINE 1 PATCH: 50 PATCH CUTANEOUS at 10:04

## 2022-09-10 RX ADMIN — ACETAMINOPHEN 500 MG: 500 TABLET, FILM COATED ORAL at 15:28

## 2022-09-10 RX ADMIN — AMIODARONE HYDROCHLORIDE 200 MG: 200 TABLET ORAL at 21:38

## 2022-09-10 RX ADMIN — INSULIN LISPRO 2 UNITS: 100 INJECTION, SOLUTION INTRAVENOUS; SUBCUTANEOUS at 21:43

## 2022-09-10 RX ADMIN — Medication 10 ML: at 21:40

## 2022-09-10 NOTE — PROGRESS NOTES
Cardiology Progress Note      Reason for visit:    · Diastolic heart failure  · Valvular heart disease status post bioprosthetic MVR  · Hypertension    IDENTIFICATION: Patient is a 67-year-old gentleman who resides in [facility] Hospital Problems    Diagnosis  POA   • **Severe mitral regurgitation status post bioprosthetic MVR, 9/2/2022 [I34.0]  Yes     Priority: High     · Echo 6/26/22: EF 36-40%, global hypokinesis, mild bileaflet MVP with moderate MR; grade III diastolic dysfunction, severe PAH with RVSP 67mmHg  · KENNEY 6/28/22: LVEF 56-60%, MVP of posterior leaflet with mod-severe MR, modTR with RVSP 52mmHg  · MVR 9/2/22 Dr. Chan with 33 mm Magna Ease mitral valve      • Diastolic CHF due to valvular disease (HCC) [I50.30, I38]  Yes     Priority: High   • Essential hypertension [I10]  Yes     Priority: Medium   • Coronary artery disease involving native coronary artery of native heart with angina pectoris (HCC) [I25.119]  Yes     Priority: Medium     · Cardiac catheterization (6/2022): mild nonobstructive CAD, 3-4+ MR, LVEF 55%     • Hyperlipidemia LDL goal <70 [E78.5]  Yes     Priority: Low   • Obesity (BMI 30-39.9) [E66.9]  Yes     Priority: Low          Patient is sitting up in the chair breathing easy on O2 2 L via nasal cannula.  He denies any chest pain.  His hemoglobin dropped to 6.7 this morning.  He reports he almost passed out ambulating to the bathroom this morning.  He is maintaining normal sinus rhythm ever since his cardioversion a few days ago.             Vital Sign Min/Max for last 24 hours  Temp  Min: 97.3 °F (36.3 °C)  Max: 98.9 °F (37.2 °C)   BP  Min: 92/53  Max: 121/73   Pulse  Min: 71  Max: 90   Resp  Min: 16  Max: 16   SpO2  Min: 91 %  Max: 100 %   Flow (L/min)  Min: 2  Max: 2.5      Intake/Output Summary (Last 24 hours) at 9/10/2022 1334  Last data filed at 9/10/2022 1005  Gross per 24 hour   Intake 510 ml   Output 1425 ml   Net -915 ml           Physical  Exam  Constitutional:       Appearance: He is well-developed.   HENT:      Head: Normocephalic.   Neck:      Vascular: No carotid bruit or JVD.   Cardiovascular:      Rate and Rhythm: Normal rate and regular rhythm.      Heart sounds: Normal heart sounds. No murmur heard.    No friction rub. No gallop.   Pulmonary:      Effort: Pulmonary effort is normal.      Breath sounds: Normal breath sounds.   Abdominal:      General: Bowel sounds are normal. There is no distension.      Palpations: Abdomen is soft.   Skin:     General: Skin is warm and dry.   Neurological:      Mental Status: He is alert and oriented to person, place, and time.         Tele: Normal sinus rhythm    Results Review (reviewed the patient's recent labs in the electronic medical record):     EKG (9/8/2022): Sinus rhythm to sinus arrhythmia with first-degree AV block with occasional PVCs    CXR (9/7/2022): Stable mild bibasilar atelectasis left greater than right    KENNEY (9/8/2022): LVEF 60%    Results from last 7 days   Lab Units 09/09/22  0535 09/08/22  0305 09/07/22  0341 09/06/22  0212 09/05/22 2024 09/05/22  0959 09/05/22  0407   SODIUM mmol/L 142 139 137 137 137 138 132*   POTASSIUM mmol/L 3.5 3.9 4.3 4.8 5.5* 5.1 5.5*   CHLORIDE mmol/L 97* 95* 96* 96* 96* 96* 93*   BUN mg/dL 30* 36* 45* 44* 43* 36* 33*   CREATININE mg/dL 1.16 1.25 1.47* 1.83* 2.02* 2.00* 1.75*   MAGNESIUM mg/dL 2.1 2.2 2.3 2.3 2.4 2.5*  --          Results from last 7 days   Lab Units 09/10/22  1008 09/09/22  0535 09/08/22  0305   WBC 10*3/mm3 7.27 6.35 6.82   HEMOGLOBIN g/dL 6.7* 8.8* 8.8*   HEMATOCRIT % 21.0* 27.8* 27.0*   PLATELETS 10*3/mm3 209 202 182       Lab Results   Component Value Date    HGBA1C 6.30 (H) 08/30/2022       Lab Results   Component Value Date    CHOL 159 06/25/2022    TRIG 85 06/25/2022    HDL 36 (L) 06/25/2022     (H) 06/25/2022              Severe mitral regurgitation  · Status post bioprosthetic MVR 9/2/2022  · Aspirin 81 mg  daily      Paroxysmal atrial fibrillation  · Converted on IV amiodarone to p.o. taper protocol  · Left atrial appendage ligation performed at time of surgery with 35 mm atrial cure clip.  KENNEY confirms occluded ANGELICA with no residual flow  · KENNEY/ECV 9/7/2022 with successful conversion to NSR.  · On amiodarone 200 mg twice daily x10 days then 200 mg thereafter  · Eliquis 5 mg twice daily x30 days post discharge    Diastolic heart failure  · Echo 6/26/2022 showed LVEF 36-40% but recent KENNEY shows normalization LVEF to 60% status post MVR    Nonobstructive CAD  · Aspirin and statin therapy    Hypertension  · Metoprolol tartrate 12.5 mg twice daily    Hyperlipidemia  · Lipitor 40 mg daily    Blood loss anemia  · Hemoglobin  6.7 and hematocrit 21 on 9/10/2022       · Discontinue Eliquis due to decline in hemoglobin  · Blood transfusion per CT surgery    Electronically signed by SREEKANTH Gastelum, 09/10/22, 1:34 PM EDT.

## 2022-09-10 NOTE — PROGRESS NOTES
[facility] Medicine Services  PROGRESS NOTE    Patient Name: Damián Myers  : 1955  MRN: [id number]    Date of Admission: 2022  Primary Care Physician: Spencer Saeed MD    Subjective   Subjective     CC:  S/p MVR    HPI:  Doing well today. Reports some lightheadedness this morning with standing that is worse than baseline but resolved with time.  Only complaint otherwise if for restless legs, left>right that is chronic.    ROS:  Gen- No fevers, chills  CV- No chest pain, palpitations  Resp- No cough, dyspnea  GI- No N/V/D, abd pain    Objective   Objective     Vital Signs:   Temp:  [97.3 °F (36.3 °C)-98.5 °F (36.9 °C)] 98.5 °F (36.9 °C)  Heart Rate:  [73-90] 81  Resp:  [16] 16  BP: ()/(50-73) 96/51  Flow (L/min):  [2] 2     Physical Exam:  Constitutional: No acute distress, awake, alert  HENT: NCAT, mucous membranes moist  Respiratory: Clear to auscultation bilaterally, respiratory effort normal   Cardiovascular: irregularly irregular, no murmurs, rubs, or gallops  Gastrointestinal: Soft, nontender, nondistended  Musculoskeletal: Muscle tone within normal limits, no joint effusions appreciated  Psychiatric: Appropriate affect, cooperative  Neurologic: Alert and oriented, facial movements symmetric and spontaneous movement of all 4 extremities grossly equal bilaterally, speech clear. Continuous movement of left leg on exam  Skin: No rashes      Results Reviewed: repeat hemogram x 2 from this date was reviewed and showed downtrending Hbg (rapid downtrend). Ferritin ordered and reviewed: normal range    LAB RESULTS:      Lab 09/10/22  1422 09/10/22  1008 22  0535 22  0305 22  0341 22  0212   WBC  --  7.27 6.35 6.82 8.96 12.06*   HEMOGLOBIN 6.0* 6.7* 8.8* 8.8* 8.4* 8.8*   HEMATOCRIT 18.6* 21.0* 27.8* 27.0* 26.6* 27.9*   PLATELETS  --  209 202 182 154 146   NEUTROS ABS  --  5.17  --   --   --   --    IMMATURE GRANS (ABS)  --  0.10*  --   --   --   --     LYMPHS ABS  --  1.16  --   --   --   --    MONOS ABS  --  0.68  --   --   --   --    EOS ABS  --  0.12  --   --   --   --    MCV  --  96.3 94.9 93.1 95.7 96.2         Lab 09/10/22  1422 09/09/22  0535 09/08/22  0305 09/07/22  0341 09/06/22  0212 09/05/22 2024 09/05/22  0959   SODIUM 143 142 139 137 137 137 138   POTASSIUM 4.1 3.5 3.9 4.3 4.8 5.5* 5.1   CHLORIDE 101 97* 95* 96* 96* 96* 96*   CO2 36.0* 41.0* 38.0* 32.0* 34.0* 32.0* 35.0*   ANION GAP 6.0 4.0* 6.0 9.0 7.0 9.0 7.0   BUN 46* 30* 36* 45* 44* 43* 36*   CREATININE 1.17 1.16 1.25 1.47* 1.83* 2.02* 2.00*   EGFR 68.3 69.0 63.1 52.0* 39.9* 35.7* 36.1*   GLUCOSE 132* 106* 115* 138* 150* 138* 107*   CALCIUM 8.1* 8.9 8.7 8.7 9.0 8.8 9.4   IONIZED CALCIUM  --   --   --   --   --  1.21 1.26   MAGNESIUM  --  2.1 2.2 2.3 2.3 2.4 2.5*   PHOSPHORUS  --  4.1 2.7 3.5 3.7 4.7* 5.1*         Lab 09/10/22  1422 09/05/22 2024 09/05/22  0959   TOTAL PROTEIN 4.7*  --   --    ALBUMIN 2.30* 3.50 3.90   GLOBULIN 2.4  --   --    ALT (SGPT) 9  --   --    AST (SGOT) 24  --   --    BILIRUBIN 0.4  --   --    ALK PHOS 45  --   --                  Lab 09/10/22  1422   FERRITIN 365.40   ABO TYPING O   RH TYPING Negative   ANTIBODY SCREEN Negative         Lab 09/08/22  0614 09/07/22  0339 09/06/22  1411   PH, ARTERIAL 7.419 7.335* 7.323*   PCO2, ARTERIAL 59.5* 67.1* 69.1*   PO2 ART 74.8* 108.0 85.1   FIO2 28 35 35   HCO3 ART 38.4* 35.8* 35.8*   BASE EXCESS ART 12.3* 8.5* 8.2*   CARBOXYHEMOGLOBIN 1.3 0.8 1.2     Brief Urine Lab Results     None          Microbiology Results Abnormal     None          No radiology results from the last 24 hrs    Results for orders placed during the hospital encounter of 09/02/22    Adult Transesophageal Echo (KENNEY) W/ Cont if Necessary Per Protocol (Cardiology Department)    Interpretation Summary  · Estimated left ventricular EF = 60%      I have reviewed the medications:  Scheduled Meds:amiodarone, 200 mg, Oral, Q8H   Followed by  [START ON 9/12/2022]  amiodarone, 200 mg, Oral, Q12H   Followed by  [START ON 9/26/2022] amiodarone, 200 mg, Oral, Daily  aspirin, 81 mg, Oral, Daily  atorvastatin, 40 mg, Oral, Nightly  insulin lispro, 0-7 Units, Subcutaneous, 4x Daily With Meals & Nightly  lidocaine, 1 patch, Transdermal, Q24H  metoprolol tartrate, 12.5 mg, Oral, Q12H  Pharmacy Consult, , Does not apply, Q24H  senna-docusate sodium, 2 tablet, Oral, BID  sodium chloride, 10 mL, Intravenous, Q12H  sodium chloride, 10 mL, Intravenous, Q8H      Continuous Infusions:   PRN Meds:.•  acetaminophen  •  bisacodyl  •  bisacodyl  •  cyclobenzaprine  •  dextrose  •  dextrose  •  docusate sodium  •  glucagon (human recombinant)  •  magnesium hydroxide  •  ondansetron  •  oxyCODONE-acetaminophen  •  polyethylene glycol  •  potassium chloride **OR** potassium chloride  •  senna-docusate sodium  •  simethicone    Assessment & Plan   Assessment & Plan     Active Hospital Problems    Diagnosis  POA   • **Severe mitral regurgitation status post bioprosthetic MVR, 9/2/2022 [I34.0]  Yes   • Paroxysmal atrial fibrillation (HCC) [I48.0]  No   • Diastolic CHF due to valvular disease (HCC) [I50.30, I38]  Yes   • Hyperlipidemia LDL goal <70 [E78.5]  Yes   • Essential hypertension [I10]  Yes   • Coronary artery disease involving native coronary artery of native heart with angina pectoris (HCC) [I25.119]  Yes   • Obesity (BMI 30-39.9) [E66.9]  Yes      Resolved Hospital Problems    Diagnosis Date Resolved POA   • Chronic systolic heart failure (HCC) [I50.22] 09/09/2022 Yes        Brief Hospital Course to date:  Damián Myers is a 67 y.o. male male with PMH hypertension, congestive heart failure and coronary artery disease who presents to the ICU status post MVR with Dr. Chan.  Patient was admitted to [facility] in July for congestive heart failure.  During that admission he underwent left heart cath that revealed nonobstructive CAD but was suggestive of severe mitral valve  regurgitation.  KENNEY revealed moderate MVR with mild prolapse.  Patient ultimately underwent MV replacement and left atrial appendage ligation with Dr Chan. Patient was placed in the ICU post operatively and ultimately extubated 9/3. Post-op course complicated by afib which cardiology follows. Patient transitioned to floor 9/9 with hospital team consulting.  Today, drop in Hbg on lab testing and patient symptomatic with orthostatic hypotension. Transfused 2 units.    1) Orthostatic hypotension 2/2 acute anemia, unclear etiology  - no GI losses on review with patient, unclear etiology at this time but rapid drop in Hbg on repeat draws  - transfuse 2 units pRBCs, repeat H&H after transfusion (ordered)  - monitor for any signs of blood loss. Agree with further evaluation as ordered by CT surgery  - hold eliquis for Afib in setting of acute anemia     MVR/LAAL with Dr Chan  CAD/CHF  --management post op per CTS  --CTX removed  --pain control as needed, IS/ambulate     Afib/flutter post operatively  --was started on amio protocol and kishor had successful KENNEY/ECV 9/7  --on amio. D/c eliquis as above  --f/u per cardiology     HLD  --continue statin     Neck pain  --chronic issue at home as well: lidocaine and flexeril 9/9 started    DVT prophylaxis:  Mechanical DVT prophylaxis orders are present.     AM-PAC 6 Clicks Score (PT): 17 (09/10/22 0802)    CODE STATUS:   Code Status and Medical Interventions:   Ordered at: 09/02/22 1352     Code Status (Patient has no pulse and is not breathing):    CPR (Attempt to Resuscitate)     Medical Interventions (Patient has pulse or is breathing):    Full Support     Release to patient:    Routine Release       Louise Leal MD  09/10/22

## 2022-09-10 NOTE — PLAN OF CARE
Problem: Adult Inpatient Plan of Care  Goal: Plan of Care Review  Outcome: Ongoing, Progressing  Flowsheets (Taken 9/10/2022 0250)  Progress: improving     Problem: Skin Injury Risk Increased  Goal: Skin Health and Integrity  Outcome: Ongoing, Progressing  Intervention: Optimize Skin Protection  Recent Flowsheet Documentation  Taken 9/9/2022 2030 by Patt Dixon RN  Pressure Reduction Techniques: weight shift assistance provided  Head of Bed (HOB) Positioning: HOB at 30-45 degrees     Problem: Fall Injury Risk  Goal: Absence of Fall and Fall-Related Injury  Outcome: Ongoing, Progressing  Intervention: Identify and Manage Contributors  Recent Flowsheet Documentation  Taken 9/9/2022 2200 by Patt Dixon RN  Medication Review/Management: medications reviewed  Taken 9/9/2022 2030 by Patt Dixon RN  Medication Review/Management: medications reviewed  Intervention: Promote Injury-Free Environment  Recent Flowsheet Documentation  Taken 9/9/2022 2200 by Patt Dixon RN  Safety Promotion/Fall Prevention:   activity supervised   clutter free environment maintained   fall prevention program maintained   nonskid shoes/slippers when out of bed   safety round/check completed   toileting scheduled  Taken 9/9/2022 2030 by Patt Dixon RN  Safety Promotion/Fall Prevention:   nonskid shoes/slippers when out of bed   safety round/check completed   fall prevention program maintained     Problem: Adult Inpatient Plan of Care  Goal: Absence of Hospital-Acquired Illness or Injury  Intervention: Identify and Manage Fall Risk  Recent Flowsheet Documentation  Taken 9/9/2022 2200 by Patt Dixon RN  Safety Promotion/Fall Prevention:   activity supervised   clutter free environment maintained   fall prevention program maintained   nonskid shoes/slippers when out of bed   safety round/check completed   toileting scheduled  Taken 9/9/2022 2030 by Patt Dixon RN  Safety Promotion/Fall Prevention:    nonskid shoes/slippers when out of bed   safety round/check completed   fall prevention program maintained  Intervention: Prevent Skin Injury  Recent Flowsheet Documentation  Taken 9/9/2022 2030 by Patt Dixon RN  Body Position: position changed independently  Intervention: Prevent and Manage VTE (Venous Thromboembolism) Risk  Recent Flowsheet Documentation  Taken 9/9/2022 2030 by Patt Dixon RN  VTE Prevention/Management: (pt. on eliquis) other (see comments)  Range of Motion:   active ROM (range of motion) encouraged   ROM (range of motion) performed  Intervention: Prevent Infection  Recent Flowsheet Documentation  Taken 9/9/2022 2200 by Patt iDxon RN  Infection Prevention:   hand hygiene promoted   rest/sleep promoted  Taken 9/9/2022 2100 by Patt Dixon RN  Infection Prevention: hand hygiene promoted  Taken 9/9/2022 2030 by Patt Dixon RN  Infection Prevention: hand hygiene promoted  Goal: Optimal Comfort and Wellbeing  Intervention: Provide Person-Centered Care  Recent Flowsheet Documentation  Taken 9/9/2022 2030 by Patt Dixon RN  Trust Relationship/Rapport:   questions encouraged   questions answered   care explained   thoughts/feelings acknowledged     Problem: Cerebral Tissue Perfusion (Cardiovascular Surgery)  Goal: Optimal Cerebral Tissue Perfusion (Cardiovascular Surgery)  Intervention: Protect and Optimize Cerebral Perfusion  Recent Flowsheet Documentation  Taken 9/9/2022 2030 by Patt Dixon RN  Head of Bed (HOB) Positioning: HOB at 30-45 degrees     Problem: Infection (Cardiovascular Surgery)  Goal: Absence of Infection Signs and Symptoms  Intervention: Prevent or Manage Infection  Recent Flowsheet Documentation  Taken 9/9/2022 2200 by Patt Dixon RN  Infection Prevention:   hand hygiene promoted   rest/sleep promoted  Taken 9/9/2022 2100 by Patt Dixon RN  Infection Prevention: hand hygiene promoted  Taken 9/9/2022 2030 by Zack  PARDEEP Beltre  Infection Prevention: hand hygiene promoted     Problem: Ongoing Anesthesia Effects (Cardiovascular Surgery)  Goal: Anesthesia/Sedation Recovery  Intervention: Optimize Anesthesia Recovery  Recent Flowsheet Documentation  Taken 9/9/2022 2200 by Patt Dixon RN  Safety Promotion/Fall Prevention:   activity supervised   clutter free environment maintained   fall prevention program maintained   nonskid shoes/slippers when out of bed   safety round/check completed   toileting scheduled  Taken 9/9/2022 2030 by Patt Dixon RN  Safety Promotion/Fall Prevention:   nonskid shoes/slippers when out of bed   safety round/check completed   fall prevention program maintained     Problem: Pain (Cardiovascular Surgery)  Goal: Acceptable Pain Control  Intervention: Prevent or Manage Pain  Recent Flowsheet Documentation  Taken 9/9/2022 2030 by Patt Dixon RN  Diversional Activities: television     Problem: Communication Impairment (Mechanical Ventilation, Invasive)  Goal: Effective Communication  Intervention: Ensure Effective Communication  Recent Flowsheet Documentation  Taken 9/9/2022 2030 by Patt Dixon RN  Communication Enhancement Strategies:   call light answered in person   communication board used     Problem: Inability to Wean (Mechanical Ventilation, Invasive)  Goal: Mechanical Ventilation Liberation  Intervention: Promote Extubation and Mechanical Ventilation Liberation  Recent Flowsheet Documentation  Taken 9/9/2022 2200 by Patt Dixon RN  Medication Review/Management: medications reviewed  Taken 9/9/2022 2030 by Patt Dixon RN  Medication Review/Management: medications reviewed     Problem: Ventilator-Induced Lung Injury (Mechanical Ventilation, Invasive)  Goal: Absence of Ventilator-Induced Lung Injury  Intervention: Prevent Ventilator-Associated Pneumonia  Recent Flowsheet Documentation  Taken 9/9/2022 2030 by Patt Dixon RN  Head of Bed (HOB) Positioning:  HOB at 30-45 degrees     Problem: Skin Injury Risk Increased  Goal: Skin Health and Integrity  Intervention: Optimize Skin Protection  Recent Flowsheet Documentation  Taken 9/9/2022 2030 by Patt Dixon RN  Pressure Reduction Techniques: weight shift assistance provided  Head of Bed (HOB) Positioning: HOB at 30-45 degrees     Problem: Fall Injury Risk  Goal: Absence of Fall and Fall-Related Injury  Intervention: Identify and Manage Contributors  Recent Flowsheet Documentation  Taken 9/9/2022 2200 by Patt Dixon RN  Medication Review/Management: medications reviewed  Taken 9/9/2022 2030 by Patt Dixon RN  Medication Review/Management: medications reviewed  Intervention: Promote Injury-Free Environment  Recent Flowsheet Documentation  Taken 9/9/2022 2200 by Patt Dixon RN  Safety Promotion/Fall Prevention:   activity supervised   clutter free environment maintained   fall prevention program maintained   nonskid shoes/slippers when out of bed   safety round/check completed   toileting scheduled  Taken 9/9/2022 2030 by Patt Dixon RN  Safety Promotion/Fall Prevention:   nonskid shoes/slippers when out of bed   safety round/check completed   fall prevention program maintained   Goal Outcome Evaluation:  Plan of Care Reviewed With: patient        Progress: improving

## 2022-09-10 NOTE — PROGRESS NOTES
Cardiothoracic Surgery Progress Note      POD # 8 s/p MVR, LAAL     LOS: 8 days      Subjective:  Up in chair.  Conversant.  Pleasant.  States had episode this morning when he got up to go to the bathroom without getting weak and almost passing out.  States his blood pressure got low.    Objective:  Vital Signs  Temp:  [97.3 °F (36.3 °C)-98.9 °F (37.2 °C)] 98.3 °F (36.8 °C)  Heart Rate:  [71-90] 73  Resp:  [16] 16  BP: ()/(52-73) 92/53    Physical Exam:   General Appearance: No acute distress  CV: Regular rate and rhyth  Respiratory: Decreased bilaterally but clear to auscultation no wheezes rales rubs  Extremities: Warm to the touch feet are cool no significant edema  Sternum: Stable incisions healing well clean dry and intact        Results:    Results from last 7 days   Lab Units 09/10/22  1008   WBC 10*3/mm3 7.27   HEMOGLOBIN g/dL 6.7*   HEMATOCRIT % 21.0*   PLATELETS 10*3/mm3 209     Results from last 7 days   Lab Units 09/09/22  0535   SODIUM mmol/L 142   POTASSIUM mmol/L 3.5   CHLORIDE mmol/L 97*   CO2 mmol/L 41.0*   BUN mg/dL 30*   CREATININE mg/dL 1.16   GLUCOSE mg/dL 106*   CALCIUM mg/dL 8.9       Assessment:  POD # 8 s/p MVR, LAAL  Back in NSR after cardioversion for A.flutter    Plan:  Serial Hct  Transfuse 2 units given acute anemia with anticoagulation  Hold NOAC  Repeat CXR and Echo to identify source of bleeding  Pulmonary toilet  PT/OT  Patient wants to go to inpatient rehab in [place], possibly Monday if Hct stabilizes   to arrange   Advance diet    Lyle Chan MD  09/10/22  14:36 EDT

## 2022-09-11 ENCOUNTER — APPOINTMENT (OUTPATIENT)
Dept: GENERAL RADIOLOGY | Facility: HOSPITAL | Age: 67
End: 2022-09-11

## 2022-09-11 LAB
BH BB BLOOD EXPIRATION DATE: NORMAL
BH BB BLOOD EXPIRATION DATE: NORMAL
BH BB BLOOD TYPE BARCODE: 9500
BH BB BLOOD TYPE BARCODE: 9500
BH BB DISPENSE STATUS: NORMAL
BH BB DISPENSE STATUS: NORMAL
BH BB PRODUCT CODE: NORMAL
BH BB PRODUCT CODE: NORMAL
BH BB UNIT NUMBER: NORMAL
BH BB UNIT NUMBER: NORMAL
CROSSMATCH INTERPRETATION: NORMAL
CROSSMATCH INTERPRETATION: NORMAL
GLUCOSE BLDC GLUCOMTR-MCNC: 128 MG/DL (ref 70–130)
GLUCOSE BLDC GLUCOMTR-MCNC: 131 MG/DL (ref 70–130)
GLUCOSE BLDC GLUCOMTR-MCNC: 162 MG/DL (ref 70–130)
GLUCOSE BLDC GLUCOMTR-MCNC: 183 MG/DL (ref 70–130)
HCT VFR BLD AUTO: 21 % (ref 37.5–51)
HCT VFR BLD AUTO: 21 % (ref 37.5–51)
HCT VFR BLD AUTO: 21.3 % (ref 37.5–51)
HCT VFR BLD AUTO: 23.3 % (ref 37.5–51)
HGB BLD-MCNC: 6.8 G/DL (ref 13–17.7)
HGB BLD-MCNC: 7 G/DL (ref 13–17.7)
HGB BLD-MCNC: 7.3 G/DL (ref 13–17.7)
HGB BLD-MCNC: 7.7 G/DL (ref 13–17.7)
UNIT  ABO: NORMAL
UNIT  ABO: NORMAL
UNIT  RH: NORMAL
UNIT  RH: NORMAL

## 2022-09-11 PROCEDURE — 85018 HEMOGLOBIN: CPT | Performed by: INTERNAL MEDICINE

## 2022-09-11 PROCEDURE — P9035 PLATELET PHERES LEUKOREDUCED: HCPCS

## 2022-09-11 PROCEDURE — 93010 ELECTROCARDIOGRAM REPORT: CPT | Performed by: INTERNAL MEDICINE

## 2022-09-11 PROCEDURE — 63710000001 INSULIN LISPRO (HUMAN) PER 5 UNITS: Performed by: PHYSICIAN ASSISTANT

## 2022-09-11 PROCEDURE — 85014 HEMATOCRIT: CPT | Performed by: PHYSICIAN ASSISTANT

## 2022-09-11 PROCEDURE — 85018 HEMOGLOBIN: CPT | Performed by: PHYSICIAN ASSISTANT

## 2022-09-11 PROCEDURE — 99232 SBSQ HOSP IP/OBS MODERATE 35: CPT | Performed by: NURSE PRACTITIONER

## 2022-09-11 PROCEDURE — P9100 PATHOGEN TEST FOR PLATELETS: HCPCS

## 2022-09-11 PROCEDURE — 85014 HEMATOCRIT: CPT | Performed by: INTERNAL MEDICINE

## 2022-09-11 PROCEDURE — 82962 GLUCOSE BLOOD TEST: CPT

## 2022-09-11 PROCEDURE — 71045 X-RAY EXAM CHEST 1 VIEW: CPT

## 2022-09-11 PROCEDURE — 99232 SBSQ HOSP IP/OBS MODERATE 35: CPT | Performed by: INTERNAL MEDICINE

## 2022-09-11 PROCEDURE — 86900 BLOOD TYPING SEROLOGIC ABO: CPT

## 2022-09-11 PROCEDURE — 93005 ELECTROCARDIOGRAM TRACING: CPT | Performed by: NURSE PRACTITIONER

## 2022-09-11 PROCEDURE — 36430 TRANSFUSION BLD/BLD COMPNT: CPT

## 2022-09-11 PROCEDURE — P9016 RBC LEUKOCYTES REDUCED: HCPCS

## 2022-09-11 RX ORDER — CYCLOBENZAPRINE HCL 10 MG
10 TABLET ORAL 3 TIMES DAILY PRN
Status: DISCONTINUED | OUTPATIENT
Start: 2022-09-11 | End: 2022-09-14 | Stop reason: HOSPADM

## 2022-09-11 RX ORDER — PANTOPRAZOLE SODIUM 40 MG/1
40 TABLET, DELAYED RELEASE ORAL
Status: DISCONTINUED | OUTPATIENT
Start: 2022-09-12 | End: 2022-09-12

## 2022-09-11 RX ORDER — BUMETANIDE 0.25 MG/ML
2 INJECTION INTRAMUSCULAR; INTRAVENOUS ONCE
Status: COMPLETED | OUTPATIENT
Start: 2022-09-11 | End: 2022-09-11

## 2022-09-11 RX ORDER — ALUMINA, MAGNESIA, AND SIMETHICONE 2400; 2400; 240 MG/30ML; MG/30ML; MG/30ML
15 SUSPENSION ORAL EVERY 6 HOURS PRN
Status: DISCONTINUED | OUTPATIENT
Start: 2022-09-11 | End: 2022-09-14 | Stop reason: HOSPADM

## 2022-09-11 RX ADMIN — AMIODARONE HYDROCHLORIDE 200 MG: 200 TABLET ORAL at 11:11

## 2022-09-11 RX ADMIN — AMIODARONE HYDROCHLORIDE 200 MG: 200 TABLET ORAL at 04:51

## 2022-09-11 RX ADMIN — ALUMINUM HYDROXIDE, MAGNESIUM HYDROXIDE, AND DIMETHICONE 15 ML: 400; 400; 40 SUSPENSION ORAL at 18:09

## 2022-09-11 RX ADMIN — SENNOSIDES AND DOCUSATE SODIUM 2 TABLET: 50; 8.6 TABLET ORAL at 20:18

## 2022-09-11 RX ADMIN — LIDOCAINE 1 PATCH: 50 PATCH CUTANEOUS at 08:07

## 2022-09-11 RX ADMIN — ATORVASTATIN CALCIUM 40 MG: 40 TABLET, FILM COATED ORAL at 20:19

## 2022-09-11 RX ADMIN — CYCLOBENZAPRINE 10 MG: 10 TABLET, FILM COATED ORAL at 11:12

## 2022-09-11 RX ADMIN — METOPROLOL TARTRATE 12.5 MG: 25 TABLET, FILM COATED ORAL at 08:11

## 2022-09-11 RX ADMIN — BUMETANIDE 2 MG: 0.25 INJECTION, SOLUTION INTRAMUSCULAR; INTRAVENOUS at 11:12

## 2022-09-11 RX ADMIN — ASPIRIN 81 MG: 81 TABLET, COATED ORAL at 08:06

## 2022-09-11 RX ADMIN — Medication 10 ML: at 06:14

## 2022-09-11 RX ADMIN — Medication 10 ML: at 20:19

## 2022-09-11 RX ADMIN — CYCLOBENZAPRINE 10 MG: 10 TABLET, FILM COATED ORAL at 20:18

## 2022-09-11 RX ADMIN — METOPROLOL TARTRATE 12.5 MG: 25 TABLET, FILM COATED ORAL at 20:19

## 2022-09-11 RX ADMIN — AMIODARONE HYDROCHLORIDE 200 MG: 200 TABLET ORAL at 20:19

## 2022-09-11 RX ADMIN — OXYCODONE HYDROCHLORIDE AND ACETAMINOPHEN 1 TABLET: 5; 325 TABLET ORAL at 06:13

## 2022-09-11 RX ADMIN — SENNOSIDES AND DOCUSATE SODIUM 2 TABLET: 50; 8.6 TABLET ORAL at 08:06

## 2022-09-11 RX ADMIN — SIMETHICONE 80 MG: 80 TABLET, CHEWABLE ORAL at 06:13

## 2022-09-11 RX ADMIN — Medication 10 ML: at 08:11

## 2022-09-11 RX ADMIN — BISACODYL 10 MG: 5 TABLET, COATED ORAL at 12:18

## 2022-09-11 RX ADMIN — ACETAMINOPHEN 500 MG: 500 TABLET, FILM COATED ORAL at 03:24

## 2022-09-11 NOTE — PROGRESS NOTES
"    [facility] Medicine Services  PROGRESS NOTE    Patient Name: Damián Myers  : 1955  MRN: [id number]    Date of Admission: 2022  Primary Care Physician: Spencer Saeed MD    Subjective   Subjective     CC:  Post-op    HPI:  Reports feeling worse today. Swelling in legs and in abdomen, no bowel movements since 9/10 AM where they were \"regular brown\" without blood or dark coloring. He does report  dark stool in AM.  Still lightheaded and dizzy    ROS:  Gen- No fevers, chills  CV- No chest pain, palpitations  Resp- No cough, dyspnea  GI- No N/V/D, abd pain    Objective   Objective     Vital Signs:   Temp:  [97.9 °F (36.6 °C)-98.6 °F (37 °C)] 97.9 °F (36.6 °C)  Heart Rate:  [] 96  Resp:  [16-18] 18  BP: ()/(40-77) 104/40  Flow (L/min):  [2] 2     Physical Exam:  Constitutional: Uncomfortable appearing male up in chair, awake, alert  HENT: NCAT, mucous membranes moist  Respiratory: Clear to auscultation bilaterally, respiratory effort mildly increased  Cardiovascular: RRR, no murmurs, rubs, or gallops, 2+ pitting edema to mid-shin bilaterally which is new from 9/10  Gastrointestinal: Soft, nontender, mildly distended  Musculoskeletal: Muscle tone within normal limits, no joint effusions appreciated  Psychiatric: Appropriate affect, cooperative  Neurologic: Alert and oriented, facial movements symmetric and spontaneous movement of all 4 extremities grossly equal bilaterally, speech clear  Skin: No rashes    Results Reviewed: Hbg 6.0 --> 7.0 after 2 units. ECHO with pericardial effusion but no tamponade. CXR personally reviewed and interpreted: stable from previous without acute change.  LAB RESULTS:      Lab 22  0320 09/10/22  1422 09/10/22  1008 22  0535 22  0305 22  0341 22  0212   WBC  --   --  7.27 6.35 6.82 8.96 12.06*   HEMOGLOBIN 7.0* 6.0* 6.7* 8.8* 8.8* 8.4* 8.8*   HEMATOCRIT 21.0* 18.6* 21.0* 27.8* 27.0* 26.6* 27.9*   PLATELETS  --   " --  209 202 182 154 146   NEUTROS ABS  --   --  5.17  --   --   --   --    IMMATURE GRANS (ABS)  --   --  0.10*  --   --   --   --    LYMPHS ABS  --   --  1.16  --   --   --   --    MONOS ABS  --   --  0.68  --   --   --   --    EOS ABS  --   --  0.12  --   --   --   --    MCV  --   --  96.3 94.9 93.1 95.7 96.2         Lab 09/10/22  1422 09/09/22  0535 09/08/22  0305 09/07/22  0341 09/06/22 0212 09/05/22 2024 09/05/22  0959   SODIUM 143 142 139 137 137 137 138   POTASSIUM 4.1 3.5 3.9 4.3 4.8 5.5* 5.1   CHLORIDE 101 97* 95* 96* 96* 96* 96*   CO2 36.0* 41.0* 38.0* 32.0* 34.0* 32.0* 35.0*   ANION GAP 6.0 4.0* 6.0 9.0 7.0 9.0 7.0   BUN 46* 30* 36* 45* 44* 43* 36*   CREATININE 1.17 1.16 1.25 1.47* 1.83* 2.02* 2.00*   EGFR 68.3 69.0 63.1 52.0* 39.9* 35.7* 36.1*   GLUCOSE 132* 106* 115* 138* 150* 138* 107*   CALCIUM 8.1* 8.9 8.7 8.7 9.0 8.8 9.4   IONIZED CALCIUM  --   --   --   --   --  1.21 1.26   MAGNESIUM  --  2.1 2.2 2.3 2.3 2.4 2.5*   PHOSPHORUS  --  4.1 2.7 3.5 3.7 4.7* 5.1*         Lab 09/10/22  1422 09/05/22  2024 09/05/22  0959   TOTAL PROTEIN 4.7*  --   --    ALBUMIN 2.30* 3.50 3.90   GLOBULIN 2.4  --   --    ALT (SGPT) 9  --   --    AST (SGOT) 24  --   --    BILIRUBIN 0.4  --   --    ALK PHOS 45  --   --                  Lab 09/10/22  1422   FERRITIN 365.40   ABO TYPING O   RH TYPING Negative   ANTIBODY SCREEN Negative         Lab 09/08/22  0614 09/07/22  0339 09/06/22  1411   PH, ARTERIAL 7.419 7.335* 7.323*   PCO2, ARTERIAL 59.5* 67.1* 69.1*   PO2 ART 74.8* 108.0 85.1   FIO2 28 35 35   HCO3 ART 38.4* 35.8* 35.8*   BASE EXCESS ART 12.3* 8.5* 8.2*   CARBOXYHEMOGLOBIN 1.3 0.8 1.2     Brief Urine Lab Results     None          Microbiology Results Abnormal     None          Adult Transthoracic Echo Complete W/ Cont if Necessary Per Protocol    Result Date: 9/10/2022  · Left ventricular systolic function is normal. Estimated left ventricular EF = 55%. · Left atrial volume is moderately increased. · A 33 mm  Medtronic Magna Ease bioprosthetic mitral valve is present. The mean gradient across the valve is 8 mmHg. There is no regurgitation. · There is a moderate (1-2cm) pericardial effusion adjacent to the left ventricle. There is no evidence of cardiac tamponade. · A left pleural effusion is present.      XR Chest 1 View    Result Date: 9/10/2022  DATE OF EXAM: 9/10/2022 5:24 PM  PROCEDURE: XR CHEST 1 VW-  INDICATIONS: lightheadedness/syncope; I34.0-Nonrheumatic mitral (valve) insufficiency; I34.0-Nonrheumatic mitral (valve) insufficiency; I48.0-Paroxysmal atrial fibrillation; E78.5-Hyperlipidemia, unspecified  COMPARISON: 9/7/2022  TECHNIQUE: Single radiographic AP view of the chest was obtained.  FINDINGS: Sternotomy wires, mitral valve and left atrial appendage exclusion clip are again noted. The heart remains enlarged. The vasculature, however, appears normal. There is mild remaining bibasilar effusion and atelectasis, improved on the left. No new pulmonary parenchymal disease is seen. NG tube and right IJ sheath have been removed.      Impression: Improving left basilar atelectasis/effusion.  This report was finalized on 9/10/2022 10:32 PM by Dr. Gonzales Herman MD.        Results for orders placed during the hospital encounter of 09/02/22    Adult Transthoracic Echo Complete W/ Cont if Necessary Per Protocol    Interpretation Summary  · Left ventricular systolic function is normal. Estimated left ventricular EF = 55%.  · Left atrial volume is moderately increased.  · A 33 mm Medtronic Magna Ease bioprosthetic mitral valve is present. The mean gradient across the valve is 8 mmHg. There is no regurgitation.  · There is a moderate (1-2cm) pericardial effusion adjacent to the left ventricle. There is no evidence of cardiac tamponade.  · A left pleural effusion is present.      I have reviewed the medications:  Scheduled Meds:amiodarone, 200 mg, Oral, Q8H   Followed by  [START ON 9/12/2022] amiodarone, 200 mg, Oral, Q12H    Followed by  [START ON 9/26/2022] amiodarone, 200 mg, Oral, Daily  aspirin, 81 mg, Oral, Daily  atorvastatin, 40 mg, Oral, Nightly  insulin lispro, 0-7 Units, Subcutaneous, 4x Daily With Meals & Nightly  lidocaine, 1 patch, Transdermal, Q24H  metoprolol tartrate, 12.5 mg, Oral, Q12H  Pharmacy Consult, , Does not apply, Q24H  senna-docusate sodium, 2 tablet, Oral, BID  sodium chloride, 10 mL, Intravenous, Q12H  sodium chloride, 10 mL, Intravenous, Q8H      Continuous Infusions:   PRN Meds:.•  acetaminophen  •  bisacodyl  •  bisacodyl  •  cyclobenzaprine  •  dextrose  •  dextrose  •  docusate sodium  •  glucagon (human recombinant)  •  magnesium hydroxide  •  ondansetron  •  oxyCODONE-acetaminophen  •  polyethylene glycol  •  potassium chloride **OR** potassium chloride  •  senna-docusate sodium  •  simethicone    Assessment & Plan   Assessment & Plan     Active Hospital Problems    Diagnosis  POA   • **Severe mitral regurgitation status post bioprosthetic MVR, 9/2/2022 [I34.0]  Yes   • Paroxysmal atrial fibrillation (HCC) [I48.0]  No   • Diastolic CHF due to valvular disease (HCC) [I50.30, I38]  Yes   • Hyperlipidemia LDL goal <70 [E78.5]  Yes   • Essential hypertension [I10]  Yes   • Coronary artery disease involving native coronary artery of native heart with angina pectoris (HCC) [I25.119]  Yes   • Obesity (BMI 30-39.9) [E66.9]  Yes      Resolved Hospital Problems    Diagnosis Date Resolved POA   • Chronic systolic heart failure (HCC) [I50.22] 09/09/2022 Yes        Brief Hospital Course to date:  Damián Myers is a 67 y.o. male with PMH hypertension, congestive heart failure and coronary artery disease who presents to the ICU status post MVR with Dr. Chan.  Patient was admitted to [facility] in July for congestive heart failure.  During that admission he underwent left heart cath that revealed nonobstructive CAD but was suggestive of severe mitral valve regurgitation.  KENNEY revealed moderate MVR with  mild prolapse.  Patient ultimately underwent MV replacement and left atrial appendage ligation with Dr Chan. Patient was placed in the ICU post operatively and ultimately extubated 9/3. Post-op course complicated by afib which cardiology follows. Patient transitioned to floor 9/9 with hospital team consulting.  Drop in Hbg 9/10 acutely and patient symptomatic with orthostatic hypotension. Transfused 2 units with mild response: Hbg 6.0 --> 7.0 after 2 units, still orthostatic dizziness per patient. New LE edema as well.     1) Orthostatic hypotension 2/2 acute anemia - worsened  - anemia speed appears 2/2 blood loss: no stools with blood, normal CXR, pericardial effusion though. Inadequate response to 2 units 9/10 and appears symptomatic  - discussed with CT surgery SAM on this date: they will order blood transfusion moving forward. If Hbg continues to decline rapidly, would have higher concern for tamponade physiology given softer BP's and LE edema but defer to both cardiology and CT surgery teams for follow-up of this potential source. Repeat ECHO 9/12  - low threshold to start IV PPI BID at any sign of GI source  - hold eliquis for Afib in setting of acute anemia. Of note, patient still on ASA     MVR/LAAL with Dr Chan  CAD/CHF  --management post op per CTS  --CTX removed  --pain control as needed, IS/ambulate     Afib/flutter post operatively  --was started on amio protocol and kishor had successful KENNEY/ECV 9/7  --on amio. D/c eliquis as above  --f/u per cardiology     HLD --continue statin  RLS - patient declines medication, ferritin within normal limits, no further w/u     DVT prophylaxis:  Mechanical DVT prophylaxis orders are present.      Hospital Medicine will continue to consult as needed.     AM-PAC 6 Clicks Score (PT): 17 (09/10/22 2010)    CODE STATUS:   Code Status and Medical Interventions:   Ordered at: 09/02/22 3338     Code Status (Patient has no pulse and is not breathing):    CPR (Attempt  to Resuscitate)     Medical Interventions (Patient has pulse or is breathing):    Full Support     Release to patient:    Routine Release       Louise Leal MD  09/11/22

## 2022-09-11 NOTE — PROGRESS NOTES
Cardiothoracic Surgery Progress Note      POD # 9 s/p MVR, LAAL     LOS: 9 days      Subjective:  Tachycardic. On 2L NC. Sitting up in bedside chair.  Patient states that he continues to feel light headed and short of breath while sitting and standing.    Objective:  Vital Signs  Temp:  [97.9 °F (36.6 °C)-98.6 °F (37 °C)] 97.9 °F (36.6 °C)  Heart Rate:  [] 111  Resp:  [16-18] 18  BP: ()/(50-77) 114/57    Physical Exam:  General Appearance: No acute distress  CV: Tachycardic  Respiratory: Decreased bilaterally but clear to auscultation no wheezes rales rubs  Extremities: Warm, 1+ BLE  Sternum: Stable incisions healing well clean dry and intact      Results:    Results from last 7 days   Lab Units 09/11/22  0320 09/10/22  1422 09/10/22  1008   WBC 10*3/mm3  --   --  7.27   HEMOGLOBIN g/dL 7.0*   < > 6.7*   HEMATOCRIT % 21.0*   < > 21.0*   PLATELETS 10*3/mm3  --   --  209    < > = values in this interval not displayed.     Results from last 7 days   Lab Units 09/10/22  1422   SODIUM mmol/L 143   POTASSIUM mmol/L 4.1   CHLORIDE mmol/L 101   CO2 mmol/L 36.0*   BUN mg/dL 46*   CREATININE mg/dL 1.17   GLUCOSE mg/dL 132*   CALCIUM mg/dL 8.1*       Assessment:  POD # 9 s/p MVR, LAAL  Back in NSR after cardioversion for A.flutter    TTE 9/10/22: moderate (1-2cm) pericardial effusion adjacent to the left ventricle. There is no evidence of cardiac tamponade      Plan:  Transfuse 2 units PRBC, platelets  TTE tomorrow  Diuresis  Serial Hct  Continue to hold NOAC  Pulmonary toilet  PT/OT  Patient wants to go to inpatient rehab in [place] - not ready at this point    Lyle Chan MD  09/11/22  08:40 EDT

## 2022-09-11 NOTE — PROGRESS NOTES
Cardiology Progress Note      Reason for visit:    · Diastolic heart failure  · Valvular heart disease status post bioprosthetic MVR  · Paroxysmal atrial fibrillation    IDENTIFICATION: Patient is a 67-year-old gentleman who resides in [facility] Hospital Problems    Diagnosis  POA   • **Severe mitral regurgitation status post bioprosthetic MVR, 9/2/2022 [I34.0]  Yes     Priority: High     · Echo 6/26/22: EF 36-40%, global hypokinesis, mild bileaflet MVP with moderate MR; grade III diastolic dysfunction, severe PAH with RVSP 67mmHg  · KENNEY 6/28/22: LVEF 56-60%, MVP of posterior leaflet with mod-severe MR, modTR with RVSP 52mmHg  · MVR 9/2/22 Dr. Chan with 33 mm Magna Ease mitral valve   · Echo (9/10/2022):LVEF = 55%.  33 mm Medtronic Magna Ease bioprosthetic valve present.  Mean gradient 8 mmHg.  Moderate 1-2 cm pericardial effusion.  No tamponade.  Left pleural effusion present.     • Paroxysmal atrial fibrillation (HCC) [I48.0]  No     Priority: High     · Atrial fibrillation status post MVR  · [id number]  · ANGELICA ligated at time of surgery with 35 mm Atricure clip- KENNEY confirms occluded ANGELICA with no residual flow    · Successful KENNEY/ECV 9/7, continue amiodarone 200 mg twice daily for 10 days then 200 mg daily thereafter for least 30 days, continue Eliquis 5 mg twice daily for 30 days post discharge     • Diastolic CHF due to valvular disease (HCC) [I50.30, I38]  Yes     Priority: High     · Echo (9/10/2022):LVEF = 55%.  33 mm Medtronic Magna Ease bioprosthetic valve present.  Mean gradient 8 mmHg.  Moderate 1-2 cm pericardial effusion.  No tamponade.  Left pleural effusion present.     • Essential hypertension [I10]  Yes     Priority: Medium   • Coronary artery disease involving native coronary artery of native heart with angina pectoris (HCC) [I25.119]  Yes     Priority: Medium     · Cardiac catheterization (6/2022): mild nonobstructive CAD, 3-4+ MR, LVEF 55%     • Hyperlipidemia LDL goal <70  [E78.5]  Yes     Priority: Low   • Obesity (BMI 30-39.9) [E66.9]  Yes     Priority: Low          The patient received 2 units of packed red blood cells yesterday.  Repeat hemoglobin this morning 7.0 and hematocrit 21.0.  He went back into atrial fibrillation.  He had echocardiogram yesterday which shows normal LVEF and bioprosthetic mitral valve present without regurgitation.  There was a moderate 1-2 cm pericardial effusion without tamponade and left pleural effusion present.  Patient is sitting up in the chair on O2 at 2 L via nasal cannula.  He does not feel as lightheaded as he did yesterday.  He was treated with IV Bumex earlier today.             Vital Sign Min/Max for last 24 hours  Temp  Min: 97.9 °F (36.6 °C)  Max: 98.6 °F (37 °C)   BP  Min: 86/50  Max: 140/77   Pulse  Min: 73  Max: 114   Resp  Min: 16  Max: 18   SpO2  Min: 92 %  Max: 100 %   Flow (L/min)  Min: 2  Max: 2      Intake/Output Summary (Last 24 hours) at 9/11/2022 1059  Last data filed at 9/11/2022 0231  Gross per 24 hour   Intake 1320 ml   Output --   Net 1320 ml           Physical Exam  Constitutional:       Appearance: He is well-developed.   HENT:      Head: Normocephalic.   Neck:      Vascular: No carotid bruit or JVD.   Cardiovascular:      Rate and Rhythm: Normal rate. Rhythm irregularly irregular.      Heart sounds: Normal heart sounds. No murmur heard.    No friction rub. No gallop.   Pulmonary:      Effort: Pulmonary effort is normal.      Breath sounds: Normal breath sounds.   Abdominal:      General: Bowel sounds are normal. There is no distension.      Palpations: Abdomen is soft.   Musculoskeletal:      Right lower leg: Edema present.      Left lower leg: Edema present.   Skin:     General: Skin is warm and dry.   Neurological:      Mental Status: He is alert and oriented to person, place, and time.         Tele: Atrial fibrillation    Results Review (reviewed the patient's recent labs in the electronic medical record):     EKG  (9/8/2022): Sinus rhythm to sinus arrhythmia with first-degree AV block with occasional PVCs    CXR (9/7/2022): Stable mild bibasilar atelectasis left greater than right    Echo (9/10/2022):    Results from last 7 days   Lab Units 09/10/22  1422 09/09/22  0535 09/08/22  0305 09/07/22  0341 09/06/22  0212 09/05/22 2024 09/05/22  0959   SODIUM mmol/L 143 142 139 137 137 137 138   POTASSIUM mmol/L 4.1 3.5 3.9 4.3 4.8 5.5* 5.1   CHLORIDE mmol/L 101 97* 95* 96* 96* 96* 96*   BUN mg/dL 46* 30* 36* 45* 44* 43* 36*   CREATININE mg/dL 1.17 1.16 1.25 1.47* 1.83* 2.02* 2.00*   MAGNESIUM mg/dL  --  2.1 2.2 2.3 2.3 2.4 2.5*         Results from last 7 days   Lab Units 09/11/22  0320 09/10/22  1422 09/10/22  1008 09/09/22  0535 09/08/22  0305   WBC 10*3/mm3  --   --  7.27 6.35 6.82   HEMOGLOBIN g/dL 7.0* 6.0* 6.7* 8.8* 8.8*   HEMATOCRIT % 21.0* 18.6* 21.0* 27.8* 27.0*   PLATELETS 10*3/mm3  --   --  209 202 182       Lab Results   Component Value Date    HGBA1C 6.30 (H) 08/30/2022       Lab Results   Component Value Date    CHOL 159 06/25/2022    TRIG 85 06/25/2022    HDL 36 (L) 06/25/2022     (H) 06/25/2022              Severe mitral regurgitation  · Status post bioprosthetic MVR 9/2/2022  · Aspirin 81 mg daily  · Echo 9/10/2022 bioprosthetic valve present with no regurgitation      Paroxysmal atrial fibrillation  · Converted on IV amiodarone to p.o. taper protocol  · Left atrial appendage ligation performed at time of surgery with 35 mm atrial cure clip.  KENNEY confirms occluded ANGELICA with no residual flow  · KENNEY/ECV 9/7/2022 with successful conversion to NSR.  · On amiodarone 200 mg twice daily x10 days then 200 mg thereafter  · Eliquis discontinued 9/10/2022 due to severe anemia  · Converted back to atrial fibrillation 9/11/2022    Diastolic heart failure  · Echo 9/10/2022 normal LVEF 55%.  · Moderate 1-2 cm pericardial effusion without tamponade and left pleural effusion present    Nonobstructive CAD  · Aspirin and statin  therapy    Hypertension  · Metoprolol tartrate 12.5 mg twice daily    Hyperlipidemia  · Lipitor 40 mg daily    Blood loss anemia  · Received 2 units packed red blood cells 9/10/2022  · Repeat H&H 7 and 21 on 9/11/2022       · Patient due to receive 2 units packed red blood cells this morning  · Defer NOAC due to severe anemia  · Hopefully patient will convert back to NSR once anemia has improved  · Continue p.o. amiodarone  · May need IV Bumex in between PRBCs    Electronically signed by SREEKANTH Gastelum, 09/10/22, 1:34 PM EDT.

## 2022-09-12 ENCOUNTER — ANESTHESIA (OUTPATIENT)
Dept: GASTROENTEROLOGY | Facility: HOSPITAL | Age: 67
End: 2022-09-12

## 2022-09-12 ENCOUNTER — APPOINTMENT (OUTPATIENT)
Dept: CT IMAGING | Facility: HOSPITAL | Age: 67
End: 2022-09-12

## 2022-09-12 ENCOUNTER — APPOINTMENT (OUTPATIENT)
Dept: CARDIOLOGY | Facility: HOSPITAL | Age: 67
End: 2022-09-12

## 2022-09-12 ENCOUNTER — APPOINTMENT (OUTPATIENT)
Dept: GENERAL RADIOLOGY | Facility: HOSPITAL | Age: 67
End: 2022-09-12

## 2022-09-12 ENCOUNTER — ANESTHESIA EVENT (OUTPATIENT)
Dept: GASTROENTEROLOGY | Facility: HOSPITAL | Age: 67
End: 2022-09-12

## 2022-09-12 PROBLEM — I31.39 PERICARDIAL EFFUSION: Status: ACTIVE | Noted: 2022-09-12

## 2022-09-12 PROBLEM — K92.1 GASTROINTESTINAL HEMORRHAGE WITH MELENA: Status: ACTIVE | Noted: 2022-08-04

## 2022-09-12 PROBLEM — K92.1 GASTROINTESTINAL HEMORRHAGE WITH MELENA: Status: RESOLVED | Noted: 2022-08-04 | Resolved: 2022-09-12

## 2022-09-12 LAB
ANION GAP SERPL CALCULATED.3IONS-SCNC: 6 MMOL/L (ref 5–15)
BH BB BLOOD EXPIRATION DATE: NORMAL
BH BB BLOOD EXPIRATION DATE: NORMAL
BH BB BLOOD TYPE BARCODE: 5100
BH BB BLOOD TYPE BARCODE: 600
BH BB DISPENSE STATUS: NORMAL
BH BB DISPENSE STATUS: NORMAL
BH BB PRODUCT CODE: NORMAL
BH BB PRODUCT CODE: NORMAL
BH BB UNIT NUMBER: NORMAL
BH BB UNIT NUMBER: NORMAL
BH CV VAS BP RIGHT ARM: NORMAL MMHG
BILIRUB UR QL STRIP: NEGATIVE
BUN SERPL-MCNC: 44 MG/DL (ref 8–23)
BUN/CREAT SERPL: 42.7 (ref 7–25)
CALCIUM SPEC-SCNC: 8.3 MG/DL (ref 8.6–10.5)
CHLORIDE SERPL-SCNC: 99 MMOL/L (ref 98–107)
CLARITY UR: CLEAR
CO2 SERPL-SCNC: 39 MMOL/L (ref 22–29)
COLOR UR: YELLOW
CREAT SERPL-MCNC: 1.03 MG/DL (ref 0.76–1.27)
D-LACTATE SERPL-SCNC: 1 MMOL/L (ref 0.5–2)
DEPRECATED RDW RBC AUTO: 50.4 FL (ref 37–54)
EGFRCR SERPLBLD CKD-EPI 2021: 79.6 ML/MIN/1.73
ERYTHROCYTE [DISTWIDTH] IN BLOOD BY AUTOMATED COUNT: 15.9 % (ref 12.3–15.4)
GLUCOSE BLDC GLUCOMTR-MCNC: 112 MG/DL (ref 70–130)
GLUCOSE BLDC GLUCOMTR-MCNC: 119 MG/DL (ref 70–130)
GLUCOSE BLDC GLUCOMTR-MCNC: 132 MG/DL (ref 70–130)
GLUCOSE BLDC GLUCOMTR-MCNC: 156 MG/DL (ref 70–130)
GLUCOSE SERPL-MCNC: 124 MG/DL (ref 65–99)
GLUCOSE UR STRIP-MCNC: NEGATIVE MG/DL
HCT VFR BLD AUTO: 19.6 % (ref 37.5–51)
HCT VFR BLD AUTO: 25.2 % (ref 37.5–51)
HCT VFR BLD AUTO: 25.4 % (ref 37.5–51)
HCT VFR BLD AUTO: 25.8 % (ref 37.5–51)
HEMOCCULT STL QL: POSITIVE
HGB BLD-MCNC: 6.4 G/DL (ref 13–17.7)
HGB BLD-MCNC: 8.2 G/DL (ref 13–17.7)
HGB BLD-MCNC: 8.3 G/DL (ref 13–17.7)
HGB BLD-MCNC: 8.6 G/DL (ref 13–17.7)
HGB UR QL STRIP.AUTO: NEGATIVE
KETONES UR QL STRIP: NEGATIVE
LEUKOCYTE ESTERASE UR QL STRIP.AUTO: NEGATIVE
MAXIMAL PREDICTED HEART RATE: 153 BPM
MCH RBC QN AUTO: 30.3 PG (ref 26.6–33)
MCHC RBC AUTO-ENTMCNC: 32.7 G/DL (ref 31.5–35.7)
MCV RBC AUTO: 92.9 FL (ref 79–97)
NITRITE UR QL STRIP: NEGATIVE
PH UR STRIP.AUTO: 6.5 [PH] (ref 5–8)
PLATELET # BLD AUTO: 262 10*3/MM3 (ref 140–450)
PMV BLD AUTO: 10.1 FL (ref 6–12)
POTASSIUM SERPL-SCNC: 3.8 MMOL/L (ref 3.5–5.2)
PROCALCITONIN SERPL-MCNC: 0.12 NG/ML (ref 0–0.25)
PROT UR QL STRIP: NEGATIVE
RBC # BLD AUTO: 2.11 10*6/MM3 (ref 4.14–5.8)
SODIUM SERPL-SCNC: 144 MMOL/L (ref 136–145)
SP GR UR STRIP: 1.02 (ref 1–1.03)
STRESS TARGET HR: 130 BPM
UNIT  ABO: NORMAL
UNIT  ABO: NORMAL
UNIT  RH: NORMAL
UNIT  RH: NORMAL
UROBILINOGEN UR QL STRIP: NORMAL
WBC NRBC COR # BLD: 14.71 10*3/MM3 (ref 3.4–10.8)

## 2022-09-12 PROCEDURE — 3E0G8GC INTRODUCTION OF OTHER THERAPEUTIC SUBSTANCE INTO UPPER GI, VIA NATURAL OR ARTIFICIAL OPENING ENDOSCOPIC: ICD-10-PCS | Performed by: INTERNAL MEDICINE

## 2022-09-12 PROCEDURE — 85027 COMPLETE CBC AUTOMATED: CPT | Performed by: PHYSICIAN ASSISTANT

## 2022-09-12 PROCEDURE — 83605 ASSAY OF LACTIC ACID: CPT

## 2022-09-12 PROCEDURE — 86900 BLOOD TYPING SEROLOGIC ABO: CPT

## 2022-09-12 PROCEDURE — 81003 URINALYSIS AUTO W/O SCOPE: CPT

## 2022-09-12 PROCEDURE — 85018 HEMOGLOBIN: CPT | Performed by: PHYSICIAN ASSISTANT

## 2022-09-12 PROCEDURE — 87040 BLOOD CULTURE FOR BACTERIA: CPT

## 2022-09-12 PROCEDURE — 82962 GLUCOSE BLOOD TEST: CPT

## 2022-09-12 PROCEDURE — 80048 BASIC METABOLIC PNL TOTAL CA: CPT | Performed by: PHYSICIAN ASSISTANT

## 2022-09-12 PROCEDURE — 0W3P8ZZ CONTROL BLEEDING IN GASTROINTESTINAL TRACT, VIA NATURAL OR ARTIFICIAL OPENING ENDOSCOPIC: ICD-10-PCS | Performed by: INTERNAL MEDICINE

## 2022-09-12 PROCEDURE — 82272 OCCULT BLD FECES 1-3 TESTS: CPT

## 2022-09-12 PROCEDURE — 93308 TTE F-UP OR LMTD: CPT | Performed by: INTERNAL MEDICINE

## 2022-09-12 PROCEDURE — 85018 HEMOGLOBIN: CPT | Performed by: INTERNAL MEDICINE

## 2022-09-12 PROCEDURE — 85014 HEMATOCRIT: CPT | Performed by: INTERNAL MEDICINE

## 2022-09-12 PROCEDURE — 99233 SBSQ HOSP IP/OBS HIGH 50: CPT | Performed by: INTERNAL MEDICINE

## 2022-09-12 PROCEDURE — 99222 1ST HOSP IP/OBS MODERATE 55: CPT | Performed by: PHYSICIAN ASSISTANT

## 2022-09-12 PROCEDURE — 43255 EGD CONTROL BLEEDING ANY: CPT | Performed by: INTERNAL MEDICINE

## 2022-09-12 PROCEDURE — 71045 X-RAY EXAM CHEST 1 VIEW: CPT

## 2022-09-12 PROCEDURE — 85014 HEMATOCRIT: CPT | Performed by: PHYSICIAN ASSISTANT

## 2022-09-12 PROCEDURE — 84145 PROCALCITONIN (PCT): CPT

## 2022-09-12 PROCEDURE — 25010000002 ONDANSETRON PER 1 MG: Performed by: PHYSICIAN ASSISTANT

## 2022-09-12 PROCEDURE — P9016 RBC LEUKOCYTES REDUCED: HCPCS

## 2022-09-12 PROCEDURE — 36430 TRANSFUSION BLD/BLD COMPNT: CPT

## 2022-09-12 PROCEDURE — 85014 HEMATOCRIT: CPT

## 2022-09-12 PROCEDURE — 85018 HEMOGLOBIN: CPT

## 2022-09-12 PROCEDURE — 93308 TTE F-UP OR LMTD: CPT

## 2022-09-12 PROCEDURE — 25010000002 PROPOFOL 10 MG/ML EMULSION: Performed by: NURSE ANESTHETIST, CERTIFIED REGISTERED

## 2022-09-12 DEVICE — DEV CLIP ENDO RESOLUTION360 CONTRL ROT 235CM: Type: IMPLANTABLE DEVICE | Site: PYLORUS | Status: FUNCTIONAL

## 2022-09-12 RX ORDER — PROPOFOL 10 MG/ML
VIAL (ML) INTRAVENOUS AS NEEDED
Status: DISCONTINUED | OUTPATIENT
Start: 2022-09-12 | End: 2022-09-12 | Stop reason: SURG

## 2022-09-12 RX ORDER — FENTANYL CITRATE 50 UG/ML
50 INJECTION, SOLUTION INTRAMUSCULAR; INTRAVENOUS
Status: DISCONTINUED | OUTPATIENT
Start: 2022-09-12 | End: 2022-09-12 | Stop reason: HOSPADM

## 2022-09-12 RX ORDER — ALBUTEROL SULFATE 2.5 MG/3ML
2.5 SOLUTION RESPIRATORY (INHALATION) ONCE AS NEEDED
Status: DISCONTINUED | OUTPATIENT
Start: 2022-09-12 | End: 2022-09-12 | Stop reason: HOSPADM

## 2022-09-12 RX ORDER — SODIUM CHLORIDE 9 MG/ML
INJECTION, SOLUTION INTRAVENOUS CONTINUOUS PRN
Status: DISCONTINUED | OUTPATIENT
Start: 2022-09-12 | End: 2022-09-12 | Stop reason: SURG

## 2022-09-12 RX ORDER — PANTOPRAZOLE SODIUM 40 MG/10ML
40 INJECTION, POWDER, LYOPHILIZED, FOR SOLUTION INTRAVENOUS
Status: DISCONTINUED | OUTPATIENT
Start: 2022-09-12 | End: 2022-09-14 | Stop reason: HOSPADM

## 2022-09-12 RX ORDER — ONDANSETRON 2 MG/ML
4 INJECTION INTRAMUSCULAR; INTRAVENOUS ONCE AS NEEDED
Status: DISCONTINUED | OUTPATIENT
Start: 2022-09-12 | End: 2022-09-12 | Stop reason: HOSPADM

## 2022-09-12 RX ORDER — LIDOCAINE HYDROCHLORIDE 10 MG/ML
INJECTION, SOLUTION EPIDURAL; INFILTRATION; INTRACAUDAL; PERINEURAL AS NEEDED
Status: DISCONTINUED | OUTPATIENT
Start: 2022-09-12 | End: 2022-09-12 | Stop reason: SURG

## 2022-09-12 RX ADMIN — SIMETHICONE 80 MG: 80 TABLET, CHEWABLE ORAL at 04:42

## 2022-09-12 RX ADMIN — AMIODARONE HYDROCHLORIDE 200 MG: 200 TABLET ORAL at 23:43

## 2022-09-12 RX ADMIN — PROPOFOL 50 MCG/KG/MIN: 10 INJECTION, EMULSION INTRAVENOUS at 15:46

## 2022-09-12 RX ADMIN — ONDANSETRON 4 MG: 2 INJECTION INTRAMUSCULAR; INTRAVENOUS at 04:45

## 2022-09-12 RX ADMIN — SENNOSIDES AND DOCUSATE SODIUM 2 TABLET: 50; 8.6 TABLET ORAL at 21:14

## 2022-09-12 RX ADMIN — ASPIRIN 81 MG: 81 TABLET, COATED ORAL at 08:20

## 2022-09-12 RX ADMIN — Medication 10 ML: at 08:20

## 2022-09-12 RX ADMIN — PANTOPRAZOLE SODIUM 40 MG: 40 INJECTION, POWDER, FOR SOLUTION INTRAVENOUS at 08:20

## 2022-09-12 RX ADMIN — LIDOCAINE 1 PATCH: 50 PATCH CUTANEOUS at 12:14

## 2022-09-12 RX ADMIN — Medication 10 ML: at 21:23

## 2022-09-12 RX ADMIN — AMIODARONE HYDROCHLORIDE 200 MG: 200 TABLET ORAL at 04:13

## 2022-09-12 RX ADMIN — AMIODARONE HYDROCHLORIDE 200 MG: 200 TABLET ORAL at 12:14

## 2022-09-12 RX ADMIN — ATORVASTATIN CALCIUM 40 MG: 40 TABLET, FILM COATED ORAL at 21:14

## 2022-09-12 RX ADMIN — CYCLOBENZAPRINE 10 MG: 10 TABLET, FILM COATED ORAL at 04:17

## 2022-09-12 RX ADMIN — OXYCODONE HYDROCHLORIDE AND ACETAMINOPHEN 1 TABLET: 5; 325 TABLET ORAL at 12:14

## 2022-09-12 RX ADMIN — SODIUM CHLORIDE: 9 INJECTION, SOLUTION INTRAVENOUS at 15:41

## 2022-09-12 RX ADMIN — PANTOPRAZOLE SODIUM 40 MG: 40 INJECTION, POWDER, FOR SOLUTION INTRAVENOUS at 17:11

## 2022-09-12 RX ADMIN — PROPOFOL 80 MG: 10 INJECTION, EMULSION INTRAVENOUS at 15:46

## 2022-09-12 RX ADMIN — LIDOCAINE HYDROCHLORIDE 100 MG: 10 INJECTION, SOLUTION EPIDURAL; INFILTRATION; INTRACAUDAL; PERINEURAL at 15:46

## 2022-09-12 RX ADMIN — SENNOSIDES AND DOCUSATE SODIUM 2 TABLET: 50; 8.6 TABLET ORAL at 08:20

## 2022-09-12 NOTE — PROGRESS NOTES
"  [facility]  PROGRESS NOTE    Date of Admission: 9/2/2022  Date of Service: 09/12/22    Primary Care Physician: Spencer Saeed MD    Chief Complaint: f/u PAF, HTN  Problem List:   Severe mitral regurgitation status post bioprosthetic MVR, 9/2/2022    Obesity (BMI 30-39.9)    Coronary artery disease involving native coronary artery of native heart with angina pectoris (HCC)    Essential hypertension    Hyperlipidemia LDL goal <70    Diastolic CHF due to valvular disease (HCC)    Paroxysmal atrial fibrillation (HCC)    Post-op pericardial effusion      Subjective      Developed acute anemia over weekend, currently getting transfused. Afib, rate mostly controlled, asymptomatic. Discharge has been postponed until GI evaluation.  Denies chest pain palpitations or abdominal pain.      Objective   Vitals: BP 99/73 (BP Location: Right arm)   Pulse 87   Temp 97.7 °F (36.5 °C) (Axillary)   Resp 20   Ht 190.5 cm (75\")   Wt 114 kg (251 lb 4.8 oz)   SpO2 93%   BMI 31.41 kg/m²     Physical Exam:  General Appearance:   · well developed  · well nourished  Neck:  · thyroid not enlarged  · supple  Respiratory:  · no respiratory distress  · normal breath sounds  · no rales  Cardiovascular:  · no jugular venous distention  · regular rhythm  · apical impulse normal  · S1 normal, S2 normal  · no S3, no S4   · no murmur  · no rub, no thrill  · carotid pulses normal; no bruit  · pedal pulses normal  · lower extremity edema: 1+  Skin:   warm, dry      Results:  Results from last 7 days   Lab Units 09/12/22  0436 09/11/22  1724 09/11/22  1242 09/10/22  1422 09/10/22  1008 09/09/22  0535   WBC 10*3/mm3 14.71*  --   --   --  7.27 6.35   HEMOGLOBIN g/dL 6.4* 7.7* 7.3*   < > 6.7* 8.8*   HEMATOCRIT % 19.6* 23.3* 21.3*   < > 21.0* 27.8*   PLATELETS 10*3/mm3 262  --   --   --  209 202    < > = values in this interval not displayed.     Results from last 7 days   Lab Units 09/12/22  0436 09/10/22  1422 " 09/09/22  0535   SODIUM mmol/L 144 143 142   POTASSIUM mmol/L 3.8 4.1 3.5   CHLORIDE mmol/L 99 101 97*   CO2 mmol/L 39.0* 36.0* 41.0*   BUN mg/dL 44* 46* 30*   CREATININE mg/dL 1.03 1.17 1.16   GLUCOSE mg/dL 124* 132* 106*      Lab Results   Component Value Date    CHOL 159 06/25/2022    TRIG 85 06/25/2022    HDL 36 (L) 06/25/2022     (H) 06/25/2022    AST 24 09/10/2022    ALT 9 09/10/2022       Intake/Output Summary (Last 24 hours) at 9/12/2022 0842  Last data filed at 9/12/2022 0800  Gross per 24 hour   Intake 1235 ml   Output 1900 ml   Net -665 ml     I personally reviewed the patient's EKG/Telemetry data    Radiology Data:   CXR 9/12/22:  IMPRESSION:  Mild pulmonary edema pattern with small bilateral pleural effusions,  stable as compared to the previous study.    Current Medications:  amiodarone, 200 mg, Oral, Q12H   Followed by  [START ON 9/26/2022] amiodarone, 200 mg, Oral, Daily  aspirin, 81 mg, Oral, Daily  atorvastatin, 40 mg, Oral, Nightly  insulin lispro, 0-7 Units, Subcutaneous, 4x Daily With Meals & Nightly  lidocaine, 1 patch, Transdermal, Q24H  metoprolol tartrate, 12.5 mg, Oral, Q12H  pantoprazole, 40 mg, Intravenous, BID AC  Pharmacy Consult, , Does not apply, Q24H  senna-docusate sodium, 2 tablet, Oral, BID  sodium chloride, 10 mL, Intravenous, Q12H  sodium chloride, 10 mL, Intravenous, Q8H           Assessment and Plan:     1. 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 Chan  - KENNEY 9/7 with normal EF, bioprosthetic mitral valve appears well-seated with normal leaflet excursion.  There is mild perivalvular leak.  - discussed AHA SBE ppx instructions with the patient and his wife     2. CAD, nonobstructive   - recent cath 06/2022 with mild nonobstructive CAD  - continue ASA, statin      3. Dyslipidemia  - continue Lipitor 40mg to target LDL <70 given history of CAD and prior LAD  stent     4.  PVCs  -asymptomatic     5.  Postoperative atrial flutter/fibrillation:  - onset 9/4, started on Amio protocol   - ANGELICA ligated at time of surgery with 35 mm Atricure clip- KENNEY confirms occluded ANGELICA with no residual flow    - s/p successful KENNEY/ECV 9/7, continue amiodarone 200 mg twice daily for 10 days then 200 mg daily thereafter for least 30 days  - Eliquis discontinued due to acute anemia, possible GI bleeding  - now in Afib, rate controlled and asymptomatic -will continue to monitor for now     Rate control strategy for now, continue beta-blocker, will not reinitiate anticoagulation due to significant blood loss anemia.    6. Anemia, normocytic   - s/p 2 units PRBCs 9/11  - GI eval underway   Planning EGD    7.  Hypotension:  repeat echo 9/12/2022: small to moderate pericardial effusion located near the LV, no effusion near the RA or RV, no impingement on chambers, no evidence of pericardial tamponade        Electronically signed by Yamilet Trivedi PA-C, 09/12/22, 10:25 AM EDT.

## 2022-09-12 NOTE — ANESTHESIA PREPROCEDURE EVALUATION
Anesthesia Evaluation     Patient summary reviewed and Nursing notes reviewed   no history of anesthetic complications:  NPO Solid Status: > 8 hours  NPO Liquid Status: > 2 hours           Airway   Mallampati: II  TM distance: >3 FB  Neck ROM: full  No difficulty expected  Dental - normal exam     Pulmonary - negative pulmonary ROS and normal exam    breath sounds clear to auscultation  Cardiovascular     ECG reviewed  PT is on anticoagulation therapy  Rhythm: irregular  Rate: normal    (+) hypertension, valvular problems/murmurs (s/p MVR), dysrhythmias Atrial Fib, pericardial effusion (small to moderate, no tamponade effect),     ROS comment: 9/12/22 Echo:  · Left ventricular ejection fraction appears to be 56 - 60%. Left ventricular systolic function is normal.  · Small to moderate pericardial effusion located next to the LV, essentially unchanged from 9/10/2022  · There is a left pleural effusion.  · No evidence of tamponade    Neuro/Psych- negative ROS  GI/Hepatic/Renal/Endo    (+) obesity,       Musculoskeletal (-) negative ROS    Abdominal    Substance History      OB/GYN          Other - negative ROS                       Anesthesia Plan    ASA 4     general     intravenous induction     Anesthetic plan, risks, benefits, and alternatives have been provided, discussed and informed consent has been obtained with: patient.    Plan discussed with CRNA.        CODE STATUS:    Code Status (Patient has no pulse and is not breathing): CPR (Attempt to Resuscitate)  Medical Interventions (Patient has pulse or is breathing): Full Support  Release to patient: Routine Release

## 2022-09-12 NOTE — CASE MANAGEMENT/SOCIAL WORK
Continued Stay Note  [facility]     Patient Name: Damián Myers  MRN: [id number]  Today's Date: 9/12/2022    Admit Date: 9/2/2022     Discharge Plan     Row Name 09/12/22 0843       Plan    Plan Inpatient rehab @ [place]    Plan Comments CM called and updated Nataliya [facility]  [phone] & cancelled noon transport for today.    Final Discharge Disposition Code 62 - inpatient rehab facility    Row Name 09/12/22 0747       Plan    Final Discharge Disposition Code 62 - inpatient rehab facility               Discharge Codes    No documentation.               Expected Discharge Date and Time     Expected Discharge Date Expected Discharge Time    Sep 14, 2022             Herb Mckinney RN

## 2022-09-12 NOTE — SIGNIFICANT NOTE
Pt Hgb continuing to drop overnight. 1 unit PRBC transfused. Fecal occult positive. Stat CT abd/pelvis pending. Consulted GI. RN called for positive sepsis screening.  WBC now elevated. Lactic acid, procal, blood cultures, UA, CXR pending.

## 2022-09-12 NOTE — ANESTHESIA POSTPROCEDURE EVALUATION
Patient: Damián Myers    Procedure Summary     Date: 09/12/22 Room / Location:  [facility]    Anesthesia Start: 1541 Anesthesia Stop: 1626    Procedure: ESOPHAGOGASTRODUODENOSCOPY (N/A ) Diagnosis:       Gastrointestinal hemorrhage with melena      (Gastrointestinal hemorrhage with melena [K92.1])    Surgeons: Leann Fitzpatrick MD Provider: Ever Ly MD    Anesthesia Type: general ASA Status: 4          Anesthesia Type: general    Vitals  Vitals Value Taken Time   /50 09/12/22 1618   Temp 97.3 °F (36.3 °C) 09/12/22 1618   Pulse 115 09/12/22 1625   Resp     SpO2 94 % 09/12/22 1625   Vitals shown include unvalidated device data.        Post Anesthesia Care and Evaluation    Patient location during evaluation: PACU  Patient participation: complete - patient participated  Level of consciousness: awake  Pain score: 0  Pain management: adequate    Airway patency: patent  Anesthetic complications: No anesthetic complications  PONV Status: none  Cardiovascular status: acceptable and stable  Respiratory status: nasal cannula, unassisted, acceptable and spontaneous ventilation  Hydration status: acceptable

## 2022-09-12 NOTE — CASE MANAGEMENT/SOCIAL WORK
Continued Stay Note  [facility]     Patient Name: Damián Myers  MRN: [id number]  Today's Date: 9/12/2022    Admit Date: 9/2/2022     Discharge Plan     Row Name 09/12/22 1416       Plan    Plan [facility] for rehab    Patient/Family in Agreement with Plan yes    Plan Comments Spoke w/pt and spouse in room, plan is to go to [facility] for rehab @ d/c. Over the w/e H&H dropped, positive stool. EGD planned for today. Discussed in MDR, hopefully Wed for potential d/c. CM rescheduled [facility] for Wednesday @ 0930  for transport.    Final Discharge Disposition Code 62 - inpatient rehab facility               Discharge Codes    No documentation.               Expected Discharge Date and Time     Expected Discharge Date Expected Discharge Time    Sep 14, 2022             Herb Mckinney RN

## 2022-09-12 NOTE — PROGRESS NOTES
Cardiothoracic Surgery Progress Note      POD # 10 s/p MVR, LAAL     LOS: 10 days      Subjective:  Black, tarry bowel movement overnight. Fecal occult positive. Denies chest pain or shortness of breath. On 3L NC saturations 96%.     Objective:  Vital Signs  Temp:  [97.6 °F (36.4 °C)-98.6 °F (37 °C)] 98.5 °F (36.9 °C)  Heart Rate:  [] 96  Resp:  [12-18] 16  BP: ()/(40-94) 94/69    Physical Exam:  General Appearance: No acute distress  CV: A. fib  Respiratory: Decreased bilaterally but clear to auscultation no wheezes rales rubs  Extremities: Warm, 1+ BLE  Sternum: Stable incisions healing well clean dry and intact      Results:    Results from last 7 days   Lab Units 09/12/22  0436   WBC 10*3/mm3 14.71*   HEMOGLOBIN g/dL 6.4*   HEMATOCRIT % 19.6*   PLATELETS 10*3/mm3 262     Results from last 7 days   Lab Units 09/12/22  0436   SODIUM mmol/L 144   POTASSIUM mmol/L 3.8   CHLORIDE mmol/L 99   CO2 mmol/L 39.0*   BUN mg/dL 44*   CREATININE mg/dL 1.03   GLUCOSE mg/dL 124*   CALCIUM mg/dL 8.3*       Assessment:  POD # 10 s/p MVR, LAAL  Back in NSR after cardioversion for A.flutter  TTE 9/10/22: moderate (1-2cm) pericardial effusion adjacent to the left ventricle. There is no evidence of cardiac tamponade      Plan:  Received 1 unit of PRBCs  Serial H&H  GI consult  TTE today to assess pericardial effusion  Diuresis  Continue to hold NOAC  Pulmonary toilet  PT/OT  Patient wants to go to inpatient rehab in [place] - not ready at this point    Lyle Chan MD  09/12/22  07:30 EDT

## 2022-09-12 NOTE — PLAN OF CARE
Goal Outcome Evaluation:  Plan of Care Reviewed With: patient        Progress: no change  Outcome Evaluation: Pt hallucinating hearing rock music playing in background. Pt easily reoriented. Pt A&Ox4. Pt having no complaints. Pt's stool positive for blood. GI consult ordered for AM. Pt got 1 unit of platelets this shift. In shift report, day RN informed night RN that KATHRINE Sarabia let her know to only give 1 unit of PRBC due to Hgb increasing to 7.7 without use of blood products. VSS. RN will continue to monitor.

## 2022-09-12 NOTE — CONSULTS
[facility] Gastroenterology Consult    Referring Provider: Mahendra Amador MD    PCP: Spencer Saeed MD    Reason for Consultation: GI bleed with melena     Chief complaint: Mitral valve regurgitation, significant fatigue      History of present illness:    Damián Myers is a 67 y.o. male who is admitted with symptomatic mitral valve regurgitation and underwent mitral valve replacement and left atrial appendage ligation on 9/2/2022 with Dr. Chan.   GI is consulted on post operative day 10 for concerns of melena and acute blood loss anemia.  He has been transfused 4 units of PRBCs, 5 units of platelets and 2 units of FFP since his procedure.   He had dark tarry stools overnight.  He has dyspepsia and mild nausea.   He denies any history of previous GI bleeding nor NSAID use.        Allergies:  Other    Scheduled Meds:  amiodarone, 200 mg, Oral, Q12H   Followed by  [START ON 9/26/2022] amiodarone, 200 mg, Oral, Daily  aspirin, 81 mg, Oral, Daily  atorvastatin, 40 mg, Oral, Nightly  insulin lispro, 0-7 Units, Subcutaneous, 4x Daily With Meals & Nightly  lidocaine, 1 patch, Transdermal, Q24H  metoprolol tartrate, 12.5 mg, Oral, Q12H  pantoprazole, 40 mg, Intravenous, BID AC  Pharmacy Consult, , Does not apply, Q24H  senna-docusate sodium, 2 tablet, Oral, BID  sodium chloride, 10 mL, Intravenous, Q12H  sodium chloride, 10 mL, Intravenous, Q8H         Infusions:       PRN Meds:  •  acetaminophen  •  aluminum-magnesium hydroxide-simethicone  •  bisacodyl  •  bisacodyl  •  cyclobenzaprine  •  dextrose  •  dextrose  •  docusate sodium  •  glucagon (human recombinant)  •  magnesium hydroxide  •  ondansetron  •  oxyCODONE-acetaminophen  •  polyethylene glycol  •  potassium chloride **OR** potassium chloride  •  senna-docusate sodium  •  simethicone    Home Meds:  Medications Prior to Admission   Medication Sig Dispense Refill Last Dose   • aspirin 81 MG EC tablet Take 81 mg by mouth Daily. Asa 81 x 4 @ 9/1/22 @ 2100   9/1/2022 at  2100   • carvedilol (COREG) 6.25 MG tablet Take 6.25 mg by mouth 2 (Two) Times a Day With Meals.   9/2/2022 at 0400   • carvedilol (COREG) 6.25 MG tablet Take 1 tablet by mouth 2 (Two) Times a Day With Meals for 30 days. 60 tablet 0      ROS: Review of Systems   Constitutional: Positive for fatigue.   HENT: Negative.    Eyes: Negative.    Respiratory: Negative.    Cardiovascular: Negative.    Gastrointestinal: Positive for blood in stool and nausea.   Endocrine: Negative.    Genitourinary: Negative.    Musculoskeletal: Negative.    Skin: Positive for pallor.   Allergic/Immunologic: Negative.    Neurological: Positive for dizziness and weakness.   Hematological: Negative.    Psychiatric/Behavioral: Negative.      PAST MED HX:  Past Medical History:   Diagnosis Date   • CAD (coronary artery disease)    • Cardiac abnormality    • CHF (congestive heart failure) (HCC)    • Coma of unknown cause (HCC) 1984    Pt states he was in a coma for one week, unknown cause   • COVID-19 10/25/2021   • Hypertension    • Mitral regurgitation    • Obesity (BMI 30-39.9) 11/04/2019   • Spinal headache 1984       PAST SURG HX:  Past Surgical History:   Procedure Laterality Date   • CARDIAC CATHETERIZATION N/A 11/08/2019    Procedure: Left Heart Cath;  Surgeon: Sherrell Hart MD;  Location:  [facility] INVASIVE LOCATION;  Service: Cardiology   • CARDIAC CATHETERIZATION N/A 06/27/2022    Procedure: Left Heart Cath;  Surgeon: Greyson Balderas MD;  [facility]  [facility]ATH INVASIVE LOCATION;  Service: Cardiology;  Laterality: N/A;   • COLONOSCOPY     • CORONARY STENT PLACEMENT  2019   • MITRAL VALVE REPAIR/REPLACEMENT N/A 9/2/2022    Procedure: MEDIAN STERNOTOMY MITRAL VALVE REPLACEMENT, LEFT ATRIAL APPENDAGE LIGATION AND KENNEY PER ANESTHESIA;  Surgeon: Lyle Chan MD;  Location: [facility] OR;  Service: Cardiothoracic;  Laterality: N/A;       FAM HX:  Family History   Problem Relation Age of Onset   • Heart valve disorder Mother    •  "Heart valve disorder Brother    • Heart valve disorder Maternal Grandfather    • Heart disease Maternal Grandfather    • Diabetes type I Son        SOC HX:  Social History     Socioeconomic History   • Marital status:    • Number of children: 7   Tobacco Use   • Smoking status: Never Smoker   • Smokeless tobacco: Never Used   Vaping Use   • Vaping Use: Never used   Substance and Sexual Activity   • Alcohol use: No   • Drug use: No   • Sexual activity: Defer       PHYSICAL EXAM  /67   Pulse 93   Temp 97.9 °F (36.6 °C) (Axillary)   Resp 20   Ht 190.5 cm (75\")   Wt 114 kg (251 lb 4.8 oz)   SpO2 96%   BMI 31.41 kg/m²   Wt Readings from Last 3 Encounters:   09/12/22 114 kg (251 lb 4.8 oz)   08/30/22 115 kg (254 lb 8.3 oz)   08/05/22 113 kg (250 lb)   ,body mass index is 31.41 kg/m².  Physical Exam  Constitutional:       General: He is not in acute distress.  HENT:      Head: Normocephalic and atraumatic.      Mouth/Throat:      Mouth: Mucous membranes are moist.   Eyes:      General: No scleral icterus.  Cardiovascular:      Rate and Rhythm: Normal rate and regular rhythm.   Pulmonary:      Effort: Pulmonary effort is normal. No respiratory distress.   Abdominal:      General: Bowel sounds are normal. There is no distension.      Palpations: Abdomen is soft.      Tenderness: There is no abdominal tenderness.   Musculoskeletal:      Right lower leg: Edema present.      Left lower leg: Edema present.   Skin:     Coloration: Skin is pale.      Comments: Healing median sternotomy incision    Neurological:      Mental Status: He is alert and oriented to person, place, and time.   Psychiatric:         Behavior: Behavior normal.       Results Review:   I reviewed the patient's new clinical results.    Lab Results   Component Value Date    WBC 14.71 (H) 09/12/2022    HGB 6.4 (C) 09/12/2022    HGB 7.7 (L) 09/11/2022    HGB 7.3 (L) 09/11/2022    HCT 19.6 (C) 09/12/2022    MCV 92.9 09/12/2022     " 09/12/2022     Lab Results   Component Value Date    INR 1.12 09/03/2022    INR 1.44 (H) 09/02/2022    INR 1.56 (H) 09/02/2022     Lab Results   Component Value Date    GLUCOSE 124 (H) 09/12/2022    BUN 44 (H) 09/12/2022    CREATININE 1.03 09/12/2022    EGFRIFNONA 87 10/29/2021    BCR 42.7 (H) 09/12/2022     09/12/2022    K 3.8 09/12/2022    CO2 39.0 (H) 09/12/2022    CALCIUM 8.3 (L) 09/12/2022    ALBUMIN 2.30 (L) 09/10/2022    ALKPHOS 45 09/10/2022    BILITOT 0.4 09/10/2022    ALT 9 09/10/2022    AST 24 09/10/2022       ASSESSMENTS/PLANS    1. Acute gastrointestinal bleed with melena  2. Acute blood loss anemia, symptomatic   3. Nausea without vomiting  4. Dyspepsia  5. Symptomatic mitral valve regurgitation s/p mitral valve replacement and left atrial appendage ligation, POD # 10    >>> Agree with additional transfusion of PRBCs this morning   >>> CTA Abdomen/Pelvis ordered, will follow up on results  >>> Suspect upper GI source of blood loss.    Recommend EGD this afternoon pending results of CTA   >>> IV Protonix 40 mg BID  >>> NPO     I discussed the patient's findings and my recommendations with patient and his wife whom is at bedside.      KATHRINE Aragon  09/12/22  09:24 EDT

## 2022-09-12 NOTE — PROGRESS NOTES
[facility] Medicine Services  PROGRESS NOTE    Patient Name: Damián Myers  : 1955  MRN: [id number]    Date of Admission: 2022  Primary Care Physician: Spencer Saeed MD    Subjective   Subjective     CC:  Post-op    HPI:  One bowel movement last night that was small and black per patient, fecal occult +. No other bowel movements since yesterday AM. No other signs of bleeding  Dry mouth this morning so minimally talkative    ROS:  Gen- No fevers, chills  CV- No chest pain, palpitations  Resp- No cough, dyspnea  GI- No N/V/D, abd pain    Objective   Objective     Vital Signs:   Temp:  [97.6 °F (36.4 °C)-98.6 °F (37 °C)] 98.6 °F (37 °C)  Heart Rate:  [] 102  Resp:  [12-20] 16  BP: ()/(44-94) 102/62  Flow (L/min):  [2-3] 3     Physical Exam:  Constitutional: Well appearing male up in chair, awake, alert  HENT: NCAT, mucous membranes moist  Respiratory: Clear to auscultation bilaterally, respiratory effort mildly increased  Cardiovascular: Irregularly irregular, no murmurs, rubs, or gallops, resolved LE edema  Gastrointestinal: Soft, nontender, not distended  Musculoskeletal: Muscle tone within normal limits, no joint effusions appreciated  Psychiatric: Appropriate affect, cooperative  Neurologic: Alert and oriented, facial movements symmetric and spontaneous movement of all 4 extremities grossly equal bilaterally, speech clear  Skin: No rashes    Results Reviewed: ECHO repeat without change in size of pericardial effusion (no tamponade). Hbg dropped again, now receiving 5th unit pRBCs  LAB RESULTS:      Lab 22  0436 22  1724 22  1242 22  1115 22  0320 09/10/22  1422 09/10/22  1008 22  0535 22  0305 22  0341   WBC 14.71*  --   --   --   --   --  7.27 6.35 6.82 8.96   HEMOGLOBIN 6.4* 7.7* 7.3* 6.8* 7.0*   < > 6.7* 8.8* 8.8* 8.4*   HEMATOCRIT 19.6* 23.3* 21.3* 21.0* 21.0*   < > 21.0* 27.8* 27.0* 26.6*   PLATELETS 262  --   --    --   --   --  209 202 182 154   NEUTROS ABS  --   --   --   --   --   --  5.17  --   --   --    IMMATURE GRANS (ABS)  --   --   --   --   --   --  0.10*  --   --   --    LYMPHS ABS  --   --   --   --   --   --  1.16  --   --   --    MONOS ABS  --   --   --   --   --   --  0.68  --   --   --    EOS ABS  --   --   --   --   --   --  0.12  --   --   --    MCV 92.9  --   --   --   --   --  96.3 94.9 93.1 95.7   PROCALCITONIN 0.12  --   --   --   --   --   --   --   --   --     < > = values in this interval not displayed.         Lab 09/12/22  0436 09/10/22  1422 09/09/22  0535 09/08/22  0305 09/07/22  0341 09/06/22  0212 09/05/22 2024   SODIUM 144 143 142 139 137 137 137   POTASSIUM 3.8 4.1 3.5 3.9 4.3 4.8 5.5*   CHLORIDE 99 101 97* 95* 96* 96* 96*   CO2 39.0* 36.0* 41.0* 38.0* 32.0* 34.0* 32.0*   ANION GAP 6.0 6.0 4.0* 6.0 9.0 7.0 9.0   BUN 44* 46* 30* 36* 45* 44* 43*   CREATININE 1.03 1.17 1.16 1.25 1.47* 1.83* 2.02*   EGFR 79.6 68.3 69.0 63.1 52.0* 39.9* 35.7*   GLUCOSE 124* 132* 106* 115* 138* 150* 138*   CALCIUM 8.3* 8.1* 8.9 8.7 8.7 9.0 8.8   IONIZED CALCIUM  --   --   --   --   --   --  1.21   MAGNESIUM  --   --  2.1 2.2 2.3 2.3 2.4   PHOSPHORUS  --   --  4.1 2.7 3.5 3.7 4.7*         Lab 09/10/22  1422 09/05/22 2024   TOTAL PROTEIN 4.7*  --    ALBUMIN 2.30* 3.50   GLOBULIN 2.4  --    ALT (SGPT) 9  --    AST (SGOT) 24  --    BILIRUBIN 0.4  --    ALK PHOS 45  --                  Lab 09/10/22  1422   FERRITIN 365.40   ABO TYPING O   RH TYPING Negative   ANTIBODY SCREEN Negative         Lab 09/08/22  0614 09/07/22  0339 09/06/22  1411   PH, ARTERIAL 7.419 7.335* 7.323*   PCO2, ARTERIAL 59.5* 67.1* 69.1*   PO2 ART 74.8* 108.0 85.1   FIO2 28 35 35   HCO3 ART 38.4* 35.8* 35.8*   BASE EXCESS ART 12.3* 8.5* 8.2*   CARBOXYHEMOGLOBIN 1.3 0.8 1.2     Brief Urine Lab Results  (Last result in the past 365 days)      Color   Clarity   Blood   Leuk Est   Nitrite   Protein   CREAT   Urine HCG        09/12/22 0818 Yellow    Clear   Negative   Negative   Negative   Negative                 Microbiology Results Abnormal     None          Adult Transthoracic Echo Complete W/ Cont if Necessary Per Protocol    Result Date: 9/10/2022  · Left ventricular systolic function is normal. Estimated left ventricular EF = 55%. · Left atrial volume is moderately increased. · A 33 mm Medtronic Magna Ease bioprosthetic mitral valve is present. The mean gradient across the valve is 8 mmHg. There is no regurgitation. · There is a moderate (1-2cm) pericardial effusion adjacent to the left ventricle. There is no evidence of cardiac tamponade. · A left pleural effusion is present.      XR Chest 1 View    Result Date: 9/12/2022  Examination: XR CHEST 1 VW-  Date of Exam: 9/12/2022 4:18 AM  Indication: s/p MVR; I34.0-Nonrheumatic mitral (valve) insufficiency; I34.0-Nonrheumatic mitral (valve) insufficiency; I48.0-Paroxysmal atrial fibrillation; E78.5-Hyperlipidemia, unspecified.  Comparison: Radiograph 09/11/2022, 09/10/2022  Technique: 1 view of the chest  Findings: There are no airspace consolidations. Small bilateral pleural effusions are present. The lung volumes are low.. No pneumothorax. The heart size is normal. The pulmonary vasculature appears mildly indistinct. Median sternotomy wires are present. No acute osseous abnormality identified.      Impression: Mild pulmonary edema pattern with small bilateral pleural effusions, stable as compared to the previous study.  This report was finalized on 9/12/2022 7:30 AM by Tiffanie Ochoa MD.      XR Chest 1 View    Result Date: 9/11/2022  Examination: XR CHEST 1 VW-  Date of Exam: 9/11/2022 5:17 PM  Indication: s/p MVR; I34.0-Nonrheumatic mitral (valve) insufficiency; I34.0-Nonrheumatic mitral (valve) insufficiency; I48.0-Paroxysmal atrial fibrillation; E78.5-Hyperlipidemia, unspecified.  Comparison: 09/10/2022.  Technique: Single radiographic view of the chest was obtained.  Findings: There is stable moderate  right pleural effusion. Stable small left pleural effusion. No pneumothorax or new lung opacity. There is evidence of prior median sternotomy, CABG and heart valve replacement. Cardiac silhouette is unchanged.      Impression: Stable moderate right and small left pleural effusions and stable postoperative findings.  This report was finalized on 9/11/2022 5:52 PM by Cody James MD.      XR Chest 1 View    Result Date: 9/10/2022  DATE OF EXAM: 9/10/2022 5:24 PM  PROCEDURE: XR CHEST 1 VW-  INDICATIONS: lightheadedness/syncope; I34.0-Nonrheumatic mitral (valve) insufficiency; I34.0-Nonrheumatic mitral (valve) insufficiency; I48.0-Paroxysmal atrial fibrillation; E78.5-Hyperlipidemia, unspecified  COMPARISON: 9/7/2022  TECHNIQUE: Single radiographic AP view of the chest was obtained.  FINDINGS: Sternotomy wires, mitral valve and left atrial appendage exclusion clip are again noted. The heart remains enlarged. The vasculature, however, appears normal. There is mild remaining bibasilar effusion and atelectasis, improved on the left. No new pulmonary parenchymal disease is seen. NG tube and right IJ sheath have been removed.      Impression: Improving left basilar atelectasis/effusion.  This report was finalized on 9/10/2022 10:32 PM by Dr. Gonzales Herman MD.      Adult Transthoracic Echo Limited W/ Cont if Necessary Per Protocol    Result Date: 9/12/2022  · Left ventricular ejection fraction appears to be 56 - 60%. Left ventricular systolic function is normal. · Small to moderate pericardial effusion located next to the LV, essentially unchanged from 9/10/2022 · There is a left pleural effusion. · No evidence of tamponade        Results for orders placed during the hospital encounter of 09/02/22    Adult Transthoracic Echo Limited W/ Cont if Necessary Per Protocol    Interpretation Summary  · Left ventricular ejection fraction appears to be 56 - 60%. Left ventricular systolic function is normal.  · Small to moderate  pericardial effusion located next to the LV, essentially unchanged from 9/10/2022  · There is a left pleural effusion.  · No evidence of tamponade      I have reviewed the medications:  Scheduled Meds:amiodarone, 200 mg, Oral, Q12H   Followed by  [START ON 9/26/2022] amiodarone, 200 mg, Oral, Daily  aspirin, 81 mg, Oral, Daily  atorvastatin, 40 mg, Oral, Nightly  insulin lispro, 0-7 Units, Subcutaneous, 4x Daily With Meals & Nightly  lidocaine, 1 patch, Transdermal, Q24H  metoprolol tartrate, 12.5 mg, Oral, Q12H  pantoprazole, 40 mg, Intravenous, BID AC  Pharmacy Consult, , Does not apply, Q24H  senna-docusate sodium, 2 tablet, Oral, BID  sodium chloride, 10 mL, Intravenous, Q12H  sodium chloride, 10 mL, Intravenous, Q8H      Continuous Infusions:   PRN Meds:.•  acetaminophen  •  aluminum-magnesium hydroxide-simethicone  •  bisacodyl  •  bisacodyl  •  cyclobenzaprine  •  dextrose  •  dextrose  •  docusate sodium  •  glucagon (human recombinant)  •  magnesium hydroxide  •  ondansetron  •  oxyCODONE-acetaminophen  •  polyethylene glycol  •  potassium chloride **OR** potassium chloride  •  senna-docusate sodium  •  simethicone    Assessment & Plan   Assessment & Plan     Active Hospital Problems    Diagnosis  POA   • **Severe mitral regurgitation status post bioprosthetic MVR, 9/2/2022 [I34.0]  Yes   • Post-op pericardial effusion [I31.3]  No   • Paroxysmal atrial fibrillation (HCC) [I48.0]  No   • Diastolic CHF due to valvular disease (HCC) [I50.30, I38]  Yes   • Hyperlipidemia LDL goal <70 [E78.5]  Yes   • Essential hypertension [I10]  Yes   • Coronary artery disease involving native coronary artery of native heart with angina pectoris (HCC) [I25.119]  Yes   • Obesity (BMI 30-39.9) [E66.9]  Yes      Resolved Hospital Problems    Diagnosis Date Resolved POA   • Chronic systolic heart failure (HCC) [I50.22] 09/09/2022 Yes        Brief Hospital Course to date:  Damián Myers is a 67 y.o. male with PMH hypertension,  congestive heart failure and coronary artery disease who presents to the ICU status post MVR with Dr. Chan. Patient ultimately underwent MV replacement and left atrial appendage ligation with Dr Chan. Patient was placed in the ICU post operatively and ultimately extubated 9/3. Post-op course complicated by afib which cardiology follows. Patient transitioned to floor 9/9 with hospital team consulting.  Drop in Hbg 9/10 acutely w lower blood pressures, continued downtrend in Hbg despite transfusions with fecal occult + bowel movement 9/11 overnight. Awaiting EGD evaluation today     1) Acute blood loss anemia, likely GI source  - hold eliquis  - IV ppi BID  - CTA abdomen/pelvis  - receiving 2 units pRBCs this morning, will need repeat H&H in pm  - NPO pending endoscopic evaluation with GI. Will f/u GI scope results    2) Leukocytosis - new  -likely reactive in setting of acute anemia and blood loss as in #1. Blood cultures pending, but procal negative and no other source for infection identified. Will hold abx for now and continue blood resuscitation    3) Pericardial effusion - stable on repeat ECHO 9/12, no signs of tamponade    MVR/LAAL with Dr Chan  CAD/CHF  --management post op per CTS  --CTX removed  --pain control as needed, IS/ambulate  -- would recommend caution with diuresis in setting of #1     Afib/flutter post operatively  --was started on amio protocol and kishor had successful KENNEY/ECV 9/7  --on amio. Hold eliquis as above  --f/u per cardiology     HLD --continue statin  RLS - patient declines medication, ferritin within normal limits, no further w/u    **Called by GI attending in PM and discussed by phone. Multiple ulcers found on EGD, cancel CTA abdomen/pelvis. Continue IV PPI bid, H&H rechecks per CT surgery (can de-escalate after next check).     DVT prophylaxis:  Mechanical DVT prophylaxis orders are present.      Hospital Medicine will continue to follow closely    AM-PAC 6 Clicks Score  (PT): 17 (09/12/22 0815)    CODE STATUS:   Code Status and Medical Interventions:   Ordered at: 09/02/22 1359     Code Status (Patient has no pulse and is not breathing):    CPR (Attempt to Resuscitate)     Medical Interventions (Patient has pulse or is breathing):    Full Support     Release to patient:    Routine Release       Louise Leal MD  09/12/22

## 2022-09-13 ENCOUNTER — APPOINTMENT (OUTPATIENT)
Dept: GENERAL RADIOLOGY | Facility: HOSPITAL | Age: 67
End: 2022-09-13

## 2022-09-13 LAB
ANION GAP SERPL CALCULATED.3IONS-SCNC: 7 MMOL/L (ref 5–15)
BH BB BLOOD EXPIRATION DATE: NORMAL
BH BB BLOOD TYPE BARCODE: 9500
BH BB DISPENSE STATUS: NORMAL
BH BB PRODUCT CODE: NORMAL
BH BB UNIT NUMBER: NORMAL
BUN SERPL-MCNC: 27 MG/DL (ref 8–23)
BUN/CREAT SERPL: 26 (ref 7–25)
CALCIUM SPEC-SCNC: 8.3 MG/DL (ref 8.6–10.5)
CHLORIDE SERPL-SCNC: 99 MMOL/L (ref 98–107)
CO2 SERPL-SCNC: 38 MMOL/L (ref 22–29)
CREAT SERPL-MCNC: 1.04 MG/DL (ref 0.76–1.27)
CROSSMATCH INTERPRETATION: NORMAL
DEPRECATED RDW RBC AUTO: 51.6 FL (ref 37–54)
EGFRCR SERPLBLD CKD-EPI 2021: 78.7 ML/MIN/1.73
ERYTHROCYTE [DISTWIDTH] IN BLOOD BY AUTOMATED COUNT: 17 % (ref 12.3–15.4)
GLUCOSE BLDC GLUCOMTR-MCNC: 115 MG/DL (ref 70–130)
GLUCOSE BLDC GLUCOMTR-MCNC: 123 MG/DL (ref 70–130)
GLUCOSE BLDC GLUCOMTR-MCNC: 140 MG/DL (ref 70–130)
GLUCOSE BLDC GLUCOMTR-MCNC: 156 MG/DL (ref 70–130)
GLUCOSE SERPL-MCNC: 112 MG/DL (ref 65–99)
HCT VFR BLD AUTO: 23.9 % (ref 37.5–51)
HCT VFR BLD AUTO: 24.7 % (ref 37.5–51)
HCT VFR BLD AUTO: 25.1 % (ref 37.5–51)
HCT VFR BLD AUTO: 25.1 % (ref 37.5–51)
HGB BLD-MCNC: 7.7 G/DL (ref 13–17.7)
HGB BLD-MCNC: 7.9 G/DL (ref 13–17.7)
HGB BLD-MCNC: 8.1 G/DL (ref 13–17.7)
HGB BLD-MCNC: 8.1 G/DL (ref 13–17.7)
MCH RBC QN AUTO: 30.7 PG (ref 26.6–33)
MCHC RBC AUTO-ENTMCNC: 32.3 G/DL (ref 31.5–35.7)
MCV RBC AUTO: 95.1 FL (ref 79–97)
PLATELET # BLD AUTO: 259 10*3/MM3 (ref 140–450)
PMV BLD AUTO: 10.3 FL (ref 6–12)
POTASSIUM SERPL-SCNC: 3.9 MMOL/L (ref 3.5–5.2)
RBC # BLD AUTO: 2.64 10*6/MM3 (ref 4.14–5.8)
SODIUM SERPL-SCNC: 144 MMOL/L (ref 136–145)
UNIT  ABO: NORMAL
UNIT  RH: NORMAL
WBC NRBC COR # BLD: 12.38 10*3/MM3 (ref 3.4–10.8)

## 2022-09-13 PROCEDURE — 82962 GLUCOSE BLOOD TEST: CPT

## 2022-09-13 PROCEDURE — 99232 SBSQ HOSP IP/OBS MODERATE 35: CPT | Performed by: INTERNAL MEDICINE

## 2022-09-13 PROCEDURE — 85018 HEMOGLOBIN: CPT | Performed by: INTERNAL MEDICINE

## 2022-09-13 PROCEDURE — 71045 X-RAY EXAM CHEST 1 VIEW: CPT

## 2022-09-13 PROCEDURE — 85027 COMPLETE CBC AUTOMATED: CPT | Performed by: INTERNAL MEDICINE

## 2022-09-13 PROCEDURE — 85014 HEMATOCRIT: CPT | Performed by: INTERNAL MEDICINE

## 2022-09-13 PROCEDURE — 80048 BASIC METABOLIC PNL TOTAL CA: CPT | Performed by: INTERNAL MEDICINE

## 2022-09-13 RX ADMIN — Medication 10 ML: at 23:32

## 2022-09-13 RX ADMIN — OXYCODONE HYDROCHLORIDE AND ACETAMINOPHEN 1 TABLET: 5; 325 TABLET ORAL at 23:29

## 2022-09-13 RX ADMIN — ASPIRIN 81 MG: 81 TABLET, COATED ORAL at 08:34

## 2022-09-13 RX ADMIN — OXYCODONE HYDROCHLORIDE AND ACETAMINOPHEN 1 TABLET: 5; 325 TABLET ORAL at 14:05

## 2022-09-13 RX ADMIN — AMIODARONE HYDROCHLORIDE 200 MG: 200 TABLET ORAL at 11:52

## 2022-09-13 RX ADMIN — METOPROLOL TARTRATE 12.5 MG: 25 TABLET, FILM COATED ORAL at 20:48

## 2022-09-13 RX ADMIN — ACETAMINOPHEN 500 MG: 500 TABLET, FILM COATED ORAL at 02:36

## 2022-09-13 RX ADMIN — PANTOPRAZOLE SODIUM 40 MG: 40 INJECTION, POWDER, FOR SOLUTION INTRAVENOUS at 05:36

## 2022-09-13 RX ADMIN — SENNOSIDES AND DOCUSATE SODIUM 2 TABLET: 50; 8.6 TABLET ORAL at 08:34

## 2022-09-13 RX ADMIN — Medication 10 ML: at 20:53

## 2022-09-13 RX ADMIN — METOPROLOL TARTRATE 12.5 MG: 25 TABLET, FILM COATED ORAL at 08:34

## 2022-09-13 RX ADMIN — PANTOPRAZOLE SODIUM 40 MG: 40 INJECTION, POWDER, FOR SOLUTION INTRAVENOUS at 16:50

## 2022-09-13 RX ADMIN — SENNOSIDES AND DOCUSATE SODIUM 2 TABLET: 50; 8.6 TABLET ORAL at 20:44

## 2022-09-13 RX ADMIN — AMIODARONE HYDROCHLORIDE 200 MG: 200 TABLET ORAL at 23:29

## 2022-09-13 RX ADMIN — ATORVASTATIN CALCIUM 40 MG: 40 TABLET, FILM COATED ORAL at 20:44

## 2022-09-13 RX ADMIN — Medication 10 ML: at 08:34

## 2022-09-13 NOTE — CASE MANAGEMENT/SOCIAL WORK
Continued Stay Note  [facility]     Patient Name: Damián Myers  MRN: [id number]  Today's Date: 9/13/2022    Admit Date: 9/2/2022     Discharge Plan     Row Name 09/13/22 1612       Plan    Plan Rehab @ [facility]    Patient/Family in Agreement with Plan yes    Plan Comments Per Nataliya @ [facility] MD there will review chart too thru Epic, informed her that I have a 0930 ambulance for Wednesday to transport, if any issues, spouse is agreeable to transport. CM will get loaner O2 tank for d/c if  any delay. H&H in am as well.     Final Discharge Disposition Code 62 - inpatient rehab facility               Discharge Codes    No documentation.               Expected Discharge Date and Time     Expected Discharge Date Expected Discharge Time    Sep 14, 2022             Herb Mckinney RN

## 2022-09-13 NOTE — PROGRESS NOTES
[facility] Medicine Services  PROGRESS NOTE    Patient Name: Damián Myers  : 1955  MRN: [id number]    Date of Admission: 2022  Primary Care Physician: Spencer Saeed MD    Subjective   Subjective     CC:  Post-op    HPI:  EGD yesterday w ulcers present. Small darker bowel movement x 1 since EGD  Feels much improved. Anxious for discharge when able.  Afib today with rate ~100's while sitting. Patient asymptomatic and walking without lightheadedness or dizziness    ROS:  Gen- No fevers, chills  CV- No chest pain, palpitations  Resp- No cough, dyspnea  GI- No N/V/D, abd pain    Objective   Objective     Vital Signs:   Temp:  [97.3 °F (36.3 °C)-99.4 °F (37.4 °C)] 98.2 °F (36.8 °C)  Heart Rate:  [] 96  Resp:  [18-20] 18  BP: ()/(50-98) 124/98  Flow (L/min):  [2-6] 2     Physical Exam:  Constitutional: Well appearing male up in bed, awake, alert. Wife bedside  HENT: NCAT, mucous membranes moist  Respiratory: Clear to auscultation bilaterally, respiratory effort mildly increased  Cardiovascular: Irregularly irregular and tachy, no murmurs, rubs, or gallops, resolved LE edema  Gastrointestinal: Soft, nontender, not distended  Musculoskeletal: Muscle tone within normal limits, no joint effusions appreciated  Psychiatric: Appropriate affect, cooperative  Neurologic: Alert and oriented, facial movements symmetric and spontaneous movement of all 4 extremities grossly equal bilaterally, speech clear  Skin: No rashes    Results Reviewed: Hbg stabilized since EGD ~8, EGD results reviewed  LAB RESULTS:      Lab 22  1204 22  0455 22  0027 22  1903 22  1250 22  0436 09/10/22  1422 09/10/22  1008 22  0535 22  0305   WBC  --  12.38*  --   --   --  14.71*  --  7.27 6.35 6.82   HEMOGLOBIN 7.9* 8.1*  8.1* 7.7* 8.6* 8.3*  8.2* 6.4*   < > 6.7* 8.8* 8.8*   HEMATOCRIT 24.7* 25.1*  25.1* 23.9* 25.8* 25.4*  25.2* 19.6*   < > 21.0* 27.8* 27.0*    PLATELETS  --  259  --   --   --  262  --  209 202 182   NEUTROS ABS  --   --   --   --   --   --   --  5.17  --   --    IMMATURE GRANS (ABS)  --   --   --   --   --   --   --  0.10*  --   --    LYMPHS ABS  --   --   --   --   --   --   --  1.16  --   --    MONOS ABS  --   --   --   --   --   --   --  0.68  --   --    EOS ABS  --   --   --   --   --   --   --  0.12  --   --    MCV  --  95.1  --   --   --  92.9  --  96.3 94.9 93.1   PROCALCITONIN  --   --   --   --   --  0.12  --   --   --   --    LACTATE  --   --   --   --  1.0  --   --   --   --   --     < > = values in this interval not displayed.         Lab 09/13/22  0455 09/12/22  0436 09/10/22  1422 09/09/22  0535 09/08/22  0305 09/07/22  0341   SODIUM 144 144 143 142 139 137   POTASSIUM 3.9 3.8 4.1 3.5 3.9 4.3   CHLORIDE 99 99 101 97* 95* 96*   CO2 38.0* 39.0* 36.0* 41.0* 38.0* 32.0*   ANION GAP 7.0 6.0 6.0 4.0* 6.0 9.0   BUN 27* 44* 46* 30* 36* 45*   CREATININE 1.04 1.03 1.17 1.16 1.25 1.47*   EGFR 78.7 79.6 68.3 69.0 63.1 52.0*   GLUCOSE 112* 124* 132* 106* 115* 138*   CALCIUM 8.3* 8.3* 8.1* 8.9 8.7 8.7   MAGNESIUM  --   --   --  2.1 2.2 2.3   PHOSPHORUS  --   --   --  4.1 2.7 3.5         Lab 09/10/22  1422   TOTAL PROTEIN 4.7*   ALBUMIN 2.30*   GLOBULIN 2.4   ALT (SGPT) 9   AST (SGOT) 24   BILIRUBIN 0.4   ALK PHOS 45                 Lab 09/10/22  1422   FERRITIN 365.40   ABO TYPING O   RH TYPING Negative   ANTIBODY SCREEN Negative         Lab 09/08/22  0614 09/07/22  0339   PH, ARTERIAL 7.419 7.335*   PCO2, ARTERIAL 59.5* 67.1*   PO2 ART 74.8* 108.0   FIO2 28 35   HCO3 ART 38.4* 35.8*   BASE EXCESS ART 12.3* 8.5*   CARBOXYHEMOGLOBIN 1.3 0.8     Brief Urine Lab Results  (Last result in the past 365 days)      Color   Clarity   Blood   Leuk Est   Nitrite   Protein   CREAT   Urine HCG        09/12/22 0818 Yellow   Clear   Negative   Negative   Negative   Negative                 Microbiology Results Abnormal     Procedure Component Value - Date/Time     Blood Culture - Blood, Arm, Right [[id number]]  (Normal) Collected: 09/12/22 1250    Lab Status: Preliminary result Specimen: Blood from Arm, Right Updated: 09/13/22 1318     Blood Culture No growth at 24 hours    Blood Culture - Blood, Wrist, Right [[id number]]  (Normal) Collected: 09/12/22 1250    Lab Status: Preliminary result Specimen: Blood from Wrist, Right Updated: 09/13/22 1318     Blood Culture No growth at 24 hours          XR Chest 1 View    Result Date: 9/13/2022  DATE OF EXAM: 9/13/2022 4:34 AM  PROCEDURE: XR CHEST 1 VW-  INDICATIONS: s/p MVR; I34.0-Nonrheumatic mitral (valve) insufficiency; I34.0-Nonrheumatic mitral (valve) insufficiency; I48.0-Paroxysmal atrial fibrillation; E78.5-Hyperlipidemia, unspecified; K92.1-Melena; K25.0-Acute gastric ulcer with hemorrhage   COMPARISON: AP portable chest 09/12/2022.  TECHNIQUE: Single radiographic view of the chest was obtained.  FINDINGS: Mild bibasilar airspace disease favored to represent atelectasis with small bilateral pleural effusions, without significant change. Stable moderate cardiac enlargement with valve replacement and left atrial appendage ligation. Mild central vascular congestive changes are present, without overt features of edema. No pneumothorax is identified. There are no acute osseous abnormalities.      Impression:  1. Stable small bilateral pleural effusions and bibasilar airspace disease, favored to represent atelectasis. 2. Mild central vascular congestive changes without overt edema.  This report was finalized on 9/13/2022 7:33 AM by Grace Becerra MD.      XR Chest 1 View    Result Date: 9/12/2022  Examination: XR CHEST 1 VW-  Date of Exam: 9/12/2022 4:18 AM  Indication: s/p MVR; I34.0-Nonrheumatic mitral (valve) insufficiency; I34.0-Nonrheumatic mitral (valve) insufficiency; I48.0-Paroxysmal atrial fibrillation; E78.5-Hyperlipidemia, unspecified.  Comparison: Radiograph 09/11/2022, 09/10/2022  Technique: 1 view of the chest  Findings:  There are no airspace consolidations. Small bilateral pleural effusions are present. The lung volumes are low.. No pneumothorax. The heart size is normal. The pulmonary vasculature appears mildly indistinct. Median sternotomy wires are present. No acute osseous abnormality identified.      Impression: Mild pulmonary edema pattern with small bilateral pleural effusions, stable as compared to the previous study.  This report was finalized on 9/12/2022 7:30 AM by Tiffanie Ochoa MD.      XR Chest 1 View    Result Date: 9/11/2022  Examination: XR CHEST 1 VW-  Date of Exam: 9/11/2022 5:17 PM  Indication: s/p MVR; I34.0-Nonrheumatic mitral (valve) insufficiency; I34.0-Nonrheumatic mitral (valve) insufficiency; I48.0-Paroxysmal atrial fibrillation; E78.5-Hyperlipidemia, unspecified.  Comparison: 09/10/2022.  Technique: Single radiographic view of the chest was obtained.  Findings: There is stable moderate right pleural effusion. Stable small left pleural effusion. No pneumothorax or new lung opacity. There is evidence of prior median sternotomy, CABG and heart valve replacement. Cardiac silhouette is unchanged.      Impression: Stable moderate right and small left pleural effusions and stable postoperative findings.  This report was finalized on 9/11/2022 5:52 PM by Cody James MD.      Adult Transthoracic Echo Limited W/ Cont if Necessary Per Protocol    Result Date: 9/12/2022  · Left ventricular ejection fraction appears to be 56 - 60%. Left ventricular systolic function is normal. · Small to moderate pericardial effusion located next to the LV, essentially unchanged from 9/10/2022 · There is a left pleural effusion. · No evidence of tamponade        Results for orders placed during the hospital encounter of 09/02/22    Adult Transthoracic Echo Limited W/ Cont if Necessary Per Protocol    Interpretation Summary  · Left ventricular ejection fraction appears to be 56 - 60%. Left ventricular systolic function is normal.  ·  Small to moderate pericardial effusion located next to the LV, essentially unchanged from 9/10/2022  · There is a left pleural effusion.  · No evidence of tamponade      I have reviewed the medications:  Scheduled Meds:amiodarone, 200 mg, Oral, Q12H   Followed by  [START ON 9/26/2022] amiodarone, 200 mg, Oral, Daily  aspirin, 81 mg, Oral, Daily  atorvastatin, 40 mg, Oral, Nightly  insulin lispro, 0-7 Units, Subcutaneous, 4x Daily With Meals & Nightly  lidocaine, 1 patch, Transdermal, Q24H  metoprolol tartrate, 12.5 mg, Oral, Q12H  pantoprazole, 40 mg, Intravenous, BID AC  Pharmacy Consult, , Does not apply, Q24H  senna-docusate sodium, 2 tablet, Oral, BID  sodium chloride, 10 mL, Intravenous, Q12H  sodium chloride, 10 mL, Intravenous, Q8H      Continuous Infusions:   PRN Meds:.•  acetaminophen  •  aluminum-magnesium hydroxide-simethicone  •  bisacodyl  •  bisacodyl  •  cyclobenzaprine  •  dextrose  •  dextrose  •  docusate sodium  •  glucagon (human recombinant)  •  magnesium hydroxide  •  ondansetron  •  oxyCODONE-acetaminophen  •  polyethylene glycol  •  potassium chloride **OR** potassium chloride  •  senna-docusate sodium  •  simethicone    Assessment & Plan   Assessment & Plan     Active Hospital Problems    Diagnosis  POA   • **Severe mitral regurgitation status post bioprosthetic MVR, 9/2/2022 [I34.0]  Yes   • Post-op pericardial effusion [I31.3]  No   • Paroxysmal atrial fibrillation (HCC) [I48.0]  No   • Diastolic CHF due to valvular disease (HCC) [I50.30, I38]  Yes   • Hyperlipidemia LDL goal <70 [E78.5]  Yes   • Essential hypertension [I10]  Yes   • Coronary artery disease involving native coronary artery of native heart with angina pectoris (HCC) [I25.119]  Yes   • Obesity (BMI 30-39.9) [E66.9]  Yes      Resolved Hospital Problems    Diagnosis Date Resolved POA   • Gastrointestinal hemorrhage with melena [K92.1] 09/12/2022 Unknown   • Chronic systolic heart failure (HCC) [I50.22] 09/09/2022 Yes         Brief Hospital Course to date:  Damián Myers is a 67 y.o. male with PMH hypertension, congestive heart failure and coronary artery disease who presents to the ICU status post MVR with Dr. Chan. Patient ultimately underwent MV replacement and left atrial appendage ligation with Dr Chan. Patient was placed in the ICU post operatively and ultimately extubated 9/3. Post-op course complicated by afib which cardiology follows. Patient transitioned to floor 9/9 with hospital team consulting.  Drop in Hbg 9/10 acutely, 2/2 multiple ulcers after EGD evaluation, now stabilized. Afib with borderline rate controlled (rate  sitting), defer to cardiology     1) Acute blood loss anemia 2/2 gastric ulcer x 4, pyloric ulcer x 1 s/p epinephrine injection  - EGD w Dr Fitzpatrick on 9/12  - hold eliquis. Likely PO PPI bid x 4 weeks, then once daily w repeat EGD in 8 weeks to f/u healing. Verifying with GI  - H&H now stabilized, repeat in AM    2) Pericardial effusion - stable on repeat ECHO 9/12, no signs of tamponade    3) Post-operative Afib/Aflutter  -was started on amio protocol, s/p KENNEY/ECV 9/7, now with recurrence  -Borderline rate control. Metoprolol titration per cardiology  -On amio. Hold eliquis as above    MVR/LAAL with Dr Chan  CAD/CHF  --management post op per CTS  --CTX removed  --pain control as needed, IS/ambulate  -- would recommend caution with diuresis in setting of #1     HLD --continue statin  RLS - patient declines medication, ferritin within normal limits, no further w/u     DVT prophylaxis:  Mechanical DVT prophylaxis orders are present.      Hospital Medicine will continue to follow closely    AM-PAC 6 Clicks Score (PT): 19 (09/13/22 2387)    CODE STATUS:   Code Status and Medical Interventions:   Ordered at: 09/02/22 0249     Code Status (Patient has no pulse and is not breathing):    CPR (Attempt to Resuscitate)     Medical Interventions (Patient has pulse or is breathing):    Full Support     Release to  patient:    Routine Release       Louise Leal MD  09/13/22

## 2022-09-13 NOTE — PROGRESS NOTES
Cardiothoracic Surgery Progress Note      POD # 11 s/p MVR, LAAL     LOS: 11 days      Subjective:  Feeling better today.  No acute events. Underwent EGD yesterday with GI.  Denies chest pain, shortness of breath, or melena.  On 3 L nasal cannula saturations 97%.     Objective:  Vital Signs  Temp:  [97.3 °F (36.3 °C)-99.4 °F (37.4 °C)] 98.2 °F (36.8 °C)  Heart Rate:  [] 105  Resp:  [16-20] 18  BP: ()/(50-95) 99/61    Physical Exam:  General Appearance: No acute distress  CV: Irregularly irregular  Respiratory: Decreased bilaterally, but clear to auscultation; no wheezes rales, rubs  Extremities: Warm, 1+ BLE  Sternum: Stable incisions, healing well; clean, dry and intact      Results:    Results from last 7 days   Lab Units 09/13/22  0455   WBC 10*3/mm3 12.38*   HEMOGLOBIN g/dL 8.1*  8.1*   HEMATOCRIT % 25.1*  25.1*   PLATELETS 10*3/mm3 259     Results from last 7 days   Lab Units 09/13/22  0455   SODIUM mmol/L 144   POTASSIUM mmol/L 3.9   CHLORIDE mmol/L 99   CO2 mmol/L 38.0*   BUN mg/dL 27*   CREATININE mg/dL 1.04   GLUCOSE mg/dL 112*   CALCIUM mg/dL 8.3*       Assessment:  POD # 11 s/p MVR, LAAL  Back in NSR after cardioversion for A.flutter  TTE 9/10/22: moderate (1-2cm) pericardial effusion adjacent to the left ventricle. There is no evidence of cardiac tamponade      Plan:  Serial H&H-stable (H&H 8.1/25.1)  Continue to hold NOAC  Continue BID PPI  Will need repeat EGD in 8 weeks per GI  Pulmonary toilet  PT/OT  Patient wants to go to inpatient rehab in [place] - likely ready tomorrow if Hct stable    Lyle Chan MD  09/13/22  07:25 EDT

## 2022-09-13 NOTE — PROGRESS NOTES
"  [facility]  PROGRESS NOTE    Date of Admission: 9/2/2022  Date of Service: 09/13/22    Primary Care Physician: Spencer Saeed MD    Chief Complaint: f/u PAF, hypotension, anemia   Problem List:   Severe mitral regurgitation status post bioprosthetic MVR, 9/2/2022    Obesity (BMI 30-39.9)    Coronary artery disease involving native coronary artery of native heart with angina pectoris (HCC)    Essential hypertension    Hyperlipidemia LDL goal <70    Diastolic CHF due to valvular disease (HCC)    Paroxysmal atrial fibrillation (HCC)    Post-op pericardial effusion      Subjective      Feels better today, denies chest pain or palpitations. Has ambulated, remains on O2.  Rate controlled A. fib, although goes up to about 120 when he ambulates which is expected.    Objective   Vitals: BP 99/61 (BP Location: Right arm, Patient Position: Sitting)   Pulse 105   Temp 98.2 °F (36.8 °C) (Oral)   Resp 18   Ht 190.5 cm (75\")   Wt 121 kg (267 lb 8 oz)   SpO2 98%   BMI 33.44 kg/m²     Physical Exam:  General Appearance:   · well developed  · well nourished  Neck:  · thyroid not enlarged  · supple  Respiratory:  · no respiratory distress  · normal breath sounds  · no rales  Cardiovascular:  · no jugular venous distention  · Irregular irregular rhythm  · apical impulse normal  · S1 normal, S2 normal  · no S3, no S4   · no murmur  · no rub, no thrill  · carotid pulses normal; no bruit  · pedal pulses normal  · lower extremity edema: 1+  Skin:   warm, dry      Results:  Results from last 7 days   Lab Units 09/13/22  0455 09/13/22  0027 09/12/22  1903 09/12/22  1250 09/12/22  0436 09/10/22  1422 09/10/22  1008   WBC 10*3/mm3 12.38*  --   --   --  14.71*  --  7.27   HEMOGLOBIN g/dL 8.1*  8.1* 7.7* 8.6*   < > 6.4*   < > 6.7*   HEMATOCRIT % 25.1*  25.1* 23.9* 25.8*   < > 19.6*   < > 21.0*   PLATELETS 10*3/mm3 259  --   --   --  262  --  209    < > = values in this interval not displayed. "     Results from last 7 days   Lab Units 09/13/22  0455 09/12/22  0436 09/10/22  1422   SODIUM mmol/L 144 144 143   POTASSIUM mmol/L 3.9 3.8 4.1   CHLORIDE mmol/L 99 99 101   CO2 mmol/L 38.0* 39.0* 36.0*   BUN mg/dL 27* 44* 46*   CREATININE mg/dL 1.04 1.03 1.17   GLUCOSE mg/dL 112* 124* 132*      Lab Results   Component Value Date    CHOL 159 06/25/2022    TRIG 85 06/25/2022    HDL 36 (L) 06/25/2022     (H) 06/25/2022    AST 24 09/10/2022    ALT 9 09/10/2022       Intake/Output Summary (Last 24 hours) at 9/13/2022 0739  Last data filed at 9/13/2022 0124  Gross per 24 hour   Intake 860 ml   Output 850 ml   Net 10 ml       I personally reviewed the patient's EKG/Telemetry data    Radiology Data:   Echo 9/12/22:  Interpretation Summary    · Left ventricular ejection fraction appears to be 56 - 60%. Left ventricular systolic function is normal.  · Small to moderate pericardial effusion located next to the LV, essentially unchanged from 9/10/2022  · There is a left pleural effusion.  · No evidence of tamponade         Current Medications:  amiodarone, 200 mg, Oral, Q12H   Followed by  [START ON 9/26/2022] amiodarone, 200 mg, Oral, Daily  aspirin, 81 mg, Oral, Daily  atorvastatin, 40 mg, Oral, Nightly  insulin lispro, 0-7 Units, Subcutaneous, 4x Daily With Meals & Nightly  lidocaine, 1 patch, Transdermal, Q24H  metoprolol tartrate, 12.5 mg, Oral, Q12H  pantoprazole, 40 mg, Intravenous, BID AC  Pharmacy Consult, , Does not apply, Q24H  senna-docusate sodium, 2 tablet, Oral, BID  sodium chloride, 10 mL, Intravenous, Q12H  sodium chloride, 10 mL, Intravenous, Q8H           Assessment and Plan:      1. 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 Chan  - KENNEY 9/7 with normal EF, bioprosthetic mitral valve appears well-seated with normal leaflet excursion.  There is mild perivalvular leak.  - discussed AHA SBE  ppx instructions with the patient and his wife     2. CAD, nonobstructive   - recent cath 06/2022 with mild nonobstructive CAD  - continue ASA, statin      3. Dyslipidemia  - continue Lipitor 40mg to target LDL <70     4.  Postoperative atrial flutter/fibrillation:  - onset 9/4, started on Amio protocol   - ANGELICA ligated at time of surgery with 35 mm Atricure clip- KENNEY confirms occluded ANGELICA with no residual flow    - s/p successful KENNEY/ECV 9/7, continue amiodarone 200 mg twice daily for 10 days then 200 mg daily thereafter for least 30 days  - Eliquis discontinued due to acute anemia, possible GI bleeding, status post left atrial appendage clip, no need to restart anticoagulation.     Rate control strategy for now, continue beta-blocker, will not reinitiate anticoagulation due to significant blood loss anemia.    Blood pressure too low to increase metoprolol at this time, continue monitoring.     5. Anemia, normocytic   - s/p 2 units PRBCs 9/11  - GI eval underway   - EGD 9/12 with gastric ulcers, pyloric ulcer treated with epi injection  - continue high dose PPI    6.  Hypotension:  repeat echo 9/12/2022: small to moderate pericardial effusion located near the LV, no effusion near the RA or RV, no impingement on chambers, no evidence of pericardial tamponade    If hemoglobin stable tomorrow, may be ready for discharge to rehab     Electronically signed by Yamilet Trivedi PA-C, 09/13/22, 7:48 AM EDT.    I have seen and examined the patient, performing a face-to-face diagnostic evaluation with plan of care reviewed and developed with the Advanced Practice Clinician and nursing staff. I have addended and modified the above history of present illness, physical examination, and assessment and plan to reflect my findings and impressions. All medical decision making performed by Dr. Bowden.    Scott Bowden MD, [facility]  09/13/22

## 2022-09-14 ENCOUNTER — APPOINTMENT (OUTPATIENT)
Dept: GENERAL RADIOLOGY | Facility: HOSPITAL | Age: 67
End: 2022-09-14

## 2022-09-14 ENCOUNTER — HOSPITAL ENCOUNTER (INPATIENT)
Facility: HOSPITAL | Age: 67
LOS: 1 days | Discharge: HOME | End: 2022-09-15
Attending: INTERNAL MEDICINE | Admitting: INTERNAL MEDICINE

## 2022-09-14 VITALS
DIASTOLIC BLOOD PRESSURE: 72 MMHG | OXYGEN SATURATION: 86 % | SYSTOLIC BLOOD PRESSURE: 130 MMHG | WEIGHT: 267.3 LBS | TEMPERATURE: 97.9 F | HEART RATE: 90 BPM | HEIGHT: 75 IN | RESPIRATION RATE: 18 BRPM | BODY MASS INDEX: 33.24 KG/M2

## 2022-09-14 DIAGNOSIS — R53.81 DEBILITY: ICD-10-CM

## 2022-09-14 DIAGNOSIS — I38 DIASTOLIC CHF DUE TO VALVULAR DISEASE: Primary | ICD-10-CM

## 2022-09-14 DIAGNOSIS — I50.30 DIASTOLIC CHF DUE TO VALVULAR DISEASE: Primary | ICD-10-CM

## 2022-09-14 LAB
ANION GAP SERPL CALCULATED.3IONS-SCNC: 5 MMOL/L (ref 5–15)
BUN SERPL-MCNC: 20 MG/DL (ref 8–23)
BUN/CREAT SERPL: 19 (ref 7–25)
CALCIUM SPEC-SCNC: 8.6 MG/DL (ref 8.6–10.5)
CHLORIDE SERPL-SCNC: 101 MMOL/L (ref 98–107)
CO2 SERPL-SCNC: 39 MMOL/L (ref 22–29)
CREAT SERPL-MCNC: 1.05 MG/DL (ref 0.76–1.27)
DEPRECATED RDW RBC AUTO: 52.4 FL (ref 37–54)
EGFRCR SERPLBLD CKD-EPI 2021: 77.8 ML/MIN/1.73
ERYTHROCYTE [DISTWIDTH] IN BLOOD BY AUTOMATED COUNT: 17.5 % (ref 12.3–15.4)
GLUCOSE BLDC GLUCOMTR-MCNC: 114 MG/DL (ref 70–130)
GLUCOSE BLDC GLUCOMTR-MCNC: 140 MG/DL (ref 70–130)
GLUCOSE SERPL-MCNC: 144 MG/DL (ref 65–99)
HCT VFR BLD AUTO: 26.2 % (ref 37.5–51)
HGB BLD-MCNC: 8 G/DL (ref 13–17.7)
MCH RBC QN AUTO: 30.2 PG (ref 26.6–33)
MCHC RBC AUTO-ENTMCNC: 30.5 G/DL (ref 31.5–35.7)
MCV RBC AUTO: 98.9 FL (ref 79–97)
PLATELET # BLD AUTO: 306 10*3/MM3 (ref 140–450)
PMV BLD AUTO: 10.4 FL (ref 6–12)
POTASSIUM SERPL-SCNC: 5.1 MMOL/L (ref 3.5–5.2)
RBC # BLD AUTO: 2.65 10*6/MM3 (ref 4.14–5.8)
SARS-COV-2 RDRP RESP QL NAA+PROBE: NORMAL
SODIUM SERPL-SCNC: 145 MMOL/L (ref 136–145)
WBC NRBC COR # BLD: 12.61 10*3/MM3 (ref 3.4–10.8)

## 2022-09-14 PROCEDURE — 82746 ASSAY OF FOLIC ACID SERUM: CPT | Performed by: INTERNAL MEDICINE

## 2022-09-14 PROCEDURE — 82607 VITAMIN B-12: CPT | Performed by: INTERNAL MEDICINE

## 2022-09-14 PROCEDURE — 99222 1ST HOSP IP/OBS MODERATE 55: CPT | Performed by: INTERNAL MEDICINE

## 2022-09-14 PROCEDURE — 99024 POSTOP FOLLOW-UP VISIT: CPT

## 2022-09-14 PROCEDURE — 87635 SARS-COV-2 COVID-19 AMP PRB: CPT | Performed by: INTERNAL MEDICINE

## 2022-09-14 PROCEDURE — 85027 COMPLETE CBC AUTOMATED: CPT | Performed by: INTERNAL MEDICINE

## 2022-09-14 PROCEDURE — 97530 THERAPEUTIC ACTIVITIES: CPT

## 2022-09-14 PROCEDURE — 82962 GLUCOSE BLOOD TEST: CPT

## 2022-09-14 PROCEDURE — 99232 SBSQ HOSP IP/OBS MODERATE 35: CPT | Performed by: INTERNAL MEDICINE

## 2022-09-14 PROCEDURE — 71045 X-RAY EXAM CHEST 1 VIEW: CPT

## 2022-09-14 PROCEDURE — 80048 BASIC METABOLIC PNL TOTAL CA: CPT | Performed by: INTERNAL MEDICINE

## 2022-09-14 RX ORDER — PANTOPRAZOLE SODIUM 40 MG/1
TABLET, DELAYED RELEASE ORAL
Qty: 90 TABLET | Refills: 0
Start: 2022-09-14 | End: 2022-09-14

## 2022-09-14 RX ORDER — POLYETHYLENE GLYCOL 3350 17 G/17G
17 POWDER, FOR SOLUTION ORAL DAILY PRN
Status: DISCONTINUED | OUTPATIENT
Start: 2022-09-14 | End: 2022-09-15 | Stop reason: HOSPADM

## 2022-09-14 RX ORDER — FUROSEMIDE 40 MG/1
TABLET ORAL DAILY PRN
Status: ON HOLD | COMMUNITY
End: 2022-09-22 | Stop reason: SDUPTHER

## 2022-09-14 RX ORDER — BUMETANIDE 0.25 MG/ML
2 INJECTION INTRAMUSCULAR; INTRAVENOUS ONCE
Status: DISCONTINUED | OUTPATIENT
Start: 2022-09-14 | End: 2022-09-14

## 2022-09-14 RX ORDER — AMIODARONE HYDROCHLORIDE 200 MG/1
200 TABLET ORAL 2 TIMES DAILY WITH MEALS
Status: DISCONTINUED | OUTPATIENT
Start: 2022-09-14 | End: 2022-09-15 | Stop reason: HOSPADM

## 2022-09-14 RX ORDER — ASPIRIN 81 MG/1
81 TABLET ORAL DAILY
Status: DISCONTINUED | OUTPATIENT
Start: 2022-09-15 | End: 2022-09-15 | Stop reason: HOSPADM

## 2022-09-14 RX ORDER — AMIODARONE HYDROCHLORIDE 200 MG/1
TABLET ORAL
Qty: 44 TABLET | Refills: 0 | Status: ON HOLD | OUTPATIENT
Start: 2022-09-14 | End: 2022-09-14

## 2022-09-14 RX ORDER — ATORVASTATIN CALCIUM 40 MG/1
40 TABLET, FILM COATED ORAL NIGHTLY
Status: DISCONTINUED | OUTPATIENT
Start: 2022-09-14 | End: 2022-09-15 | Stop reason: HOSPADM

## 2022-09-14 RX ORDER — ACETAMINOPHEN 325 MG/1
650 TABLET ORAL EVERY 6 HOURS PRN
Status: DISCONTINUED | OUTPATIENT
Start: 2022-09-14 | End: 2022-09-15 | Stop reason: HOSPADM

## 2022-09-14 RX ORDER — BUMETANIDE 2 MG/1
2 TABLET ORAL ONCE
Status: COMPLETED | OUTPATIENT
Start: 2022-09-14 | End: 2022-09-14

## 2022-09-14 RX ORDER — PANTOPRAZOLE SODIUM 40 MG/1
40 TABLET, DELAYED RELEASE ORAL
Status: DISCONTINUED | OUTPATIENT
Start: 2022-09-14 | End: 2022-09-15 | Stop reason: HOSPADM

## 2022-09-14 RX ORDER — CARVEDILOL 6.25 MG/1
6.25 TABLET ORAL 2 TIMES DAILY WITH MEALS
COMMUNITY
End: 2022-09-15 | Stop reason: HOSPADM

## 2022-09-14 RX ORDER — HYDROCODONE BITARTRATE AND ACETAMINOPHEN 5; 325 MG/1; MG/1
1 TABLET ORAL EVERY 6 HOURS PRN
Qty: 20 TABLET | Refills: 0 | Status: ON HOLD | OUTPATIENT
Start: 2022-09-14 | End: 2022-09-14

## 2022-09-14 RX ORDER — ATORVASTATIN CALCIUM 40 MG/1
40 TABLET, FILM COATED ORAL NIGHTLY
Qty: 30 TABLET | Refills: 5 | Status: ON HOLD | OUTPATIENT
Start: 2022-09-14 | End: 2022-09-14

## 2022-09-14 RX ADMIN — PANTOPRAZOLE SODIUM 40 MG: 40 TABLET, DELAYED RELEASE ORAL at 17:19

## 2022-09-14 RX ADMIN — AMIODARONE HYDROCHLORIDE 200 MG: 200 TABLET ORAL at 11:27

## 2022-09-14 RX ADMIN — ACETAMINOPHEN 500 MG: 500 TABLET, FILM COATED ORAL at 08:04

## 2022-09-14 RX ADMIN — BUMETANIDE 2 MG: 2 TABLET ORAL at 11:27

## 2022-09-14 RX ADMIN — SENNOSIDES AND DOCUSATE SODIUM 2 TABLET: 50; 8.6 TABLET ORAL at 08:04

## 2022-09-14 RX ADMIN — ATORVASTATIN CALCIUM 40 MG: 40 TABLET, FILM COATED ORAL at 20:59

## 2022-09-14 RX ADMIN — Medication 10 ML: at 08:06

## 2022-09-14 RX ADMIN — ASPIRIN 81 MG: 81 TABLET, COATED ORAL at 08:11

## 2022-09-14 RX ADMIN — AMIODARONE HYDROCHLORIDE 200 MG: 200 TABLET ORAL at 17:23

## 2022-09-14 RX ADMIN — METOPROLOL TARTRATE 12.5 MG: 25 TABLET, FILM COATED ORAL at 08:04

## 2022-09-14 RX ADMIN — PANTOPRAZOLE SODIUM 40 MG: 40 INJECTION, POWDER, FOR SOLUTION INTRAVENOUS at 06:33

## 2022-09-14 NOTE — PROGRESS NOTES
Cardiothoracic Surgery Progress Note      POD # 12 s/p MVR, LAAL     LOS: 12 days      Subjective:  No complaints.  Denies chest pain or shortness of breath.  Vitals stable on 2 L nasal cannula    Objective:  Vital Signs  Temp:  [97.7 °F (36.5 °C)-98.2 °F (36.8 °C)] 97.7 °F (36.5 °C)  Heart Rate:  [] 82  Resp:  [18] 18  BP: (108-131)/(62-98) 119/68    Physical Exam:  General Appearance: No acute distress  CV: Irregularly irregular  Respiratory: Decreased bilaterally, but clear to auscultation; no wheezes rales, rubs  Extremities: Warm, 1+ BLE  Sternum: Stable incisions, healing well; clean, dry and intact      Results:    Results from last 7 days   Lab Units 09/14/22  0347   WBC 10*3/mm3 12.61*   HEMOGLOBIN g/dL 8.0*   HEMATOCRIT % 26.2*   PLATELETS 10*3/mm3 306     Results from last 7 days   Lab Units 09/14/22  0347   SODIUM mmol/L 145   POTASSIUM mmol/L 5.1   CHLORIDE mmol/L 101   CO2 mmol/L 39.0*   BUN mg/dL 20   CREATININE mg/dL 1.05   GLUCOSE mg/dL 144*   CALCIUM mg/dL 8.6       Assessment:  POD # 12 s/p MVR, LAAL  Back in NSR after cardioversion for A.flutter  Stable moderate pericardial effusion after NOAC  GI bleed after NOAC    Plan:  H&H stable-8.0/26.2  Continue to hold NOAC  Continue BID PPI  Will need repeat EGD in 8 weeks per GI  Pulmonary toilet  Diuresis  PT/OT  Patient wants to go to inpatient rehab in [place] - [facility]    Lyle Chan MD  09/14/22  07:48 EDT

## 2022-09-14 NOTE — CASE MANAGEMENT/SOCIAL WORK
Continued Stay Note  [facility]     Patient Name: Damián Myers  MRN: [id number]  Today's Date: 9/14/2022    Admit Date: 9/2/2022     Discharge Plan     Row Name 09/14/22 0858       Plan    Plan Rehab @ [facility] [place]    Patient/Family in Agreement with Plan yes    Plan Comments Pt can go to rehab today. Spouse here in room, would like to transport pt herself. 0930 Ambulance cancelled. Covid swab ordered. Pt was declining oxygen @ first. While sitting, his oxygen sat dropped to 86% on room air, w/2L he recovered to 94%.  contacted Jace boyce/[employer] he will provide loaner tank for transport, [facility] [place] will need to reevaluate him @ d/c from there.     Final Discharge Disposition Code 62 - inpatient rehab facility               Discharge Codes    No documentation.               Expected Discharge Date and Time     Expected Discharge Date Expected Discharge Time    Sep 14, 2022             Herb Mckinney RN

## 2022-09-14 NOTE — PROGRESS NOTES
Rehabilitation Nursing  Inpatient Rehabilitation Admission Assessment of Functional Measures  and Plan of Care    Plan of Care  Copy from POC    Functional Measures  MICHELLE Eating:  MICHELLE Grooming:  MICHELLE Bathing:  MICHELLE Upper Body Dressing:  MICHELLE Lower Body Dressing:  MICHELLE Toileting:    MICHELLE Bladder Management  Level of Assistance:  Frequency/Number of Accidents (4 days prior to admission):  Frequency/Number of Accidents this Shift:    MICHELLE Bowel Management  Level of Assistance:  Frequency/Number of Accidents (4 days prior to admission):  Frequency/Number of Accidents this Shift:    MICHELLE Bed/Chair/Wheelchair Transfer:  MICHELLE Toilet Transfer:  MICHELLE Tub/Shower Transfer:    Previously Documented Mode of Locomotion at Discharge:  [facility] Expected Mode of Locomotion at Discharge:  MICHELLE Walk/Wheelchair:  MICHELLE Stairs:    MICHELLE Comprehension:  MICHELLE Expression:  MICHELLE Social Interaction:  [facility] Problem Solving:  MICHELLE Memory:    Discharge Functional Goals:    Signed by: Radha Wesley Nurse

## 2022-09-14 NOTE — DISCHARGE SUMMARY
[facility] Cardiothoracic Surgery Discharge Summary    Name:  Damián Myers  MRN Number:  [id number]  Date of Admission: 9/2/2022  Date of Discharge:  9/14/2022    Referred By: Mahendra Amador MD  PCP: Spencer Saeed MD  IP Care Team:  Patient Care Team:  Spencer Saeed MD as PCP - General (Family Medicine)  Mahendra Amador MD as Consulting Physician (Cardiology)    Discharge Diagnosis:  Past Medical History:   Diagnosis Date   • CAD (coronary artery disease)    • Cardiac abnormality    • CHF (congestive heart failure) (HCC)    • Coma of unknown cause (HCC) 1984    Pt states he was in a coma for one week, unknown cause   • COVID-19 10/25/2021   • Hypertension    • Mitral regurgitation    • Obesity (BMI 30-39.9) 11/04/2019   • Spinal headache 1984     Active Hospital Problems    Diagnosis  POA   • **Severe mitral regurgitation status post bioprosthetic MVR, 9/2/2022 [I34.0]  Yes   • Post-op pericardial effusion [I31.3]  No   • Paroxysmal atrial fibrillation (HCC) [I48.0]  No   • Diastolic CHF due to valvular disease (HCC) [I50.30, I38]  Yes   • Hyperlipidemia LDL goal <70 [E78.5]  Yes   • Essential hypertension [I10]  Yes   • Coronary artery disease involving native coronary artery of native heart with angina pectoris (HCC) [I25.119]  Yes   • Obesity (BMI 30-39.9) [E66.9]  Yes      Resolved Hospital Problems    Diagnosis Date Resolved POA   • Gastrointestinal hemorrhage with melena [K92.1] 09/12/2022 Unknown   • Chronic systolic heart failure (HCC) [I50.22] 09/09/2022 Yes     Severe mitral regurgitation [I34.0]    Procedures Performed:  Procedure(s):  ESOPHAGOGASTRODUODENOSCOPY       Operative Pathology:    Clinical Information    Severe mitral regurgitation   Final Diagnosis   MITRAL VALVE, ANTERIOR AND POSTERIOR LEAFLETS, REPLACEMENT:  Valvular tissue with myxoid degeneration   Electronically signed by Annette Smith DO on 9/6/2022 at 1504     History of Present Illness:    66-year-old  male with  history of hypertension, congestive heart failure and coronary artery disease who presents with mitral valve regurgitation.  The patient was admitted to [facility] in July for congestive heart failure.  He was diagnosed with pulmonary edema on CXR and was discharged after diuresis.  The patient does have significant fatigue.  He denies any shortness of breath with exertion, however his activity has been decreased more recently.  He does describe some upper chest tightness.    Hospital Course:  Patient was admitted to [facility] on 9/2/2022 for scheduled mitral valve replacement (33 mm Magna Ease tissue mitral valve) and left atrial appendage ligation (35 mm Atricure clip). Patient was sent to ICU in stable condition was extubated per ICU protocol. Chest tubes and pacing wires were removed on POD #2. Transferred to telemetry floor on 9/10. Patient was discharged to rehab facility on POD # 12 in stable condition. His hospital course is outlined below.     Postop Afib/flutter:  -Successful cardioversion on 9/7  -Converted to Afib on 9/11  -Rate controlled 9/12  -Metoprolol 12.5 mg BID  -Amiodarone 200 mg twice daily for 10 days then 200 mg daily thereafter for least 30 days  - Eliquis discontinued due to acute anemia/GI bleeding s/p LAAL no need to restart anticoagulation    Pericardial effusion:  -Moderate (1-2 cm) pericardial effusion adjacent to left ventricle without evidence of tamponade  -Repeat echo on 9/12: Small to moderate pericardial effusion located next to left ventricle. No evidence of tamponade.    Anemia/GI bleed:  -H&H 6.7/21.0 on 9/10 s/p 2 units of PRBCs  -s/p 2 units of PRBCs and platelets on 9/11  -Fecal occult blood positive  -EGD on 9/12 revealed 4 gastric ulcers and pyloric ulcer treated with epinephrine injection; gold probe heat cautery and clips  -PPI BID x 4 weeks  -Plans for repeat EGD in 8 weeks to assess for healing  -NOAC on hold   -H&H stable at  discharge-8.0/26.2     Discharge Medications:     Discharge Medications      New Medications      Instructions Start Date   amiodarone 200 MG tablet  Commonly known as: PACERONE   Take 1 tablet by mouth 2 (Two) Times a Day for 7 days, THEN 1 tablet Daily for 30 days.   Start Date: September 14, 2022     atorvastatin 40 MG tablet  Commonly known as: LIPITOR   40 mg, Oral, Nightly      HYDROcodone-acetaminophen 5-325 MG per tablet  Commonly known as: Norco   1 tablet, Oral, Every 6 Hours PRN      metoprolol tartrate 25 MG tablet  Commonly known as: LOPRESSOR   12.5 mg, Oral, Every 12 Hours Scheduled      pantoprazole 40 MG EC tablet  Commonly known as: Protonix   Take 1 tablet by mouth 2 (Two) Times a Day for 30 days, THEN 1 tablet Daily for 30 days.   Start Date: September 14, 2022        Continue These Medications      Instructions Start Date   aspirin 81 MG EC tablet   81 mg, Oral, Daily, Asa 81 x 4 @ 9/1/22 @ 2100         Stop These Medications    carvedilol 6.25 MG tablet  Commonly known as: COREG            Allergies:  Allergies   Allergen Reactions   • Other Unknown - High Severity     Pt had a reaction to anesthesia when placing stents          Discharge Diet:  Diet Instructions     Diet: Consistent Carbohydrate, Cardiac      Discharge Diet:  Consistent Carbohydrate  Cardiac               Activity at Discharge:  Activity Instructions     Activity as Tolerated      Bathing Restrictions      Type of Restriction: Bathing    Bathing Restrictions: No Tub Bath    Driving Restrictions      Type of Restriction: Driving    Driving Restrictions: No Driving While Taking Narcotics    Lifting Restrictions      Type of Restriction: Lifting    Lifting Restrictions: Lifting Restriction (Indicate Limit)    Weight Limit (Pounds): 10    Length of Lifting Restriction: until seen at next follow-up appointment          Discharge Disposition  Rehab    Discharge Education:  Post-Op Education:  Patient was advised on responsible use  of opioids in the post-surgical setting.  Patient was advised these medications are highly addictive.  Patient advised on proper disposal of unused opioid medication and was urged to discard any unused opioid medication.  Wound Care:  Patient educated regarding care of post-operative wounds.  Patient is to wash with plain soap and water and pat dry.  Patient is not to use salves on the incision sites.  Patient instructed regarding the signs and symptoms of infection and when to call the office with any concerns.  SBE Education/Valve Education: Symptoms of acute IE usually begin with fever (102°-104°), chills, fast heart rate, fatigue, night sweats, aching joints and muscles, persistent cough, or swelling in the feet, legs or abdomen. You can reduce the risk of IE by maintaining good oral health through regular professional dental care and the use of dental products such as manual, powered and ultrasonic toothbrushes; dental floss; and other plaque-removal devices. Inform your dentist, family doctor (PCP) or other health care professional prior to any surgeries and/or procedures that you have had heart valve surgery.  Prophylactic antibiotics are always recommended prior to any surgical procedures following heart valve replacement.    Special Instructions:   1.  Keep incisions clean and dry at all times. Take a shower daily. Do not take a tub bath or use hot tubs until seen by the cardiac surgeon in your follow-up visit. Clean  your body and incisions daily with Dial soap and water. Always use a clean washcloth. Do not scrub your incision(s). Do not re-use dressings on your incision(s). Do not use any lotions, creams, oils, powders, antibiotic ointment (i.e., Neosporin), peroxide, alcohol, or iodine UNLESS told to do by the cardiac surgeon.  Patient may shower, but no tub baths until CT Surgery followup.   2.  No lifting over 10 lbs until CT Surgery follow up.  3.  No driving until released to do so by the surgeon.       When to Call the Cardiac Surgeon:  -Increased swelling, redness, tenderness, and drainage  -Increased warmth in the skin around an incision  -Large amount of clear or pinkish drainage  -Sudden increased amount of drainage  -White, yellow, or greenish drainage  -Odor, which may be foul or sweet, coming from an incision  -An opening in your incisions  -Temperature higher than 101 degrees Fahrenheit   -Temperature higher than 100 degrees Fahrenheit two times within 24 hours  -Do not hesitate to call the cardiac surgery nurse navigator or cardiac surgeon if you have concerns or questions.     *The [facility] cardiac surgery nurse navigator is available Monday-Friday 8 a.m. to 4:30 p.m. [phone]  Call 911:  -Chest pain (pain similar to what you experienced prior to surgery)  -Fainting spells  -Bright red stool  -Vomiting bright red blood  -Shortness of breath not relieved by rest  -Sudden numbness or weakness in arms or legs  -Sudden, severe headache  -Heart rate faster than 150 beats/minute with shortness of breath or a new irregular heart rate      Follow-up Appointments  Future Appointments   Date Time Provider Department Center   9/14/2022  9:30 AM EMS 1  [facility]   9/30/2022  8:30 AM Yasmany Leos, [facility]     Additional Instructions for the Follow-ups that You Need to Schedule     Ambulatory referral for Screening EGD   As directed      Call MD With Problems / Concerns   As directed      Instructions:   Please call the CT Surgery office with any questions or concerns you may have [phone]    Order Comments: Instructions: Please call the CT Surgery office with any questions or concerns you may have [phone]          Discharge Follow-up with PCP   As directed       Currently Documented PCP:    Spencer Saeed MD    PCP Phone Number:    [phone]     Follow Up Details: Follow-up with your PCP within 1-2 weeks         Discharge Follow-up with Specialty:  Cardiothoracic Surgery   As directed      Follow Up Details: Follow Up in 2-4 Weeks    Specialty: Cardiothoracic Surgery         Discharge Follow-up with Specialty: [facility]; 1 Week   As directed      Specialty: [facility]    Follow Up: 1 Week    Follow Up Details: Follow Up With [facility] Within 7 Days of Discharge. If Discharged on a Weekend, Schedule Follow Up For The Following Friday at 0900.         Cardiac Rehab Evaluation and Enrollment   Sep 19, 2022      Reason for Consult?: Education              SREEKANTH Lei  09/14/22  09:29 EDT    Time: I spent 40 minutes on this discharge activity which included: face-to-face encounter with the patient, reviewing the data in the system, coordination of the care with the nursing staff as well as consultants, documentation, and entering orders.

## 2022-09-14 NOTE — PROGRESS NOTES
Enter Query Response Below      Query Response:   Acute on chronic hypercapnic respiratory failure. Requiring supplemental oxygen at 2L NC.            If applicable, please update the problem list.        Patient: Damián Myers        : 1955  Account: [id number]           Admit Date:         Options to Respond to Query:    1. Access the Encounter     a. From the To-Do Side bar, click Respond With Note.     b. Click New Note     c. Answer query within the yellow box.                d. Update the Problem List if applicable.     Dr. King:     67 year old male admitted with severe mitral regurgitation   Mitral valve replacement, left atrial appendage ligation  Clinical indicators: Sp02 83%, 85%, 88%, 89% (), Respiratory rate 22, 26 ()   arterial blood gas ph 7.335, pC02 67.1    arterial blood gas ph 7.205, pC02 82.6    Intensivist: Acute respiratory failure with hypercapnia last night and placed on Bipap   Intensivist: Continue nasal cannula during the day and Bipap at night for now. He should continue to use Bipap at night as he continues to have what appears to be a chronic hypercapnic respiratory failure  No mention of acute or chronic respiratory failure in discharge summary  Treatments:  bipap, currently on 2 liters nasal cannula ()  After study, please further specify:     Acute on chronic hypercapnic respiratory failure   Acute hypercapnic respiratory failure only (resolved)   Chronic hypercapnic respiratory failure   Patient does not have acute or chronic respiratory failure   Other_______________________   Unable to determine      By submitting this query, we are merely seeking further clarification of documentation to accurately reflect all conditions that you are monitoring, evaluating, treating or that extend the hospitalization or utilize additional resources of care. Please utilize your independent clinical judgment when addressing the question(s) above.     This query  and your response, once completed, will be entered into the legal medical record.    Sincerely,  [email or web address]  [phone]  Clinical Documentation Integrity Program

## 2022-09-14 NOTE — PROGRESS NOTES
"  [facility]  PROGRESS NOTE    Date of Admission: 9/2/2022  Date of Service: 09/14/22    Primary Care Physician: Spencer Saeed MD    Chief Complaint: f/u PAF, hypotension, anemia   Problem List:   Severe mitral regurgitation status post bioprosthetic MVR, 9/2/2022    Obesity (BMI 30-39.9)    Coronary artery disease involving native coronary artery of native heart with angina pectoris (HCC)    Essential hypertension    Hyperlipidemia LDL goal <70    Diastolic CHF due to valvular disease (HCC)    Paroxysmal atrial fibrillation (HCC)    Post-op pericardial effusion      Subjective      Stable overnight, no cardiac complaints. Eager to go to rehab.  Denies chest pain or shortness of breath, no problems with ambulation.      Objective   Vitals: /72 (BP Location: Left arm, Patient Position: Lying)   Pulse 91   Temp 97.9 °F (36.6 °C) (Oral)   Resp 18   Ht 190.5 cm (75\")   Wt 121 kg (267 lb 4.8 oz)   SpO2 95%   BMI 33.41 kg/m²     Physical Exam:  General Appearance:   · well developed  · well nourished  Neck:  · thyroid not enlarged  · supple  Respiratory:  · no respiratory distress  · normal breath sounds  · no rales  Cardiovascular:  · no jugular venous distention  · regular rhythm  · apical impulse normal  · S1 normal, S2 normal  · no S3, no S4   · no murmur  · no rub, no thrill  · carotid pulses normal; no bruit  · pedal pulses normal  · lower extremity edema: none    Skin:   warm, dry      Results:  Results from last 7 days   Lab Units 09/14/22  0347 09/13/22  1204 09/13/22  0455 09/12/22  1250 09/12/22  0436   WBC 10*3/mm3 12.61*  --  12.38*  --  14.71*   HEMOGLOBIN g/dL 8.0* 7.9* 8.1*  8.1*   < > 6.4*   HEMATOCRIT % 26.2* 24.7* 25.1*  25.1*   < > 19.6*   PLATELETS 10*3/mm3 306  --  259  --  262    < > = values in this interval not displayed.     Results from last 7 days   Lab Units 09/14/22  0347 09/13/22  0455 09/12/22  0436   SODIUM mmol/L 145 144 144 "   POTASSIUM mmol/L 5.1 3.9 3.8   CHLORIDE mmol/L 101 99 99   CO2 mmol/L 39.0* 38.0* 39.0*   BUN mg/dL 20 27* 44*   CREATININE mg/dL 1.05 1.04 1.03   GLUCOSE mg/dL 144* 112* 124*      Lab Results   Component Value Date    CHOL 159 06/25/2022    TRIG 85 06/25/2022    HDL 36 (L) 06/25/2022     (H) 06/25/2022    AST 24 09/10/2022    ALT 9 09/10/2022             Intake/Output Summary (Last 24 hours) at 9/14/2022 1042  Last data filed at 9/13/2022 2317  Gross per 24 hour   Intake --   Output 650 ml   Net -650 ml     I personally reviewed the patient's EKG/Telemetry data    Current Medications:  amiodarone, 200 mg, Oral, Q12H   Followed by  [START ON 9/26/2022] amiodarone, 200 mg, Oral, Daily  aspirin, 81 mg, Oral, Daily  atorvastatin, 40 mg, Oral, Nightly  bumetanide, 2 mg, Oral, Once  insulin lispro, 0-7 Units, Subcutaneous, 4x Daily With Meals & Nightly  lidocaine, 1 patch, Transdermal, Q24H  metoprolol tartrate, 12.5 mg, Oral, Q12H  pantoprazole, 40 mg, Intravenous, BID AC  Pharmacy Consult, , Does not apply, Q24H  senna-docusate sodium, 2 tablet, Oral, BID  sodium chloride, 10 mL, Intravenous, Q12H  sodium chloride, 10 mL, Intravenous, Q8H           Assessment and Plan:   1. 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 Chan  - KENNEY 9/7 with normal EF, bioprosthetic mitral valve appears well-seated with normal leaflet excursion.  There is mild perivalvular leak.  - discussed AHA SBE ppx instructions with the patient and his wife     2. CAD, nonobstructive   - recent cath 06/2022 with mild nonobstructive CAD  - continue ASA, statin      3. Dyslipidemia  - continue Lipitor 40mg to target LDL <70     4.  Postoperative atrial flutter/fibrillation:  - onset 9/4, started on Amio protocol   - ANGELICA ligated at time of surgery with 35 mm Atricure clip- KENNEY confirms occluded ANGELICA with no residual flow    - s/p  successful KENNEY/ECV 9/7, continue amiodarone 200 mg twice daily for 10 days then 200 mg daily thereafter for least 30 days  - Eliquis discontinued due to acute anemia, GI bleeding, status post left atrial appendage clip, no need to restart anticoagulation.       Doing well with rate control strategy, continue metoprolol, blood pressure too low to increase.  May consider repeat ECV after follow-up with Dr. Amador in [place].  No anticoagulation due to severe hemorrhage recently.       5. Anemia, normocytic   - s/p 2 units PRBCs 9/11  - EGD 9/12 with gastric ulcers, pyloric ulcer treated with epi injection  - continue high dose PPI  No anticoagulation at this time  Aspirin only at this time.     6.  Hypotension:  repeat echo 9/12/2022: small to moderate pericardial effusion located near the LV, no effusion near the RA or RV, no impingement on chambers, no evidence of pericardial tamponade  Hypotension resolved with blood transfusion      Stable for discharge to rehab from cardiac perspective. Patient will follow up with Dr. Amador's office in [place].      Electronically signed by Yamilet Trivedi PA-C, 09/14/22, 9:01 AM EDT.    I have seen and examined the patient, performing a face-to-face diagnostic evaluation with plan of care reviewed and developed with the Advanced Practice Clinician and nursing staff. I have addended and modified the above history of present illness, physical examination, and assessment and plan to reflect my findings and impressions. All medical decision making performed by Dr. Bowden.    Scott Bowden MD, [facility]  09/14/22

## 2022-09-14 NOTE — H&P
[facility] HOSPITALIST HISTORY AND PHYSICAL    Patient Identification:  Name:  Damián Myers  Age:  67 y.o.  Sex:  male  :  1955  MRN:  [id number]   Visit Number:  [id number]  Room number:  Room/bed info not found  Primary Care Physician:  Spencer Saeed MD     Subjective     (Not on file)   Chief complaint:  No chief complaint on file.      History of presenting illness:  67 y.o. male  This pt is seen today for post admission physician evaluation to the inpatient rehabilitation facility.  Patient is a 67-year-old male who was electively admitted to [facility] under the care of Dr. Lyle Chan for mitral valve replacement.  On  patient underwent mitral valve replacement with a 33 Magna Ease tissue mitral valve.  He also had a left atrial appendage ligation at that time.  Indication for the surgery were severe mitral regurgitation in association with history of hypertension and congestive heart failure and coronary artery disease.  He is felt to be symptomatic from his valve.  Patient developed postoperative atrial fib/flutter.  Patient had successful cardioversion on  but reconverted back to A. fib on .  He was followed by cardiology there and he was noted to be rate controlled as of  and is maintained of that on low-dose metoprolol 12.5 twice daily as well as amiodarone.  Decision was made by cardiology there to maintain atrial fibrillation and not try repeat cardioversion.  Patient was noted to have a pericardial effusion as well.  1 to 2 cm, he is had this reevaluated and there is no tamponade.  I did discuss with Dr. Chan this morning and no concern for worsening.  Patient however developed a GI bleed.  He did require blood transfusion while there.  EGD was done on .  Findings were for clean-based gastric ulcers as well as a pyloric ulcer with pigmented spot treated with epinephrine injection.  Also gold probe cautery and  clips.  He also had mild duodenitis.  Patient has been maintained on Protonix and serial hemoglobins have been checked and overall have been stable.  Patient is obviously not on anticoagulation for his A. fib secondary to the GI bleeding have confirmed this with Dr. Chan that he is not to be on this at this time.  Patient was on Eliquis prior to the GI bleed.  Again he has had his left atrial appendage ligated.  Patient has progressed medically and physical therapy and Occupational therapy evaluations were completed with recommended acute inpatient rehabilitation referral for continued functional mobility intervention and reeducation.  Acute rehab referral ordered for continued medical monitoring and management post prolonged hospitalization, continued respiratory status monitoring, lab monitoring, pain mgt needs, bowel/bladder care with new medication education, skin monitoring and breakdown prevention along with ongoing medical comorbidities that require ongoing care.    The preadmission mini-FIM score as assessed by the referring facility as follows: Eating 4, grooming 4, bathing 3 upper body dressing 3 lower body dressing 3 toileting 3 transfers 5 transfer to toilet 5 locomotion 5 bladder management 6 bowel management 6 comprehension 7 expression 7 social interaction 7 problem solving 7 memory 7 estimated length of stay 5 to 7 days.    ---------------------------------------------------------------------------------------------------------------------   Review of Systems:  General: Hearing is intact, no fever no chills.   HEENT: No visual disturbances, no swallowing difficulty, no oral lesions,  Heart: He states he is having no chest pain even in his incision site, no palpitations currently.  He does have about 1+ edema including pretibial.  This is pitting  Lungs: He states he does get short of breath with mobilization.  Also get short of breath with conversation, no cough no fever.  Abdomen: He states his  "bowels have still been a little \"hard\" no reflux symptoms currently, no dysphagia  : He has been voiding without difficulty, no dysuria or hesitancy  Musculoskeletal: He has felt somewhat weak but no unilateral symptoms and he has had no joint effusion  Neuro: Denies any neurologic problems  Skin: Denies skin problems, states his incision is healing well.  ---------------------------------------------------------------------------------------------------------------------   Past Medical History:   Diagnosis Date   • CAD (coronary artery disease)    • Cardiac abnormality    • CHF (congestive heart failure) ([facility])    • Coma of unknown cause ([facility]) 1984    Pt states he was in a coma for one week, unknown cause   • COVID-19 10/25/2021   • Hypertension    • Mitral regurgitation    • Obesity (BMI 30-39.9) 11/04/2019   • Spinal headache 1984     Past Surgical History:   Procedure Laterality Date   • CARDIAC CATHETERIZATION N/A 11/08/2019    Procedure: Left Heart Cath;  Surgeon: Sherrell Hart MD;  Location:  [facility];  Service: Cardiology   • CARDIAC CATHETERIZATION N/A 06/27/2022    Procedure: Left Heart Cath;  Surgeon: Greyson Balderas MD;  Location:  [facility];  Service: Cardiology;  Laterality: N/A;   • COLONOSCOPY     • CORONARY STENT PLACEMENT  2019   • ENDOSCOPY N/A 9/12/2022    Procedure: ESOPHAGOGASTRODUODENOSCOPY;  Surgeon: Leann Fitzpatrick MD;  Location:  [facility];  Service: Gastroenterology;  Laterality: N/A;   • MITRAL VALVE REPAIR/REPLACEMENT N/A 9/2/2022    Procedure: MEDIAN STERNOTOMY MITRAL VALVE REPLACEMENT, LEFT ATRIAL APPENDAGE LIGATION AND KENNEY PER ANESTHESIA;  Surgeon: Lyle Chan MD;  Location:  [facility];  Service: Cardiothoracic;  Laterality: N/A;     Family History   Problem Relation Age of Onset   • Heart valve disorder Mother    • Heart valve disorder Brother    • Heart valve disorder Maternal Grandfather    • Heart disease Maternal " Grandfather    • Diabetes type I Son      Social History     Socioeconomic History   • Marital status:    • Number of children: 7   Tobacco Use   • Smoking status: Never Smoker   • Smokeless tobacco: Never Used   Vaping Use   • Vaping Use: Never used   Substance and Sexual Activity   • Alcohol use: No   • Drug use: No   • Sexual activity: Defer     ---------------------------------------------------------------------------------------------------------------------   Allergies:  Morphine and Percocet tend to cause hallucination---------------------------------------------------------------------------------------------------------------------              Objective     Vital Signs:  Temp:  [97.7 °F (36.5 °C)-98.2 °F (36.8 °C)] 97.9 °F (36.6 °C)  Heart Rate:  [] 90  Resp:  [18] 18  BP: (108-131)/(66-80) 130/72    No data found.  SpO2:  [86 %-99 %] 86 %  on  Flow (L/min):  [2] 2;   Device (Oxygen Therapy): room air  There is no height or weight on file to calculate BMI.    Wt Readings from Last 3 Encounters:   09/14/22 121 kg (267 lb 4.8 oz)   08/30/22 115 kg (254 lb 8.3 oz)   08/05/22 113 kg (250 lb)      ---------------------------------------------------------------------------------------------------------------------     Physical exam:  Constitutional:   Well-developed well-nourished and in no distress.  His mood is good.  HEENT: [place] unremarkable hearing is intact pupils equal speech is fast oral mucosa moist no JVD lungs about breath sounds with some faint crackles right posterior base otherwise fairly good air excursion no wheezing or rhonchi heard,  Cardiovascular: Controlled irregularly irregular rhythm without murmur  Abdominal: Soft benign bowel sounds are active no organomegaly.   Musculoskeletal: Strength is symmetric without focal findings  Neurological: Sensation is intact, patient is oriented alert and awake  Skin: Incision on his mid chest area as well as his abdominal incisions from the  chest tube appear to be healing well and there is no evidence of drainage or secondary cellulitis.  Peripheral vascular:  Genitourinary:  ---------------------------------------------------------------------------------------------------------------------  EKG: Last EKG from September 11 image reviewed showed evidence of atrial fibrillation with a controlled rate  No orders to display           Last echocardiogram:  Results for orders placed during the hospital encounter of 09/02/22    Adult Transthoracic Echo Limited W/ Cont if Necessary Per Protocol    Interpretation Summary  · Left ventricular ejection fraction appears to be 56 - 60%. Left ventricular systolic function is normal.  · Small to moderate pericardial effusion located next to the LV, essentially unchanged from 9/10/2022  · There is a left pleural effusion.  · No evidence of tamponade    --------------------------------------------------------------------------------------------------------------------  Labs:  Results from last 7 days   Lab Units 09/14/22  0347 09/13/22  1204 09/13/22  0455 09/12/22  1903 09/12/22  1250 09/12/22  0436   PROCALCITONIN ng/mL  --   --   --   --   --  0.12   LACTATE mmol/L  --   --   --   --  1.0  --    WBC 10*3/mm3 12.61*  --  12.38*  --   --  14.71*   HEMOGLOBIN g/dL 8.0* 7.9* 8.1*  8.1*   < > 8.3*  8.2* 6.4*   HEMATOCRIT % 26.2* 24.7* 25.1*  25.1*   < > 25.4*  25.2* 19.6*   MCV fL 98.9*  --  95.1  --   --  92.9   MCHC g/dL 30.5*  --  32.3  --   --  32.7   PLATELETS 10*3/mm3 306  --  259  --   --  262    < > = values in this interval not displayed.     Results from last 7 days   Lab Units 09/08/22  0614   PH, ARTERIAL pH units 7.419   PO2 ART mm Hg 74.8*   PCO2, ARTERIAL mm Hg 59.5*   HCO3 ART mmol/L 38.4*     Results from last 7 days   Lab Units 09/14/22  0347 09/13/22  0455 09/12/22  0436 09/10/22  1422 09/09/22  0535 09/08/22  0305   SODIUM mmol/L 145 144 144 143 142 139   POTASSIUM mmol/L 5.1 3.9 3.8 4.1 3.5 3.9    MAGNESIUM mg/dL  --   --   --   --  2.1 2.2   CHLORIDE mmol/L 101 99 99 101 97* 95*   CO2 mmol/L 39.0* 38.0* 39.0* 36.0* 41.0* 38.0*   BUN mg/dL 20 27* 44* 46* 30* 36*   CREATININE mg/dL 1.05 1.04 1.03 1.17 1.16 1.25   CALCIUM mg/dL 8.6 8.3* 8.3* 8.1* 8.9 8.7   PHOSPHORUS mg/dL  --   --   --   --  4.1 2.7   GLUCOSE mg/dL 144* 112* 124* 132* 106* 115*   ALBUMIN g/dL  --   --   --  2.30*  --   --    BILIRUBIN mg/dL  --   --   --  0.4  --   --    ALK PHOS U/L  --   --   --  45  --   --    AST (SGOT) U/L  --   --   --  24  --   --    ALT (SGPT) U/L  --   --   --  9  --   --    Estimated Creatinine Clearance: 95.7 mL/min (by C-G formula based on SCr of 1.05 mg/dL).  No results found for: AMMONIA          Glucose   Date/Time Value Ref Range Status   09/14/2022 0759 114 70 - 130 mg/dL Final     Comment:     Meter: [id number] : [id number] [facility]   09/14/2022 0112 140 (H) 70 - 130 mg/dL Final     Comment:     Meter: [id number] : [id number] Gibran Onofre   09/13/2022 2012 156 (H) 70 - 130 mg/dL Final     Comment:     Meter: [id number] : [id number] Bartolo Amezcua   09/13/2022 1711 123 70 - 130 mg/dL Final     Comment:     Meter: [id number] : [id number] [facility]   09/13/2022 1130 140 (H) 70 - 130 mg/dL Final     Comment:     Meter: [id number] : [id number] [facility]   09/13/2022 0741 115 70 - 130 mg/dL Final     Comment:     Meter: [id number] : [id number] [facility]   09/12/2022 2001 156 (H) 70 - 130 mg/dL Final     Comment:     Meter: [id number] : [id number] Chuck Margot   09/12/2022 1712 119 70 - 130 mg/dL Final     Comment:     Meter: [id number] : [id number] Andrew Gil     Lab Results   Component Value Date    TSH 3.170 06/25/2022    FREET4 1.05 11/05/2019     No results found for: PREGTESTUR, PREGSERUM, HCG, HCGQUANT  Pain Management Panel     Pain Management Panel Latest Ref Rng & Units 8/30/2022    AMPHETAMINES SCREEN, URINE Negative Negative    BARBITURATES  SCREEN Negative Negative    BENZODIAZEPINE SCREEN, URINE Negative Negative    BUPRENORPHINEUR Negative Negative    COCAINE SCREEN, URINE Negative Negative    METHADONE SCREEN, URINE Negative Negative    METHAMPHETAMINEUR Negative Negative        Brief Urine Lab Results  (Last result in the past 365 days)      Color   Clarity   Blood   Leuk Est   Nitrite   Protein   CREAT   Urine HCG        09/12/22 0818 Yellow   Clear   Negative   Negative   Negative   Negative               Blood Culture   Date Value Ref Range Status   09/12/2022 No growth at 2 days  Preliminary   09/12/2022 No growth at 2 days  Preliminary     No results found for: URINECX  No results found for: WOUNDCX  No results found for: STOOLCX    Last Urine Toxicity     LAST URINE TOXICITY RESULTS Latest Ref Rng & Units 8/30/2022    AMPHETAMINES SCREEN, URINE Negative Negative    BARBITURATES SCREEN Negative Negative    BENZODIAZEPINE SCREEN, URINE Negative Negative    BUPRENORPHINEUR Negative Negative    COCAINE SCREEN, URINE Negative Negative    METHADONE SCREEN, URINE Negative Negative    METHAMPHETAMINEUR Negative Negative          I have personally looked at the labs and they are summarized above.  ----------------------------------------------------------------------------------------------------------------------  Detailed radiology reports for the last 24 hours:    Imaging Results (Last 24 Hours)     ** No results found for the last 24 hours. **        Final impressions for the last 30 days of radiology reports:    XR Abdomen 1 View    Result Date: 9/6/2022  Nasogastric tube with tip terminating in the gastric fundus.  This report was finalized on 9/6/2022 10:55 AM by Brian Dewey.      XR Chest 1 View    Result Date: 9/14/2022   1. Increasing opacities in the lung bases, and developing airspace disease in the left midlung, compared to one day prior. The findings may represent developing or worsening pneumonia. Atypical distribution of edema not  excluded. 2. Persistent dense bibasilar consolidations with small layering bilateral pleural effusions. 3. Marked generalized cardiac enlargement appears unchanged.  This report was finalized on 9/14/2022 7:23 AM by Grace Becerra MD.      XR Chest 1 View    Result Date: 9/13/2022   1. Stable small bilateral pleural effusions and bibasilar airspace disease, favored to represent atelectasis. 2. Mild central vascular congestive changes without overt edema.  This report was finalized on 9/13/2022 7:33 AM by Grace Becerra MD.      XR Chest 1 View    Result Date: 9/12/2022  Mild pulmonary edema pattern with small bilateral pleural effusions, stable as compared to the previous study.  This report was finalized on 9/12/2022 7:30 AM by Tiffanie Ochoa MD.      XR Chest 1 View    Result Date: 9/11/2022  Stable moderate right and small left pleural effusions and stable postoperative findings.  This report was finalized on 9/11/2022 5:52 PM by Cody James MD.      XR Chest 1 View    Result Date: 9/10/2022  Improving left basilar atelectasis/effusion.  This report was finalized on 9/10/2022 10:32 PM by Dr. Gonzales Herman MD.      XR Chest 1 View    Result Date: 9/7/2022   1. NG tube below the left diaphragm in good position. 2. Stable mild bibasilar atelectasis, left greater than right.  This report was finalized on 9/7/2022 9:09 AM by Dr. Gonzales Herman MD.      XR Chest 1 View    Result Date: 9/6/2022  The patient's pleural drains are not well seen, either removed or obscured by superimposed opacity at the lung bases and upper abdomen. Right-sided central venous catheter projects unchanged. There is no distinct pneumothorax or new focal airspace consolidation. Unchanged suspected trace effusions. Stable heart and mediastinal contours.  This report was finalized on 9/6/2022 7:37 AM by Vargas Moise.      XR Chest 1 View    Result Date: 9/5/2022  No significant interval change.  This report was finalized on 9/5/2022 6:59 AM by Jackson  MD Josep.      XR Chest 1 View    Result Date: 9/4/2022  Interval extubation and removal of NG/OG tube. Removal of pulmonary arterial catheter. The right IJ introducer sheath remains in place. Thoracostomy tubes remain in place. Stable cardiomegaly and low lung volumes. Similar small bilateral pleural effusions and bibasilar parenchymal opacities, likely associated atelectasis. Stable perihilar vascular congestion and/or bronchovascular crowding in setting of low lung volumes. No pneumothorax.  This report was finalized on 9/4/2022 6:38 AM by Jackson Car MD.      XR Chest 1 View    Result Date: 9/3/2022  1.  Bibasilar densities which could relate to atelectasis and/or underlying effusions. 2.  Slight prominence of pulmonary vascularity that could reflect some congestion.  This report was finalized on 9/3/2022 7:16 AM by Jigar Garvey MD.      XR Chest 1 View    Result Date: 9/2/2022  Stable chest radiograph, including focal left basilar atelectasis or effusion. No new chest pathology is seen elsewhere.  This report was finalized on 9/2/2022 10:29 PM by Dr. Gonzales Herman MD.      XR Chest 1 View    Result Date: 9/2/2022   1. Mildly increased left basilar atelectasis. 2. No new chest disease is seen elsewhere. In particular, no left apical pneumothorax is appreciated.  This report was finalized on 9/2/2022 6:14 PM by Dr. Gonzales Herman MD.      XR Chest 1 View    Result Date: 9/2/2022  Postsurgical changes as detailed above with satisfactory appearance of medical support devices. Mildly decreased lung volumes with bronchovascular crowding versus interstitial edema and trace left apical pneumothorax with thoracostomy tubes in place. Likely small left pleural effusion.  This report was finalized on 9/2/2022 2:45 PM by Jackson Car MD.      XR Abdomen KUB    Result Date: 9/5/2022  Nonobstructive, nonspecific bowel gas pattern. Mild gaseous distention of the stomach.  This report was finalized on 9/5/2022 8:11 AM by  Jackson Car MD.      Chest X-Ray PA & Lateral    Result Date: 8/30/2022  No acute cardiopulmonary findings.  This report was finalized on 8/30/2022 4:50 PM by Jackson Car MD.      Last chest x-ray from today image reviewed showed poor lung volumes and what appeared to be some worsening airspace disease.    Last cardiac catheterization June of this year showed:  LM: Is a large calibre vessel , normal take off from left cusp, divides into LAD and Lcx.  No significant stenosis     LAD: Large caliber artery with multiple tandem 30 to 40% stenosis in the proximal mid and distal, the stent in the mid LAD past the diagonal artery appeared patent, the distal stented had about like 40 to 50% stenosis which was unchanged from the previous cath.     Ramus intermedius: Large-caliber vessel with no significant stenosis, mild diffuse disease.     LCX: Moderate calibre vessel, mild luminal irregularities.  Large OM branch with no significant stenosis the AV groove circumflex had no significant stenosis     RCA: Large calibre, dominant artery, normal take off from right cusp.  The RCA appeared to be ectatic, multiple tandem 30 to 40% diffuse stenosis in the proximal mid and distal RCA, RPDA and PL branches had no significant stenosis.      Left Ventriculography:     LV systolic function was low normal, LV chamber appears dilated with visual estimated EF of 50 to 55%. No wall motion abnormalities.   3-4+ MR     LVEDP: 19 mmHg  No gradient across the aortic valve on pull back.       Assessment & Plan       General debility, complicated by recent open heart surgery with mitral valve replacement.  He had a prolonged course secondary to atrial fibrillation and then subsequent GI bleed.  He will undergo physical therapy 5-6 times a week for 1 to 2 hours.  This will be for therapeutic exercise, range of motion, endurance and gait training   he will also undergo intensive occupational therapy 1 to 2 hours for 5 to 6 days a week.   This will be for dressing and ADLs feeding home skills safety and toileting.  Expected level of improvement is to be able to safely perform activities of daily living using adaptive equipment for improved functional mobility independent and safe bowel bladder function.  Able to safely consume food and fluids with no sign of aspiration and be able to navigate home and community territory safely without injury or falls..    Atrial fibrillation, will monitor rate intermittently during activity especially.  Cardiology note seen and patient is expected to go up into the 120s during exercise and this can be acceptable.  Obviously holding on anticoagulation secondary to GI bleeding.    Coronary artery disease status post stenting in the past, continue low-dose beta-blocker, aspirin (verified he was on this in [place]) and statin.    DVT prophylaxis, patient will be on SCUDs, holding anticoagulation due to his GI bleeding.    Peptic ulcer disease, continue Protonix, I do not see any pathology that I can see that was obtained during EGD.    History of congestive heart failure, now preserved EF EF , currently he still appears to be mildly volume overloaded.  He received IV Lasix earlier in the day, he has diuresed with this, chest x-ray to be repeated tomorrow, we will also check a BNP.    Abnormal chest x-ray, repeat in a.m. White count was minimally elevated at 12.6, will check CRP in the a.m., white count actually trending down considering it was 14 on 12.    Impaired fasting glucose by A1c of 6.3, monitor glucoses    VTE Prophylaxis:   Mechanical Order History:     None      Pharmalogical Order History:     None            The patient is high risk due to the following diagnoses/reasons: Status post recent mitral valve replacement, subsequently had a GI bleed after having been cardioverted for A. fib, only not on any anticoagulation but maintaining rhythm of A. fib.    Jason Dugan MD  [facility]  Hospitalist  09/14/22  14:45 EDT

## 2022-09-14 NOTE — PLAN OF CARE
Goal Outcome Evaluation:  Plan of Care Reviewed With: patient        Progress: improving  Outcome Evaluation: Pt with great progress towards goals, completed STS with progressed from SBA to SUP with proper management of sternal precautions, completed functional distance of 20ft without AD and SBA using HHA PRN, demonstrated progress to SUP with bed mob with sternal precautions maintained to flat surface, cont IPOT per POC limited by activity tolerance

## 2022-09-14 NOTE — PLAN OF CARE
Goal Outcome Evaluation:  Plan of Care Reviewed With: patient           Outcome Evaluation: Patient admitted to rehab. Will start POC.

## 2022-09-14 NOTE — PROGRESS NOTES
Patient Assessment Instrument  Quality Indicators - Admission    Section B. Hearing, Speech Vision  Expression of Ideas and Wants: Expresses complex messages without difficulty and  with speech that is clear and easy to understand.  Understanding Verbal and Non-Verbal Content: Understands: Clear comprehension  without cues or repetitions.    Section C. Cognitive Patterns      Section SK4370. Prior Functioning      Section [id number]. Prior Device Use      Section GU0643. Self Care Performance      Section DF7846. Self Care Discharge Goals      Section FC3892. Mobility Performance      Section [id number]. Mobility Discharge Goals      Section H. Bladder and Bowel      Section I. Active Diagnosis      Section J. Health Conditions      Section K. Swallowing/Nutritional Status      Section M. Skin Conditions      Section N. Medication      Section O. Special Treatments, Procedures, and Programs      OPTIONAL BRANCH FOR TRACKING FALLS  Fall(s) During Shift:    Signed by: Nurse Fabian

## 2022-09-14 NOTE — CASE MANAGEMENT/SOCIAL WORK
Case Management Discharge Note      Final Note: Plan is to d/c today to [facility] acute rehab. RN to call report ot [phone] & send d/c packet w/pt. D/C summary will be obtained thru [facility]. Covid negative. CM ordered oxygen thru [employer] [facility] [employer] to supply loaner tank for transport. [facility] to reevaluate oxygen prior to d/c for home. Pt and spouse updated.         Selected Continued Care - Admitted Since 9/2/2022     Destination    No services have been selected for the patient.              Durable Medical Equipment Coordination complete.    Service Provider Selected Services Address Phone Fax Patient Preferred    [facility] [place]  Durable Medical Equipment [address], [place] [id number] [phone] --          Dialysis/Infusion    No services have been selected for the patient.              Home Medical Care    No services have been selected for the patient.              Therapy    No services have been selected for the patient.              Community Resources    No services have been selected for the patient.              Community & DME    No services have been selected for the patient.                       Final Discharge Disposition Code: 62 - inpatient rehab facility

## 2022-09-14 NOTE — THERAPY TREATMENT NOTE
Patient Name: Damián Myers  : 1955    MRN: [id number]                              Today's Date: 2022       Admit Date: 2022    Visit Dx:     ICD-10-CM ICD-9-CM   1. Severe mitral regurgitation status post bioprosthetic MVR, 2022  I34.0 424.0   2. Severe mitral regurgitation  I34.0 424.0   3. Paroxysmal atrial fibrillation (HCC)  I48.0 427.31   4. Hyperlipidemia LDL goal <70  E78.5 272.4   5. Gastrointestinal hemorrhage with melena  K92.1 578.1   6. Acute gastric ulcer with hemorrhage  K25.0 531.00     Patient Active Problem List   Diagnosis   • Obesity (BMI 30-39.9)   • Coronary artery disease involving native coronary artery of native heart with angina pectoris (HCC)   • Stroke (cerebrum) ([facility])   • Essential hypertension   • Hyperlipidemia LDL goal <70   • Diastolic CHF due to valvular disease ([facility])   • Severe mitral regurgitation status post bioprosthetic MVR, 2022   • Paroxysmal atrial fibrillation (HCC)   • Post-op pericardial effusion     Past Medical History:   Diagnosis Date   • CAD (coronary artery disease)    • Cardiac abnormality    • CHF (congestive heart failure) ([facility])    • Coma of unknown cause ([facility])     Pt states he was in a coma for one week, unknown cause   • COVID-19 10/25/2021   • Hypertension    • Mitral regurgitation    • Obesity (BMI 30-39.9) 2019   • Spinal headache      Past Surgical History:   Procedure Laterality Date   • CARDIAC CATHETERIZATION N/A 2019    Procedure: Left Heart Cath;  Surgeon: Sherrell Hart MD;  Location:  [facility]AS[facility];  Service: Cardiology   • CARDIAC CATHETERIZATION N/A 2022    Procedure: Left Heart Cath;  Surgeon: Greyson Balderas MD;  Location:  [facility];  Service: Cardiology;  Laterality: N/A;   • COLONOSCOPY     • CORONARY STENT PLACEMENT     • ENDOSCOPY N/A 2022    Procedure: ESOPHAGOGASTRODUODENOSCOPY;  Surgeon: Leann Fitzpatrick MD;  Location: [facility] ENDOSCOPY;   Service: Gastroenterology;  Laterality: N/A;   • MITRAL VALVE REPAIR/REPLACEMENT N/A 9/2/2022    Procedure: MEDIAN STERNOTOMY MITRAL VALVE REPLACEMENT, LEFT ATRIAL APPENDAGE LIGATION AND KENNEY PER ANESTHESIA;  Surgeon: Lyle Chan MD;  Location: [facility];  Service: Cardiothoracic;  Laterality: N/A;      General Information     Row Name 09/14/22 1031          OT Time and Intention    Document Type therapy note (daily note)  -KF     Mode of Treatment occupational therapy  -KF     Row Name 09/14/22 1031          General Information    Patient Profile Reviewed yes  -KF     Existing Precautions/Restrictions cardiac;oxygen therapy device and L/min;fall;sternal  -KF     Barriers to Rehab previous functional deficit;medically complex  -KF     Row Name 09/14/22 1031          Cognition    Orientation Status (Cognition) oriented x 4  -KF     Row Name 09/14/22 1031          Safety Issues, Functional Mobility    Safety Issues Affecting Function (Mobility) insight into deficits/self-awareness;awareness of need for assistance  -KF     Impairments Affecting Function (Mobility) endurance/activity tolerance;shortness of breath;strength;range of motion (ROM);cognition  -KF     Cognitive Impairments, Mobility Safety/Performance insight into deficits/self-awareness  -KF     Comment, Safety Issues/Impairments (Mobility) Pt with improved progress  -KF           User Key  (r) = Recorded By, (t) = Taken By, (c) = Cosigned By    Initials Name Provider Type    KF Areli Hernandez, OT Occupational Therapist                 Mobility/ADL's     Row Name 09/14/22 1032          Bed Mobility    Bed Mobility sidelying-sit-sidelying;rolling right  -KF     Rolling Right [place] (Bed Mobility) supervision  -KF     Scooting/Bridging [place] (Bed Mobility) supervision  -KF     Sidelying-Sit-Sidelying [place] (Bed Mobility) supervision  -KF     Bed Mobility, Safety Issues decreased use of arms for pushing/pulling  -KF     Comment, (Bed  Mobility) from flat surface with only min VC's for sternal precaution management  -KF     Row Name 09/14/22 1032          Transfers    Transfers sit-stand transfer;stand-sit transfer  -KF     Comment, (Transfers) STS x3 reps without cues for hand placement to maintain sternal precautions from multiple surface heights and replicated bed height to simulate a home environment  -KF     Bed-Chair [place] (Transfers) standby assist  -KF     Sit-Stand [place] (Transfers) standby assist  -KF     Stand-Sit [place] (Transfers) standby assist  -KF     Row Name 09/14/22 1032          Functional Mobility    Functional Mobility- Ind. Level contact guard assist  -KF     Functional Mobility- Device other (see comments)  no AD  -KF     Functional Mobility-Distance (Feet) 20  -KF     Functional Mobility- Safety Issues supplemental O2  -KF     Functional Mobility- Comment monitored O2 upon sitting rest break on 2L to 96% for recovery  -KF     Row Name 09/14/22 1032          Lower Body Dressing Assessment/Training    [place] Level (Lower Body Dressing) don;doff;shoes/slippers;socks;supervision  -KF     Position (Lower Body Dressing) edge of bed sitting  -KF           User Key  (r) = Recorded By, (t) = Taken By, (c) = Cosigned By    Initials Name Provider Type    KF Areli Hernandez, OT Occupational Therapist               Obj/Interventions     Row Name 09/14/22 1035          Shoulder (Therapeutic Exercise)    Shoulder (Therapeutic Exercise) AROM (active range of motion)  -     Shoulder AROM (Therapeutic Exercise) bilateral;flexion;extension;10 repetitions;standing  -     Row Name 09/14/22 1035          Motor Skills    Therapeutic Exercise shoulder  -[facility] Name 09/14/22 1035          Balance    Balance Assessment sitting static balance;sitting dynamic balance;standing static balance;standing dynamic balance  -KF     Static Sitting Balance independent  -KF     Dynamic Sitting Balance independent  -KF      Position, Sitting Balance unsupported  -KF     Static Standing Balance supervision  -KF     Dynamic Standing Balance standby assist;contact guard  -KF     Position/Device Used, Standing Balance unsupported  -KF     Balance Interventions sit to stand;standing;minimal challenge;weight shifting activity  -KF     Comment, Balance progressed to SBA, to progress balance no AD and HHA if needed for safety for short distances replicating distances for ADL completion  -KF           User Key  (r) = Recorded By, (t) = Taken By, (c) = Cosigned By    Initials Name Provider Type    Areli Jorge OT Occupational Therapist               Goals/Plan     Row Name 09/14/22 1039          Bed Mobility Goal 1 (OT)    Activity/Assistive Device (Bed Mobility Goal 1, OT) sit to supine/supine to sit  -KF     Strategies/Barriers (Bed Mobility Goal 1, OT) with min VC's for seq to manage sternal precautions  -KF     Progress/Outcomes (Bed Mobility Goal 1, OT) goal met  -KF     Row Name 09/14/22 1039          Transfer Goal 1 (OT)    Activity/Assistive Device (Transfer Goal 1, OT) bed-to-chair/chair-to-bed;sit-to-stand/stand-to-sit;walker, rolling;commode  -KF     [place] Level/Cues Needed (Transfer Goal 1, OT) standby assist  -KF     Time Frame (Transfer Goal 1, OT) long term goal (LTG);10 days  -KF     Progress/Outcome (Transfer Goal 1, OT) goal partially met  met for STS and distance of bed<>chair  -KF     Row Name 09/14/22 1039          Dressing Goal 1 (OT)    Activity/Device (Dressing Goal 1, OT) lower body dressing  -KF     [place]/Cues Needed (Dressing Goal 1, OT) standby assist  -KF     Time Frame (Dressing Goal 1, OT) long term goal (LTG);10 days  -KF     Progress/Outcome (Dressing Goal 1, OT) goal partially met  met for socks  -KF           User Key  (r) = Recorded By, (t) = Taken By, (c) = Cosigned By    Initials Name Provider Type    Areli Jorge OT Occupational Therapist               Clinical Impression      Row Name 09/14/22 1036          Pain Assessment    Pretreatment Pain Rating 0/10 - no pain  -KF     Posttreatment Pain Rating 0/10 - no pain  -KF     Pre/Posttreatment Pain Comment tolerated  -KF     Pain Intervention(s) Repositioned;Ambulation/increased activity  -     Row Name 09/14/22 1036          Plan of Care Review    Plan of Care Reviewed With patient  -     Progress improving  -     Outcome Evaluation Pt with great progress towards goals, completed STS with progressed from SBA to SUP with proper management of sternal precautions, completed functional distance of 20ft without AD and SBA using HHA PRN, demonstrated progress to SUP with bed mob with sternal precautions maintained to flat surface, cont IPOT per POC limited by activity tolerance  -     Row Name 09/14/22 1036          Therapy Assessment/Plan (OT)    Rehab Potential (OT) good, to achieve stated therapy goals  -     Criteria for Skilled Therapeutic Interventions Met (OT) yes;meets criteria;skilled treatment is necessary  -KF     Therapy Frequency (OT) daily  -     Row Name 09/14/22 1036          Therapy Plan Review/Discharge Plan (OT)    Anticipated Discharge Disposition (OT) inpatient rehabilitation facility  -     Row Name 09/14/22 1036          Vital Signs    Pre Systolic BP Rehab --  RN cleared VSS  -KF     Post SpO2 (%) 96  -KF     O2 Delivery Post Treatment supplemental O2  -KF     Post Patient Position Sitting  -KF     Rest Breaks  2  -KF     Row Name 09/14/22 1036          Positioning and Restraints    Pre-Treatment Position other (comment)  sitting on couch with family member, cleared by RN  -KF     Post Treatment Position other  -KF     Other Position return to room with caregiver  seated position call button within reach and within staff view  -KF           User Key  (r) = Recorded By, (t) = Taken By, (c) = Cosigned By    Initials Name Provider Type    KF Areli Hernandez, OT Occupational Therapist               Outcome  Measures     Row Name 09/14/22 1043          How much help from another is currently needed...    Putting on and taking off regular lower body clothing? 3  -KF     Bathing (including washing, rinsing, and drying) 3  -KF     Toileting (which includes using toilet bed pan or urinal) 3  -KF     Putting on and taking off regular upper body clothing 3  -KF     Taking care of personal grooming (such as brushing teeth) 4  -KF     Eating meals 4  -KF     AM-PAC 6 Clicks Score (OT) 20  -KF     Row Name 09/14/22 1043          Functional Assessment    Outcome Measure Options AM-PAC 6 Clicks Daily Activity (OT)  -KF           User Key  (r) = Recorded By, (t) = Taken By, (c) = Cosigned By    Initials Name Provider Type    KF Areli Hernandez OT Occupational Therapist                Occupational Therapy Education                 Title: PT OT SLP Therapies (Resolved)     Topic: Occupational Therapy (Resolved)     Point: ADL training (Resolved)     Description:   Instruct learner(s) on proper safety adaptation and remediation techniques during self care or transfers.   Instruct in proper use of assistive devices.              Learning Progress Summary           Patient Acceptance, E, VU by  at 9/9/2022 1548    Acceptance, E,TB, VU,DU by  at 9/8/2022 0833    Acceptance, E,TB,D, VU,DU,NR by  at 9/6/2022 1517                   Point: Home exercise program (Resolved)     Description:   Instruct learner(s) on appropriate technique for monitoring, assisting and/or progressing therapeutic exercises/activities.              Learning Progress Summary           Patient Acceptance, E,TB, VU,DU by  at 9/8/2022 0833    Acceptance, E,TB,D, VU,DU,NR by  at 9/6/2022 1517                   Point: Precautions (Resolved)     Description:   Instruct learner(s) on prescribed precautions during self-care and functional transfers.              Learning Progress Summary           Patient Acceptance, E, VU by  at 9/9/2022 1548    Acceptance,  E,TB, VU,DU by  at 9/8/2022 0833    Acceptance, E,TB,D, VU,DU,NR by  at 9/6/2022 1517                   Point: Body mechanics (Resolved)     Description:   Instruct learner(s) on proper positioning and spine alignment during self-care, functional mobility activities and/or exercises.              Learning Progress Summary           Patient Acceptance, E,TB, VU,DU by  at 9/8/2022 0833    Acceptance, E,TB,D, VU,DU,NR by  at 9/6/2022 1517                               User Key     Initials Effective Dates Name Provider Type Discipline     06/16/21 -  Nishi Elias RN Registered Nurse Nurse     06/16/21 -  Areli Hernandez, OT Occupational Therapist OT     06/16/21 -  Blanche Jackson, MARIAMA Occupational Therapist OT              OT Recommendation and Plan  Planned Therapy Interventions (OT): activity tolerance training, adaptive equipment training, BADL retraining, occupation/activity based interventions, strengthening exercise, transfer/mobility retraining, functional balance retraining, ROM/therapeutic exercise, patient/caregiver education/training  Therapy Frequency (OT): daily  Plan of Care Review  Plan of Care Reviewed With: patient  Progress: improving  Outcome Evaluation: Pt with great progress towards goals, completed STS with progressed from SBA to SUP with proper management of sternal precautions, completed functional distance of 20ft without AD and SBA using HHA PRN, demonstrated progress to SUP with bed mob with sternal precautions maintained to flat surface, cont IPOT per POC limited by activity tolerance     Time Calculation:    Time Calculation- OT     Row Name 09/14/22 0910             Time Calculation- OT    OT Start Time 0910 -KF      OT Received On 09/14/22  -KF              Timed Charges    18848 - OT Therapeutic Activity Minutes 10  -KF      16014 - OT Self Care/Mgmt Minutes 5  -KF              Total Minutes    Timed Charges Total Minutes 15  -KF       Total Minutes 15  -KF             User Key  (r) = Recorded By, (t) = Taken By, (c) = Cosigned By    Initials Name Provider Type    KF Areli Hernandez, OT Occupational Therapist              Therapy Charges for Today     Code Description Service Date Service Provider Modifiers Qty    [id number]  OT THERAPEUTIC ACT EA 15 MIN 9/14/2022 Areli Hernandez OT GO 1               Areli Hernandez OT  9/14/2022

## 2022-09-15 ENCOUNTER — APPOINTMENT (OUTPATIENT)
Dept: GENERAL RADIOLOGY | Facility: HOSPITAL | Age: 67
End: 2022-09-15

## 2022-09-15 ENCOUNTER — READMISSION MANAGEMENT (OUTPATIENT)
Dept: CALL CENTER | Facility: HOSPITAL | Age: 67
End: 2022-09-15

## 2022-09-15 ENCOUNTER — HOSPITAL ENCOUNTER (EMERGENCY)
Facility: HOSPITAL | Age: 67
Discharge: HOME | End: 2022-09-16
Admitting: EMERGENCY MEDICINE

## 2022-09-15 ENCOUNTER — APPOINTMENT (OUTPATIENT)
Dept: CT IMAGING | Facility: HOSPITAL | Age: 67
End: 2022-09-15

## 2022-09-15 VITALS
SYSTOLIC BLOOD PRESSURE: 128 MMHG | DIASTOLIC BLOOD PRESSURE: 50 MMHG | RESPIRATION RATE: 20 BRPM | HEART RATE: 95 BPM | OXYGEN SATURATION: 92 % | HEIGHT: 75 IN | WEIGHT: 254 LBS | TEMPERATURE: 98.2 F | BODY MASS INDEX: 31.58 KG/M2

## 2022-09-15 DIAGNOSIS — R06.89 HYPERCARBIA: Primary | ICD-10-CM

## 2022-09-15 LAB
A-A DO2: 45.1 MMHG (ref 0–300)
ALBUMIN SERPL-MCNC: 3.24 G/DL (ref 3.5–5.2)
ALBUMIN SERPL-MCNC: 3.36 G/DL (ref 3.5–5.2)
ALBUMIN/GLOB SERPL: 1.2 G/DL
ALBUMIN/GLOB SERPL: 1.2 G/DL
ALP SERPL-CCNC: 65 U/L (ref 39–117)
ALP SERPL-CCNC: 69 U/L (ref 39–117)
ALT SERPL W P-5'-P-CCNC: 20 U/L (ref 1–41)
ALT SERPL W P-5'-P-CCNC: 25 U/L (ref 1–41)
ANION GAP SERPL CALCULATED.3IONS-SCNC: 11 MMOL/L (ref 5–15)
ANION GAP SERPL CALCULATED.3IONS-SCNC: 7.1 MMOL/L (ref 5–15)
ARTERIAL PATENCY WRIST A: POSITIVE
AST SERPL-CCNC: 39 U/L (ref 1–40)
AST SERPL-CCNC: 43 U/L (ref 1–40)
ATMOSPHERIC PRESS: 731 MMHG
BASE EXCESS BLDA CALC-SCNC: 14.8 MMOL/L (ref 0–2)
BASOPHILS # BLD AUTO: 0.04 10*3/MM3 (ref 0–0.2)
BASOPHILS # BLD AUTO: 0.05 10*3/MM3 (ref 0–0.2)
BASOPHILS NFR BLD AUTO: 0.3 % (ref 0–1.5)
BASOPHILS NFR BLD AUTO: 0.4 % (ref 0–1.5)
BDY SITE: ABNORMAL
BILIRUB SERPL-MCNC: 0.5 MG/DL (ref 0–1.2)
BILIRUB SERPL-MCNC: 0.6 MG/DL (ref 0–1.2)
BODY TEMPERATURE: 0 C
BUN SERPL-MCNC: 15 MG/DL (ref 8–23)
BUN SERPL-MCNC: 17 MG/DL (ref 8–23)
BUN/CREAT SERPL: 12.5 (ref 7–25)
BUN/CREAT SERPL: 14.7 (ref 7–25)
CALCIUM SPEC-SCNC: 8.6 MG/DL (ref 8.6–10.5)
CALCIUM SPEC-SCNC: 8.7 MG/DL (ref 8.6–10.5)
CHLORIDE SERPL-SCNC: 93 MMOL/L (ref 98–107)
CHLORIDE SERPL-SCNC: 94 MMOL/L (ref 98–107)
CO2 BLDA-SCNC: 43.1 MMOL/L (ref 22–33)
CO2 SERPL-SCNC: 35 MMOL/L (ref 22–29)
CO2 SERPL-SCNC: 38.9 MMOL/L (ref 22–29)
COHGB MFR BLD: 1.6 % (ref 0–5)
CREAT SERPL-MCNC: 1.16 MG/DL (ref 0.76–1.27)
CREAT SERPL-MCNC: 1.2 MG/DL (ref 0.76–1.27)
CRP SERPL-MCNC: 7.06 MG/DL (ref 0–0.5)
D-LACTATE SERPL-SCNC: 1 MMOL/L (ref 0.5–2)
DEPRECATED RDW RBC AUTO: 52.6 FL (ref 37–54)
DEPRECATED RDW RBC AUTO: 53.5 FL (ref 37–54)
EGFRCR SERPLBLD CKD-EPI 2021: 66.3 ML/MIN/1.73
EGFRCR SERPLBLD CKD-EPI 2021: 69 ML/MIN/1.73
EOSINOPHIL # BLD AUTO: 0.11 10*3/MM3 (ref 0–0.4)
EOSINOPHIL # BLD AUTO: 0.15 10*3/MM3 (ref 0–0.4)
EOSINOPHIL NFR BLD AUTO: 0.8 % (ref 0.3–6.2)
EOSINOPHIL NFR BLD AUTO: 1.2 % (ref 0.3–6.2)
ERYTHROCYTE [DISTWIDTH] IN BLOOD BY AUTOMATED COUNT: 17.1 % (ref 12.3–15.4)
ERYTHROCYTE [DISTWIDTH] IN BLOOD BY AUTOMATED COUNT: 17.4 % (ref 12.3–15.4)
FOLATE SERPL-MCNC: 13.7 NG/ML (ref 4.78–24.2)
GAS FLOW AIRWAY: 2 LPM
GLOBULIN UR ELPH-MCNC: 2.7 GM/DL
GLOBULIN UR ELPH-MCNC: 2.8 GM/DL
GLUCOSE SERPL-MCNC: 130 MG/DL (ref 65–99)
GLUCOSE SERPL-MCNC: 137 MG/DL (ref 65–99)
HCO3 BLDA-SCNC: 41.2 MMOL/L (ref 20–26)
HCT VFR BLD AUTO: 27.4 % (ref 37.5–51)
HCT VFR BLD AUTO: 27.4 % (ref 37.5–51)
HCT VFR BLD CALC: 26.1 % (ref 38–51)
HGB BLD-MCNC: 8.3 G/DL (ref 13–17.7)
HGB BLD-MCNC: 8.4 G/DL (ref 13–17.7)
HGB BLDA-MCNC: 8.5 G/DL (ref 14–18)
IMM GRANULOCYTES # BLD AUTO: 0.05 10*3/MM3 (ref 0–0.05)
IMM GRANULOCYTES # BLD AUTO: 0.13 10*3/MM3 (ref 0–0.05)
IMM GRANULOCYTES NFR BLD AUTO: 0.4 % (ref 0–0.5)
IMM GRANULOCYTES NFR BLD AUTO: 1 % (ref 0–0.5)
INHALED O2 CONCENTRATION: 28 %
LYMPHOCYTES # BLD AUTO: 1.12 10*3/MM3 (ref 0.7–3.1)
LYMPHOCYTES # BLD AUTO: 1.34 10*3/MM3 (ref 0.7–3.1)
LYMPHOCYTES NFR BLD AUTO: 10.3 % (ref 19.6–45.3)
LYMPHOCYTES NFR BLD AUTO: 8.4 % (ref 19.6–45.3)
Lab: ABNORMAL
MCH RBC QN AUTO: 30.5 PG (ref 26.6–33)
MCH RBC QN AUTO: 30.5 PG (ref 26.6–33)
MCHC RBC AUTO-ENTMCNC: 30.3 G/DL (ref 31.5–35.7)
MCHC RBC AUTO-ENTMCNC: 30.7 G/DL (ref 31.5–35.7)
MCV RBC AUTO: 100.7 FL (ref 79–97)
MCV RBC AUTO: 99.6 FL (ref 79–97)
METHGB BLD QL: 0.4 % (ref 0–3)
MODALITY: ABNORMAL
MONOCYTES # BLD AUTO: 1.37 10*3/MM3 (ref 0.1–0.9)
MONOCYTES # BLD AUTO: 1.39 10*3/MM3 (ref 0.1–0.9)
MONOCYTES NFR BLD AUTO: 10.3 % (ref 5–12)
MONOCYTES NFR BLD AUTO: 10.7 % (ref 5–12)
NEUTROPHILS NFR BLD AUTO: 10.65 10*3/MM3 (ref 1.7–7)
NEUTROPHILS NFR BLD AUTO: 76.5 % (ref 42.7–76)
NEUTROPHILS NFR BLD AUTO: 79.7 % (ref 42.7–76)
NEUTROPHILS NFR BLD AUTO: 9.98 10*3/MM3 (ref 1.7–7)
NOTE: ABNORMAL
NRBC BLD AUTO-RTO: 0 /100 WBC (ref 0–0.2)
NRBC BLD AUTO-RTO: 0 /100 WBC (ref 0–0.2)
NT-PROBNP SERPL-MCNC: 4219 PG/ML (ref 0–900)
OXYHGB MFR BLDV: 94 % (ref 94–99)
PCO2 BLDA: 63.6 MM HG (ref 35–45)
PCO2 TEMP ADJ BLD: ABNORMAL MM[HG]
PH BLDA: 7.42 PH UNITS (ref 7.35–7.45)
PH, TEMP CORRECTED: ABNORMAL
PLATELET # BLD AUTO: 313 10*3/MM3 (ref 140–450)
PLATELET # BLD AUTO: 357 10*3/MM3 (ref 140–450)
PMV BLD AUTO: 10.1 FL (ref 6–12)
PMV BLD AUTO: 9.8 FL (ref 6–12)
PO2 BLDA: 75.3 MM HG (ref 83–108)
PO2 TEMP ADJ BLD: ABNORMAL MM[HG]
POTASSIUM SERPL-SCNC: 3.8 MMOL/L (ref 3.5–5.2)
POTASSIUM SERPL-SCNC: 4.2 MMOL/L (ref 3.5–5.2)
PROCALCITONIN SERPL-MCNC: 0.07 NG/ML (ref 0–0.25)
PROT SERPL-MCNC: 6 G/DL (ref 6–8.5)
PROT SERPL-MCNC: 6.1 G/DL (ref 6–8.5)
RBC # BLD AUTO: 2.72 10*6/MM3 (ref 4.14–5.8)
RBC # BLD AUTO: 2.75 10*6/MM3 (ref 4.14–5.8)
SAO2 % BLDCOA: 95.9 % (ref 94–99)
SODIUM SERPL-SCNC: 139 MMOL/L (ref 136–145)
SODIUM SERPL-SCNC: 140 MMOL/L (ref 136–145)
VENTILATOR MODE: ABNORMAL
VIT B12 BLD-MCNC: 459 PG/ML (ref 211–946)
WBC NRBC COR # BLD: 13.03 10*3/MM3 (ref 3.4–10.8)
WBC NRBC COR # BLD: 13.35 10*3/MM3 (ref 3.4–10.8)

## 2022-09-15 PROCEDURE — 97162 PT EVAL MOD COMPLEX 30 MIN: CPT

## 2022-09-15 PROCEDURE — 80053 COMPREHEN METABOLIC PANEL: CPT | Performed by: INTERNAL MEDICINE

## 2022-09-15 PROCEDURE — 71046 X-RAY EXAM CHEST 2 VIEWS: CPT

## 2022-09-15 PROCEDURE — 82375 ASSAY CARBOXYHB QUANT: CPT

## 2022-09-15 PROCEDURE — 83050 HGB METHEMOGLOBIN QUAN: CPT

## 2022-09-15 PROCEDURE — 82077 ASSAY SPEC XCP UR&BREATH IA: CPT | Performed by: STUDENT IN AN ORGANIZED HEALTH CARE EDUCATION/TRAINING PROGRAM

## 2022-09-15 PROCEDURE — 85025 COMPLETE CBC W/AUTO DIFF WBC: CPT | Performed by: INTERNAL MEDICINE

## 2022-09-15 PROCEDURE — 84145 PROCALCITONIN (PCT): CPT | Performed by: INTERNAL MEDICINE

## 2022-09-15 PROCEDURE — 87040 BLOOD CULTURE FOR BACTERIA: CPT | Performed by: EMERGENCY MEDICINE

## 2022-09-15 PROCEDURE — 83605 ASSAY OF LACTIC ACID: CPT | Performed by: EMERGENCY MEDICINE

## 2022-09-15 PROCEDURE — 85025 COMPLETE CBC W/AUTO DIFF WBC: CPT | Performed by: EMERGENCY MEDICINE

## 2022-09-15 PROCEDURE — 99239 HOSP IP/OBS DSCHRG MGMT >30: CPT | Performed by: INTERNAL MEDICINE

## 2022-09-15 PROCEDURE — 36600 WITHDRAWAL OF ARTERIAL BLOOD: CPT

## 2022-09-15 PROCEDURE — 97165 OT EVAL LOW COMPLEX 30 MIN: CPT

## 2022-09-15 PROCEDURE — 87636 SARSCOV2 & INF A&B AMP PRB: CPT | Performed by: EMERGENCY MEDICINE

## 2022-09-15 PROCEDURE — 83735 ASSAY OF MAGNESIUM: CPT | Performed by: STUDENT IN AN ORGANIZED HEALTH CARE EDUCATION/TRAINING PROGRAM

## 2022-09-15 PROCEDURE — 82805 BLOOD GASES W/O2 SATURATION: CPT

## 2022-09-15 PROCEDURE — 93005 ELECTROCARDIOGRAM TRACING: CPT | Performed by: EMERGENCY MEDICINE

## 2022-09-15 PROCEDURE — 99284 EMERGENCY DEPT VISIT MOD MDM: CPT

## 2022-09-15 PROCEDURE — 86140 C-REACTIVE PROTEIN: CPT | Performed by: INTERNAL MEDICINE

## 2022-09-15 PROCEDURE — 36415 COLL VENOUS BLD VENIPUNCTURE: CPT

## 2022-09-15 PROCEDURE — 80053 COMPREHEN METABOLIC PANEL: CPT | Performed by: EMERGENCY MEDICINE

## 2022-09-15 PROCEDURE — 71045 X-RAY EXAM CHEST 1 VIEW: CPT

## 2022-09-15 PROCEDURE — 83880 ASSAY OF NATRIURETIC PEPTIDE: CPT | Performed by: INTERNAL MEDICINE

## 2022-09-15 PROCEDURE — 93010 ELECTROCARDIOGRAM REPORT: CPT | Performed by: INTERNAL MEDICINE

## 2022-09-15 PROCEDURE — 71045 X-RAY EXAM CHEST 1 VIEW: CPT | Performed by: RADIOLOGY

## 2022-09-15 RX ORDER — AMIODARONE HYDROCHLORIDE 200 MG/1
TABLET ORAL
Qty: 37 TABLET | Refills: 0 | Status: SHIPPED | OUTPATIENT
Start: 2022-09-15 | End: 2022-09-22 | Stop reason: HOSPADM

## 2022-09-15 RX ORDER — ATORVASTATIN CALCIUM 40 MG/1
40 TABLET, FILM COATED ORAL NIGHTLY
Qty: 30 TABLET | Refills: 0 | Status: SHIPPED | OUTPATIENT
Start: 2022-09-15 | End: 2022-10-25

## 2022-09-15 RX ORDER — SODIUM CHLORIDE 0.9 % (FLUSH) 0.9 %
10 SYRINGE (ML) INJECTION AS NEEDED
Status: DISCONTINUED | OUTPATIENT
Start: 2022-09-15 | End: 2022-09-16 | Stop reason: HOSPADM

## 2022-09-15 RX ORDER — PANTOPRAZOLE SODIUM 40 MG/1
40 TABLET, DELAYED RELEASE ORAL
Qty: 60 TABLET | Refills: 0 | Status: SHIPPED | OUTPATIENT
Start: 2022-09-15 | End: 2022-10-25

## 2022-09-15 RX ADMIN — AMIODARONE HYDROCHLORIDE 200 MG: 200 TABLET ORAL at 09:02

## 2022-09-15 RX ADMIN — METOPROLOL TARTRATE 12.5 MG: 25 TABLET, FILM COATED ORAL at 09:02

## 2022-09-15 RX ADMIN — ASPIRIN 81 MG: 81 TABLET, COATED ORAL at 09:03

## 2022-09-15 RX ADMIN — PANTOPRAZOLE SODIUM 40 MG: 40 TABLET, DELAYED RELEASE ORAL at 06:32

## 2022-09-15 NOTE — PROGRESS NOTES
Patient Assessment Instrument  Quality Indicators - Discharge    Section GG. Self-Care Performance      Section GG. Mobility Performance      Section J. Health Conditions Discharge      Section M. Skin Conditions Discharge  Unhealed Pressure Ulcer(s)/Injurie(s) at Stage 1 or Higher:  No    . Current Number of Unhealed Pressure Ulcers      Section N. Medication    Signed by: Radha Wesley Nurse

## 2022-09-15 NOTE — SIGNIFICANT NOTE
09/14/22 1625   Living Environment   People in Home spouse   Name(s) of People in Home Anette Myers-spouse   Current Living Arrangements home   Primary Care Provided by self   Provides Primary Care For no one   Family Caregiver if Needed spouse   Family Caregiver Names Anette Myers-spouse   Quality of Family Relationships helpful;involved;supportive   Able to Return to Prior Arrangements yes   Living Arrangement Comments SS spoke to pt who state living at home with spouse Anette.  Pt and spouse have seven children:  Meenakshi Gibran ([place]), Milagros Fernando ([place]), Titalesleydamien Brown ([place]), Reji Myers ([place]), Stanislaw Myers ([place]), Santiago Myers ([place]), and Geo Myers ([place]).  Sons who are in college come to pt's home when they are not in school.  Pt receives Social Security income, Medicare A&B, and does not have prescription insurance.  PCP is Dr. Spencer Saeed.  Pt uses [facility].  Spouse is POA.  Pt does not have a living will.  Pt was independent with mobility, ADL's and driving prior to hospital admission.   Resource/Environmental Concerns   Resource/Environmental Concerns none   Transportation Concerns none   Transition Planning   Patient/Family Anticipates Transition to home with family   Patient/Family Anticipated Services at Transition durable medical equipment;outpatient care   Transportation Anticipated family or friend will provide   Discharge Needs Assessment (IRF)   Concerns to be Addressed   (Pt did not receive home health or outpatient therapy prior to hospital admission.)   Equipment Currently Used at Home bp cuff;pulse ox   Discharge Coordination/Progress Pt plans to return home with spouse at discharge who can assist with any caregiving needs.  Team conference to be held on 9-20-22.  Pt is hopeful for a short rehab stay.  SS will follow and assist with discharge planning.

## 2022-09-15 NOTE — PROGRESS NOTES
Patient Assessment Instrument  Quality Indicators - Discharge    Section GG. Self-Care Performance      Section GG. Mobility Performance     Roll Left and Right: Patient completed the activities by him/herself with no  assistance from a helper.   Sit to Lying: Patient completed the activities by him/herself with no  assistance from a helper.   Lying to Sitting on Side of Bed: Patient completed the activities by  him/herself with no assistance from a helper.   Sit to Stand: [place] provides verbal cues and/or touching/steadying and/or  contact guard assistance as patient completes activity. Assistance may be  provided throughout the activity or intermittently.   Chair/Bed to Chair Transfer: [place] provides verbal cues and/or  touching/steadying and/or contact guard assistance as patient completes  activity. Assistance may be provided throughout the activity or intermittently.   Toilet Transfer [place] provides verbal cues and/or touching/steadying and/or  contact guard assistance as patient completes activity. Assistance may be  provided throughout the activity or intermittently.   Car Transfer: [place] provides verbal cues and/or touching/steadying and/or  contact guard assistance as patient completes activity. Assistance may be  provided throughout the activity or intermittently.   Walk 10 Feet:   [place] provides verbal cues and/or touching/steadying and/or  contact guard assistance as patient completes activity. Assistance may be  provided throughout the activity or intermittently.  Walk 50 Feet with 2 Turns:   Em[place] provides verbal cues and/or  touching/steadying and/or contact guard assistance as patient completes  activity. Assistance may be provided throughout the activity or intermittently.  Walk 150 Feet:   Em[place] provides verbal cues and/or touching/steadying and/or  contact guard assistance as patient completes activity. Assistance may be  provided throughout the activity or intermittently.  Walking 10 Feet  on Uneven Surfaces:   Not applicable.  1 Step Over Curb or Up/Down Stair:   [place] provides verbal cues and/or  touching/steadying and/or contact guard assistance as patient completes  activity. Assistance may be provided throughout the activity or intermittently.  4 Steps Up and Down, With/Without Rail:   [place] provides verbal cues and/or  touching/steadying and/or contact guard assistance as patient completes  activity. Assistance may be provided throughout the activity or intermittently.  12 Steps Up and Down, With/Without Rail:   Not applicable.  Picking up an Object:   Not attempted due to medical or safety concerns. Uses  Wheelchair and/or Scooter: No    Section J. Health Conditions Discharge      Section M. Skin Conditions Discharge      . Current Number of Unhealed Pressure Ulcers      Section N. Medication    Signed by: Wai Kay, PT

## 2022-09-15 NOTE — SIGNIFICANT NOTE
09/15/22 0950   Plan   Plan Faxed discharge summary with O2 sat results to [facility] via [employer].  Faxed ambulatory referral for outpatient PT, face sheet, H&P, and discharge summary to [phone].  Informed pt about outpatient PT to provided in his home on 9-16-22 at 3:30 pm by [facility] PT and he is agreeable.  Informed him portable O2 to be delivered to rehab and O2 concentrator will be delivered to his home.

## 2022-09-15 NOTE — PROGRESS NOTES
Rehabilitation Nursing  Inpatient Rehabilitation Plan of Care Note    Plan of Care  Pain    Pain Management (Active)  Current Status (9/14/2022 3:24:00 PM): Potential for pain  Weekly Goal: No pain this week  Discharge Goal: No pain    Safety    Potential for Injury (Active)  Current Status (9/14/2022 3:24:00 PM): At risk for injury  Weekly Goal: No injury this week  Discharge Goal: No i njury    Signed by: Sheila Barker, Supervisor

## 2022-09-15 NOTE — PROGRESS NOTES
Case Management  Inpatient Rehabilitation Plan of Care and Discharge Plan Note    Rehabilitation Diagnosis:  debility  Date of Onset:  9-2-22    Medical Summary:  debility, AVR    Plan of Care      Expected Intensity:  Average of 3 hours of therapy 5 days/week.  Interdisciplinary Team:  Interdisciplinary Team: Medical Supervision and 24 Hour Rehabilitation Nursing.,  Physical Therapy:, Occupational Therapy:, Social Work, Therapeutic Recreation.,  Respiratory Therapy.  Physical Therapy Intensity/Duration: PT 1.5 hours per day/5 days per week  Occupational Therapy Intensity/Duration: OT 1.5 hours per day/5 days per week  Estimated Length of Stay/Anticipated Discharge Date: 5-7 days  Anticipated Discharge Destination:  Anticipated discharge destination from inpatient rehabilitation is community  discharge with assistance. Pt plans to return home with spouse at discharge.      Based on the patient's medical and functional status, their prognosis and  expected level of functional improvement is:  good    Signed by: ELVIA Galeano

## 2022-09-15 NOTE — PLAN OF CARE
Goal Outcome Evaluation:      New admit to physical rehab. PT and OT to eval. Continue current plan of care.

## 2022-09-15 NOTE — THERAPY EVALUATION
Inpatient Rehabilitation - Occupational Therapy Initial Evaluation     [place]     Patient Name: Damián Myers  : 1955  MRN: [id number]    Today's Date: 9/15/2022                 Admit Date: 2022         ICD-10-CM ICD-9-CM   1. Diastolic CHF due to valvular disease ([facility])  I50.30 428.30    I38 424.90   2. Debility  R53.81 799.3       Patient Active Problem List   Diagnosis   • Obesity (BMI 30-39.9)   • Coronary artery disease involving native coronary artery of native heart with angina pectoris ([facility])   • Stroke (cerebrum) ([facility])   • Essential hypertension   • Hyperlipidemia LDL goal <70   • Diastolic CHF due to valvular disease ([facility])   • Severe mitral regurgitation status post bioprosthetic MVR, 2022   • Paroxysmal atrial fibrillation ([facility])   • Post-op pericardial effusion   • Debility       Past Medical History:   Diagnosis Date   • CAD (coronary artery disease)    • Cardiac abnormality    • CHF (congestive heart failure) ([facility])    • Coma of unknown cause ([facility])     Pt states he was in a coma for one week, unknown cause   • COVID-19 10/25/2021   • Hypertension    • Mitral regurgitation    • Obesity (BMI 30-39.9) 2019   • Spinal headache        Past Surgical History:   Procedure Laterality Date   • CARDIAC CATHETERIZATION N/A 2019    Procedure: Left Heart Cath;  Surgeon: Sherrell Hart MD;  Location:  [facility] CATH INVASIVE LOCATION;  Service: Cardiology   • CARDIAC CATHETERIZATION N/A 2022    Procedure: Left Heart Cath;  Surgeon: Greyson Balderas MD;  Location:  [facility]ATH [facility] LOCATION;  Service: Cardiology;  Laterality: N/A;   • COLONOSCOPY     • CORONARY STENT PLACEMENT     • ENDOSCOPY N/A 2022    Procedure: ESOPHAGOGASTRODUODENOSCOPY;  Surgeon: Leann Fitzpatrick MD;  Location: [facility] ENDOSCOPY;  Service: Gastroenterology;  Laterality: N/A;   • MITRAL VALVE REPAIR/REPLACEMENT N/A 2022    Procedure: MEDIAN STERNOTOMY MITRAL VALVE REPLACEMENT, LEFT ATRIAL  APPENDAGE LIGATION AND KENNEY PER ANESTHESIA;  Surgeon: Lyle Chan MD;  Location: [facility];  Service: Cardiothoracic;  Laterality: N/A;             IRF OT ASSESSMENT FLOWSHEET (last 12 hours)     IRF OT Evaluation and Treatment     Row Name 09/15/22 1300          OT Time and Intention    Document Type initial evaluation  evaluation only - D/C on this date  -     Mode of Treatment occupational therapy  -     Symptoms Noted During/After Treatment none  -     Row Name 09/15/22 1300          General Information    Patient Profile Reviewed yes  -     Existing Precautions/Restrictions cardiac;fall;oxygen therapy device and L/min;sternal;lifting  no lifting greater than 10 lbs  -     Row Name 09/15/22 1300          Previous Level of Function/Home Environm    BADLs, Premorbid Functional Level independent  -     Row Name 09/15/22 1300          Living Environment    Current Living Arrangements home  -     People in Home spouse  spouse can assist at D/C  -     Primary Care Provided by self  -     Row Name 09/15/22 1300          Home Use of Assistive/Adaptive Equipment    Equipment Currently Used at Home none  -     Row Name 09/15/22 1300          Cognition/Psychosocial    Follows Commands (Cognition) WFL  -     Row Name 09/15/22 1300          Range of Motion Comprehensive    Comment, General Range of Motion BUE AROM - WFL, except toney shlds- at least 1/2, deferred further shld AROM  -     Row Name 09/15/22 1300          Strength Comprehensive (MMT)    Comment, General Manual Muscle Testing (MMT) Assessment BUE - deferred  -     Row Name 09/15/22 1300          Sensory    Additional Documentation --  BUE light touch - WFL  -     Row Name 09/15/22 1300          Vision Assessment/Intervention    Visual Impairment/Limitations corrective lenses for reading  -     Row Name 09/15/22 1300          Bathing    Comment (Bathing) SBA  -     Row Name 09/15/22 1300          Upper Body Dressing    Comment  (Upper Body Dressing) Set up  -     Row Name 09/15/22 1300          Lower Body Dressing    Comment (Lower Body Dressing) SBA  -     Row Name 09/15/22 1300          Grooming    Comment (Grooming) Set up  -     Row Name 09/15/22 1300          Toileting    Assistive Device Use (Toileting) raised toilet seat;grab bar/safety frame  -     Comment (Toileting) SBA  -     Row Name 09/15/22 1300          Self-Feeding    [place] Level (Self-Feeding) modified independence  -     Row Name 09/15/22 1300          Transfers    [place] Level (Toilet Transfer) standby assist;verbal cues  -     Assistive Device (Toilet Transfer) raised toilet seat;grab bars/safety frame  -     Row Name 09/15/22 1300          Motor Skills    Coordination bilateral;upper extremity;WFL  -     Functional Endurance decreased  -[facility] Name 09/15/22 1300          Positioning and Restraints    Post Treatment Position wheelchair  -     In Wheelchair sitting;call light within reach;encouraged to call for assist;notified nsg  -[facility] Name 09/15/22 1300          Vital Signs    Intra SpO2 (%) 94  -     O2 Delivery Intra Treatment supplemental O2  -[facility] Name 09/15/22 1300          Therapy Assessment/Plan (OT)    Patient's Goals For Discharge return home;take care of myself at home  -[facility] Name 09/15/22 1300          Therapy Assessment/Plan (OT)    OT Diagnosis Debility - s/p MVR  -     Frequency of Treatment (OT) --  evaluation only  -     Estimated Duration of Therapy (OT) --  evaluation only - D/C on this date  -     Row Name 09/15/22 1300          Therapy Plan Review/Discharge Plan (OT)    Anticipated Discharge Disposition (OT) home with 24/7 care  Pt declined further OT services at D/C.  -     Row Name 09/15/22 1300          Discharge Summary (OT)    Discharge Summary Statement (OT) Education provided re:  ADL status, safety, DME, home program, 24 hr supervision, AE, precautions, lifting restrictions,  and D/C concerns.  Verbalized understanding.  -           User Key  (r) = Recorded By, (t) = Taken By, (c) = Cosigned By    Initials Name Effective Dates     Urvashi Decker OT 06/16/21 -                  Occupational Therapy Education                 Title: PT OT SLP Therapies (Done)     Topic: Occupational Therapy (Resolved)     Point: ADL training (Resolved)     Description:   Instruct learner(s) on proper safety adaptation and remediation techniques during self care or transfers.   Instruct in proper use of assistive devices.              Learning Progress Summary           Patient Acceptance, E,D,H, VU by  at 9/15/2022 1419                   Point: Home exercise program (Resolved)     Description:   Instruct learner(s) on appropriate technique for monitoring, assisting and/or progressing therapeutic exercises/activities.              Learning Progress Summary           Patient Acceptance, E,D,H, VU by  at 9/15/2022 1419                   Point: Precautions (Resolved)     Description:   Instruct learner(s) on prescribed precautions during self-care and functional transfers.              Learning Progress Summary           Patient Acceptance, E,D,H, VU by  at 9/15/2022 1419                   Point: Body mechanics (Resolved)     Description:   Instruct learner(s) on proper positioning and spine alignment during self-care, functional mobility activities and/or exercises.              Learner Progress:  Not documented in this visit.                      User Key     Initials Effective Dates Name Provider Type Discipline     06/16/21 -  Urvashi Decker OT Occupational Therapist OT                    OT Recommendation and Plan    Anticipated Discharge Disposition (OT): home with 24/7 care                    Time Calculation:       Therapy Charges for Today     Code Description Service Date Service Provider Modifiers Qty    [id number] HC OT EVAL LOW COMPLEXITY 4 9/15/2022 Urvashi Decker OT GO 1                    Urvashi Decker, OT  9/15/2022

## 2022-09-15 NOTE — PROGRESS NOTES
PPS CMG Coordinator  Inpatient Rehabilitation Admission    Ethnic Group: White.  Marital Status:  Marital Status: .    IRF Admission Date:  09/14/2022  Admission Class: Initial Rehab.  Admit From:  [facility]    Pre-Hospital Living: Home. Pre-Hospital Living  With: (2) Family/Relatives.    Payment Sources: Primary: Medicare Fee for Service  Secondary: Not Listed.  Impairment Group: 16 Debility (non-cardiac, non-pulmonary)  Date of Onset of Impairment: 09/02/2022    Etiologic Diagnosis Code(s):  Rank Code      Description  1    I34.0     Nonrheumatic mitral (valve) insufficiency    Comorbidities:      Height on Admission: 75 inches.  Weight on Admission: 254 pounds.    Are there any arthritis conditions recorded for Impairment Group, Etiologic  Diagnosis, or Comorbid Conditions that meet all of the regulatory requirements  for IRF classification (in 42 .29(b)(2)(x), (xi), and xii))?    MICHELLE Bladder Accidents:  0 - Accidents.  Bladder Score = 5.  One (1) bladder accident. urinal  MICHELLE Bowel Accident: 0 -Accidents.  Bowel Score = 7. Patient has no accidents.    Signed by: Sheila Barker, Supervisor

## 2022-09-15 NOTE — SIGNIFICANT NOTE
09/15/22 0905   Plan   Plan Pt is being discharged today.  PT recommends outpatient PT 3 x wk x 4 wks and MD says pt will need home O2 at 2L NC continuous with portable O2.  Spoke to pt about discharge and recommendation for outpatient PT 3 x wk x 4 wks and home O2 at 2L NC continuous with portable O2 tank.  OT to evaluate pt this morning.  Pt says he does not want outpatient OT.  Pt prefers [facility] for outpatient therapy and wants to know what his co-pay will be per visit per deciding if he will go 3 x wk or less.  [facility] is preferred for home oxygen.  Spouse is aware of discharge and will be coming back to transport him home when he is ready to leave.  Contacted [phone] per preference per MARITZA Arechiga about discharge and Medicare part B co-pay per PT visit.  Geni says co-pay should be around $20 per 1 hour visit but could be more depending on what is performed during session.  Geni says she will doing outpatient PT at home instead of in-office.  Reviewed this with pt who is agreeable and only wants outpatient PT 1 x week; MD informed.  Reviewed this with MARITZA Arechiga and she scheduled pt for PT evaluation at his home on 9-16-22 at 3:30 pm.  Pt will need outpatient PT 1 x wk x 4wks evaluate and treat for strengthening, endurance, advanced gait training, balance, therapeutic exercise, bed mobility, home safety, steps/stairs.  Pt will fax orders and records to [facility].  Contacted [facility] [phone] per preference per Yahaira with discharge and order for home O2 at 2L NC continuous with portable O2.  Spouse to be contacted about delivery of home O2 concentrator.  Portable O2 tank to be delivered to rehab this am.  Faxed face sheet, H&P and MD order for home O2 to [facility] via [employer].  MD checked O2 sats on RA at rest this am which was 81 %; this will be included in discharge summary which will be faxed to [facility] when completed.  Pt is going home with  spouse Anette today who will assist with any needs.   Patient/Family in Agreement with Plan yes   Provided Post Acute Provider List? Yes   Post Acute Provider List DME Supplier;Outpatient Therapy   Provided Post Acute Provider Quality & Resource List? Yes   Delivered To Patient   Method of Delivery In person

## 2022-09-15 NOTE — PROGRESS NOTES
Patient Assessment Instrument  Quality Indicators - Discharge    Section GG. Self-Care Performance     Eating: Patient completed the activities by him/herself with no assistance from  a helper.   Oral Hygiene: Warne sets up or cleans up; patient completes activity. Warne  assists only prior to or following the activity.   Toileting Hygiene: : [place] provides verbal cues and/or touching/steadying  and/or contact guard assistance as patient completes activity.   Shower/Bathe Self: [place] provides verbal cues and/or touching/steadying and/or  contact guard assistance as patient completes activity.   Upper Body Dressing: Warne sets up or cleans up; patient completes activity.  [place] assists only prior to or following the activity.   Lower Body Dressing: [place] provides verbal cues and/or touching/steadying  and/or contact guard assistance as patient completes activity.   Putting On/Taking Off Footwear: Warne provides verbal cues and/or  touching/steadying and/or contact guard assistance as patient completes  activity.    Section GG. Mobility Performance      Section J. Health Conditions Discharge      Section M. Skin Conditions Discharge      . Current Number of Unhealed Pressure Ulcers      Section N. Medication    Signed by: Urvashi Decker, Occupational Therapist

## 2022-09-15 NOTE — PROGRESS NOTES
"Patient Assessment Instrument  Quality Indicators - Admission    Section B. Hearing, Speech Vision      Section C. Cognitive Patterns  Brief Interview for Mental Status (BIMS) was conducted.  Repetition of Three Words: Three words  Able to report correct year: Correct  Able to report correct month: Accurate within 5 days  Able to report correct day of the week: Correct  Able to recall \"sock\": Yes, no cue required  Able to recall \"blue\": Yes, no cue required  Able to recall \"bed\": Yes, no cue required    BIMS SUMMARY SCORE: 15 Cognitively intact Patient was able to complete the Brief  Interview for Mental Status    Section [id number]. Prior Functioning    Self Care: Patient completed the activities by him/herself, with or without an  assistive device, with no assistance from a helper.  Indoor Mobility: Patient completed the activities by him/herself, with or  without an assistive device, with no assistance from a helper.  Stairs: Patient completed the activities by him/herself, with or without an  assistive device, with no assistance from a helper.  Functional Cognition: Patient completed the activities by him/herself, with or  without an assistive device, with no assistance from a helper.    Section BB9950. Prior Device Use      Section CM1472. Self Care Performance      Section [id number]. Self Care Discharge Goals      Section GM3280. Mobility Performance      Section SY5822. Mobility Discharge Goals      Section H. Bladder and Bowel  Bladder Continence: Always continent (no documented incontinence).  Bowel Continence: Always continent (no documented incontinence).    Section I. Active Diagnosis  Comorbidities and Co-existing Conditions:   Patient does not have PAD, PVD, or  Diabetes Mellitus    Section J. Health Conditions  Patient has not had any falls in the past year. Patient has had major surgery  during the 100 days prior to admission.    Section K. Swallowing/Nutritional Status  Regular food (solids and liquids " swallowed safely without supervision or  modified food or liquid consistency).    Section M. Skin Conditions  Unhealed Pressure Ulcer/Injuries at Stage 1 or Higher on Admission:  No.    Section N. Medication    Potential Clinically Significant Medication Issues: No issues found during  review    Section O. Special Treatments, Procedures, and Programs  Patient did not receive total parenteral nutrition treatment at the time of  admission.    OPTIONAL BRANCH FOR TRACKING FALLS  Fall(s) During Shift: No falls.    Signed by: Sheila Barker, Supervisor

## 2022-09-15 NOTE — PROGRESS NOTES
Patient Assessment Instrument  Quality Indicators - Discharge    Section GG. Self-Care Performance      Section GG. Mobility Performance      Section J. Health Conditions Discharge  Fall(s) Since Admission:  No    Section M. Skin Conditions Discharge  Unhealed Pressure Ulcer(s)/Injurie(s) at Stage 1 or Higher:  No    . Current Number of Unhealed Pressure Ulcers      Section N. Medication    Medication Intervention: N/A - There were no potential clinically significant  medication issues identified since admission or patient is not taking any  medications.    Signed by: Sheila Barker, Supervisor

## 2022-09-15 NOTE — PROGRESS NOTES
Patient Assessment Instrument  Quality Indicators - Admission    Section B. Hearing, Speech Vision      Section C. Cognitive Patterns      Section [id number]. Prior Functioning      Section OU5125. Prior Device Use  Patient does not use manual or motorized wheelchair or scooter, mechanical lift,  walker, or an orthotic/prosthesis.    Section [id number]. Self Care Performance      Section LD6895. Self Care Discharge Goals      Section DH3612. Mobility Performance      Section QG9184. Mobility Discharge Goals      Section H. Bladder and Bowel      Section I. Active Diagnosis      Section J. Health Conditions      Section K. Swallowing/Nutritional Status      Section M. Skin Conditions      Section N. Medication      Section O. Special Treatments, Procedures, and Programs      OPTIONAL BRANCH FOR TRACKING FALLS  Fall(s) During Shift:    Signed by: ELVIA Galeano

## 2022-09-15 NOTE — OUTREACH NOTE
Prep Survey    Flowsheet Row Responses   [facility] patient discharged from? [place]   Is LACE score < 7 ? No   Emergency Room discharge w/ pulse ox? No   Eligibility Readm Mgmt   Discharge diagnosis Debility/Hx of CHF   Does the patient have one of the following disease processes/diagnoses(primary or secondary)? CHF   Does the patient have Home health ordered? No   Is there a DME ordered? Yes   What DME was ordered? Oxygen from [facility]   Comments regarding appointments Has otpt PT being set up   Prep survey completed? Yes          MARTI PIERCE - Registered Nurse

## 2022-09-15 NOTE — PROGRESS NOTES
Occupational Therapy: Individual: 55 minutes.    Physical Therapy:    Speech Language Pathology:    Signed by: Urvashi Decker, Occupational Therapist

## 2022-09-15 NOTE — PROGRESS NOTES
Inpatient Rehabilitation Functional Measures Assessment and Plan of Care    Plan of Care      Functional Measures  MICHELLE Eating:  MICHELLE Grooming:  MICHELLE Bathing:  MICHELLE Upper Body Dressing:  MICHELLE Lower Body Dressing:  MICHELLE Toileting:    MICHELLE Bladder Management  Level of Assistance:  Frequency/Number of Accidents this Shift:    MICHELLE Bowel Management  Level of Assistance:  Frequency/Number of Accidents this Shift:    MICHELLE Bed/Chair/Wheelchair Transfer:  Bed/chair/wheelchair Transfer Score = 5.  Patient is supervision/set-up for transferring to and from the  bed/chair/wheelchair, requiring: Stand by assistance. No assistive devices were  required.  MICHELLE Toilet Transfer:  MICHELLE Tub/Shower Transfer:    Previously Documented Mode of Locomotion at Discharge:  MICHELLE Expected Mode of Locomotion at Discharge: The expected mode of most  frequently used locomotion, at discharge, is expected to be walking.  MICHELLE Walk/Wheelchair:  WHEELCHAIR OBSERVATION   Wheelchair did not occur.    WALK OBSERVATION   Walk Distance Scale = 3.  Distance walked is greater than 150 feet. Walk Score  = 5.  Patient requires supervision or set up for walking. Stand by assistance.  Patient walked a distance of  160 feet. No assistive devices were required.  MICHELLE Stairs:  Stairs Score = 2.  Patient requires supervision for household  negotiation of stairs. Patient negotiated  6 stairs. No assistive devices were  required.    MICHELLE Comprehension:  MICHELLE Expression:  MICHELLE Social Interaction:  MICHELLE Problem Solving:  MICHELLE Memory:    Therapy Mode Minutes  Occupational Therapy:  Physical Therapy: Individual: 45 minutes.  Speech Language Pathology:    Discharge Functional Goals:    Signed by: Wai Kay PT

## 2022-09-15 NOTE — DISCHARGE SUMMARY
"Date of admission: 2022  Date of discharge: 9/15/2022    Principal diagnosis: Generalized debility complicated by recent open heart surgery for mitral valve replacement with a porcine valve.  Patient's course was prolonged while in the hospital due to recurrent atrial fibrillation and GI bleed, he was admitted here for rehabilitation.    Secondary diagnoses:  -Atrial fibrillation persistent now not chronic anticoagulated due to recent GI bleed  -Coronary disease status post PCI in the past  -Status post recent open heart surgery with porcine valve replacement  -GI bleed secondary to peptic ulcer disease  -Hypoxic respiratory failure secondary to diastolic CHF and chronic elevation of right hemidiaphragm  -HFpEF slight exacerbation, being discharged on Lasix as needed which he states he knows how to take\" get rid of the fluid  Impaired fasting glucose    Consultants: None    Procedures:  Chest x-ray showin.  Tiny bilateral pleural effusions.  2.  Cardiomegaly again noted.  3.  Coarsened interstitial markings noted throughout the lungs.    Exam: Patient is wanting to go home he did not rest well and states he could recover better at home.  He did ambulate with physical therapy down the [place]isano out the door and got in a car and came back him.  He had to take some rest breaks but overall did well.  Lungs have bilateral breath sounds decreased at the right base but otherwise clear, heart irregular irregular controlled rhythm without murmur room air saturation was 81%, vital signs otherwise temperature 98.8, on 2 L saturation 94%, blood pressure 128/82, pulse 95 and irregularly irregular, yesterday I did evaluate his chest incision looked excellent without evidence of drainage or secondary infection.  He has trace to 1+ edema of his lower extremities.  He is alert oriented    Hospital course: Patient arrived here from [facility] after he had to have the above procedures done.  Patient was not wanting to " just stay in bed and was wanting to walk up and down the halls on his own and we did explain that he needed to have evaluation by therapy to set levels prior to him being able to ambulate however he decided that he wanted to go home.  He thinks he could recover better at home.  He has been seen now by physical and occupational therapy, home exercises have been given and we are setting up home therapy as well.  We will arrange home oxygen.  Chest x-ray noted above.  I have encouraged him to take Lasix dose when he gets home and then as needed as previously prescribed.    Disposition: Home with his wife    Follow-up:  PCP 1 week  Slime Pyle as scheduled 9/21  Marc Leos is scheduled 9/30  Cardiothoracic surgery Aidee Chandler as scheduled 10/4  Dr. Rea gastroenterology as scheduled November 18 for follow-up of possible repeat EGD  PT 1 time a week for 4 weeks    Diet: Cardiac constant carbohydrate    Activity as tolerated with assistance with restrictions from his open heart surgery as given by surgeon    Condition: Stable    Medication:  Amiodarone 200 mg twice daily for 6 days then daily  Aspirin 81 mg a day  Lipitor 40 mg a day  Lasix 40 mg as needed he is to take a dose when he gets home  Metoprolol 12.5 mg twice daily  Protonix 40 mg twice daily    Greater than 30-minute discharge

## 2022-09-15 NOTE — DISCHARGE INSTR - OTHER ORDERS
Pt will receive outpatient PT 1 x week x 4 weeks in his home from [facility] [phone].  Pt has an appointment on 9-16-22 at 3:30 pm for PT evaluation which will take place in his home.      [facility] [phone] for home O2 at 2L NC continuous with portable oxygen.

## 2022-09-15 NOTE — SIGNIFICANT NOTE
09/15/22 1429   OT Discharge Summary   Anticipated Discharge Disposition (OT) home with 24/7 care   Reason for Discharge Discharge from facility   Discharge Destination Home with assist;other (comment)  (Pt declined OT services at D/C.)

## 2022-09-15 NOTE — SIGNIFICANT NOTE
09/15/22 3743   Plan   Plan Spoke to spouse [phone] about discharge, outpatient PT 1 x wk x 4 wks with [facility] providing outpatient PT in pt's home on 9-16-22 at 3:30 pm, and need for home O2 at 2L NC continuous with portable O2 which was ordered from [facility].  Informed spouse portable O2 to be delivered to rehab and home O2 concentrator to be delivered to their home today; [facility] to be contacting her.  Spouse will come to rehab when pt is ready for discharge and transport him home.   Patient/Family in Agreement with Plan yes

## 2022-09-15 NOTE — THERAPY DISCHARGE NOTE
Inpatient Rehabilitation - Physical Therapy Initial Evaluation        [facility]     Patient Name: Damián Myers  : 1955  MRN: [id number]    Today's Date: 9/15/2022                    Admit Date: 2022      Visit Dx:     ICD-10-CM ICD-9-CM   1. Diastolic CHF due to valvular disease ([facility])  I50.30 428.30    I38 424.90   2. Debility  R53.81 799.3       Patient Active Problem List   Diagnosis   • Obesity (BMI 30-39.9)   • Coronary artery disease involving native coronary artery of native heart with angina pectoris ([facility])   • Stroke (cerebrum) ([facility])   • Essential hypertension   • Hyperlipidemia LDL goal <70   • Diastolic CHF due to valvular disease ([facility])   • Severe mitral regurgitation status post bioprosthetic MVR, 2022   • Paroxysmal atrial fibrillation ([facility])   • Post-op pericardial effusion   • Debility       Past Medical History:   Diagnosis Date   • CAD (coronary artery disease)    • Cardiac abnormality    • CHF (congestive heart failure) ([facility])    • Coma of unknown cause ([facility])     Pt states he was in a coma for one week, unknown cause   • COVID-19 10/25/2021   • Hypertension    • Mitral regurgitation    • Obesity (BMI 30-39.9) 2019   • Spinal headache        Past Surgical History:   Procedure Laterality Date   • CARDIAC CATHETERIZATION N/A 2019    Procedure: Left Heart Cath;  Surgeon: Sherrell Hart MD;  Location:  [facility];  Service: Cardiology   • CARDIAC CATHETERIZATION N/A 2022    Procedure: Left Heart Cath;  Surgeon: Greyson Balderas MD;  Location:  [facility];  Service: Cardiology;  Laterality: N/A;   • COLONOSCOPY     • CORONARY STENT PLACEMENT     • ENDOSCOPY N/A 2022    Procedure: ESOPHAGOGASTRODUODENOSCOPY;  Surgeon: Leann Fitzpatrick MD;  Location: [facility] ENDOSCOPY;  Service: Gastroenterology;  Laterality: N/A;   • MITRAL VALVE REPAIR/REPLACEMENT N/A 2022    Procedure: MEDIAN STERNOTOMY MITRAL VALVE  REPLACEMENT, LEFT ATRIAL APPENDAGE LIGATION AND KENNEY PER ANESTHESIA;  Surgeon: Lyle Chan MD;  Location: [facility];  Service: Cardiothoracic;  Laterality: N/A;       PT ASSESSMENT (last 12 hours)     IRF PT Evaluation and Treatment     Row Name 09/15/22 1000          PT Time and Intention    Document Type discharge evaluation  -KM     Mode of Treatment individual therapy;physical therapy  -     Row Name 09/15/22 1000          General Information    Patient Profile Reviewed yes  -KM     Existing Precautions/Restrictions cardiac;oxygen therapy device and L/min;fall;sternal  -KM     Row Name 09/15/22 1000          Living Environment    Current Living Arrangements home  -KM     People in Home spouse  -KM     Primary Care Provided by self  -KM     Row Name 09/15/22 1000          Home Main Entrance    Number of Stairs, Main Entrance three  -KM     Stair Railings, Main Entrance none  -     Row Name 09/15/22 1000          Cognition/Psychosocial    Affect/Mental Status (Cognition) WF  -     Orientation Status (Cognition) oriented x 4  -KM     Follows Commands (Cognition) WFL  -     Row Name 09/15/22 1000          Range of Motion (ROM)    Range of Motion bilateral lower extremities;ROM is L  -[facility] Name 09/15/22 1000          Strength (Manual Muscle Testing)    Strength (Manual Muscle Testing) bilateral lower extremities;strength is [facility]  -[facility] Name 09/15/22 1000          Bed Mobility    Bed Mobility bed mobility (all) activities  -KM     All Activities, [place] (Bed Mobility) supervision  -     Row Name 09/15/22 1000          Transfer Assessment/Treatment    Transfers sit-stand transfer;stand-sit transfer;bed-chair transfer;chair-bed transfer;car transfer  -     Row Name 09/15/22 1000          Transfers    Bed-Chair [facility] (Transfers) standby assist  -KM     Chair-Bed [facility] (Transfers) standby assist  -KM     Sit-Stand [facility] (Transfers) standby assist  -KM     Stand-Sit  [place] (Transfers) standby assist  -KM     Row Name 09/15/22 1000          Car Transfer    Type (Car Transfer) stand pivot/stand step  -KM     [place] Level (Car Transfer) standby assist  -KM     Row Name 09/15/22 1000          Gait/Stairs (Locomotion)    Gait/Stairs Locomotion gait/ambulation independence;distance ambulated  -KM     [place] Level (Gait) contact guard  -KM     Distance in Feet (Gait) 280  120' and 160' bouts w/ minor rest breaks to catch breath  -KM     Pattern (Gait) step-through  -KM     Comment, (Gait/Stairs) Pt. able to carry oxygen tank on roller while ambulating  -KM     [facility] Name 09/15/22 1000          Safety Issues, Functional Mobility    Impairments Affecting Function (Mobility) endurance/activity tolerance;shortness of breath  -KM     Row Name 09/15/22 1000          Balance    Balance Assessment sitting static balance;standing dynamic balance  -KM     Static Sitting Balance independent  -KM     Dynamic Standing Balance contact guard  -KM     Row Name 09/15/22 1000          Motor Skills    Therapeutic Exercise hip;knee  -KM     Row Name 09/15/22 1000          Hip (Therapeutic Exercise)    Hip (Therapeutic Exercise) AROM (active range of motion)  -KM     Hip AROM (Therapeutic Exercise) bilateral;flexion;aBduction;aDduction  -KM     Row Name 09/15/22 1000          Knee (Therapeutic Exercise)    Knee (Therapeutic Exercise) AROM (active range of motion)  -KM     Knee AROM (Therapeutic Exercise) bilateral;extension  -KM     Row Name 09/15/22 1000          Vital Signs    Pre SpO2 (%) 98  -KM     O2 Delivery Pre Treatment nasal cannula  -KM     Intra SpO2 (%) 93  -KM     O2 Delivery Intra Treatment nasal cannula  -KM     Post SpO2 (%) 97  -KM     O2 Delivery Post Treatment nasal cannula  -KM     Row Name 09/15/22 1000          Therapy Assessment/Plan (PT)    Patient's Goals For Discharge return home;take care of myself at home  -KM     Family Goals For Discharge patient able to  provide self-care independently  -     Row Name 09/15/22 1000          Therapy Assessment/Plan (PT)    Functional Level at Time of Evaluation (PT) SBA-CGA  -     PT Diagnosis (PT) decreased endurance  -     Frequency of Treatment (PT) --  Evaluation only  -     Comment, Therapy Assessment/Plan (PT) Pt. discharge evaluation completed during PT session. Pt. was able to demonstrate functional mobility skills w/ SBA. He was able to ambulate prolonged distances w/ short standing rest breaks to catch breath. He was able to ambulate while carrying his own oxygen tank on roller. Pt. would benefit from continued physical therapy in the outpatient setting to address his impairments. Anticipated discharge home w/ outpatient PT.  -     Row Name 09/15/22 1000          Therapy Plan Review/Discharge Plan (PT)    Therapy Plan Review (PT) evaluation/treatment results reviewed;patient;spouse/significant other  -     Anticipated Discharge Disposition (PT) home with outpatient therapy services  -     Row Name 09/15/22 1000          Discharge Summary (PT)    Outcomes Achieved/Progress Made Upon Discharge (PT) discharge from facility occurred on same date as evaluation  -     Discharge Summary Statement (PT) Pt. discharge evaluation completed during PT session. Pt. was able to demonstrate functional mobility skills w/ SBA. He was able to ambulate prolonged distances w/ short standing rest breaks to catch breath. He was able to ambulate while carrying his own oxygen tank on roller. Pt. would benefit from continued physical therapy in the outpatient setting to address his impairments. Anticipated discharge home w/ outpatient PT.  -           User Key  (r) = Recorded By, (t) = Taken By, (c) = Cosigned By    Initials Name Provider Type    Wai Darden, PT Physical Therapist              Wound 09/02/22 sternal Incision (Active)   Dressing Appearance open to air 09/15/22 0740   Closure Approximated;Liquid skin adhesive  09/14/22 1908   Base clean;dry;pink 09/14/22 1908   Dressing Care open to air 09/15/22 0740     Physical Therapy Education                 Title: PT OT SLP Therapies (Done)     Topic: Physical Therapy (Done)     Point: Mobility training (Done)     Learning Progress Summary           Patient Acceptance, E,TB, VU by  at 9/15/2022 1014                   Point: Home exercise program (Done)     Learning Progress Summary           Patient Acceptance, E,TB, VU by KM at 9/15/2022 1014                   Point: Body mechanics (Done)     Learning Progress Summary           Patient Acceptance, E,TB, VU by  at 9/15/2022 1014                   Point: Precautions (Done)     Learning Progress Summary           Patient Acceptance, E,TB, VU by  at 9/15/2022 1014                               User Key     Initials Effective Dates Name Provider Type Discipline     05/24/22 -  Wai Kay, PT Physical Therapist PT                PT Recommendation and Plan    Frequency of Treatment (PT):  (Evaluation only)                     Time Calculation:      PT Charges     Row Name 09/15/22 1004             Time Calculation    PT Received On 09/15/22  -            User Key  (r) = Recorded By, (t) = Taken By, (c) = Cosigned By    Initials Name Provider Type     Wai Kay, PT Physical Therapist                Therapy Charges for Today     Code Description Service Date Service Provider Modifiers Qty    96117247435 HC PT EVAL MOD COMPLEXITY 3 9/15/2022 Wai Kay, PT GP 1                   Wai Kay PT  9/15/2022

## 2022-09-15 NOTE — PROGRESS NOTES
Patient Assessment Instrument  Quality Indicators - Admission    Section B. Hearing, Speech Vision      Section C. Cognitive Patterns      Section [id number]. Prior Functioning      Section NO1015. Prior Device Use      Section QG5734. Self Care Performance      Section GP2705. Self Care Discharge Goals      Section [id number]. Mobility Performance     Roll Left and Right: Patient completed the activities by him/herself with no  assistance from a helper.   Sit to Lying: Patient completed the activities by him/herself with no  assistance from a helper.   Lying to Sitting on Side of Bed: Patient completed the activities by  him/herself with no assistance from a helper.   Sit to Stand: [place] provides verbal cues and/or touching/steadying and/or  contact guard assistance as patient completes activity. Assistance may be  provided throughout the activity or intermittently.   Chair/Bed to Chair Transfer: [place] provides verbal cues and/or  touching/steadying and/or contact guard assistance as patient completes  activity. Assistance may be provided throughout the activity or intermittently.   Toilet Transfer [place] provides verbal cues and/or touching/steadying and/or  contact guard assistance as patient completes activity. Assistance may be  provided throughout the activity or intermittently.   Car Transfer: [place] provides verbal cues and/or touching/steadying and/or  contact guard assistance as patient completes activity. Assistance may be  provided throughout the activity or intermittently.   Walk 10 Feet:   [place] provides verbal cues and/or touching/steadying and/or  contact guard assistance as patient completes activity. Assistance may be  provided throughout the activity or intermittently.  Walk 50 Feet with 2 Turns:   [place] provides verbal cues and/or  touching/steadying and/or contact guard assistance as patient completes  activity. Assistance may be provided throughout the activity or intermittently.  Walk 150 Feet:    [place] provides verbal cues and/or touching/steadying and/or  contact guard assistance as patient completes activity. Assistance may be  provided throughout the activity or intermittently.  Walking 10 Feet on Uneven Surfaces:   Not applicable.  1 Step Over Curb or Up/Down Stair:   [place] provides verbal cues and/or  touching/steadying and/or contact guard assistance as patient completes  activity. Assistance may be provided throughout the activity or intermittently.  4 Steps Up and Down, With/Without Rail:   [place] provides verbal cues and/or  touching/steadying and/or contact guard assistance as patient completes  activity. Assistance may be provided throughout the activity or intermittently.  12 Steps Up and Down, With/Without Rail:   Not applicable.  Picking up an Object:   Not attempted due to medical or safety concerns.  Uses Wheelchair/Scooter: No    Section [id number]. Mobility Discharge Goals     Walk Discharge Goals:   Walk 150 Feet: Patient completed the activities by him/herself with no  assistance from a helper.    Section H. Bladder and Bowel      Section I. Active Diagnosis      Section J. Health Conditions      Section K. Swallowing/Nutritional Status      Section M. Skin Conditions      Section N. Medication      Section O. Special Treatments, Procedures, and Programs      OPTIONAL BRANCH FOR TRACKING FALLS  Fall(s) During Shift:    Signed by: Wai Kay, MARITZA

## 2022-09-15 NOTE — SIGNIFICANT NOTE
09/15/22 1018   Plan   Plan Portable O2 tank has been delivered.  Spoke to spouse [phone] who will come to transport pt home then will call [facility] when they arrive home for delivery of home O2 concentrator.  Spouse will meet pt in the [facility] [place] pick-up loop; nursing aware.

## 2022-09-15 NOTE — PROGRESS NOTES
Patient Assessment Instrument  Quality Indicators - Admission    Section B. Hearing, Speech Vision      Section C. Cognitive Patterns      Section [id number]. Prior Functioning      Section SK7311. Prior Device Use      Section FN5895. Self Care Performance     Eating: Patient completed the activities by him/herself with no assistance from  a helper.   Oral Hygiene: [place] sets up or cleans up; patient completes activity. [place]  assists only prior to or following the activity.   Toileting Hygiene: [place] provides verbal cues and/or touching/steadying and/or  contact guard assistance as patient completes activity.   Shower/Bathe Self: [place] provides verbal cues and/or touching/steadying and/or  contact guard assistance as patient completes activity.   Upper Body Dressing: [place] sets up or cleans up; patient completes activity.  [place] assists only prior to or following the activity.   Lower Body Dressing: [place] provides verbal cues and/or touching/steadying  and/or contact guard assistance as patient completes activity.   Putting On/Taking Off Footwear: [place] provides verbal cues and/or  touching/steadying and/or contact guard assistance as patient completes  activity.    Section [id number]. Self Care Discharge Goals      Section GR4635. Mobility Performance      Section YH9217. Mobility Discharge Goals      Section H. Bladder and Bowel      Section I. Active Diagnosis      Section J. Health Conditions      Section K. Swallowing/Nutritional Status      Section M. Skin Conditions      Section N. Medication      Section O. Special Treatments, Procedures, and Programs      OPTIONAL BRANCH FOR TRACKING FALLS  Fall(s) During Shift:    Signed by: Urvashi Decker Occupational Therapist

## 2022-09-16 VITALS
HEIGHT: 75 IN | TEMPERATURE: 98.2 F | SYSTOLIC BLOOD PRESSURE: 110 MMHG | RESPIRATION RATE: 18 BRPM | OXYGEN SATURATION: 99 % | BODY MASS INDEX: 31.58 KG/M2 | WEIGHT: 254 LBS | DIASTOLIC BLOOD PRESSURE: 64 MMHG | HEART RATE: 90 BPM

## 2022-09-16 LAB
ETHANOL BLD-MCNC: <10 MG/DL (ref 0–10)
ETHANOL UR QL: <0.01 %
FLUAV RNA RESP QL NAA+PROBE: NOT DETECTED
FLUBV RNA RESP QL NAA+PROBE: NOT DETECTED
HOLD SPECIMEN: NORMAL
HOLD SPECIMEN: NORMAL
MAGNESIUM SERPL-MCNC: 2 MG/DL (ref 1.6–2.4)
QT INTERVAL: 368 MS
QT INTERVAL: 414 MS
QTC INTERVAL: 452 MS
QTC INTERVAL: 480 MS
SARS-COV-2 RNA RESP QL NAA+PROBE: NOT DETECTED
WHOLE BLOOD HOLD COAG: NORMAL
WHOLE BLOOD HOLD SPECIMEN: NORMAL

## 2022-09-16 NOTE — PROGRESS NOTES
[facility] CMG Coordinator  Inpatient Rehabilitation Admission    Ethnic Group:  Marital Status:    IRF Admission Date:  09/14/2022  Admission Class:  Admit From:    Pre-Hospital Living:    Payment Sources:  Impairment Group:  Date of Onset of Impairment:    Etiologic Diagnosis Code(s):  Rank Code      Description  1    I34.0     Nonrheumatic mitral (valve) insufficiency    Comorbidities:  Rank Code      Description    1    K25.4     Chronic or unspecified gastric ulcer with                 hemorrhage  2    I11.0     Hypertensive heart disease with heart failure  3    I48.92    Unspecified atrial flutter  4    I50.9     Heart failure, unspecified  5    I48.91    Unspecified atrial fibrillation  6    I25.10    Atherosclerotic heart disease of native                 coronary artery without angina pectoris  7    K29.80    Duodenitis without bleeding  8    Z95.3     Presence of xenogenic heart valve  9    Z95.5     Presence of coronary angioplasty implant and                 graft  10   R53.81    Other malaise    Height on Admission:  Weight on Admission:    Are there any arthritis conditions recorded for Impairment Group, Etiologic  Diagnosis, or Comorbid Conditions that meet all of the regulatory requirements  for IRF classification (in 42 .29(b)(2)(x), (xi), and xii))?  No    MICHELLE Bladder Accidents:  0 - Accidents.  Bladder Score = 5.  One (1) bladder accident. urinal  MICHELLE Bowel Accident: 0 -Accidents.  Bowel Score = 6. Patient has no accidents, but uses a device/medications. bowel  med    Signed by: Stephanie Hodges Nurse

## 2022-09-17 LAB
BACTERIA SPEC AEROBE CULT: NORMAL
BACTERIA SPEC AEROBE CULT: NORMAL

## 2022-09-19 ENCOUNTER — APPOINTMENT (OUTPATIENT)
Dept: CT IMAGING | Facility: HOSPITAL | Age: 67
End: 2022-09-19

## 2022-09-19 ENCOUNTER — TELEPHONE (OUTPATIENT)
Dept: CARDIOLOGY | Facility: CLINIC | Age: 67
End: 2022-09-19

## 2022-09-19 ENCOUNTER — APPOINTMENT (OUTPATIENT)
Dept: GENERAL RADIOLOGY | Facility: HOSPITAL | Age: 67
End: 2022-09-19

## 2022-09-19 ENCOUNTER — HOSPITAL ENCOUNTER (INPATIENT)
Facility: HOSPITAL | Age: 67
LOS: 3 days | Discharge: HOME | End: 2022-09-22
Attending: EMERGENCY MEDICINE | Admitting: INTERNAL MEDICINE

## 2022-09-19 DIAGNOSIS — I38 DIASTOLIC CHF DUE TO VALVULAR DISEASE: Primary | ICD-10-CM

## 2022-09-19 DIAGNOSIS — J96.91 RESPIRATORY FAILURE WITH HYPOXIA AND HYPERCAPNIA, UNSPECIFIED CHRONICITY: ICD-10-CM

## 2022-09-19 DIAGNOSIS — J96.21 ACUTE AND CHRONIC RESPIRATORY FAILURE WITH HYPOXIA: ICD-10-CM

## 2022-09-19 DIAGNOSIS — I50.30 DIASTOLIC CHF DUE TO VALVULAR DISEASE: Primary | ICD-10-CM

## 2022-09-19 DIAGNOSIS — J96.92 RESPIRATORY FAILURE WITH HYPOXIA AND HYPERCAPNIA, UNSPECIFIED CHRONICITY: ICD-10-CM

## 2022-09-19 PROBLEM — J44.1 COPD EXACERBATION: Status: ACTIVE | Noted: 2022-09-19

## 2022-09-19 PROBLEM — I34.0 SEVERE MITRAL REGURGITATION: Status: RESOLVED | Noted: 2022-08-03 | Resolved: 2022-09-19

## 2022-09-19 PROBLEM — I31.39 PERICARDIAL EFFUSION: Status: RESOLVED | Noted: 2022-09-12 | Resolved: 2022-09-19

## 2022-09-19 LAB
A-A DO2: 107.9 MMHG (ref 0–300)
A-A DO2: 71.4 MMHG (ref 0–300)
ABO GROUP BLD: NORMAL
ABO GROUP BLD: NORMAL
ALBUMIN SERPL-MCNC: 3.55 G/DL (ref 3.5–5.2)
ALBUMIN/GLOB SERPL: 1.2 G/DL
ALP SERPL-CCNC: 89 U/L (ref 39–117)
ALT SERPL W P-5'-P-CCNC: 34 U/L (ref 1–41)
AMMONIA BLD-SCNC: 24 UMOL/L (ref 16–60)
ANION GAP SERPL CALCULATED.3IONS-SCNC: 6.3 MMOL/L (ref 5–15)
APTT PPP: 32.7 SECONDS (ref 26.5–34.5)
ARTERIAL PATENCY WRIST A: POSITIVE
ARTERIAL PATENCY WRIST A: POSITIVE
AST SERPL-CCNC: 60 U/L (ref 1–40)
ATMOSPHERIC PRESS: 727 MMHG
ATMOSPHERIC PRESS: 727 MMHG
BASE EXCESS BLDA CALC-SCNC: 19.7 MMOL/L (ref 0–2)
BASE EXCESS BLDA CALC-SCNC: 21.5 MMOL/L (ref 0–2)
BASOPHILS # BLD AUTO: 0.06 10*3/MM3 (ref 0–0.2)
BASOPHILS NFR BLD AUTO: 0.6 % (ref 0–1.5)
BDY SITE: ABNORMAL
BDY SITE: ABNORMAL
BILIRUB SERPL-MCNC: 0.6 MG/DL (ref 0–1.2)
BILIRUB UR QL STRIP: NEGATIVE
BLD GP AB SCN SERPL QL: NEGATIVE
BODY TEMPERATURE: 0 C
BODY TEMPERATURE: 0 C
BUN SERPL-MCNC: 11 MG/DL (ref 8–23)
BUN/CREAT SERPL: 8.3 (ref 7–25)
CALCIUM SPEC-SCNC: 8.9 MG/DL (ref 8.6–10.5)
CHLORIDE SERPL-SCNC: 86 MMOL/L (ref 98–107)
CLARITY UR: CLEAR
CO2 BLDA-SCNC: 51.8 MMOL/L (ref 22–33)
CO2 BLDA-SCNC: 52.8 MMOL/L (ref 22–33)
CO2 SERPL-SCNC: 43.7 MMOL/L (ref 22–29)
COHGB MFR BLD: 1.9 % (ref 0–5)
COHGB MFR BLD: 2.3 % (ref 0–5)
COLOR UR: YELLOW
CREAT SERPL-MCNC: 1.32 MG/DL (ref 0.76–1.27)
CRP SERPL-MCNC: 3.23 MG/DL (ref 0–0.5)
D-LACTATE SERPL-SCNC: 1.3 MMOL/L (ref 0.5–2)
DEPRECATED RDW RBC AUTO: 61.5 FL (ref 37–54)
EGFRCR SERPLBLD CKD-EPI 2021: 59.1 ML/MIN/1.73
EOSINOPHIL # BLD AUTO: 0.06 10*3/MM3 (ref 0–0.4)
EOSINOPHIL NFR BLD AUTO: 0.6 % (ref 0.3–6.2)
EPAP: 8
ERYTHROCYTE [DISTWIDTH] IN BLOOD BY AUTOMATED COUNT: 17.2 % (ref 12.3–15.4)
ERYTHROCYTE [SEDIMENTATION RATE] IN BLOOD: 12 MM/HR (ref 0–20)
FLUAV SUBTYP SPEC NAA+PROBE: NOT DETECTED
FLUBV RNA ISLT QL NAA+PROBE: NOT DETECTED
GAS FLOW AIRWAY: 3.5 LPM
GLOBULIN UR ELPH-MCNC: 3.1 GM/DL
GLUCOSE BLDC GLUCOMTR-MCNC: 111 MG/DL (ref 70–130)
GLUCOSE BLDC GLUCOMTR-MCNC: 161 MG/DL (ref 70–130)
GLUCOSE SERPL-MCNC: 113 MG/DL (ref 65–99)
GLUCOSE UR STRIP-MCNC: NEGATIVE MG/DL
HCO3 BLDA-SCNC: 49 MMOL/L (ref 20–26)
HCO3 BLDA-SCNC: 50 MMOL/L (ref 20–26)
HCT VFR BLD AUTO: 30.9 % (ref 37.5–51)
HCT VFR BLD CALC: 26.5 % (ref 38–51)
HCT VFR BLD CALC: 28.1 % (ref 38–51)
HGB BLD-MCNC: 9.2 G/DL (ref 13–17.7)
HGB BLDA-MCNC: 8.6 G/DL (ref 14–18)
HGB BLDA-MCNC: 9.2 G/DL (ref 14–18)
HGB UR QL STRIP.AUTO: NEGATIVE
HOLD SPECIMEN: NORMAL
HOLD SPECIMEN: NORMAL
IMM GRANULOCYTES # BLD AUTO: 0.09 10*3/MM3 (ref 0–0.05)
IMM GRANULOCYTES NFR BLD AUTO: 0.9 % (ref 0–0.5)
INHALED O2 CONCENTRATION: 34 %
INHALED O2 CONCENTRATION: 40 %
INR PPP: 1.1 (ref 0.9–1.1)
IPAP: 24
KETONES UR QL STRIP: NEGATIVE
LEUKOCYTE ESTERASE UR QL STRIP.AUTO: NEGATIVE
LYMPHOCYTES # BLD AUTO: 1.13 10*3/MM3 (ref 0.7–3.1)
LYMPHOCYTES NFR BLD AUTO: 11.3 % (ref 19.6–45.3)
Lab: ABNORMAL
MAGNESIUM SERPL-MCNC: 2 MG/DL (ref 1.6–2.4)
MCH RBC QN AUTO: 30.7 PG (ref 26.6–33)
MCHC RBC AUTO-ENTMCNC: 29.8 G/DL (ref 31.5–35.7)
MCV RBC AUTO: 103 FL (ref 79–97)
METHGB BLD QL: 0.4 % (ref 0–3)
METHGB BLD QL: 0.6 % (ref 0–3)
MODALITY: ABNORMAL
MODALITY: ABNORMAL
MONOCYTES # BLD AUTO: 1.36 10*3/MM3 (ref 0.1–0.9)
MONOCYTES NFR BLD AUTO: 13.6 % (ref 5–12)
NEUTROPHILS NFR BLD AUTO: 7.28 10*3/MM3 (ref 1.7–7)
NEUTROPHILS NFR BLD AUTO: 73 % (ref 42.7–76)
NITRITE UR QL STRIP: NEGATIVE
NOTE: ABNORMAL
NOTE: ABNORMAL
NOTIFIED BY: ABNORMAL
NOTIFIED BY: ABNORMAL
NOTIFIED WHO: ABNORMAL
NOTIFIED WHO: ABNORMAL
NRBC BLD AUTO-RTO: 0.2 /100 WBC (ref 0–0.2)
OXYHGB MFR BLDV: 83.9 % (ref 94–99)
OXYHGB MFR BLDV: 90.2 % (ref 94–99)
PCO2 BLDA: 88.8 MM HG (ref 35–45)
PCO2 BLDA: 94.1 MM HG (ref 35–45)
PCO2 TEMP ADJ BLD: ABNORMAL MM[HG]
PCO2 TEMP ADJ BLD: ABNORMAL MM[HG]
PH BLDA: 7.33 PH UNITS (ref 7.35–7.45)
PH BLDA: 7.36 PH UNITS (ref 7.35–7.45)
PH UR STRIP.AUTO: <=5 [PH] (ref 5–8)
PH, TEMP CORRECTED: ABNORMAL
PH, TEMP CORRECTED: ABNORMAL
PLATELET # BLD AUTO: 452 10*3/MM3 (ref 140–450)
PMV BLD AUTO: 9.7 FL (ref 6–12)
PO2 BLDA: 55.5 MM HG (ref 83–108)
PO2 BLDA: 66.6 MM HG (ref 83–108)
PO2 TEMP ADJ BLD: ABNORMAL MM[HG]
PO2 TEMP ADJ BLD: ABNORMAL MM[HG]
POTASSIUM SERPL-SCNC: 4 MMOL/L (ref 3.5–5.2)
PROT SERPL-MCNC: 6.6 G/DL (ref 6–8.5)
PROT UR QL STRIP: NEGATIVE
PROTHROMBIN TIME: 14.5 SECONDS (ref 12.1–14.7)
RBC # BLD AUTO: 3 10*6/MM3 (ref 4.14–5.8)
RH BLD: NEGATIVE
RH BLD: NEGATIVE
SAO2 % BLDCOA: 85.9 % (ref 94–99)
SAO2 % BLDCOA: 92.9 % (ref 94–99)
SARS-COV-2 RNA PNL SPEC NAA+PROBE: NOT DETECTED
SET MECH RESP RATE: 24
SODIUM SERPL-SCNC: 136 MMOL/L (ref 136–145)
SP GR UR STRIP: <=1.005 (ref 1–1.03)
T&S EXPIRATION DATE: NORMAL
TROPONIN T SERPL-MCNC: 0.14 NG/ML (ref 0–0.03)
TSH SERPL DL<=0.05 MIU/L-ACNC: 8.28 UIU/ML (ref 0.27–4.2)
UROBILINOGEN UR QL STRIP: NORMAL
VENTILATOR MODE: ABNORMAL
VENTILATOR MODE: ABNORMAL
WBC NRBC COR # BLD: 9.98 10*3/MM3 (ref 3.4–10.8)
WHOLE BLOOD HOLD COAG: NORMAL
WHOLE BLOOD HOLD SPECIMEN: NORMAL

## 2022-09-19 PROCEDURE — 87040 BLOOD CULTURE FOR BACTERIA: CPT | Performed by: EMERGENCY MEDICINE

## 2022-09-19 PROCEDURE — 70496 CT ANGIOGRAPHY HEAD: CPT | Performed by: RADIOLOGY

## 2022-09-19 PROCEDURE — 85652 RBC SED RATE AUTOMATED: CPT | Performed by: EMERGENCY MEDICINE

## 2022-09-19 PROCEDURE — 82607 VITAMIN B-12: CPT | Performed by: INTERNAL MEDICINE

## 2022-09-19 PROCEDURE — 99285 EMERGENCY DEPT VISIT HI MDM: CPT

## 2022-09-19 PROCEDURE — 70496 CT ANGIOGRAPHY HEAD: CPT

## 2022-09-19 PROCEDURE — 93010 ELECTROCARDIOGRAM REPORT: CPT | Performed by: INTERNAL MEDICINE

## 2022-09-19 PROCEDURE — 85730 THROMBOPLASTIN TIME PARTIAL: CPT | Performed by: EMERGENCY MEDICINE

## 2022-09-19 PROCEDURE — 86901 BLOOD TYPING SEROLOGIC RH(D): CPT

## 2022-09-19 PROCEDURE — 99291 CRITICAL CARE FIRST HOUR: CPT | Performed by: INTERNAL MEDICINE

## 2022-09-19 PROCEDURE — 87641 MR-STAPH DNA AMP PROBE: CPT | Performed by: INTERNAL MEDICINE

## 2022-09-19 PROCEDURE — 70450 CT HEAD/BRAIN W/O DYE: CPT

## 2022-09-19 PROCEDURE — 70498 CT ANGIOGRAPHY NECK: CPT

## 2022-09-19 PROCEDURE — 84484 ASSAY OF TROPONIN QUANT: CPT | Performed by: EMERGENCY MEDICINE

## 2022-09-19 PROCEDURE — 82962 GLUCOSE BLOOD TEST: CPT

## 2022-09-19 PROCEDURE — 87636 SARSCOV2 & INF A&B AMP PRB: CPT | Performed by: EMERGENCY MEDICINE

## 2022-09-19 PROCEDURE — 82140 ASSAY OF AMMONIA: CPT | Performed by: EMERGENCY MEDICINE

## 2022-09-19 PROCEDURE — 36600 WITHDRAWAL OF ARTERIAL BLOOD: CPT

## 2022-09-19 PROCEDURE — 82375 ASSAY CARBOXYHB QUANT: CPT

## 2022-09-19 PROCEDURE — 94799 UNLISTED PULMONARY SVC/PX: CPT

## 2022-09-19 PROCEDURE — 4A03X5D MEASUREMENT OF ARTERIAL FLOW, INTRACRANIAL, EXTERNAL APPROACH: ICD-10-PCS | Performed by: STUDENT IN AN ORGANIZED HEALTH CARE EDUCATION/TRAINING PROGRAM

## 2022-09-19 PROCEDURE — 80053 COMPREHEN METABOLIC PANEL: CPT | Performed by: EMERGENCY MEDICINE

## 2022-09-19 PROCEDURE — 82746 ASSAY OF FOLIC ACID SERUM: CPT | Performed by: INTERNAL MEDICINE

## 2022-09-19 PROCEDURE — 86900 BLOOD TYPING SEROLOGIC ABO: CPT

## 2022-09-19 PROCEDURE — 36415 COLL VENOUS BLD VENIPUNCTURE: CPT

## 2022-09-19 PROCEDURE — 71045 X-RAY EXAM CHEST 1 VIEW: CPT

## 2022-09-19 PROCEDURE — 25010000002 LORAZEPAM PER 2 MG

## 2022-09-19 PROCEDURE — 86140 C-REACTIVE PROTEIN: CPT | Performed by: EMERGENCY MEDICINE

## 2022-09-19 PROCEDURE — 82805 BLOOD GASES W/O2 SATURATION: CPT

## 2022-09-19 PROCEDURE — 81003 URINALYSIS AUTO W/O SCOPE: CPT | Performed by: EMERGENCY MEDICINE

## 2022-09-19 PROCEDURE — 85025 COMPLETE CBC W/AUTO DIFF WBC: CPT | Performed by: EMERGENCY MEDICINE

## 2022-09-19 PROCEDURE — 86900 BLOOD TYPING SEROLOGIC ABO: CPT | Performed by: EMERGENCY MEDICINE

## 2022-09-19 PROCEDURE — 25010000002 PIPERACILLIN SOD-TAZOBACTAM PER 1 G: Performed by: INTERNAL MEDICINE

## 2022-09-19 PROCEDURE — 83605 ASSAY OF LACTIC ACID: CPT | Performed by: EMERGENCY MEDICINE

## 2022-09-19 PROCEDURE — 84443 ASSAY THYROID STIM HORMONE: CPT | Performed by: EMERGENCY MEDICINE

## 2022-09-19 PROCEDURE — 93005 ELECTROCARDIOGRAM TRACING: CPT | Performed by: EMERGENCY MEDICINE

## 2022-09-19 PROCEDURE — 83050 HGB METHEMOGLOBIN QUAN: CPT

## 2022-09-19 PROCEDURE — 94761 N-INVAS EAR/PLS OXIMETRY MLT: CPT

## 2022-09-19 PROCEDURE — 70498 CT ANGIOGRAPHY NECK: CPT | Performed by: RADIOLOGY

## 2022-09-19 PROCEDURE — 83735 ASSAY OF MAGNESIUM: CPT | Performed by: EMERGENCY MEDICINE

## 2022-09-19 PROCEDURE — 86850 RBC ANTIBODY SCREEN: CPT | Performed by: EMERGENCY MEDICINE

## 2022-09-19 PROCEDURE — 94660 CPAP INITIATION&MGMT: CPT

## 2022-09-19 PROCEDURE — 0 IOPAMIDOL PER 1 ML: Performed by: EMERGENCY MEDICINE

## 2022-09-19 PROCEDURE — 86901 BLOOD TYPING SEROLOGIC RH(D): CPT | Performed by: EMERGENCY MEDICINE

## 2022-09-19 PROCEDURE — 70450 CT HEAD/BRAIN W/O DYE: CPT | Performed by: RADIOLOGY

## 2022-09-19 PROCEDURE — 85610 PROTHROMBIN TIME: CPT | Performed by: EMERGENCY MEDICINE

## 2022-09-19 RX ORDER — METOPROLOL TARTRATE 5 MG/5ML
5 INJECTION INTRAVENOUS ONCE
Status: COMPLETED | OUTPATIENT
Start: 2022-09-19 | End: 2022-09-19

## 2022-09-19 RX ORDER — FLUMAZENIL 0.1 MG/ML
0.5 INJECTION INTRAVENOUS ONCE
Status: DISCONTINUED | OUTPATIENT
Start: 2022-09-19 | End: 2022-09-22 | Stop reason: HOSPADM

## 2022-09-19 RX ORDER — DEXTROSE MONOHYDRATE 25 G/50ML
25 INJECTION, SOLUTION INTRAVENOUS
Status: DISCONTINUED | OUTPATIENT
Start: 2022-09-19 | End: 2022-09-22 | Stop reason: HOSPADM

## 2022-09-19 RX ORDER — LORAZEPAM 2 MG/ML
INJECTION INTRAMUSCULAR
Status: COMPLETED
Start: 2022-09-19 | End: 2022-09-19

## 2022-09-19 RX ORDER — INSULIN ASPART 100 [IU]/ML
0-7 INJECTION, SOLUTION INTRAVENOUS; SUBCUTANEOUS
Status: DISCONTINUED | OUTPATIENT
Start: 2022-09-20 | End: 2022-09-22 | Stop reason: HOSPADM

## 2022-09-19 RX ORDER — AMIODARONE HYDROCHLORIDE 200 MG/1
200 TABLET ORAL DAILY
Status: ON HOLD | COMMUNITY
Start: 2022-09-21 | End: 2022-09-22 | Stop reason: SDUPTHER

## 2022-09-19 RX ORDER — NICOTINE POLACRILEX 4 MG
15 LOZENGE BUCCAL
Status: DISCONTINUED | OUTPATIENT
Start: 2022-09-19 | End: 2022-09-22 | Stop reason: HOSPADM

## 2022-09-19 RX ORDER — SODIUM CHLORIDE 0.9 % (FLUSH) 0.9 %
10 SYRINGE (ML) INJECTION EVERY 12 HOURS SCHEDULED
Status: DISCONTINUED | OUTPATIENT
Start: 2022-09-19 | End: 2022-09-22 | Stop reason: HOSPADM

## 2022-09-19 RX ORDER — PANTOPRAZOLE SODIUM 40 MG/1
40 TABLET, DELAYED RELEASE ORAL
Status: CANCELLED | OUTPATIENT
Start: 2022-09-20

## 2022-09-19 RX ORDER — LORAZEPAM 2 MG/ML
0.5 INJECTION INTRAMUSCULAR ONCE
Status: COMPLETED | OUTPATIENT
Start: 2022-09-19 | End: 2022-09-19

## 2022-09-19 RX ORDER — FUROSEMIDE 40 MG/1
40 TABLET ORAL DAILY PRN
Status: CANCELLED | OUTPATIENT
Start: 2022-09-19

## 2022-09-19 RX ORDER — PANTOPRAZOLE SODIUM 40 MG/10ML
40 INJECTION, POWDER, LYOPHILIZED, FOR SOLUTION INTRAVENOUS EVERY 12 HOURS SCHEDULED
Status: DISCONTINUED | OUTPATIENT
Start: 2022-09-19 | End: 2022-09-21 | Stop reason: ALTCHOICE

## 2022-09-19 RX ORDER — METHYLPREDNISOLONE SODIUM SUCCINATE 125 MG/2ML
62.5 INJECTION, POWDER, LYOPHILIZED, FOR SOLUTION INTRAMUSCULAR; INTRAVENOUS DAILY
Status: DISCONTINUED | OUTPATIENT
Start: 2022-09-20 | End: 2022-09-20

## 2022-09-19 RX ORDER — AMIODARONE HYDROCHLORIDE 200 MG/1
200 TABLET ORAL 2 TIMES DAILY
Status: CANCELLED | OUTPATIENT
Start: 2022-09-19

## 2022-09-19 RX ORDER — AMIODARONE HYDROCHLORIDE 200 MG/1
200 TABLET ORAL 2 TIMES DAILY
Status: CANCELLED | OUTPATIENT
Start: 2022-09-19 | End: 2022-09-21

## 2022-09-19 RX ORDER — SODIUM CHLORIDE 0.9 % (FLUSH) 0.9 %
10 SYRINGE (ML) INJECTION AS NEEDED
Status: DISCONTINUED | OUTPATIENT
Start: 2022-09-19 | End: 2022-09-22 | Stop reason: HOSPADM

## 2022-09-19 RX ORDER — AMIODARONE HYDROCHLORIDE 200 MG/1
200 TABLET ORAL DAILY
Status: CANCELLED | OUTPATIENT
Start: 2022-09-21

## 2022-09-19 RX ADMIN — PIPERACILLIN SODIUM AND TAZOBACTAM SODIUM 3.38 G: 3; .375 INJECTION, POWDER, LYOPHILIZED, FOR SOLUTION INTRAVENOUS at 23:21

## 2022-09-19 RX ADMIN — DOXYCYCLINE 100 MG: 100 INJECTION, POWDER, LYOPHILIZED, FOR SOLUTION INTRAVENOUS at 23:22

## 2022-09-19 RX ADMIN — LORAZEPAM 0.5 MG: 2 INJECTION INTRAMUSCULAR at 17:18

## 2022-09-19 RX ADMIN — METOPROLOL TARTRATE 5 MG: 1 INJECTION, SOLUTION INTRAVENOUS at 21:34

## 2022-09-19 RX ADMIN — IOPAMIDOL 70 ML: 755 INJECTION, SOLUTION INTRAVENOUS at 17:52

## 2022-09-19 RX ADMIN — PANTOPRAZOLE SODIUM 40 MG: 40 INJECTION, POWDER, FOR SOLUTION INTRAVENOUS at 21:34

## 2022-09-19 RX ADMIN — Medication 10 ML: at 21:34

## 2022-09-20 ENCOUNTER — READMISSION MANAGEMENT (OUTPATIENT)
Dept: CALL CENTER | Facility: HOSPITAL | Age: 67
End: 2022-09-20

## 2022-09-20 LAB
A-A DO2: 97.3 MMHG (ref 0–300)
ALBUMIN SERPL-MCNC: 3.31 G/DL (ref 3.5–5.2)
ALBUMIN/GLOB SERPL: 1.1 G/DL
ALP SERPL-CCNC: 80 U/L (ref 39–117)
ALT SERPL W P-5'-P-CCNC: 32 U/L (ref 1–41)
ANION GAP SERPL CALCULATED.3IONS-SCNC: 5.4 MMOL/L (ref 5–15)
ANION GAP SERPL CALCULATED.3IONS-SCNC: 8.5 MMOL/L (ref 5–15)
ARTERIAL PATENCY WRIST A: POSITIVE
AST SERPL-CCNC: 48 U/L (ref 1–40)
ATMOSPHERIC PRESS: 727 MMHG
B PARAPERT DNA SPEC QL NAA+PROBE: NOT DETECTED
B PERT DNA SPEC QL NAA+PROBE: NOT DETECTED
BACTERIA SPEC AEROBE CULT: NORMAL
BACTERIA SPEC AEROBE CULT: NORMAL
BASE EXCESS BLDA CALC-SCNC: 22.3 MMOL/L (ref 0–2)
BASOPHILS # BLD AUTO: 0.04 10*3/MM3 (ref 0–0.2)
BASOPHILS NFR BLD AUTO: 0.5 % (ref 0–1.5)
BDY SITE: ABNORMAL
BILIRUB SERPL-MCNC: 0.6 MG/DL (ref 0–1.2)
BODY TEMPERATURE: 0 C
BUN SERPL-MCNC: 10 MG/DL (ref 8–23)
BUN SERPL-MCNC: 13 MG/DL (ref 8–23)
BUN/CREAT SERPL: 7.5 (ref 7–25)
BUN/CREAT SERPL: 7.8 (ref 7–25)
C PNEUM DNA NPH QL NAA+NON-PROBE: NOT DETECTED
CALCIUM SPEC-SCNC: 8.4 MG/DL (ref 8.6–10.5)
CALCIUM SPEC-SCNC: 8.9 MG/DL (ref 8.6–10.5)
CHLORIDE SERPL-SCNC: 86 MMOL/L (ref 98–107)
CHLORIDE SERPL-SCNC: 88 MMOL/L (ref 98–107)
CHOLEST SERPL-MCNC: 83 MG/DL (ref 0–200)
CO2 BLDA-SCNC: 52.3 MMOL/L (ref 22–33)
CO2 SERPL-SCNC: 45.5 MMOL/L (ref 22–29)
CO2 SERPL-SCNC: 45.6 MMOL/L (ref 22–29)
COHGB MFR BLD: 2.2 % (ref 0–5)
CREAT SERPL-MCNC: 1.33 MG/DL (ref 0.76–1.27)
CREAT SERPL-MCNC: 1.67 MG/DL (ref 0.76–1.27)
D-LACTATE SERPL-SCNC: 1.2 MMOL/L (ref 0.5–2)
DEPRECATED RDW RBC AUTO: 62.9 FL (ref 37–54)
EGFRCR SERPLBLD CKD-EPI 2021: 44.6 ML/MIN/1.73
EGFRCR SERPLBLD CKD-EPI 2021: 58.6 ML/MIN/1.73
EOSINOPHIL # BLD AUTO: 0.04 10*3/MM3 (ref 0–0.4)
EOSINOPHIL NFR BLD AUTO: 0.5 % (ref 0.3–6.2)
ERYTHROCYTE [DISTWIDTH] IN BLOOD BY AUTOMATED COUNT: 17.2 % (ref 12.3–15.4)
FLUAV SUBTYP SPEC NAA+PROBE: NOT DETECTED
FLUBV RNA ISLT QL NAA+PROBE: NOT DETECTED
FOLATE SERPL-MCNC: >20 NG/ML (ref 4.78–24.2)
GAS FLOW AIRWAY: 4 LPM
GLOBULIN UR ELPH-MCNC: 2.9 GM/DL
GLUCOSE BLDC GLUCOMTR-MCNC: 102 MG/DL (ref 70–130)
GLUCOSE BLDC GLUCOMTR-MCNC: 119 MG/DL (ref 70–130)
GLUCOSE BLDC GLUCOMTR-MCNC: 123 MG/DL (ref 70–130)
GLUCOSE BLDC GLUCOMTR-MCNC: 208 MG/DL (ref 70–130)
GLUCOSE SERPL-MCNC: 109 MG/DL (ref 65–99)
GLUCOSE SERPL-MCNC: 202 MG/DL (ref 65–99)
HADV DNA SPEC NAA+PROBE: NOT DETECTED
HBA1C MFR BLD: 5.1 % (ref 4.8–5.6)
HCO3 BLDA-SCNC: 49.9 MMOL/L (ref 20–26)
HCOV 229E RNA SPEC QL NAA+PROBE: NOT DETECTED
HCOV HKU1 RNA SPEC QL NAA+PROBE: NOT DETECTED
HCOV NL63 RNA SPEC QL NAA+PROBE: NOT DETECTED
HCOV OC43 RNA SPEC QL NAA+PROBE: NOT DETECTED
HCT VFR BLD AUTO: 30.2 % (ref 37.5–51)
HCT VFR BLD CALC: 27.8 % (ref 38–51)
HGB BLD-MCNC: 9 G/DL (ref 13–17.7)
HGB BLDA-MCNC: 9.1 G/DL (ref 14–18)
HMPV RNA NPH QL NAA+NON-PROBE: NOT DETECTED
HPIV1 RNA ISLT QL NAA+PROBE: NOT DETECTED
HPIV2 RNA SPEC QL NAA+PROBE: NOT DETECTED
HPIV3 RNA NPH QL NAA+PROBE: NOT DETECTED
HPIV4 P GENE NPH QL NAA+PROBE: NOT DETECTED
IMM GRANULOCYTES # BLD AUTO: 0.04 10*3/MM3 (ref 0–0.05)
IMM GRANULOCYTES NFR BLD AUTO: 0.5 % (ref 0–0.5)
INHALED O2 CONCENTRATION: 36 %
LYMPHOCYTES # BLD AUTO: 0.85 10*3/MM3 (ref 0.7–3.1)
LYMPHOCYTES NFR BLD AUTO: 11.6 % (ref 19.6–45.3)
Lab: ABNORMAL
Lab: ABNORMAL
M PNEUMO IGG SER IA-ACNC: NOT DETECTED
MAGNESIUM SERPL-MCNC: 2 MG/DL (ref 1.6–2.4)
MCH RBC QN AUTO: 30.7 PG (ref 26.6–33)
MCHC RBC AUTO-ENTMCNC: 29.8 G/DL (ref 31.5–35.7)
MCV RBC AUTO: 103.1 FL (ref 79–97)
METHGB BLD QL: 0.3 % (ref 0–3)
MODALITY: ABNORMAL
MONOCYTES # BLD AUTO: 1.06 10*3/MM3 (ref 0.1–0.9)
MONOCYTES NFR BLD AUTO: 14.5 % (ref 5–12)
MRSA DNA SPEC QL NAA+PROBE: NORMAL
NEUTROPHILS NFR BLD AUTO: 5.3 10*3/MM3 (ref 1.7–7)
NEUTROPHILS NFR BLD AUTO: 72.4 % (ref 42.7–76)
NOTE: ABNORMAL
NOTIFIED BY: ABNORMAL
NOTIFIED WHO: ABNORMAL
NRBC BLD AUTO-RTO: 0 /100 WBC (ref 0–0.2)
NT-PROBNP SERPL-MCNC: 8814 PG/ML (ref 0–900)
OXYHGB MFR BLDV: 90.2 % (ref 94–99)
PCO2 BLDA: 77 MM HG (ref 35–45)
PCO2 TEMP ADJ BLD: ABNORMAL MM[HG]
PH BLDA: 7.42 PH UNITS (ref 7.35–7.45)
PH, TEMP CORRECTED: ABNORMAL
PHOSPHATE SERPL-MCNC: 3.3 MG/DL (ref 2.5–4.5)
PLATELET # BLD AUTO: 359 10*3/MM3 (ref 140–450)
PMV BLD AUTO: 9.3 FL (ref 6–12)
PO2 BLDA: 62.3 MM HG (ref 83–108)
PO2 TEMP ADJ BLD: ABNORMAL MM[HG]
POTASSIUM SERPL-SCNC: 3.8 MMOL/L (ref 3.5–5.2)
POTASSIUM SERPL-SCNC: 3.8 MMOL/L (ref 3.5–5.2)
PROCALCITONIN SERPL-MCNC: 0.06 NG/ML (ref 0–0.25)
PROT SERPL-MCNC: 6.2 G/DL (ref 6–8.5)
RBC # BLD AUTO: 2.93 10*6/MM3 (ref 4.14–5.8)
RHINOVIRUS RNA SPEC NAA+PROBE: NOT DETECTED
RSV RNA NPH QL NAA+NON-PROBE: NOT DETECTED
S PNEUM AG SPEC QL LA: NEGATIVE
SAO2 % BLDCOA: 92.5 % (ref 94–99)
SARS-COV-2 RNA NPH QL NAA+NON-PROBE: NOT DETECTED
SODIUM SERPL-SCNC: 139 MMOL/L (ref 136–145)
SODIUM SERPL-SCNC: 140 MMOL/L (ref 136–145)
TROPONIN T SERPL-MCNC: 0.12 NG/ML (ref 0–0.03)
TROPONIN T SERPL-MCNC: 0.12 NG/ML (ref 0–0.03)
VENTILATOR MODE: ABNORMAL
VIT B12 BLD-MCNC: 664 PG/ML (ref 211–946)
WBC NRBC COR # BLD: 7.33 10*3/MM3 (ref 3.4–10.8)

## 2022-09-20 PROCEDURE — 94761 N-INVAS EAR/PLS OXIMETRY MLT: CPT

## 2022-09-20 PROCEDURE — 87040 BLOOD CULTURE FOR BACTERIA: CPT | Performed by: INTERNAL MEDICINE

## 2022-09-20 PROCEDURE — 83050 HGB METHEMOGLOBIN QUAN: CPT

## 2022-09-20 PROCEDURE — 83036 HEMOGLOBIN GLYCOSYLATED A1C: CPT | Performed by: INTERNAL MEDICINE

## 2022-09-20 PROCEDURE — 83735 ASSAY OF MAGNESIUM: CPT | Performed by: INTERNAL MEDICINE

## 2022-09-20 PROCEDURE — 82375 ASSAY CARBOXYHB QUANT: CPT

## 2022-09-20 PROCEDURE — 99222 1ST HOSP IP/OBS MODERATE 55: CPT | Performed by: INTERNAL MEDICINE

## 2022-09-20 PROCEDURE — 94799 UNLISTED PULMONARY SVC/PX: CPT

## 2022-09-20 PROCEDURE — 97166 OT EVAL MOD COMPLEX 45 MIN: CPT

## 2022-09-20 PROCEDURE — 82962 GLUCOSE BLOOD TEST: CPT

## 2022-09-20 PROCEDURE — 84484 ASSAY OF TROPONIN QUANT: CPT | Performed by: INTERNAL MEDICINE

## 2022-09-20 PROCEDURE — 63710000001 INSULIN ASPART PER 5 UNITS: Performed by: STUDENT IN AN ORGANIZED HEALTH CARE EDUCATION/TRAINING PROGRAM

## 2022-09-20 PROCEDURE — 99222 1ST HOSP IP/OBS MODERATE 55: CPT | Performed by: PSYCHIATRY & NEUROLOGY

## 2022-09-20 PROCEDURE — 84145 PROCALCITONIN (PCT): CPT | Performed by: INTERNAL MEDICINE

## 2022-09-20 PROCEDURE — 36600 WITHDRAWAL OF ARTERIAL BLOOD: CPT

## 2022-09-20 PROCEDURE — 97163 PT EVAL HIGH COMPLEX 45 MIN: CPT

## 2022-09-20 PROCEDURE — 25010000002 METHYLPREDNISOLONE PER 125 MG: Performed by: INTERNAL MEDICINE

## 2022-09-20 PROCEDURE — 0202U NFCT DS 22 TRGT SARS-COV-2: CPT | Performed by: INTERNAL MEDICINE

## 2022-09-20 PROCEDURE — 25010000002 CEFTRIAXONE PER 250 MG: Performed by: INTERNAL MEDICINE

## 2022-09-20 PROCEDURE — 25010000002 FUROSEMIDE PER 20 MG: Performed by: STUDENT IN AN ORGANIZED HEALTH CARE EDUCATION/TRAINING PROGRAM

## 2022-09-20 PROCEDURE — 84100 ASSAY OF PHOSPHORUS: CPT | Performed by: INTERNAL MEDICINE

## 2022-09-20 PROCEDURE — 85025 COMPLETE CBC W/AUTO DIFF WBC: CPT | Performed by: INTERNAL MEDICINE

## 2022-09-20 PROCEDURE — 82465 ASSAY BLD/SERUM CHOLESTEROL: CPT | Performed by: INTERNAL MEDICINE

## 2022-09-20 PROCEDURE — 80053 COMPREHEN METABOLIC PANEL: CPT | Performed by: INTERNAL MEDICINE

## 2022-09-20 PROCEDURE — 94660 CPAP INITIATION&MGMT: CPT

## 2022-09-20 PROCEDURE — 82805 BLOOD GASES W/O2 SATURATION: CPT

## 2022-09-20 PROCEDURE — 25010000002 THIAMINE PER 100 MG: Performed by: INTERNAL MEDICINE

## 2022-09-20 PROCEDURE — 99233 SBSQ HOSP IP/OBS HIGH 50: CPT | Performed by: STUDENT IN AN ORGANIZED HEALTH CARE EDUCATION/TRAINING PROGRAM

## 2022-09-20 PROCEDURE — 83605 ASSAY OF LACTIC ACID: CPT | Performed by: INTERNAL MEDICINE

## 2022-09-20 PROCEDURE — 83880 ASSAY OF NATRIURETIC PEPTIDE: CPT | Performed by: INTERNAL MEDICINE

## 2022-09-20 PROCEDURE — 92610 EVALUATE SWALLOWING FUNCTION: CPT

## 2022-09-20 RX ORDER — ATORVASTATIN CALCIUM 40 MG/1
40 TABLET, FILM COATED ORAL NIGHTLY
Status: DISCONTINUED | OUTPATIENT
Start: 2022-09-20 | End: 2022-09-22 | Stop reason: HOSPADM

## 2022-09-20 RX ORDER — BUMETANIDE 0.25 MG/ML
1 INJECTION INTRAMUSCULAR; INTRAVENOUS ONCE
Status: COMPLETED | OUTPATIENT
Start: 2022-09-20 | End: 2022-09-20

## 2022-09-20 RX ORDER — ASPIRIN 81 MG/1
324 TABLET, CHEWABLE ORAL ONCE
Status: COMPLETED | OUTPATIENT
Start: 2022-09-20 | End: 2022-09-20

## 2022-09-20 RX ORDER — ASPIRIN 81 MG/1
81 TABLET ORAL DAILY
Status: DISCONTINUED | OUTPATIENT
Start: 2022-09-21 | End: 2022-09-22 | Stop reason: HOSPADM

## 2022-09-20 RX ORDER — FUROSEMIDE 10 MG/ML
60 INJECTION INTRAMUSCULAR; INTRAVENOUS ONCE
Status: COMPLETED | OUTPATIENT
Start: 2022-09-20 | End: 2022-09-20

## 2022-09-20 RX ADMIN — ATORVASTATIN CALCIUM 40 MG: 40 TABLET, FILM COATED ORAL at 01:47

## 2022-09-20 RX ADMIN — BUMETANIDE 1 MG: 0.25 INJECTION, SOLUTION INTRAMUSCULAR; INTRAVENOUS at 17:22

## 2022-09-20 RX ADMIN — ATORVASTATIN CALCIUM 40 MG: 40 TABLET, FILM COATED ORAL at 21:48

## 2022-09-20 RX ADMIN — PANTOPRAZOLE SODIUM 40 MG: 40 INJECTION, POWDER, FOR SOLUTION INTRAVENOUS at 08:30

## 2022-09-20 RX ADMIN — CEFTRIAXONE 1 G: 1 INJECTION, POWDER, FOR SOLUTION INTRAMUSCULAR; INTRAVENOUS at 01:46

## 2022-09-20 RX ADMIN — ASPIRIN 324 MG: 81 TABLET, CHEWABLE ORAL at 01:47

## 2022-09-20 RX ADMIN — METHYLPREDNISOLONE SODIUM SUCCINATE 62.5 MG: 125 INJECTION, POWDER, FOR SOLUTION INTRAMUSCULAR; INTRAVENOUS at 08:30

## 2022-09-20 RX ADMIN — Medication 10 ML: at 22:46

## 2022-09-20 RX ADMIN — FUROSEMIDE 60 MG: 10 INJECTION, SOLUTION INTRAMUSCULAR; INTRAVENOUS at 08:31

## 2022-09-20 RX ADMIN — METOPROLOL TARTRATE 12.5 MG: 25 TABLET, FILM COATED ORAL at 17:23

## 2022-09-20 RX ADMIN — PANTOPRAZOLE SODIUM 40 MG: 40 INJECTION, POWDER, FOR SOLUTION INTRAVENOUS at 20:53

## 2022-09-20 RX ADMIN — Medication 10 ML: at 08:31

## 2022-09-20 RX ADMIN — THIAMINE HYDROCHLORIDE 100 MG: 100 INJECTION, SOLUTION INTRAMUSCULAR; INTRAVENOUS at 02:29

## 2022-09-20 NOTE — OUTREACH NOTE
CHF Week 1 Survey    Flowsheet Row Responses   Religious facility patient discharged from? [facility]   Does the patient have one of the following disease processes/diagnoses(primary or secondary)? CHF   CHF Week 1 attempt successful? No   Unsuccessful attempts Attempt 1   Revoke Readmitted          SHERICE OG - Registered Nurse

## 2022-09-21 ENCOUNTER — ANESTHESIA EVENT (OUTPATIENT)
Dept: CARDIOLOGY | Facility: HOSPITAL | Age: 67
End: 2022-09-21

## 2022-09-21 ENCOUNTER — ANESTHESIA (OUTPATIENT)
Dept: CARDIOLOGY | Facility: HOSPITAL | Age: 67
End: 2022-09-21

## 2022-09-21 ENCOUNTER — APPOINTMENT (OUTPATIENT)
Dept: CARDIOLOGY | Facility: HOSPITAL | Age: 67
End: 2022-09-21

## 2022-09-21 LAB
ALBUMIN SERPL-MCNC: 3.06 G/DL (ref 3.5–5.2)
ALBUMIN/GLOB SERPL: 1.2 G/DL
ALP SERPL-CCNC: 83 U/L (ref 39–117)
ALT SERPL W P-5'-P-CCNC: 27 U/L (ref 1–41)
ANION GAP SERPL CALCULATED.3IONS-SCNC: 7.5 MMOL/L (ref 5–15)
AST SERPL-CCNC: 40 U/L (ref 1–40)
BASOPHILS # BLD AUTO: 0.01 10*3/MM3 (ref 0–0.2)
BASOPHILS NFR BLD AUTO: 0.2 % (ref 0–1.5)
BH CV ECHO MEAS - TR MAX VEL: 266 CM/SEC
BILIRUB SERPL-MCNC: 0.4 MG/DL (ref 0–1.2)
BUN SERPL-MCNC: 15 MG/DL (ref 8–23)
BUN/CREAT SERPL: 9.7 (ref 7–25)
CALCIUM SPEC-SCNC: 8.4 MG/DL (ref 8.6–10.5)
CHLORIDE SERPL-SCNC: 88 MMOL/L (ref 98–107)
CO2 SERPL-SCNC: 47.5 MMOL/L (ref 22–29)
CREAT SERPL-MCNC: 1.54 MG/DL (ref 0.76–1.27)
DEPRECATED RDW RBC AUTO: 62.2 FL (ref 37–54)
EGFRCR SERPLBLD CKD-EPI 2021: 49.1 ML/MIN/1.73
EOSINOPHIL # BLD AUTO: 0 10*3/MM3 (ref 0–0.4)
EOSINOPHIL NFR BLD AUTO: 0 % (ref 0.3–6.2)
ERYTHROCYTE [DISTWIDTH] IN BLOOD BY AUTOMATED COUNT: 17.2 % (ref 12.3–15.4)
GLOBULIN UR ELPH-MCNC: 2.6 GM/DL
GLUCOSE BLDC GLUCOMTR-MCNC: 105 MG/DL (ref 70–130)
GLUCOSE BLDC GLUCOMTR-MCNC: 147 MG/DL (ref 70–130)
GLUCOSE BLDC GLUCOMTR-MCNC: 162 MG/DL (ref 70–130)
GLUCOSE SERPL-MCNC: 173 MG/DL (ref 65–99)
HCT VFR BLD AUTO: 28.7 % (ref 37.5–51)
HGB BLD-MCNC: 8.6 G/DL (ref 13–17.7)
IMM GRANULOCYTES # BLD AUTO: 0.02 10*3/MM3 (ref 0–0.05)
IMM GRANULOCYTES NFR BLD AUTO: 0.3 % (ref 0–0.5)
LYMPHOCYTES # BLD AUTO: 0.36 10*3/MM3 (ref 0.7–3.1)
LYMPHOCYTES NFR BLD AUTO: 5.5 % (ref 19.6–45.3)
MAXIMAL PREDICTED HEART RATE: 153 BPM
MAXIMAL PREDICTED HEART RATE: 153 BPM
MCH RBC QN AUTO: 30.9 PG (ref 26.6–33)
MCHC RBC AUTO-ENTMCNC: 30 G/DL (ref 31.5–35.7)
MCV RBC AUTO: 103.2 FL (ref 79–97)
MONOCYTES # BLD AUTO: 0.49 10*3/MM3 (ref 0.1–0.9)
MONOCYTES NFR BLD AUTO: 7.4 % (ref 5–12)
NEUTROPHILS NFR BLD AUTO: 5.71 10*3/MM3 (ref 1.7–7)
NEUTROPHILS NFR BLD AUTO: 86.6 % (ref 42.7–76)
NRBC BLD AUTO-RTO: 0 /100 WBC (ref 0–0.2)
PLATELET # BLD AUTO: 376 10*3/MM3 (ref 140–450)
PMV BLD AUTO: 9.6 FL (ref 6–12)
POTASSIUM SERPL-SCNC: 4 MMOL/L (ref 3.5–5.2)
PROT SERPL-MCNC: 5.7 G/DL (ref 6–8.5)
QT INTERVAL: 362 MS
QTC INTERVAL: 483 MS
RBC # BLD AUTO: 2.78 10*6/MM3 (ref 4.14–5.8)
SODIUM SERPL-SCNC: 143 MMOL/L (ref 136–145)
STRESS TARGET HR: 130 BPM
STRESS TARGET HR: 130 BPM
WBC NRBC COR # BLD: 6.59 10*3/MM3 (ref 3.4–10.8)

## 2022-09-21 PROCEDURE — 93312 ECHO TRANSESOPHAGEAL: CPT

## 2022-09-21 PROCEDURE — 92960 CARDIOVERSION ELECTRIC EXT: CPT | Performed by: INTERNAL MEDICINE

## 2022-09-21 PROCEDURE — 36410 VNPNXR 3YR/> PHY/QHP DX/THER: CPT

## 2022-09-21 PROCEDURE — 93325 DOPPLER ECHO COLOR FLOW MAPG: CPT | Performed by: INTERNAL MEDICINE

## 2022-09-21 PROCEDURE — 94799 UNLISTED PULMONARY SVC/PX: CPT

## 2022-09-21 PROCEDURE — 25010000002 THIAMINE PER 100 MG: Performed by: STUDENT IN AN ORGANIZED HEALTH CARE EDUCATION/TRAINING PROGRAM

## 2022-09-21 PROCEDURE — 5A2204Z RESTORATION OF CARDIAC RHYTHM, SINGLE: ICD-10-PCS | Performed by: INTERNAL MEDICINE

## 2022-09-21 PROCEDURE — 63710000001 INSULIN ASPART PER 5 UNITS: Performed by: STUDENT IN AN ORGANIZED HEALTH CARE EDUCATION/TRAINING PROGRAM

## 2022-09-21 PROCEDURE — 93325 DOPPLER ECHO COLOR FLOW MAPG: CPT

## 2022-09-21 PROCEDURE — 99232 SBSQ HOSP IP/OBS MODERATE 35: CPT | Performed by: INTERNAL MEDICINE

## 2022-09-21 PROCEDURE — 99233 SBSQ HOSP IP/OBS HIGH 50: CPT | Performed by: STUDENT IN AN ORGANIZED HEALTH CARE EDUCATION/TRAINING PROGRAM

## 2022-09-21 PROCEDURE — 94660 CPAP INITIATION&MGMT: CPT

## 2022-09-21 PROCEDURE — 93321 DOPPLER ECHO F-UP/LMTD STD: CPT | Performed by: INTERNAL MEDICINE

## 2022-09-21 PROCEDURE — 93321 DOPPLER ECHO F-UP/LMTD STD: CPT

## 2022-09-21 PROCEDURE — 82962 GLUCOSE BLOOD TEST: CPT

## 2022-09-21 PROCEDURE — 93005 ELECTROCARDIOGRAM TRACING: CPT | Performed by: INTERNAL MEDICINE

## 2022-09-21 PROCEDURE — 80053 COMPREHEN METABOLIC PANEL: CPT | Performed by: STUDENT IN AN ORGANIZED HEALTH CARE EDUCATION/TRAINING PROGRAM

## 2022-09-21 PROCEDURE — 93312 ECHO TRANSESOPHAGEAL: CPT | Performed by: INTERNAL MEDICINE

## 2022-09-21 PROCEDURE — 92960 CARDIOVERSION ELECTRIC EXT: CPT | Performed by: NURSE PRACTITIONER

## 2022-09-21 PROCEDURE — 85025 COMPLETE CBC W/AUTO DIFF WBC: CPT | Performed by: STUDENT IN AN ORGANIZED HEALTH CARE EDUCATION/TRAINING PROGRAM

## 2022-09-21 PROCEDURE — 25010000002 PROPOFOL 10 MG/ML EMULSION: Performed by: NURSE ANESTHETIST, CERTIFIED REGISTERED

## 2022-09-21 PROCEDURE — 99233 SBSQ HOSP IP/OBS HIGH 50: CPT | Performed by: INTERNAL MEDICINE

## 2022-09-21 PROCEDURE — 25010000002 PHENYLEPHRINE 10 MG/ML SOLUTION: Performed by: NURSE ANESTHETIST, CERTIFIED REGISTERED

## 2022-09-21 PROCEDURE — C1751 CATH, INF, PER/CENT/MIDLINE: HCPCS

## 2022-09-21 RX ORDER — SODIUM CHLORIDE, SODIUM LACTATE, POTASSIUM CHLORIDE, CALCIUM CHLORIDE 600; 310; 30; 20 MG/100ML; MG/100ML; MG/100ML; MG/100ML
INJECTION, SOLUTION INTRAVENOUS CONTINUOUS PRN
Status: DISCONTINUED | OUTPATIENT
Start: 2022-09-21 | End: 2022-09-21 | Stop reason: SURG

## 2022-09-21 RX ORDER — PROPOFOL 10 MG/ML
VIAL (ML) INTRAVENOUS AS NEEDED
Status: DISCONTINUED | OUTPATIENT
Start: 2022-09-21 | End: 2022-09-21 | Stop reason: SURG

## 2022-09-21 RX ORDER — PANTOPRAZOLE SODIUM 40 MG/1
40 TABLET, DELAYED RELEASE ORAL
Status: DISCONTINUED | OUTPATIENT
Start: 2022-09-21 | End: 2022-09-22 | Stop reason: HOSPADM

## 2022-09-21 RX ORDER — AMIODARONE HYDROCHLORIDE 200 MG/1
200 TABLET ORAL
Status: DISCONTINUED | OUTPATIENT
Start: 2022-09-21 | End: 2022-09-22 | Stop reason: HOSPADM

## 2022-09-21 RX ORDER — PHENYLEPHRINE HYDROCHLORIDE 10 MG/ML
INJECTION INTRAVENOUS AS NEEDED
Status: DISCONTINUED | OUTPATIENT
Start: 2022-09-21 | End: 2022-09-21 | Stop reason: SURG

## 2022-09-21 RX ADMIN — PROPOFOL 30 MG: 10 INJECTION, EMULSION INTRAVENOUS at 10:14

## 2022-09-21 RX ADMIN — AMIODARONE HYDROCHLORIDE 200 MG: 200 TABLET ORAL at 13:54

## 2022-09-21 RX ADMIN — PROPOFOL 50 MG: 10 INJECTION, EMULSION INTRAVENOUS at 10:08

## 2022-09-21 RX ADMIN — Medication 10 ML: at 20:36

## 2022-09-21 RX ADMIN — PHENYLEPHRINE HYDROCHLORIDE 100 MCG: 10 INJECTION INTRAVENOUS at 10:10

## 2022-09-21 RX ADMIN — METOPROLOL TARTRATE 12.5 MG: 25 TABLET, FILM COATED ORAL at 20:36

## 2022-09-21 RX ADMIN — PHENYLEPHRINE HYDROCHLORIDE 100 MCG: 10 INJECTION INTRAVENOUS at 10:12

## 2022-09-21 RX ADMIN — PANTOPRAZOLE SODIUM 40 MG: 40 INJECTION, POWDER, FOR SOLUTION INTRAVENOUS at 09:15

## 2022-09-21 RX ADMIN — SODIUM CHLORIDE, POTASSIUM CHLORIDE, SODIUM LACTATE AND CALCIUM CHLORIDE: 600; 310; 30; 20 INJECTION, SOLUTION INTRAVENOUS at 10:02

## 2022-09-21 RX ADMIN — ATORVASTATIN CALCIUM 40 MG: 40 TABLET, FILM COATED ORAL at 20:36

## 2022-09-21 RX ADMIN — THIAMINE HYDROCHLORIDE 100 MG: 100 INJECTION, SOLUTION INTRAMUSCULAR; INTRAVENOUS at 09:16

## 2022-09-21 RX ADMIN — PANTOPRAZOLE SODIUM 40 MG: 40 TABLET, DELAYED RELEASE ORAL at 13:54

## 2022-09-22 VITALS
HEIGHT: 75 IN | HEART RATE: 85 BPM | SYSTOLIC BLOOD PRESSURE: 114 MMHG | DIASTOLIC BLOOD PRESSURE: 80 MMHG | TEMPERATURE: 97.4 F | BODY MASS INDEX: 31.98 KG/M2 | WEIGHT: 257.2 LBS | RESPIRATION RATE: 16 BRPM | OXYGEN SATURATION: 96 %

## 2022-09-22 LAB
ANION GAP SERPL CALCULATED.3IONS-SCNC: 5 MMOL/L (ref 5–15)
BUN SERPL-MCNC: 13 MG/DL (ref 8–23)
BUN/CREAT SERPL: 8.7 (ref 7–25)
CALCIUM SPEC-SCNC: 8.4 MG/DL (ref 8.6–10.5)
CHLORIDE SERPL-SCNC: 92 MMOL/L (ref 98–107)
CO2 SERPL-SCNC: 48 MMOL/L (ref 22–29)
CREAT SERPL-MCNC: 1.49 MG/DL (ref 0.76–1.27)
DEPRECATED RDW RBC AUTO: 66 FL (ref 37–54)
EGFRCR SERPLBLD CKD-EPI 2021: 51.1 ML/MIN/1.73
ERYTHROCYTE [DISTWIDTH] IN BLOOD BY AUTOMATED COUNT: 17.2 % (ref 12.3–15.4)
GLUCOSE BLDC GLUCOMTR-MCNC: 137 MG/DL (ref 70–130)
GLUCOSE SERPL-MCNC: 170 MG/DL (ref 65–99)
HCT VFR BLD AUTO: 30.7 % (ref 37.5–51)
HGB BLD-MCNC: 8.8 G/DL (ref 13–17.7)
MCH RBC QN AUTO: 30.7 PG (ref 26.6–33)
MCHC RBC AUTO-ENTMCNC: 28.7 G/DL (ref 31.5–35.7)
MCV RBC AUTO: 107 FL (ref 79–97)
PLATELET # BLD AUTO: 395 10*3/MM3 (ref 140–450)
PMV BLD AUTO: 9.4 FL (ref 6–12)
POTASSIUM SERPL-SCNC: 4 MMOL/L (ref 3.5–5.2)
RBC # BLD AUTO: 2.87 10*6/MM3 (ref 4.14–5.8)
SODIUM SERPL-SCNC: 145 MMOL/L (ref 136–145)
WBC NRBC COR # BLD: 8.52 10*3/MM3 (ref 3.4–10.8)

## 2022-09-22 PROCEDURE — 85027 COMPLETE CBC AUTOMATED: CPT | Performed by: STUDENT IN AN ORGANIZED HEALTH CARE EDUCATION/TRAINING PROGRAM

## 2022-09-22 PROCEDURE — 82962 GLUCOSE BLOOD TEST: CPT

## 2022-09-22 PROCEDURE — 80048 BASIC METABOLIC PNL TOTAL CA: CPT | Performed by: STUDENT IN AN ORGANIZED HEALTH CARE EDUCATION/TRAINING PROGRAM

## 2022-09-22 PROCEDURE — 97116 GAIT TRAINING THERAPY: CPT

## 2022-09-22 PROCEDURE — 94799 UNLISTED PULMONARY SVC/PX: CPT

## 2022-09-22 PROCEDURE — 99239 HOSP IP/OBS DSCHRG MGMT >30: CPT | Performed by: STUDENT IN AN ORGANIZED HEALTH CARE EDUCATION/TRAINING PROGRAM

## 2022-09-22 RX ORDER — AMIODARONE HYDROCHLORIDE 200 MG/1
200 TABLET ORAL DAILY
Qty: 30 TABLET | Refills: 0 | Status: SHIPPED | OUTPATIENT
Start: 2022-09-22 | End: 2022-10-22

## 2022-09-22 RX ORDER — FUROSEMIDE 40 MG/1
40 TABLET ORAL EVERY OTHER DAY
Qty: 15 TABLET | Refills: 0 | Status: SHIPPED | OUTPATIENT
Start: 2022-09-22 | End: 2022-09-27

## 2022-09-22 RX ORDER — ASPIRIN 81 MG/1
81 TABLET ORAL DAILY
Qty: 30 TABLET | Refills: 0 | Status: SHIPPED | OUTPATIENT
Start: 2022-09-22 | End: 2022-10-22

## 2022-09-22 RX ADMIN — METOPROLOL TARTRATE 12.5 MG: 25 TABLET, FILM COATED ORAL at 10:28

## 2022-09-22 RX ADMIN — ASPIRIN 81 MG: 81 TABLET, COATED ORAL at 10:29

## 2022-09-22 RX ADMIN — AMIODARONE HYDROCHLORIDE 200 MG: 200 TABLET ORAL at 10:29

## 2022-09-22 RX ADMIN — PANTOPRAZOLE SODIUM 40 MG: 40 TABLET, DELAYED RELEASE ORAL at 10:30

## 2022-09-22 NOTE — PROGRESS NOTES
[facility] CMG Coordinator  Inpatient Rehabilitation Discharge    Mode of Locomotion: Walking.    Discharge Against Medical Advice:  No.  Discharge Information  Patient Discharged Alive:  Yes  Discharge Destination/Living Setting: Home.  At discharge, the patient was discharged to live (with) (02)  Family / Relatives    Diagnosis for Interruption/Death:    Impairment Group:    Comorbidities: Rank Code      Description      1    K25.4     Chronic or unspecified gastric ulcer with                 hemorrhage  2    I11.0     Hypertensive heart disease with heart failure  3    I48.92    Unspecified atrial flutter  4    I50.9     Heart failure, unspecified  5    I48.91    Unspecified atrial fibrillation  6    I25.10    Atherosclerotic heart disease of native                 coronary artery without angina pectoris  7    K29.80    Duodenitis without bleeding  8    Z95.3     Presence of xenogenic heart valve  9    Z95.5     Presence of coronary angioplasty implant and                 graft  10   R53.81    Other malaise    Complications:      MICHELLE Bladder Accidents: 0  - Accidents.  Bladder Score = 5.  One (1) bladder accident. urinal  MICHELLE Bowel Accident: 0  - Accidents.  Bowel Score = 6. Patient has no accidents, but uses a device/medications. bowel  meds    Signed by: Stephanie Hodges Nurse

## 2022-09-23 ENCOUNTER — READMISSION MANAGEMENT (OUTPATIENT)
Dept: CALL CENTER | Facility: HOSPITAL | Age: 67
End: 2022-09-23

## 2022-09-23 NOTE — OUTREACH NOTE
Prep Survey    Flowsheet Row Responses   Jainism facility patient discharged from? [place]   Is LACE score < 7 ? No   Emergency Room discharge w/ pulse ox? No   Eligibility Readm Mgmt   Discharge diagnosis Elevated troponins/acute non-ST elevation myocardial infarction    Does the patient have one of the following disease processes/diagnoses(primary or secondary)? CHF   Does the patient have Home health ordered? No   Is there a DME ordered? Yes   What DME was ordered? Oxygen from [facility]   Medication alerts for this patient ASA    Prep survey completed? Yes          PARISH JOHNSON - Registered Nurse

## 2022-09-24 LAB
BACTERIA SPEC AEROBE CULT: NORMAL
BACTERIA SPEC AEROBE CULT: NORMAL
QT INTERVAL: 388 MS
QTC INTERVAL: 479 MS

## 2022-09-25 LAB
BACTERIA SPEC AEROBE CULT: NORMAL
BACTERIA SPEC AEROBE CULT: NORMAL

## 2022-09-26 ENCOUNTER — READMISSION MANAGEMENT (OUTPATIENT)
Dept: CALL CENTER | Facility: HOSPITAL | Age: 67
End: 2022-09-26

## 2022-09-26 NOTE — OUTREACH NOTE
CHF Week 1 Survey    Flowsheet Row Responses   Jew facility patient discharged from? [facility]   Does the patient have one of the following disease processes/diagnoses(primary or secondary)? CHF   CHF Week 1 attempt successful? No   Unsuccessful attempts Attempt 1          LIZZETTE VAUGHAN - Licensed Nurse

## 2022-09-27 ENCOUNTER — HOSPITAL ENCOUNTER (OUTPATIENT)
Dept: CARDIOLOGY | Facility: HOSPITAL | Age: 67
Discharge: HOME | End: 2022-09-27
Admitting: PHYSICIAN ASSISTANT

## 2022-09-27 VITALS
DIASTOLIC BLOOD PRESSURE: 70 MMHG | SYSTOLIC BLOOD PRESSURE: 107 MMHG | HEIGHT: 75 IN | BODY MASS INDEX: 30.56 KG/M2 | WEIGHT: 245.8 LBS | HEART RATE: 89 BPM | OXYGEN SATURATION: 96 %

## 2022-09-27 DIAGNOSIS — D50.9 MICROCYTIC ANEMIA: ICD-10-CM

## 2022-09-27 DIAGNOSIS — Z95.3 HISTORY OF MITRAL VALVE REPLACEMENT WITH BIOPROSTHETIC VALVE: ICD-10-CM

## 2022-09-27 DIAGNOSIS — N18.30 STAGE 3 CHRONIC KIDNEY DISEASE, UNSPECIFIED WHETHER STAGE 3A OR 3B CKD: ICD-10-CM

## 2022-09-27 DIAGNOSIS — I38 DIASTOLIC CHF DUE TO VALVULAR DISEASE: Primary | ICD-10-CM

## 2022-09-27 DIAGNOSIS — K27.9 PEPTIC ULCER DISEASE: ICD-10-CM

## 2022-09-27 DIAGNOSIS — I50.30 DIASTOLIC CHF DUE TO VALVULAR DISEASE: Primary | ICD-10-CM

## 2022-09-27 DIAGNOSIS — Z92.89 HISTORY OF RECENT HOSPITALIZATION: ICD-10-CM

## 2022-09-27 LAB
ABSOLUTE LUNG FLUID CONTENT: 41 % (ref 20–35)
ANION GAP SERPL CALCULATED.3IONS-SCNC: 7.1 MMOL/L (ref 5–15)
BUN SERPL-MCNC: 13 MG/DL (ref 8–23)
BUN/CREAT SERPL: 12 (ref 7–25)
CALCIUM SPEC-SCNC: 9 MG/DL (ref 8.6–10.5)
CHLORIDE SERPL-SCNC: 97 MMOL/L (ref 98–107)
CO2 SERPL-SCNC: 37.9 MMOL/L (ref 22–29)
CREAT SERPL-MCNC: 1.08 MG/DL (ref 0.76–1.27)
EGFRCR SERPLBLD CKD-EPI 2021: 75.2 ML/MIN/1.73
GLUCOSE SERPL-MCNC: 131 MG/DL (ref 65–99)
MAGNESIUM SERPL-MCNC: 2.1 MG/DL (ref 1.6–2.4)
NT-PROBNP SERPL-MCNC: 1504 PG/ML (ref 0–900)
POTASSIUM SERPL-SCNC: 4.6 MMOL/L (ref 3.5–5.2)
SODIUM SERPL-SCNC: 142 MMOL/L (ref 136–145)

## 2022-09-27 PROCEDURE — 80048 BASIC METABOLIC PNL TOTAL CA: CPT | Performed by: PHYSICIAN ASSISTANT

## 2022-09-27 PROCEDURE — 36415 COLL VENOUS BLD VENIPUNCTURE: CPT | Performed by: PHYSICIAN ASSISTANT

## 2022-09-27 PROCEDURE — 83735 ASSAY OF MAGNESIUM: CPT | Performed by: PHYSICIAN ASSISTANT

## 2022-09-27 PROCEDURE — 94726 PLETHYSMOGRAPHY LUNG VOLUMES: CPT | Performed by: PHYSICIAN ASSISTANT

## 2022-09-27 PROCEDURE — 83880 ASSAY OF NATRIURETIC PEPTIDE: CPT

## 2022-09-27 PROCEDURE — 99215 OFFICE O/P EST HI 40 MIN: CPT | Performed by: PHYSICIAN ASSISTANT

## 2022-09-27 RX ORDER — FUROSEMIDE 40 MG/1
40 TABLET ORAL EVERY OTHER DAY
Qty: 15 TABLET | Refills: 0 | Status: SHIPPED | OUTPATIENT
Start: 2022-09-27 | End: 2023-01-26

## 2022-09-27 NOTE — PROGRESS NOTES
[facility]  JOSESITO Mccann William, DO  [address] [place] [address]    Thank you for asking me to see Damián Myers for congestive heart failure.    HPI:     This is a 67 y.o. male with known past medical history of:  · HFrEF with recovered EF  · 06/25/22 TTE with EF 36-40% with moderate MV regurgitation and severe pulmonary HTN  · 09/08/22 TTE with EF 60%  · 09/12/22 TTE with EF 56-60% and small to moderate pericardial effusion  · 09/21/22 KENNEY with EF 51-55% with bioprosthetic mitral valve present (pericardial effusion)  · Bioprosthetic mitral valve replacement due to symptomatic severe mitral regurgitation  · 33 mm Magna Ease tissue mitral valve  · Persistent atrial fibrillation:   · 09/02/22 Left atrial appendage ligation/clipping  · 35mm Atricure clip  · 09/21/22 KENNEY guided cardioversion  · Recent upper GI bleeding with PUD:   · EGD 9/12 revealed 4 gastric ulcers and pyloric ulcer treated with epinephrine injection; gold probe heat cautery and clips  · Microcytic anemia  · CKD IIIa-b  · Essential hypertension  · Hyperlipidemia  · Persistent atrial fibrillation    Damián Myers presents for today to establish HF clinic care.  The patient is typically seen by Spencer Saeed MD.  Patient's primary cardiologist is Dr. Amador & team with primary CT surgeon Dr. Chan.       · Last known EF 51-55%.    • Last known hospitalization and/or ED visit: Patient had a comp patient had a rather complicated month of September when initially admitted from September 2 through 14, 2022 at [facility] during which time he underwent mitral valve replacement as well as left atrial appendage ligation while also developing acute GI bleeding requiring transfusion of packed red blood cells and platelets.  He underwent EGD revealing for gastric ulcers and pyloric ulcer treated with epinephrine injection.  After discharge he was admitted to acute inpatient  "rehabilitation at [facility] from which he left AMA per documentation review.  Shortly afterward he presented to the emergency department with confusion and shortness of breath and was then admitted for acute hypoxemic and hypercarbic respiratory failure felt to be secondary to acute CHF.  During this course of care he was referred to heart failure clinic at discharge.     • Accompanied by: Wife    Initial visit data/details regarding:   • Dyspnea: Significantly improved since discharge  • Lower extremity swelling: Mild swelling yesterday that improved with diuretics  • Abdominal swelling: Denies  • Home weight: Improving  • Home BP: Improving  • Home heart rate: Not currently monitoring--log provided.    • Daily activities of living: Improving significantly  • Pillows/lying flat: sleeps in recliner and has for sometime, also tolerating sleeping in bed now for about 5 hours nightly  • Zone: Green   • Mr. Myers reports he is doing well and \"always feels well\". His wife assists with his history. She reports he is doing well since recent hospitalizations. He is not experiencing chest pains and is slowly working on weaning from home oxygen. Discussed home oxygen weaning methods.    • Patient is making adequate urine on every other day Lasix.   Lower extremity edema from 09/26 did improve with Lasix.    • Patient reports he feels well overall and hopes to continue moving forward with his progress since his mitral valve procedure.        Specialists:   • General Cardiology: Dr. Amador & team  • CT Surgery: Dr. Chan & team  • GI Dr. Garcia    Review of Systems - Review of Systems   Constitutional: Negative for chills, decreased appetite, diaphoresis and fever.   HENT: Negative for congestion and ear pain.    Eyes: Negative for blurred vision, discharge and double vision.   Cardiovascular: Negative for chest pain, claudication and cyanosis.   Respiratory: Negative for cough and hemoptysis.    Endocrine: " Negative for cold intolerance and heat intolerance.   Hematologic/Lymphatic: Negative for adenopathy and bleeding problem.   Skin: Negative for color change and nail changes.   Musculoskeletal: Negative for arthritis, back pain and falls.   Gastrointestinal: Negative for bloating and abdominal pain.   Genitourinary: Negative for bladder incontinence, decreased libido and dysuria.   Neurological: Negative for aphonia, brief paralysis and difficulty with concentration.   Psychiatric/Behavioral: Negative for altered mental status, depression and hallucinations.         All other systems were reviewed and were negative.    Patient Active Problem List   Diagnosis   • Obesity (BMI 30-39.9)   • Coronary artery disease involving native coronary artery of native heart with angina pectoris (HCC)   • Stroke (cerebrum) ([facility])   • Essential hypertension   • Hyperlipidemia LDL goal <70   • Diastolic CHF due to valvular disease (HCC)   • Paroxysmal atrial fibrillation (HCC)   • Debility   • Respiratory failure with hypoxia and hypercapnia ([facility])   • COPD exacerbation ([facility])       family history includes Diabetes type I in his son; Heart disease in his maternal grandfather; Heart valve disorder in his brother, maternal grandfather, and mother.     reports that he has never smoked. He has never used smokeless tobacco. He reports that he does not drink alcohol and does not use drugs.    Allergies   Allergen Reactions   • Other Unknown - High Severity     Pt had a reaction to anesthesia when placing stents      • Percocet [Oxycodone-Acetaminophen] Unknown - High Severity         Current Outpatient Medications:   •  amiodarone (PACERONE) 200 MG tablet, Take 1 tablet by mouth Daily for 30 days., Disp: 30 tablet, Rfl: 0  •  aspirin 81 MG EC tablet, Take 1 tablet by mouth Daily for 30 days., Disp: 30 tablet, Rfl: 0  •  atorvastatin (LIPITOR) 40 MG tablet, Take 1 tablet by mouth Every Night., Disp: 30 tablet, Rfl: 0  •  furosemide (LASIX) 40  MG tablet, Take 1 tablet by mouth Every Other Day for 30 days. Extra 40 as needed for swelling, Disp: 15 tablet, Rfl: 0  •  metoprolol tartrate (LOPRESSOR) 25 MG tablet, Take 0.5 tablets by mouth Every 12 (Twelve) Hours., Disp: 15 tablet, Rfl: 0  •  pantoprazole (PROTONIX) 40 MG EC tablet, Take 1 tablet by mouth 2 (Two) Times a Day Before Meals., Disp: 60 tablet, Rfl: 0  No current facility-administered medications for this encounter.    Facility-Administered Medications Ordered in Other Encounters:   •  Chlorhexidine Gluconate Cloth 2 % pads 1 application, 1 application, Topical, Q12H PRN, Magaly King, SREEKANTH      Physical Exam:  I have reviewed the patient's current vital signs as documented in the patient's EMR.   Vitals:    09/27/22 1324   BP: 107/70   Pulse: 89   SpO2: 96%     Body mass index is 30.72 kg/m².       09/27/22  1324   Weight: 111 kg (245 lb 12.8 oz)            Ambulates independently to room while pushing O2 tank  Physical Exam  Vitals and nursing note reviewed.   Constitutional:       General: He is awake.      Appearance: Normal appearance. He is well-developed and well-groomed.      Interventions: Nasal cannula in place.   HENT:      Head: Normocephalic and atraumatic.   Eyes:      General: Lids are normal.      Conjunctiva/sclera:      Right eye: Right conjunctiva is not injected.      Left eye: Left conjunctiva is not injected.   Cardiovascular:      Rate and Rhythm: Normal rate and regular rhythm.      Heart sounds: S1 normal and S2 normal.   Pulmonary:      Effort: No tachypnea, bradypnea or accessory muscle usage.      Breath sounds: No decreased breath sounds, wheezing, rhonchi or rales.      Comments: Clear breath sounds bilaterally   Abdominal:      General: Bowel sounds are normal.      Palpations: Abdomen is soft.      Tenderness: There is no abdominal tenderness.   Musculoskeletal:      Right lower leg: No swelling, deformity or lacerations. No edema.      Left lower leg: No  swelling, deformity or lacerations. No edema.   Skin:     General: Skin is warm and moist.   Neurological:      Mental Status: He is alert and oriented to person, place, and time.   Psychiatric:         Attention and Perception: Attention normal.         Mood and Affect: Mood normal.         Behavior: Behavior is cooperative.      Comments: Patient is pleasant and looking forward to ongoing cardiac recovery.           JVP: Volume/Pulsation: Normal.        DATA REVIEWED:     EKG. I personally reviewed and interpreted the EKG.      ---------------------------------------------------  TTE/KENNEY:  Results for orders placed during the hospital encounter of 09/19/22    Adult Transesophageal Echo (KENNEY) W/ Cont if Necessary Per Protocol    Interpretation Summary  · Normal left ventricular cavity size and wall thickness noted. All left ventricular wall segments contract normally.  · Left ventricular ejection fraction appears to be 51 - 55%.  · There is (grade 1), layered plaque in the descending aorta present.  · Left atrial appendage ligation noted.  · The aortic valve is structurally normal with no regurgitation or stenosis present.  · There is a bioprosthetic mitral valve present. The prosthetic mitral valve is grossly normal.  · Mild mitral valve regurgitation is present. Mild paravalvular regurgitation noted along the lateral aspect No significant mitral valve stenosis is present.  · There is no evidence of pericardial effusion.        -----------------------------------------------------  CXR/Imaging:   Imaging Results (Most Recent)     None          I personally reviewed and interpreted the CXR.      -----------------------------------------------------  CT:   XR Chest 2 View    Result Date: 9/15/2022  Improvement in bilateral interstitial infiltrates and vascular congestion. Improvement in bibasilar infiltrates. Signer Name: Scott Marina MD  Signed: 9/15/2022 9:27 PM  Workstation Name: [id number]  [facility]    XR Abdomen 1 View    Result Date: 9/6/2022  Nasogastric tube with tip terminating in the gastric fundus.  This report was finalized on 9/6/2022 10:55 AM by Brian Dewey.      CT Angiogram Neck    Result Date: 9/19/2022  Mild plaque, but no hemodynamically significant stenosis  This report was finalized on 9/19/2022 6:03 PM by Dr. Jordy Jhaveri MD.      XR Chest 1 View    Result Date: 9/19/2022  1.  Marked cardiomegaly with mild vascular congestion and interstitial edema increasing since 9/15/2022. 2.  Persistent pleural effusions and basilar atelectasis, greater on the left. 3.  MVR. LAAE. Signer Name: Kayode Conner MD  Signed: 9/19/2022 7:02 PM  Workstation Name: [id number]  [facility]    XR Chest 1 View    Result Date: 9/15/2022  1.  Tiny bilateral pleural effusions. 2.  Cardiomegaly again noted. 3.  Coarsened interstitial markings noted throughout the lungs.  This report was finalized on 9/15/2022 8:23 AM by Dr. Yonatan Marroquin MD.      XR Chest 1 View    Result Date: 9/14/2022   1. Increasing opacities in the lung bases, and developing airspace disease in the left midlung, compared to one day prior. The findings may represent developing or worsening pneumonia. Atypical distribution of edema not excluded. 2. Persistent dense bibasilar consolidations with small layering bilateral pleural effusions. 3. Marked generalized cardiac enlargement appears unchanged.  This report was finalized on 9/14/2022 7:23 AM by Grace Becerra MD.      XR Chest 1 View    Result Date: 9/13/2022   1. Stable small bilateral pleural effusions and bibasilar airspace disease, favored to represent atelectasis. 2. Mild central vascular congestive changes without overt edema.  This report was finalized on 9/13/2022 7:33 AM by Grace Becerra MD.      XR Chest 1 View    Result Date: 9/12/2022  Mild pulmonary edema pattern with small bilateral pleural effusions, stable as compared to  the previous study.  This report was finalized on 9/12/2022 7:30 AM by Tiffanie Ochoa MD.      XR Chest 1 View    Result Date: 9/11/2022  Stable moderate right and small left pleural effusions and stable postoperative findings.  This report was finalized on 9/11/2022 5:52 PM by Cody James MD.      XR Chest 1 View    Result Date: 9/10/2022  Improving left basilar atelectasis/effusion.  This report was finalized on 9/10/2022 10:32 PM by Dr. Gonzales Herman MD.      XR Chest 1 View    Result Date: 9/7/2022   1. NG tube below the left diaphragm in good position. 2. Stable mild bibasilar atelectasis, left greater than right.  This report was finalized on 9/7/2022 9:09 AM by Dr. Gonzales Herman MD.      XR Chest 1 View    Result Date: 9/6/2022  The patient's pleural drains are not well seen, either removed or obscured by superimposed opacity at the lung bases and upper abdomen. Right-sided central venous catheter projects unchanged. There is no distinct pneumothorax or new focal airspace consolidation. Unchanged suspected trace effusions. Stable heart and mediastinal contours.  This report was finalized on 9/6/2022 7:37 AM by Vargas Moise.      XR Chest 1 View    Result Date: 9/5/2022  No significant interval change.  This report was finalized on 9/5/2022 6:59 AM by Jackson Car MD.      XR Chest 1 View    Result Date: 9/4/2022  Interval extubation and removal of NG/OG tube. Removal of pulmonary arterial catheter. The right IJ introducer sheath remains in place. Thoracostomy tubes remain in place. Stable cardiomegaly and low lung volumes. Similar small bilateral pleural effusions and bibasilar parenchymal opacities, likely associated atelectasis. Stable perihilar vascular congestion and/or bronchovascular crowding in setting of low lung volumes. No pneumothorax.  This report was finalized on 9/4/2022 6:38 AM by Jackson Car MD.      XR Chest 1 View    Result Date: 9/3/2022  1.  Bibasilar densities which could relate to  atelectasis and/or underlying effusions. 2.  Slight prominence of pulmonary vascularity that could reflect some congestion.  This report was finalized on 9/3/2022 7:16 AM by Jigar Garvey MD.      XR Chest 1 View    Result Date: 9/2/2022  Stable chest radiograph, including focal left basilar atelectasis or effusion. No new chest pathology is seen elsewhere.  This report was finalized on 9/2/2022 10:29 PM by Dr. Gonzales Herman MD.      XR Chest 1 View    Result Date: 9/2/2022   1. Mildly increased left basilar atelectasis. 2. No new chest disease is seen elsewhere. In particular, no left apical pneumothorax is appreciated.  This report was finalized on 9/2/2022 6:14 PM by Dr. Gonzales Herman MD.      XR Chest 1 View    Result Date: 9/2/2022  Postsurgical changes as detailed above with satisfactory appearance of medical support devices. Mildly decreased lung volumes with bronchovascular crowding versus interstitial edema and trace left apical pneumothorax with thoracostomy tubes in place. Likely small left pleural effusion.  This report was finalized on 9/2/2022 2:45 PM by Jackson Car MD.      CT Head Without Contrast Stroke Protocol    Result Date: 9/19/2022    1. Study degraded by patient motion 2. No evidence of an acute ischemic event 3. No evidence of parenchymal mass, hemorrhage, or midline shift  Results were relayed to the referring physician at 4:55 PM  This report was finalized on 9/19/2022 5:00 PM by Dr. Jordy Jhaveri MD.      XR Abdomen KUB    Result Date: 9/5/2022  Nonobstructive, nonspecific bowel gas pattern. Mild gaseous distention of the stomach.  This report was finalized on 9/5/2022 8:11 AM by Jackson Car MD.      Chest X-Ray PA & Lateral    Result Date: 8/30/2022  No acute cardiopulmonary findings.  This report was finalized on 8/30/2022 4:50 PM by Jackson Car MD.      CT Angiogram Head w AI Analysis of LVO    Result Date: 9/19/2022  1. No large vessel occlusion 2. Left pleural effusion and  left-sided consolidation  This report was finalized on 9/19/2022 6:05 PM by Dr. Jordy Jhaveri MD.      I personally reviewed the images of the CT scan.  My personal interpretation is below.      ----------------------------------------------------    PFTs: None available at present  --------------------------------------------------------------------------------------------------    Laboratory evaluations:    Lab Results   Component Value Date    GLUCOSE 131 (H) 09/27/2022    BUN 13 09/27/2022    CREATININE 1.08 09/27/2022    EGFRIFNONA 87 10/29/2021    BCR 12.0 09/27/2022    K 4.6 09/27/2022    CO2 37.9 (H) 09/27/2022    CALCIUM 9.0 09/27/2022    ALBUMIN 3.06 (L) 09/21/2022    AST 40 09/21/2022    ALT 27 09/21/2022     Lab Results   Component Value Date    WBC 8.52 09/22/2022    HGB 8.8 (L) 09/22/2022    HCT 30.7 (L) 09/22/2022    .0 (H) 09/22/2022     09/22/2022     Lab Results   Component Value Date    CHOL 83 09/20/2022    TRIG 85 06/25/2022    HDL 36 (L) 06/25/2022     (H) 06/25/2022     Lab Results   Component Value Date    TSH 8.280 (H) 09/19/2022     Lab Results   Component Value Date    HGBA1C 5.10 09/20/2022     Lab Results   Component Value Date    ALT 27 09/21/2022     Lab Results   Component Value Date    HGBA1C 5.10 09/20/2022    HGBA1C 6.30 (H) 08/30/2022    HGBA1C 6.00 (H) 06/25/2022     Lab Results   Component Value Date    CREATININE 1.08 09/27/2022     Lab Results   Component Value Date    FERRITIN 365.40 09/10/2022     Lab Results   Component Value Date    INR 1.10 09/19/2022    INR 1.12 09/03/2022    INR 1.44 (H) 09/02/2022    PROTIME 14.5 09/19/2022    PROTIME 14.3 09/03/2022    PROTIME 17.5 (H) 09/02/2022        Lab Results   Component Value Date    ABSOLUTELUNG 41 (A) 09/27/2022    ABSOLUTELUNG 39 (A) 06/25/2022       PAH RISK ASSESSMENT:      1. Diastolic CHF due to valvular disease (HCC)    2. History of mitral valve replacement with bioprosthetic valve    3. History of  recent hospitalization    4. Peptic ulcer disease    5. Microcytic anemia    6. Stage 3 chronic kidney disease, unspecified whether stage 3a or 3b CKD (HCC)          ORDERS PLACED TODAY:  Orders Placed This Encounter   Procedures   • BNP   • Basic Metabolic Panel   • Magnesium   • ReDs Vest        Diagnoses and all orders for this visit:    1. Diastolic CHF due to valvular disease (HCC) (Primary)  Overview:  · Echo (9/10/2022):LVEF = 55%.  33 mm Medtronic Magna Ease bioprosthetic valve present.  Mean gradient 8 mmHg.  Moderate 1-2 cm pericardial effusion.  No tamponade.  Left pleural effusion present.    Orders:  -     BNP  -     Basic Metabolic Panel; Standing  -     Magnesium; Standing  -     ReDs Vest  -     Basic Metabolic Panel  -     Magnesium    2. History of mitral valve replacement with bioprosthetic valve    3. History of recent hospitalization    4. Peptic ulcer disease    5. Microcytic anemia    6. Stage 3 chronic kidney disease, unspecified whether stage 3a or 3b CKD (HCC)    Other orders  -     furosemide (LASIX) 40 MG tablet; Take 1 tablet by mouth Every Other Day for 30 days. Extra 40 as needed for swelling  Dispense: 15 tablet; Refill: 0           MEDS ORDERED TODAY:    New Medications Ordered This Visit   Medications   • furosemide (LASIX) 40 MG tablet     Sig: Take 1 tablet by mouth Every Other Day for 30 days. Extra 40 as needed for swelling     Dispense:  15 tablet     Refill:  0        ---------------------------------------------------------------------------------------------------------------------------          ASSESSMENT/PLAN:      Diagnosis Plan   1. Diastolic CHF due to valvular disease (HCC)  BNP    Basic Metabolic Panel    Magnesium    ReDs Vest    Basic Metabolic Panel    Magnesium   2. History of mitral valve replacement with bioprosthetic valve     3. History of recent hospitalization     4. Peptic ulcer disease     5. Microcytic anemia     6. Stage 3 chronic kidney disease,  unspecified whether stage 3a or 3b CKD (HCC)         not acutely decompensated chronic diastolic heart failure. CHF.      NYHA stage Stage C: Structural heart disease is present AND symptoms have occurredFC-Class II: Slight limitation of physical activity. Comfortable at rest Ordinary physical activity results in fatigue, palpitation, dyspnea (shortness of breath)    Today, Patient appears euvolemic.and with  Moderate perfusion. The patient's hemodynamics are currently acceptable. HR is: normal and is at goal. BP/MAP was reviewed and there is notroom for medication up-titration.  Clinical trajectory was assessed and hasimproved.     CHF GOAL DIRECTED MEDICAL THERAPY FOR PATIENT ADDRESSED/ADJUSTED:     GDMT    Drug Class   Drug   Dose Last Dose Adjustment Additional Titration   Notes   ACEi/ARB/ARNI CKD IIIa-b       Beta Blocker  Metoprolol tartrate   12.5mg BID BP & HR controlled during visit, not adjusted     MRA CKD IIIa-b       SGLT2i Consider in future visit    N/A      -CHF Specific BB:   •  Continue Metoprolol 12.5mg BID with Amiodarone already on board with atrial fibrillation.   • BP and HR acceptable, hold on adjusting.   • Plan of care reviewed with patient and his wife Mrs. Myers.      -ACE/ARB/ARNi: Hold @ present with CKD IIIa-b and borderline hypotension.     -MRA: Avoid in CKD IIIb patient.      -SGLT2 inhibitor therapy:   · Discussed possibly considering addition of SGLT2i in a future visit.   The patient has HFpEF and NYHA class II-IV and merits consideration for this therapy to reduce the combined risk of cardiovascular death or hospitalization for heart failure in patients with heart failure and a preserved ejection fraction, regardless of the presence or absence of diabetes.  At present, Mr. Myers is excelling with current regimen and is hesitant for additions/changes.  We discuss possible addition should his course of care begin to downtrend with current regimen.  He has had a very lengthy month  "of multiple medical treatments and adjustments with understandable resistance to change at present.      -Diuretic regimen:   • ReDS Vest reading for 09/27/22 is 41 during today's visit with patient approaching euvolemia; ReDs Vest reading reviewed with patient.    • ProBNP continued to downtrend significantly from hospital visit.  He is showing improvement and feels well.   • Tolerating Lasix 40mg every other day with creatinine tolerating as well. Discussed taking extra Lasix 40mg on days (his \"off day\" from lasix) when he has worsening leg swelling or dyspnea and/or weight gain of 3lbs in a day and/or 5lbs in a week.  • Discussed weighing daily with confirmation of home scale.     -Acute vs. Chronic underlying conditions other than HF addressed during visit:   • P.Afib s/p cardioversion & ANGELICA ligation:   o Continue Amiodarone & Metoprolol.   o Continue outpatient f/u with General Cards & CT surgery.     • CKD III a-b:   o BMP today showing much improvement.      • Recent GI bleed with PUD:   o Continue BID Protonix 40mg.   o Has f/u with Dr. Rea.    o Reports he was told stress of procedure caused ulcers.     • Bioprosthetic mitral valve replacement:   o Continue outpatient f/u with Dr. Chan's office.     • Recent acute hypoxemic respiratory failure:   o Continue to wean from O2 2LPM as tolerated.   o Continue use of incentive spirometry.   o Discussed use of pulse oximetry while resting on room air.   o Discussed gradual monitoring of ambulatory pulse oximetry.   o Has upcoming visit with PCP Dr. Saeed as well.         BMI is >= 30 and <35. (Class 1 Obesity). The following options were offered after discussion;: nutrition counseling/recommendations with CHF education.               45 minutes out of 60 minutes face to face spent counseling patient extensively on dietary Na+ intake, importance of activity, weight monitoring, compliance with medications in addition to importance of titration with goal " directed medical therapy and follow up appointments.            This document has been electronically signed by Samra Roberts PA-C  September 27, 2022 14:54 EDT      Dictated Utilizing Dragon Dictation: Part of this note may be an electronic transcription/translation of spoken language to printed text using the Dragon Dictation System.    Follow-up appointment and medication changes provided in hand delivered After Visit Summary as well as reviewed in the room.

## 2022-09-27 NOTE — PROGRESS NOTES
Heart Failure Clinic    Date: 09/27/22     Vitals:    09/27/22 1324   BP: 107/70   Pulse: 89   SpO2: 96%    weight 245.8    Method of arrival: Ambulatory    Weighing self daily: Yes    Monitoring Heart Failure Zones: no    Today's HF Zone: Green    Taking medications as prescribed: Yes    Edema Yes    Shortness of Air: No    Number of pillows used at night:Recliner and bed    Educational Materials given:  Avs, Heart failure zone education                                                                         ReDS Value: 41  36-41 Possible Hypervolemic Status      Kalyn Singh RN 09/27/22 13:29 EDT

## 2022-09-28 ENCOUNTER — READMISSION MANAGEMENT (OUTPATIENT)
Dept: CALL CENTER | Facility: HOSPITAL | Age: 67
End: 2022-09-28

## 2022-09-28 NOTE — OUTREACH NOTE
CHF Week 1 Survey    Flowsheet Row Responses   [facility] patient discharged from? [facility]   Does the patient have one of the following disease processes/diagnoses(primary or secondary)? CHF   CHF Week 1 attempt successful? Yes   Call start time 1124   Call end time 1127   Discharge diagnosis Elevated troponins/acute non-ST elevation myocardial infarction    Is patient permission given to speak with other caregiver? Yes   List who call center can speak with Anette spouse    Person spoke with today (if not patient) and relationship Anette spouse    Meds reviewed with patient/caregiver? Yes   Is the patient having any side effects they believe may be caused by any medication additions or changes? No   Does the patient have all medications ordered at discharge? Yes   Is the patient taking all medications as directed (includes completed medication regime)? Yes   Does the patient have a primary care provider?  Yes   Does the patient have an appointment with their PCP within 7 days of discharge? No   What is preventing the patient from scheduling follow up appointments within 7 days of discharge? Haven't had time   Nursing Interventions Educated patient on importance of making appointment   Has the patient kept scheduled appointments due by today? Yes  [Cardiac clinic apt 9/27/22]   What DME was ordered? Oxygen from [facility]   Has all DME been delivered? Yes   Pulse Ox monitoring Intermittent   Pulse Ox device source Patient   O2 Sat comments 2L O2 prn    O2 Sat: education provided Sat levels, Monitoring frequency, When to seek care   Psychosocial issues? No   Did the patient receive a copy of their discharge instructions? Yes   Nursing interventions Reviewed instructions with patient   What is the patient's perception of their health status since discharge? Improving   Nursing interventions Nurse provided patient education   Is the patient able to teach back signs and symptoms of worsening condition?  "(i.e. weight gain, shortness of air, etc.) Yes   If the patient is a current smoker, are they able to teach back resources for cessation? Not a smoker   Is the patient/caregiver able to teach back the hierarchy of who to call/visit for symptoms/problems? PCP, Specialist, Home health nurse, Urgent Care, ED, 911 Yes   Is the patient able to teach back Heart Failure Zones? Yes   CHF Nursing Interventions Education provided on various zones   CHF Zone this Call Green Zone   Green Zone Patient reports doing well, No new or worsening shortness of breath, Physical activity level is normal for you, No new swelling -  feet, ankles and legs look normal for you, Weight check stable   Green Zone Interventions Daily weight check, Meds as directed, Follow up visits planned    CHF Week 1 call completed? Yes   Wrap up additional comments Blood work and vitals were \"good\" yesterday (9/27/22) during cardiac clinic visit per spouse.  HR 88//80.   Call end time 1127          JOSE H - Registered Nurse  "

## 2022-09-29 NOTE — ANESTHESIA POSTPROCEDURE EVALUATION
Patient: Damián Myers    Procedure Summary     Date: 09/21/22 Room / Location: [facility] NONINVASIVE LAB    Anesthesia Start: 1001 Anesthesia Stop: 1021    Procedure: ADULT TRANSESOPHAGEAL ECHO (KENNEY) W/ CONT IF NECESSARY PER PROTOCOL Diagnosis:       (Arrhythmia)      (AFib)    Scheduled Providers: Mahendra Amador MD Provider: Marcellus Solomon MD    Anesthesia Type: Not recorded ASA Status: Not recorded          Anesthesia Type: No value filed.    Vitals  Vitals Value Taken Time   /70 09/27/22 1324   Temp 97.4 °F (36.3 °C) 09/22/22 0800   Pulse 89 09/27/22 1324   Resp 16 09/22/22 0700   SpO2 96 % 09/27/22 1324           Post Anesthesia Care and Evaluation    Patient location during evaluation: PHASE II  Patient participation: complete - patient participated  Level of consciousness: awake and alert  Pain score: 1  Pain management: adequate    Airway patency: patent  Anesthetic complications: No anesthetic complications  PONV Status: controlled  Cardiovascular status: acceptable  Respiratory status: acceptable  Hydration status: acceptable

## 2022-09-29 NOTE — ANESTHESIA PREPROCEDURE EVALUATION
Anesthesia Evaluation     Patient summary reviewed and Nursing notes reviewed   no history of anesthetic complications:  NPO Solid Status: > 8 hours  NPO Liquid Status: > 2 hours           Airway   Mallampati: II  TM distance: >3 FB  Neck ROM: full  No difficulty expected  Dental - normal exam     Pulmonary - negative pulmonary ROS and normal exam    breath sounds clear to auscultation  Cardiovascular     ECG reviewed  PT is on anticoagulation therapy  Rhythm: irregular  Rate: normal    (+) hypertension, valvular problems/murmurs (s/p MVR), dysrhythmias Atrial Fib, pericardial effusion (small to moderate, no tamponade effect),     ROS comment: 9/12/22 Echo:  · Left ventricular ejection fraction appears to be 56 - 60%. Left ventricular systolic function is normal.  · Small to moderate pericardial effusion located next to the LV, essentially unchanged from 9/10/2022  · There is a left pleural effusion.  · No evidence of tamponade    Neuro/Psych- negative ROS  GI/Hepatic/Renal/Endo    (+) obesity,       Musculoskeletal (-) negative ROS    Abdominal    Substance History      OB/GYN          Other - negative ROS                         Anesthesia Plan    ASA 4     general     intravenous induction     Anesthetic plan, risks, benefits, and alternatives have been provided, discussed and informed consent has been obtained with: patient.    Plan discussed with CRNA.        CODE STATUS:

## 2022-09-30 ENCOUNTER — DOCUMENTATION (OUTPATIENT)
Dept: CARDIAC REHAB | Facility: HOSPITAL | Age: 67
End: 2022-09-30

## 2022-09-30 NOTE — PROGRESS NOTES
Thank you for your referral to [facility] Cardiac Rehab.    Staff has left message at both numbers provided if patient calls back we will discuss the program and schedule him if he would like to participate.

## 2022-10-04 ENCOUNTER — OFFICE VISIT (OUTPATIENT)
Dept: CARDIAC SURGERY | Facility: CLINIC | Age: 67
End: 2022-10-04

## 2022-10-04 ENCOUNTER — DOCUMENTATION (OUTPATIENT)
Dept: CARDIAC REHAB | Facility: HOSPITAL | Age: 67
End: 2022-10-04

## 2022-10-04 VITALS
BODY MASS INDEX: 31.16 KG/M2 | HEIGHT: 75 IN | DIASTOLIC BLOOD PRESSURE: 78 MMHG | OXYGEN SATURATION: 91 % | HEART RATE: 83 BPM | TEMPERATURE: 98 F | SYSTOLIC BLOOD PRESSURE: 124 MMHG | WEIGHT: 250.6 LBS

## 2022-10-04 DIAGNOSIS — Z95.2 S/P MVR (MITRAL VALVE REPLACEMENT): ICD-10-CM

## 2022-10-04 PROCEDURE — 71046 X-RAY EXAM CHEST 2 VIEWS: CPT | Performed by: NURSE PRACTITIONER

## 2022-10-04 PROCEDURE — 99024 POSTOP FOLLOW-UP VISIT: CPT | Performed by: NURSE PRACTITIONER

## 2022-10-04 NOTE — PROGRESS NOTES
Spoke with patient and he stated were he has medicare only he does not want to participate due to copay

## 2022-10-04 NOTE — PROGRESS NOTES
[facility] Cardiothoracic Surgery Office Follow Up Note     Date of Encounter: 10/04/2022     Name: Damián Myers  : 1955     Referred By: No ref. provider found  PCP: Spencer Saeed MD    Chief Complaint:    Chief Complaint   Patient presents with   • Follow-up     Hospital follow up s/p MVR on 22 with [facility]        Subjective      History of Present Illness:    Damián Myers is a 67 y.o. male non-smoker with history of COPD, HTN, HLD on statin therapy, PAF, nonobstructive CAD, CHF, and symptomatic mitral regurgitation s/p mitral valve replacement with 33 mm Magna Ease tissue valve and left atrial appendage ligation with 35mm AtriCure clip on 2022 by Dr. Chan. Postop a.fib/flutter s/p cardioversion  with recurrence tx with amio (no anticoag needed), pericardial effusion without evidence of tamponade, anemia/Gi bleed s/p 2 unites PRBCs and FFP with EGT revealing 4 gastric ulcers s/p cautery/clips.  Patient met criteria for discharge on 2022.  Patient was readmitted to [facility] from 2022 - 2022 for acute respiratory failure, acute exacerbation of HFrEF with recovered EF, NSTEMI type II secondary to respiratory failure, and acute metabolic encephalopathy secondary to hypercapnia.  Patient was aggressively diuresed and treated with BiPAP with plans for sleep study in the future.  During this hospitalization patient had recurrent A. fib and underwent successful KENNEY and cardioversion on 2022.  He presents today for his postoperative eval and despite his tumultuous postoperative course he is doing surprisingly well.  He has no complaints of any unusual or uncontrolled pain, palpitations, or respiratory complaints.  He does have PHOENIX but is only using his oxygen as needed with things such as walking to the hospital garage and at night as instructed.  He does not have outpatient sleep study set up.  He has had no issues with incisional healing.  He has had no further issues with BLE edema  and is checking daily weights. He has follow-up with cardiology Dr. Amador's office 10/17/2022. He has had no further melena or GI complaints.     Review of Systems:  Review of Systems   Constitutional: Negative for chills, decreased appetite, diaphoresis, fever, malaise/fatigue, night sweats, weight gain and weight loss.   HENT: Negative for hoarse voice.    Eyes: Negative for blurred vision, double vision and visual disturbance.   Cardiovascular: Positive for dyspnea on exertion. Negative for chest pain, claudication, irregular heartbeat, leg swelling, near-syncope, orthopnea, palpitations, paroxysmal nocturnal dyspnea and syncope.   Respiratory: Negative for cough, hemoptysis, shortness of breath, sputum production and wheezing.    Hematologic/Lymphatic: Negative for adenopathy and bleeding problem. Does not bruise/bleed easily.   Skin: Negative for color change, nail changes, poor wound healing and rash.   Musculoskeletal: Negative for back pain, falls and muscle cramps.   Gastrointestinal: Negative for abdominal pain, dysphagia and heartburn.   Genitourinary: Negative for flank pain.   Neurological: Negative for brief paralysis, disturbances in coordination, dizziness, focal weakness, headaches, light-headedness, loss of balance, numbness, paresthesias, sensory change, vertigo and weakness.   Psychiatric/Behavioral: Negative for depression and suicidal ideas.   Allergic/Immunologic: Negative for persistent infections.       I have reviewed the following portions of the patient's history: allergies, current medications, past family history, past medical history, past social history, past surgical history and problem list and confirm it's accurate.    Allergies:  Allergies   Allergen Reactions   • Other Unknown - High Severity     Pt had a reaction to anesthesia when placing stents      • Percocet [Oxycodone-Acetaminophen] Unknown - High Severity       Medications:      Current Outpatient Medications:   •   amiodarone (PACERONE) 200 MG tablet, Take 1 tablet by mouth Daily for 30 days., Disp: 30 tablet, Rfl: 0  •  aspirin 81 MG EC tablet, Take 1 tablet by mouth Daily for 30 days., Disp: 30 tablet, Rfl: 0  •  atorvastatin (LIPITOR) 40 MG tablet, Take 1 tablet by mouth Every Night., Disp: 30 tablet, Rfl: 0  •  furosemide (LASIX) 40 MG tablet, Take 1 tablet by mouth Every Other Day for 30 days. Extra 40 as needed for swelling, Disp: 15 tablet, Rfl: 0  •  metoprolol tartrate (LOPRESSOR) 25 MG tablet, Take 0.5 tablets by mouth Every 12 (Twelve) Hours., Disp: 15 tablet, Rfl: 0  •  pantoprazole (PROTONIX) 40 MG EC tablet, Take 1 tablet by mouth 2 (Two) Times a Day Before Meals., Disp: 60 tablet, Rfl: 0  No current facility-administered medications for this visit.    Facility-Administered Medications Ordered in Other Visits:   •  Chlorhexidine Gluconate Cloth 2 % pads 1 application, 1 application, Topical, Q12H PRN, Magaly King APRN    History:   Past Medical History:   Diagnosis Date   • CAD (coronary artery disease)    • Cardiac abnormality    • CHF (congestive heart failure) ([facility])    • Coma of unknown cause ([facility]) 1984    Pt states he was in a coma for one week, unknown cause   • COVID-19 10/25/2021   • Debility 09/14/2022   • Diastolic CHF due to valvular disease ([facility]) 06/25/2022    Echo (9/10/2022):LVEF = 55%.  33 mm Medtronic Magna Ease bioprosthetic valve present.  Mean gradient 8 mmHg.  Moderate 1-2 cm pericardial effusion.  No tamponade.  Left pleural effusion present.   • History of stomach ulcers    • Hypertension    • Mitral regurgitation    • Obesity (BMI 30-39.9) 11/04/2019   • Paroxysmal atrial fibrillation (H[facility]) 09/10/2022    Atrial fibrillation status post MVR [id number] ANGELICA ligated at time of surgery with 35 mm Atricure clip- KENNEY confirms occluded ANGELICA with no residual flow   Successful KENNEY/ECV 9/7, on amiodarone Was on Eliquis but discontinued on 9/10/2022 due to severe anemia Back into atrial  fibrillation on 9/11/2022   • Post-op pericardial effusion 09/12/2022    Echo for post-op hypotension (9/10/2022): Normal LVEF.  Moderate pericardial effusion.  No evidence of tamponade seen.   • Severe mitral regurgitation status post bioprosthetic MVR, 9/2/2022 08/03/2022    Echo 6/26/22: EF 36-40%, global hypokinesis, mild bileaflet MVP with moderate MR; grade III diastolic dysfunction, severe PAH with RVSP 67mmHg KENNEY 6/28/22: LVEF 56-60%, MVP of posterior leaflet with mod-severe MR, modTR with RVSP 52mmHg MVR 9/2/22 Dr. Chan with 33 mm Magna Ease mitral valve  Echo (9/10/2022):LVEF = 55%.  33 mm Medtronic Magna Ease bioprosthetic valve present.  Mean gradient 8 mm   • Spinal headache 1984       Past Surgical History:   Procedure Laterality Date   • CARDIAC CATHETERIZATION N/A 11/08/2019    Procedure: Left Heart Cath;  Surgeon: Sherrell Hart MD;  Location:  [facility] CATH INVASIVE LOCATION;  Service: Cardiology   • CARDIAC CATHETERIZATION N/A 06/27/2022    Procedure: Left Heart Cath;  Surgeon: Greyson Balderas MD;  Location:  [facility] CATH INVASIVE LOCATION;  Service: Cardiology;  Laterality: N/A;   • COLONOSCOPY     • CORONARY STENT PLACEMENT  2019   • ENDOSCOPY N/A 9/12/2022    Procedure: ESOPHAGOGASTRODUODENOSCOPY;  Surgeon: Leann Fitzpatrick MD;  Location: [facility] ENDOSCOPY;  Service: Gastroenterology;  Laterality: N/A;   • MITRAL VALVE REPAIR/REPLACEMENT N/A 9/2/2022    Procedure: MEDIAN STERNOTOMY MITRAL VALVE REPLACEMENT, LEFT ATRIAL APPENDAGE LIGATION AND KENNEY PER ANESTHESIA;  Surgeon: Lyle Chan MD;  Location: [facility] OR;  Service: Cardiothoracic;  Laterality: N/A;       Social History     Socioeconomic History   • Marital status:    • Number of children: 7   Tobacco Use   • Smoking status: Never Smoker   • Smokeless tobacco: Never Used   Vaping Use   • Vaping Use: Never used   Substance and Sexual Activity   • Alcohol use: No   • Drug use: No   • Sexual activity: Defer        Family  "History   Problem Relation Age of Onset   • Heart valve disorder Mother    • Heart valve disorder Brother    • Heart valve disorder Maternal Grandfather    • Heart disease Maternal Grandfather    • Diabetes type I Son        Objective     Physical Exam:  Vitals:    10/04/22 1058   BP: 124/78   BP Location: Right arm   Patient Position: Sitting   Pulse: 83   Temp: 98 °F (36.7 °C)   SpO2: 91%   Weight: 114 kg (250 lb 9.6 oz)   Height: 190.5 cm (75\")      Body mass index is 31.32 kg/m².    Physical Exam  Vitals and nursing note reviewed.   Constitutional:       Appearance: Normal appearance. He is obese.   HENT:      Head: Normocephalic and atraumatic.   Eyes:      Pupils: Pupils are equal, round, and reactive to light.   Neck:      Vascular: No carotid bruit.   Cardiovascular:      Rate and Rhythm: Normal rate and regular rhythm.      Pulses: Normal pulses.      Heart sounds: Normal heart sounds, S1 normal and S2 normal. No murmur heard.  Pulmonary:      Effort: Pulmonary effort is normal.      Breath sounds: Examination of the right-lower field reveals decreased breath sounds. Examination of the left-lower field reveals decreased breath sounds. Decreased breath sounds present.   Abdominal:      Palpations: Abdomen is soft.   Musculoskeletal:         General: Normal range of motion.      Cervical back: Normal range of motion and neck supple.      Right lower leg: No edema.      Left lower leg: No edema.   Skin:     General: Skin is warm and dry.      Capillary Refill: Capillary refill takes less than 2 seconds.      Comments: Mid-sternotomy incision: wound edges well approximated, no erythema, edema, or induration  Mediastinal tubes sites: well healing without erythema, edema, or drainage  Endoscopic vein harvest site: well healing without erythema, edema, or drainage   Neurological:      General: No focal deficit present.      Mental Status: He is alert and oriented to person, place, and time. Mental status is at " baseline.      GCS: GCS eye subscore is 4. GCS verbal subscore is 5. GCS motor subscore is 6.      Motor: Motor function is intact.      Coordination: Coordination is intact.      Gait: Gait is intact.   Psychiatric:         Mood and Affect: Mood normal.         Speech: Speech normal.         Behavior: Behavior normal. Behavior is cooperative.         Cognition and Memory: Cognition normal.         Imaging/Labs:  XR Chest-10/4/2022  1. Postsurgical changes of sternotomy and valve replacement with persistent small bilateral pleural effusions.  2. Bibasilar airspace disease has improved from the prior study with mild residual bibasilar atelectasis.  3. No pneumothorax.      Assessment / Plan      Assessment / Plan:  Diagnoses and all orders for this visit:    1. S/P MVR (mitral valve replacement)       · mitral regurgitation s/p mitral valve replacement with 33 mm Magna Ease tissue valve and left atrial appendage ligation with 35mm AtriCure clip on 9/2/2022 by Dr. Chan.   · Tumultuous post-operative course with readmission to [facility] from 9/19/2022 - 9/22/2022   · postoperative eval doing surprisingly well.  With no unusual or uncontrolled pain  · improving PHOENIX with minimal PRN supplemental oxygen use and night as instructed.  · Follow-up with PCP for possible out patient sleep study   · no issues with incisional healing or delayed wound healing.  Exam with sternotomy incision and MTS well-healing without complication.  · No BLE edema, compliant with daily weight checks and established with [facility] heart failure clinic  · CXR in clinic with persistent small effusions, significant improvement in aeration from comparison. Continue pulmonary toilet and increase ambulation  · Currently working with  PT. Encouraged cardiac rehab when available  · Follow-up with cardiology Dr. Amador's office 10/17/2022.   · Follow-up with GI as previously determined  · Patient is stable from a postoperative standpoint to follow-up with us on  as-needed basis      Patient Education:  Activity Restrictions: You may resume driving at 6 weeks post-operatively. No lifting more than 10 lbs for 12 weeks (3 months). At this time you can slowly increase your weight as tolerated. You should not partake in any activities that involve twisting of your upper chest and torso such as (but not limited to) golfing or tennis, lawn mowing including zero turn mowers, use of law trimmers or edgers, shoveling, vacuuming, etc.   Wound Care: continue to keep your incisions clean and dry. You may use plain antibacterial soap (i.e. dial) and water to clean and pat dry. No lotions, creams, oils, powders, salves, or ointments.       Follow Up:   Return on as needed basis.   Or sooner for any further concerns or worsening sign and symptoms. If unable to reach us in the office please dial 911 or go to the nearest emergency department.      Aidee STACK  [facility] Cardiothoracic Surgery

## 2022-10-05 ENCOUNTER — READMISSION MANAGEMENT (OUTPATIENT)
Dept: CALL CENTER | Facility: HOSPITAL | Age: 67
End: 2022-10-05

## 2022-10-05 NOTE — OUTREACH NOTE
CHF Week 2 Survey    Flowsheet Row Responses   [facility] patient discharged from? [facility]   Does the patient have one of the following disease processes/diagnoses(primary or secondary)? CHF   Week 2 attempt successful? Yes   Call start time 1238   Call end time 1240   Discharge diagnosis Elevated troponins/acute non-ST elevation myocardial infarction    Person spoke with today (if not patient) and relationship Anette spouse    Meds reviewed with patient/caregiver? Yes   Is the patient taking all medications as directed (includes completed medication regime)? Yes   Does the patient have a primary care provider?  Yes   Has the patient kept scheduled appointments due by today? Yes   What DME was ordered? Oxygen from [facility]   Has all DME been delivered? Yes   Pulse Ox monitoring Intermittent   Pulse Ox device source Patient   O2 Sat comments down 1 l   O2 Sat education comments staying above 90%   Psychosocial issues? No   Did the patient receive a copy of their discharge instructions? Yes   Nursing interventions Reviewed instructions with patient   What is the patient's perception of their health status since discharge? Improving   Nursing interventions Nurse provided patient education   Is the patient able to teach back signs and symptoms of worsening condition? (i.e. weight gain, shortness of air, etc.) Yes   Is the patient/caregiver able to teach back the hierarchy of who to call/visit for symptoms/problems? PCP, Specialist, Home health nurse, Urgent Care, ED, 911 Yes   Is the patient able to teach back Heart Failure Zones? Yes   CHF Nursing Interventions Education provided on various zones   CHF Zone this Call Green Zone   Green Zone Patient reports doing well   Green Zone Interventions Daily weight check, Meds as directed   CHF Week 2 call completed? Yes   Wrap up additional comments Wife reports Pt is doing well. Aware of what to watch for and is doing wt daily.    Call end time 1240           WILLY PIERCE - Registered Nurse

## 2022-10-14 ENCOUNTER — OFFICE VISIT (OUTPATIENT)
Dept: CARDIOLOGY | Facility: CLINIC | Age: 67
End: 2022-10-14

## 2022-10-14 VITALS
HEIGHT: 75 IN | HEART RATE: 84 BPM | BODY MASS INDEX: 31.13 KG/M2 | DIASTOLIC BLOOD PRESSURE: 79 MMHG | WEIGHT: 250.4 LBS | SYSTOLIC BLOOD PRESSURE: 123 MMHG

## 2022-10-14 DIAGNOSIS — I48.0 PAROXYSMAL ATRIAL FIBRILLATION: ICD-10-CM

## 2022-10-14 DIAGNOSIS — I34.0 SEVERE MITRAL REGURGITATION: Primary | ICD-10-CM

## 2022-10-14 DIAGNOSIS — E78.5 HYPERLIPIDEMIA LDL GOAL <70: ICD-10-CM

## 2022-10-14 DIAGNOSIS — I25.119 CORONARY ARTERY DISEASE INVOLVING NATIVE CORONARY ARTERY OF NATIVE HEART WITH ANGINA PECTORIS: ICD-10-CM

## 2022-10-14 PROCEDURE — 99214 OFFICE O/P EST MOD 30 MIN: CPT | Performed by: PHYSICIAN ASSISTANT

## 2022-10-14 NOTE — PROGRESS NOTES
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 male with a past medical history significant for nonobstructive CAD, chronic HFrEF with now improved LVEF of 60%, severe mitral valve regurgitation status post mitral valve replacement with 33 mm Magna Ease tissue valve and left atrial appendage ligation with 35 mm atricure clip on 9/2/2022 by CT surgeon, Dr. Chan.  Patient also has paroxysmal atrial fibrillation that was persistent following his mitral valve replacement surgery however was able to be cardioverted and is maintaining sinus rhythm with assistance of amiodarone.  He also has history of GI bleed that did require a couple units of packed red blood cells with subsequent EGD revealing for gastric ulcers that was subsequently cauterized.  He also has history of CKD, essential pretension, and dyslipidemia.  He comes in today for cardiology follow-up.    Since Ed was last seen he unfortunately was hospitalized for acute on chronic heart failure with preserved EF.  He was also found to be persistently in atrial fibrillation with RVR however he was initiated on amiodarone and cardioverted and has maintained sinus rhythm since.  He subsequently did improve after being diuresed throughout his hospital stay and was eventually able to be discharged in stable  condition.  Ed reports he has been improving on a nearly daily basis requiring less and less oxygen reports at this point he can nearly go the entire day without needing any oxygen at all.  He denies worsening orthopnea or PND.  He is very reluctant to continue most of any of his medications.  He is very adamant on wanting to stop Lipitor as well as amiodarone.    Allergies   Allergen Reactions   • Other Unknown - High Severity     Pt had a reaction to anesthesia when placing stents      • Percocet [Oxycodone-Acetaminophen] Unknown - High Severity   :      Current Outpatient Medications:   •  amiodarone (PACERONE) 200 MG tablet, Take 1 tablet by mouth Daily for 30 days., Disp: 30 tablet, Rfl: 0  •  aspirin 81 MG EC tablet, Take 1 tablet by mouth Daily for 30 days., Disp: 30 tablet, Rfl: 0  •  atorvastatin (LIPITOR) 40 MG tablet, Take 1 tablet by mouth Every Night., Disp: 30 tablet, Rfl: 0  •  furosemide (LASIX) 40 MG tablet, Take 1 tablet by mouth Every Other Day for 30 days. Extra 40 as needed for swelling, Disp: 15 tablet, Rfl: 0  •  metoprolol tartrate (LOPRESSOR) 25 MG tablet, Take 0.5 tablets by mouth Every 12 (Twelve) Hours., Disp: 15 tablet, Rfl: 0  •  pantoprazole (PROTONIX) 40 MG EC tablet, Take 1 tablet by mouth 2 (Two) Times a Day Before Meals., Disp: 60 tablet, Rfl: 0  No current facility-administered medications for this visit.    Facility-Administered Medications Ordered in Other Visits:   •  Chlorhexidine Gluconate Cloth 2 % pads 1 application, 1 application, Topical, Q12H PRN, Magaly King, SREEKANTH    The following portions of the patient's history were reviewed and updated as appropriate: allergies, current medications, past family history, past medical history, past social history, past surgical history and problem list.    Social History     Tobacco Use   • Smoking status: Never   • Smokeless tobacco: Never   Vaping Use   • Vaping Use: Never used   Substance Use Topics   • Alcohol use: No   • Drug  "use: No         Objective   Vitals:    10/14/22 0829   BP: 123/79   Pulse: 84   Weight: 114 kg (250 lb 6.4 oz)   Height: 190.5 cm (75\")     Body mass index is 31.3 kg/m².    Constitutional:       General: Not in acute distress.     Appearance: Healthy appearance. Well-developed and not in distress. Not diaphoretic.   Eyes:      Conjunctiva/sclera: Conjunctivae normal.      Pupils: Pupils are equal, round, and reactive to light.   HENT:      Head: Normocephalic and atraumatic.   Neck:      Vascular: No carotid bruit or JVD.   Pulmonary:      Effort: Pulmonary effort is normal. No respiratory distress.      Breath sounds: Normal breath sounds.   Cardiovascular:      Normal rate. Regular rhythm.   Skin:     General: Skin is cool.   Neurological:      Mental Status: Alert, oriented to person, place, and time and oriented to person, place and time.         Lab Results   Component Value Date     09/27/2022    K 4.6 09/27/2022    CL 97 (L) 09/27/2022    CO2 37.9 (H) 09/27/2022    BUN 13 09/27/2022    CREATININE 1.08 09/27/2022    GLUCOSE 131 (H) 09/27/2022    CALCIUM 9.0 09/27/2022    AST 40 09/21/2022    ALT 27 09/21/2022    ALKPHOS 83 09/21/2022     Lab Results   Component Value Date    CKTOTAL 38 10/29/2021     Lab Results   Component Value Date    WBC 8.52 09/22/2022    HGB 8.8 (L) 09/22/2022    HCT 30.7 (L) 09/22/2022     09/22/2022     Lab Results   Component Value Date    INR 1.10 09/19/2022    INR 1.12 09/03/2022    INR 1.44 (H) 09/02/2022     Lab Results   Component Value Date    MG 2.1 09/27/2022     Lab Results   Component Value Date    TSH 8.280 (H) 09/19/2022    PSA 0.433 11/06/2019    TRIG 85 06/25/2022    HDL 36 (L) 06/25/2022     (H) 06/25/2022      No results found for: BNP    During this visit the following were done:  Labs Reviewed []    Labs Ordered []    Radiology Reports Reviewed []    Radiology Ordered []    PCP Records Reviewed []    Referring Provider Records Reviewed []    ER " Records Reviewed []    Hospital Records Reviewed []    History Obtained From Family []    Radiology Images Reviewed []    Other Reviewed []    Records Requested []       Procedures    Assessment & Plan    Diagnosis Plan   1. Severe mitral regurgitation status post bioprosthetic MVR, 9/2/2022        2. Paroxysmal atrial fibrillation (HCC)  Lipid Panel      3. Coronary artery disease involving native coronary artery of native heart with angina pectoris (HCC)  Lipid Panel      4. Hyperlipidemia LDL goal <70                 Recommendations:  1. Paroxysmal atrial fibrillation  1. Likely induced secondary to his recent open heart surgery.  Per Dr. Amador's note I did recommend continuing amiodarone at least for 2 months which would be roughly November 20 he is reluctant on continuing amiodarone for this long as he is having a side effect of tremors since initiating this.  He does understand the risk of recurrent atrial fibrillation if he stops this prematurely.  2. Dyslipidemia  1. Patient is very adamant that he does not want to continue statins however he is willing to consider a PCSK9, if affordable. [facility] if this is approved by his insurance since he refuses further statins and currently has myalgias.  2. I will also check a lipid panel to monitor cholesterol levels.  3. HFrEF with improved EF   1. Patient's LVEF has returned essentially back to within normal limits.  He currently is on Lasix 40 mg every other day.  Goal would be to take this only as needed his breathing is improving rapidly and he reports he is partaking in physical therapy at home and trying to walk farther each day and requires less oxygen.  I did advise before stopping this completely to reduce to 20 mg once every other day and see how this is tolerated prior to completely holding.  He expressed understanding.  4. Coronary artery disease  1. Continue aspirin 81 mg and metoprolol tartrate.    Return in about 3 months (around  1/14/2023).    As always, I appreciate very much the opportunity to participate in the cardiovascular care of your patients.      With Best Regards,    Yasmany Leos PA-C

## 2022-10-25 ENCOUNTER — HOSPITAL ENCOUNTER (OUTPATIENT)
Dept: CARDIOLOGY | Facility: HOSPITAL | Age: 67
Discharge: HOME | End: 2022-10-25
Admitting: PHYSICIAN ASSISTANT

## 2022-10-25 VITALS
HEIGHT: 75 IN | BODY MASS INDEX: 31.13 KG/M2 | SYSTOLIC BLOOD PRESSURE: 118 MMHG | DIASTOLIC BLOOD PRESSURE: 75 MMHG | OXYGEN SATURATION: 94 % | WEIGHT: 250.4 LBS | HEART RATE: 79 BPM

## 2022-10-25 DIAGNOSIS — I50.30 DIASTOLIC CHF DUE TO VALVULAR DISEASE: Primary | ICD-10-CM

## 2022-10-25 DIAGNOSIS — I38 DIASTOLIC CHF DUE TO VALVULAR DISEASE: Primary | ICD-10-CM

## 2022-10-25 DIAGNOSIS — I48.0 PAROXYSMAL ATRIAL FIBRILLATION: ICD-10-CM

## 2022-10-25 DIAGNOSIS — N18.30 STAGE 3 CHRONIC KIDNEY DISEASE, UNSPECIFIED WHETHER STAGE 3A OR 3B CKD: ICD-10-CM

## 2022-10-25 LAB
ANION GAP SERPL CALCULATED.3IONS-SCNC: 8.8 MMOL/L (ref 5–15)
BUN SERPL-MCNC: 17 MG/DL (ref 8–23)
BUN/CREAT SERPL: 13.9 (ref 7–25)
CALCIUM SPEC-SCNC: 9.5 MG/DL (ref 8.6–10.5)
CHLORIDE SERPL-SCNC: 103 MMOL/L (ref 98–107)
CO2 SERPL-SCNC: 31.2 MMOL/L (ref 22–29)
CREAT SERPL-MCNC: 1.22 MG/DL (ref 0.76–1.27)
EGFRCR SERPLBLD CKD-EPI 2021: 65 ML/MIN/1.73
GLUCOSE SERPL-MCNC: 173 MG/DL (ref 65–99)
MAGNESIUM SERPL-MCNC: 2.1 MG/DL (ref 1.6–2.4)
NT-PROBNP SERPL-MCNC: 1475 PG/ML (ref 0–900)
POTASSIUM SERPL-SCNC: 4.8 MMOL/L (ref 3.5–5.2)
SODIUM SERPL-SCNC: 143 MMOL/L (ref 136–145)

## 2022-10-25 PROCEDURE — 99215 OFFICE O/P EST HI 40 MIN: CPT | Performed by: PHYSICIAN ASSISTANT

## 2022-10-25 PROCEDURE — 83880 ASSAY OF NATRIURETIC PEPTIDE: CPT

## 2022-10-25 PROCEDURE — 36415 COLL VENOUS BLD VENIPUNCTURE: CPT | Performed by: PHYSICIAN ASSISTANT

## 2022-10-25 PROCEDURE — 83735 ASSAY OF MAGNESIUM: CPT | Performed by: PHYSICIAN ASSISTANT

## 2022-10-25 PROCEDURE — 80048 BASIC METABOLIC PNL TOTAL CA: CPT | Performed by: PHYSICIAN ASSISTANT

## 2022-10-25 RX ORDER — ASPIRIN 81 MG/1
81 TABLET, CHEWABLE ORAL 2 TIMES DAILY
COMMUNITY

## 2022-10-25 NOTE — PROGRESS NOTES
[facility]    Date: 10/25/22     Vitals:    10/25/22 0821   BP: 118/75   Pulse: 79   SpO2: 94%      Weight 250.4  Method of arrival: Ambulatory    Weighing self daily: Yes    Monitoring Heart Failure Zones: Yes    Today's HF Zone: Green    Taking medications as prescribed: Yes    Edema No    Shortness of Air: No    Number of pillows used at night:<2    Educational Materials given:  sandeep Singh RN 10/25/22 08:24 EDT

## 2022-10-25 NOTE — PROGRESS NOTES
[facility]  JOSESITO Mccann Bruce R, MD  [address] [place] [address]    Thank you for asking me to see Damián Myers for congestive heart failure.    HPI:     This is a 67 y.o. male with known past medical history of:  · HFrEF with recovered EF  · 06/25/22 TTE with EF 36-40% with moderate MV regurgitation and severe pulmonary HTN  · 09/08/22 TTE with EF 60%  · 09/12/22 TTE with EF 56-60% and small to moderate pericardial effusion  · 09/21/22 KENNEY with EF 51-55% with bioprosthetic mitral valve present (pericardial effusion)  · Bioprosthetic mitral valve replacement due to symptomatic severe mitral regurgitation  · 33 mm Magna Ease tissue mitral valve  · Persistent atrial fibrillation:   · 09/02/22 Left atrial appendage ligation/clipping  · 35mm Atricure clip  · 09/21/22 KENNEY guided cardioversion  · Recent upper GI bleeding with PUD:   · EGD 9/12 revealed 4 gastric ulcers and pyloric ulcer treated with epinephrine injection; gold probe heat cautery and clips  · Microcytic anemia  · CKD IIIa-b  · Essential hypertension  · Hyperlipidemia  · Persistent atrial fibrillation    Damián Myers presents for today to establish HF clinic care.  The patient is typically seen by Spencer Saeed MD.  Patient's primary cardiologist is Dr. Amador & team with primary CT surgeon Dr. Chan.       · Last known EF 51-55%.    • Last known hospitalization and/or ED visit: Patient had a comp patient had a rather complicated month of September when initially admitted from September 2 through 14, 2022 at [facility] during which time he underwent mitral valve replacement as well as left atrial appendage ligation while also developing acute GI bleeding requiring transfusion of packed red blood cells and platelets.  He underwent EGD revealing for gastric ulcers and pyloric ulcer treated with epinephrine injection.  After discharge he was admitted to acute inpatient rehabilitation at  [facility] from which he left AMA per documentation review.  Shortly afterward he presented to the emergency department with confusion and shortness of breath and was then admitted for acute hypoxemic and hypercarbic respiratory failure felt to be secondary to acute CHF.  During this course of care he was referred to heart failure clinic at discharge.     • Accompanied by: Wife    Initial visit data/details regarding:   • Dyspnea: Improving  • Lower extremity swelling: Improving  • Abdominal swelling: Denies  • Home weight: Stable per log review  • Home BP: SBP 110s per log review  • Home heart rate: Not currently monitoring--log provided.    • Daily activities of living: Improving significantly  • Pillows/lying flat: sleeps in recliner and has for sometime, also tolerating sleeping in bed now for about 5 hours nightly  • Zone: Green   • Patient is doing well.  He is no longer on supplemental oxygen and is ambulating well on room air without significant dyspnea on exertion.   • We discuss possible cardiac rehab; however, he reports he has looked into this option and it is not affordable with copays with children in college.  He reports he does plan to begin riding stationary bike again for short periods of time.   • Patient is chest pain free.  He plans to get lipid panel tomorrow morning as he has eaten today.    • Mr. Myers follows with Dr. Rea in a few weeks.  He reports he completed prior Protonix prescription.     Specialists:   • General Cardiology: Dr. Amador & team  • CT Surgery: Dr. Chan & team  • GI Dr. Garcia    Review of Systems - Review of Systems   Constitutional: Negative for chills, decreased appetite, diaphoresis and fever.   HENT: Negative for congestion and ear pain.    Eyes: Negative for blurred vision, discharge and double vision.   Cardiovascular: Negative for chest pain, claudication and cyanosis.   Respiratory: Negative for cough and hemoptysis.    Endocrine: Negative  for cold intolerance and heat intolerance.   Hematologic/Lymphatic: Negative for adenopathy and bleeding problem.   Skin: Negative for color change and nail changes.   Musculoskeletal: Negative for arthritis, back pain and falls.   Gastrointestinal: Negative for bloating and abdominal pain.   Genitourinary: Negative for bladder incontinence, decreased libido and dysuria.   Neurological: Negative for aphonia, brief paralysis and difficulty with concentration.   Psychiatric/Behavioral: Negative for altered mental status, depression and hallucinations.         All other systems were reviewed and were negative.    Patient Active Problem List   Diagnosis   • Obesity (BMI 30-39.9)   • Coronary artery disease involving native coronary artery of native heart with angina pectoris ([facility])   • Stroke (cerebrum) ([facility])   • Essential hypertension   • Hyperlipidemia LDL goal <70   • Diastolic CHF due to valvular disease ([facility])   • Paroxysmal atrial fibrillation ([facility])   • Debility   • Respiratory failure with hypoxia and hypercapnia ([facility])   • COPD exacerbation ([facility])   • CKD (chronic kidney disease) stage 3, GFR 30-59 ml/min ([facility])       family history includes Diabetes type I in his son; Heart disease in his maternal grandfather; Heart valve disorder in his brother, maternal grandfather, and mother.     reports that he has never smoked. He has never used smokeless tobacco. He reports that he does not drink alcohol and does not use drugs.    Allergies   Allergen Reactions   • Other Unknown - High Severity     Pt had a reaction to anesthesia when placing stents      • Percocet [Oxycodone-Acetaminophen] Other (See Comments)     Reports they gave him too much and he had respiratory depression   Reports he has tolerated since then with no issues.          Current Outpatient Medications:   •  aspirin 81 MG chewable tablet, Chew 1 tablet 2 (Two) Times a Day., Disp: , Rfl:   •  furosemide (LASIX) 40 MG tablet, Take 1 tablet by mouth Every  Other Day for 30 days. Extra 40 as needed for swelling (Patient taking differently: Take 1 tablet by mouth Daily As Needed. Only as needed now, has only taken 1 time since here last), Disp: 15 tablet, Rfl: 0  •  metoprolol tartrate (LOPRESSOR) 25 MG tablet, Take 0.5 tablets by mouth Every 12 (Twelve) Hours for 30 days., Disp: 30 tablet, Rfl: 2  No current facility-administered medications for this encounter.    Facility-Administered Medications Ordered in Other Encounters:   •  Chlorhexidine Gluconate Cloth 2 % pads 1 application, 1 application, Topical, Q12H PRN, Magaly King APRN      Physical Exam:  I have reviewed the patient's current vital signs as documented in the patient's EMR.   Vitals:    10/25/22 0821   BP: 118/75   Pulse: 79   SpO2: 94%     Body mass index is 31.3 kg/m².       10/25/22  0821   Weight: 114 kg (250 lb 6.4 oz)            Ambulates independently to room while pushing O2 tank  Physical Exam  Vitals and nursing note reviewed.   Constitutional:       General: He is awake.      Appearance: Normal appearance. He is well-developed and well-groomed.      Interventions: Nasal cannula in place.   HENT:      Head: Normocephalic and atraumatic.   Eyes:      General: Lids are normal.      Conjunctiva/sclera:      Right eye: Right conjunctiva is not injected.      Left eye: Left conjunctiva is not injected.   Cardiovascular:      Rate and Rhythm: Normal rate and regular rhythm.      Heart sounds: S1 normal and S2 normal.   Pulmonary:      Effort: No tachypnea, bradypnea or accessory muscle usage.      Breath sounds: No decreased breath sounds, wheezing, rhonchi or rales.      Comments: Clear breath sounds bilaterally   Abdominal:      General: Bowel sounds are normal.      Palpations: Abdomen is soft.      Tenderness: There is no abdominal tenderness.   Musculoskeletal:      Right lower leg: No swelling, deformity or lacerations. No edema.      Left lower leg: No swelling, deformity or lacerations.  No edema.   Skin:     General: Skin is warm and moist.   Neurological:      Mental Status: He is alert and oriented to person, place, and time.   Psychiatric:         Attention and Perception: Attention normal.         Mood and Affect: Mood normal.         Behavior: Behavior is cooperative.      Comments: Mr. Myers is very upbeat about his recovery.            JVP: Volume/Pulsation: Normal.        DATA REVIEWED:     EKG. I personally reviewed and interpreted the EKG.      ---------------------------------------------------  TTE/KENNEY:  Results for orders placed during the hospital encounter of 09/19/22    Adult Transesophageal Echo (KENNEY) W/ Cont if Necessary Per Protocol    Interpretation Summary  · Normal left ventricular cavity size and wall thickness noted. All left ventricular wall segments contract normally.  · Left ventricular ejection fraction appears to be 51 - 55%.  · There is (grade 1), layered plaque in the descending aorta present.  · Left atrial appendage ligation noted.  · The aortic valve is structurally normal with no regurgitation or stenosis present.  · There is a bioprosthetic mitral valve present. The prosthetic mitral valve is grossly normal.  · Mild mitral valve regurgitation is present. Mild paravalvular regurgitation noted along the lateral aspect No significant mitral valve stenosis is present.  · There is no evidence of pericardial effusion.        -----------------------------------------------------  CXR/Imaging:   Imaging Results (Most Recent)     None          I personally reviewed and interpreted the CXR.      -----------------------------------------------------  CT:   XR Chest 2 View    Result Date: 10/4/2022  1. Postsurgical changes of sternotomy and valve replacement with persistent small bilateral pleural effusions. 2. Bibasilar airspace disease has improved from the prior study with mild residual bibasilar atelectasis. 3. No pneumothorax.  This report was finalized on 10/4/2022  11:49 AM by Holland Burton MD.      I personally reviewed the images of the CT scan.  My personal interpretation is below.      ----------------------------------------------------    PFTs: None available at present  --------------------------------------------------------------------------------------------------    Laboratory evaluations:    Lab Results   Component Value Date    GLUCOSE 173 (H) 10/25/2022    BUN 17 10/25/2022    CREATININE 1.22 10/25/2022    EGFRIFNONA 87 10/29/2021    BCR 13.9 10/25/2022    K 4.8 10/25/2022    CO2 31.2 (H) 10/25/2022    CALCIUM 9.5 10/25/2022    ALBUMIN 3.06 (L) 09/21/2022    AST 40 09/21/2022    ALT 27 09/21/2022     Lab Results   Component Value Date    WBC 8.52 09/22/2022    HGB 8.8 (L) 09/22/2022    HCT 30.7 (L) 09/22/2022    .0 (H) 09/22/2022     09/22/2022     Lab Results   Component Value Date    CHOL 83 09/20/2022    TRIG 85 06/25/2022    HDL 36 (L) 06/25/2022     (H) 06/25/2022     Lab Results   Component Value Date    TSH 8.280 (H) 09/19/2022     Lab Results   Component Value Date    HGBA1C 5.10 09/20/2022     Lab Results   Component Value Date    ALT 27 09/21/2022     Lab Results   Component Value Date    HGBA1C 5.10 09/20/2022    HGBA1C 6.30 (H) 08/30/2022    HGBA1C 6.00 (H) 06/25/2022     Lab Results   Component Value Date    CREATININE 1.22 10/25/2022     Lab Results   Component Value Date    FERRITIN 365.40 09/10/2022     Lab Results   Component Value Date    INR 1.10 09/19/2022    INR 1.12 09/03/2022    INR 1.44 (H) 09/02/2022    PROTIME 14.5 09/19/2022    PROTIME 14.3 09/03/2022    PROTIME 17.5 (H) 09/02/2022        Lab Results   Component Value Date    ABSOLUTELUNG 41 (A) 09/27/2022    ABSOLUTELUNG 39 (A) 06/25/2022       PAH RISK ASSESSMENT:      1. Diastolic CHF due to valvular disease (HCC)    2. Paroxysmal atrial fibrillation (HCC)    3. Stage 3 chronic kidney disease, unspecified whether stage 3a or 3b CKD (HCC)          ORDERS PLACED  TODAY:  Orders Placed This Encounter   Procedures   • BNP   • Basic Metabolic Panel   • Magnesium        Diagnoses and all orders for this visit:    1. Diastolic CHF due to valvular disease (HCC) (Primary)  Overview:  · Echo (9/10/2022):LVEF = 55%.  33 mm Medtronic Magna Ease bioprosthetic valve present.  Mean gradient 8 mmHg.  Moderate 1-2 cm pericardial effusion.  No tamponade.  Left pleural effusion present.    Orders:  -     BNP  -     Basic Metabolic Panel; Standing  -     Magnesium; Standing  -     Basic Metabolic Panel  -     Magnesium    2. Paroxysmal atrial fibrillation (HCC)  Overview:  · Atrial fibrillation status post MVR  · [id number]  · ANGELICA ligated at time of surgery with 35 mm Atricure clip- KENNEY confirms occluded ANGELICA with no residual flow    · Successful KENNEY/ECV 9/7, on amiodarone  · Was on Eliquis but discontinued on 9/10/2022 due to severe anemia  · Back into atrial fibrillation on 9/11/2022      3. Stage 3 chronic kidney disease, unspecified whether stage 3a or 3b CKD (HCC)    Other orders  -     metoprolol tartrate (LOPRESSOR) 25 MG tablet; Take 0.5 tablets by mouth Every 12 (Twelve) Hours for 30 days.  Dispense: 30 tablet; Refill: 2           MEDS ORDERED TODAY:    New Medications Ordered This Visit   Medications   • metoprolol tartrate (LOPRESSOR) 25 MG tablet     Sig: Take 0.5 tablets by mouth Every 12 (Twelve) Hours for 30 days.     Dispense:  30 tablet     Refill:  2        ---------------------------------------------------------------------------------------------------------------------------          ASSESSMENT/PLAN:      Diagnosis Plan   1. Diastolic CHF due to valvular disease (HCC)  BNP    Basic Metabolic Panel    Magnesium    Basic Metabolic Panel    Magnesium      2. Paroxysmal atrial fibrillation (HCC)        3. Stage 3 chronic kidney disease, unspecified whether stage 3a or 3b CKD (HCC)            not acutely decompensated chronic diastolic heart failure. CHF.      NYHA stage  Stage C: Structural heart disease is present AND symptoms have occurredFC-Class II: Slight limitation of physical activity. Comfortable at rest Ordinary physical activity results in fatigue, palpitation, dyspnea (shortness of breath)    Today, Patient appears euvolemic.and with  Moderate perfusion. The patient's hemodynamics are currently acceptable. HR is: normal and is at goal. BP/MAP was reviewed and there is notroom for medication up-titration.  Clinical trajectory was assessed and hasimproved.     CHF GOAL DIRECTED MEDICAL THERAPY FOR PATIENT ADDRESSED/ADJUSTED:     GDMT    Drug Class   Drug   Dose Last Dose Adjustment Additional Titration   Notes   ACEi/ARB/ARNI CKD IIIa-b       Beta Blocker  Metoprolol tartrate   12.5mg BID BP & HR controlled during visit, not adjusted     MRA CKD IIIa-b       SGLT2i Consider in future visit    N/A      -CHF Specific BB:   •  Continue Metoprolol 12.5mg BID with Amiodarone already on board with atrial fibrillation.   • Sent Metoprolol refills to [facility].   • BP and HR acceptable, hold on adjusting.   • Plan of care reviewed with patient.   • RTC in 3 months.       -ACE/ARB/ARNi:   · Hold @ present with CKD IIIa-b and borderline hypotension.     -MRA:   · Avoid in CKD IIIb patient with borderline hypotension.     -SGLT2 inhibitor therapy:   · Discussed possibly considering addition of SGLT2i in last visit. Patient is not currently interested in additions of medications as he feels he continues to improve.    · Will hold off for now; however, if course of care should wane, could consider addition of SGLT2i in the future.      -Diuretic regimen:   • ReDS Vest reading for 10/25/22 could not be obtained secondary to .   • ProBNP continues to downtrend.    • Continue Lasix as needed.    • Discussed weighing daily with confirmation of home scale.     -Acute vs. Chronic underlying conditions other than HF addressed during visit:   • P.Afib s/p  cardioversion & ANGELICA ligation:   o Continue Metoprolol 12.5mg BID.    o Continue outpatient f/u with General Cards & CT surgery.     • CKD III a-b:   o BMP obtained and unremarkable.       • Recent GI bleed with PUD:   o Continue BID Protonix 40mg.   o Has f/u with Dr. Rea (November 2022)  o Reports he was told stress of procedure caused ulcers.     • Bioprosthetic mitral valve replacement:   o Last CT surgery office visit note reviewed with as needed follow-up and patient able to resume driving and lifting.     • Recent acute hypoxemic respiratory failure, no longer on home O2:   o No long on home O2.         BMI is >= 30 and <35. (Class 1 Obesity). The following options were offered after discussion;: nutrition counseling/recommendations with CHF education.               >45 minutes out of 60 minutes face to face spent counseling patient extensively on dietary Na+ intake, importance of activity, weight monitoring, compliance with medications in addition to importance of titration with goal directed medical therapy and follow up appointments.            This document has been electronically signed by Samra Roberts PA-C  October 25, 2022 09:31 EDT      Dictated Utilizing Dragon Dictation: Part of this note may be an electronic transcription/translation of spoken language to printed text using the Dragon Dictation System.    Follow-up appointment and medication changes provided in hand delivered After Visit Summary as well as reviewed in the room.

## 2022-10-25 NOTE — PROGRESS NOTES
Heart Failure Clinic  Pharmacist Note     Damián Myers is a 67 y.o. male seen in the Heart Failure Clinic for HFimpEF with EF 36-40% June '22 and EF 51-55% 9/21/22.  Damián Myers brought in his BP/weight log and reports his weight has been stable.  He denies SOB or swelling.  He does report he is not interested in starting any new medications or getting any vaccines.      Medication Use:   Hx of med intolerances: None related to HF  Retail Rx Management:  N/A currently. Pt prefers [facility]. Patient has no prescription insurance.     Past Medical History:   Diagnosis Date   • CAD (coronary artery disease)    • Cardiac abnormality    • CHF (congestive heart failure) ([facility])    • Coma of unknown cause ([facility]) 1984    Pt states he was in a coma for one week, unknown cause   • COVID-19 10/25/2021   • Debility 09/14/2022   • Diastolic CHF due to valvular disease ([facility]) 06/25/2022    Echo (9/10/2022):LVEF = 55%.  33 mm Medtronic Magna Ease bioprosthetic valve present.  Mean gradient 8 mmHg.  Moderate 1-2 cm pericardial effusion.  No tamponade.  Left pleural effusion present.   • History of stomach ulcers    • Hypertension    • Mitral regurgitation    • Obesity (BMI 30-39.9) 11/04/2019   • Paroxysmal atrial fibrillation ([facility]) 09/10/2022    Atrial fibrillation status post MVR [id number] ANGELICA ligated at time of surgery with 35 mm Atricure clip- KENNEY confirms occluded ANGELICA with no residual flow   Successful KENNEY/ECV 9/7, on amiodarone Was on Eliquis but discontinued on 9/10/2022 due to severe anemia Back into atrial fibrillation on 9/11/2022   • Post-op pericardial effusion 09/12/2022    Echo for post-op hypotension (9/10/2022): Normal LVEF.  Moderate pericardial effusion.  No evidence of tamponade seen.   • Severe mitral regurgitation status post bioprosthetic MVR, 9/2/2022 08/03/2022    Echo 6/26/22: EF 36-40%, global hypokinesis, mild bileaflet MVP with moderate MR; grade III diastolic dysfunction, severe PAH with RVSP  "67mmHg KENNEY 6/28/22: LVEF 56-60%, MVP of posterior leaflet with mod-severe MR, modTR with RVSP 52mmHg MVR 9/2/22 Dr. Chan with 33 mm Magna Ease mitral valve  Echo (9/10/2022):LVEF = 55%.  33 mm Medtronic Magna Ease bioprosthetic valve present.  Mean gradient 8 mm   • Spinal headache 1984     ALLERGIES: Other and Percocet [oxycodone-acetaminophen]  Current Outpatient Medications   Medication Sig Dispense Refill   • atorvastatin (LIPITOR) 40 MG tablet Take 1 tablet by mouth Every Night. 30 tablet 0   • furosemide (LASIX) 40 MG tablet Take 1 tablet by mouth Every Other Day for 30 days. Extra 40 as needed for swelling 15 tablet 0   • metoprolol tartrate (LOPRESSOR) 25 MG tablet Take 0.5 tablets by mouth Every 12 (Twelve) Hours. 15 tablet 0   • pantoprazole (PROTONIX) 40 MG EC tablet Take 1 tablet by mouth 2 (Two) Times a Day Before Meals. 60 tablet 0     No current facility-administered medications for this encounter.     Facility-Administered Medications Ordered in Other Encounters   Medication Dose Route Frequency Provider Last Rate Last Admin   • Chlorhexidine Gluconate Cloth 2 % pads 1 application  1 application Topical Q12H PRN Magaly King APRN           Vaccination History:   Pneumonia: declines all vaccines  Annual Influenza:  Declines all vaccines      Objective  Vitals:    10/25/22 0821   BP: 118/75   BP Location: Left arm   Patient Position: Sitting   Cuff Size: Adult   Pulse: 79   SpO2: 94%   Weight: 114 kg (250 lb 6.4 oz)   Height: 190.5 cm (75\")     Wt Readings from Last 3 Encounters:   10/25/22 114 kg (250 lb 6.4 oz)   10/14/22 114 kg (250 lb 6.4 oz)   10/04/22 114 kg (250 lb 9.6 oz)         10/25/22  0821   Weight: 114 kg (250 lb 6.4 oz)     Lab Results   Component Value Date    GLUCOSE 131 (H) 09/27/2022    BUN 13 09/27/2022    CREATININE 1.08 09/27/2022    EGFRIFNONA 87 10/29/2021    BCR 12.0 09/27/2022    K 4.6 09/27/2022    CO2 37.9 (H) 09/27/2022    CALCIUM 9.0 09/27/2022    ALBUMIN 3.06 (L) " 09/21/2022    AST 40 09/21/2022    ALT 27 09/21/2022     Lab Results   Component Value Date    WBC 8.52 09/22/2022    HGB 8.8 (L) 09/22/2022    HCT 30.7 (L) 09/22/2022    .0 (H) 09/22/2022     09/22/2022     Lab Results   Component Value Date    CKTOTAL 38 10/29/2021    TROPONINT 0.119 (C) 09/20/2022     Lab Results   Component Value Date    PROBNP 1,504.0 (H) 09/27/2022     Results for orders placed during the hospital encounter of 09/19/22    Adult Transesophageal Echo (KENNEY) W/ Cont if Necessary Per Protocol    Interpretation Summary  · Normal left ventricular cavity size and wall thickness noted. All left ventricular wall segments contract normally.  · Left ventricular ejection fraction appears to be 51 - 55%.  · There is (grade 1), layered plaque in the descending aorta present.  · Left atrial appendage ligation noted.  · The aortic valve is structurally normal with no regurgitation or stenosis present.  · There is a bioprosthetic mitral valve present. The prosthetic mitral valve is grossly normal.  · Mild mitral valve regurgitation is present. Mild paravalvular regurgitation noted along the lateral aspect No significant mitral valve stenosis is present.  · There is no evidence of pericardial effusion.         GDMT    Drug Class   Drug   Dose Last Dose Adjustment Additional Titration   Notes   ACEi/ARB/ARNI        Beta Blocker Metoprolol tart 12.5mg      MRA        SGLT2i    N/A        Drug Therapy Problems    1. GDMT    Recommendations:     1. No additional medications or vaccinations at this time as the patient declines any additional treatments.     Patient was educated on heart failure medications and the importance of medication adherence. All questions were addressed and patient expressed understanding.     Thank you for allowing me to participate in the care of your patient,    Abby Ordonez, PharmD  10/25/22  08:34 EDT

## 2022-11-10 ENCOUNTER — HOSPITAL ENCOUNTER (OUTPATIENT)
Dept: GENERAL RADIOLOGY | Facility: HOSPITAL | Age: 67
Discharge: HOME | End: 2022-11-10

## 2022-11-10 ENCOUNTER — HOSPITAL ENCOUNTER (OUTPATIENT)
Dept: CT IMAGING | Facility: HOSPITAL | Age: 67
Discharge: HOME | End: 2022-11-10

## 2022-11-10 ENCOUNTER — LAB (OUTPATIENT)
Dept: LAB | Facility: HOSPITAL | Age: 67
End: 2022-11-10

## 2022-11-10 ENCOUNTER — TRANSCRIBE ORDERS (OUTPATIENT)
Dept: ADMINISTRATIVE | Facility: HOSPITAL | Age: 67
End: 2022-11-10

## 2022-11-10 DIAGNOSIS — R07.9 CHEST PAIN, UNSPECIFIED TYPE: ICD-10-CM

## 2022-11-10 DIAGNOSIS — R06.82 TACHYPNEA: ICD-10-CM

## 2022-11-10 DIAGNOSIS — R07.9 CHEST PAIN, UNSPECIFIED TYPE: Primary | ICD-10-CM

## 2022-11-10 DIAGNOSIS — R06.00 DYSPNEA, UNSPECIFIED TYPE: ICD-10-CM

## 2022-11-10 LAB — D DIMER PPP FEU-MCNC: 1.54 MCGFEU/ML (ref 0–0.5)

## 2022-11-10 PROCEDURE — 85379 FIBRIN DEGRADATION QUANT: CPT

## 2022-11-10 PROCEDURE — 71275 CT ANGIOGRAPHY CHEST: CPT

## 2022-11-10 PROCEDURE — 71046 X-RAY EXAM CHEST 2 VIEWS: CPT

## 2022-11-10 PROCEDURE — 25010000002 IOPAMIDOL 61 % SOLUTION: Performed by: FAMILY MEDICINE

## 2022-11-10 PROCEDURE — 36415 COLL VENOUS BLD VENIPUNCTURE: CPT

## 2022-11-10 PROCEDURE — 71275 CT ANGIOGRAPHY CHEST: CPT | Performed by: RADIOLOGY

## 2022-11-10 PROCEDURE — 71046 X-RAY EXAM CHEST 2 VIEWS: CPT | Performed by: RADIOLOGY

## 2022-11-10 RX ADMIN — IOPAMIDOL 80 ML: 612 INJECTION, SOLUTION INTRAVENOUS at 14:54

## 2022-11-16 ENCOUNTER — PREP FOR SURGERY (OUTPATIENT)
Dept: OTHER | Facility: HOSPITAL | Age: 67
End: 2022-11-16

## 2022-11-16 DIAGNOSIS — I25.119 CORONARY ARTERY DISEASE INVOLVING NATIVE CORONARY ARTERY OF NATIVE HEART WITH ANGINA PECTORIS: ICD-10-CM

## 2022-11-16 DIAGNOSIS — I48.91 ATRIAL FIBRILLATION, UNSPECIFIED TYPE: ICD-10-CM

## 2022-11-16 DIAGNOSIS — K92.1 GASTROINTESTINAL HEMORRHAGE WITH MELENA: Primary | ICD-10-CM

## 2022-11-17 ENCOUNTER — TELEPHONE (OUTPATIENT)
Dept: GASTROENTEROLOGY | Facility: CLINIC | Age: 67
End: 2022-11-17

## 2022-11-17 PROBLEM — K92.1 GASTROINTESTINAL HEMORRHAGE WITH MELENA: Status: ACTIVE | Noted: 2022-11-17

## 2022-11-17 NOTE — TELEPHONE ENCOUNTER
Due to patient's ASA score, patient must be scheduled at main hospital OR - I called patient to notify patient we will need to move patient's procedure to the main hospital OR, due to patient safety.    I made patient aware we can still keep procedure on same date, but will just need to move procedure to later in the day to accommodate main OR schedule. Patient declined to be scheduled in main hospital OR.     I offered to schedule patient for another date/time, at [facility] hospital OR, to allow for an earlier procedure time. Patient declined.     Patient states he wishes to not have EGD procedure at this time and requests an office visit.    Patient requests an office visit with [facility] office as patient lives in [place]. I provided patient with [facility] number as well as transferred patient to [facility] office.

## 2022-11-25 NOTE — PROGRESS NOTES
Heart Failure Clinic  Pharmacist Note     Damián Myers is a 67 y.o. male seen in the Heart Failure Clinic for HFimpEF with EF 36-40% June '22 and EF 51-55% 9/21/22. Patient was admitted to [facility] [place] 9/19-9/22 for respiratory failure and CHF exacerbation. Had persistent Afib and was taken for successful KENNEY and cardioversion on 9/21.  No need for chronic anticoagulation per cardiology due to left atrial appendage closure during recent surgery; also recent history of GI bleed. Pt was hypotensive at times during admission but seems to have resolved by the time of discharge. Changes to medications at discharge include amiodarone frequency change from BID to qday, addition of ASA, and Lasix daily prn changed to every other day scheduled.  aDmián Myers has help with his medications from his wife who reports a very good understanding of medications.  Reports all meds are being taken as directed with no missed doses. Do not currently check BP at home regularly but will start. Denies symptoms of lightheadedness/dizziness. He does weigh every day at home and reports weight has been steady since discharge. They report that they get concerned if he starts getting close to 250lbs. Minimal SOA/edema since his surgery; wife reports mild LE edema yesterday resolved after taking Lasix.  Patient and wife report they would like to have patient taking as few medications as reasonably possible.      Medication Use:   Hx of med intolerances: None related to HF  Retail Rx Management:  N/A currently. Pt prefers [facility]. Patient has no prescription insurance. Pt/wife report they are not concerned about cost; report that total cost of current meds is around $50 monthly.    Past Medical History:   Diagnosis Date   • CAD (coronary artery disease)    • Cardiac abnormality    • CHF (congestive heart failure) ([facility])    • Coma of unknown cause ([facility]) 1984    Pt states he was in a coma for one week, unknown cause   • COVID-19 10/25/2021   •  Debility 09/14/2022   • Diastolic CHF due to valvular disease (HCC) 06/25/2022    Echo (9/10/2022):LVEF = 55%.  33 mm Medtronic Magna Ease bioprosthetic valve present.  Mean gradient 8 mmHg.  Moderate 1-2 cm pericardial effusion.  No tamponade.  Left pleural effusion present.   • History of stomach ulcers    • Hypertension    • Mitral regurgitation    • Obesity (BMI 30-39.9) 11/04/2019   • Paroxysmal atrial fibrillation (HCC) 09/10/2022    Atrial fibrillation status post MVR [id number] ANGELICA ligated at time of surgery with 35 mm Atricure clip- KENNEY confirms occluded ANGELICA with no residual flow   Successful KENNEY/ECV 9/7, on amiodarone Was on Eliquis but discontinued on 9/10/2022 due to severe anemia Back into atrial fibrillation on 9/11/2022   • Post-op pericardial effusion 09/12/2022    Echo for post-op hypotension (9/10/2022): Normal LVEF.  Moderate pericardial effusion.  No evidence of tamponade seen.   • Severe mitral regurgitation status post bioprosthetic MVR, 9/2/2022 08/03/2022    Echo 6/26/22: EF 36-40%, global hypokinesis, mild bileaflet MVP with moderate MR; grade III diastolic dysfunction, severe PAH with RVSP 67mmHg KENNEY 6/28/22: LVEF 56-60%, MVP of posterior leaflet with mod-severe MR, modTR with RVSP 52mmHg MVR 9/2/22 Dr. Chan with 33 mm Magna Ease mitral valve  Echo (9/10/2022):LVEF = 55%.  33 mm Medtronic Magna Ease bioprosthetic valve present.  Mean gradient 8 mm   • Spinal headache 1984     ALLERGIES: Other and Percocet [oxycodone-acetaminophen]  Current Outpatient Medications   Medication Sig Dispense Refill   • amiodarone (PACERONE) 200 MG tablet Take 1 tablet by mouth Daily for 30 days. 30 tablet 0   • aspirin 81 MG EC tablet Take 1 tablet by mouth Daily for 30 days. 30 tablet 0   • atorvastatin (LIPITOR) 40 MG tablet Take 1 tablet by mouth Every Night. 30 tablet 0   • furosemide (LASIX) 40 MG tablet Take 1 tablet by mouth Every Other Day for 30 days. Extra 40 as needed for swelling 15 tablet 0  "  • metoprolol tartrate (LOPRESSOR) 25 MG tablet Take 0.5 tablets by mouth Every 12 (Twelve) Hours. 15 tablet 0   • pantoprazole (PROTONIX) 40 MG EC tablet Take 1 tablet by mouth 2 (Two) Times a Day Before Meals. 60 tablet 0     No current facility-administered medications for this encounter.     Facility-Administered Medications Ordered in Other Encounters   Medication Dose Route Frequency Provider Last Rate Last Admin   • Chlorhexidine Gluconate Cloth 2 % pads 1 application  1 application Topical Q12H PRN Magaly King APRN           Vaccination History:   Pneumonia: declines all vaccines  Annual Influenza:  Declines all vaccines      Objective  Vitals:    09/27/22 1324   BP: 107/70   BP Location: Right arm   Patient Position: Sitting   Cuff Size: Adult   Pulse: 89   SpO2: 96%   Weight: 111 kg (245 lb 12.8 oz)   Height: 190.5 cm (75\")     Wt Readings from Last 3 Encounters:   09/27/22 111 kg (245 lb 12.8 oz)   09/22/22 117 kg (257 lb 3.2 oz)   09/14/22 115 kg (254 lb)         09/27/22  1324   Weight: 111 kg (245 lb 12.8 oz)     Lab Results   Component Value Date    GLUCOSE 131 (H) 09/27/2022    BUN 13 09/27/2022    CREATININE 1.08 09/27/2022    EGFRIFNONA 87 10/29/2021    BCR 12.0 09/27/2022    K 4.6 09/27/2022    CO2 37.9 (H) 09/27/2022    CALCIUM 9.0 09/27/2022    ALBUMIN 3.06 (L) 09/21/2022    AST 40 09/21/2022    ALT 27 09/21/2022     Lab Results   Component Value Date    WBC 8.52 09/22/2022    HGB 8.8 (L) 09/22/2022    HCT 30.7 (L) 09/22/2022    .0 (H) 09/22/2022     09/22/2022     Lab Results   Component Value Date    CKTOTAL 38 10/29/2021    TROPONINT 0.119 (C) 09/20/2022     Lab Results   Component Value Date    PROBNP 1,504.0 (H) 09/27/2022     Results for orders placed during the hospital encounter of 09/19/22    Adult Transesophageal Echo (KENNEY) W/ Cont if Necessary Per Protocol    Interpretation Summary  · Normal left ventricular cavity size and wall thickness noted. All left " ventricular wall segments contract normally.  · Left ventricular ejection fraction appears to be 51 - 55%.  · There is (grade 1), layered plaque in the descending aorta present.  · Left atrial appendage ligation noted.  · The aortic valve is structurally normal with no regurgitation or stenosis present.  · There is a bioprosthetic mitral valve present. The prosthetic mitral valve is grossly normal.  · Mild mitral valve regurgitation is present. Mild paravalvular regurgitation noted along the lateral aspect No significant mitral valve stenosis is present.  · There is no evidence of pericardial effusion.         GDMT    Drug Class   Drug   Dose Last Dose Adjustment Additional Titration   Notes   ACEi/ARB/ARNI        Beta Blocker Metoprolol tart 12.5mg      MRA        SGLT2i    N/A        Drug Therapy Problems    1. Drug Interactions Screening: metoprolol + amiodarone increase risk of hypotension and bradycardia  2. Drug-Disease Interactions: none noted  3. GDMT    Recommendations:     1. Not currently checking BP at home regularly but will start. Denies symptoms of dizziness/lightheadedness.  2. N/A  3. Could consider changing Lopressor 12.5mg BID to Toprol 25mg daily for HF benefit if medically appropriate (pt on metoprolol tartrate currently for rate control with Afib). If pt continues to not have SOA/edema, could consider changing Lasix to daily prn dosing at future visit.      Discharge medications have been reviewed and reconciled.    Patient was educated on heart failure medications and the importance of medication adherence. All questions were addressed and patient expressed understanding. Used teach-back method to assess understanding.     Thank you for allowing me to participate in the care of your patient,    Veronica Velásquez, PharmD  09/27/22  14:31 EDT      3 = A little assistance

## 2022-12-06 ENCOUNTER — TRANSCRIBE ORDERS (OUTPATIENT)
Dept: ADMINISTRATIVE | Facility: HOSPITAL | Age: 67
End: 2022-12-06

## 2022-12-06 DIAGNOSIS — I25.5 ISCHEMIC CARDIOMYOPATHY: Primary | ICD-10-CM

## 2022-12-06 DIAGNOSIS — Z95.2 HEART VALVE REPLACED BY TRANSPLANT: ICD-10-CM

## 2023-01-18 NOTE — CASE MANAGEMENT/SOCIAL WORK
Continued Stay Note  [facility]     Patient Name: Damián Myers  MRN: [id number]  Today's Date: 9/8/2022    Admit Date: 9/2/2022     Discharge Plan     Row Name 09/08/22 1057       Plan    Plan rehab    Patient/Family in Agreement with Plan yes    Plan Comments Mr. Myers was able to ambulate 110ft with therapy today.  He has referral in to [facility] acute rehab.  Scheduled for cardioversion today for A Flutter.  CM following.    Final Discharge Disposition Code 62 - inpatient rehab facility               Discharge Codes    No documentation.               Expected Discharge Date and Time     Expected Discharge Date Expected Discharge Time    Sep 12, 2022             Yulissa Lyn RN     Cyclophosphamide Pregnancy And Lactation Text: This medication is Pregnancy Category D and it isn't considered safe during pregnancy. This medication is excreted in breast milk.

## 2023-01-26 ENCOUNTER — HOSPITAL ENCOUNTER (OUTPATIENT)
Dept: CARDIOLOGY | Facility: HOSPITAL | Age: 68
Discharge: HOME | End: 2023-01-26
Admitting: PHYSICIAN ASSISTANT
Payer: MEDICARE

## 2023-01-26 VITALS
BODY MASS INDEX: 31.81 KG/M2 | HEIGHT: 75 IN | HEART RATE: 85 BPM | OXYGEN SATURATION: 92 % | WEIGHT: 255.8 LBS | DIASTOLIC BLOOD PRESSURE: 85 MMHG | SYSTOLIC BLOOD PRESSURE: 126 MMHG

## 2023-01-26 DIAGNOSIS — Z95.3 S/P MITRAL VALVE REPLACEMENT WITH BIOPROSTHETIC VALVE: ICD-10-CM

## 2023-01-26 DIAGNOSIS — I50.30 DIASTOLIC CHF DUE TO VALVULAR DISEASE: Primary | ICD-10-CM

## 2023-01-26 DIAGNOSIS — N18.30 STAGE 3 CHRONIC KIDNEY DISEASE, UNSPECIFIED WHETHER STAGE 3A OR 3B CKD: ICD-10-CM

## 2023-01-26 DIAGNOSIS — I10 ESSENTIAL HYPERTENSION: ICD-10-CM

## 2023-01-26 DIAGNOSIS — I38 DIASTOLIC CHF DUE TO VALVULAR DISEASE: Primary | ICD-10-CM

## 2023-01-26 LAB
ABSOLUTE LUNG FLUID CONTENT: 33 % (ref 20–35)
ANION GAP SERPL CALCULATED.3IONS-SCNC: 7 MMOL/L (ref 5–15)
BUN SERPL-MCNC: 16 MG/DL (ref 8–23)
BUN/CREAT SERPL: 12.9 (ref 7–25)
CALCIUM SPEC-SCNC: 9 MG/DL (ref 8.6–10.5)
CHLORIDE SERPL-SCNC: 103 MMOL/L (ref 98–107)
CO2 SERPL-SCNC: 31 MMOL/L (ref 22–29)
CREAT SERPL-MCNC: 1.24 MG/DL (ref 0.76–1.27)
EGFRCR SERPLBLD CKD-EPI 2021: 63.7 ML/MIN/1.73
GLUCOSE SERPL-MCNC: 121 MG/DL (ref 65–99)
MAGNESIUM SERPL-MCNC: 2 MG/DL (ref 1.6–2.4)
NT-PROBNP SERPL-MCNC: 1042 PG/ML (ref 0–900)
POTASSIUM SERPL-SCNC: 4.6 MMOL/L (ref 3.5–5.2)
SODIUM SERPL-SCNC: 141 MMOL/L (ref 136–145)

## 2023-01-26 PROCEDURE — 99215 OFFICE O/P EST HI 40 MIN: CPT | Performed by: PHYSICIAN ASSISTANT

## 2023-01-26 PROCEDURE — 83880 ASSAY OF NATRIURETIC PEPTIDE: CPT

## 2023-01-26 PROCEDURE — 80048 BASIC METABOLIC PNL TOTAL CA: CPT | Performed by: PHYSICIAN ASSISTANT

## 2023-01-26 PROCEDURE — 94726 PLETHYSMOGRAPHY LUNG VOLUMES: CPT | Performed by: PHYSICIAN ASSISTANT

## 2023-01-26 PROCEDURE — 36415 COLL VENOUS BLD VENIPUNCTURE: CPT | Performed by: PHYSICIAN ASSISTANT

## 2023-01-26 PROCEDURE — 83735 ASSAY OF MAGNESIUM: CPT | Performed by: PHYSICIAN ASSISTANT

## 2023-01-26 RX ORDER — FUROSEMIDE 40 MG/1
40 TABLET ORAL AS NEEDED
COMMUNITY

## 2023-01-26 NOTE — PROGRESS NOTES
Heart Failure Clinic  Pharmacist Note     Daminá Myers is a 67 y.o. male seen in the Heart Failure Clinic for HFimpEF with EF 36-40% June '22 and EF 51-55% 9/21/22.  Damián Myers reports that he has not been checking his BP/weight at home. He reports that he has gained some weight due to eating more during the holidays. He denies any swelling and reports some SOB roughly every 10 days and that is when he will take his PRN Lasix 40mg once and the SOB will resolve. His BP was elevated in clinic today, but pt reports that he was anxious about his appointment today. He reports that a normal BP for him is usually 125/78, but again, has not been checking here lately. He does report he is not interested in starting any new medications or getting any vaccines at this time.      Medication Use:   Hx of med intolerances: None related to HF  Retail Rx Management:  N/A currently. Pt prefers [facility]. Patient has no prescription insurance.     Past Medical History:   Diagnosis Date   • CAD (coronary artery disease)    • Cardiac abnormality    • CHF (congestive heart failure) ([facility])    • Coma of unknown cause ([facility]) 1984    Pt states he was in a coma for one week, unknown cause   • COVID-19 10/25/2021   • Debility 09/14/2022   • Diastolic CHF due to valvular disease ([facility]) 06/25/2022    Echo (9/10/2022):LVEF = 55%.  33 mm Medtronic Magna Ease bioprosthetic valve present.  Mean gradient 8 mmHg.  Moderate 1-2 cm pericardial effusion.  No tamponade.  Left pleural effusion present.   • History of stomach ulcers    • Hypertension    • Mitral regurgitation    • Obesity (BMI 30-39.9) 11/04/2019   • Paroxysmal atrial fibrillation ([facility]) 09/10/2022    Atrial fibrillation status post MVR [id number] ANGELICA ligated at time of surgery with 35 mm Atricure clip- KENNEY confirms occluded ANGELICA with no residual flow   Successful KENNEY/ECV 9/7, on amiodarone Was on Eliquis but discontinued on 9/10/2022 due to severe anemia Back into atrial  "fibrillation on 9/11/2022   • Post-op pericardial effusion 09/12/2022    Echo for post-op hypotension (9/10/2022): Normal LVEF.  Moderate pericardial effusion.  No evidence of tamponade seen.   • Severe mitral regurgitation status post bioprosthetic MVR, 9/2/2022 08/03/2022    Echo 6/26/22: EF 36-40%, global hypokinesis, mild bileaflet MVP with moderate MR; grade III diastolic dysfunction, severe PAH with RVSP 67mmHg KENNEY 6/28/22: LVEF 56-60%, MVP of posterior leaflet with mod-severe MR, modTR with RVSP 52mmHg MVR 9/2/22 Dr. Chan with 33 mm Magna Ease mitral valve  Echo (9/10/2022):LVEF = 55%.  33 mm Medtronic Magna Ease bioprosthetic valve present.  Mean gradient 8 mm   • Spinal headache 1984     ALLERGIES: Other and Percocet [oxycodone-acetaminophen]  Current Outpatient Medications   Medication Sig Dispense Refill   • aspirin 81 MG chewable tablet Chew 1 tablet 2 (Two) Times a Day.     • furosemide (Lasix) 40 MG tablet Take 40 mg by mouth As Needed.     • metoprolol tartrate (LOPRESSOR) 25 MG tablet Take 12.5 mg by mouth 2 (Two) Times a Day.       No current facility-administered medications for this encounter.     Facility-Administered Medications Ordered in Other Encounters   Medication Dose Route Frequency Provider Last Rate Last Admin   • Chlorhexidine Gluconate Cloth 2 % pads 1 application  1 application Topical Q12H PRN Magaly King APRN           Vaccination History:   Pneumonia: declines all vaccines  Annual Influenza:  Declines all vaccines      Objective  Vitals:    01/26/23 0824 01/26/23 0856   BP: 143/93 126/85   BP Location: Left arm    Patient Position: Sitting    Cuff Size: Adult    Pulse: 85    SpO2: 92%    Weight: 116 kg (255 lb 12.8 oz)    Height: 190.5 cm (75\")      Wt Readings from Last 3 Encounters:   01/26/23 116 kg (255 lb 12.8 oz)   10/25/22 114 kg (250 lb 6.4 oz)   10/14/22 114 kg (250 lb 6.4 oz)         01/26/23  0824   Weight: 116 kg (255 lb 12.8 oz)     Lab Results   Component " Value Date    GLUCOSE 121 (H) 01/26/2023    BUN 16 01/26/2023    CREATININE 1.24 01/26/2023    EGFRIFNONA 87 10/29/2021    BCR 12.9 01/26/2023    K 4.6 01/26/2023    CO2 31.0 (H) 01/26/2023    CALCIUM 9.0 01/26/2023    ALBUMIN 3.06 (L) 09/21/2022    AST 40 09/21/2022    ALT 27 09/21/2022     Lab Results   Component Value Date    WBC 8.52 09/22/2022    HGB 8.8 (L) 09/22/2022    HCT 30.7 (L) 09/22/2022    .0 (H) 09/22/2022     09/22/2022     Lab Results   Component Value Date    CKTOTAL 38 10/29/2021    TROPONINT 0.119 (C) 09/20/2022     Lab Results   Component Value Date    PROBNP 1,042.0 (H) 01/26/2023     Results for orders placed during the hospital encounter of 09/19/22    Adult Transesophageal Echo (KENNEY) W/ Cont if Necessary Per Protocol    Interpretation Summary  · Normal left ventricular cavity size and wall thickness noted. All left ventricular wall segments contract normally.  · Left ventricular ejection fraction appears to be 51 - 55%.  · There is (grade 1), layered plaque in the descending aorta present.  · Left atrial appendage ligation noted.  · The aortic valve is structurally normal with no regurgitation or stenosis present.  · There is a bioprosthetic mitral valve present. The prosthetic mitral valve is grossly normal.  · Mild mitral valve regurgitation is present. Mild paravalvular regurgitation noted along the lateral aspect No significant mitral valve stenosis is present.  · There is no evidence of pericardial effusion.         GDMT    Drug Class   Drug   Dose Last Dose Adjustment Additional Titration   Notes   ACEi/ARB/ARNI     CKDIIIa-b   Beta Blocker Lopressor 12.5mg      MRA     CKDIIIa-b   SGLT2i     Pt has not been interested in adding       Drug Therapy Problems    1. Daily Logs  2. GDMT    Recommendations:     1. Encouraged the patient to start taking his BP daily to ensure that it is not staying consistently high. Kalyn gave him new daily log sheets. Also encouraged him to  start weighting himself daily.   2. He would be a candidate for SGLT2i, but with no Rx insurance ,we would have to look for assistance from the . Samra rechecked BP to assess if hypertensive or not and patient's /85. No titration needed at this time. The pt is not interested in any additional medications or vaccinations at this time.     Patient was educated on heart failure medications and the importance of medication adherence. All questions were addressed and patient expressed understanding.     Thank you for allowing me to participate in the care of your patient,    Betsy King [facility]  01/26/23  09:09 EST

## 2023-01-26 NOTE — PROGRESS NOTES
[facility]  JOSESITO Mccann Bruce R, MD  [address] [place] [address]    Thank you for asking me to see Damián Myers for congestive heart failure.    HPI:     This is a 67 y.o. male with known past medical history of:  · HFrEF with recovered EF  · Grade I diastolic dysfunction  · 06/25/22 TTE with EF 36-40% with moderate MV regurgitation and severe pulmonary HTN  · 09/08/22 TTE with EF 60%  · 09/12/22 TTE with EF 56-60% and small to moderate pericardial effusion  · 09/21/22 KENNEY with EF 51-55% with bioprosthetic mitral valve present (pericardial effusion)  · Bioprosthetic mitral valve replacement due to symptomatic severe mitral regurgitation  · 33 mm Magna Ease tissue mitral valve  · Persistent atrial fibrillation:   · 09/02/22 Left atrial appendage ligation/clipping  · 35mm Atricure clip  · 09/21/22 KENNEY guided cardioversion  · Recent upper GI bleeding with PUD:   · EGD 9/12 revealed 4 gastric ulcers and pyloric ulcer treated with epinephrine injection; gold probe heat cautery and clips  · Microcytic anemia  · CKD IIIa-b  · Essential hypertension  · Hyperlipidemia    Damián Myers presents for today to establish HF clinic care.  The patient is typically seen by Spencer Saeed MD.  Patient's primary cardiologist is Dr. Amador & team with primary CT surgeon Dr. Chan.       · Last known EF 51-55%.  • Last known hospitalization and/or ED visit: Patient had a comp patient had a rather complicated month of September when initially admitted from September 2 through 14, 2022 at [facility] during which time he underwent mitral valve replacement as well as left atrial appendage ligation while also developing acute GI bleeding requiring transfusion of packed red blood cells and platelets.  He underwent EGD revealing for gastric ulcers and pyloric ulcer treated with epinephrine injection.  After discharge he was admitted to acute inpatient rehabilitation at  [facility] from which he left [place] per documentation review.  Shortly afterward he presented to the emergency department with confusion and shortness of breath and was then admitted for acute hypoxemic and hypercarbic respiratory failure felt to be secondary to acute CHF.  During this course of care he was referred to heart failure clinic at discharge.   • Accompanied by: Self    Initial visit data/details regarding:   • Dyspnea: Denies dyspnea on exertion  • Lower extremity swelling: Denies swelling of the lower extremities  • Abdominal swelling: Denies  • Home weight: Patient is not monitoring at home.   • Home BP: Not monitoring at home, educated in office today by staff about monitoring.   • Home heart rate: Not currently monitoring--log provided.    • Daily activities of living: Improving significantly  • Pillows/lying flat: Continues to sleep in recliner  • Zone: Green   • Mr. Myers is doing well overall.  He reports he takes Lasix as needed and closely monitors his sodium.  He reports he does periodically splurge with mexican food but for the most part attempts to closely monitor.    • Mr. Myers plans to have his wife start monitoring his blood pressures at home.  His initial reading was higher, but he reports he was nervous after recent debacles involving mask wearing.    • He is following up regularly with Dr. Saeed.    • We discuss adding lipid panel and hemoglobin a1c to next visit.      Specialists:   • General Cardiology: Dr. Amador & team  • CT Surgery: Dr. Chan & team  • GI Dr. Garcia    Review of Systems - Review of Systems   Constitutional: Negative for chills, decreased appetite, diaphoresis and fever.   HENT: Negative for congestion and ear pain.    Eyes: Negative for blurred vision, discharge and double vision.   Cardiovascular: Negative for chest pain, claudication and cyanosis.   Respiratory: Negative for cough and hemoptysis.    Endocrine: Negative for cold intolerance and heat  intolerance.   Hematologic/Lymphatic: Negative for adenopathy and bleeding problem.   Skin: Negative for color change and nail changes.   Musculoskeletal: Negative for arthritis, back pain and falls.   Gastrointestinal: Negative for bloating and abdominal pain.   Genitourinary: Negative for bladder incontinence, decreased libido and dysuria.   Neurological: Negative for aphonia, brief paralysis and difficulty with concentration.   Psychiatric/Behavioral: Negative for altered mental status, depression and hallucinations.           All other systems were reviewed and were negative.    Patient Active Problem List   Diagnosis   • Obesity (BMI 30-39.9)   • Coronary artery disease involving native coronary artery of native heart with angina pectoris (HCC)   • Stroke (cerebrum) ([facility])   • Essential hypertension   • Hyperlipidemia LDL goal <70   • Diastolic CHF due to valvular disease ([facility])   • Paroxysmal atrial fibrillation ([facility])   • Debility   • Respiratory failure with hypoxia and hypercapnia ([facility])   • COPD exacerbation ([facility])   • CKD (chronic kidney disease) stage 3, GFR 30-59 ml/min ([facility])   • Gastrointestinal hemorrhage with melena       family history includes Diabetes type I in his son; Heart disease in his maternal grandfather; Heart valve disorder in his brother, maternal grandfather, and mother.     reports that he has never smoked. He has never used smokeless tobacco. He reports that he does not drink alcohol and does not use drugs.    Allergies   Allergen Reactions   • Other Unknown - High Severity     Pt had a reaction to anesthesia when placing stents      • Percocet [Oxycodone-Acetaminophen] Other (See Comments)     Reports they gave him too much and he had respiratory depression   Reports he has tolerated since then with no issues.          Current Outpatient Medications:   •  aspirin 81 MG chewable tablet, Chew 1 tablet 2 (Two) Times a Day., Disp: , Rfl:   •  furosemide (Lasix) 40 MG tablet, Take 40 mg by  "mouth As Needed., Disp: , Rfl:   •  metoprolol tartrate (LOPRESSOR) 25 MG tablet, Take 12.5 mg by mouth 2 (Two) Times a Day., Disp: , Rfl:   No current facility-administered medications for this encounter.    Facility-Administered Medications Ordered in Other Encounters:   •  Chlorhexidine Gluconate Cloth 2 % pads 1 application, 1 application, Topical, Q12H PRN, Magaly King APRN      Physical Exam:  I have reviewed the patient's current vital signs as documented in the patient's EMR.   Vitals:    01/26/23 0824 01/26/23 0856   BP: 143/93 126/85   BP Location: Left arm    Patient Position: Sitting    Cuff Size: Adult    Pulse: 85    SpO2: 92%    Weight: 116 kg (255 lb 12.8 oz)    Height: 190.5 cm (75\")        Physical Exam  Vitals and nursing note reviewed.   Constitutional:       General: He is awake.      Appearance: Normal appearance. He is well-developed and well-groomed.      Interventions: Nasal cannula in place.   HENT:      Head: Normocephalic and atraumatic.   Eyes:      General: Lids are normal.      Conjunctiva/sclera:      Right eye: Right conjunctiva is not injected.      Left eye: Left conjunctiva is not injected.   Cardiovascular:      Rate and Rhythm: Normal rate and regular rhythm.      Heart sounds: S1 normal and S2 normal.   Pulmonary:      Effort: No tachypnea, bradypnea or accessory muscle usage.      Breath sounds: No decreased breath sounds, wheezing, rhonchi or rales.      Comments: Clear breath sounds bilaterally   Abdominal:      General: Bowel sounds are normal.      Palpations: Abdomen is soft.      Tenderness: There is no abdominal tenderness.   Musculoskeletal:      Right lower leg: No swelling, deformity or lacerations. No edema.      Left lower leg: No swelling, deformity or lacerations. No edema.   Skin:     General: Skin is warm and moist.   Neurological:      Mental Status: He is alert and oriented to person, place, and time.   Psychiatric:         Attention and Perception: " Attention normal.         Mood and Affect: Mood normal.         Behavior: Behavior is cooperative.      Comments: Mr. Myers is very upbeat about his recovery.          JVP: Volume/Pulsation: Normal.        DATA REVIEWED:     EKG. I personally reviewed and interpreted the EKG.      ---------------------------------------------------  TTE/KENNEY:  Results for orders placed during the hospital encounter of 09/19/22    Adult Transesophageal Echo (KENNEY) W/ Cont if Necessary Per Protocol    Interpretation Summary  · Normal left ventricular cavity size and wall thickness noted. All left ventricular wall segments contract normally.  · Left ventricular ejection fraction appears to be 51 - 55%.  · There is (grade 1), layered plaque in the descending aorta present.  · Left atrial appendage ligation noted.  · The aortic valve is structurally normal with no regurgitation or stenosis present.  · There is a bioprosthetic mitral valve present. The prosthetic mitral valve is grossly normal.  · Mild mitral valve regurgitation is present. Mild paravalvular regurgitation noted along the lateral aspect No significant mitral valve stenosis is present.  · There is no evidence of pericardial effusion.        -----------------------------------------------------  CXR/Imaging:   Imaging Results (Most Recent)     None          I personally reviewed and interpreted the CXR.      -----------------------------------------------------  CT:   No radiology results for the last 30 days.  I personally reviewed the images of the CT scan.  My personal interpretation is below.      ----------------------------------------------------    PFTs: None available at present  --------------------------------------------------------------------------------------------------    Laboratory evaluations:    Lab Results   Component Value Date    GLUCOSE 121 (H) 01/26/2023    BUN 16 01/26/2023    CREATININE 1.24 01/26/2023    EGFRIFNONA 87 10/29/2021    BCR 12.9  01/26/2023    K 4.6 01/26/2023    CO2 31.0 (H) 01/26/2023    CALCIUM 9.0 01/26/2023    ALBUMIN 3.06 (L) 09/21/2022    AST 40 09/21/2022    ALT 27 09/21/2022     Lab Results   Component Value Date    WBC 8.52 09/22/2022    HGB 8.8 (L) 09/22/2022    HCT 30.7 (L) 09/22/2022    .0 (H) 09/22/2022     09/22/2022     Lab Results   Component Value Date    CHOL 83 09/20/2022    TRIG 85 06/25/2022    HDL 36 (L) 06/25/2022     (H) 06/25/2022     Lab Results   Component Value Date    TSH 8.280 (H) 09/19/2022     Lab Results   Component Value Date    HGBA1C 5.10 09/20/2022     Lab Results   Component Value Date    ALT 27 09/21/2022     Lab Results   Component Value Date    HGBA1C 5.10 09/20/2022    HGBA1C 6.30 (H) 08/30/2022    HGBA1C 6.00 (H) 06/25/2022     Lab Results   Component Value Date    CREATININE 1.24 01/26/2023     Lab Results   Component Value Date    FERRITIN 365.40 09/10/2022     Lab Results   Component Value Date    INR 1.10 09/19/2022    INR 1.12 09/03/2022    INR 1.44 (H) 09/02/2022    PROTIME 14.5 09/19/2022    PROTIME 14.3 09/03/2022    PROTIME 17.5 (H) 09/02/2022        Lab Results   Component Value Date    ABSOLUTELUNG 33 01/26/2023    ABSOLUTELUNG 41 (A) 09/27/2022    ABSOLUTELUNG 39 (A) 06/25/2022       PAH RISK ASSESSMENT:      1. Diastolic CHF due to valvular disease (HCC)    2. Essential hypertension    3. Stage 3 chronic kidney disease, unspecified whether stage 3a or 3b CKD (HCC)    4. S/P mitral valve replacement with bioprosthetic valve          ORDERS PLACED TODAY:  Orders Placed This Encounter   Procedures   • BNP   • Basic Metabolic Panel   • Magnesium   • ReDs Vest        Diagnoses and all orders for this visit:    1. Diastolic CHF due to valvular disease (HCC) (Primary)  Overview:  · Echo (9/10/2022):LVEF = 55%.  33 mm Medtronic Magna Ease bioprosthetic valve present.  Mean gradient 8 mmHg.  Moderate 1-2 cm pericardial effusion.  No tamponade.  Left pleural effusion  present.    Orders:  -     ReDs Vest  -     BNP  -     Basic Metabolic Panel; Standing  -     Magnesium; Standing  -     Basic Metabolic Panel  -     Magnesium    2. Essential hypertension    3. Stage 3 chronic kidney disease, unspecified whether stage 3a or 3b CKD ([facility])    4. S/P mitral valve replacement with bioprosthetic valve           MEDS ORDERED TODAY:    No orders of the defined types were placed in this encounter.       ---------------------------------------------------------------------------------------------------------------------------          ASSESSMENT/PLAN:      Diagnosis Plan   1. Diastolic CHF due to valvular disease ([facility])  ReDs Vest    BNP    Basic Metabolic Panel    Magnesium    Basic Metabolic Panel    Magnesium      2. Essential hypertension        3. Stage 3 chronic kidney disease, unspecified whether stage 3a or 3b CKD ([facility])        4. S/P mitral valve replacement with bioprosthetic valve            not acutely decompensated chronic diastolic heart failure. CHF.      NYHA stage Stage C: Structural heart disease is present AND symptoms have occurredFC-Class II: Slight limitation of physical activity. Comfortable at rest Ordinary physical activity results in fatigue, palpitation, dyspnea (shortness of breath)    Today, Patient appears euvolemic.and with  Moderate perfusion. The patient's hemodynamics are currently acceptable. HR is: normal and is at goal. BP/MAP was reviewed and there is notroom for medication up-titration.  Clinical trajectory was assessed and hasimproved.     CHF GOAL DIRECTED MEDICAL THERAPY FOR PATIENT ADDRESSED/ADJUSTED:     GDMT    Drug Class   Drug   Dose Last Dose Adjustment Additional Titration   Notes   ACEi/ARB/ARNI CKD IIIa-b       Beta Blocker  Metoprolol tartrate   12.5mg BID BP & HR controlled during visit, not adjusted     MRA CKD IIIa-b       SGLT2i Consider in future visit    N/A      -CHF Specific BB:   •  Continue Metoprolol 12.5mg BID.   • BP and HR  acceptable, hold on adjusting.   • Plan of care reviewed with patient.   • RTC in June 2023.      -ACE/ARB/ARNi:   · Held previously due to CKD & Borderline hypotension.     -MRA:   · Avoid in CKD IIIb patient with borderline hypotension.     -SGLT2 inhibitor therapy:   · Discussed possibly considering addition of SGLT2i in last visit. Patient is not currently interested in additions of medications as he feels he continues to improve.    · Will hold off for now; however, if course of care should wane, could consider addition of SGLT2i in the future.      -Diuretic regimen:   • ReDS Vest reading for 01/26/23 was found to be 33 and within normal limits. .    • Continue Lasix as needed (using once a week for now).    • Labs reviewed with patient with proBNP nearly normalized and the best it has been in years.    • Discussed weighing daily with confirmation of home scale.     -Acute vs. Chronic underlying conditions other than HF addressed during visit:   • P.Afib s/p cardioversion & ANGELICA ligation:   o Continue Metoprolol 12.5mg BID.    o Continue outpatient f/u with General Cards & CT surgery.     • CKD III a-b:   o BMP obtained and unremarkable.       • Recent GI bleed with PUD:   o Patient declined repeat EGD in November 2022 per review of chart.      • Bioprosthetic mitral valve replacement:   o Last CT surgery office visit note reviewed with as needed follow-up and patient able to resume driving and lifting.     • Recent acute hypoxemic respiratory failure, no longer on home O2:   o No long on home O2.         BMI is >= 30 and <35. (Class 1 Obesity). The following options were offered after discussion;: nutrition counseling/recommendations with CHF education.               >45 minutes out of 60 minutes face to face spent counseling patient extensively on dietary Na+ intake, importance of activity, weight monitoring, compliance with medications in addition to importance of titration with goal directed medical therapy  and follow up appointments.            This document has been electronically signed by Samra Roberts PA-C  January 26, 2023 09:52 EST      Dictated Utilizing Dragon Dictation: Part of this note may be an electronic transcription/translation of spoken language to printed text using the Dragon Dictation System.    Follow-up appointment and medication changes provided in hand delivered After Visit Summary as well as reviewed in the room.

## 2023-01-31 ENCOUNTER — TELEPHONE (OUTPATIENT)
Dept: CARDIOLOGY | Facility: CLINIC | Age: 68
End: 2023-01-31
Payer: MEDICARE

## 2023-01-31 NOTE — TELEPHONE ENCOUNTER
HUB CAN READ    Patient not completed LIPID ordered.  He sates he had eaten when he went for labs and they could not draw this.  Patient t o check with PCP and see if he can have this done and resulted prior to appt Friday- and call us back and let us know or r/s.  Order for Lipid would need to be faxed or routed to PCP if he does them there

## 2023-02-03 ENCOUNTER — OFFICE VISIT (OUTPATIENT)
Dept: CARDIOLOGY | Facility: CLINIC | Age: 68
End: 2023-02-03
Payer: MEDICARE

## 2023-02-03 VITALS
WEIGHT: 252.2 LBS | OXYGEN SATURATION: 97 % | DIASTOLIC BLOOD PRESSURE: 85 MMHG | BODY MASS INDEX: 31.36 KG/M2 | RESPIRATION RATE: 16 BRPM | HEIGHT: 75 IN | HEART RATE: 79 BPM | SYSTOLIC BLOOD PRESSURE: 131 MMHG

## 2023-02-03 DIAGNOSIS — I25.10 ASCVD (ARTERIOSCLEROTIC CARDIOVASCULAR DISEASE): ICD-10-CM

## 2023-02-03 DIAGNOSIS — Z95.2 H/O MITRAL VALVE REPLACEMENT: ICD-10-CM

## 2023-02-03 DIAGNOSIS — I48.0 PAROXYSMAL ATRIAL FIBRILLATION: ICD-10-CM

## 2023-02-03 DIAGNOSIS — I50.22 CHRONIC HFREF (HEART FAILURE WITH REDUCED EJECTION FRACTION): ICD-10-CM

## 2023-02-03 DIAGNOSIS — I42.0 CARDIOMYOPATHY, DILATED: ICD-10-CM

## 2023-02-03 DIAGNOSIS — E78.5 HYPERLIPIDEMIA LDL GOAL <70: Primary | ICD-10-CM

## 2023-02-03 PROCEDURE — 99214 OFFICE O/P EST MOD 30 MIN: CPT | Performed by: NURSE PRACTITIONER

## 2023-02-03 RX ORDER — ASCORBIC ACID 500 MG
500 TABLET ORAL DAILY
COMMUNITY

## 2023-02-03 NOTE — PROGRESS NOTES
[facility]             KeyanaSREEKANTH Schneider Bruce R, MD Dave E Garr  1955 02/03/2023    Patient Active Problem List   Diagnosis   • Obesity (BMI 30-39.9)   • Chronic HFrEF (heart failure with reduced ejection fraction) (HCC)   • Coronary artery disease involving native coronary artery of native heart with angina pectoris (HCC)   • Stroke (cerebrum) (HCC)   • Essential hypertension   • Hyperlipidemia LDL goal <70   • Diastolic CHF due to valvular disease (HCC)   • Paroxysmal atrial fibrillation (HCC)   • Debility   • Respiratory failure with hypoxia and hypercapnia (HCC)   • COPD exacerbation (HCC)   • CKD (chronic kidney disease) stage 3, GFR 30-59 ml/min (HCC)   • Gastrointestinal hemorrhage with melena   • ASCVD (arteriosclerotic cardiovascular disease)   • H/O mitral valve replacement   • Cardiomyopathy, dilated (HCC)       Spencer Kilpatrick MD:    Subjective     Chief Complaint   Patient presents with   • Follow-up     Valve replac 09/2022   • Med Management     presented       HPI:     This is a 67 y.o. male with known past medical history of coronary artery disease with PCI to LAD on 11/8/2019, is hemic cardiomyopathy, moderate to severe mitral valve regurgitation status post mitral valve replacement in September 2022 with 33 mm Magna Ease tissue mitral valve, paroxysmal atrial fibrillation, hyperlipidemia, COPD, and hypertension.    Damián E Louis presents today for routine cardiology follow up.  [facility] for which he underwent mitral valve replacement with a 33 mm Magna Ease tissue mitral valve and left atrial appendage ligation with 35 mm atricure clip by Dr. Chan.  He was noted to have some postop atrial fibrillation/flutter and underwent successful cardioversion and was started on amiodarone 200 mg.  Eliquis was discontinued during that admission due to acute anemia/GI bleeding status post  ANGELICA L with no need to restart anticoagulation.  He did go to rehab for quite some time after that admission and was discharged home.  He then presented back to [facility] where he was admitted in September 2022 for acute hypoxic hypercapnic respiratory failure with persistent atrial fibrillation and acute metabolic encephalopathy secondary to hypercapnia.  He has recently been following with heart failure clinic and has been overall stable.    Patient states he has been doing overall well since his last visit.  Occasionally has some sharp pains on the left and right side of his chest that feels like needle pricks however they are overall stable.  Breathing has been good.  Denies any chest pain or lower extremity edema.  Blood pressure has been controlled.  Reports compliance with medications.  Recent lab work showed LDL of 92.  Recent CMP showed normal kidney function.    • Diagnostic Testing  1. Echocardiogram 10/2021: EF 56 to 60% with moderate mitral valve regurgitation  2. Echocardiogram 6/2022: EF 36 to 40% with severe pulmonary hypertension and moderate mitral valve regurgitation  3. Left heart catheterization 6/2022: 30 to 40% stenosis in the proximal mid and distal LAD with distal stent with a 40 to 50% stenosis unchanged from previous cath, 30 to 40% diffuse stenosis in the proximal mid and distal RCA with 3-4+ MR  4. KENNEY 6/2022: EF 56 to 60% with moderate mitral valve prolapse, moderate to severe mitral valve regurgitation, moderate tricuspid valve regurgitation and moderate to severe pulmonary hypertension  5. KENNEY 9/2022: EF 45 to 50%  6. 9/2/2022 meidan sternotomy mitral valve replacement with 33 mm Magna Ease tissue mitral valve with left atrial appendage ligation by Dr. Chan       All other systems were reviewed and were negative.    Patient Active Problem List   Diagnosis   • Obesity (BMI 30-39.9)   • Chronic HFrEF (heart failure with reduced ejection fraction) (HCC)   • Coronary artery  disease involving native coronary artery of native heart with angina pectoris (HCC)   • Stroke (cerebrum) (HCC)   • Essential hypertension   • Hyperlipidemia LDL goal <70   • Diastolic CHF due to valvular disease (HCC)   • Paroxysmal atrial fibrillation (HCC)   • Debility   • Respiratory failure with hypoxia and hypercapnia (HCC)   • COPD exacerbation (HCC)   • CKD (chronic kidney disease) stage 3, GFR 30-59 ml/min (HCC)   • Gastrointestinal hemorrhage with melena   • ASCVD (arteriosclerotic cardiovascular disease)   • H/O mitral valve replacement   • Cardiomyopathy, dilated (HCC)       family history includes Diabetes type I in his son; Heart disease in his maternal grandfather; Heart valve disorder in his brother, maternal grandfather, and mother.     reports that he has never smoked. He has never used smokeless tobacco. He reports that he does not drink alcohol and does not use drugs.    Allergies   Allergen Reactions   • Other Unknown - High Severity     Pt had a reaction to anesthesia when placing stents      • Percocet [Oxycodone-Acetaminophen] Other (See Comments)     Reports they gave him too much and he had respiratory depression   Reports he has tolerated since then with no issues.          Current Outpatient Medications:   •  aspirin 81 MG chewable tablet, Chew 1 tablet 2 (Two) Times a Day., Disp: , Rfl:   •  furosemide (LASIX) 40 MG tablet, Take 40 mg by mouth As Needed., Disp: , Rfl:   •  metoprolol tartrate (LOPRESSOR) 25 MG tablet, Take 12.5 mg by mouth 2 (Two) Times a Day., Disp: , Rfl:   •  ascorbic acid (VITAMIN C) 500 MG tablet, Take 500 mg by mouth Daily., Disp: , Rfl:   No current facility-administered medications for this visit.    Facility-Administered Medications Ordered in Other Visits:   •  Chlorhexidine Gluconate Cloth 2 % pads 1 application, 1 application, Topical, Q12H PRN, Fernando, Magaly, APRN      Physical Exam:  I have reviewed the patient's current vital signs as documented in the  patient's EMR.   Vitals:    02/03/23 0836   BP: 131/85   Pulse: 79   Resp: 16   SpO2: 97%     Body mass index is 31.52 kg/m².       02/03/23  0836   Weight: 114 kg (252 lb 3.2 oz)      Physical Exam  Constitutional:       General: He is not in acute distress.     Appearance: Normal appearance. He is well-developed.   HENT:      Head: Normocephalic and atraumatic.      Mouth/Throat:      Mouth: Mucous membranes are moist.   Eyes:      Extraocular Movements: Extraocular movements intact.      Pupils: Pupils are equal, round, and reactive to light.   Neck:      Vascular: No JVD.   Cardiovascular:      Rate and Rhythm: Normal rate and regular rhythm.      Heart sounds: Normal heart sounds. No murmur heard.    No S3 or S4 sounds.   Pulmonary:      Effort: Pulmonary effort is normal. No respiratory distress.      Breath sounds: Normal breath sounds. No wheezing.   Abdominal:      General: Bowel sounds are normal. There is no distension.      Palpations: Abdomen is soft. There is no hepatomegaly.      Tenderness: There is no abdominal tenderness.   Musculoskeletal:         General: Normal range of motion.      Cervical back: Normal range of motion and neck supple.   Skin:     General: Skin is warm and dry.      Coloration: Skin is not jaundiced or pale.   Neurological:      General: No focal deficit present.      Mental Status: He is alert and oriented to person, place, and time. Mental status is at baseline.   Psychiatric:         Mood and Affect: Mood normal.         Behavior: Behavior normal.         Thought Content: Thought content normal.         Judgment: Judgment normal.            DATA REVIEWED:     TTE/KENNEY:  Results for orders placed during the hospital encounter of 09/19/22    Adult Transesophageal Echo (KENNEY) W/ Cont if Necessary Per Protocol    Interpretation Summary  · Normal left ventricular cavity size and wall thickness noted. All left ventricular wall segments contract normally.  · Left ventricular ejection  fraction appears to be 51 - 55%.  · There is (grade 1), layered plaque in the descending aorta present.  · Left atrial appendage ligation noted.  · The aortic valve is structurally normal with no regurgitation or stenosis present.  · There is a bioprosthetic mitral valve present. The prosthetic mitral valve is grossly normal.  · Mild mitral valve regurgitation is present. Mild paravalvular regurgitation noted along the lateral aspect No significant mitral valve stenosis is present.  · There is no evidence of pericardial effusion.      Laboratory evaluations:    Lab Results   Component Value Date    GLUCOSE 121 (H) 01/26/2023    BUN 16 01/26/2023    CREATININE 1.24 01/26/2023    EGFRIFNONA 87 10/29/2021    BCR 12.9 01/26/2023    K 4.6 01/26/2023    CO2 31.0 (H) 01/26/2023    CALCIUM 9.0 01/26/2023    ALBUMIN 3.06 (L) 09/21/2022    AST 40 09/21/2022    ALT 27 09/21/2022     Lab Results   Component Value Date    WBC 8.52 09/22/2022    HGB 8.8 (L) 09/22/2022    HCT 30.7 (L) 09/22/2022    .0 (H) 09/22/2022     09/22/2022     Lab Results   Component Value Date    CHOL 83 09/20/2022    TRIG 85 06/25/2022    HDL 36 (L) 06/25/2022     (H) 06/25/2022     Lab Results   Component Value Date    TSH 8.280 (H) 09/19/2022     Lab Results   Component Value Date    HGBA1C 5.10 09/20/2022     Lab Results   Component Value Date    ALT 27 09/21/2022     Lab Results   Component Value Date    HGBA1C 5.10 09/20/2022    HGBA1C 6.30 (H) 08/30/2022    HGBA1C 6.00 (H) 06/25/2022     Lab Results   Component Value Date    CREATININE 1.24 01/26/2023     Lab Results   Component Value Date    FERRITIN 365.40 09/10/2022     Lab Results   Component Value Date    INR 1.10 09/19/2022    INR 1.12 09/03/2022    INR 1.44 (H) 09/02/2022    PROTIME 14.5 09/19/2022    PROTIME 14.3 09/03/2022    PROTIME 17.5 (H) 09/02/2022        Lab Results   Component Value Date    ABSOLUTELUNG 33 01/26/2023    ABSOLUTELUNG 41 (A) 09/27/2022     ABSOLUTELUNG 39 (A) 06/25/2022       --------------------------------------------------------------------------------------------------------------------------    ASSESSMENT/PLAN:      Diagnosis Plan   1. Hyperlipidemia LDL goal <70        2. Paroxysmal atrial fibrillation (HCC)        3. ASCVD (arteriosclerotic cardiovascular disease)        4. H/O mitral valve replacement        5. Chronic HFrEF (heart failure with reduced ejection fraction) ([facility])        6. Cardiomyopathy, dilated ([facility])            1. ASCVD  • Denies any chest pain.  Left heart catheterization in June 2022 showed nonobstructive coronary artery disease.  Continue with medical management with aspirin and metoprolol.  Patient is not interested in using statin or PCSK9.  ACE/ARB held in the past secondary to borderline hypotension and chronic kidney disease.    2. Paroxysmal atrial fibrillation  • Maintaining normal sinus rhythm.  Patient has now stopped amiodarone.  We will continue to monitor for any recurrence.  Off of anticoagulation due to patient having atrial appendage ligation.    3.  Mitral valve replacement  · Appears to be healing well.  Continue with follow-up as needed with CT surgery.    4.  Cardiomyopathy  5.  Chronic HFrEF  · Appears compensated from heart failure standpoint today.  KENNEY in September 2022 showed improved LV function with EF 51 to 55%.  Continue with metoprolol and Lasix as needed.  ACE/ARB held in the past secondary to borderline hypotension and chronic kidney disease.  · Follow-up with heart failure clinic as needed.    6.  Hyperlipidemia  · Recently panel showed LDL in the 90s.  Discussed with patient about stricter LDL goal given his history of coronary artery disease however he is reluctant to try any statins or PCSK9.  I did recommend PCSK9 however he is not interested at this time.      This document has been @Electronically signed by SREEKANTH Bhatia, 02/03/23, 8:30 AM EST.       Dictated Utilizing Dragon  Dictation: Part of this note may be an electronic transcription/translation of spoken language to printed text using the Dragon Dictation System.    Follow-up appointment and medication changes provided in hand delivered After Visit Summary as well as reviewed in the room.

## 2023-05-04 ENCOUNTER — OFFICE VISIT (OUTPATIENT)
Dept: CARDIOLOGY | Facility: CLINIC | Age: 68
End: 2023-05-04
Payer: MEDICARE

## 2023-05-04 VITALS
HEART RATE: 68 BPM | HEIGHT: 75 IN | DIASTOLIC BLOOD PRESSURE: 94 MMHG | SYSTOLIC BLOOD PRESSURE: 137 MMHG | BODY MASS INDEX: 32.08 KG/M2 | WEIGHT: 258 LBS | OXYGEN SATURATION: 92 %

## 2023-05-04 DIAGNOSIS — I10 ESSENTIAL HYPERTENSION: ICD-10-CM

## 2023-05-04 DIAGNOSIS — I25.119 CORONARY ARTERY DISEASE INVOLVING NATIVE CORONARY ARTERY OF NATIVE HEART WITH ANGINA PECTORIS: Primary | ICD-10-CM

## 2023-05-04 DIAGNOSIS — I25.10 ASCVD (ARTERIOSCLEROTIC CARDIOVASCULAR DISEASE): ICD-10-CM

## 2023-05-04 DIAGNOSIS — I48.0 PAROXYSMAL ATRIAL FIBRILLATION: ICD-10-CM

## 2023-05-04 NOTE — PROGRESS NOTES
Spencer Saeed MD  Damián Myers  1955 05/04/2023    Patient Active Problem List   Diagnosis   • Obesity (BMI 30-39.9)   • Chronic HFrEF (heart failure with reduced ejection fraction)   • Coronary artery disease involving native coronary artery of native heart with angina pectoris (HCC)   • Stroke (cerebrum)   • Essential hypertension   • Hyperlipidemia LDL goal <70   • Diastolic CHF due to valvular disease   • Paroxysmal atrial fibrillation   • Debility   • Respiratory failure with hypoxia and hypercapnia   • COPD exacerbation   • CKD (chronic kidney disease) stage 3, GFR 30-59 ml/min   • Gastrointestinal hemorrhage with melena   • ASCVD (arteriosclerotic cardiovascular disease)   • H/O mitral valve replacement   • Cardiomyopathy, dilated       Dear Spencer Saeed MD:    Subjective     History of Present Illness:    Chief Complaint   Patient presents with   • Follow-up     ROUTINE       Damián Myers is a pleasant 67 y.o. male with a past medical history significant for coronary artery disease with PCI to LAD on 11/8/2019, is ischemic cardiomyopathy with now improved LVEF, moderate to severe mitral valve regurgitation status post mitral valve replacement in September 2022 with 33 mm Magna Ease tissue mitral valve, paroxysmal atrial fibrillation, hyperlipidemia, COPD, and hypertension. Ed comes in today for routine cardiology follow up.     Patient denies any chest pain, shortness of breath, palpitations, dizziness, syncope or near syncope. He even tells me he has been able to push mow and weed eating without any issues.       Allergies   Allergen Reactions   • Other Unknown - High Severity     Pt had a reaction to anesthesia when placing stents      • Percocet [Oxycodone-Acetaminophen] Other (See Comments)     Reports they gave him too much and he had respiratory depression   Reports he has tolerated since then with no issues.    :      Current Outpatient Medications:   •  ascorbic acid (VITAMIN C) 500 MG tablet,  "Take 1 tablet by mouth Daily., Disp: , Rfl:   •  aspirin 81 MG chewable tablet, Chew 1 tablet 2 (Two) Times a Day., Disp: , Rfl:   •  furosemide (LASIX) 40 MG tablet, Take 1 tablet by mouth As Needed., Disp: , Rfl:   •  metoprolol tartrate (LOPRESSOR) 25 MG tablet, Take 12.5 mg by mouth 2 (Two) Times a Day., Disp: , Rfl:   No current facility-administered medications for this visit.    Facility-Administered Medications Ordered in Other Visits:   •  Chlorhexidine Gluconate Cloth 2 % pads 1 application, 1 application, Topical, Q12H PRN, Fernando, Magaly, APRN    The following portions of the patient's history were reviewed and updated as appropriate: allergies, current medications, past family history, past medical history, past social history, past surgical history and problem list.    Social History     Tobacco Use   • Smoking status: Never   • Smokeless tobacco: Never   Vaping Use   • Vaping Use: Never used   Substance Use Topics   • Alcohol use: No   • Drug use: No         Objective   Vitals:    05/04/23 0815   BP: 137/94   Pulse: 68   SpO2: 92%   Weight: 117 kg (258 lb)   Height: 190.5 cm (75\")     Body mass index is 32.25 kg/m².    Constitutional:       General: Not in acute distress.     Appearance: Healthy appearance. Well-developed and not in distress. Not diaphoretic.   Eyes:      Conjunctiva/sclera: Conjunctivae normal.      Pupils: Pupils are equal, round, and reactive to light.   HENT:      Head: Normocephalic and atraumatic.   Neck:      Vascular: No carotid bruit or JVD.   Pulmonary:      Effort: Pulmonary effort is normal. No respiratory distress.      Breath sounds: Normal breath sounds.   Cardiovascular:      Normal rate. Regular rhythm.   Skin:     General: Skin is cool.   Neurological:      Mental Status: Alert, oriented to person, place, and time and oriented to person, place and time.       Lab Results   Component Value Date     01/26/2023    K 4.6 01/26/2023     01/26/2023    CO2 31.0 " (H) 01/26/2023    BUN 16 01/26/2023    CREATININE 1.24 01/26/2023    GLUCOSE 121 (H) 01/26/2023    CALCIUM 9.0 01/26/2023    AST 40 09/21/2022    ALT 27 09/21/2022    ALKPHOS 83 09/21/2022     Lab Results   Component Value Date    CKTOTAL 38 10/29/2021     Lab Results   Component Value Date    WBC 8.52 09/22/2022    HGB 8.8 (L) 09/22/2022    HCT 30.7 (L) 09/22/2022     09/22/2022     Lab Results   Component Value Date    INR 1.10 09/19/2022    INR 1.12 09/03/2022    INR 1.44 (H) 09/02/2022     Lab Results   Component Value Date    MG 2.0 01/26/2023     Lab Results   Component Value Date    TSH 8.280 (H) 09/19/2022    PSA 0.433 11/06/2019    TRIG 85 06/25/2022    HDL 36 (L) 06/25/2022     (H) 06/25/2022      No results found for: BNP    During this visit the following were done:  Labs Reviewed []    Labs Ordered []    Radiology Reports Reviewed []    Radiology Ordered []    PCP Records Reviewed []    Referring Provider Records Reviewed []    ER Records Reviewed []    Hospital Records Reviewed []    History Obtained From Family []    Radiology Images Reviewed []    Other Reviewed []    Records Requested []         ECG 12 Lead    Date/Time: 5/4/2023 8:21 AM  Performed by: Yasmany Leos PA-C  Authorized by: Yasmany Leos PA-C   Comparison: compared with previous ECG   Similar to previous ECG  Rhythm: sinus rhythm  Conduction: conduction normal  ST Segments: ST segments normal    Clinical impression: normal ECG            Assessment & Plan    Diagnosis Plan   1. Coronary artery disease involving native coronary artery of native heart with angina pectoris (HCC)        2. Essential hypertension        3. Paroxysmal atrial fibrillation        4. ASCVD (arteriosclerotic cardiovascular disease)                 Recommendations:  1. CAD  a. No recent anginal symptoms, able to perform rigorous activities without any issues.   b. I will continue aspirin and metoprolol.   c. He has chronically refused all  types of lipid lowering agents.     Return in about 6 months (around 11/4/2023).    As always, I appreciate very much the opportunity to participate in the cardiovascular care of your patients.      With Best Regards,    Yasmany Leos PA-C

## 2023-06-05 ENCOUNTER — TRANSCRIBE ORDERS (OUTPATIENT)
Dept: ADMINISTRATIVE | Facility: HOSPITAL | Age: 68
End: 2023-06-05
Payer: MEDICARE

## 2023-06-05 DIAGNOSIS — I25.5 ISCHEMIC CARDIOMYOPATHY: ICD-10-CM

## 2023-06-05 DIAGNOSIS — R06.09 DYSPNEA ON EXERTION: Primary | ICD-10-CM

## 2023-06-12 ENCOUNTER — HOSPITAL ENCOUNTER (OUTPATIENT)
Dept: GENERAL RADIOLOGY | Facility: HOSPITAL | Age: 68
Discharge: HOME | End: 2023-06-12
Payer: MEDICARE

## 2023-06-12 ENCOUNTER — TELEPHONE (OUTPATIENT)
Dept: CARDIOLOGY | Facility: CLINIC | Age: 68
End: 2023-06-12
Payer: MEDICARE

## 2023-06-12 ENCOUNTER — HOSPITAL ENCOUNTER (OUTPATIENT)
Dept: CARDIOLOGY | Facility: HOSPITAL | Age: 68
Discharge: HOME | End: 2023-06-12
Payer: MEDICARE

## 2023-06-12 ENCOUNTER — OFFICE VISIT (OUTPATIENT)
Dept: CARDIOLOGY | Facility: CLINIC | Age: 68
End: 2023-06-12
Payer: MEDICARE

## 2023-06-12 VITALS
OXYGEN SATURATION: 93 % | WEIGHT: 260.6 LBS | HEART RATE: 67 BPM | BODY MASS INDEX: 32.4 KG/M2 | HEIGHT: 75 IN | SYSTOLIC BLOOD PRESSURE: 132 MMHG | DIASTOLIC BLOOD PRESSURE: 86 MMHG

## 2023-06-12 DIAGNOSIS — I50.22 CHRONIC HFREF (HEART FAILURE WITH REDUCED EJECTION FRACTION): Primary | ICD-10-CM

## 2023-06-12 DIAGNOSIS — I25.5 ISCHEMIC CARDIOMYOPATHY: ICD-10-CM

## 2023-06-12 DIAGNOSIS — I50.22 CHRONIC HFREF (HEART FAILURE WITH REDUCED EJECTION FRACTION): ICD-10-CM

## 2023-06-12 DIAGNOSIS — Z95.2 HEART VALVE REPLACED BY TRANSPLANT: ICD-10-CM

## 2023-06-12 LAB
BH CV ECHO MEAS - ACS: 2.1 CM
BH CV ECHO MEAS - AO MAX PG: 2.9 MMHG
BH CV ECHO MEAS - AO MEAN PG: 2 MMHG
BH CV ECHO MEAS - AO ROOT DIAM: 4.1 CM
BH CV ECHO MEAS - AO V2 MAX: 84.6 CM/SEC
BH CV ECHO MEAS - AO V2 VTI: 17.3 CM
BH CV ECHO MEAS - EDV(CUBED): 175.6 ML
BH CV ECHO MEAS - EDV(MOD-SP4): 149 ML
BH CV ECHO MEAS - EF(MOD-SP4): 42.7 %
BH CV ECHO MEAS - ESV(CUBED): 103.8 ML
BH CV ECHO MEAS - ESV(MOD-SP4): 85.4 ML
BH CV ECHO MEAS - FS: 16.1 %
BH CV ECHO MEAS - IVS/LVPW: 1 CM
BH CV ECHO MEAS - IVSD: 1.5 CM
BH CV ECHO MEAS - LA DIMENSION: 5.4 CM
BH CV ECHO MEAS - LAT PEAK E' VEL: 7.7 CM/SEC
BH CV ECHO MEAS - LV DIASTOLIC VOL/BSA (35-75): 60.9 CM2
BH CV ECHO MEAS - LV MASS(C)D: 383.7 GRAMS
BH CV ECHO MEAS - LV SYSTOLIC VOL/BSA (12-30): 34.9 CM2
BH CV ECHO MEAS - LVIDD: 5.6 CM
BH CV ECHO MEAS - LVIDS: 4.7 CM
BH CV ECHO MEAS - LVOT AREA: 3.5 CM2
BH CV ECHO MEAS - LVOT DIAM: 2.1 CM
BH CV ECHO MEAS - LVPWD: 1.5 CM
BH CV ECHO MEAS - MED PEAK E' VEL: 3.3 CM/SEC
BH CV ECHO MEAS - MV A MAX VEL: 80.1 CM/SEC
BH CV ECHO MEAS - MV DEC SLOPE: 502 CM/SEC2
BH CV ECHO MEAS - MV E MAX VEL: 147 CM/SEC
BH CV ECHO MEAS - MV E/A: 1.84
BH CV ECHO MEAS - MV P1/2T: 109.1 MSEC
BH CV ECHO MEAS - MVA(P1/2T): 2.02 CM2
BH CV ECHO MEAS - PA ACC TIME: 0.08 SEC
BH CV ECHO MEAS - RAP SYSTOLE: 10 MMHG
BH CV ECHO MEAS - RVSP: 41.6 MMHG
BH CV ECHO MEAS - SI(MOD-SP4): 26 ML/M2
BH CV ECHO MEAS - SV(MOD-SP4): 63.6 ML
BH CV ECHO MEAS - TAPSE (>1.6): 1.16 CM
BH CV ECHO MEAS - TR MAX PG: 31.6 MMHG
BH CV ECHO MEAS - TR MAX VEL: 280.5 CM/SEC
BH CV ECHO MEASUREMENTS AVERAGE E/E' RATIO: 26.73
LEFT ATRIUM VOLUME INDEX: 51 ML/M2
MAXIMAL PREDICTED HEART RATE: 153 BPM
STRESS TARGET HR: 130 BPM

## 2023-06-12 PROCEDURE — 99214 OFFICE O/P EST MOD 30 MIN: CPT | Performed by: PHYSICIAN ASSISTANT

## 2023-06-12 PROCEDURE — 1160F RVW MEDS BY RX/DR IN RCRD: CPT | Performed by: PHYSICIAN ASSISTANT

## 2023-06-12 PROCEDURE — 93306 TTE W/DOPPLER COMPLETE: CPT

## 2023-06-12 PROCEDURE — 3075F SYST BP GE 130 - 139MM HG: CPT | Performed by: PHYSICIAN ASSISTANT

## 2023-06-12 PROCEDURE — 71046 X-RAY EXAM CHEST 2 VIEWS: CPT

## 2023-06-12 PROCEDURE — 1159F MED LIST DOCD IN RCRD: CPT | Performed by: PHYSICIAN ASSISTANT

## 2023-06-12 PROCEDURE — 3079F DIAST BP 80-89 MM HG: CPT | Performed by: PHYSICIAN ASSISTANT

## 2023-06-12 PROCEDURE — 71046 X-RAY EXAM CHEST 2 VIEWS: CPT | Performed by: RADIOLOGY

## 2023-06-12 RX ORDER — PANTOPRAZOLE SODIUM 40 MG/1
40 TABLET, DELAYED RELEASE ORAL
COMMUNITY
Start: 2023-06-02

## 2023-06-12 RX ORDER — METOPROLOL SUCCINATE 25 MG/1
25 TABLET, EXTENDED RELEASE ORAL DAILY
Qty: 30 TABLET | Refills: 2 | Status: SHIPPED | OUTPATIENT
Start: 2023-06-12

## 2023-06-12 NOTE — PROGRESS NOTES
Spencer Saeed MD  Damián Myers  1955 06/12/2023    Patient Active Problem List   Diagnosis    Obesity (BMI 30-39.9)    Chronic HFrEF (heart failure with reduced ejection fraction)    Coronary artery disease involving native coronary artery of native heart with angina pectoris    Stroke (cerebrum)    Essential hypertension    Hyperlipidemia LDL goal <70    Diastolic CHF due to valvular disease    Paroxysmal atrial fibrillation    Debility    Respiratory failure with hypoxia and hypercapnia    COPD exacerbation    CKD (chronic kidney disease) stage 3, GFR 30-59 ml/min    Gastrointestinal hemorrhage with melena    ASCVD (arteriosclerotic cardiovascular disease)    H/O mitral valve replacement    Cardiomyopathy, dilated       Dear Spencer Saeed MD:    Subjective     History of Present Illness:    Chief Complaint   Patient presents with    Follow-up     PCP WANTED HIM SEEN SOONER DUE TO SOB    Shortness of Breath     WORSEENING       Damián Myers is a pleasant 67 y.o. male with a past medical history significant for ronary artery disease with PCI to LAD on 11/8/2019, is ischemic cardiomyopathy with now improved LVEF, moderate to severe mitral valve regurgitation status post mitral valve replacement in September 2022 with 33 mm Magna Ease tissue mitral valve, paroxysmal atrial fibrillation with ANGELICA ligation, hyperlipidemia, COPD, and hypertension. Ed comes in today for routine cardiology follow up.       Ed reports in the last couple weeks he has had markedly worsening dyspnea.  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  He also reports he has had to sleep in an upright position and in a recliner in order to sleep due to shortness of breath.  His current weight is 260 pounds and he reports his dry weight is 248 although on May 4 he was 258.    Allergies   Allergen Reactions    Other Unknown - High Severity     Pt had a reaction  "to anesthesia when placing stents       Percocet [Oxycodone-Acetaminophen] Other (See Comments)     Reports they gave him too much and he had respiratory depression   Reports he has tolerated since then with no issues.    :      Current Outpatient Medications:     ascorbic acid (VITAMIN C) 500 MG tablet, Take 1 tablet by mouth Daily., Disp: , Rfl:     aspirin 81 MG chewable tablet, Chew 1 tablet 2 (Two) Times a Day., Disp: , Rfl:     furosemide (LASIX) 40 MG tablet, Take 1 tablet by mouth As Needed., Disp: , Rfl:     metoprolol succinate XL (TOPROL-XL) 25 MG 24 hr tablet, Take 1 tablet by mouth Daily., Disp: 30 tablet, Rfl: 2    pantoprazole (PROTONIX) 40 MG EC tablet, Take 1 tablet by mouth every night at bedtime. (Patient not taking: Reported on 6/12/2023), Disp: , Rfl:   No current facility-administered medications for this visit.    Facility-Administered Medications Ordered in Other Visits:     Chlorhexidine Gluconate Cloth 2 % pads 1 application, 1 application, Topical, Q12H PRN, Fernando, Magaly, SREEKANTH    The following portions of the patient's history were reviewed and updated as appropriate: allergies, current medications, past family history, past medical history, past social history, past surgical history and problem list.    Social History     Tobacco Use    Smoking status: Never    Smokeless tobacco: Never   Vaping Use    Vaping Use: Never used   Substance Use Topics    Alcohol use: No    Drug use: No         Objective   Vitals:    06/12/23 1005   BP: 132/86   Pulse: 67   SpO2: 93%   Weight: 118 kg (260 lb 9.6 oz)   Height: 190.5 cm (75\")     Body mass index is 32.57 kg/m².    Constitutional:       General: Not in acute distress.     Appearance: Healthy appearance. Well-developed and not in distress. Not diaphoretic.   Eyes:      Conjunctiva/sclera: Conjunctivae normal.      Pupils: Pupils are equal, round, and reactive to light.   HENT:      Head: Normocephalic and atraumatic.   Neck:      Vascular: No " carotid bruit or JVD.   Pulmonary:      Effort: Pulmonary effort is normal. No respiratory distress.      Breath sounds: Normal breath sounds.      Comments: Diminished air entry at the bases  Cardiovascular:      Normal rate. Regular rhythm.   Edema:     Pretibial: bilateral 2+ edema of the pretibial area.     Ankle: bilateral 2+ edema of the ankle.     Feet: bilateral 2+ edema of the feet.  Skin:     General: Skin is cool.   Neurological:      Mental Status: Alert, oriented to person, place, and time and oriented to person, place and time.       Lab Results   Component Value Date     01/26/2023    K 4.6 01/26/2023     01/26/2023    CO2 31.0 (H) 01/26/2023    BUN 16 01/26/2023    CREATININE 1.24 01/26/2023    GLUCOSE 121 (H) 01/26/2023    CALCIUM 9.0 01/26/2023    AST 40 09/21/2022    ALT 27 09/21/2022    ALKPHOS 83 09/21/2022     Lab Results   Component Value Date    CKTOTAL 38 10/29/2021     Lab Results   Component Value Date    WBC 8.52 09/22/2022    HGB 8.8 (L) 09/22/2022    HCT 30.7 (L) 09/22/2022     09/22/2022     Lab Results   Component Value Date    INR 1.10 09/19/2022    INR 1.12 09/03/2022    INR 1.44 (H) 09/02/2022     Lab Results   Component Value Date    MG 2.0 01/26/2023     Lab Results   Component Value Date    TSH 8.280 (H) 09/19/2022    PSA 0.433 11/06/2019    TRIG 85 06/25/2022    HDL 36 (L) 06/25/2022     (H) 06/25/2022      No results found for: BNP    During this visit the following were done:  Labs Reviewed []    Labs Ordered []    Radiology Reports Reviewed []    Radiology Ordered []    PCP Records Reviewed []    Referring Provider Records Reviewed []    ER Records Reviewed []    Hospital Records Reviewed []    History Obtained From Family []    Radiology Images Reviewed []    Other Reviewed []    Records Requested []       Procedures    Assessment & Plan    Diagnosis Plan   1. Chronic HFrEF (heart failure with reduced ejection fraction)  XR Chest 2 View    BNP                Recommendations:  Acute on chronic HFrEF   Official report not yet dictated by Dr. Amador however I did have Dr. Amador review his echocardiogram who did read his LVEF at 40 to 45% which is a drop.  I discussed the case with Dr. Amador who thinks drop in LVEF is secondary to mitral valve replacement due to loss chordae tendineae.   I did ask him to increase lasix to 40 mg daily, currently only takes 20 mg PRN.   I will order CXR and BNP.   Will request labs from pcp.     Addendum: CXR has been performed which did show acute mild CHF with pulmonary congestion.  I called and informed Ed myself.  I asked him to continue Lasix 40 mg at least for 5 days.    Return in about 2 weeks (around 6/26/2023).    As always, I appreciate very much the opportunity to participate in the cardiovascular care of your patients.      With Best Regards,    Yasmany Leos PA-C

## 2023-07-03 ENCOUNTER — TELEPHONE (OUTPATIENT)
Dept: CARDIOLOGY | Facility: CLINIC | Age: 68
End: 2023-07-03

## 2023-07-03 NOTE — TELEPHONE ENCOUNTER
Patient called was given instruction for increase in water intake. He states is taking Bumex 0.5mg and was advised to continue this dose and to repeat lab in 1 week. Patient verb understanding but was inquiring and concerned about carbon dioxide level with hx of past hosp stay for this.

## 2023-07-19 ENCOUNTER — APPOINTMENT (OUTPATIENT)
Dept: GENERAL RADIOLOGY | Facility: HOSPITAL | Age: 68
DRG: 291 | End: 2023-07-19
Payer: MEDICARE

## 2023-07-19 ENCOUNTER — HOSPITAL ENCOUNTER (INPATIENT)
Facility: HOSPITAL | Age: 68
LOS: 3 days | Discharge: HOME | DRG: 291 | End: 2023-07-22
Attending: EMERGENCY MEDICINE | Admitting: STUDENT IN AN ORGANIZED HEALTH CARE EDUCATION/TRAINING PROGRAM
Payer: MEDICARE

## 2023-07-19 ENCOUNTER — APPOINTMENT (OUTPATIENT)
Dept: CT IMAGING | Facility: HOSPITAL | Age: 68
DRG: 291 | End: 2023-07-19
Payer: MEDICARE

## 2023-07-19 DIAGNOSIS — I50.9 ACUTE ON CHRONIC CONGESTIVE HEART FAILURE, UNSPECIFIED HEART FAILURE TYPE: Primary | ICD-10-CM

## 2023-07-19 PROBLEM — I50.40 HEART FAILURE WITH REDUCED EJECTION FRACTION AND DIASTOLIC DYSFUNCTION: Status: ACTIVE | Noted: 2023-07-19

## 2023-07-19 LAB
A-A DO2: 24.3 MMHG (ref 0–300)
ALBUMIN SERPL-MCNC: 3.7 G/DL (ref 3.5–5.2)
ALBUMIN/GLOB SERPL: 1.3 G/DL
ALP SERPL-CCNC: 66 U/L (ref 39–117)
ALT SERPL W P-5'-P-CCNC: 19 U/L (ref 1–41)
ANION GAP SERPL CALCULATED.3IONS-SCNC: 7.8 MMOL/L (ref 5–15)
APTT PPP: 24.2 SECONDS (ref 26.5–34.5)
ARTERIAL PATENCY WRIST A: POSITIVE
AST SERPL-CCNC: 36 U/L (ref 1–40)
ATMOSPHERIC PRESS: 730 MMHG
BASE EXCESS BLDA CALC-SCNC: 8.3 MMOL/L (ref 0–2)
BASOPHILS # BLD AUTO: 0.04 10*3/MM3 (ref 0–0.2)
BASOPHILS NFR BLD AUTO: 0.7 % (ref 0–1.5)
BDY SITE: ABNORMAL
BILIRUB SERPL-MCNC: 0.5 MG/DL (ref 0–1.2)
BUN SERPL-MCNC: 19 MG/DL (ref 8–23)
BUN/CREAT SERPL: 14.1 (ref 7–25)
CALCIUM SPEC-SCNC: 9 MG/DL (ref 8.6–10.5)
CHLORIDE SERPL-SCNC: 102 MMOL/L (ref 98–107)
CK SERPL-CCNC: 72 U/L (ref 20–200)
CO2 BLDA-SCNC: 38.6 MMOL/L (ref 22–33)
CO2 SERPL-SCNC: 33.2 MMOL/L (ref 22–29)
COHGB MFR BLD: 1.8 % (ref 0–5)
CREAT SERPL-MCNC: 1.35 MG/DL (ref 0.76–1.27)
CRP SERPL-MCNC: 0.3 MG/DL (ref 0–0.5)
DEPRECATED RDW RBC AUTO: 49.1 FL (ref 37–54)
EGFRCR SERPLBLD CKD-EPI 2021: 57.5 ML/MIN/1.73
EOSINOPHIL # BLD AUTO: 0.1 10*3/MM3 (ref 0–0.4)
EOSINOPHIL NFR BLD AUTO: 1.7 % (ref 0.3–6.2)
ERYTHROCYTE [DISTWIDTH] IN BLOOD BY AUTOMATED COUNT: 13.3 % (ref 12.3–15.4)
ERYTHROCYTE [SEDIMENTATION RATE] IN BLOOD: 4 MM/HR (ref 0–20)
FLUAV RNA RESP QL NAA+PROBE: NOT DETECTED
FLUBV RNA ISLT QL NAA+PROBE: NOT DETECTED
GEN 5 2HR TROPONIN T REFLEX: 45 NG/L
GLOBULIN UR ELPH-MCNC: 2.9 GM/DL
GLUCOSE SERPL-MCNC: 96 MG/DL (ref 65–99)
HCO3 BLDA-SCNC: 36.6 MMOL/L (ref 20–26)
HCT VFR BLD AUTO: 56 % (ref 37.5–51)
HCT VFR BLD CALC: 50.7 % (ref 38–51)
HGB BLD-MCNC: 16.2 G/DL (ref 13–17.7)
HGB BLDA-MCNC: 16.5 G/DL (ref 14–18)
HYPOCHROMIA BLD QL: NORMAL
IMM GRANULOCYTES # BLD AUTO: 0.01 10*3/MM3 (ref 0–0.05)
IMM GRANULOCYTES NFR BLD AUTO: 0.2 % (ref 0–0.5)
INHALED O2 CONCENTRATION: 21 %
INR PPP: 0.97 (ref 0.9–1.1)
LYMPHOCYTES # BLD AUTO: 1.43 10*3/MM3 (ref 0.7–3.1)
LYMPHOCYTES NFR BLD AUTO: 23.8 % (ref 19.6–45.3)
Lab: ABNORMAL
Lab: ABNORMAL
MACROCYTES BLD QL SMEAR: NORMAL
MAGNESIUM SERPL-MCNC: 2.2 MG/DL (ref 1.6–2.4)
MCH RBC QN AUTO: 28.9 PG (ref 26.6–33)
MCHC RBC AUTO-ENTMCNC: 28.9 G/DL (ref 31.5–35.7)
MCV RBC AUTO: 100 FL (ref 79–97)
METHGB BLD QL: 0.1 % (ref 0–3)
MODALITY: ABNORMAL
MONOCYTES # BLD AUTO: 0.76 10*3/MM3 (ref 0.1–0.9)
MONOCYTES NFR BLD AUTO: 12.6 % (ref 5–12)
NEUTROPHILS NFR BLD AUTO: 3.67 10*3/MM3 (ref 1.7–7)
NEUTROPHILS NFR BLD AUTO: 61 % (ref 42.7–76)
NOTE: ABNORMAL
NOTIFIED BY: ABNORMAL
NOTIFIED WHO: ABNORMAL
NRBC BLD AUTO-RTO: 0 /100 WBC (ref 0–0.2)
NT-PROBNP SERPL-MCNC: 2395 PG/ML (ref 0–900)
OXYHGB MFR BLDV: 80.2 % (ref 94–99)
PCO2 BLDA: 63.9 MM HG (ref 35–45)
PCO2 TEMP ADJ BLD: ABNORMAL MM[HG]
PH BLDA: 7.37 PH UNITS (ref 7.35–7.45)
PH, TEMP CORRECTED: ABNORMAL
PLAT MORPH BLD: NORMAL
PLATELET # BLD AUTO: 112 10*3/MM3 (ref 140–450)
PMV BLD AUTO: 10.5 FL (ref 6–12)
PO2 BLDA: 47 MM HG (ref 83–108)
PO2 TEMP ADJ BLD: ABNORMAL MM[HG]
POTASSIUM SERPL-SCNC: 4.6 MMOL/L (ref 3.5–5.2)
PROT SERPL-MCNC: 6.6 G/DL (ref 6–8.5)
PROTHROMBIN TIME: 13.3 SECONDS (ref 12.1–14.7)
RBC # BLD AUTO: 5.6 10*6/MM3 (ref 4.14–5.8)
SAO2 % BLDCOA: 81.8 % (ref 94–99)
SARS-COV-2 RNA RESP QL NAA+PROBE: NOT DETECTED
SODIUM SERPL-SCNC: 143 MMOL/L (ref 136–145)
T4 FREE SERPL-MCNC: 1.06 NG/DL (ref 0.93–1.7)
TROPONIN T DELTA: -1 NG/L
TROPONIN T SERPL HS-MCNC: 46 NG/L
TSH SERPL DL<=0.05 MIU/L-ACNC: 3.76 UIU/ML (ref 0.27–4.2)
VENTILATOR MODE: ABNORMAL
WBC NRBC COR # BLD: 6.01 10*3/MM3 (ref 3.4–10.8)

## 2023-07-19 PROCEDURE — 84443 ASSAY THYROID STIM HORMONE: CPT | Performed by: EMERGENCY MEDICINE

## 2023-07-19 PROCEDURE — 84439 ASSAY OF FREE THYROXINE: CPT | Performed by: EMERGENCY MEDICINE

## 2023-07-19 PROCEDURE — 93005 ELECTROCARDIOGRAM TRACING: CPT | Performed by: EMERGENCY MEDICINE

## 2023-07-19 PROCEDURE — 82805 BLOOD GASES W/O2 SATURATION: CPT

## 2023-07-19 PROCEDURE — 83050 HGB METHEMOGLOBIN QUAN: CPT

## 2023-07-19 PROCEDURE — 71250 CT THORAX DX C-: CPT | Performed by: RADIOLOGY

## 2023-07-19 PROCEDURE — 83735 ASSAY OF MAGNESIUM: CPT | Performed by: EMERGENCY MEDICINE

## 2023-07-19 PROCEDURE — 25010000002 ENOXAPARIN PER 10 MG: Performed by: STUDENT IN AN ORGANIZED HEALTH CARE EDUCATION/TRAINING PROGRAM

## 2023-07-19 PROCEDURE — 82375 ASSAY CARBOXYHB QUANT: CPT

## 2023-07-19 PROCEDURE — 85610 PROTHROMBIN TIME: CPT | Performed by: EMERGENCY MEDICINE

## 2023-07-19 PROCEDURE — 71045 X-RAY EXAM CHEST 1 VIEW: CPT | Performed by: RADIOLOGY

## 2023-07-19 PROCEDURE — 99284 EMERGENCY DEPT VISIT MOD MDM: CPT

## 2023-07-19 PROCEDURE — 85652 RBC SED RATE AUTOMATED: CPT | Performed by: EMERGENCY MEDICINE

## 2023-07-19 PROCEDURE — 83880 ASSAY OF NATRIURETIC PEPTIDE: CPT | Performed by: EMERGENCY MEDICINE

## 2023-07-19 PROCEDURE — 86140 C-REACTIVE PROTEIN: CPT | Performed by: EMERGENCY MEDICINE

## 2023-07-19 PROCEDURE — 84484 ASSAY OF TROPONIN QUANT: CPT | Performed by: EMERGENCY MEDICINE

## 2023-07-19 PROCEDURE — 94799 UNLISTED PULMONARY SVC/PX: CPT

## 2023-07-19 PROCEDURE — 36600 WITHDRAWAL OF ARTERIAL BLOOD: CPT

## 2023-07-19 PROCEDURE — 85025 COMPLETE CBC W/AUTO DIFF WBC: CPT | Performed by: EMERGENCY MEDICINE

## 2023-07-19 PROCEDURE — 85730 THROMBOPLASTIN TIME PARTIAL: CPT | Performed by: EMERGENCY MEDICINE

## 2023-07-19 PROCEDURE — 71250 CT THORAX DX C-: CPT

## 2023-07-19 PROCEDURE — 82550 ASSAY OF CK (CPK): CPT | Performed by: EMERGENCY MEDICINE

## 2023-07-19 PROCEDURE — 87636 SARSCOV2 & INF A&B AMP PRB: CPT | Performed by: EMERGENCY MEDICINE

## 2023-07-19 PROCEDURE — 99223 1ST HOSP IP/OBS HIGH 75: CPT | Performed by: STUDENT IN AN ORGANIZED HEALTH CARE EDUCATION/TRAINING PROGRAM

## 2023-07-19 PROCEDURE — 80053 COMPREHEN METABOLIC PANEL: CPT | Performed by: EMERGENCY MEDICINE

## 2023-07-19 PROCEDURE — 71045 X-RAY EXAM CHEST 1 VIEW: CPT

## 2023-07-19 PROCEDURE — 36415 COLL VENOUS BLD VENIPUNCTURE: CPT

## 2023-07-19 PROCEDURE — 93010 ELECTROCARDIOGRAM REPORT: CPT | Performed by: INTERNAL MEDICINE

## 2023-07-19 PROCEDURE — 85007 BL SMEAR W/DIFF WBC COUNT: CPT | Performed by: EMERGENCY MEDICINE

## 2023-07-19 RX ORDER — POLYETHYLENE GLYCOL 3350 17 G/17G
17 POWDER, FOR SOLUTION ORAL DAILY PRN
Status: DISCONTINUED | OUTPATIENT
Start: 2023-07-19 | End: 2023-07-22 | Stop reason: HOSPADM

## 2023-07-19 RX ORDER — METOPROLOL SUCCINATE 25 MG/1
12.5 TABLET, EXTENDED RELEASE ORAL 2 TIMES DAILY
COMMUNITY
End: 2023-07-26 | Stop reason: SDUPTHER

## 2023-07-19 RX ORDER — ASCORBIC ACID 500 MG
500 TABLET ORAL DAILY
Status: DISCONTINUED | OUTPATIENT
Start: 2023-07-20 | End: 2023-07-22 | Stop reason: HOSPADM

## 2023-07-19 RX ORDER — NITROGLYCERIN 0.4 MG/1
0.4 TABLET SUBLINGUAL
Status: DISCONTINUED | OUTPATIENT
Start: 2023-07-19 | End: 2023-07-22 | Stop reason: HOSPADM

## 2023-07-19 RX ORDER — BISACODYL 10 MG
10 SUPPOSITORY, RECTAL RECTAL DAILY PRN
Status: DISCONTINUED | OUTPATIENT
Start: 2023-07-19 | End: 2023-07-22 | Stop reason: HOSPADM

## 2023-07-19 RX ORDER — SODIUM CHLORIDE 9 MG/ML
40 INJECTION, SOLUTION INTRAVENOUS AS NEEDED
Status: DISCONTINUED | OUTPATIENT
Start: 2023-07-19 | End: 2023-07-22 | Stop reason: HOSPADM

## 2023-07-19 RX ORDER — BUMETANIDE 0.25 MG/ML
1 INJECTION INTRAMUSCULAR; INTRAVENOUS ONCE
Status: COMPLETED | OUTPATIENT
Start: 2023-07-19 | End: 2023-07-19

## 2023-07-19 RX ORDER — SODIUM CHLORIDE 0.9 % (FLUSH) 0.9 %
10 SYRINGE (ML) INJECTION EVERY 12 HOURS SCHEDULED
Status: DISCONTINUED | OUTPATIENT
Start: 2023-07-19 | End: 2023-07-22 | Stop reason: HOSPADM

## 2023-07-19 RX ORDER — FUROSEMIDE 10 MG/ML
80 INJECTION INTRAMUSCULAR; INTRAVENOUS ONCE
Status: DISCONTINUED | OUTPATIENT
Start: 2023-07-19 | End: 2023-07-19

## 2023-07-19 RX ORDER — METOPROLOL SUCCINATE 25 MG/1
12.5 TABLET, EXTENDED RELEASE ORAL 2 TIMES DAILY
Status: DISCONTINUED | OUTPATIENT
Start: 2023-07-19 | End: 2023-07-22 | Stop reason: HOSPADM

## 2023-07-19 RX ORDER — BISACODYL 5 MG/1
5 TABLET, DELAYED RELEASE ORAL DAILY PRN
Status: DISCONTINUED | OUTPATIENT
Start: 2023-07-19 | End: 2023-07-22 | Stop reason: HOSPADM

## 2023-07-19 RX ORDER — SODIUM CHLORIDE 0.9 % (FLUSH) 0.9 %
10 SYRINGE (ML) INJECTION AS NEEDED
Status: DISCONTINUED | OUTPATIENT
Start: 2023-07-19 | End: 2023-07-22 | Stop reason: HOSPADM

## 2023-07-19 RX ORDER — LOSARTAN POTASSIUM 50 MG/1
50 TABLET ORAL DAILY
Status: DISCONTINUED | OUTPATIENT
Start: 2023-07-20 | End: 2023-07-22 | Stop reason: HOSPADM

## 2023-07-19 RX ORDER — AMOXICILLIN 250 MG
2 CAPSULE ORAL 2 TIMES DAILY
Status: DISCONTINUED | OUTPATIENT
Start: 2023-07-19 | End: 2023-07-22 | Stop reason: HOSPADM

## 2023-07-19 RX ORDER — ASPIRIN 81 MG/1
81 TABLET, CHEWABLE ORAL DAILY
Status: DISCONTINUED | OUTPATIENT
Start: 2023-07-20 | End: 2023-07-22 | Stop reason: HOSPADM

## 2023-07-19 RX ORDER — LOSARTAN POTASSIUM 50 MG/1
50 TABLET ORAL DAILY
COMMUNITY
End: 2023-07-26 | Stop reason: SDUPTHER

## 2023-07-19 RX ORDER — ENOXAPARIN SODIUM 100 MG/ML
40 INJECTION SUBCUTANEOUS DAILY
Status: DISCONTINUED | OUTPATIENT
Start: 2023-07-19 | End: 2023-07-22 | Stop reason: HOSPADM

## 2023-07-19 RX ORDER — BUMETANIDE 1 MG/1
0.5 TABLET ORAL DAILY
Status: CANCELLED | OUTPATIENT
Start: 2023-07-20

## 2023-07-19 RX ADMIN — METOPROLOL SUCCINATE 12.5 MG: 25 TABLET, EXTENDED RELEASE ORAL at 21:10

## 2023-07-19 RX ADMIN — ENOXAPARIN SODIUM 40 MG: 40 INJECTION SUBCUTANEOUS at 18:09

## 2023-07-19 RX ADMIN — Medication 10 ML: at 21:10

## 2023-07-19 RX ADMIN — BUMETANIDE 1 MG: 0.25 INJECTION, SOLUTION INTRAMUSCULAR; INTRAVENOUS at 18:09

## 2023-07-19 NOTE — H&P
[facility]    Patient Identification:  Name:  Damián Myers  Age:  67 y.o.  Sex:  male  :  1955  MRN:  [id number]   Visit Number:  [id number]  Admit Date: 2023   Room number:  3320/1P  Primary Care Physician:  Spencer Saeed MD    Date of Admission: 2023     Subjective     Chief complaint:    Chief Complaint   Patient presents with    Shortness of Breath     History of presenting illness:  67 y.o. male who was admitted on 2023 with progressive dyspnea and LE edema.    Damián Myers has relevant PMH of HFrEF, CAD, s/p mitral valve replacement w/bioprosthetic c/b post-op Afib s/p ANGELICA ligation due to AC intolerance (had GI bleed on eliquis), HTN, HLD presenting with progressive dyspnea.    Patient states that he was previously well controlled on lasix at unknown dose but urine output declined and LE edema worsened, transitioned to Bumex 0.5mg daily at most recent outpatient cardiology appointment but has not noted significant increase in UOP. Presented to PCP today with progressive dyspnea, LE edema, and persistent fatigue and activity intolerance over last 2 weeks prompting presentation to ED.    In ED he was found to have elevation bnp double baseline (1K -> 2K), troponin elevation without delta, and hypoxia on abg with paO2 47.0 and CT chest consistent with CHF. He was offered IV lasix but did not want diuresis until arriving to floor bed thus he was placed on 2L NC and admitted for further evaluation and management.       Prior to admission I discussed patient's presentation and management with attending ER physician and verbal handoff received.  ---------------------------------------------------------------------------------------------------------------------   A thorough systems based relevant ROS was asked and was negative except as noted  above.  ---------------------------------------------------------------------------------------------------------------------   Past Medical History:   Diagnosis Date    CAD (coronary artery disease)     Cardiac abnormality     CHF (congestive heart failure)     Coma of unknown cause 1984    Pt states he was in a coma for one week, unknown cause    COVID-19 10/25/2021    Debility 09/14/2022    Diastolic CHF due to valvular disease 06/25/2022    Echo (9/10/2022):LVEF = 55%.  33 mm Medtronic Magna Ease bioprosthetic valve present.  Mean gradient 8 mmHg.  Moderate 1-2 cm pericardial effusion.  No tamponade.  Left pleural effusion present.    History of stomach ulcers     Hypertension     Mitral regurgitation     Obesity (BMI 30-39.9) 11/04/2019    Paroxysmal atrial fibrillation 09/10/2022    Atrial fibrillation status post MVR [id number] ANGELICA ligated at time of surgery with 35 mm Atricure clip- KENNEY confirms occluded ANGELICA with no residual flow   Successful KENNEY/ECV 9/7, on amiodarone Was on Eliquis but discontinued on 9/10/2022 due to severe anemia Back into atrial fibrillation on 9/11/2022    Post-op pericardial effusion 09/12/2022    Echo for post-op hypotension (9/10/2022): Normal LVEF.  Moderate pericardial effusion.  No evidence of tamponade seen.    Severe mitral regurgitation status post bioprosthetic MVR, 9/2/2022 08/03/2022    Echo 6/26/22: EF 36-40%, global hypokinesis, mild bileaflet MVP with moderate MR; grade III diastolic dysfunction, severe PAH with RVSP 67mmHg KENNEY 6/28/22: LVEF 56-60%, MVP of posterior leaflet with mod-severe MR, modTR with RVSP 52mmHg MVR 9/2/22 Dr. Chan with 33 mm Magna Ease mitral valve  Echo (9/10/2022):LVEF = 55%.  33 mm Medtronic Magna Ease bioprosthetic valve present.  Mean gradient 8 mm    Spinal headache 1984     Past Surgical History:   Procedure Laterality Date    CARDIAC CATHETERIZATION N/A 11/08/2019    Procedure: Left Heart Cath;  Surgeon: Sherrell Hart MD;  Location:   [facility];  Service: Cardiology    CARDIAC CATHETERIZATION N/A 06/27/2022    Procedure: Left Heart Cath;  Surgeon: Greyson Balderas MD;  Location:  [facility];  Service: Cardiology;  Laterality: N/A;    COLONOSCOPY      CORONARY STENT PLACEMENT  2019    ENDOSCOPY N/A 9/12/2022    Procedure: ESOPHAGOGASTRODUODENOSCOPY;  Surgeon: Leann Fitzpatrick MD;  Location:  [facility];  Service: Gastroenterology;  Laterality: N/A;    MITRAL VALVE REPAIR/REPLACEMENT N/A 9/2/2022    Procedure: MEDIAN STERNOTOMY MITRAL VALVE REPLACEMENT, LEFT ATRIAL APPENDAGE LIGATION AND KENNEY PER ANESTHESIA;  Surgeon: Lyle Chan MD;  Location:  [facility];  Service: Cardiothoracic;  Laterality: N/A;     Family History   Problem Relation Age of Onset    Heart valve disorder Mother     Heart valve disorder Brother     Heart valve disorder Maternal Grandfather     Heart disease Maternal Grandfather     Diabetes type I Son      Social History     Socioeconomic History    Marital status:     Number of children: 7   Tobacco Use    Smoking status: Never    Smokeless tobacco: Never   Vaping Use    Vaping Use: Never used   Substance and Sexual Activity    Alcohol use: No    Drug use: No    Sexual activity: Defer     ---------------------------------------------------------------------------------------------------------------------   Allergies:  Other and Percocet [oxycodone-acetaminophen]  ---------------------------------------------------------------------------------------------------------------------   Medications below are reported home medications pulling from within the system; at this time, these medications have not been reconciled unless otherwise specified and are in the verification process for further verifcation as current home medications.      Prior to Admission Medications       Prescriptions Last Dose Informant Patient Reported? Taking?    ascorbic acid (VITAMIN C) 500 MG  tablet 7/19/2023 Self, Other Yes Yes    Take 1 tablet by mouth Daily.    aspirin 81 MG chewable tablet 7/19/2023 Self, Other Yes Yes    Chew 1 tablet Daily.    bumetanide (BUMEX) 0.5 MG tablet 7/19/2023 Self, Other No Yes    Take 1 tablet by mouth Daily.    losartan (COZAAR) 50 MG tablet 7/19/2023 Self, Other Yes Yes    Take 1 tablet by mouth Daily.    metoprolol succinate XL (TOPROL-XL) 25 MG 24 hr tablet 7/19/2023 Self, Other Yes Yes    Take 12.5 mg by mouth 2 (Two) Times a Day.          Objective     Vital Signs:  Temp:  [97.2 °F (36.2 °C)-97.9 °F (36.6 °C)] 97.9 °F (36.6 °C)  Heart Rate:  [66-78] 73  Resp:  [18-20] 18  BP: (127-159)/() 159/93    Mean Arterial Pressure (Non-Invasive) for the past 24 hrs (Last 3 readings):   Noninvasive MAP (mmHg)   07/19/23 1703 107   07/19/23 1630 110   07/19/23 1620 107     SpO2:  [83 %-97 %] 95 %  on  Flow (L/min):  [1.5-2] 1.5;   Device (Oxygen Therapy): nasal cannula  Body mass index is 33.25 kg/m².    Wt Readings from Last 3 Encounters:   07/19/23 121 kg (266 lb)   06/26/23 119 kg (262 lb)   06/12/23 118 kg (260 lb 9.6 oz)      ----------------------------------------------------------------------------------------------------------------------  Physical Exam  Vitals and nursing note reviewed.   Constitutional:       General: He is not in acute distress.  HENT:      Head: Normocephalic and atraumatic.      Mouth/Throat:      Mouth: Mucous membranes are dry.      Pharynx: Oropharynx is clear.   Eyes:      General: No scleral icterus.     Extraocular Movements: Extraocular movements intact.   Cardiovascular:      Rate and Rhythm: Normal rate and regular rhythm.      Pulses: Normal pulses.   Pulmonary:      Effort: Pulmonary effort is normal. No respiratory distress.      Breath sounds: No wheezing or rales.      Comments: On 2l NC  Abdominal:      General: There is no distension.      Palpations: Abdomen is soft.      Tenderness: There is no abdominal tenderness.    Musculoskeletal:      Right lower leg: Edema present.      Left lower leg: Edema present.   Skin:     General: Skin is warm and dry.      Capillary Refill: Capillary refill takes less than 2 seconds.   Neurological:      General: No focal deficit present.      Mental Status: He is alert and oriented to person, place, and time.   Psychiatric:         Mood and Affect: Mood normal.         Behavior: Behavior normal.     --------------------------------------------------------------------------------------------------------------------  LABS:    CBC and coagulation:  Results from last 7 days   Lab Units 07/19/23  1337 07/19/23  1249   SED RATE mm/hr  --  4   CRP mg/dL 0.30  --    WBC 10*3/mm3  --  6.01   HEMOGLOBIN g/dL  --  16.2   HEMATOCRIT %  --  56.0*   MCV fL  --  100.0*   MCHC g/dL  --  28.9*   PLATELETS 10*3/mm3  --  112*   INR   --  0.97     Acid/base balance:  Results from last 7 days   Lab Units 07/19/23  1312   PH, ARTERIAL pH units 7.367   PO2 ART mm Hg 47.0*   PCO2, ARTERIAL mm Hg 63.9*   HCO3 ART mmol/L 36.6*     Renal and electrolytes:  Results from last 7 days   Lab Units 07/19/23  1337   SODIUM mmol/L 143   POTASSIUM mmol/L 4.6   MAGNESIUM mg/dL 2.2   CHLORIDE mmol/L 102   CO2 mmol/L 33.2*   BUN mg/dL 19   CREATININE mg/dL 1.35*   CALCIUM mg/dL 9.0   GLUCOSE mg/dL 96     Estimated Creatinine Clearance: 74.4 mL/min (A) (by C-G formula based on SCr of 1.35 mg/dL (H)).    Liver and pancreatic function:  Results from last 7 days   Lab Units 07/19/23  1337   ALBUMIN g/dL 3.7   BILIRUBIN mg/dL 0.5   ALK PHOS U/L 66   AST (SGOT) U/L 36   ALT (SGPT) U/L 19     Endocrine function:  Lab Results   Component Value Date    HGBA1C 5.10 09/20/2022     Point of care bedside glucose levels:      Lab Results   Component Value Date    TSH 3.760 07/19/2023    FREET4 1.06 07/19/2023     Cardiac:  Results from last 7 days   Lab Units 07/19/23  1605 07/19/23  1337   CK TOTAL U/L  --  72   HSTROP T ng/L 45* 46*   PROBNP  pg/mL  --  2,395.0*       Cultures:  Lab Results   Component Value Date    COLORU Yellow 09/19/2022    CLARITYU Clear 09/19/2022    PHUR <=5.0 09/19/2022    GLUCOSEU Negative 09/19/2022    KETONESU Negative 09/19/2022    BLOODU Negative 09/19/2022    NITRITEU Negative 09/19/2022    LEUKOCYTESUR Negative 09/19/2022    BILIRUBINUR Negative 09/19/2022    UROBILINOGEN 0.2 E.U./dL 09/19/2022     Microbiology Results (last 10 days)       Procedure Component Value - Date/Time    COVID-19 and FLU A/B PCR - Swab, Nasopharynx [[id number]]  (Normal) Collected: 07/19/23 1515    Lab Status: Final result Specimen: Swab from Nasopharynx Updated: 07/19/23 1539     COVID19 Not Detected     Influenza A PCR Not Detected     Influenza B PCR Not Detected    Narrative:      Fact sheet for providers: https://www.fda.gov/media/482819/download    Fact sheet for patients: https://www.fda.gov/media/409173/download    Test performed by PCR.            No results found for: PREGTESTUR, PREGSERUM, HCG, HCGQUANT  Pain Management Panel          Latest Ref Rng & Units 8/30/2022   Pain Management Panel   Amphetamine, Urine Qual Negative Negative    Barbiturates Screen, Urine Negative Negative    Benzodiazepine Screen, Urine Negative Negative    Buprenorphine, Screen, Urine Negative Negative    Cocaine Screen, Urine Negative Negative    Methadone Screen , Urine Negative Negative    Methamphetamine, Ur Negative Negative        I have personally looked at the labs and they are summarized above.  ----------------------------------------------------------------------------------------------------------------------  Detailed radiology reports for the last 24 hours:    Imaging Results (Last 24 Hours)       Procedure Component Value Units Date/Time    CT Chest Without Contrast Diagnostic [[id number]] Collected: 07/19/23 1511     Updated: 07/19/23 1516    Narrative:      EXAM:    CT Chest Without Intravenous Contrast     EXAM DATE:    7/19/2023 1:59 PM      CLINICAL HISTORY:    COPD suspected     TECHNIQUE:    Axial computed tomography images of the chest without intravenous  contrast.  Sagittal and coronal reformatted images were created and  reviewed.  This CT exam was performed using one or more of the following  dose reduction techniques:  automated exposure control, adjustment of  the mA and/or kV according to patient size, and/or use of iterative  reconstruction technique.     COMPARISON:    11/10/2022     FINDINGS:    Lungs and pleural spaces:  Miniscule right pleural effusion.  Right  lower lobe airspace disease. Partially consolidative.  Interstitial  edema.  Pulmonary vascular congestion.    Heart:  Marked cardiomegaly.  Valvuloplasty changes of the mitral  valve.  No significant pericardial effusion.  No significant coronary  artery calcifications.    Mediastinum:  No mediastinal or hilar lymphadenopathy.    Bones/joints:  Median sternotomy.  No acute fracture.  No dislocation.    Soft tissues:  Unremarkable.    Vasculature:  Mild atherosclerosis thoracic aorta.    Lymph nodes:  See above.       Impression:      1.  CHF with interstitial edema and a miniscule right pleural effusion.  Pulmonary vascular congestion noted.  2.  Right lower lobe airspace disease which may represent pneumonia  and/or atelectasis.  3.  Changes of prior median sternotomy with valvuloplasty.  4.  Other incidental/nonacute findings as above.     This report was finalized on 7/19/2023 3:14 PM by Dr. Corey Powell MD.       XR Chest 1 View [[id number]] Collected: 07/19/23 1400     Updated: 07/19/23 1403    Narrative:      EXAM:    XR Chest, 1 View     EXAM DATE:    7/19/2023 1:23 PM     CLINICAL HISTORY:    sob     TECHNIQUE:    Frontal view of the chest.     COMPARISON:    06/12/2023     FINDINGS:    Lungs and pleural spaces:  Trace right pleural effusion.  Stable right  basilar airspace disease.  No pneumothorax.    Heart:  Cardiomegaly is stable.    Mediastinum:  Unremarkable.     Bones/joints:  Median sternotomy.    Vasculature:  Mild vascular congestion.    Tubes, lines and devices:  Atrial closure device.    Upper abdomen:  Elevation right hemidiaphragm, stable.    Other findings:  Valvuloplasty changes noted.       Impression:      1.  Mild CHF with cardiomegaly and pulmonary vessel congestion.  2.  Stable right basilar airspace disease likely atelectasis with stable  elevation of right hemidiaphragm.  3.  Stable postsurgical cardiovascular findings.     This report was finalized on 7/19/2023 2:01 PM by Dr. Corey Powell MD.             Final impressions for the last 30 days of radiology reports:    CT Chest Without Contrast Diagnostic    Result Date: 7/19/2023  1.  CHF with interstitial edema and a miniscule right pleural effusion. Pulmonary vascular congestion noted. 2.  Right lower lobe airspace disease which may represent pneumonia and/or atelectasis. 3.  Changes of prior median sternotomy with valvuloplasty. 4.  Other incidental/nonacute findings as above.  This report was finalized on 7/19/2023 3:14 PM by Dr. Corey Powell MD.      XR Chest 1 View    Result Date: 7/19/2023  1.  Mild CHF with cardiomegaly and pulmonary vessel congestion. 2.  Stable right basilar airspace disease likely atelectasis with stable elevation of right hemidiaphragm. 3.  Stable postsurgical cardiovascular findings.  This report was finalized on 7/19/2023 2:01 PM by Dr. Corey Powell MD.       I have personally looked at the radiology images, my personal interpretation is as follows:    CT chest with some RLL consolidation and elevated R hemidiaphragm     Assessment & Plan       #Acute on chronic HFmrEF  -Most recent echo 6/12/23 with EF reduced as compared to fall 2022 (now 41-45%) and grade 2 diastolic dysfunction though no CP or anginal equivalents reported, no palpitations  -Given lack of diuretic response explaining increasing hypervolemia and recent echo, repeat not ordered  -Cardiology consulted, in  setting of HF progression may be candidate for repeat ischemic eval  -Bumex 1mg IV given, strict I&Os, daily weights. F/u UOP to redose prn  -Cont daily asa, statin    #Acute hypoxic resp failure  -Secondary to pulm edema, diuesis planned as above  -Titrate O2 off as tolerated    - - Chronic conditions - -     #CKD: Cr stable  #Bioprosthetic mitral valve replacement  #Afib not on ac, s/p ANGELICA ligation      VTE Prophylaxis:   Mechanical Order History:       None          Pharmalogical Order History:        Ordered     Dose Route Frequency Stop    07/19/23 2091  Enoxaparin Sodium (LOVENOX) syringe 40 mg         40 mg SC Daily --                  The patient is high risk due to the following diagnoses/reasons:  HF exacerbation requiring IV diuretics, New O2 requirement    Admission Status:  I certify that this patient requires inpatient hospitalization for greater than 2 midnights in INPATIENT status.  I anticipate there to be the need for care which can only be reasonably provided in a hospital setting such as possible need for aggressive/expedited ancillary services and/or consultation services, IV medications, close physician monitoring, and/or procedures. In such, I feel patient’s risk for adverse outcomes and need for care warrant INPATIENT evaluation and predict the patient’s care encounter to likely last beyond 2 midnights.    Code Status and Medical Interventions:   Ordered at: 07/19/23 1432     Code Status (Patient has no pulse and is not breathing):    CPR (Attempt to Resuscitate)     Medical Interventions (Patient has pulse or is breathing):    Full Support     Release to patient:    Routine Release        Disposition: Admit to tele    Bruno Stearns MD  [facility]  07/19/23  18:48 EDT

## 2023-07-19 NOTE — PLAN OF CARE
Goal Outcome Evaluation:  Pt resting in bed with spouse at bedside. No signs or symptoms of distress noted. No complaint at this time. Will continue with plan of care.

## 2023-07-20 PROBLEM — I50.23 ACUTE ON CHRONIC HFREF (HEART FAILURE WITH REDUCED EJECTION FRACTION): Status: ACTIVE | Noted: 2023-07-20

## 2023-07-20 LAB
ABSOLUTE LUNG FLUID CONTENT: 33 % (ref 20–35)
ANION GAP SERPL CALCULATED.3IONS-SCNC: 7.5 MMOL/L (ref 5–15)
BILIRUB UR QL STRIP: NEGATIVE
BUN SERPL-MCNC: 18 MG/DL (ref 8–23)
BUN/CREAT SERPL: 13.6 (ref 7–25)
CALCIUM SPEC-SCNC: 9.2 MG/DL (ref 8.6–10.5)
CHLORIDE SERPL-SCNC: 101 MMOL/L (ref 98–107)
CLARITY UR: CLEAR
CO2 SERPL-SCNC: 34.5 MMOL/L (ref 22–29)
COLOR UR: YELLOW
CREAT SERPL-MCNC: 1.32 MG/DL (ref 0.76–1.27)
DEPRECATED RDW RBC AUTO: 50 FL (ref 37–54)
EGFRCR SERPLBLD CKD-EPI 2021: 59.1 ML/MIN/1.73
ERYTHROCYTE [DISTWIDTH] IN BLOOD BY AUTOMATED COUNT: 13.3 % (ref 12.3–15.4)
GLUCOSE SERPL-MCNC: 118 MG/DL (ref 65–99)
GLUCOSE UR STRIP-MCNC: NEGATIVE MG/DL
HCT VFR BLD AUTO: 56.4 % (ref 37.5–51)
HGB BLD-MCNC: 16.3 G/DL (ref 13–17.7)
HGB UR QL STRIP.AUTO: NEGATIVE
KETONES UR QL STRIP: NEGATIVE
LEUKOCYTE ESTERASE UR QL STRIP.AUTO: NEGATIVE
MAGNESIUM SERPL-MCNC: 2 MG/DL (ref 1.6–2.4)
MAGNESIUM SERPL-MCNC: 2.3 MG/DL (ref 1.6–2.4)
MCH RBC QN AUTO: 29.1 PG (ref 26.6–33)
MCHC RBC AUTO-ENTMCNC: 28.9 G/DL (ref 31.5–35.7)
MCV RBC AUTO: 100.7 FL (ref 79–97)
NITRITE UR QL STRIP: NEGATIVE
PH UR STRIP.AUTO: 7 [PH] (ref 5–8)
PLATELET # BLD AUTO: 115 10*3/MM3 (ref 140–450)
PMV BLD AUTO: 10.7 FL (ref 6–12)
POTASSIUM SERPL-SCNC: 4.6 MMOL/L (ref 3.5–5.2)
POTASSIUM SERPL-SCNC: 4.6 MMOL/L (ref 3.5–5.2)
PROT UR QL STRIP: NEGATIVE
QT INTERVAL: 428 MS
QTC INTERVAL: 475 MS
RBC # BLD AUTO: 5.6 10*6/MM3 (ref 4.14–5.8)
SODIUM SERPL-SCNC: 143 MMOL/L (ref 136–145)
SP GR UR STRIP: 1.02 (ref 1–1.03)
UROBILINOGEN UR QL STRIP: NORMAL
WBC NRBC COR # BLD: 6.21 10*3/MM3 (ref 3.4–10.8)

## 2023-07-20 PROCEDURE — 99232 SBSQ HOSP IP/OBS MODERATE 35: CPT | Performed by: STUDENT IN AN ORGANIZED HEALTH CARE EDUCATION/TRAINING PROGRAM

## 2023-07-20 PROCEDURE — 94726 PLETHYSMOGRAPHY LUNG VOLUMES: CPT

## 2023-07-20 PROCEDURE — 83735 ASSAY OF MAGNESIUM: CPT

## 2023-07-20 PROCEDURE — 80048 BASIC METABOLIC PNL TOTAL CA: CPT | Performed by: STUDENT IN AN ORGANIZED HEALTH CARE EDUCATION/TRAINING PROGRAM

## 2023-07-20 PROCEDURE — 93010 ELECTROCARDIOGRAM REPORT: CPT | Performed by: INTERNAL MEDICINE

## 2023-07-20 PROCEDURE — 84132 ASSAY OF SERUM POTASSIUM: CPT

## 2023-07-20 PROCEDURE — 85027 COMPLETE CBC AUTOMATED: CPT | Performed by: STUDENT IN AN ORGANIZED HEALTH CARE EDUCATION/TRAINING PROGRAM

## 2023-07-20 PROCEDURE — 99222 1ST HOSP IP/OBS MODERATE 55: CPT | Performed by: INTERNAL MEDICINE

## 2023-07-20 PROCEDURE — 93005 ELECTROCARDIOGRAM TRACING: CPT

## 2023-07-20 PROCEDURE — 81003 URINALYSIS AUTO W/O SCOPE: CPT | Performed by: EMERGENCY MEDICINE

## 2023-07-20 RX ORDER — SPIRONOLACTONE 25 MG/1
12.5 TABLET ORAL DAILY
Status: DISCONTINUED | OUTPATIENT
Start: 2023-07-20 | End: 2023-07-22 | Stop reason: HOSPADM

## 2023-07-20 RX ORDER — BUMETANIDE 0.25 MG/ML
1 INJECTION INTRAMUSCULAR; INTRAVENOUS ONCE
Status: COMPLETED | OUTPATIENT
Start: 2023-07-20 | End: 2023-07-20

## 2023-07-20 RX ADMIN — BUMETANIDE 1 MG: 0.25 INJECTION, SOLUTION INTRAMUSCULAR; INTRAVENOUS at 13:32

## 2023-07-20 RX ADMIN — SPIRONOLACTONE 12.5 MG: 25 TABLET ORAL at 18:33

## 2023-07-20 RX ADMIN — OXYCODONE HYDROCHLORIDE AND ACETAMINOPHEN 500 MG: 500 TABLET ORAL at 09:12

## 2023-07-20 RX ADMIN — EMPAGLIFLOZIN 10 MG: 10 TABLET, FILM COATED ORAL at 18:33

## 2023-07-20 RX ADMIN — Medication 10 ML: at 09:14

## 2023-07-20 RX ADMIN — METOPROLOL SUCCINATE 12.5 MG: 25 TABLET, EXTENDED RELEASE ORAL at 09:12

## 2023-07-20 RX ADMIN — ASPIRIN 81 MG: 81 TABLET, CHEWABLE ORAL at 09:12

## 2023-07-20 RX ADMIN — Medication 10 ML: at 21:20

## 2023-07-20 RX ADMIN — LOSARTAN POTASSIUM 50 MG: 50 TABLET, FILM COATED ORAL at 09:12

## 2023-07-20 RX ADMIN — METOPROLOL SUCCINATE 12.5 MG: 25 TABLET, EXTENDED RELEASE ORAL at 21:20

## 2023-07-20 NOTE — PLAN OF CARE
Goal Outcome Evaluation:      Tele: NSR 80s, VSS, SPO2 stable on 2 L n/c, pt desats when ambulating to bathroom without O2, recovers quickly.  No c/o CP or acute distress ntd this shift. Pt continues on IV Bumex, voiding without difficulty.  Will continue to follow plan of care.

## 2023-07-20 NOTE — PROGRESS NOTES
[facility] HOSPITALIST PROGRESS NOTE     Patient Identification:  Name:  Damián Myers  Age:  67 y.o.  Sex:  male  :  1955  MRN:  [id number]  Visit Number:  [id number]  ROOM: [address] [place] [address]     Primary Care Provider:  Spencer Saeed MD     Date of Admission: 2023    Length of stay in inpatient status:  1    Subjective     Chief Compliant:    Chief Complaint   Patient presents with    Shortness of Breath       Patient with improved LE edema this am but remains on NC O2. No output recorded and reports he did not feel like he was urinating significantly more overnight. Discussed importance of measuring output this am. Of note, patient somewhat somnolent during exam and wife shares that he has excessive daytime drowsiness at times and witnessed apnea, no hx of sleep study.         Objective     Current Hospital Meds:  ascorbic acid, 500 mg, Oral, Daily  aspirin, 81 mg, Oral, Daily  enoxaparin, 40 mg, Subcutaneous, Daily  losartan, 50 mg, Oral, Daily  metoprolol succinate XL, 12.5 mg, Oral, BID  senna-docusate sodium, 2 tablet, Oral, BID  sodium chloride, 10 mL, Intravenous, Q12H    Pharmacy Consult,       Current Antimicrobial Therapy:  Anti-Infectives (From admission, onward)      None          Current Diuretic Therapy:  Diuretics (From admission, onward)      Ordered     Dose/Rate Route Frequency Start Stop    23 1117  bumetanide (BUMEX) injection 1 mg        Ordering Provider: Bruno Stearns MD    1 mg Intravenous Once 23 1300 23 1332    23 1654  bumetanide (BUMEX) injection 1 mg        Ordering Provider: Bruno Stearns MD    1 mg Intravenous Once 23 1745 23 1809          ----------------------------------------------------------------------------------------------------------------------  Vital Signs:  Temp:  [97.6 °F (36.4 °C)-98.3 °F (36.8 °C)] 98 °F (36.7 °C)  Heart Rate:  [66-84] 70  Resp:  [18-22] 20  BP: (110-159)/() 111/69  SpO2:  [94 %-97 %]  95 %  on  Flow (L/min):  [1.5-2] 2;   Device (Oxygen Therapy): nasal cannula  Body mass index is 33.29 kg/m².    Wt Readings from Last 3 Encounters:   07/20/23 121 kg (266 lb 4.8 oz)   06/26/23 119 kg (262 lb)   06/12/23 118 kg (260 lb 9.6 oz)     Intake & Output (last 3 days)         07/17 0701 07/18 0700 07/18 0701 07/19 0700 07/19 0701 07/20 0700 07/20 0701 07/21 0700    P.O.   360 960    Total Intake(mL/kg)   360 (3) 960 (7.9)    Urine (mL/kg/hr)    400 (0.4)    Total Output    400    Net   +360 +560            Urine Unmeasured Occurrence   4 x 2 x          Diet: Cardiac Diets; Healthy Heart (2-3 Na+); Texture: Regular Texture (IDDSI 7); Fluid Consistency: Thin (IDDSI 0)  ----------------------------------------------------------------------------------------------------------------------  Physical Exam  Vitals and nursing note reviewed.   Constitutional:       General: He is not in acute distress.     Comments: Somnolent at times but easily arouses   HENT:      Head: Normocephalic and atraumatic.      Mouth/Throat:      Mouth: Mucous membranes are moist.      Pharynx: Oropharynx is clear.   Eyes:      General: No scleral icterus.     Extraocular Movements: Extraocular movements intact.   Cardiovascular:      Rate and Rhythm: Normal rate.      Pulses: Normal pulses.   Pulmonary:      Effort: Pulmonary effort is normal. No respiratory distress.      Breath sounds: No wheezing or rales.   Abdominal:      General: Abdomen is flat.      Palpations: Abdomen is soft.   Musculoskeletal:      Right lower leg: Edema present.      Left lower leg: Edema present.   Skin:     General: Skin is warm and dry.      Capillary Refill: Capillary refill takes less than 2 seconds.      Comments: Chronic bronzing of b/l LE   Neurological:      General: No focal deficit present.      Mental Status: He is oriented to person, place, and time.   Psychiatric:         Mood and Affect: Mood normal.         Behavior: Behavior normal.      ----------------------------------------------------------------------------------------------------------------------    ----------------------------------------------------------------------------------------------------------------------  LABS:    CBC and coagulation:  Results from last 7 days   Lab Units 07/20/23 0059 07/19/23  1337 07/19/23  1249   SED RATE mm/hr  --   --  4   CRP mg/dL  --  0.30  --    WBC 10*3/mm3 6.21  --  6.01   HEMOGLOBIN g/dL 16.3  --  16.2   HEMATOCRIT % 56.4*  --  56.0*   MCV fL 100.7*  --  100.0*   MCHC g/dL 28.9*  --  28.9*   PLATELETS 10*3/mm3 115*  --  112*   INR   --   --  0.97     Acid/base balance:  Results from last 7 days   Lab Units 07/19/23  1312   PH, ARTERIAL pH units 7.367   PO2 ART mm Hg 47.0*   PCO2, ARTERIAL mm Hg 63.9*   HCO3 ART mmol/L 36.6*     Renal and electrolytes:  Results from last 7 days   Lab Units 07/20/23 0059 07/19/23  1337   SODIUM mmol/L 143 143   POTASSIUM mmol/L 4.6 4.6   MAGNESIUM mg/dL 2.3 2.2   CHLORIDE mmol/L 101 102   CO2 mmol/L 34.5* 33.2*   BUN mg/dL 18 19   CREATININE mg/dL 1.32* 1.35*   CALCIUM mg/dL 9.2 9.0   GLUCOSE mg/dL 118* 96     Estimated Creatinine Clearance: 76.1 mL/min (A) (by C-G formula based on SCr of 1.32 mg/dL (H)).    Liver and pancreatic function:  Results from last 7 days   Lab Units 07/19/23  1337   ALBUMIN g/dL 3.7   BILIRUBIN mg/dL 0.5   ALK PHOS U/L 66   AST (SGOT) U/L 36   ALT (SGPT) U/L 19     Endocrine function:  Lab Results   Component Value Date    HGBA1C 5.10 09/20/2022     Point of care bedside glucose levels:      Glucose levels from the [facility]:  Results from last 7 days   Lab Units 07/20/23  0059 07/19/23  1337   GLUCOSE mg/dL 118* 96     Lab Results   Component Value Date    TSH 3.760 07/19/2023    FREET4 1.06 07/19/2023     Cardiac:  Results from last 7 days   Lab Units 07/19/23  1605 07/19/23  1337   CK TOTAL U/L  --  72   HSTROP T ng/L 45* 46*   PROBNP pg/mL  --  2,395.0*       Cultures:  Lab Results    Component Value Date    COLORU Yellow 07/20/2023    CLARITYU Clear 07/20/2023    PHUR 7.0 07/20/2023    GLUCOSEU Negative 07/20/2023    KETONESU Negative 07/20/2023    BLOODU Negative 07/20/2023    NITRITEU Negative 07/20/2023    LEUKOCYTESUR Negative 07/20/2023    BILIRUBINUR Negative 07/20/2023    UROBILINOGEN 1.0 E.U./dL 07/20/2023     Microbiology Results (last 10 days)       Procedure Component Value - Date/Time    COVID-19 and FLU A/B PCR - Swab, Nasopharynx [[id number]]  (Normal) Collected: 07/19/23 1515    Lab Status: Final result Specimen: Swab from Nasopharynx Updated: 07/19/23 1539     COVID19 Not Detected     Influenza A PCR Not Detected     Influenza B PCR Not Detected    Narrative:      Fact sheet for providers: https://www.fda.gov/media/507921/download    Fact sheet for patients: https://www.fda.gov/media/411122/download    Test performed by PCR.            No results found for: PREGTESTUR, PREGSERUM, HCG, HCGQUANT  Pain Management Panel          Latest Ref Rng & Units 8/30/2022   Pain Management Panel   Amphetamine, Urine Qual Negative Negative    Barbiturates Screen, Urine Negative Negative    Benzodiazepine Screen, Urine Negative Negative    Buprenorphine, Screen, Urine Negative Negative    Cocaine Screen, Urine Negative Negative    Methadone Screen , Urine Negative Negative    Methamphetamine, Ur Negative Negative        I have personally looked at the labs and they are summarized above.  ----------------------------------------------------------------------------------------------------------------------  Detailed radiology reports for the last 24 hours:    Imaging Results (Last 24 Hours)       ** No results found for the last 24 hours. **            Assessment & Plan      #Acute on chronic HFmrEF  -Most recent echo 6/12/23 with EF reduced as compared to fall 2022 (now 41-45%) and grade 2 diastolic dysfunction though no CP or anginal equivalents reported, no palpitations  -Given lack of diuretic  response explaining increasing hypervolemia and recent echo, repeat not ordered  -Cardiology consulted, in setting of HF progression may be candidate for repeat ischemic eval  -strict I&Os, daily weights  -Cont daily asa, statin  -Discussed importance of strict I&Os, repeat dose 1mg IV bumex given and will monitor response as exam has improved this am but remains on NC O2   -Cont ARB, pend approval for SGLT2  -HF education ordered, reds vest pend  -Reports dry weight ~245lbs, currently 266     #Acute hypoxic resp failure  -Secondary to pulm edema, diuesis planned as above  -Titrate O2 off as tolerated  -walk test entered for am     - - Chronic conditions - -      #CKD: Cr stable  #Bioprosthetic mitral valve replacement not on AC  #Afib not on ac, s/p ANGELICA ligation: Cont daily asa and metoprolol    VTE Prophylaxis:   Mechanical Order History:       None          Pharmalogical Order History:        Ordered     Dose Route Frequency Stop    07/19/23 1654  Enoxaparin Sodium (LOVENOX) syringe 40 mg         40 mg SC Daily --                    Code Status and Medical Interventions:   Ordered at: 07/19/23 1432     Code Status (Patient has no pulse and is not breathing):    CPR (Attempt to Resuscitate)     Medical Interventions (Patient has pulse or is breathing):    Full Support     Release to patient:    Routine Release         Disposition: Cont tele monitoring, d/c <48hrs    I have reviewed any copied/forwarded text or data, verified its accuracy, and updated as necessary above.    Bruno Stearns MD  Psychiatric Hospitalist  07/20/23  15:42 EDT

## 2023-07-20 NOTE — ED PROVIDER NOTES
Subjective     History provided by:  Patient   used: No    Shortness of Breath  Severity:  Moderate  Onset quality:  Gradual  Duration: Several.  Timing:  Constant  Progression:  Worsening  Chronicity:  New  Context: activity    Context: not animal exposure, not emotional upset, not fumes, not known allergens, not occupational exposure, not pollens, not smoke exposure, not strong odors, not URI and not weather changes    Relieved by:  Nothing  Worsened by:  Activity, deep breathing, exertion and movement  Ineffective treatments:  Diuretics, rest, position changes and sitting up  Associated symptoms: no abdominal pain, no chest pain, no claudication, no cough, no diaphoresis, no ear pain, no fever, no headaches, no hemoptysis, no neck pain, no PND, no rash, no sore throat, no sputum production, no syncope, no swollen glands, no vomiting and no wheezing    Risk factors: no recent alcohol use, no family hx of DVT, no hx of cancer, no hx of PE/DVT, no obesity, no oral contraceptive use, no prolonged immobilization, no recent surgery and no tobacco use      Review of Systems   Constitutional:  Negative for activity change, appetite change, chills, diaphoresis, fatigue and fever.   HENT:  Negative for congestion, ear pain and sore throat.    Eyes:  Negative for redness.   Respiratory:  Positive for shortness of breath. Negative for cough, hemoptysis, sputum production, chest tightness and wheezing.    Cardiovascular:  Negative for chest pain, palpitations, claudication, leg swelling, syncope and PND.   Gastrointestinal:  Negative for abdominal pain, diarrhea, nausea and vomiting.   Genitourinary:  Negative for dysuria and urgency.   Musculoskeletal:  Negative for arthralgias, back pain, myalgias and neck pain.   Skin:  Negative for pallor, rash and wound.   Neurological:  Negative for dizziness, speech difficulty, weakness and headaches.   Psychiatric/Behavioral:  Negative for agitation, behavioral  problems, confusion and decreased concentration.    All other systems reviewed and are negative.    Past Medical History:   Diagnosis Date    CAD (coronary artery disease)     Cardiac abnormality     CHF (congestive heart failure)     Coma of unknown cause 1984    Pt states he was in a coma for one week, unknown cause    COVID-19 10/25/2021    Debility 09/14/2022    Diastolic CHF due to valvular disease 06/25/2022    Echo (9/10/2022):LVEF = 55%.  33 mm Medtronic Magna Ease bioprosthetic valve present.  Mean gradient 8 mmHg.  Moderate 1-2 cm pericardial effusion.  No tamponade.  Left pleural effusion present.    History of stomach ulcers     Hypertension     Mitral regurgitation     Obesity (BMI 30-39.9) 11/04/2019    Paroxysmal atrial fibrillation 09/10/2022    Atrial fibrillation status post MVR [id number] ANGELICA ligated at time of surgery with 35 mm Atricure clip- KENNEY confirms occluded ANGELICA with no residual flow   Successful KENNEY/ECV 9/7, on amiodarone Was on Eliquis but discontinued on 9/10/2022 due to severe anemia Back into atrial fibrillation on 9/11/2022    Post-op pericardial effusion 09/12/2022    Echo for post-op hypotension (9/10/2022): Normal LVEF.  Moderate pericardial effusion.  No evidence of tamponade seen.    Severe mitral regurgitation status post bioprosthetic MVR, 9/2/2022 08/03/2022    Echo 6/26/22: EF 36-40%, global hypokinesis, mild bileaflet MVP with moderate MR; grade III diastolic dysfunction, severe PAH with RVSP 67mmHg KENNEY 6/28/22: LVEF 56-60%, MVP of posterior leaflet with mod-severe MR, modTR with RVSP 52mmHg MVR 9/2/22 Dr. Chan with 33 mm Magna Ease mitral valve  Echo (9/10/2022):LVEF = 55%.  33 mm Medtronic Magna Ease bioprosthetic valve present.  Mean gradient 8 mm    Spinal headache 1984       Allergies   Allergen Reactions    Other Unknown - High Severity     Pt had a reaction to anesthesia when placing stents       Percocet [Oxycodone-Acetaminophen] Other (See Comments)      Reports they gave him too much and he had respiratory depression   Reports he has tolerated since then with no issues.        Past Surgical History:   Procedure Laterality Date    CARDIAC CATHETERIZATION N/A 11/08/2019    Procedure: Left Heart Cath;  Surgeon: Sherrell Hart MD;  Location:  [facility];  Service: Cardiology    CARDIAC CATHETERIZATION N/A 06/27/2022    Procedure: Left Heart Cath;  Surgeon: Greyson Balderas MD;  Location:  [facility];  Service: Cardiology;  Laterality: N/A;    COLONOSCOPY      CORONARY STENT PLACEMENT  2019    ENDOSCOPY N/A 9/12/2022    Procedure: ESOPHAGOGASTRODUODENOSCOPY;  Surgeon: Leann Fitzpatrick MD;  Location:  [facility];  Service: Gastroenterology;  Laterality: N/A;    MITRAL VALVE REPAIR/REPLACEMENT N/A 9/2/2022    Procedure: MEDIAN STERNOTOMY MITRAL VALVE REPLACEMENT, LEFT ATRIAL APPENDAGE LIGATION AND KENNEY PER ANESTHESIA;  Surgeon: Lyle Chan MD;  Location:  [facility];  Service: Cardiothoracic;  Laterality: N/A;       Family History   Problem Relation Age of Onset    Heart valve disorder Mother     Heart valve disorder Brother     Heart valve disorder Maternal Grandfather     Heart disease Maternal Grandfather     Diabetes type I Son        Social History     Socioeconomic History    Marital status:     Number of children: 7   Tobacco Use    Smoking status: Never    Smokeless tobacco: Never   Vaping Use    Vaping Use: Never used   Substance and Sexual Activity    Alcohol use: No    Drug use: No    Sexual activity: Defer           Objective   Physical Exam  Vitals and nursing note reviewed.   Constitutional:       General: He is not in acute distress.     Appearance: Normal appearance. He is well-developed. He is not toxic-appearing or diaphoretic.   HENT:      Head: Normocephalic and atraumatic.      Right Ear: External ear normal.      Left Ear: External ear normal.      Nose: Nose normal.      Mouth/Throat:       Pharynx: No oropharyngeal exudate.      Tonsils: No tonsillar exudate.   Eyes:      General: Lids are normal.      Conjunctiva/sclera: Conjunctivae normal.      Pupils: Pupils are equal, round, and reactive to light.   Neck:      Thyroid: No thyromegaly.   Cardiovascular:      Rate and Rhythm: Normal rate and regular rhythm.      Pulses: Normal pulses.      Heart sounds: Normal heart sounds, S1 normal and S2 normal.   Pulmonary:      Effort: Pulmonary effort is normal. No tachypnea or respiratory distress.      Breath sounds: Examination of the right-upper field reveals decreased breath sounds. Examination of the left-upper field reveals decreased breath sounds. Examination of the right-middle field reveals decreased breath sounds. Examination of the left-middle field reveals decreased breath sounds. Examination of the right-lower field reveals decreased breath sounds. Examination of the left-lower field reveals decreased breath sounds. Decreased breath sounds present. No wheezing or rales.   Chest:      Chest wall: No tenderness.   Abdominal:      General: Bowel sounds are normal. There is no distension.      Palpations: Abdomen is soft.      Tenderness: There is no abdominal tenderness. There is no guarding or rebound.   Musculoskeletal:         General: No tenderness or deformity. Normal range of motion.      Cervical back: Full passive range of motion without pain, normal range of motion and neck supple.      Right lower leg: Edema present.      Left lower leg: Edema present.   Lymphadenopathy:      Cervical: No cervical adenopathy.   Skin:     General: Skin is warm and dry.      Coloration: Skin is not pale.      Findings: No erythema or rash.   Neurological:      Mental Status: He is alert and oriented to person, place, and time.      GCS: GCS eye subscore is 4. GCS verbal subscore is 5. GCS motor subscore is 6.      Cranial Nerves: No cranial nerve deficit.      Sensory: No sensory deficit.   Psychiatric:          Speech: Speech normal.         Behavior: Behavior normal.         Thought Content: Thought content normal.         Judgment: Judgment normal.       Procedures           ED Course  ED Course as of 07/20/23 1155   Wed Jul 19, 2023   1244 ECG 12 Lead Dyspnea  Vent. Rate :  74 BPM     Atrial Rate :  74 BPM     P-R Int : 148 ms          QRS Dur :  86 ms      QT Int : 428 ms       P-R-T Axes :  74  41  63 degrees     QTc Int : 475 ms     Normal sinus rhythm  Cannot rule out Anterior infarct , age undetermined  Abnormal ECG  When compared with ECG of 21-SEP-2022 10:32,  Nonspecific T wave abnormality, worse in Anterior leads  Nonspecific T wave abnormality, improved in Lateral leads   [ES]   u Jul 20, 2023   1154 CT Chest Without Contrast Diagnostic    IMPRESSION:  1.  CHF with interstitial edema and a miniscule right pleural effusion.  Pulmonary vascular congestion noted.  2.  Right lower lobe airspace disease which may represent pneumonia  and/or atelectasis.  3.  Changes of prior median sternotomy with valvuloplasty.  4.  Other incidental/nonacute findings as above.   [ES]   1154 XR Chest 1 View  IMPRESSION:  1.  Mild CHF with cardiomegaly and pulmonary vessel congestion.  2.  Stable right basilar airspace disease likely atelectasis with stable  elevation of right hemidiaphragm.  3.  Stable postsurgical cardiovascular findings.   [ES]      ED Course User Index  [ES] Hang Gale MD                                           Medical Decision Making    History provided by:  Patient   used: No    Shortness of Breath  Severity:  Moderate  Onset quality:  Gradual  Duration: Several.  Timing:  Constant  Progression:  Worsening  Chronicity:  New  Context: activity    Context: not animal exposure, not emotional upset, not fumes, not known allergens, not occupational exposure, not pollens, not smoke exposure, not strong odors, not URI and not weather changes    Relieved by:   Nothing  Worsened by:  Activity, deep breathing, exertion and movement  Ineffective treatments:  Diuretics, rest, position changes and sitting up  Associated symptoms: no abdominal pain, no chest pain, no claudication, no cough, no diaphoresis, no ear pain, no fever, no headaches, no hemoptysis, no neck pain, no PND, no rash, no sore throat, no sputum production, no syncope, no swollen glands, no vomiting and no wheezing    Risk factors: no recent alcohol use, no family hx of DVT, no hx of cancer, no hx of PE/DVT, no obesity, no oral contraceptive use, no prolonged immobilization, no recent surgery and no tobacco use      Amount and/or Complexity of Data Reviewed  External Data Reviewed: labs, radiology, ECG and notes.  Labs: ordered. Decision-making details documented in ED Course.  Radiology: ordered and independent interpretation performed. Decision-making details documented in ED Course.  ECG/medicine tests: ordered and independent interpretation performed. Decision-making details documented in ED Course.    Risk  Prescription drug management.  Decision regarding hospitalization.        Final diagnoses:   Acute on chronic congestive heart failure, unspecified heart failure type       ED Disposition  ED Disposition       ED Disposition   Decision to Admit    Condition   --    Comment   Level of Care: Telemetry [5]   Diagnosis: Heart failure with reduced ejection fraction and diastolic dysfunction [[id number]]   Admitting Physician: ESTEFANY COTTON [[id number]]   Attending Physician: ESTEFANY COTTON [[id number]]   Certification: I Certify That Inpatient Hospital Services Are Medically Necessary For Greater Than 2 Midnights                 Spencer Saeed MD  [address] [place] [address] [phone]               Medication List        ASK your doctor about these medications      metoprolol succinate XL 25 MG 24 hr tablet  Commonly known as: TOPROL-XL  Ask about: Which instructions should I use?                  Hang Gale MD  07/20/23 0644

## 2023-07-20 NOTE — PLAN OF CARE
Goal Outcome Evaluation:     Patient resting in bed. No complaints of chest pain pain. Patient had 7 beat ab conduction, PA aware, see orders, see results. Patient continues to require 2 L NC for oxygen saturation to remain >90% No visible indicators of acute distress noted at this time. Will continue to follow plan of care this shift.

## 2023-07-20 NOTE — PROGRESS NOTES
Pharmacy consulted for a price check on jardiance.   Was unable to find prescription coverage. When I called the patient's usual pharmacy, they informed me that the patient has been filling his medications using a discount card.     Patient is ineligible for the heart failure deisy, as only medicare part D patients quality for deisy funds. Can potentially, sign the patient up for free med. It seems that uninsured patients have to have applied for medicare part D in the past. Will need income information for the free med application (a W2 or three months of pay stubs)    For now, the patient can use the [employer] 14 day free trial card, while patient assistance is attempted. If patient does not qualify for Free Med, will likely have to rely on samples.

## 2023-07-20 NOTE — CONSULTS
Heart Failure Education Consult    67 y.o. male PMHx: HFrEF, CAD, s/p NV replacement with bioprosthetic valve, A-fib, HTN, HLD.    Type of Heart Failure: Systolic     Length of diagnosis: Previous Diagnosis     Current HF knowledge: good     Have you had HF education/teaching in the past? Yes  Do you check your weight daily? No    Current weight        07/19/23  1205 07/20/23  0500   Weight: 121 kg (266 lb) 121 kg (266 lb 4.8 oz)          Most recent EF 41-45% Date 6/12/23       Most recent ProBNP 2395 pg/ml Date 7/19/23  Most recent ReDS  - ordered and pending      Edema Yes        Shortness of Breath: Improving       Barriers to learning: Other - none identified at this time      Materials Provided: - no written material given at this time. Patient has been seen previously in HF clinic and was given HF education materials                 Thank you for this consult. Please let me know if I can be of any assistance with HF education for this patient.    <30 minutes was spent on this visit    Electronically signed by,   Isabelle Montana, RN ,DNP, MSN, RN, CHFN  07/20/23 10:29 EDT

## 2023-07-20 NOTE — CONSULTS
[facility]  CONSULT  NOTE      Patient information:  Date of Admit: 7/19/2023  Date of Consult: 07/20/23  Hospitalist/Referring MD:Bruno Stearns MD  PCP: Spencer Saeed MD  MRN:  [id number]  Visit Number:  [id number]    LOS: 1  CODE STATUS:  Code Status and Medical Interventions:   Ordered at: 07/19/23 1432     Code Status (Patient has no pulse and is not breathing):    CPR (Attempt to Resuscitate)     Medical Interventions (Patient has pulse or is breathing):    Full Support     Release to patient:    Routine Release       PROBLEM LIST: Principal Problem:    Heart failure with reduced ejection fraction and diastolic dysfunction      Inpatient Cardiology Consult  Consult performed by: Dana Olivo APRN  Consult ordered by: Bruno Stearns MD      07:17 EDT  7/20/2023    General Cardiology Consulting Physician: Dr. Mahendra Amador MD, [facility]    Assessment      Acute on chronic HFrEF.  History of severe mitral regurgitation, status post mitral valve replacement with a 2033 mm Magna Ease tissue valve in September 2022.  LV systolic dysfunction with LV ejection fraction of 41 to 45% on recent echo Doppler study.  Paroxysmal atrial fibrillation with left atrial appendage ligation during the mitral valve replacement surgery.  Mild elevated troponin with a flat trend of around 45 with recent coronary angiography revealing nonobstructive CAD.  COPD.      Recommendations     Continue with IV diuretics as needed and tolerated and monitor the urine output and kidney function and adjust the dose accordingly.  Continue to optimize GDMT for his cardiomyopathy.    Add SGLT2 inhibitor as tolerated.  Patient is not willing to go on Entresto as he states he cannot afford it.  Continue with losartan and metoprolol succinate.  Add spironolactone as well as tolerated by his blood pressure.  No ischemic evaluation planned due to recent finding of nonobstructive CAD on coronary  angiography.    Reason for Cardiology consultation: HFrEF with troponin elevation    Subjective Data   ADMISSION INFORMATION:  Chief Complaint   Patient presents with    Shortness of Breath     History of Present Illness    Daimán Myers is a 67 y.o. male with a past medical history significant for HFrEF with LVEF of 41 to 45%, CAD status post CABG, CVA, essential hypertension, dyslipidemia, paroxysmal atrial fibrillation s/p ANGELICA lateral ligation, COPD, CKD stage III, mitral valve replacement with 33 mm Magna Ease tissue mitral valve  in September 2022, dilated cardiomyopathy, history of GI bleed, and obesity.   Patient presented to [facility] ([facility] emergency room (ER) on 7/19/2023 with complaints of increased shortness of breath.  Patient was recently transition from Lasix to Bumex secondary to increased LE edema and increased shortness of breath on his last appointment with cardiology 06/26/2023.  07/19/2023 patient had a follow-up with his PCP for progressive dyspnea, LE edema, and persistent fatigue over the last 2 weeks, and thus prompting him to present to the ER for further evaluation. High sensitive troponin was 46-> 45.  proBNP was 2395.0. CRT was 1.35 -> 1.32.  Baseline 1.03-1.67 according to the lab flowsheet.  He is found to be hypoxic with a PaO2 of 47.0 and CT chest consistent with CHF.  Patient was placed on oxygen at 2 L via nasal cannula and he declined any diuretics until you arrived on the floor.  Patient was admitted and Cardiology has been consulted for further evaluation and management HFrEF with elevated troponins.     Primary Cardiologist has been KATHRINE Steele and he was last seen in the office on 06/26/2023.     Patient is in room 320 and was examined by Dr. Dr. Amador.  Telemetry reveals SR 80's with episodes of tachycardia and a 7 beat run of aberrant conduction overnight as seen in the strips attached below.  According to medication record, patient was given 1 mg IV Bumex at  1745 on 07/19/2023. However his I & O flow sheet does not reflect the negative diuresing balance.  There is a note of 4 times unmeasured urine occurrences on the I & O flow sheet. Patient is lying in bed resting quietly. Head of bed is elevated to 75 degrees. Patient reports he has been unable to lay flat since his surgery in Sept. 2022 (Value replacement). He reports he has had increased shortness of breath intermittently for a couple months and has only been taking the diuretic as needed per weight gain as he was instructed. Patient says he can lay flat for only about 45 mins then he becomes short of breath and has to sit up. He denies any chest pain and reports he has become short of breath everyday in the last couple of weeks. He denies eating a high sodium diet. He reports he was not able to afford Entresto therefore he was placed on Losartan instead. He reports he has had increased edema RLE > LLE ever since he started the Losartan but he does not take the diuretic daily. He reports initially when he was changed to Bumex, he did have increased UOP but it has slowed down in the last few days.     ORDERS: Pharmacy consult for price check on Jardiance.     Known medications given enroute vis EMS and in the ER:       Cardiac risk factors:arteriosclerotic heart disease, hypercholesterolemia, hypertension, and Obesity      Last Echo: Results for orders placed during the hospital encounter of 06/12/23    Adult Transthoracic Echo Complete W/ Cont if Necessary Per Protocol    Interpretation Summary  Images from the original result were not included.      Normal left ventricular cavity size and wall thickness noted. There is mild left ventricular global hypokinesis noted.    Left ventricular ejection fraction appears to be 41 - 45%.    Left ventricular diastolic function is consistent with (grade II w/high LAP) pseudonormalization.    There is a bioprosthetic mitral valve present.The prosthetic mitral valve is grossly  normal.    Trace to mild mitral valve regurgitation is present. No significant mitral valve stenosis is present    The aortic valve is structurally normal with no regurgitation or stenosis present.    Mild tricuspid valve regurgitation is present. Estimated right ventricular systolic pressure from tricuspid regurgitation is mildly elevated (35-45 mmHg). Mild pulmonary hypertension is present.    There is no evidence of pericardial effusion.        Last Cath: Results for orders placed during the hospital encounter of 06/25/22    Cardiac Catheterization/Vascular Study    Narrative  Images from the original result were not included.  CARDIAC CATHETERIZATION / INTERVENTION REPORT    DATE OF PROCEDURE: 6/27/2022    INDICATION FOR PROCEDURE:  Mitral regurgitation  Dyspnea      PRE PROCEDURE DIAGNOSIS:  Mild non obstructive CAD  3-4+ MR  LVEDP 19 mmHg.  LVEF 50 to 55%.    PROCEDURE PERFORMED:    1. Selective right and left Coronary Angiogram  2. Left heart catheretization  3. Left Ventriculography    Coronary anatomy findings:    LM: Is a large calibre vessel , normal take off from left cusp, divides into LAD and Lcx.  No significant stenosis    LAD: Large caliber artery with multiple tandem 30 to 40% stenosis in the proximal mid and distal, the stent in the mid LAD past the diagonal artery appeared patent, the distal stented had about like 40 to 50% stenosis which was unchanged from the previous cath.    Ramus intermedius: Large-caliber vessel with no significant stenosis, mild diffuse disease.    LCX: Moderate calibre vessel, mild luminal irregularities.  Large OM branch with no significant stenosis the AV groove circumflex had no significant stenosis    RCA: Large calibre, dominant artery, normal take off from right cusp.  The RCA appeared to be ectatic, multiple tandem 30 to 40% diffuse stenosis in the proximal mid and distal RCA, RPDA and PL branches had no significant stenosis.    Left Ventriculography:    LV  systolic function was low normal, LV chamber appears dilated with visual estimated EF of 50 to 55%. No wall motion abnormalities.  3-4+ MR    LVEDP: 19 mmHg  No gradient across the aortic valve on pull back.    Final Impression:  Mild non obstructive CAD  3-4+ MR  LVEDP 19 mmHg.  LVEF 50 to 55%.    Recommendations:  Patient will need a transesophageal echocardiogram as he does have bileaflet mitral valve prolapse from transthoracic echo with moderate to severe MR, with eccentric jet, likely severe MR.    Greyson Balderas MD, [facility]  Interventional Cardiology    06/27/22  12:25 EDT                         Past Medical History:   Diagnosis Date    CAD (coronary artery disease)     Cardiac abnormality     CHF (congestive heart failure)     Coma of unknown cause 1984    Pt states he was in a coma for one week, unknown cause    COVID-19 10/25/2021    Debility 09/14/2022    Diastolic CHF due to valvular disease 06/25/2022    Echo (9/10/2022):LVEF = 55%.  33 mm Medtronic Magna Ease bioprosthetic valve present.  Mean gradient 8 mmHg.  Moderate 1-2 cm pericardial effusion.  No tamponade.  Left pleural effusion present.    History of stomach ulcers     Hypertension     Mitral regurgitation     Obesity (BMI 30-39.9) 11/04/2019    Paroxysmal atrial fibrillation 09/10/2022    Atrial fibrillation status post MVR [id number] ANGELICA ligated at time of surgery with 35 mm Atricure clip- KENNEY confirms occluded ANGELICA with no residual flow   Successful KENNEY/ECV 9/7, on amiodarone Was on Eliquis but discontinued on 9/10/2022 due to severe anemia Back into atrial fibrillation on 9/11/2022    Post-op pericardial effusion 09/12/2022    Echo for post-op hypotension (9/10/2022): Normal LVEF.  Moderate pericardial effusion.  No evidence of tamponade seen.    Severe mitral regurgitation status post bioprosthetic MVR, 9/2/2022 08/03/2022    Echo 6/26/22: EF 36-40%, global hypokinesis, mild bileaflet MVP with moderate MR; grade III diastolic  dysfunction, severe PAH with RVSP 67mmHg KENNEY 6/28/22: LVEF 56-60%, MVP of posterior leaflet with mod-severe MR, modTR with RVSP 52mmHg MVR 9/2/22 Dr. Chan with 33 mm Magna Ease mitral valve  Echo (9/10/2022):LVEF = 55%.  33 mm Medtronic Magna Ease bioprosthetic valve present.  Mean gradient 8 mm    Spinal headache 1984     Past Surgical History:   Procedure Laterality Date    CARDIAC CATHETERIZATION N/A 11/08/2019    Procedure: Left Heart Cath;  Surgeon: Sherrell Hart MD;  Location:  [facility];  Service: Cardiology    CARDIAC CATHETERIZATION N/A 06/27/2022    Procedure: Left Heart Cath;  Surgeon: Greyson Balderas MD;  Location:  [facility];  Service: Cardiology;  Laterality: N/A;    COLONOSCOPY      CORONARY STENT PLACEMENT  2019    ENDOSCOPY N/A 9/12/2022    Procedure: ESOPHAGOGASTRODUODENOSCOPY;  Surgeon: Leann Fitzpatrick MD;  Location:  [facility];  Service: Gastroenterology;  Laterality: N/A;    MITRAL VALVE REPAIR/REPLACEMENT N/A 9/2/2022    Procedure: MEDIAN STERNOTOMY MITRAL VALVE REPLACEMENT, LEFT ATRIAL APPENDAGE LIGATION AND KENNEY PER ANESTHESIA;  Surgeon: Lyle Chan MD;  Location: [facility] OR;  Service: Cardiothoracic;  Laterality: N/A;     Family History   Problem Relation Age of Onset    Heart valve disorder Mother     Heart valve disorder Brother     Heart valve disorder Maternal Grandfather     Heart disease Maternal Grandfather     Diabetes type I Son      Social History     Tobacco Use    Smoking status: Never    Smokeless tobacco: Never   Vaping Use    Vaping Use: Never used   Substance Use Topics    Alcohol use: No    Drug use: No       Medications listed below are reported home medications pulling from within the system:  Medications Prior to Admission   Medication Sig Dispense Refill Last Dose    ascorbic acid (VITAMIN C) 500 MG tablet Take 1 tablet by mouth Daily.   7/19/2023    aspirin 81 MG chewable tablet Chew 1 tablet Daily.    7/19/2023    bumetanide (BUMEX) 0.5 MG tablet Take 1 tablet by mouth Daily. 30 tablet 3 7/19/2023    losartan (COZAAR) 50 MG tablet Take 1 tablet by mouth Daily.   7/19/2023    metoprolol succinate XL (TOPROL-XL) 25 MG 24 hr tablet Take 12.5 mg by mouth 2 (Two) Times a Day.   7/19/2023     Allergies:  Other and Percocet [oxycodone-acetaminophen]    Review of Systems   Constitutional:  Positive for fatigue. Negative for activity change, diaphoresis and unexpected weight change.   HENT:  Negative for facial swelling and trouble swallowing.    Eyes:  Negative for visual disturbance.   Respiratory:  Positive for shortness of breath. Negative for cough, chest tightness and wheezing.    Cardiovascular:  Positive for leg swelling. Negative for chest pain and palpitations.   Gastrointestinal:  Negative for abdominal distention, nausea and vomiting.   Endocrine: Negative.    Genitourinary:  Negative for dysuria and hematuria.   Musculoskeletal:  Negative for back pain and gait problem.   Skin:  Negative for color change.   Allergic/Immunologic: Negative.    Neurological:  Negative for dizziness, syncope, speech difficulty and weakness.   Psychiatric/Behavioral:  Negative for agitation and behavioral problems.      Objective Data      Vital Signs  Temp:  [97.2 °F (36.2 °C)-98.3 °F (36.8 °C)] 97.6 °F (36.4 °C)  Heart Rate:  [66-84] 79  Resp:  [18-22] 22  BP: (110-159)/() 140/91  Flow (L/min):  [1.5-2] 2  Vital Signs (last 72 hrs)         07/17 0700  07/18 0659 07/18 0700  07/19 0659 07/19 0700 07/20 0659 07/20 0700 07/20 0717   Most Recent      Temp (°F)     97.2 -  98.3       97.6 (36.4) 07/20 0608    Heart Rate     66 -  84       79 07/20 0608    Resp     18 -  22       22 07/20 0608    BP     110/65 -  159/93       140/91 07/20 0608    SpO2 (%)     83 -  97       94 07/20 0608          Body mass index is 33.29 kg/m².    Intake/Output Summary (Last 24 hours) at 7/20/2023 0717  Last data filed at 7/19/2023 2110  Gross  per 24 hour   Intake 360 ml   Output --   Net 360 ml         Physical Exam  Constitutional:       General: He is not in acute distress.     Appearance: Normal appearance. He is not ill-appearing, toxic-appearing or diaphoretic.   HENT:      Head: Normocephalic and atraumatic.      Nose: Nose normal.      Mouth/Throat:      Mouth: Mucous membranes are moist.   Eyes:      Extraocular Movements: Extraocular movements intact.      Pupils: Pupils are equal, round, and reactive to light.   Neck:      Vascular: JVD present.   Cardiovascular:      Rate and Rhythm: Normal rate and regular rhythm.      Heart sounds: Normal heart sounds.   Pulmonary:      Effort: Pulmonary effort is normal.      Breath sounds: Rales (Rales at both bases right worse than the left.) present.   Abdominal:      General: Bowel sounds are normal.      Palpations: Abdomen is soft.   Musculoskeletal:         General: Normal range of motion.      Cervical back: Normal range of motion.   Skin:     General: Skin is warm and dry.   Neurological:      General: No focal deficit present.      Mental Status: He is alert and oriented to person, place, and time. Mental status is at baseline.   Psychiatric:         Mood and Affect: Mood normal.         Behavior: Behavior normal.         Thought Content: Thought content normal.         Judgment: Judgment normal.     Results review   Results Review:    I have reviewed the patient's new clinical results.  Results from last 7 days   Lab Units 07/19/23  1605 07/19/23  1337   CK TOTAL U/L  --  72   HSTROP T ng/L 45* 46*     Results from last 7 days   Lab Units 07/20/23  0059 07/19/23  1249   WBC 10*3/mm3 6.21 6.01   HEMOGLOBIN g/dL 16.3 16.2   PLATELETS 10*3/mm3 115* 112*     Results from last 7 days   Lab Units 07/20/23  0059 07/19/23  1337   SODIUM mmol/L 143 143   POTASSIUM mmol/L 4.6 4.6   CHLORIDE mmol/L 101 102   CO2 mmol/L 34.5* 33.2*   BUN mg/dL 18 19   CREATININE mg/dL 1.32* 1.35*   CALCIUM mg/dL 9.2 9.0    GLUCOSE mg/dL 118* 96   ALT (SGPT) U/L  --  19   AST (SGOT) U/L  --  36     Lab Results   Component Value Date    INR 0.97 2023    INR 1.10 2022    INR 1.12 2022    INR 1.44 (H) 2022    INR 1.56 (H) 2022    INR 0.97 2022    INR 0.95 2022         Lab Results   Component Value Date    MG 2.3 2023    MG 2.2 2023    MG 2.0 2023     Lab Results   Component Value Date    TSH 3.760 2023    PSA 0.433 2019      No results found for: CHOL, TRIG, HDL, LDL  Lab Results   Component Value Date    PROBNP 2,395.0 (H) 2023    PROBNP 1,042.0 (H) 2023    PROBNP 1,475.0 (H) 10/25/2022     No results found.  No results found for: URICACID  Pain Management Panel          Latest Ref Rng & Units 2022   Pain Management Panel   Amphetamine, Urine Qual Negative Negative    Barbiturates Screen, Urine Negative Negative    Benzodiazepine Screen, Urine Negative Negative    Buprenorphine, Screen, Urine Negative Negative    Cocaine Screen, Urine Negative Negative    Methadone Screen , Urine Negative Negative    Methamphetamine, Ur Negative Negative      Microbiology Results (last 10 days)       Procedure Component Value - Date/Time    COVID-19 and FLU A/B PCR - Swab, Nasopharynx [[id number]]  (Normal) Collected: 23 1515    Lab Status: Final result Specimen: Swab from Nasopharynx Updated: 23 1539     COVID19 Not Detected     Influenza A PCR Not Detected     Influenza B PCR Not Detected    Narrative:      Fact sheet for providers: https://www.fda.gov/media/226354/download    Fact sheet for patients: https://www.fda.gov/media/168528/download    Test performed by PCR.           No results found for: BLOODCX        EC2023      ECG/EMG Results (last 24 hours)       Procedure Component Value Units Date/Time    ECG 12 Lead Dyspnea [[id number]] Collected: 23 1216     Updated: 23 1236     QT Interval 428 ms      QTC  Interval 475 ms     Narrative:      Test Reason : Dyspnea  Blood Pressure :   */*   mmHG  Vent. Rate :  74 BPM     Atrial Rate :  74 BPM     P-R Int : 148 ms          QRS Dur :  86 ms      QT Int : 428 ms       P-R-T Axes :  74  41  63 degrees     QTc Int : 475 ms    Normal sinus rhythm  Cannot rule out Anterior infarct , age undetermined  Abnormal ECG  When compared with ECG of 21-SEP-2022 10:32,  Nonspecific T wave abnormality, worse in Anterior leads  Nonspecific T wave abnormality, improved in Lateral leads    Referred By: NNAMDI           Confirmed By:     ECG 12 Lead Rhythm Change [[id number]] Collected: 07/20/23 0537     Updated: 07/20/23 0538     QT Interval 400 ms      QTC Interval 467 ms     Narrative:      Test Reason : Rhythm Change  Blood Pressure :   */*   mmHG  Vent. Rate :  82 BPM     Atrial Rate :  82 BPM     P-R Int : 156 ms          QRS Dur :  86 ms      QT Int : 400 ms       P-R-T Axes :  66  60  56 degrees     QTc Int : 467 ms    Sinus rhythm with occasional premature ventricular complexes  Possible Anterior infarct (cited on or before 19-JUL-2023)  Abnormal ECG  When compared with ECG of 19-JUL-2023 12:16, (Unconfirmed)  premature ventricular complexes are now present    Referred By:            Confirmed By:     SCANNED - TELEMETRY   [[id number]] Resulted: 07/19/23     Updated: 07/20/23 0647    SCANNED - TELEMETRY   [[id number]] Resulted: 07/19/23     Updated: 07/20/23 0647            TELEMETRY:   SR 80's with episodes of tachycardia and a 7 beat run of aberrant conduction overnight as seen in the strips attached below.                        RADIOLOGY STUDIES:  Imaging Results (Last 72 Hours)       Procedure Component Value Units Date/Time    CT Chest Without Contrast Diagnostic [[id number]] Collected: 07/19/23 1511     Updated: 07/19/23 1516    Narrative:      EXAM:    CT Chest Without Intravenous Contrast     EXAM DATE:    7/19/2023 1:59 PM     CLINICAL HISTORY:    COPD suspected      TECHNIQUE:    Axial computed tomography images of the chest without intravenous  contrast.  Sagittal and coronal reformatted images were created and  reviewed.  This CT exam was performed using one or more of the following  dose reduction techniques:  automated exposure control, adjustment of  the mA and/or kV according to patient size, and/or use of iterative  reconstruction technique.     COMPARISON:    11/10/2022     FINDINGS:    Lungs and pleural spaces:  Miniscule right pleural effusion.  Right  lower lobe airspace disease. Partially consolidative.  Interstitial  edema.  Pulmonary vascular congestion.    Heart:  Marked cardiomegaly.  Valvuloplasty changes of the mitral  valve.  No significant pericardial effusion.  No significant coronary  artery calcifications.    Mediastinum:  No mediastinal or hilar lymphadenopathy.    Bones/joints:  Median sternotomy.  No acute fracture.  No dislocation.    Soft tissues:  Unremarkable.    Vasculature:  Mild atherosclerosis thoracic aorta.    Lymph nodes:  See above.       Impression:      1.  CHF with interstitial edema and a miniscule right pleural effusion.  Pulmonary vascular congestion noted.  2.  Right lower lobe airspace disease which may represent pneumonia  and/or atelectasis.  3.  Changes of prior median sternotomy with valvuloplasty.  4.  Other incidental/nonacute findings as above.     This report was finalized on 7/19/2023 3:14 PM by Dr. Corey Powell MD.       XR Chest 1 View [[id number]] Collected: 07/19/23 1400     Updated: 07/19/23 1403    Narrative:      EXAM:    XR Chest, 1 View     EXAM DATE:    7/19/2023 1:23 PM     CLINICAL HISTORY:    sob     TECHNIQUE:    Frontal view of the chest.     COMPARISON:    06/12/2023     FINDINGS:    Lungs and pleural spaces:  Trace right pleural effusion.  Stable right  basilar airspace disease.  No pneumothorax.    Heart:  Cardiomegaly is stable.    Mediastinum:  Unremarkable.    Bones/joints:  Median sternotomy.     Vasculature:  Mild vascular congestion.    Tubes, lines and devices:  Atrial closure device.    Upper abdomen:  Elevation right hemidiaphragm, stable.    Other findings:  Valvuloplasty changes noted.       Impression:      1.  Mild CHF with cardiomegaly and pulmonary vessel congestion.  2.  Stable right basilar airspace disease likely atelectasis with stable  elevation of right hemidiaphragm.  3.  Stable postsurgical cardiovascular findings.     This report was finalized on 7/19/2023 2:01 PM by Dr. Corey Powell MD.               CURRENT MEDICATIONS:  Current list of medications may not reflect those currently placed in orders that are not signed or are being held.     ascorbic acid, 500 mg, Oral, Daily  aspirin, 81 mg, Oral, Daily  enoxaparin, 40 mg, Subcutaneous, Daily  losartan, 50 mg, Oral, Daily  metoprolol succinate XL, 12.5 mg, Oral, BID  senna-docusate sodium, 2 tablet, Oral, BID  sodium chloride, 10 mL, Intravenous, Q12H           senna-docusate sodium **AND** polyethylene glycol **AND** bisacodyl **AND** bisacodyl    Calcium Replacement - Follow Nurse / BPA Driven Protocol    Magnesium Standard Dose Replacement - Follow Nurse / BPA Driven Protocol    nitroglycerin    Phosphorus Replacement - Follow Nurse / BPA Driven Protocol    Potassium Replacement - Follow Nurse / BPA Driven Protocol    [COMPLETED] Insert Peripheral IV **AND** sodium chloride    sodium chloride    sodium chloride    Thank you very much for asking us to be involved in this patient's care.  We will follow along with you.    I have discussed the patients findings and my recommendations with patient and his wife at bedside..      Electronically signed by SREEKANTH Lopez, 07/20/23, 11:31 AM EDT.     Electronically signed by Mahendra Amador MD, 07/20/23, 4:58 PM EDT.        Please note that portions of this note were copied and has been reviewed and is accurate as of 7/20/2023 .      Please note that portions of this note were  completed with a voice recognition program.

## 2023-07-21 LAB
ALBUMIN SERPL-MCNC: 3.6 G/DL (ref 3.5–5.2)
ANION GAP SERPL CALCULATED.3IONS-SCNC: 9.1 MMOL/L (ref 5–15)
BUN SERPL-MCNC: 18 MG/DL (ref 8–23)
BUN/CREAT SERPL: 15.4 (ref 7–25)
CALCIUM SPEC-SCNC: 9 MG/DL (ref 8.6–10.5)
CHLORIDE SERPL-SCNC: 97 MMOL/L (ref 98–107)
CO2 SERPL-SCNC: 33.9 MMOL/L (ref 22–29)
CREAT SERPL-MCNC: 1.17 MG/DL (ref 0.76–1.27)
EGFRCR SERPLBLD CKD-EPI 2021: 68.3 ML/MIN/1.73
GLUCOSE SERPL-MCNC: 134 MG/DL (ref 65–99)
PHOSPHATE SERPL-MCNC: 3.8 MG/DL (ref 2.5–4.5)
POTASSIUM SERPL-SCNC: 4.3 MMOL/L (ref 3.5–5.2)
QT INTERVAL: 400 MS
QTC INTERVAL: 467 MS
SODIUM SERPL-SCNC: 140 MMOL/L (ref 136–145)

## 2023-07-21 PROCEDURE — 25010000002 ENOXAPARIN PER 10 MG: Performed by: STUDENT IN AN ORGANIZED HEALTH CARE EDUCATION/TRAINING PROGRAM

## 2023-07-21 PROCEDURE — 99232 SBSQ HOSP IP/OBS MODERATE 35: CPT | Performed by: STUDENT IN AN ORGANIZED HEALTH CARE EDUCATION/TRAINING PROGRAM

## 2023-07-21 PROCEDURE — 99232 SBSQ HOSP IP/OBS MODERATE 35: CPT | Performed by: INTERNAL MEDICINE

## 2023-07-21 PROCEDURE — 80069 RENAL FUNCTION PANEL: CPT | Performed by: STUDENT IN AN ORGANIZED HEALTH CARE EDUCATION/TRAINING PROGRAM

## 2023-07-21 RX ORDER — BUMETANIDE 0.25 MG/ML
1 INJECTION INTRAMUSCULAR; INTRAVENOUS ONCE
Status: COMPLETED | OUTPATIENT
Start: 2023-07-21 | End: 2023-07-21

## 2023-07-21 RX ORDER — BUMETANIDE 0.25 MG/ML
1 INJECTION INTRAMUSCULAR; INTRAVENOUS EVERY 12 HOURS
Status: COMPLETED | OUTPATIENT
Start: 2023-07-21 | End: 2023-07-22

## 2023-07-21 RX ORDER — ACETAMINOPHEN 325 MG/1
650 TABLET ORAL EVERY 6 HOURS PRN
Status: DISCONTINUED | OUTPATIENT
Start: 2023-07-21 | End: 2023-07-22 | Stop reason: HOSPADM

## 2023-07-21 RX ADMIN — LOSARTAN POTASSIUM 50 MG: 50 TABLET, FILM COATED ORAL at 08:50

## 2023-07-21 RX ADMIN — Medication 10 ML: at 08:51

## 2023-07-21 RX ADMIN — METOPROLOL SUCCINATE 12.5 MG: 25 TABLET, EXTENDED RELEASE ORAL at 08:50

## 2023-07-21 RX ADMIN — ASPIRIN 81 MG: 81 TABLET, CHEWABLE ORAL at 08:50

## 2023-07-21 RX ADMIN — ACETAMINOPHEN 650 MG: 325 TABLET ORAL at 20:13

## 2023-07-21 RX ADMIN — BUMETANIDE 1 MG: 0.25 INJECTION, SOLUTION INTRAMUSCULAR; INTRAVENOUS at 09:19

## 2023-07-21 RX ADMIN — BUMETANIDE 1 MG: 0.25 INJECTION, SOLUTION INTRAMUSCULAR; INTRAVENOUS at 20:12

## 2023-07-21 RX ADMIN — SPIRONOLACTONE 12.5 MG: 25 TABLET ORAL at 08:55

## 2023-07-21 RX ADMIN — ACETAMINOPHEN 650 MG: 325 TABLET ORAL at 01:34

## 2023-07-21 RX ADMIN — OXYCODONE HYDROCHLORIDE AND ACETAMINOPHEN 500 MG: 500 TABLET ORAL at 08:50

## 2023-07-21 RX ADMIN — ENOXAPARIN SODIUM 40 MG: 40 INJECTION SUBCUTANEOUS at 20:12

## 2023-07-21 RX ADMIN — METOPROLOL SUCCINATE 12.5 MG: 25 TABLET, EXTENDED RELEASE ORAL at 20:13

## 2023-07-21 NOTE — PROGRESS NOTES
[facility] HOSPITALIST PROGRESS NOTE     Patient Identification:  Name:  Damián Myers  Age:  67 y.o.  Sex:  male  :  1955  MRN:  [id number]  Visit Number:  [id number]  ROOM: [address] [place] [address]     Primary Care Provider:  Spencer Saeed MD     Date of Admission: 2023    Length of stay in inpatient status:  2    Subjective     Chief Compliant:    Chief Complaint   Patient presents with    Shortness of Breath       Patient urinating frequently following IV bumex doses. O2 sat on RA improved but remains <88% but asymptomatic. Witnessed hypersomnolence during daytime and apnea when sleeping in room. Orthopnea improving but remains present.        Objective     Current Hospital Meds:  ascorbic acid, 500 mg, Oral, Daily  aspirin, 81 mg, Oral, Daily  bumetanide, 1 mg, Intravenous, Q12H  empagliflozin, 10 mg, Oral, Daily  enoxaparin, 40 mg, Subcutaneous, Daily  losartan, 50 mg, Oral, Daily  metoprolol succinate XL, 12.5 mg, Oral, BID  senna-docusate sodium, 2 tablet, Oral, BID  sodium chloride, 10 mL, Intravenous, Q12H  spironolactone, 12.5 mg, Oral, Daily    Pharmacy Consult,       Current Antimicrobial Therapy:  Anti-Infectives (From admission, onward)      None          Current Diuretic Therapy:  Diuretics (From admission, onward)      Ordered     Dose/Rate Route Frequency Start Stop    23 1227  bumetanide (BUMEX) injection 1 mg        Ordering Provider: Dana Olivo APRN    1 mg Intravenous Every 12 Hours 23 2100 23 2059    23 0751  bumetanide (BUMEX) injection 1 mg        Ordering Provider: Bruno Stearns MD    1 mg Intravenous Once 23 0900 23 0919    23 1709  spironolactone (ALDACTONE) tablet 12.5 mg        Ordering Provider: Mahendra Amador MD    12.5 mg Oral Daily 23 1800      23 1117  bumetanide (BUMEX) injection 1 mg        Ordering Provider: Bruno Stearns MD    1 mg Intravenous Once 23 1300 23 1332    23 1654   bumetanide (BUMEX) injection 1 mg        Ordering Provider: Bruno Stearns MD    1 mg Intravenous Once 07/19/23 1745 07/19/23 1809          ----------------------------------------------------------------------------------------------------------------------  Vital Signs:  Temp:  [98 °F (36.7 °C)-98.3 °F (36.8 °C)] 98.1 °F (36.7 °C)  Heart Rate:  [68-88] 68  Resp:  [20] 20  BP: ()/(55-74) 93/55  SpO2:  [79 %-94 %] 91 %  on  Flow (L/min):  [2] 2;   Device (Oxygen Therapy): nasal cannula  Body mass index is 33.54 kg/m².    Wt Readings from Last 3 Encounters:   07/21/23 122 kg (268 lb 4.8 oz)   06/26/23 119 kg (262 lb)   06/12/23 118 kg (260 lb 9.6 oz)     Intake & Output (last 3 days)         07/18 0701 07/19 0700 07/19 0701 07/20 0700 07/20 0701 07/21 0700 07/21 0701  07/22 0700    P.O.  360 1500 720    Total Intake(mL/kg)  360 (3) 1500 (12.3) 720 (5.9)    Urine (mL/kg/hr)   5310 (1.8) 1000 (1.2)    Total Output   5310 1000    Net  +360 -3810 -280            Urine Unmeasured Occurrence  4 x 2 x           Diet: Cardiac Diets; Healthy Heart (2-3 Na+); Texture: Regular Texture (IDDSI 7); Fluid Consistency: Thin (IDDSI 0)  ----------------------------------------------------------------------------------------------------------------------  Physical Exam  Vitals and nursing note reviewed.   Constitutional:       General: He is not in acute distress.     Comments: Somnolent at times but easily arouses   HENT:      Head: Normocephalic and atraumatic.      Mouth/Throat:      Mouth: Mucous membranes are moist.      Pharynx: Oropharynx is clear.   Eyes:      General: No scleral icterus.     Extraocular Movements: Extraocular movements intact.   Cardiovascular:      Rate and Rhythm: Normal rate.      Pulses: Normal pulses.   Pulmonary:      Effort: Pulmonary effort is normal. No respiratory distress.      Breath sounds: No wheezing or rales.   Abdominal:      General: Abdomen is flat.      Palpations: Abdomen is  soft.   Musculoskeletal:      Right lower leg: Edema present.      Left lower leg: Edema (Improving b/l) present.   Skin:     General: Skin is warm and dry.      Capillary Refill: Capillary refill takes less than 2 seconds.      Comments: Chronic bronzing of b/l LE   Neurological:      General: No focal deficit present.      Mental Status: He is oriented to person, place, and time.   Psychiatric:         Mood and Affect: Mood normal.         Behavior: Behavior normal.     ----------------------------------------------------------------------------------------------------------------------    ----------------------------------------------------------------------------------------------------------------------  LABS:    CBC and coagulation:  Results from last 7 days   Lab Units 07/20/23  0059 07/19/23  1337 07/19/23  1249   SED RATE mm/hr  --   --  4   CRP mg/dL  --  0.30  --    WBC 10*3/mm3 6.21  --  6.01   HEMOGLOBIN g/dL 16.3  --  16.2   HEMATOCRIT % 56.4*  --  56.0*   MCV fL 100.7*  --  100.0*   MCHC g/dL 28.9*  --  28.9*   PLATELETS 10*3/mm3 115*  --  112*   INR   --   --  0.97       Acid/base balance:  Results from last 7 days   Lab Units 07/19/23  1312   PH, ARTERIAL pH units 7.367   PO2 ART mm Hg 47.0*   PCO2, ARTERIAL mm Hg 63.9*   HCO3 ART mmol/L 36.6*       Renal and electrolytes:  Results from last 7 days   Lab Units 07/21/23 0232 07/20/23 2008 07/20/23  0059 07/19/23  1337   SODIUM mmol/L 140  --  143 143   POTASSIUM mmol/L 4.3 4.6 4.6 4.6   MAGNESIUM mg/dL  --  2.0 2.3 2.2   CHLORIDE mmol/L 97*  --  101 102   CO2 mmol/L 33.9*  --  34.5* 33.2*   BUN mg/dL 18  --  18 19   CREATININE mg/dL 1.17  --  1.32* 1.35*   CALCIUM mg/dL 9.0  --  9.2 9.0   PHOSPHORUS mg/dL 3.8  --   --   --    GLUCOSE mg/dL 134*  --  118* 96       Estimated Creatinine Clearance: 86.2 mL/min (by C-G formula based on SCr of 1.17 mg/dL).    Liver and pancreatic function:  Results from last 7 days   Lab Units 07/21/23 0232  07/19/23  1337   ALBUMIN g/dL 3.6 3.7   BILIRUBIN mg/dL  --  0.5   ALK PHOS U/L  --  66   AST (SGOT) U/L  --  36   ALT (SGPT) U/L  --  19       Endocrine function:  Lab Results   Component Value Date    HGBA1C 5.10 09/20/2022     Point of care bedside glucose levels:      Glucose levels from the [facility]:  Results from last 7 days   Lab Units 07/21/23  0232 07/20/23  0059 07/19/23  1337   GLUCOSE mg/dL 134* 118* 96       Lab Results   Component Value Date    TSH 3.760 07/19/2023    FREET4 1.06 07/19/2023     Cardiac:  Results from last 7 days   Lab Units 07/19/23  1605 07/19/23  1337   CK TOTAL U/L  --  72   HSTROP T ng/L 45* 46*   PROBNP pg/mL  --  2,395.0*         Cultures:  Lab Results   Component Value Date    COLORU Yellow 07/20/2023    CLARITYU Clear 07/20/2023    PHUR 7.0 07/20/2023    GLUCOSEU Negative 07/20/2023    KETONESU Negative 07/20/2023    BLOODU Negative 07/20/2023    NITRITEU Negative 07/20/2023    LEUKOCYTESUR Negative 07/20/2023    BILIRUBINUR Negative 07/20/2023    UROBILINOGEN 1.0 E.U./dL 07/20/2023     Microbiology Results (last 10 days)       Procedure Component Value - Date/Time    COVID-19 and FLU A/B PCR - Swab, Nasopharynx [[id number]]  (Normal) Collected: 07/19/23 1515    Lab Status: Final result Specimen: Swab from Nasopharynx Updated: 07/19/23 1539     COVID19 Not Detected     Influenza A PCR Not Detected     Influenza B PCR Not Detected    Narrative:      Fact sheet for providers: https://www.fda.gov/media/457823/download    Fact sheet for patients: https://www.fda.gov/media/163329/download    Test performed by PCR.            No results found for: PREGTESTUR, PREGSERUM, HCG, HCGQUANT  Pain Management Panel          Latest Ref Rng & Units 8/30/2022   Pain Management Panel   Amphetamine, Urine Qual Negative Negative    Barbiturates Screen, Urine Negative Negative    Benzodiazepine Screen, Urine Negative Negative    Buprenorphine, Screen, Urine Negative Negative    Cocaine Screen, Urine  Negative Negative    Methadone Screen , Urine Negative Negative    Methamphetamine, Ur Negative Negative      I have personally looked at the labs and they are summarized above.  ----------------------------------------------------------------------------------------------------------------------  Detailed radiology reports for the last 24 hours:    Imaging Results (Last 24 Hours)       ** No results found for the last 24 hours. **            Assessment & Plan      #Acute on chronic HFmrEF  -Most recent echo 6/12/23 with EF reduced as compared to fall 2022 (now 41-45%) and grade 2 diastolic dysfunction though no CP or anginal equivalents reported, no palpitations  -Given lack of diuretic response explaining increasing hypervolemia and recent echo, repeat not ordered  -strict I&Os, daily weights  -Cont daily asa, statin  -Reported dry weight 240-245lbs  -Cost prohibitive for entresto and jardiance, tolerating losartan and spironolactone  -Cardiology rec repeat IV diuresis today, cr improving with decongestion thus Bumex 1mg x2 doses q12hr today  -Reds vest 33, stable from prior but cont requiring O2, multifactorial     #Acute hypoxic resp failure  -Multifactorial. Likely with some fibrotic changes from recurrent pulm edema however also with likely severe MISAEL not previously diagnosed but has used nocturnal O2 in past  -Titrate O2 off as tolerated but will likely need prn and qhs O2 at discharge  -Outpatient sleep referral for sleep study     - - Chronic conditions - -      #CKD: Cr stable  #Bioprosthetic mitral valve replacement not on AC  #Afib not on ac, s/p ANGELICA ligation: Cont daily asa and metoprolol    VTE Prophylaxis:   Mechanical Order History:       None          Pharmalogical Order History:        Ordered     Dose Route Frequency Stop    07/19/23 4035  Enoxaparin Sodium (LOVENOX) syringe 40 mg         40 mg SC Daily --                    Code Status and Medical Interventions:   Ordered at: 07/19/23 9667      Code Status (Patient has no pulse and is not breathing):    CPR (Attempt to Resuscitate)     Medical Interventions (Patient has pulse or is breathing):    Full Support     Release to patient:    Routine Release         Disposition: Cont tele monitoring, d/c <48hrs    I have reviewed any copied/forwarded text or data, verified its accuracy, and updated as necessary above.    Bruno Stearns MD  [facility]  07/21/23  13:52 EDT

## 2023-07-21 NOTE — PLAN OF CARE
Goal Outcome Evaluation:      Patient resting in bed throughout shift. Patient is alert and oriented x4. Patient is currently on O2@ 2L NC. Patient is SR on the monitor. Patient up walking in room PRN. No s/s of acute distress noted at this time. VSS. PRN medications administered, See MAR. Will continue to follow plan of care throughout shift.

## 2023-07-21 NOTE — PROGRESS NOTES
[facility]  PROGRESS NOTE    Patient information:  Name: Damián Myers  Age/Sex: 67 y.o. male  :  1955        PCP: Spencer Saeed MD  Attending: Bruno Stearns MD  MRN:  [id number]  Visit Number:  [id number]    LOS:  LOS: 2 days     CODE STATUS:    Code Status and Medical Interventions:   Ordered at: 23 1432     Code Status (Patient has no pulse and is not breathing):    CPR (Attempt to Resuscitate)     Medical Interventions (Patient has pulse or is breathing):    Full Support     Release to patient:    Routine Release       PROBLEM LIST:Principal Problem:    Heart failure with reduced ejection fraction and diastolic dysfunction  Active Problems:    Acute on chronic HFrEF (heart failure with reduced ejection fraction)      Reason for Cardiology follow-up: HFrEF with troponin elevation     Subjective   ADMISSION INFORMATION:  Chief Complaint   Patient presents with    Shortness of Breath       HPI:  Damián Myers is a 67 y.o. male with a past medical history significant for HFrEF with LVEF of 41 to 45%, CAD status post CABG, CVA, essential hypertension, dyslipidemia, paroxysmal atrial fibrillation s/p ANGELICA lateral ligation, COPD, CKD stage III, mitral valve replacement with 33 mm Magna Ease tissue mitral valve  in 2022, dilated cardiomyopathy, history of GI bleed, and obesity.   Patient presented to [facility] ([facility]) emergency room (ER) on 2023 with complaints of increased shortness of breath.  Patient was recently transition from Lasix to Bumex secondary to increased LE edema and increased shortness of breath on his last appointment with cardiology 2023.  2023 patient had a follow-up with his PCP for progressive dyspnea, LE edema, and persistent fatigue over the last 2 weeks, and thus prompting him to present to the ER for further evaluation. Patient was admitted and Cardiology was consulted for further evaluation and  management HFrEF with elevated troponins.      Primary Cardiologist has been KATHRINE Steele and he was last seen in the office on 06/26/2023.     Interval History:   Patient is in room 320 and was examined by Dr. Dr. Amador.  Telemetry reveals SR 60-70's with occasional PVCs, a brief episode of bigeminey, 10 beat run of V-tach and 8 beats of an Ab conduction in the last 12 hours.  Please see telemetry strips attached below.  Potassium 4.3, creatinine has improved to 1.17 from 1.35, and magnesium was 2.0 on 7/20/2023.  Patient is lying in bed resting quietly.  No acute distress noted at this time.  Head of bed is elevated to 45 degrees.  Patient denies any dizziness, lightheadedness, chest pain, shortness of breath, or palpitations.  Patient reports his edema has improved.  Patient did have excellent diuresing overnight with a -2760 mL noted on his I & O flow sheet.  Pharmacy was consulted on price check for Jardiance and they discovered patient does not have any prescription insurance coverage.     ORDERS:   Bumex 1 mg IV BID x 2 doses starting at 21:00 pm today. (Aware he had a dose this AM already).   BMP in AM.     Vital Signs  Temp:  [98 °F (36.7 °C)-98.3 °F (36.8 °C)] 98.2 °F (36.8 °C)  Heart Rate:  [70-88] 73  Resp:  [20] 20  BP: (111-122)/(66-74) 122/74  Flow (L/min):  [2] 2  Vital Signs (last 72 hrs)         07/18 0700  07/19 0659 07/19 0700 07/20 0659 07/20 0700 07/21 0659 07/21 0700 07/21 0729   Most Recent      Temp (°F)   97.2 -  98.3    97.7 -  98.3       98.2 (36.8) 07/21 0653    Heart Rate   66 -  84    70 -  88       73 07/21 0653    Resp   18 -  22      20       20 07/21 0653    BP   110/65 -  159/93    111/69 -  131/73       122/74 07/21 0653    SpO2 (%)   83 -  97    79 -  95       93 07/21 0653          Body mass index is 33.29 kg/m².    Intake/Output Summary (Last 24 hours) at 7/21/2023 0729  Last data filed at 7/21/2023 0100  Gross per 24 hour   Intake 1500 ml   Output 4260 ml   Net -2760  ml       Objective     I have seen and examined Mr. Myers today.  Physical Exam:      General Appearance:    Alert, cooperative, in no acute distress.   Head:    Normocephalic, without obvious abnormality, atraumatic.   Eyes:                          Conjunctivae and sclerae normal, no icterus, no pallor, corneas clear.   Neck:   No adenopathy, supple, trachea midline, no thyromegaly, no carotid bruit, no JVD.   Lungs:   Rales noted to right base to auscultation, respirations regular, even and unlabored.    Heart:    Regular rhythm and normal rate, normal S1 and S2, no          murmur, no gallop, no rub, no click   Chest Wall:    No abnormalities observed.   Abdomen:     Normal bowel sounds, no masses, no organomegaly, soft nontender, nondistended, no guarding, no rebound tenderness.   Extremities:   Moves all extremities well, no edema, no cyanosis, no           redness.   Pulses:   Pulses palpable and equal bilaterally.   Skin:   No bleeding, bruising or rash.   Neurologic:   Alert and Oriented x 3, Speech Clear & comprehensive.       Results review   Results Review:   Results from last 7 days   Lab Units 07/20/23  0059 07/19/23  1249   WBC 10*3/mm3 6.21 6.01   HEMOGLOBIN g/dL 16.3 16.2   PLATELETS 10*3/mm3 115* 112*     Results from last 7 days   Lab Units 07/21/23  0232 07/20/23 2008 07/20/23  0059 07/19/23  1337   SODIUM mmol/L 140  --  143 143   POTASSIUM mmol/L 4.3 4.6 4.6 4.6   CHLORIDE mmol/L 97*  --  101 102   CO2 mmol/L 33.9*  --  34.5* 33.2*   BUN mg/dL 18  --  18 19   CREATININE mg/dL 1.17  --  1.32* 1.35*   CALCIUM mg/dL 9.0  --  9.2 9.0   GLUCOSE mg/dL 134*  --  118* 96   ALT (SGPT) U/L  --   --   --  19   AST (SGOT) U/L  --   --   --  36     Results from last 7 days   Lab Units 07/19/23  1605 07/19/23  1337   CK TOTAL U/L  --  72   HSTROP T ng/L 45* 46*     Lab Results   Component Value Date    PROBNP 2,395.0 (H) 07/19/2023    PROBNP 1,042.0 (H) 01/26/2023    PROBNP 1,475.0 (H) 10/25/2022     No  results found.  Lab Results   Component Value Date    ABSOLUTELUNG 33 07/20/2023     Lab Results   Component Value Date    INR 0.97 07/19/2023    INR 1.10 09/19/2022    INR 1.12 09/03/2022    INR 1.44 (H) 09/02/2022    INR 1.56 (H) 09/02/2022    INR 0.97 08/30/2022    INR 0.95 08/05/2022     Lab Results   Component Value Date    MG 2.0 07/20/2023    MG 2.3 07/20/2023    MG 2.2 07/19/2023     Lab Results   Component Value Date    TSH 3.760 07/19/2023    PSA 0.433 11/06/2019      No results found for: CHOL, TRIG, HDL, LDL  Pain Management Panel          Latest Ref Rng & Units 8/30/2022   Pain Management Panel   Amphetamine, Urine Qual Negative Negative    Barbiturates Screen, Urine Negative Negative    Benzodiazepine Screen, Urine Negative Negative    Buprenorphine, Screen, Urine Negative Negative    Cocaine Screen, Urine Negative Negative    Methadone Screen , Urine Negative Negative    Methamphetamine, Ur Negative Negative      Microbiology Results (last 10 days)       Procedure Component Value - Date/Time    COVID-19 and FLU A/B PCR - Swab, Nasopharynx [[id number]]  (Normal) Collected: 07/19/23 1515    Lab Status: Final result Specimen: Swab from Nasopharynx Updated: 07/19/23 1539     COVID19 Not Detected     Influenza A PCR Not Detected     Influenza B PCR Not Detected    Narrative:      Fact sheet for providers: https://www.fda.gov/media/319724/download    Fact sheet for patients: https://www.fda.gov/media/402777/download    Test performed by PCR.           Imaging Results (Last 48 Hours)       Procedure Component Value Units Date/Time    CT Chest Without Contrast Diagnostic [[id number]] Collected: 07/19/23 1511     Updated: 07/19/23 1516    Narrative:      EXAM:    CT Chest Without Intravenous Contrast     EXAM DATE:    7/19/2023 1:59 PM     CLINICAL HISTORY:    COPD suspected     TECHNIQUE:    Axial computed tomography images of the chest without intravenous  contrast.  Sagittal and coronal reformatted images  were created and  reviewed.  This CT exam was performed using one or more of the following  dose reduction techniques:  automated exposure control, adjustment of  the mA and/or kV according to patient size, and/or use of iterative  reconstruction technique.     COMPARISON:    11/10/2022     FINDINGS:    Lungs and pleural spaces:  Miniscule right pleural effusion.  Right  lower lobe airspace disease. Partially consolidative.  Interstitial  edema.  Pulmonary vascular congestion.    Heart:  Marked cardiomegaly.  Valvuloplasty changes of the mitral  valve.  No significant pericardial effusion.  No significant coronary  artery calcifications.    Mediastinum:  No mediastinal or hilar lymphadenopathy.    Bones/joints:  Median sternotomy.  No acute fracture.  No dislocation.    Soft tissues:  Unremarkable.    Vasculature:  Mild atherosclerosis thoracic aorta.    Lymph nodes:  See above.       Impression:      1.  CHF with interstitial edema and a miniscule right pleural effusion.  Pulmonary vascular congestion noted.  2.  Right lower lobe airspace disease which may represent pneumonia  and/or atelectasis.  3.  Changes of prior median sternotomy with valvuloplasty.  4.  Other incidental/nonacute findings as above.     This report was finalized on 7/19/2023 3:14 PM by Dr. Corey Powell MD.       XR Chest 1 View [[id number]] Collected: 07/19/23 1400     Updated: 07/19/23 1403    Narrative:      EXAM:    XR Chest, 1 View     EXAM DATE:    7/19/2023 1:23 PM     CLINICAL HISTORY:    sob     TECHNIQUE:    Frontal view of the chest.     COMPARISON:    06/12/2023     FINDINGS:    Lungs and pleural spaces:  Trace right pleural effusion.  Stable right  basilar airspace disease.  No pneumothorax.    Heart:  Cardiomegaly is stable.    Mediastinum:  Unremarkable.    Bones/joints:  Median sternotomy.    Vasculature:  Mild vascular congestion.    Tubes, lines and devices:  Atrial closure device.    Upper abdomen:  Elevation right  hemidiaphragm, stable.    Other findings:  Valvuloplasty changes noted.       Impression:      1.  Mild CHF with cardiomegaly and pulmonary vessel congestion.  2.  Stable right basilar airspace disease likely atelectasis with stable  elevation of right hemidiaphragm.  3.  Stable postsurgical cardiovascular findings.     This report was finalized on 7/19/2023 2:01 PM by Dr. Corey Powell MD.               ECHO:  Results for orders placed during the hospital encounter of 06/12/23    Adult Transthoracic Echo Complete W/ Cont if Necessary Per Protocol    Interpretation Summary  Images from the original result were not included.      Normal left ventricular cavity size and wall thickness noted. There is mild left ventricular global hypokinesis noted.    Left ventricular ejection fraction appears to be 41 - 45%.    Left ventricular diastolic function is consistent with (grade II w/high LAP) pseudonormalization.    There is a bioprosthetic mitral valve present.The prosthetic mitral valve is grossly normal.    Trace to mild mitral valve regurgitation is present. No significant mitral valve stenosis is present    The aortic valve is structurally normal with no regurgitation or stenosis present.    Mild tricuspid valve regurgitation is present. Estimated right ventricular systolic pressure from tricuspid regurgitation is mildly elevated (35-45 mmHg). Mild pulmonary hypertension is present.    There is no evidence of pericardial effusion.      STRESS TEST:       HEART CATH:  Results for orders placed during the hospital encounter of 06/25/22    Cardiac Catheterization/Vascular Study    Narrative  Images from the original result were not included.  CARDIAC CATHETERIZATION / INTERVENTION REPORT  DATE OF PROCEDURE: 6/27/2022      INDICATION FOR PROCEDURE:  Mitral regurgitation  Dyspnea      PRE PROCEDURE DIAGNOSIS:  Mild non obstructive CAD  3-4+ MR  LVEDP 19 mmHg.  LVEF 50 to 55%.      POST PROCEDURE DIAGNOSIS:      Face to  face mdoerate conscious  sedation time :      COMPLICATIONS : None    Specimens collected : None    PROCEDURE PERFORMED:    1. Selective right and left Coronary Angiogram  2. Left heart catheretization  3. Left Ventriculography    Description of the procedure:  Prior to the procedure risk, benefits and possible alternative were discussed with the patient and informed consent was obtained. Patient was brought to cardiac cath lab table in post absorbtive state. Patient was prepped and drape in usual sterile fashion. IV Versed and Fentanyl was used for moderate sedation. 2% Lidocaine was used for topical anesthesia. R radial arterial site was prepped and a micropuncture needle was used to access the artery and a 5 F slender sheath was placed. 2.5 mg of Verapamil and 200 mcg of NTG was given through the sheath intra arterial and 5000 units of Heparin was given once the catheter crossed the aortic arch.    5 F TIG 4 catheters was used for right and left coronary angiogram and 5 F pigtail catheter was used for Left heart hemodynamics and left ventriculography. All the catheters were exchanged over 0.035 wire. The R radial arterial sheath was removed and TR band was applied and immediate and complete hemostasis was achieved. The patient tolerate the entire procedure well without any immediate known complications.    Coronary anatomy findings:    LM: Is a large calibre vessel , normal take off from left cusp, divides into LAD and Lcx.  No significant stenosis    LAD: Large caliber artery with multiple tandem 30 to 40% stenosis in the proximal mid and distal, the stent in the mid LAD past the diagonal artery appeared patent, the distal stented had about like 40 to 50% stenosis which was unchanged from the previous cath.    Ramus intermedius: Large-caliber vessel with no significant stenosis, mild diffuse disease.    LCX: Moderate calibre vessel, mild luminal irregularities.  Large OM branch with no significant stenosis the  AV groove circumflex had no significant stenosis    RCA: Large calibre, dominant artery, normal take off from right cusp.  The RCA appeared to be ectatic, multiple tandem 30 to 40% diffuse stenosis in the proximal mid and distal RCA, RPDA and PL branches had no significant stenosis.    Left Ventriculography:    LV systolic function was low normal, LV chamber appears dilated with visual estimated EF of 50 to 55%. No wall motion abnormalities.  3-4+ MR    LVEDP: 19 mmHg  No gradient across the aortic valve on pull back.    EBL: Less than 10cc    Final Impression:  Mild non obstructive CAD  3-4+ MR  LVEDP 19 mmHg.  LVEF 50 to 55%.    Recommendations:  Patient will need a transesophageal echocardiogram as he does have bileaflet mitral valve prolapse from transthoracic echo with moderate to severe MR, with eccentric jet, likely severe MR.    Greyson Balderas MD, [facility]  Interventional Cardiology    06/27/22  12:25 EDT      TELEMETRY:    SR 60-70's with occasional PVCs, a brief episode of bigeminey, 10 beat run of V-tach and 8 beats of an Ab conduction.                             I reviewed the patient's new clinical results.    Medication Review:   Current list of medications may not reflect those currently placed in orders that are not signed or are being held.     ascorbic acid, 500 mg, Oral, Daily  aspirin, 81 mg, Oral, Daily  empagliflozin, 10 mg, Oral, Daily  enoxaparin, 40 mg, Subcutaneous, Daily  losartan, 50 mg, Oral, Daily  metoprolol succinate XL, 12.5 mg, Oral, BID  senna-docusate sodium, 2 tablet, Oral, BID  sodium chloride, 10 mL, Intravenous, Q12H  spironolactone, 12.5 mg, Oral, Daily      Pharmacy Consult,         acetaminophen    senna-docusate sodium **AND** polyethylene glycol **AND** bisacodyl **AND** bisacodyl    Calcium Replacement - Follow Nurse / BPA Driven Protocol    Magnesium Standard Dose Replacement - Follow Nurse / BPA Driven Protocol    nitroglycerin    Pharmacy Consult    Phosphorus  Replacement - Follow Nurse / BPA Driven Protocol    Potassium Replacement - Follow Nurse / BPA Driven Protocol    [COMPLETED] Insert Peripheral IV **AND** sodium chloride    sodium chloride    sodium chloride    Assessment      Acute on chronic HFrEF.  History of severe mitral regurgitation, status post mitral valve replacement with a 23 mm Magna Ease tissue valve in September 2022.  LV systolic dysfunction with LV ejection fraction of 41 to 45% on recent echo Doppler study.  Paroxysmal atrial fibrillation with left atrial appendage ligation during the mitral valve replacement surgery.  Mild elevated troponin with a flat trend of around 45 with recent coronary angiography revealing nonobstructive CAD.  Possible obstructive sleep apnea.     Plan     Continue with IV Bumex for today and give it every 12 hours x2 more doses before switching to p.o.  Continue to monitor urine output and kidney function closely.  Continue with losartan as Entresto is too expensive for him.  Continue with metoprolol succinate and spironolactone as tolerated  Unable to put him on SGLT2 inhibitor again due to cost.  We will try for any assistance program as an outpatient both for Entresto and Jardiance/Farxiga.  Will consider sleep study as an outpatient to rule out sleep apnea.  I have reemphasized to him about the salt restriction in the diet and the foods to avoid and discussed with the patient and his wife at bedside.  They expressed understanding.    I have discussed the patients findings and my recommendations with patient.      Electronically signed by SREEKANTH Lopez, 07/21/23, 1:30 PM EDT.     Electronically signed by Mahendra Amador MD, 07/21/23, 1:47 PM EDT.          Please note that portions of this note were completed with a voice recognition program.    Please note that portions of this note were copied and has been reviewed and is accurate as of 7/21/2023 .

## 2023-07-21 NOTE — PLAN OF CARE
Goal Outcome Evaluation:   Pt has had a non eventful day. Rested in bed. He has ambulated to the bathroom. He is on 1 lnc sating 90-92 %. No s/s of distress noted at this time. Will continue to follow plan of care.       Update:1603  Pt now on 2 lnc sating between 90-94%. He had a shower today. He will continue to be monitored overnight. Possible discharge in the am.

## 2023-07-21 NOTE — CASE MANAGEMENT/SOCIAL WORK
Discharge Planning Assessment   [place]     Patient Name: Damián Myers  MRN: [id number]  Today's Date: 7/21/2023    Admit Date: 7/19/2023    Plan: Patient is an independent retired male who lives at home with his spouse & sons, where he plans to return at discharge. Patient's PCP is Dr. Saeed and he uses [facility] for his prescription needs. Patient denies any insurance or financial issues at this time.  Patient does not utilize Home Health and denies the need at this time.  Patient king s not have any DME at this time however, he states that the Doctor has told him that he will need oxygen at discharge and he prefers [facility].  Patient's wife will provide transportation at discharge.  No other issues or concerns are noted at this time.  CM will continue to follow and assist with any discharge needs.   Discharge Needs Assessment       Row Name 07/21/23 1602       Living Environment    People in Home spouse;child(yoel), adult    Name(s) of People in Home Wife-Anette, Child Geo & Santiago    Current Living Arrangements home    Duration at Residence 3 Yrs.    Potentially Unsafe Housing Conditions none    Primary Care Provided by self    Provides Primary Care For no one    Family Caregiver if Needed spouse    Family Caregiver Names Spouse Anette can assist if needed    Quality of Family Relationships helpful;involved;supportive    Able to Return to Prior Arrangements yes       Resource/Environmental Concerns    Resource/Environmental Concerns none    Transportation Concerns none       Transition Planning    Patient/Family Anticipates Transition to home    Patient/Family Anticipated Services at Transition none    Transportation Anticipated car, drives self;family or friend will provide       Discharge Needs Assessment    Readmission Within the Last 30 Days no previous admission in last 30 days    Equipment Currently Used at Home none    Concerns to be Addressed no discharge needs identified;denies  needs/concerns at this time    Anticipated Changes Related to Illness none    Equipment Needed After Discharge oxygen    Provided Post Acute Provider List? Yes    Post Acute Provider List DME Supplier    Delivered To Patient    Method of Delivery In person    Patient's Choice of Community Agency(s) [facility] [employer]                   Discharge Plan       Row Name 07/21/23 1624       Plan    Plan Patient is an independent retired male who lives at home with his spouse & sons, where he plans to return at discharge. Patient's PCP is Dr. Saeed and he uses [facility] for his prescription needs. Patient denies any insurance or financial issues at this time.  Patient does not utilize Home Health and denies the need at this time.  Patient king s not have any DME at this time however, he states that the Doctor has told him that he will need oxygen at discharge and he prefers [facility] [employer].  Patient's wife will provide transportation at discharge.  No other issues or concerns are noted at this time.  CM will continue to follow and assist with any discharge needs.    Patient/Family in Agreement with Plan yes      Row Name 07/21/23 1605       Plan    Patient/Family in Agreement with Plan yes      Row Name 07/21/23 1538       Plan    Plan Comments 7/21:  1L N/C, IV Bumex, Pt ambulating in room, UOP 2760 mL, Pt had bigeminy last pm,  D/C <48hrs-KLS                  Continued Care and Services - Admitted Since 7/19/2023    Coordination has not been started for this encounter.       Expected Discharge Date and Time       Expected Discharge Date Expected Discharge Time    Jul 22, 2023            Demographic Summary       Row Name 07/21/23 1601       General Information    Admission Type inpatient    Arrived From home;emergency department    Referral Source admission list;emergency department    Reason for Consult discharge planning;other (see comments)  CM Trigger                   Functional Status       Row  "Name 07/21/23 1601       Functional Status    Usual Activity Tolerance good    Current Activity Tolerance good       Functional Status, IADL    Medications independent    Meal Preparation independent    Housekeeping independent    Laundry independent    Shopping independent       Mental Status    General Appearance WDL WDL       Mental Status Summary    Recent Changes in Mental Status/Cognitive Functioning no changes       Employment/    Employment Status retired    Current or Previous Occupation other (see comments)    Employment/ Comments Patient states he \"Did what ever I wanted\" when ask what he did before correction                   Psychosocial    No documentation.                  Abuse/Neglect    No documentation.                  Legal    No documentation.                  Substance Abuse    No documentation.                  Patient Forms    No documentation.                     Annia Ramirez RN    "

## 2023-07-22 VITALS
WEIGHT: 258.4 LBS | SYSTOLIC BLOOD PRESSURE: 100 MMHG | HEART RATE: 65 BPM | DIASTOLIC BLOOD PRESSURE: 67 MMHG | RESPIRATION RATE: 16 BRPM | BODY MASS INDEX: 32.13 KG/M2 | OXYGEN SATURATION: 96 % | TEMPERATURE: 97.7 F | HEIGHT: 75 IN

## 2023-07-22 LAB
ANION GAP SERPL CALCULATED.3IONS-SCNC: 10.7 MMOL/L (ref 5–15)
BUN SERPL-MCNC: 22 MG/DL (ref 8–23)
BUN/CREAT SERPL: 17.7 (ref 7–25)
CALCIUM SPEC-SCNC: 9 MG/DL (ref 8.6–10.5)
CHLORIDE SERPL-SCNC: 96 MMOL/L (ref 98–107)
CO2 SERPL-SCNC: 35.3 MMOL/L (ref 22–29)
CREAT SERPL-MCNC: 1.24 MG/DL (ref 0.76–1.27)
EGFRCR SERPLBLD CKD-EPI 2021: 63.7 ML/MIN/1.73
GLUCOSE SERPL-MCNC: 107 MG/DL (ref 65–99)
POTASSIUM SERPL-SCNC: 4.5 MMOL/L (ref 3.5–5.2)
SODIUM SERPL-SCNC: 142 MMOL/L (ref 136–145)

## 2023-07-22 PROCEDURE — 99239 HOSP IP/OBS DSCHRG MGMT >30: CPT | Performed by: STUDENT IN AN ORGANIZED HEALTH CARE EDUCATION/TRAINING PROGRAM

## 2023-07-22 PROCEDURE — 80048 BASIC METABOLIC PNL TOTAL CA: CPT | Performed by: NURSE PRACTITIONER

## 2023-07-22 RX ORDER — BUMETANIDE 1 MG/1
1 TABLET ORAL DAILY
Qty: 30 TABLET | Refills: 0 | Status: SHIPPED | OUTPATIENT
Start: 2023-07-22 | End: 2023-07-26 | Stop reason: SDUPTHER

## 2023-07-22 RX ORDER — SPIRONOLACTONE 25 MG/1
12.5 TABLET ORAL DAILY
Qty: 15 TABLET | Refills: 0 | Status: SHIPPED | OUTPATIENT
Start: 2023-07-23 | End: 2023-07-26 | Stop reason: SDUPTHER

## 2023-07-22 RX ORDER — BUMETANIDE 1 MG/1
1 TABLET ORAL DAILY
Status: DISCONTINUED | OUTPATIENT
Start: 2023-07-22 | End: 2023-07-22

## 2023-07-22 RX ADMIN — SPIRONOLACTONE 12.5 MG: 25 TABLET ORAL at 10:03

## 2023-07-22 RX ADMIN — LOSARTAN POTASSIUM 50 MG: 50 TABLET, FILM COATED ORAL at 10:06

## 2023-07-22 RX ADMIN — BUMETANIDE 1 MG: 0.25 INJECTION, SOLUTION INTRAMUSCULAR; INTRAVENOUS at 10:03

## 2023-07-22 RX ADMIN — OXYCODONE HYDROCHLORIDE AND ACETAMINOPHEN 500 MG: 500 TABLET ORAL at 10:06

## 2023-07-22 RX ADMIN — METOPROLOL SUCCINATE 12.5 MG: 25 TABLET, EXTENDED RELEASE ORAL at 10:04

## 2023-07-22 NOTE — PLAN OF CARE
Goal Outcome Evaluation:   VSS on 2 L nasal cannula, SR on the monitor. No indicators of acute distress noted.

## 2023-07-22 NOTE — DISCHARGE SUMMARY
[facility]    Patient Identification:  Name:  Damián Myers  Age:  67 y.o.  Sex:  male  :  1955  MRN:  [id number]  Visit Number:  [id number]    Date of Admission: 2023  Date of Discharge: 23     PCP: Spencer Saeed MD    DISCHARGE DIAGNOSES  #Acute on chronic HFmrEF  #Acute hypoxic resp failure     - - Chronic conditions - -      #CKD  #Bioprosthetic mitral valve replacement not on AC  #Afib not on ac, s/p ANGELICA ligation    CONSULTS   Consults       Date and Time Order Name Status Description    2023  4:54 PM Inpatient Cardiology Consult Completed             PROCEDURES PERFORMED  -    HOSPITAL COURSE  Mr. Myers is a 67 yoM with above noted PMH that presented initially with progressive dyspnea and weight gain ~10lbs over previously reported dry weight. He had been using bumex 0.5mg prn at home but notes no strong urinary response even with prn dosing prompting ED admission.    He was admitted and given IV bumex 1 mg with strong urinary response and continue diuresis over course of hospitalization BID with patient ~10lbs down from admit weight with oxygenation and orthopnea improving. Cardiology was consulted and discussed initiating jardiance and entresto but was cost prohibitive thus he was continued on home losartan and started spironolactone 12.5mg daily. He will cont PO bumex 1mg daily for the next week then once back to dry weight will resume 0.5mg daily on scheduled basis. If >5lbs weight gain in one week he will increase back to 1mg bumex daily until back to dry weight.    He has f/u next week in HF clinic and would repeat bmp at that time to monitor for hyperkalemia given ARB/spironolactone combo. Instructed him not to stop diuretics if he continues taking spironolactone given risk of hyperkalemia. While here his potassium remained wnl without required supplementation.    Course was uncomplicated, he did have very short run VT with initial  diuretics that did not recurr throughout stay. He did have noted sleep apnea and daytime hypersomnolence with exertional hypoxia and orthopnea improving with diuretics but still not back to baseline. He was given order for nocturnal and prn O2 and we strongly recommend outpatient sleep study to be arranged at first f/u appointment. He was given sleep clincic f/u appointment at discharge.                  VITAL SIGNS:  Temp:  [97.6 °F (36.4 °C)-98.8 °F (37.1 °C)] 97.6 °F (36.4 °C)  Heart Rate:  [66-80] 79  Resp:  [14-20] 14  BP: ()/(53-82) 118/67  SpO2:  [90 %-94 %] 92 %  on  Flow (L/min):  [1-2] 2;   Device (Oxygen Therapy): nasal cannula    Body mass index is 32.3 kg/m².  Wt Readings from Last 3 Encounters:   07/22/23 117 kg (258 lb 6.4 oz)   06/26/23 119 kg (262 lb)   06/12/23 118 kg (260 lb 9.6 oz)       PHYSICAL EXAM:  Physical Exam  Vitals and nursing note reviewed.   Constitutional:       General: He is not in acute distress.     Comments: Somnolent at times but easily arouses   HENT:      Head: Normocephalic and atraumatic.      Mouth/Throat:      Mouth: Mucous membranes are moist.      Pharynx: Oropharynx is clear.   Eyes:      General: No scleral icterus.     Extraocular Movements: Extraocular movements intact.   Cardiovascular:      Rate and Rhythm: Normal rate.      Pulses: Normal pulses.   Pulmonary:      Effort: Pulmonary effort is normal. No respiratory distress.      Breath sounds: No wheezing or rales.   Abdominal:      General: Abdomen is flat.      Palpations: Abdomen is soft.   Musculoskeletal:      Right lower leg: Edema present.      Left lower leg: Edema (Improving b/l) present.   Skin:     General: Skin is warm and dry.      Capillary Refill: Capillary refill takes less than 2 seconds.      Comments: Chronic bronzing of b/l LE   Neurological:      General: No focal deficit present.      Mental Status: He is oriented to person, place, and time.   Psychiatric:         Mood and Affect: Mood  normal.         Behavior: Behavior normal.        DISCHARGE DISPOSITION   Stable       Discharge Medications        New Medications        Instructions Start Date   spironolactone 25 MG tablet  Commonly known as: ALDACTONE   12.5 mg, Oral, Daily   Start Date: July 23, 2023            Changes to Medications        Instructions Start Date   bumetanide 1 MG tablet  Commonly known as: BUMEX  What changed:   medication strength  how much to take   1 mg, Oral, Daily             Continue These Medications        Instructions Start Date   ascorbic acid 500 MG tablet  Commonly known as: VITAMIN C   500 mg, Oral, Daily      aspirin 81 MG chewable tablet   81 mg, Oral, Daily      losartan 50 MG tablet  Commonly known as: COZAAR   50 mg, Oral, Daily      metoprolol succinate XL 25 MG 24 hr tablet  Commonly known as: TOPROL-XL   12.5 mg, Oral, 2 Times Daily                 Additional Instructions for the Follow-ups that You Need to Schedule       Discharge Follow-up with PCP   As directed       Currently Documented PCP:    Spencer Saeed MD    PCP Phone Number:    [phone]     Follow Up Details: Within 2 weeks w/repeat bmp         Discharge Follow-up with Specialty: Pulmonology, sleep clinic; 2 Months   As directed      Specialty: Pulmonology, sleep clinic    Follow Up: 2 Months    Follow Up Details: Sleep study, witnessed MISAEL w/afib and HF         Discharge Follow-up with Specified Provider: Cardiology 7/26 as previously scheduled   As directed      To: Cardiology 7/26 as previously scheduled                Follow-up Information       Spencer Saeed MD .    Specialty: Family Medicine  Why: Within 2 weeks w/repeat bmp  Contact information:  [facility] [place] [address]  [phone]                              TEST  RESULTS PENDING AT DISCHARGE      I will notify patient via phone-call should above lab work result with any significant abnormal findings     CODE STATUS  Code Status and Medical Interventions:   Ordered at:  07/19/23 1432     Code Status (Patient has no pulse and is not breathing):    CPR (Attempt to Resuscitate)     Medical Interventions (Patient has pulse or is breathing):    Full Support     Release to patient:    Routine Release       The ASCVD Risk score (Kirill ALAN, et al., 2019) failed to calculate for the following reasons:    The patient has a prior MI or stroke diagnosis       Bruno Stearns MD  [facility] Hospitalist  07/22/23  10:22 EDT    Please note that this discharge summary required more than 30 minutes to complete.

## 2023-07-22 NOTE — DISCHARGE INSTR - APPOINTMENTS
Dr omar lizama  office  closed on  the  weekend  will  call on  Monday  and  get apointment  and  call  pt  with  apointment  will  get  a  referral  for  pulmonology   for  sleep  study   pt  has  an  apointment  with  elroy salinas  for July 26  at  8:30

## 2023-07-22 NOTE — NURSING NOTE
Contacted [facility] at [phone] regarding oxygen delivery. Spoke with Mandeep. Stated that he will provide a tank for discharge. Faxed O2 order and qualifying note to [phone]

## 2023-07-24 ENCOUNTER — TELEPHONE (OUTPATIENT)
Dept: TELEMETRY | Facility: HOSPITAL | Age: 68
End: 2023-07-24
Payer: MEDICARE

## 2023-07-24 NOTE — NURSING NOTE
Transitional Care Note    Enrolled in [facility] Transitional Care Note    Enrolled in [facility] Transitional Care Services under the CTI Model to be followed for 30 days post discharge.  [facility] will assist with support and education at time of transition home from hospital.  Hospital  will follow throughout the stay at [facility].  Home  will visit within 48 hours of discharge and follow with home vitals and telephone contact for 30 days      Patient admitted to [facility] via Emergency Department, complaints of shortness of breath.   Admitted for further treatment of heart failure.      Patient had been discharged contacted via phone.  Spoke with wife patient present in background.    Explained transition to home program and Patient and Family is agreeable to home visits.  Home  will be Rebecca Askew LPN

## 2023-07-26 ENCOUNTER — READMISSION MANAGEMENT (OUTPATIENT)
Dept: CALL CENTER | Facility: HOSPITAL | Age: 68
End: 2023-07-26
Payer: MEDICARE

## 2023-07-26 ENCOUNTER — OFFICE VISIT (OUTPATIENT)
Dept: CARDIOLOGY | Facility: CLINIC | Age: 68
End: 2023-07-26
Payer: MEDICARE

## 2023-07-26 VITALS
HEART RATE: 69 BPM | HEIGHT: 75 IN | OXYGEN SATURATION: 91 % | BODY MASS INDEX: 32.2 KG/M2 | SYSTOLIC BLOOD PRESSURE: 115 MMHG | WEIGHT: 259 LBS | DIASTOLIC BLOOD PRESSURE: 79 MMHG

## 2023-07-26 DIAGNOSIS — I25.10 ASCVD (ARTERIOSCLEROTIC CARDIOVASCULAR DISEASE): ICD-10-CM

## 2023-07-26 DIAGNOSIS — I50.22 CHRONIC HFREF (HEART FAILURE WITH REDUCED EJECTION FRACTION): Primary | ICD-10-CM

## 2023-07-26 DIAGNOSIS — I48.0 PAROXYSMAL ATRIAL FIBRILLATION: ICD-10-CM

## 2023-07-26 PROCEDURE — 99214 OFFICE O/P EST MOD 30 MIN: CPT | Performed by: PHYSICIAN ASSISTANT

## 2023-07-26 PROCEDURE — 1160F RVW MEDS BY RX/DR IN RCRD: CPT | Performed by: PHYSICIAN ASSISTANT

## 2023-07-26 PROCEDURE — 3078F DIAST BP <80 MM HG: CPT | Performed by: PHYSICIAN ASSISTANT

## 2023-07-26 PROCEDURE — 1159F MED LIST DOCD IN RCRD: CPT | Performed by: PHYSICIAN ASSISTANT

## 2023-07-26 PROCEDURE — 3074F SYST BP LT 130 MM HG: CPT | Performed by: PHYSICIAN ASSISTANT

## 2023-07-26 RX ORDER — METOPROLOL SUCCINATE 25 MG/1
12.5 TABLET, EXTENDED RELEASE ORAL 2 TIMES DAILY
Qty: 90 TABLET | Refills: 3 | Status: SHIPPED | OUTPATIENT
Start: 2023-07-26

## 2023-07-26 RX ORDER — LOSARTAN POTASSIUM 50 MG/1
50 TABLET ORAL DAILY
Qty: 90 TABLET | Refills: 3 | Status: SHIPPED | OUTPATIENT
Start: 2023-07-26

## 2023-07-26 RX ORDER — BUMETANIDE 1 MG/1
1 TABLET ORAL DAILY
Qty: 90 TABLET | Refills: 3 | Status: SHIPPED | OUTPATIENT
Start: 2023-07-26

## 2023-07-26 RX ORDER — SPIRONOLACTONE 25 MG/1
12.5 TABLET ORAL DAILY
Qty: 45 TABLET | Refills: 2 | Status: SHIPPED | OUTPATIENT
Start: 2023-07-26

## 2023-07-26 NOTE — PROGRESS NOTES
Spencer Saeed MD  Damián Myers  1955 07/26/2023    Patient Active Problem List   Diagnosis    Obesity (BMI 30-39.9)    Chronic HFrEF (heart failure with reduced ejection fraction)    Coronary artery disease involving native coronary artery of native heart with angina pectoris    Stroke (cerebrum)    Essential hypertension    Hyperlipidemia LDL goal <70    Diastolic CHF due to valvular disease    Paroxysmal atrial fibrillation    Debility    Respiratory failure with hypoxia and hypercapnia    COPD exacerbation    CKD (chronic kidney disease) stage 3, GFR 30-59 ml/min    Gastrointestinal hemorrhage with melena    ASCVD (arteriosclerotic cardiovascular disease)    H/O mitral valve replacement    Cardiomyopathy, dilated    Heart failure with reduced ejection fraction and diastolic dysfunction    Acute on chronic HFrEF (heart failure with reduced ejection fraction)       Dear Spencer Saeed MD:    Subjective     History of Present Illness:    Chief Complaint   Patient presents with    Med Management     Verbal.     Shortness of Breath       Damián Myers is a pleasant 67 y.o. male with a past medical history significant for coronary artery disease with PCI to LAD on 11/8/2019, ischemic cardiomyopathy that had improved however was recently found to be reduced at 41-45% likely now related to mitral valve replacement, moderate to severe mitral valve regurgitation status post mitral valve replacement in September 2022 with 33 mm Magna Ease tissue mitral valve, paroxysmal atrial fibrillation with ANGELICA ligation, hyperlipidemia, COPD, and hypertension. Ed comes in today for routine cardiology follow up.       Since Ed was last seen he was hospitalized for acute on chronic HFrEF he was diuresed with IV Bumex 1 mg with excellent diuretic response losing 10 pounds in fluid in just 3 days.  Today he comes in reporting that his breathing is much better and is back to baseline and pedal edema has resolved.  He denies any chest  "pains, syncope, or near syncope.  He is on maintenance dose of Bumex 1 mg daily as well as spironolactone.          Allergies   Allergen Reactions    Other Unknown - High Severity     Pt had a reaction to anesthesia when placing stents       Percocet [Oxycodone-Acetaminophen] Other (See Comments)     Reports they gave him too much and he had respiratory depression   Reports he has tolerated since then with no issues.    :      Current Outpatient Medications:     ascorbic acid (VITAMIN C) 500 MG tablet, Take 1 tablet by mouth Daily., Disp: , Rfl:     aspirin 81 MG chewable tablet, Chew 1 tablet Daily., Disp: , Rfl:     bumetanide (BUMEX) 1 MG tablet, Take 1 tablet by mouth Daily., Disp: 90 tablet, Rfl: 3    losartan (COZAAR) 50 MG tablet, Take 1 tablet by mouth Daily., Disp: 90 tablet, Rfl: 3    metoprolol succinate XL (TOPROL-XL) 25 MG 24 hr tablet, Take 0.5 tablets by mouth 2 (Two) Times a Day., Disp: 90 tablet, Rfl: 3    spironolactone (ALDACTONE) 25 MG tablet, Take 0.5 tablets by mouth Daily., Disp: 45 tablet, Rfl: 2  No current facility-administered medications for this visit.    Facility-Administered Medications Ordered in Other Visits:     Chlorhexidine Gluconate Cloth 2 % pads 1 application, 1 application , Topical, Q12H PRN, Fernando, Magaly, SREEKANTH    The following portions of the patient's history were reviewed and updated as appropriate: allergies, current medications, past family history, past medical history, past social history, past surgical history and problem list.    Social History     Tobacco Use    Smoking status: Never    Smokeless tobacco: Never   Vaping Use    Vaping Use: Never used   Substance Use Topics    Alcohol use: No    Drug use: No         Objective   Vitals:    07/26/23 0827   BP: 115/79   BP Location: Right arm   Patient Position: Sitting   Cuff Size: Adult   Pulse: 69   SpO2: 91%   Weight: 117 kg (259 lb)   Height: 190.5 cm (75\")     Body mass index is 32.37 kg/m².    Constitutional:  "      General: Not in acute distress.     Appearance: Healthy appearance. Well-developed and not in distress. Not diaphoretic.   Eyes:      Conjunctiva/sclera: Conjunctivae normal.      Pupils: Pupils are equal, round, and reactive to light.   HENT:      Head: Normocephalic and atraumatic.   Neck:      Vascular: No carotid bruit or JVD.   Pulmonary:      Effort: Pulmonary effort is normal. No respiratory distress.      Breath sounds: Normal breath sounds.   Cardiovascular:      Normal rate. Regular rhythm.   Edema:     Peripheral edema absent.   Skin:     General: Skin is cool.   Neurological:      Mental Status: Alert, oriented to person, place, and time and oriented to person, place and time.       Lab Results   Component Value Date     07/22/2023    K 4.5 07/22/2023    CL 96 (L) 07/22/2023    CO2 35.3 (H) 07/22/2023    BUN 22 07/22/2023    CREATININE 1.24 07/22/2023    GLUCOSE 107 (H) 07/22/2023    CALCIUM 9.0 07/22/2023    AST 36 07/19/2023    ALT 19 07/19/2023    ALKPHOS 66 07/19/2023     Lab Results   Component Value Date    CKTOTAL 72 07/19/2023     Lab Results   Component Value Date    WBC 6.21 07/20/2023    HGB 16.3 07/20/2023    HCT 56.4 (H) 07/20/2023     (L) 07/20/2023     Lab Results   Component Value Date    INR 0.97 07/19/2023    INR 1.10 09/19/2022    INR 1.12 09/03/2022     Lab Results   Component Value Date    MG 2.0 07/20/2023     Lab Results   Component Value Date    TSH 3.760 07/19/2023    PSA 0.433 11/06/2019    TRIG 85 06/25/2022    HDL 36 (L) 06/25/2022     (H) 06/25/2022      No results found for: BNP    During this visit the following were done:  Labs Reviewed []    Labs Ordered []    Radiology Reports Reviewed []    Radiology Ordered []    PCP Records Reviewed []    Referring Provider Records Reviewed []    ER Records Reviewed []    Hospital Records Reviewed []    History Obtained From Family []    Radiology Images Reviewed []    Other Reviewed []    Records Requested []        Procedures    Assessment & Plan   No diagnosis found.         Recommendations:  Chronic HFrEF  Euvolemic today we will continue maintenance dose of Bumex 1 mg daily and spironolactone.  Ksenia options of further GDMT with SGLT2 inhibitor/Entresto he declined for now.  I will also continue metoprolol and losartan.  Reviewed recent hospitalization  Coronary artery disease  No recent anginal symptoms continue aspirin.    Return in about 1 month (around 8/26/2023).    As always, I appreciate very much the opportunity to participate in the cardiovascular care of your patients.      With Best Regards,    Yasmany Leos PA-C

## 2023-07-26 NOTE — OUTREACH NOTE
CHF Week 1 Survey      Flowsheet Row Responses   [facility] facility patient discharged from? [facility]   Does the patient have one of the following disease processes/diagnoses(primary or secondary)? CHF   CHF Week 1 attempt successful? No   Unsuccessful attempts Attempt 1            BRINDA MORRIS - Registered Nurse

## 2023-08-01 ENCOUNTER — READMISSION MANAGEMENT (OUTPATIENT)
Dept: CALL CENTER | Facility: HOSPITAL | Age: 68
End: 2023-08-01
Payer: MEDICARE

## 2023-08-09 ENCOUNTER — READMISSION MANAGEMENT (OUTPATIENT)
Dept: CALL CENTER | Facility: HOSPITAL | Age: 68
End: 2023-08-09
Payer: MEDICARE

## 2023-08-09 NOTE — OUTREACH NOTE
CHF Week 3 Survey      Flowsheet Row Responses   [facility] facility patient discharged from? [facility]   Does the patient have one of the following disease processes/diagnoses(primary or secondary)? CHF   Week 3 attempt successful? No   Unsuccessful attempts Attempt 1            Rubina OG - Registered Nurse

## 2023-08-15 NOTE — PROGRESS NOTES
Heart Failure Clinic    Date: 01/26/23     Vitals:    01/26/23 0824   BP: 143/93   Pulse: 85   SpO2: 92%      Weight 255.8  Method of arrival: Ambulatory    Weighing self daily: Yes    Monitoring Heart Failure Zones: Yes    Today's HF Zone: Green    Taking medications as prescribed: Yes    Edema No    Shortness of Air: No    Number of pillows used at night:Recliner due to hip pain    Educational Materials given:  [facility] Success patient booklet                                                                        ReDS Value: 33  25-35 Optimal Value Status      Kalyn Singh RN 01/26/23 08:25 EST      Enbrel Counseling:  I discussed with the patient the risks of etanercept including but not limited to myelosuppression, immunosuppression, autoimmune hepatitis, demyelinating diseases, lymphoma, and infections.  The patient understands that monitoring is required including a PPD at baseline and must alert us or the primary physician if symptoms of infection or other concerning signs are noted.

## 2023-08-22 ENCOUNTER — LAB (OUTPATIENT)
Dept: LAB | Facility: HOSPITAL | Age: 68
End: 2023-08-22
Payer: MEDICARE

## 2023-08-22 ENCOUNTER — OFFICE VISIT (OUTPATIENT)
Dept: PULMONOLOGY | Facility: CLINIC | Age: 68
End: 2023-08-22
Payer: MEDICARE

## 2023-08-22 VITALS
HEART RATE: 79 BPM | WEIGHT: 256 LBS | SYSTOLIC BLOOD PRESSURE: 132 MMHG | TEMPERATURE: 97 F | DIASTOLIC BLOOD PRESSURE: 76 MMHG | OXYGEN SATURATION: 87 % | HEIGHT: 75 IN | BODY MASS INDEX: 31.83 KG/M2

## 2023-08-22 DIAGNOSIS — I27.20 PULMONARY HYPERTENSION: ICD-10-CM

## 2023-08-22 DIAGNOSIS — G47.19 EXCESSIVE DAYTIME SLEEPINESS: Primary | ICD-10-CM

## 2023-08-22 DIAGNOSIS — E66.9 OBESITY (BMI 30-39.9): ICD-10-CM

## 2023-08-22 DIAGNOSIS — R06.02 SHORTNESS OF BREATH: ICD-10-CM

## 2023-08-22 LAB — D DIMER PPP FEU-MCNC: 0.6 MCGFEU/ML (ref 0–0.67)

## 2023-08-22 PROCEDURE — 36415 COLL VENOUS BLD VENIPUNCTURE: CPT

## 2023-08-22 PROCEDURE — 99214 OFFICE O/P EST MOD 30 MIN: CPT | Performed by: PHYSICIAN ASSISTANT

## 2023-08-22 PROCEDURE — 3075F SYST BP GE 130 - 139MM HG: CPT | Performed by: PHYSICIAN ASSISTANT

## 2023-08-22 PROCEDURE — 3078F DIAST BP <80 MM HG: CPT | Performed by: PHYSICIAN ASSISTANT

## 2023-08-22 PROCEDURE — 1159F MED LIST DOCD IN RCRD: CPT | Performed by: PHYSICIAN ASSISTANT

## 2023-08-22 PROCEDURE — 1160F RVW MEDS BY RX/DR IN RCRD: CPT | Performed by: PHYSICIAN ASSISTANT

## 2023-08-22 PROCEDURE — 85379 FIBRIN DEGRADATION QUANT: CPT

## 2023-08-22 NOTE — PROGRESS NOTES
Subjective      Chief Complaint  Sleep Apnea, NOCTURNAL HYPOXIA, and Shortness of Breath    Subjective      History of Present Illness  Damián Myers is a 67 y.o. male who presents today to [facility] with past medical history of CAD s/p PCI to LAD, ischemic cardiomyopathy, chronic HFrEF, mild pulmonary hypertension, moderate to severe MVR s/p mitral valve replacement in September 2022, paroxysmal atrial fibrillation, hyperlipidemia, and essential hypertension who presents today for Sleep Apnea, NOCTURNAL HYPOXIA, and Shortness of Breath. This visit is a initial evaluation  appointment.     Sleep Apnea, NOCTURNAL HYPOXIA, and Shortness of Breath:  Patient's wife accompanies him during exam today. Patient was recently admitted from 07/19/2023 to 07/22/2023 for acute on chronic HFmrEF and acute hypoxic respiratory failure. He received IV Bumex 1 mg and diuresed well as he was down about 10 lbs from his admit weight. Patient was noted to have sleep apnea and daytime hypersomnolence with exertional hypoxia and orthopnea during hospitalization. He received order for nocturnal and as needed oxygen.     Patient states that he has not been evaluated for sleep apnea in the past. He does report symptoms including snoring loudly, feeling fatigued and tired during the daytime, and has been observed to stop breathing during his sleep.     Patient complains of having shortness of breath with exertion. He denies any significant wheezing or coughing. He denies any significant smoking history. He states that his PCP was concerned that patient could possibly have a blood clot based off recent lab work from his hospitalization. Patient's oxygen was low during exam at 87% and stated that he had a concentrator in the car but declined an oxygen tank during the visit.       Current Outpatient Medications:     ascorbic acid (VITAMIN C) 500 MG tablet, Take 1 tablet by mouth Daily.,  "Disp: , Rfl:     aspirin 81 MG chewable tablet, Chew 1 tablet Daily., Disp: , Rfl:     bumetanide (BUMEX) 1 MG tablet, Take 1 tablet by mouth Daily., Disp: 90 tablet, Rfl: 3    losartan (COZAAR) 50 MG tablet, Take 1 tablet by mouth Daily., Disp: 90 tablet, Rfl: 3    metoprolol succinate XL (TOPROL-XL) 25 MG 24 hr tablet, Take 0.5 tablets by mouth 2 (Two) Times a Day., Disp: 90 tablet, Rfl: 3    spironolactone (ALDACTONE) 25 MG tablet, Take 0.5 tablets by mouth Daily., Disp: 45 tablet, Rfl: 2  No current facility-administered medications for this visit.    Facility-Administered Medications Ordered in Other Visits:     Chlorhexidine Gluconate Cloth 2 % pads 1 application, 1 application , Topical, Q12H PRN, Fernando, Magaly, SREEKANTH      Allergies   Allergen Reactions    Other Unknown - High Severity     Pt had a reaction to anesthesia when placing stents       Percocet [Oxycodone-Acetaminophen] Other (See Comments)     Reports they gave him too much and he had respiratory depression   Reports he has tolerated since then with no issues.        Objective     Objective   Vital Signs:  /76 (BP Location: Left arm, Patient Position: Sitting)   Pulse 79   Temp 97 øF (36.1 øC)   Ht 190.5 cm (75\")   Wt 116 kg (256 lb)   SpO2 (!) 87%   BMI 32.00 kg/mý   Estimated body mass index is 32 kg/mý as calculated from the following:    Height as of this encounter: 190.5 cm (75\").    Weight as of this encounter: 116 kg (256 lb).          Past Medical History:   Diagnosis Date    CAD (coronary artery disease)     Cardiac abnormality     CHF (congestive heart failure)     Coma of unknown cause 1984    Pt states he was in a coma for one week, unknown cause    COVID-19 10/25/2021    Debility 09/14/2022    Diastolic CHF due to valvular disease 06/25/2022    Echo (9/10/2022):LVEF = 55%.  33 mm Medtronic Magna Ease bioprosthetic valve present.  Mean gradient 8 mmHg.  Moderate 1-2 cm pericardial effusion.  No tamponade.  " Left pleural effusion present.    History of stomach ulcers     Hypertension     Mitral regurgitation     Obesity (BMI 30-39.9) 11/04/2019    Paroxysmal atrial fibrillation 09/10/2022    Atrial fibrillation status post MVR [id number] ANGELICA ligated at time of surgery with 35 mm Atricure clip- KENNEY confirms occluded ANGELICA with no residual flow   Successful KENNEY/ECV 9/7, on amiodarone Was on Eliquis but discontinued on 9/10/2022 due to severe anemia Back into atrial fibrillation on 9/11/2022    Post-op pericardial effusion 09/12/2022    Echo for post-op hypotension (9/10/2022): Normal LVEF.  Moderate pericardial effusion.  No evidence of tamponade seen.    Pulmonary arterial hypertension 10/2019    Severe mitral regurgitation status post bioprosthetic MVR, 9/2/2022 08/03/2022    Echo 6/26/22: EF 36-40%, global hypokinesis, mild bileaflet MVP with moderate MR; grade III diastolic dysfunction, severe PAH with RVSP 67mmHg KENNEY 6/28/22: LVEF 56-60%, MVP of posterior leaflet with mod-severe MR, modTR with RVSP 52mmHg MVR 9/2/22 Dr. Chan with 33 mm Magna Ease mitral valve  Echo (9/10/2022):LVEF = 55%.  33 mm Medtronic Magna Ease bioprosthetic valve present.  Mean gradient 8 mm    Spinal headache 1984     Past Surgical History:   Procedure Laterality Date    CARDIAC CATHETERIZATION N/A 11/08/2019    Procedure: Left Heart Cath;  Surgeon: Sherrell Hart MD;  Location:  [facility];  Service: Cardiology    CARDIAC CATHETERIZATION N/A 06/27/2022    Procedure: Left Heart Cath;  Surgeon: Greyson Balderas MD;  Location:  [facility];  Service: Cardiology;  Laterality: N/A;    COLONOSCOPY      CORONARY STENT PLACEMENT  2019    ENDOSCOPY N/A 9/12/2022    Procedure: ESOPHAGOGASTRODUODENOSCOPY;  Surgeon: Leann Fitzpatrick MD;  Location: [facility] ENDOSCOPY;  Service: Gastroenterology;  Laterality: N/A;    MITRAL VALVE REPAIR/REPLACEMENT N/A 9/2/2022    Procedure: MEDIAN STERNOTOMY  MITRAL VALVE REPLACEMENT, LEFT ATRIAL APPENDAGE LIGATION AND KENNEY PER ANESTHESIA;  Surgeon: Lyle Chan MD;  Location: [facility];  Service: Cardiothoracic;  Laterality: N/A;     Social History     Socioeconomic History    Marital status:     Number of children: 7   Tobacco Use    Smoking status: Never     Passive exposure: Never    Smokeless tobacco: Never   Vaping Use    Vaping Use: Never used   Substance and Sexual Activity    Alcohol use: No    Drug use: No    Sexual activity: Yes     Partners: Female      Physical Exam  Constitutional:       General: He is awake.      Appearance: Normal appearance. He is obese.   HENT:      Head: Normocephalic and atraumatic.      Nose: Nose normal.      Mouth/Throat:      Mouth: Mucous membranes are moist.      Pharynx: Oropharynx is clear.   Eyes:      Conjunctiva/sclera: Conjunctivae normal.      Pupils: Pupils are equal, round, and reactive to light.   Cardiovascular:      Rate and Rhythm: Normal rate and regular rhythm.      Pulses: Normal pulses.      Heart sounds: Normal heart sounds. No murmur heard.    No friction rub. No gallop.   Pulmonary:      Effort: Pulmonary effort is normal. No tachypnea, accessory muscle usage or respiratory distress.      Breath sounds: Normal breath sounds. No decreased breath sounds, wheezing, rhonchi or rales.   Musculoskeletal:         General: Normal range of motion.      Cervical back: Full passive range of motion without pain and normal range of motion.   Skin:     General: Skin is warm and dry.   Neurological:      General: No focal deficit present.      Mental Status: He is alert. Mental status is at baseline.   Psychiatric:         Mood and Affect: Mood normal.         Behavior: Behavior normal.         Thought Content: Thought content normal.      Result Review :  The following labs and radiology results have been reviewed.    Lab Review:   No results found for: FEV1, FVC, ZEL2UXD, TLC, DLCO  Lab on 08/22/2023    Component Date Value Ref Range Status    D-Dimer, Quantitative 08/22/2023 0.60  0.00 - 0.67 MCGFEU/mL Final   Admission on 07/19/2023, Discharged on 07/22/2023   Component Date Value Ref Range Status    Glucose 07/19/2023 96  65 - 99 mg/dL Final    BUN 07/19/2023 19  8 - 23 mg/dL Final    Creatinine 07/19/2023 1.35 (H)  0.76 - 1.27 mg/dL Final    Sodium 07/19/2023 143  136 - 145 mmol/L Final    Potassium 07/19/2023 4.6  3.5 - 5.2 mmol/L Final    Slight hemolysis detected by analyzer. Results may be affected.    Chloride 07/19/2023 102  98 - 107 mmol/L Final    CO2 07/19/2023 33.2 (H)  22.0 - 29.0 mmol/L Final    Calcium 07/19/2023 9.0  8.6 - 10.5 mg/dL Final    Total Protein 07/19/2023 6.6  6.0 - 8.5 g/dL Final    Albumin 07/19/2023 3.7  3.5 - 5.2 g/dL Final    ALT (SGPT) 07/19/2023 19  1 - 41 U/L Final    AST (SGOT) 07/19/2023 36  1 - 40 U/L Final    Alkaline Phosphatase 07/19/2023 66  39 - 117 U/L Final    Total Bilirubin 07/19/2023 0.5  0.0 - 1.2 mg/dL Final    Globulin 07/19/2023 2.9  gm/dL Final    A/G Ratio 07/19/2023 1.3  g/dL Final    BUN/Creatinine Ratio 07/19/2023 14.1  7.0 - 25.0 Final    Anion Gap 07/19/2023 7.8  5.0 - 15.0 mmol/L Final    eGFR 07/19/2023 57.5 (L)  >60.0 mL/min/1.73 Final    COVID19 07/19/2023 Not Detected  Not Detected - Ref. Range Final    Influenza A PCR 07/19/2023 Not Detected  Not Detected Final    Influenza B PCR 07/19/2023 Not Detected  Not Detected Final    Color, UA 07/20/2023 Yellow  Yellow, Straw Final    Appearance, UA 07/20/2023 Clear  Clear Final    pH, UA 07/20/2023 7.0  5.0 - 8.0 Final    Specific Gravity, UA 07/20/2023 1.019  1.005 - 1.030 Final    Glucose, UA 07/20/2023 Negative  Negative Final    Ketones, UA 07/20/2023 Negative  Negative Final    Bilirubin, UA 07/20/2023 Negative  Negative Final    Blood, UA 07/20/2023 Negative  Negative Final    Protein, UA 07/20/2023 Negative  Negative Final    Leuk Esterase, UA 07/20/2023 Negative   Negative Final    Nitrite, UA 07/20/2023 Negative  Negative Final    Urobilinogen, UA 07/20/2023 1.0 E.U./dL  0.2 - 1.0 E.U./dL Final    Protime 07/19/2023 13.3  12.1 - 14.7 Seconds Final    INR 07/19/2023 0.97  0.90 - 1.10 Final    PTT 07/19/2023 24.2 (L)  26.5 - 34.5 seconds Final    Free T4 07/19/2023 1.06  0.93 - 1.70 ng/dL Final    TSH 07/19/2023 3.760  0.270 - 4.200 uIU/mL Final    Magnesium 07/19/2023 2.2  1.6 - 2.4 mg/dL Final    Creatine Kinase 07/19/2023 72  20 - 200 U/L Final    Sed Rate 07/19/2023 4  0 - 20 mm/hr Final    C-Reactive Protein 07/19/2023 0.30  0.00 - 0.50 mg/dL Final    proBNP 07/19/2023 2,395.0 (H)  0.0 - 900.0 pg/mL Final    HS Troponin T 07/19/2023 46 (H)  <15 ng/L Final    QT Interval 07/19/2023 428  ms Final    QTC Interval 07/19/2023 475  ms Final    WBC 07/19/2023 6.01  3.40 - 10.80 10*3/mm3 Final    RBC 07/19/2023 5.60  4.14 - 5.80 10*6/mm3 Final    Hemoglobin 07/19/2023 16.2  13.0 - 17.7 g/dL Final    Hematocrit 07/19/2023 56.0 (H)  37.5 - 51.0 % Final    MCV 07/19/2023 100.0 (H)  79.0 - 97.0 fL Final    MCH 07/19/2023 28.9  26.6 - 33.0 pg Final    MCHC 07/19/2023 28.9 (L)  31.5 - 35.7 g/dL Final    RDW 07/19/2023 13.3  12.3 - 15.4 % Final    RDW-SD 07/19/2023 49.1  37.0 - 54.0 fl Final    MPV 07/19/2023 10.5  6.0 - 12.0 fL Final    Platelets 07/19/2023 112 (L)  140 - 450 10*3/mm3 Final    Neutrophil % 07/19/2023 61.0  42.7 - 76.0 % Final    Lymphocyte % 07/19/2023 23.8  19.6 - 45.3 % Final    Monocyte % 07/19/2023 12.6 (H)  5.0 - 12.0 % Final    Eosinophil % 07/19/2023 1.7  0.3 - 6.2 % Final    Basophil % 07/19/2023 0.7  0.0 - 1.5 % Final    Immature Grans % 07/19/2023 0.2  0.0 - 0.5 % Final    Neutrophils, Absolute 07/19/2023 3.67  1.70 - 7.00 10*3/mm3 Final    Lymphocytes, Absolute 07/19/2023 1.43  0.70 - 3.10 10*3/mm3 Final    Monocytes, Absolute 07/19/2023 0.76  0.10 - 0.90 10*3/mm3 Final    Eosinophils, Absolute 07/19/2023 0.10  0.00 - 0.40  10*3/mm3 Final    Basophils, Absolute 07/19/2023 0.04  0.00 - 0.20 10*3/mm3 Final    Immature Grans, Absolute 07/19/2023 0.01  0.00 - 0.05 10*3/mm3 Final    nRBC 07/19/2023 0.0  0.0 - 0.2 /100 WBC Final    Hypochromia 07/19/2023 Slight/1+  None Seen Final    Macrocytes 07/19/2023 Slight/1+  None Seen Final    Platelet Morphology 07/19/2023 Normal  Normal Final    Site 07/19/2023 Left Radial   Final    Greg's Test 07/19/2023 Positive   Final    pH, Arterial 07/19/2023 7.367  7.350 - 7.450 pH units Final    pCO2, Arterial 07/19/2023 63.9 (H)  35.0 - 45.0 mm Hg Final    83 Value above reference range    pO2, Arterial 07/19/2023 47.0 (C)  83.0 - 108.0 mm Hg Final    85 Value below critical limit    HCO3, Arterial 07/19/2023 36.6 (H)  20.0 - 26.0 mmol/L Final    83 Value above reference range    Base Excess, Arterial 07/19/2023 8.3 (H)  0.0 - 2.0 mmol/L Final    O2 Saturation, Arterial 07/19/2023 81.8 (L)  94.0 - 99.0 % Final    84 Value below reference range    Hemoglobin, Blood Gas 07/19/2023 16.5  14 - 18 g/dL Final    Hematocrit, Blood Gas 07/19/2023 50.7  38.0 - 51.0 % Final    Oxyhemoglobin 07/19/2023 80.2 (L)  94 - 99 % Final    84 Value below reference range    Methemoglobin 07/19/2023 0.10  0.00 - 3.00 % Final    Carboxyhemoglobin 07/19/2023 1.8  0 - 5 % Final    A-a DO2 07/19/2023 24.3  0.0 - 300.0 mmHg Final    CO2 Content 07/19/2023 38.6 (H)  22 - 33 mmol/L Final    Barometric Pressure for Blood Gas 07/19/2023 730  mmHg Final    Modality 07/19/2023 Room Air   Final    FIO2 07/19/2023 21  % Final    Ventilator Mode 07/19/2023 NA   Final    Notified Who 07/19/2023 ER DR AND RN   Final    Notified By 07/19/2023 [id number]   Final    Notified Time 07/19/2023 07/19/2023 13:19   Final    Collected by 07/19/2023 [id number]   Final    Meter: [id number]     :  [id number]    HS Troponin T 07/19/2023 45 (H)  <15 ng/L Final    Troponin T Delta 07/19/2023 -1  >=-4 - <+4 ng/L Final     Glucose 07/20/2023 118 (H)  65 - 99 mg/dL Final    BUN 07/20/2023 18  8 - 23 mg/dL Final    Creatinine 07/20/2023 1.32 (H)  0.76 - 1.27 mg/dL Final    Sodium 07/20/2023 143  136 - 145 mmol/L Final    Potassium 07/20/2023 4.6  3.5 - 5.2 mmol/L Final    Slight hemolysis detected by analyzer. Results may be affected.    Chloride 07/20/2023 101  98 - 107 mmol/L Final    CO2 07/20/2023 34.5 (H)  22.0 - 29.0 mmol/L Final    Calcium 07/20/2023 9.2  8.6 - 10.5 mg/dL Final    BUN/Creatinine Ratio 07/20/2023 13.6  7.0 - 25.0 Final    Anion Gap 07/20/2023 7.5  5.0 - 15.0 mmol/L Final    eGFR 07/20/2023 59.1 (L)  >60.0 mL/min/1.73 Final    WBC 07/20/2023 6.21  3.40 - 10.80 10*3/mm3 Final    RBC 07/20/2023 5.60  4.14 - 5.80 10*6/mm3 Final    Hemoglobin 07/20/2023 16.3  13.0 - 17.7 g/dL Final    Hematocrit 07/20/2023 56.4 (H)  37.5 - 51.0 % Final    MCV 07/20/2023 100.7 (H)  79.0 - 97.0 fL Final    MCH 07/20/2023 29.1  26.6 - 33.0 pg Final    MCHC 07/20/2023 28.9 (L)  31.5 - 35.7 g/dL Final    RDW 07/20/2023 13.3  12.3 - 15.4 % Final    RDW-SD 07/20/2023 50.0  37.0 - 54.0 fl Final    MPV 07/20/2023 10.7  6.0 - 12.0 fL Final    Platelets 07/20/2023 115 (L)  140 - 450 10*3/mm3 Final    Magnesium 07/20/2023 2.3  1.6 - 2.4 mg/dL Final    QT Interval 07/20/2023 400  ms Final    QTC Interval 07/20/2023 467  ms Final    Absolute Lung Fluid Content 07/20/2023 33  20 - 35 % Final    Potassium 07/20/2023 4.6  3.5 - 5.2 mmol/L Final    Slight hemolysis detected by analyzer. Results may be affected.    Magnesium 07/20/2023 2.0  1.6 - 2.4 mg/dL Final    Glucose 07/21/2023 134 (H)  65 - 99 mg/dL Final    BUN 07/21/2023 18  8 - 23 mg/dL Final    Creatinine 07/21/2023 1.17  0.76 - 1.27 mg/dL Final    Sodium 07/21/2023 140  136 - 145 mmol/L Final    Potassium 07/21/2023 4.3  3.5 - 5.2 mmol/L Final    Slight hemolysis detected by analyzer. Results may be affected.    Chloride 07/21/2023 97 (L)  98 - 107 mmol/L  Final    CO2 07/21/2023 33.9 (H)  22.0 - 29.0 mmol/L Final    Calcium 07/21/2023 9.0  8.6 - 10.5 mg/dL Final    Albumin 07/21/2023 3.6  3.5 - 5.2 g/dL Final    Phosphorus 07/21/2023 3.8  2.5 - 4.5 mg/dL Final    Anion Gap 07/21/2023 9.1  5.0 - 15.0 mmol/L Final    BUN/Creatinine Ratio 07/21/2023 15.4  7.0 - 25.0 Final    eGFR 07/21/2023 68.3  >60.0 mL/min/1.73 Final    Glucose 07/22/2023 107 (H)  65 - 99 mg/dL Final    BUN 07/22/2023 22  8 - 23 mg/dL Final    Creatinine 07/22/2023 1.24  0.76 - 1.27 mg/dL Final    Sodium 07/22/2023 142  136 - 145 mmol/L Final    Potassium 07/22/2023 4.5  3.5 - 5.2 mmol/L Final    Slight hemolysis detected by analyzer. Results may be affected.    Chloride 07/22/2023 96 (L)  98 - 107 mmol/L Final    CO2 07/22/2023 35.3 (H)  22.0 - 29.0 mmol/L Final    Calcium 07/22/2023 9.0  8.6 - 10.5 mg/dL Final    BUN/Creatinine Ratio 07/22/2023 17.7  7.0 - 25.0 Final    Anion Gap 07/22/2023 10.7  5.0 - 15.0 mmol/L Final    eGFR 07/22/2023 63.7  >60.0 mL/min/1.73 Final   Hospital Outpatient Visit on 06/12/2023   Component Date Value Ref Range Status    Target HR (85%) 06/12/2023 130  bpm Final    Max. Pred. HR (100%) 06/12/2023 153  bpm Final    LVIDd 06/12/2023 5.6  cm Final    LVIDs 06/12/2023 4.7  cm Final    IVSd 06/12/2023 1.50  cm Final    LVPWd 06/12/2023 1.50  cm Final    FS 06/12/2023 16.1  % Final    IVS/LVPW 06/12/2023 1.00  cm Final    ESV(cubed) 06/12/2023 103.8  ml Final    LV Sys Vol (BSA corrected) 06/12/2023 34.9  cm2 Final    EDV(cubed) 06/12/2023 175.6  ml Final    LV Davidson Vol (BSA corrected) 06/12/2023 60.9  cm2 Final    LVOT area 06/12/2023 3.5  cm2 Final    LV mass(C)d 06/12/2023 383.7  grams Final    LVOT diam 06/12/2023 2.10  cm Final    EDV(MOD-sp4) 06/12/2023 149.0  ml Final    ESV(MOD-sp4) 06/12/2023 85.4  ml Final    SV(MOD-sp4) 06/12/2023 63.6  ml Final    SI(MOD-sp4) 06/12/2023 26.0  ml/m2 Final    EF(MOD-sp4) 06/12/2023 42.7  %  Final    MV E max alexis 06/12/2023 147.0  cm/sec Final    MV A max alexis 06/12/2023 80.1  cm/sec Final    MV E/A 06/12/2023 1.84   Final    LA ESV Index (BP) 06/12/2023 51.0  ml/m2 Final    Med Peak E' Alexis 06/12/2023 3.3  cm/sec Final    Lat Peak E' Alexis 06/12/2023 7.7  cm/sec Final    Avg E/e' ratio 06/12/2023 26.73   Final    TAPSE (>1.6) 06/12/2023 1.16  cm Final    LA dimension (2D)  06/12/2023 5.4  cm Final    Ao pk alexis 06/12/2023 84.6  cm/sec Final    Ao max PG 06/12/2023 2.9  mmHg Final    Ao mean PG 06/12/2023 2.00  mmHg Final    Ao V2 VTI 06/12/2023 17.3  cm Final    MV P1/2t 06/12/2023 109.1  msec Final    MVA(P1/2t) 06/12/2023 2.02  cm2 Final    MV dec slope 06/12/2023 502.0  cm/sec2 Final    TR max alexis 06/12/2023 280.5  cm/sec Final    TR max PG 06/12/2023 31.6  mmHg Final    RVSP(TR) 06/12/2023 41.6  mmHg Final    RAP systole 06/12/2023 10.0  mmHg Final    PA acc time 06/12/2023 0.08  sec Final    Ao root diam 06/12/2023 4.1  cm Final    ACS 06/12/2023 2.10  cm Final      Reviewed CT chest from 07/19/2023     Narrative & Impression   EXAM:    CT Chest Without Intravenous Contrast     EXAM DATE:    7/19/2023 1:59 PM     CLINICAL HISTORY:    COPD suspected     TECHNIQUE:    Axial computed tomography images of the chest without intravenous  contrast.  Sagittal and coronal reformatted images were created and  reviewed.  This CT exam was performed using one or more of the following  dose reduction techniques:  automated exposure control, adjustment of  the mA and/or kV according to patient size, and/or use of iterative  reconstruction technique.     COMPARISON:    11/10/2022     FINDINGS:    Lungs and pleural spaces:  Miniscule right pleural effusion.  Right  lower lobe airspace disease. Partially consolidative.  Interstitial  edema.  Pulmonary vascular congestion.    Heart:  Marked cardiomegaly.  Valvuloplasty changes of the mitral  valve.  No significant pericardial effusion.  No  significant coronary  artery calcifications.    Mediastinum:  No mediastinal or hilar lymphadenopathy.    Bones/joints:  Median sternotomy.  No acute fracture.  No dislocation.    Soft tissues:  Unremarkable.    Vasculature:  Mild atherosclerosis thoracic aorta.    Lymph nodes:  See above.     IMPRESSION:  1.  CHF with interstitial edema and a miniscule right pleural effusion.  Pulmonary vascular congestion noted.  2.  Right lower lobe airspace disease which may represent pneumonia  and/or atelectasis.  3.  Changes of prior median sternotomy with valvuloplasty.  4.  Other incidental/nonacute findings as above.     This report was finalized on 7/19/2023 3:14 PM by Dr. Corey Powell MD.        Reviewed echocardiogram report from 06/12/2023  Results for orders placed during the hospital encounter of 06/12/23    Adult Transthoracic Echo Complete W/ Cont if Necessary Per Protocol    Interpretation Summary  Images from the original result were not included.      Normal left ventricular cavity size and wall thickness noted. There is mild left ventricular global hypokinesis noted.    Left ventricular ejection fraction appears to be 41 - 45%.    Left ventricular diastolic function is consistent with (grade II w/high LAP) pseudonormalization.    There is a bioprosthetic mitral valve present.The prosthetic mitral valve is grossly normal.    Trace to mild mitral valve regurgitation is present. No significant mitral valve stenosis is present    The aortic valve is structurally normal with no regurgitation or stenosis present.    Mild tricuspid valve regurgitation is present. Estimated right ventricular systolic pressure from tricuspid regurgitation is mildly elevated (35-45 mmHg). Mild pulmonary hypertension is present.    There is no evidence of pericardial effusion.     Reviewed PFT from 08/30/2022      Assessment / Plan         Assessment   Diagnoses and all orders for this visit:    1. Excessive daytime sleepiness  (Primary)  2. Obesity (BMI 30-39.9)  During recent hospitalization in July 2023 noted to have episodes of apnea during his sleep and hypersomnolence during the daytime.   ESS score 7 and STOP-BANG score 6 which indicates high risk of MISAEL.   Will order in-lab sleep study to further evaluate.     -     Polysomnography 4 or More Parameters; Future    Management obstructive sleep apnea also includes lifestyle modifications including weight loss, avoidance of alcohol, sedated, caffeine especially before bedtime, allowing adequate sleep time, and body position during sleep such as side versus back. 10% weight loss can result in a 50% improvement of the apnea-hypopnea index. Untreated sleep apnea can lead to cardiovascular/metabolic disorder, neurocognitive deficit, daytime sleepiness, motor vehicle accidents, depression, mood disorders and reduced quality of life.  Patient understands the risk of untreated obstructive sleep apnea and benefit benefits of the treatment. Recommended at least 4 hours of use per night for 70% of every month (approximately 21 out of 30 days) per CMS guidelines.      3. Shortness of breath  Shortness of breath likely multifactorial due to body habitus, chronic HFmrEF, and mild pulmonary hypertension.   Reports PCP was concerned for possible blood clot based off recent lab work from hospitalization. Ordered d-dimer to further evaluate which was normal.  PFT from 08/30/2022 prior to his valve replacement procedure revealed restrictive defect but no obstruction was present. Ordered full PFT to further evaluate lung volumes and DLCO.     -     D-dimer, Quantitative; Future  -     Complete PFT - Pre & Post Bronchodilator; Future    4. Pulmonary hypertension  Recent echocardiogram from June 2023 revealed mild pulmonary hypertension with RVSP with 35-45 mmHg.   Likely multifactorial due to group 2 (underlying chronic HFmrEF) or group 3 (may have undiagnosed MISAEL due to having observed episodes of apnea  during recent hospitalization).   Continue following with cardiology and heart failure clinic for management of heart failure.   Ordered sleep study as noted above to assess for underlying sleep apnea.     I spent 30 minutes caring for Damián on this date of service. This time includes time spent by me in the following activities:preparing for the visit, reviewing tests, obtaining and/or reviewing a separately obtained history, performing a medically appropriate examination and/or evaluation , counseling and educating the patient/family/caregiver, ordering medications, tests, or procedures, referring and communicating with other health care professionals , documenting information in the medical record, independently interpreting results and communicating that information with the patient/family/caregiver, and care coordination    Follow Up   Return in about 6 months (around 2/22/2024), or if symptoms worsen or fail to improve, for Next scheduled follow up.    Patient was given instructions and counseling regarding his condition or for health maintenance advice. Please see specific information pulled into the AVS if appropriate.       This document has been electronically signed by Mirna Celeste PA-C   August 24, 2023 14:54 EDT    Dictated Utilizing Dragon Dictation: Part of this note may be an electronic transcription/translation of spoken language to printed text using the Dragon Dictation System.

## 2023-09-08 ENCOUNTER — HOSPITAL ENCOUNTER (OUTPATIENT)
Dept: RESPIRATORY THERAPY | Facility: HOSPITAL | Age: 68
Discharge: HOME | End: 2023-09-08
Payer: MEDICARE

## 2023-09-08 VITALS — OXYGEN SATURATION: 91 % | RESPIRATION RATE: 16 BRPM | HEART RATE: 78 BPM

## 2023-09-08 DIAGNOSIS — R06.02 SHORTNESS OF BREATH: ICD-10-CM

## 2023-09-08 PROCEDURE — 94060 EVALUATION OF WHEEZING: CPT

## 2023-09-08 PROCEDURE — 94799 UNLISTED PULMONARY SVC/PX: CPT

## 2023-09-08 PROCEDURE — 94729 DIFFUSING CAPACITY: CPT

## 2023-09-08 PROCEDURE — 94640 AIRWAY INHALATION TREATMENT: CPT

## 2023-09-08 PROCEDURE — 94761 N-INVAS EAR/PLS OXIMETRY MLT: CPT

## 2023-09-08 PROCEDURE — 94726 PLETHYSMOGRAPHY LUNG VOLUMES: CPT

## 2023-09-08 RX ORDER — ALBUTEROL SULFATE 2.5 MG/3ML
2.5 SOLUTION RESPIRATORY (INHALATION) ONCE
Status: COMPLETED | OUTPATIENT
Start: 2023-09-08 | End: 2023-09-08

## 2023-09-08 RX ADMIN — ALBUTEROL SULFATE 2.5 MG: 2.5 SOLUTION RESPIRATORY (INHALATION) at 09:53

## 2023-09-20 ENCOUNTER — OFFICE VISIT (OUTPATIENT)
Dept: CARDIOLOGY | Facility: CLINIC | Age: 68
End: 2023-09-20
Payer: MEDICARE

## 2023-09-20 ENCOUNTER — TELEPHONE (OUTPATIENT)
Dept: PULMONOLOGY | Facility: CLINIC | Age: 68
End: 2023-09-20
Payer: MEDICARE

## 2023-09-20 VITALS
SYSTOLIC BLOOD PRESSURE: 125 MMHG | BODY MASS INDEX: 32.7 KG/M2 | OXYGEN SATURATION: 93 % | WEIGHT: 263 LBS | RESPIRATION RATE: 18 BRPM | DIASTOLIC BLOOD PRESSURE: 81 MMHG | HEIGHT: 75 IN | HEART RATE: 71 BPM

## 2023-09-20 DIAGNOSIS — I10 ESSENTIAL HYPERTENSION: Primary | ICD-10-CM

## 2023-09-20 DIAGNOSIS — E78.5 HYPERLIPIDEMIA LDL GOAL <70: ICD-10-CM

## 2023-09-20 DIAGNOSIS — I25.10 ASCVD (ARTERIOSCLEROTIC CARDIOVASCULAR DISEASE): ICD-10-CM

## 2023-09-20 DIAGNOSIS — I48.0 PAROXYSMAL ATRIAL FIBRILLATION: ICD-10-CM

## 2023-09-20 PROCEDURE — 3079F DIAST BP 80-89 MM HG: CPT | Performed by: PHYSICIAN ASSISTANT

## 2023-09-20 PROCEDURE — 99213 OFFICE O/P EST LOW 20 MIN: CPT | Performed by: PHYSICIAN ASSISTANT

## 2023-09-20 PROCEDURE — 3074F SYST BP LT 130 MM HG: CPT | Performed by: PHYSICIAN ASSISTANT

## 2023-09-20 RX ORDER — AMOXICILLIN 250 MG/1
250 CAPSULE ORAL 3 TIMES DAILY
COMMUNITY

## 2023-09-20 NOTE — TELEPHONE ENCOUNTER
Patient returned call to office and requested call back to discuss PFT results. Discussed PFT results with patient. PFT revealed severe restrictive lung disease and moderately reduced DLCO but no obstructive lung disease or bronchodilator response was noted. Informed patient that we could proceed with HRCT imaging to evaluate lung parenchyma to assess for ILD or other small airway disease but patient declined. Restriction may be due to body habitus along with pleural effusion and pulmonary vascular congestion in setting of CHF noted on previous CT scan from July 2023. Encouraged continued follow-up with cardiology for management of CHF. Sleep study was ordered during previous visit due to underlying pulmonary hypertension noted on previous echocardiogram to evaluate if this could be contributing to CHF.

## 2023-09-20 NOTE — PROGRESS NOTES
Spencer Saeed MD  Damián Myers  1955 09/20/2023    Patient Active Problem List   Diagnosis    Obesity (BMI 30-39.9)    Chronic HFrEF (heart failure with reduced ejection fraction)    Coronary artery disease involving native coronary artery of native heart with angina pectoris    Stroke (cerebrum)    Essential hypertension    Hyperlipidemia LDL goal <70    Diastolic CHF due to valvular disease    Paroxysmal atrial fibrillation    Debility    Respiratory failure with hypoxia and hypercapnia    COPD exacerbation    CKD (chronic kidney disease) stage 3, GFR 30-59 ml/min    Gastrointestinal hemorrhage with melena    ASCVD (arteriosclerotic cardiovascular disease)    H/O mitral valve replacement    Cardiomyopathy, dilated    Heart failure with reduced ejection fraction and diastolic dysfunction    Acute on chronic HFrEF (heart failure with reduced ejection fraction)       Dear Spencer Saeed MD:    Subjective     History of Present Illness:    Chief Complaint   Patient presents with    Follow-up     Routine       Damián Myers is a pleasant 68 y.o. male with a past medical history significant for coronary artery disease with PCI to LAD on 11/8/2019, ischemic cardiomyopathy that had improved however was recently found to be reduced at 41-45% likely now related to mitral valve replacement, moderate to severe mitral valve regurgitation status post mitral valve replacement in September 2022 with 33 mm Magna Ease tissue mitral valve, paroxysmal atrial fibrillation with ANGELICA ligation, hyperlipidemia, COPD, and hypertension. Ed comes in today for routine cardiology follow up.        Ed reports he has been stable since he was last seen he reports his breathing has not worsened and is able to perform his ADLs.  He denies worsening orthopnea or PND.  He also denies any chest pains, palpitations, or syncope.  He denies any known breakthrough episodes of atrial fibrillation that he has felt.      Allergies   Allergen Reactions     "Other Unknown - High Severity     Pt had a reaction to anesthesia when placing stents       Percocet [Oxycodone-Acetaminophen] Other (See Comments)     Reports they gave him too much and he had respiratory depression   Reports he has tolerated since then with no issues.    :      Current Outpatient Medications:     amoxicillin (AMOXIL) 250 MG capsule, Take 1 capsule by mouth 3 (Three) Times a Day., Disp: , Rfl:     ascorbic acid (VITAMIN C) 500 MG tablet, Take 1 tablet by mouth Daily., Disp: , Rfl:     aspirin 81 MG chewable tablet, Chew 1 tablet Daily., Disp: , Rfl:     bumetanide (BUMEX) 1 MG tablet, Take 1 tablet by mouth Daily., Disp: 90 tablet, Rfl: 3    losartan (COZAAR) 50 MG tablet, Take 1 tablet by mouth Daily., Disp: 90 tablet, Rfl: 3    metoprolol succinate XL (TOPROL-XL) 25 MG 24 hr tablet, Take 0.5 tablets by mouth 2 (Two) Times a Day., Disp: 90 tablet, Rfl: 3    spironolactone (ALDACTONE) 25 MG tablet, Take 0.5 tablets by mouth Daily., Disp: 45 tablet, Rfl: 2  No current facility-administered medications for this visit.    Facility-Administered Medications Ordered in Other Visits:     Chlorhexidine Gluconate Cloth 2 % pads 1 application, 1 application , Topical, Q12H PRN, Magaly King APRN    The following portions of the patient's history were reviewed and updated as appropriate: allergies, current medications, past family history, past medical history, past social history, past surgical history and problem list.    Social History     Tobacco Use    Smoking status: Never     Passive exposure: Never    Smokeless tobacco: Never   Vaping Use    Vaping Use: Never used   Substance Use Topics    Alcohol use: No    Drug use: No         Objective   Vitals:    09/20/23 0908   BP: 125/81   BP Location: Right arm   Patient Position: Sitting   Cuff Size: Large Adult   Pulse: 71   Resp: 18   SpO2: 93%   Weight: 119 kg (263 lb)   Height: 190.5 cm (75\")     Body mass index is 32.87 kg/m².    Constitutional:      "  General: Not in acute distress.     Appearance: Healthy appearance. Well-developed and not in distress. Not diaphoretic.   Eyes:      Conjunctiva/sclera: Conjunctivae normal.      Pupils: Pupils are equal, round, and reactive to light.   HENT:      Head: Normocephalic and atraumatic.   Neck:      Vascular: No carotid bruit or JVD.   Pulmonary:      Effort: Pulmonary effort is normal. No respiratory distress.      Breath sounds: Normal breath sounds.   Cardiovascular:      Normal rate. Regular rhythm.   Edema:     Peripheral edema absent.   Skin:     General: Skin is cool.   Neurological:      Mental Status: Alert, oriented to person, place, and time and oriented to person, place and time.       Lab Results   Component Value Date     07/22/2023    K 4.5 07/22/2023    CL 96 (L) 07/22/2023    CO2 35.3 (H) 07/22/2023    BUN 22 07/22/2023    CREATININE 1.24 07/22/2023    GLUCOSE 107 (H) 07/22/2023    CALCIUM 9.0 07/22/2023    AST 36 07/19/2023    ALT 19 07/19/2023    ALKPHOS 66 07/19/2023     Lab Results   Component Value Date    CKTOTAL 72 07/19/2023     Lab Results   Component Value Date    WBC 6.21 07/20/2023    HGB 16.3 07/20/2023    HCT 56.4 (H) 07/20/2023     (L) 07/20/2023     Lab Results   Component Value Date    INR 0.97 07/19/2023    INR 1.10 09/19/2022    INR 1.12 09/03/2022     Lab Results   Component Value Date    MG 2.0 07/20/2023     Lab Results   Component Value Date    TSH 3.760 07/19/2023    PSA 0.433 11/06/2019    TRIG 85 06/25/2022    HDL 36 (L) 06/25/2022     (H) 06/25/2022      No results found for: BNP    During this visit the following were done:  Labs Reviewed []    Labs Ordered []    Radiology Reports Reviewed []    Radiology Ordered []    PCP Records Reviewed []    Referring Provider Records Reviewed []    ER Records Reviewed []    Hospital Records Reviewed []    History Obtained From Family []    Radiology Images Reviewed []    Other Reviewed []    Records Requested []        Procedures    Assessment & Plan    Diagnosis Plan   1. Essential hypertension        2. Hyperlipidemia LDL goal <70        3. Paroxysmal atrial fibrillation        4. ASCVD (arteriosclerotic cardiovascular disease)                 Recommendations:  ASCVD  No recent anginal symptoms continue aspirin, losartan.  Patient has declined statins  Paroxysmal atrial fibrillation  Overall stable not anticoagulated secondary to ANGELICA ligation  Chronic HFrEF  Euvolemic today continue Bumex, losartan, metoprolol, spironolactone    No follow-ups on file.    As always, I appreciate very much the opportunity to participate in the cardiovascular care of your patients.      With Best Regards,    Yasmany Leos PA-C

## 2023-11-18 ENCOUNTER — APPOINTMENT (OUTPATIENT)
Dept: GENERAL RADIOLOGY | Facility: HOSPITAL | Age: 68
End: 2023-11-18
Payer: MEDICARE

## 2023-11-18 LAB
ALBUMIN SERPL-MCNC: 4.1 G/DL (ref 3.5–5.2)
ALBUMIN/GLOB SERPL: 1.5 G/DL
ALP SERPL-CCNC: 90 U/L (ref 39–117)
ALT SERPL W P-5'-P-CCNC: 16 U/L (ref 1–41)
ANION GAP SERPL CALCULATED.3IONS-SCNC: 6.8 MMOL/L (ref 5–15)
APTT PPP: 30.9 SECONDS (ref 26.5–34.5)
AST SERPL-CCNC: 30 U/L (ref 1–40)
BASOPHILS # BLD AUTO: 0.02 10*3/MM3 (ref 0–0.2)
BASOPHILS NFR BLD AUTO: 0.3 % (ref 0–1.5)
BILIRUB SERPL-MCNC: 0.3 MG/DL (ref 0–1.2)
BUN SERPL-MCNC: 31 MG/DL (ref 8–23)
BUN/CREAT SERPL: 17.3 (ref 7–25)
CALCIUM SPEC-SCNC: 9.2 MG/DL (ref 8.6–10.5)
CHLORIDE SERPL-SCNC: 103 MMOL/L (ref 98–107)
CO2 SERPL-SCNC: 31.2 MMOL/L (ref 22–29)
CREAT SERPL-MCNC: 1.79 MG/DL (ref 0.76–1.27)
DEPRECATED RDW RBC AUTO: 45.3 FL (ref 37–54)
EGFRCR SERPLBLD CKD-EPI 2021: 40.8 ML/MIN/1.73
EOSINOPHIL # BLD AUTO: 0.2 10*3/MM3 (ref 0–0.4)
EOSINOPHIL NFR BLD AUTO: 2.9 % (ref 0.3–6.2)
ERYTHROCYTE [DISTWIDTH] IN BLOOD BY AUTOMATED COUNT: 12.6 % (ref 12.3–15.4)
GLOBULIN UR ELPH-MCNC: 2.8 GM/DL
GLUCOSE SERPL-MCNC: 120 MG/DL (ref 65–99)
HCT VFR BLD AUTO: 49.7 % (ref 37.5–51)
HGB BLD-MCNC: 15.2 G/DL (ref 13–17.7)
IMM GRANULOCYTES # BLD AUTO: 0.02 10*3/MM3 (ref 0–0.05)
IMM GRANULOCYTES NFR BLD AUTO: 0.3 % (ref 0–0.5)
INR PPP: 0.96 (ref 0.9–1.1)
LYMPHOCYTES # BLD AUTO: 1.85 10*3/MM3 (ref 0.7–3.1)
LYMPHOCYTES NFR BLD AUTO: 27 % (ref 19.6–45.3)
MCH RBC QN AUTO: 30.2 PG (ref 26.6–33)
MCHC RBC AUTO-ENTMCNC: 30.6 G/DL (ref 31.5–35.7)
MCV RBC AUTO: 98.6 FL (ref 79–97)
MONOCYTES # BLD AUTO: 0.92 10*3/MM3 (ref 0.1–0.9)
MONOCYTES NFR BLD AUTO: 13.4 % (ref 5–12)
NEUTROPHILS NFR BLD AUTO: 3.84 10*3/MM3 (ref 1.7–7)
NEUTROPHILS NFR BLD AUTO: 56.1 % (ref 42.7–76)
NRBC BLD AUTO-RTO: 0 /100 WBC (ref 0–0.2)
PLATELET # BLD AUTO: 135 10*3/MM3 (ref 140–450)
PMV BLD AUTO: 10.3 FL (ref 6–12)
POTASSIUM SERPL-SCNC: 4.4 MMOL/L (ref 3.5–5.2)
PROT SERPL-MCNC: 6.9 G/DL (ref 6–8.5)
PROTHROMBIN TIME: 13.3 SECONDS (ref 12.1–14.7)
RBC # BLD AUTO: 5.04 10*6/MM3 (ref 4.14–5.8)
SODIUM SERPL-SCNC: 141 MMOL/L (ref 136–145)
TROPONIN T SERPL HS-MCNC: 35 NG/L
WBC NRBC COR # BLD AUTO: 6.85 10*3/MM3 (ref 3.4–10.8)

## 2023-11-18 PROCEDURE — 85025 COMPLETE CBC W/AUTO DIFF WBC: CPT | Performed by: STUDENT IN AN ORGANIZED HEALTH CARE EDUCATION/TRAINING PROGRAM

## 2023-11-18 PROCEDURE — 85610 PROTHROMBIN TIME: CPT | Performed by: STUDENT IN AN ORGANIZED HEALTH CARE EDUCATION/TRAINING PROGRAM

## 2023-11-18 PROCEDURE — 99284 EMERGENCY DEPT VISIT MOD MDM: CPT

## 2023-11-18 PROCEDURE — 71045 X-RAY EXAM CHEST 1 VIEW: CPT

## 2023-11-18 PROCEDURE — 36415 COLL VENOUS BLD VENIPUNCTURE: CPT

## 2023-11-18 PROCEDURE — 85730 THROMBOPLASTIN TIME PARTIAL: CPT | Performed by: STUDENT IN AN ORGANIZED HEALTH CARE EDUCATION/TRAINING PROGRAM

## 2023-11-18 PROCEDURE — 84484 ASSAY OF TROPONIN QUANT: CPT | Performed by: STUDENT IN AN ORGANIZED HEALTH CARE EDUCATION/TRAINING PROGRAM

## 2023-11-18 PROCEDURE — 93005 ELECTROCARDIOGRAM TRACING: CPT | Performed by: STUDENT IN AN ORGANIZED HEALTH CARE EDUCATION/TRAINING PROGRAM

## 2023-11-18 PROCEDURE — 80053 COMPREHEN METABOLIC PANEL: CPT | Performed by: STUDENT IN AN ORGANIZED HEALTH CARE EDUCATION/TRAINING PROGRAM

## 2023-11-18 RX ORDER — ASPIRIN 325 MG
325 TABLET ORAL ONCE
Status: DISCONTINUED | OUTPATIENT
Start: 2023-11-18 | End: 2023-11-19 | Stop reason: HOSPADM

## 2023-11-18 RX ORDER — SODIUM CHLORIDE 0.9 % (FLUSH) 0.9 %
10 SYRINGE (ML) INJECTION AS NEEDED
Status: DISCONTINUED | OUTPATIENT
Start: 2023-11-18 | End: 2023-11-19 | Stop reason: HOSPADM

## 2023-11-19 ENCOUNTER — HOSPITAL ENCOUNTER (EMERGENCY)
Facility: HOSPITAL | Age: 68
Discharge: HOME | End: 2023-11-19
Attending: STUDENT IN AN ORGANIZED HEALTH CARE EDUCATION/TRAINING PROGRAM
Payer: MEDICARE

## 2023-11-19 VITALS
HEART RATE: 64 BPM | TEMPERATURE: 98 F | OXYGEN SATURATION: 98 % | BODY MASS INDEX: 32.33 KG/M2 | WEIGHT: 260 LBS | RESPIRATION RATE: 20 BRPM | HEIGHT: 75 IN | SYSTOLIC BLOOD PRESSURE: 104 MMHG | DIASTOLIC BLOOD PRESSURE: 64 MMHG

## 2023-11-19 DIAGNOSIS — R07.9 CHEST PAIN, UNSPECIFIED TYPE: Primary | ICD-10-CM

## 2023-11-19 LAB
GEN 5 2HR TROPONIN T REFLEX: 32 NG/L
HOLD SPECIMEN: NORMAL
HOLD SPECIMEN: NORMAL
QT INTERVAL: 392 MS
QTC INTERVAL: 435 MS
TROPONIN T DELTA: -3 NG/L
WHOLE BLOOD HOLD COAG: NORMAL
WHOLE BLOOD HOLD SPECIMEN: NORMAL

## 2023-11-19 PROCEDURE — 84484 ASSAY OF TROPONIN QUANT: CPT | Performed by: STUDENT IN AN ORGANIZED HEALTH CARE EDUCATION/TRAINING PROGRAM

## 2023-11-19 NOTE — ED NOTES
"Patient declines request for IV establishment. Patient states \"I am not going to be here all night.\" Patient states \"I have already been here long enough, if I were having a heart attack, you all wouldn't be able to treat it anyway.\" Patient informed we can treat MI and can stabilize if transfer to another facility for advanced care is necessary.\" Patient states \"No you call cannot.\" Patient wife made apologies for patient's ill tone. Patient offered pillow and cover at this time. Patient declines request. Provide made aware of patient  being uncooperative with treatment regimen and request.  "

## 2023-11-19 NOTE — DISCHARGE INSTRUCTIONS
If you chest pain or breathing gets worse or you are unable to get in quickly to see Dr. Amador please return to the emergency room.

## 2023-11-19 NOTE — ED PROVIDER NOTES
Subjective   History of Present Illness    68 year old male with PMHx of CAD, bioprosthetic mitral valve, CHF, paroxysmal A-fib, pulmonary artery hypertension, obesity, hypertension, stomach ulcers presenting for chest pain since yesterday.        Review of Systems    Past Medical History:   Diagnosis Date    CAD (coronary artery disease)     Cardiac abnormality     CHF (congestive heart failure)     Coma of unknown cause 1984    Pt states he was in a coma for one week, unknown cause    COVID-19 10/25/2021    Debility 09/14/2022    Diastolic CHF due to valvular disease 06/25/2022    Echo (9/10/2022):LVEF = 55%.  33 mm Medtronic Magna Ease bioprosthetic valve present.  Mean gradient 8 mmHg.  Moderate 1-2 cm pericardial effusion.  No tamponade.  Left pleural effusion present.    History of stomach ulcers     Hypertension     Mitral regurgitation     Obesity (BMI 30-39.9) 11/04/2019    Paroxysmal atrial fibrillation 09/10/2022    Atrial fibrillation status post MVR [id number] ANGELICA ligated at time of surgery with 35 mm Atricure clip- KENNEY confirms occluded ANGELICA with no residual flow   Successful KENNEY/ECV 9/7, on amiodarone Was on Eliquis but discontinued on 9/10/2022 due to severe anemia Back into atrial fibrillation on 9/11/2022    Post-op pericardial effusion 09/12/2022    Echo for post-op hypotension (9/10/2022): Normal LVEF.  Moderate pericardial effusion.  No evidence of tamponade seen.    Pulmonary arterial hypertension 10/2019    Severe mitral regurgitation status post bioprosthetic MVR, 9/2/2022 08/03/2022    Echo 6/26/22: EF 36-40%, global hypokinesis, mild bileaflet MVP with moderate MR; grade III diastolic dysfunction, severe PAH with RVSP 67mmHg KENNEY 6/28/22: LVEF 56-60%, MVP of posterior leaflet with mod-severe MR, modTR with RVSP 52mmHg MVR 9/2/22 Dr. Chan with 33 mm Magna Ease mitral valve  Echo (9/10/2022):LVEF = 55%.  33 mm Medtronic Magna Ease bioprosthetic valve present.  Mean gradient 8 mm    Spinal  headache 1984       Allergies   Allergen Reactions    Other Unknown - High Severity     Pt had a reaction to anesthesia when placing stents       Percocet [Oxycodone-Acetaminophen] Other (See Comments)     Reports they gave him too much and he had respiratory depression   Reports he has tolerated since then with no issues.        Past Surgical History:   Procedure Laterality Date    CARDIAC CATHETERIZATION N/A 11/08/2019    Procedure: Left Heart Cath;  Surgeon: Sherrell Hart MD;  Location:  [facility];  Service: Cardiology    CARDIAC CATHETERIZATION N/A 06/27/2022    Procedure: Left Heart Cath;  Surgeon: Greyson Balderas MD;  Location:  [facility];  Service: Cardiology;  Laterality: N/A;    COLONOSCOPY      CORONARY STENT PLACEMENT  2019    ENDOSCOPY N/A 9/12/2022    Procedure: ESOPHAGOGASTRODUODENOSCOPY;  Surgeon: Leann Fitzpatrick MD;  Location:  [facility];  Service: Gastroenterology;  Laterality: N/A;    MITRAL VALVE REPAIR/REPLACEMENT N/A 9/2/2022    Procedure: MEDIAN STERNOTOMY MITRAL VALVE REPLACEMENT, LEFT ATRIAL APPENDAGE LIGATION AND KENNEY PER ANESTHESIA;  Surgeon: Lyle Chan MD;  Location:  [facility];  Service: Cardiothoracic;  Laterality: N/A;       Family History   Problem Relation Age of Onset    Heart valve disorder Mother     Heart valve disorder Brother     Heart valve disorder Maternal Grandfather     Heart disease Maternal Grandfather     Diabetes type I Son        Social History     Socioeconomic History    Marital status:     Number of children: 7   Tobacco Use    Smoking status: Never     Passive exposure: Never    Smokeless tobacco: Never   Vaping Use    Vaping Use: Never used   Substance and Sexual Activity    Alcohol use: No    Drug use: No    Sexual activity: Yes     Partners: Female           Objective   Physical Exam    Procedures           ED Course  ED Course as of 11/19/23 0104   [place] Nov 19, 2023   0023 ECG 12 Lead ED Triage  Standing Order; Chest Pain  Sinus rhythm with slight T wave reports is an abnormality in the inferior leads.  Rate 74  QRS 82 QTc 435  Electronically signed by Pat Griffith DO, 11/19/23, 12:23 AM EST.'   [LK]      ED Course User Index  [LK] Pat Griffith DO                                           [facility]      12/6/2022 TTE: Mild left ventricular global hypokinesis, LVEF 41 to 45%, grade 2 diastolic dysfunction, bioprosthetic mitral valve grossly normal with mild regurg, mild pulmonary hypertension    9/21/2022: cardioversion    6/27/2022 cardiac cath: 30 to 40% stenosis proximal, mid and distal LAD stenosis, distal stent had 40 to 50% stenosis which was unchanged from previous cath, moderate vessel disease within left circumflex, 30 to 40% diffuse stenosis of the proximal, mid and distal RCA    Final diagnoses:   None       ED Disposition  ED Disposition       None            No follow-up provider specified.       Medication List      No changes were made to your prescriptions during this visit.          moderate vessel disease within left circumflex, 30 to 40% diffuse stenosis of the proximal, mid and distal RCA    Final diagnoses:   Chest pain, unspecified type       ED Disposition  ED Disposition       ED Disposition   Discharge    Condition   Stable    Comment   --               Mahendra Amador MD   [place] [id number]  [phone]    Schedule an appointment as soon as possible for a visit            Medication List      No changes were made to your prescriptions during this visit.            Lexie Thompson MD  12/14/23 0715

## 2023-11-20 ENCOUNTER — TELEPHONE (OUTPATIENT)
Dept: CARDIOLOGY | Facility: CLINIC | Age: 68
End: 2023-11-20
Payer: MEDICARE

## 2023-11-20 NOTE — TELEPHONE ENCOUNTER
Pt went to the er over the weekend was told to follow up ,he is asking if you could read the notes and let us know when he needs to come in

## 2023-11-29 ENCOUNTER — OFFICE VISIT (OUTPATIENT)
Dept: CARDIOLOGY | Facility: CLINIC | Age: 68
End: 2023-11-29
Payer: MEDICARE

## 2023-11-29 VITALS
OXYGEN SATURATION: 90 % | DIASTOLIC BLOOD PRESSURE: 79 MMHG | HEIGHT: 75 IN | HEART RATE: 78 BPM | SYSTOLIC BLOOD PRESSURE: 119 MMHG | WEIGHT: 165.8 LBS | BODY MASS INDEX: 20.62 KG/M2

## 2023-11-29 DIAGNOSIS — I10 ESSENTIAL HYPERTENSION: Primary | ICD-10-CM

## 2023-11-29 DIAGNOSIS — I50.22 CHRONIC HFREF (HEART FAILURE WITH REDUCED EJECTION FRACTION): ICD-10-CM

## 2023-11-29 DIAGNOSIS — E78.5 HYPERLIPIDEMIA LDL GOAL <70: ICD-10-CM

## 2023-11-29 DIAGNOSIS — I25.10 ASCVD (ARTERIOSCLEROTIC CARDIOVASCULAR DISEASE): ICD-10-CM

## 2023-11-29 DIAGNOSIS — I48.0 PAROXYSMAL ATRIAL FIBRILLATION: ICD-10-CM

## 2023-11-29 NOTE — PROGRESS NOTES
Spencer Seaed MD  Damián Myers  1955 11/29/2023    Patient Active Problem List   Diagnosis    Obesity (BMI 30-39.9)    Chronic HFrEF (heart failure with reduced ejection fraction)    Coronary artery disease involving native coronary artery of native heart with angina pectoris    Stroke (cerebrum)    Essential hypertension    Hyperlipidemia LDL goal <70    Diastolic CHF due to valvular disease    Paroxysmal atrial fibrillation    Debility    Respiratory failure with hypoxia and hypercapnia    COPD exacerbation    CKD (chronic kidney disease) stage 3, GFR 30-59 ml/min    Gastrointestinal hemorrhage with melena    ASCVD (arteriosclerotic cardiovascular disease)    H/O mitral valve replacement    Cardiomyopathy, dilated    Heart failure with reduced ejection fraction and diastolic dysfunction    Acute on chronic HFrEF (heart failure with reduced ejection fraction)       Dear Spencer Saeed MD:    Subjective     History of Present Illness:    Chief Complaint   Patient presents with    Hypertension     Follow up       Damián Myers is a pleasant 68 y.o. male with a past medical history significant for  coronary artery disease with PCI to LAD on 11/8/2019, ischemic cardiomyopathy that had improved however was recently found to be reduced at 41-45% likely now related to mitral valve replacement, moderate to severe mitral valve regurgitation status post mitral valve replacement in September 2022 with 33 mm Magna Ease tissue mitral valve, paroxysmal atrial fibrillation with ANGELICA ligation, hyperlipidemia, COPD, and hypertension. Ed comes in today for routine cardiology follow up     Since Ed was last seen he was seen in the emergency department where he did present for chest pains he reported that this happened at home but was not similar to the pain he had in the past prior to PCI.  Upon arrival his pains had nearly resolved.  He did present with an DAMIAN however he had refused an IV and fluids.  He was also advised for him  to be admitted for observation but he declined this as well.  Clinically today he reports he has been doing well denies any recurrence of the symptoms since he was in the ER and reports his breathing is doing well.  He reports he is still exercising on almost daily basis on a stationary bicycle for 30 to 40 minutes without chest pains or dyspnea.  He reports he does drink water with every meal and throughout the day.  He denies orthopnea or PND.  He does believe that his dyspnea although chronic is being made worse by metoprolol.    Allergies   Allergen Reactions    Other Unknown - High Severity     Pt had a reaction to anesthesia when placing stents       Percocet [Oxycodone-Acetaminophen] Other (See Comments)     Reports they gave him too much and he had respiratory depression   Reports he has tolerated since then with no issues.    :      Current Outpatient Medications:     amoxicillin (AMOXIL) 250 MG capsule, Take 1 capsule by mouth 3 (Three) Times a Day., Disp: , Rfl:     ascorbic acid (VITAMIN C) 500 MG tablet, Take 1 tablet by mouth Daily., Disp: , Rfl:     aspirin 81 MG chewable tablet, Chew 1 tablet Daily., Disp: , Rfl:     bumetanide (BUMEX) 1 MG tablet, Take 1 tablet by mouth Daily., Disp: 90 tablet, Rfl: 3    losartan (COZAAR) 50 MG tablet, Take 1 tablet by mouth Daily., Disp: 90 tablet, Rfl: 3    metoprolol succinate XL (TOPROL-XL) 25 MG 24 hr tablet, Take 0.5 tablets by mouth 2 (Two) Times a Day., Disp: 90 tablet, Rfl: 3    spironolactone (ALDACTONE) 25 MG tablet, Take 0.5 tablets by mouth Daily., Disp: 45 tablet, Rfl: 2  No current facility-administered medications for this visit.    Facility-Administered Medications Ordered in Other Visits:     Chlorhexidine Gluconate Cloth 2 % pads 1 application, 1 application , Topical, Q12H PRN, Magaly King, SREEKANTH    The following portions of the patient's history were reviewed and updated as appropriate: allergies, current medications, past family history,  "past medical history, past social history, past surgical history and problem list.    Social History     Tobacco Use    Smoking status: Never     Passive exposure: Never    Smokeless tobacco: Never   Vaping Use    Vaping Use: Never used   Substance Use Topics    Alcohol use: No    Drug use: No         Objective   Vitals:    11/29/23 1003   BP: 119/79   BP Location: Left arm   Patient Position: Sitting   Cuff Size: Adult   Pulse: 78   SpO2: 90%   Weight: 75.2 kg (165 lb 12.8 oz)   Height: 190.5 cm (75\")     Body mass index is 20.72 kg/m².    ROS    Constitutional:       General: Not in acute distress.     Appearance: Healthy appearance. Well-developed and not in distress. Not diaphoretic.   Eyes:      Conjunctiva/sclera: Conjunctivae normal.      Pupils: Pupils are equal, round, and reactive to light.   HENT:      Head: Normocephalic and atraumatic.   Neck:      Vascular: No carotid bruit or JVD.   Pulmonary:      Effort: Pulmonary effort is normal. No respiratory distress.      Breath sounds: Normal breath sounds.   Cardiovascular:      Normal rate. Regular rhythm.   Edema:     Peripheral edema absent.   Skin:     General: Skin is cool.   Neurological:      Mental Status: Alert, oriented to person, place, and time and oriented to person, place and time.         Lab Results   Component Value Date     11/18/2023    K 4.4 11/18/2023     11/18/2023    CO2 31.2 (H) 11/18/2023    BUN 31 (H) 11/18/2023    CREATININE 1.79 (H) 11/18/2023    GLUCOSE 120 (H) 11/18/2023    CALCIUM 9.2 11/18/2023    AST 30 11/18/2023    ALT 16 11/18/2023    ALKPHOS 90 11/18/2023     Lab Results   Component Value Date    CKTOTAL 72 07/19/2023     Lab Results   Component Value Date    WBC 6.85 11/18/2023    HGB 15.2 11/18/2023    HCT 49.7 11/18/2023     (L) 11/18/2023     Lab Results   Component Value Date    INR 0.96 11/18/2023    INR 0.97 07/19/2023    INR 1.10 09/19/2022     Lab Results   Component Value Date    MG 2.0 " "07/20/2023     Lab Results   Component Value Date    TSH 3.760 07/19/2023    PSA 0.433 11/06/2019    TRIG 85 06/25/2022    HDL 36 (L) 06/25/2022     (H) 06/25/2022      No results found for: \"BNP\"    During this visit the following were done:  Labs Reviewed []    Labs Ordered []    Radiology Reports Reviewed []    Radiology Ordered []    PCP Records Reviewed []    Referring Provider Records Reviewed []    ER Records Reviewed []    Hospital Records Reviewed []    History Obtained From Family []    Radiology Images Reviewed []    Other Reviewed []    Records Requested []       Procedures    Assessment & Plan    Diagnosis Plan   1. Essential hypertension  Basic Metabolic Panel      2. Chronic HFrEF (heart failure with reduced ejection fraction)        3. ASCVD (arteriosclerotic cardiovascular disease)        4. Hyperlipidemia LDL goal <70        5. Paroxysmal atrial fibrillation                 Recommendations:  Dyspnea  Likely related to cardiomyopathy however Ed is concerned that it may be secondary to his metoprolol.  Although metoprolol can sometimes worsen asthma/COPD I have low suspicion that this is the etiology of his dyspnea I did explain this to him.  However after shared decision making he agreed to hold metoprolol for 1 week and monitor for improvement in his breathing.  I explained to him that it he does not have any discernible difference in his breathing to reinitiate metoprolol succinate he expressed understanding.  DAMIAN  Will repeat BMP encouraged him to continue drinking plenty of water.  CAD  Reviewed ER stay. He did have highly sensitive troponin checked that was stable without significant delta.  He did have left heart catheterization last year that revealed stable nonobstructive CAD.  Will continue with GDMT. He is chest pain free today without any further recurrence.  Cardiomyopathy  Appears euvolemic today encouraged him to contact our office should he develop worsening shortness of " breath.  He expressed understanding.    No follow-ups on file.    As always, I appreciate very much the opportunity to participate in the cardiovascular care of your patients.      With Best Regards,    Yasmany Leos PA-C

## 2024-02-21 ENCOUNTER — OFFICE VISIT (OUTPATIENT)
Dept: PULMONOLOGY | Facility: CLINIC | Age: 69
End: 2024-02-21
Payer: MEDICARE

## 2024-02-21 VITALS
TEMPERATURE: 96.8 F | SYSTOLIC BLOOD PRESSURE: 108 MMHG | WEIGHT: 261.2 LBS | BODY MASS INDEX: 32.65 KG/M2 | DIASTOLIC BLOOD PRESSURE: 60 MMHG | OXYGEN SATURATION: 96 % | HEART RATE: 68 BPM

## 2024-02-21 DIAGNOSIS — R06.02 SHORTNESS OF BREATH: ICD-10-CM

## 2024-02-21 DIAGNOSIS — G47.19 EXCESSIVE DAYTIME SLEEPINESS: Primary | ICD-10-CM

## 2024-02-21 DIAGNOSIS — G47.34 NOCTURNAL HYPOXIA: ICD-10-CM

## 2024-02-21 DIAGNOSIS — E66.9 OBESITY (BMI 30-39.9): ICD-10-CM

## 2024-02-21 DIAGNOSIS — I27.20 PULMONARY HYPERTENSION: ICD-10-CM

## 2024-02-21 PROCEDURE — 99214 OFFICE O/P EST MOD 30 MIN: CPT | Performed by: PHYSICIAN ASSISTANT

## 2024-02-21 PROCEDURE — 3074F SYST BP LT 130 MM HG: CPT | Performed by: PHYSICIAN ASSISTANT

## 2024-02-21 PROCEDURE — 3078F DIAST BP <80 MM HG: CPT | Performed by: PHYSICIAN ASSISTANT

## 2024-02-21 PROCEDURE — 1160F RVW MEDS BY RX/DR IN RCRD: CPT | Performed by: PHYSICIAN ASSISTANT

## 2024-02-21 PROCEDURE — 1159F MED LIST DOCD IN RCRD: CPT | Performed by: PHYSICIAN ASSISTANT

## 2024-02-21 NOTE — PROGRESS NOTES
Subjective      Chief Complaint  Shortness of Breath    Subjective      History of Present Illness  Damián Myers is a 68 y.o. male who presents today to [facility] with past medical history of CAD s/p PCI to LAD, ischemic cardiomyopathy, chronic HFrEF, mild pulmonary hypertension, moderate to severe MVR s/p mitral valve replacement in September 2022, paroxysmal atrial fibrillation, hyperlipidemia, and essential hypertension who presents today for Shortness of Breath This visit is a follow up appointment.     Shortness of Breath:  Patient was previously admitted from 07/19/2023 to 07/22/2023 for acute on chronic HFmrEF and acute hypoxic respiratory failure. He received IV Bumex 1 mg and diuresed well as he was down about 10 lbs from his admit weight. Patient was noted to have sleep apnea and daytime hypersomnolence with exertional hypoxia and orthopnea during hospitalization. He received order for nocturnal and as needed oxygen.     Interval history:  Patient reports he is doing well. He states that his breathing is currently at baseline. He continues to have shortness of breath with exertion but doesn't feel that it's getting any worse. He denies any coughing or wheezing. He was ordered a PFT during his previous visit but this hasn't been performed. He reports that he has been exercising at home and uses a stationary bicycle for approximately 30 minutes daily. He feels that he has been sleeping well and states that he gets 7-8 hours of sleep per night. He has supplemental oxygen that he uses at night. He was ordered a sleep study during his previous visit but doesn't feel that he needs to have this done at this time.       Current Outpatient Medications:     ascorbic acid (VITAMIN C) 500 MG tablet, Take 1 tablet by mouth Daily., Disp: , Rfl:     aspirin 81 MG chewable tablet, Chew 1 tablet Daily., Disp: , Rfl:     bumetanide (BUMEX) 1 MG tablet, Take 1 tablet  "by mouth Daily., Disp: 90 tablet, Rfl: 3    losartan (COZAAR) 50 MG tablet, Take 1 tablet by mouth Daily., Disp: 90 tablet, Rfl: 3    spironolactone (ALDACTONE) 25 MG tablet, Take 0.5 tablets by mouth Daily., Disp: 45 tablet, Rfl: 2  No current facility-administered medications for this visit.    Facility-Administered Medications Ordered in Other Visits:     Chlorhexidine Gluconate Cloth 2 % pads 1 application, 1 application , Topical, Q12H PRN, Magaly King APRN      Allergies   Allergen Reactions    Other Unknown - High Severity     Pt had a reaction to anesthesia when placing stents       Percocet [Oxycodone-Acetaminophen] Other (See Comments)     Reports they gave him too much and he had respiratory depression   Reports he has tolerated since then with no issues.        Objective     Objective   Vital Signs:  /60   Pulse 68   Temp 96.8 °F (36 °C)   Wt 118 kg (261 lb 3.2 oz)   SpO2 96%   BMI 32.65 kg/m²   Estimated body mass index is 32.65 kg/m² as calculated from the following:    Height as of 11/29/23: 190.5 cm (75\").    Weight as of this encounter: 118 kg (261 lb 3.2 oz).    Past Medical History:   Diagnosis Date    CAD (coronary artery disease)     Cardiac abnormality     CHF (congestive heart failure)     Coma of unknown cause 1984    Pt states he was in a coma for one week, unknown cause    COVID-19 10/25/2021    Debility 09/14/2022    Diastolic CHF due to valvular disease 06/25/2022    Echo (9/10/2022):LVEF = 55%.  33 mm Medtronic Magna Ease bioprosthetic valve present.  Mean gradient 8 mmHg.  Moderate 1-2 cm pericardial effusion.  No tamponade.  Left pleural effusion present.    History of stomach ulcers     Hypertension     Mitral regurgitation     Obesity (BMI 30-39.9) 11/04/2019    Paroxysmal atrial fibrillation 09/10/2022    Atrial fibrillation status post MVR [id number] ANGELICA ligated at time of surgery with 35 mm Atricure clip- KENNEY confirms occluded ANGELICA with no residual flow   " Successful KENNEY/ECV 9/7, on amiodarone Was on Eliquis but discontinued on 9/10/2022 due to severe anemia Back into atrial fibrillation on 9/11/2022    Post-op pericardial effusion 09/12/2022    Echo for post-op hypotension (9/10/2022): Normal LVEF.  Moderate pericardial effusion.  No evidence of tamponade seen.    Pulmonary arterial hypertension 10/2019    Severe mitral regurgitation status post bioprosthetic MVR, 9/2/2022 08/03/2022    Echo 6/26/22: EF 36-40%, global hypokinesis, mild bileaflet MVP with moderate MR; grade III diastolic dysfunction, severe PAH with RVSP 67mmHg KENNEY 6/28/22: LVEF 56-60%, MVP of posterior leaflet with mod-severe MR, modTR with RVSP 52mmHg MVR 9/2/22 Dr. Chan with 33 mm Magna Ease mitral valve  Echo (9/10/2022):LVEF = 55%.  33 mm Medtronic Magna Ease bioprosthetic valve present.  Mean gradient 8 mm    Spinal headache 1984     Past Surgical History:   Procedure Laterality Date    CARDIAC CATHETERIZATION N/A 11/08/2019    Procedure: Left Heart Cath;  Surgeon: Sherrell Hart MD;  Location:  [facility];  Service: Cardiology    CARDIAC CATHETERIZATION N/A 06/27/2022    Procedure: Left Heart Cath;  Surgeon: Greyson Balderas MD;  Location:  [facility];  Service: Cardiology;  Laterality: N/A;    CARDIAC VALVE REPLACEMENT      COLONOSCOPY      CORONARY STENT PLACEMENT  2019    ENDOSCOPY N/A 09/12/2022    Procedure: ESOPHAGOGASTRODUODENOSCOPY;  Surgeon: Leann Fitzpatrick MD;  Location:  [facility];  Service: Gastroenterology;  Laterality: N/A;    MITRAL VALVE REPAIR/REPLACEMENT N/A 09/02/2022    Procedure: MEDIAN STERNOTOMY MITRAL VALVE REPLACEMENT, LEFT ATRIAL APPENDAGE LIGATION AND KENNEY PER ANESTHESIA;  Surgeon: Lyle Chan MD;  Location: [facility] OR;  Service: Cardiothoracic;  Laterality: N/A;     Social History     Socioeconomic History    Marital status:     Number of children: 7   Tobacco Use    Smoking status: Never     Passive  "exposure: Never    Smokeless tobacco: Never   Vaping Use    Vaping Use: Never used   Substance and Sexual Activity    Alcohol use: No    Drug use: No    Sexual activity: Not Currently     Partners: Female      Physical Exam  Constitutional:       General: He is awake.      Appearance: Normal appearance. He is obese.   HENT:      Head: Normocephalic and atraumatic.      Nose: Nose normal.      Mouth/Throat:      Mouth: Mucous membranes are moist.      Pharynx: Oropharynx is clear.   Eyes:      Conjunctiva/sclera: Conjunctivae normal.      Pupils: Pupils are equal, round, and reactive to light.   Cardiovascular:      Rate and Rhythm: Normal rate and regular rhythm.      Pulses: Normal pulses.      Heart sounds: Normal heart sounds. No murmur heard.     No friction rub. No gallop.   Pulmonary:      Effort: Pulmonary effort is normal. No tachypnea, accessory muscle usage or respiratory distress.      Breath sounds: Normal breath sounds. No decreased breath sounds, wheezing, rhonchi or rales.   Musculoskeletal:         General: Normal range of motion.      Cervical back: Full passive range of motion without pain and normal range of motion.   Skin:     General: Skin is warm and dry.   Neurological:      General: No focal deficit present.      Mental Status: He is alert. Mental status is at baseline.   Psychiatric:         Mood and Affect: Mood normal.         Behavior: Behavior normal. Behavior is cooperative.         Thought Content: Thought content normal.        Result Review :  The following labs and radiology results have been reviewed.    Lab Review:   No results found for: \"FEV1\", \"FVC\", \"WPE6FDF\", \"TLC\", \"DLCO\"  No visits with results within 3 Month(s) from this visit.   Latest known visit with results is:   Admission on 11/19/2023, Discharged on 11/19/2023   Component Date Value Ref Range Status    QT Interval 11/18/2023 392  ms Final    QTC Interval 11/18/2023 435  ms Final    Glucose 11/18/2023 120 (H)  65 - 99 " mg/dL Final    BUN 11/18/2023 31 (H)  8 - 23 mg/dL Final    Creatinine 11/18/2023 1.79 (H)  0.76 - 1.27 mg/dL Final    Sodium 11/18/2023 141  136 - 145 mmol/L Final    Potassium 11/18/2023 4.4  3.5 - 5.2 mmol/L Final    Slight hemolysis detected by analyzer. Result may be falsely elevated.    Chloride 11/18/2023 103  98 - 107 mmol/L Final    CO2 11/18/2023 31.2 (H)  22.0 - 29.0 mmol/L Final    Calcium 11/18/2023 9.2  8.6 - 10.5 mg/dL Final    Total Protein 11/18/2023 6.9  6.0 - 8.5 g/dL Final    Albumin 11/18/2023 4.1  3.5 - 5.2 g/dL Final    ALT (SGPT) 11/18/2023 16  1 - 41 U/L Final    AST (SGOT) 11/18/2023 30  1 - 40 U/L Final    Alkaline Phosphatase 11/18/2023 90  39 - 117 U/L Final    Total Bilirubin 11/18/2023 0.3  0.0 - 1.2 mg/dL Final    Globulin 11/18/2023 2.8  gm/dL Final    A/G Ratio 11/18/2023 1.5  g/dL Final    BUN/Creatinine Ratio 11/18/2023 17.3  7.0 - 25.0 Final    Anion Gap 11/18/2023 6.8  5.0 - 15.0 mmol/L Final    eGFR 11/18/2023 40.8 (L)  >60.0 mL/min/1.73 Final    HS Troponin T 11/18/2023 35 (H)  <22 ng/L Final    Extra Tube 11/18/2023 Hold for add-ons.   Final    Auto resulted.    Extra Tube 11/18/2023 hold for add-on   Final    Auto resulted    Extra Tube 11/18/2023 Hold for add-ons.   Final    Auto resulted.    Extra Tube 11/18/2023 Hold for add-ons.   Final    Auto resulted    WBC 11/18/2023 6.85  3.40 - 10.80 10*3/mm3 Final    RBC 11/18/2023 5.04  4.14 - 5.80 10*6/mm3 Final    Hemoglobin 11/18/2023 15.2  13.0 - 17.7 g/dL Final    Hematocrit 11/18/2023 49.7  37.5 - 51.0 % Final    MCV 11/18/2023 98.6 (H)  79.0 - 97.0 fL Final    MCH 11/18/2023 30.2  26.6 - 33.0 pg Final    MCHC 11/18/2023 30.6 (L)  31.5 - 35.7 g/dL Final    RDW 11/18/2023 12.6  12.3 - 15.4 % Final    RDW-SD 11/18/2023 45.3  37.0 - 54.0 fl Final    MPV 11/18/2023 10.3  6.0 - 12.0 fL Final    Platelets 11/18/2023 135 (L)  140 - 450 10*3/mm3 Final    Neutrophil % 11/18/2023 56.1  42.7 - 76.0 % Final    Lymphocyte % 11/18/2023  27.0  19.6 - 45.3 % Final    Monocyte % 11/18/2023 13.4 (H)  5.0 - 12.0 % Final    Eosinophil % 11/18/2023 2.9  0.3 - 6.2 % Final    Basophil % 11/18/2023 0.3  0.0 - 1.5 % Final    Immature Grans % 11/18/2023 0.3  0.0 - 0.5 % Final    Neutrophils, Absolute 11/18/2023 3.84  1.70 - 7.00 10*3/mm3 Final    Lymphocytes, Absolute 11/18/2023 1.85  0.70 - 3.10 10*3/mm3 Final    Monocytes, Absolute 11/18/2023 0.92 (H)  0.10 - 0.90 10*3/mm3 Final    Eosinophils, Absolute 11/18/2023 0.20  0.00 - 0.40 10*3/mm3 Final    Basophils, Absolute 11/18/2023 0.02  0.00 - 0.20 10*3/mm3 Final    Immature Grans, Absolute 11/18/2023 0.02  0.00 - 0.05 10*3/mm3 Final    nRBC 11/18/2023 0.0  0.0 - 0.2 /100 WBC Final    Protime 11/18/2023 13.3  12.1 - 14.7 Seconds Final    INR 11/18/2023 0.96  0.90 - 1.10 Final    PTT 11/18/2023 30.9  26.5 - 34.5 seconds Final    HS Troponin T 11/19/2023 32 (H)  <22 ng/L Final    Troponin T Delta 11/19/2023 -3  >=-4 - <+4 ng/L Final      Reviewed CT chest from 07/19/2023     Narrative & Impression   EXAM:    CT Chest Without Intravenous Contrast     EXAM DATE:    7/19/2023 1:59 PM     CLINICAL HISTORY:    COPD suspected     TECHNIQUE:    Axial computed tomography images of the chest without intravenous  contrast.  Sagittal and coronal reformatted images were created and  reviewed.  This CT exam was performed using one or more of the following  dose reduction techniques:  automated exposure control, adjustment of  the mA and/or kV according to patient size, and/or use of iterative  reconstruction technique.     COMPARISON:    11/10/2022     FINDINGS:    Lungs and pleural spaces:  Miniscule right pleural effusion.  Right  lower lobe airspace disease. Partially consolidative.  Interstitial  edema.  Pulmonary vascular congestion.    Heart:  Marked cardiomegaly.  Valvuloplasty changes of the mitral  valve.  No significant pericardial effusion.  No significant coronary  artery calcifications.    Mediastinum:  No  mediastinal or hilar lymphadenopathy.    Bones/joints:  Median sternotomy.  No acute fracture.  No dislocation.    Soft tissues:  Unremarkable.    Vasculature:  Mild atherosclerosis thoracic aorta.    Lymph nodes:  See above.     IMPRESSION:  1.  CHF with interstitial edema and a miniscule right pleural effusion.  Pulmonary vascular congestion noted.  2.  Right lower lobe airspace disease which may represent pneumonia  and/or atelectasis.  3.  Changes of prior median sternotomy with valvuloplasty.  4.  Other incidental/nonacute findings as above.     This report was finalized on 7/19/2023 3:14 PM by Dr. Corey Powell MD.        Reviewed echocardiogram report from 06/12/2023  Results for orders placed during the hospital encounter of 06/12/23    Adult Transthoracic Echo Complete W/ Cont if Necessary Per Protocol    Interpretation Summary  Images from the original result were not included.      Normal left ventricular cavity size and wall thickness noted. There is mild left ventricular global hypokinesis noted.    Left ventricular ejection fraction appears to be 41 - 45%.    Left ventricular diastolic function is consistent with (grade II w/high LAP) pseudonormalization.    There is a bioprosthetic mitral valve present.The prosthetic mitral valve is grossly normal.    Trace to mild mitral valve regurgitation is present. No significant mitral valve stenosis is present    The aortic valve is structurally normal with no regurgitation or stenosis present.    Mild tricuspid valve regurgitation is present. Estimated right ventricular systolic pressure from tricuspid regurgitation is mildly elevated (35-45 mmHg). Mild pulmonary hypertension is present.    There is no evidence of pericardial effusion.     Reviewed PFT from 08/30/2022      Assessment / Plan         Assessment   Diagnoses and all orders for this visit:    1. Excessive daytime sleepiness (Primary)  2. Nocturnal hypoxia  3. Obesity (BMI 30-39.9)  During previous  hospitalization in July 2023 noted to have periods of apnea during his sleep and hypersomnolence during the daytime.   Previously ordered sleep study to further evaluate for underlying MISAEL but patient feels that he doesn't need to have this done at this time. Informed him to contact office if he wishes to have this done in the future.   Compliance with using supplemental oxygen at night.     4. Shortness of breath  5. Pulmonary hypertension  Shortness of breath likely multifactorial due to body habitus, chronic HFmrEF, and mild pulmonary hypertension.   PFT from 08/30/2022 prior to his valve replacement procedure revealed restrictive defect but no obstruction was present. Ordered full PFT to further evaluate lung volumes and DLCO, but hasn't been performed.   Recent echocardiogram from June 2023 revealed mild pulmonary hypertension with RVSP with 35-45 mmHg. Likely multifactorial due to group 2 (underlying chronic HFmrEF) or group 3 (may have undiagnosed MISAEL due to having observed episodes of apnea during recent hospitalization).   Continue following with cardiology and heart failure clinic for management of heart failure.      Management obstructive sleep apnea also includes lifestyle modifications including weight loss, avoidance of alcohol, sedated, caffeine especially before bedtime, allowing adequate sleep time, and body position during sleep such as side versus back. 10% weight loss can result in a 50% improvement of the apnea-hypopnea index. Untreated sleep apnea can lead to cardiovascular/metabolic disorder, neurocognitive deficit, daytime sleepiness, motor vehicle accidents, depression, mood disorders and reduced quality of life.  Patient understands the risk of untreated obstructive sleep apnea and benefit benefits of the treatment. Recommended at least 4 hours of use per night for 70% of every month (approximately 21 out of 30 days) per CMS guidelines.      I spent 30 minutes caring for Damián on this date  of service. This time includes time spent by me in the following activities:preparing for the visit, reviewing tests, obtaining and/or reviewing a separately obtained history, performing a medically appropriate examination and/or evaluation , counseling and educating the patient/family/caregiver, ordering medications, tests, or procedures, referring and communicating with other health care professionals , documenting information in the medical record, independently interpreting results and communicating that information with the patient/family/caregiver, and care coordination    Follow Up   Return if symptoms worsen or fail to improve, for Next scheduled follow up.  Patient wishes to follow-up on as needed basis at this time. Educated to contact office if notices any worsening respiratory symptoms and if he wishes to have sleep study in the future.     Patient was given instructions and counseling regarding his condition or for health maintenance advice. Please see specific information pulled into the AVS if appropriate.       This document has been electronically signed by Mirna Celeste PA-C   February 23, 2024 15:05 EST    Dictated Utilizing Dragon Dictation: Part of this note may be an electronic transcription/translation of spoken language to printed text using the Dragon Dictation System.

## 2024-03-20 ENCOUNTER — OFFICE VISIT (OUTPATIENT)
Dept: CARDIOLOGY | Facility: CLINIC | Age: 69
End: 2024-03-20
Payer: MEDICARE

## 2024-03-20 VITALS
DIASTOLIC BLOOD PRESSURE: 73 MMHG | WEIGHT: 264.6 LBS | OXYGEN SATURATION: 94 % | HEIGHT: 75 IN | HEART RATE: 77 BPM | SYSTOLIC BLOOD PRESSURE: 108 MMHG | BODY MASS INDEX: 32.9 KG/M2

## 2024-03-20 DIAGNOSIS — I25.10 ASCVD (ARTERIOSCLEROTIC CARDIOVASCULAR DISEASE): Primary | ICD-10-CM

## 2024-03-20 DIAGNOSIS — I50.22 CHRONIC HFREF (HEART FAILURE WITH REDUCED EJECTION FRACTION): ICD-10-CM

## 2024-03-20 PROCEDURE — 99213 OFFICE O/P EST LOW 20 MIN: CPT | Performed by: PHYSICIAN ASSISTANT

## 2024-03-20 PROCEDURE — 3074F SYST BP LT 130 MM HG: CPT | Performed by: PHYSICIAN ASSISTANT

## 2024-03-20 PROCEDURE — 3078F DIAST BP <80 MM HG: CPT | Performed by: PHYSICIAN ASSISTANT

## 2024-03-20 NOTE — PROGRESS NOTES
Spencer Saeed MD  Damián Myers  1955 03/20/2024    Patient Active Problem List   Diagnosis    Obesity (BMI 30-39.9)    Chronic HFrEF (heart failure with reduced ejection fraction)    Coronary artery disease involving native coronary artery of native heart with angina pectoris    Stroke (cerebrum)    Essential hypertension    Hyperlipidemia LDL goal <70    Diastolic CHF due to valvular disease    Paroxysmal atrial fibrillation    Debility    Respiratory failure with hypoxia and hypercapnia    COPD exacerbation    CKD (chronic kidney disease) stage 3, GFR 30-59 ml/min    Gastrointestinal hemorrhage with melena    ASCVD (arteriosclerotic cardiovascular disease)    H/O mitral valve replacement    Cardiomyopathy, dilated    Heart failure with reduced ejection fraction and diastolic dysfunction    Acute on chronic HFrEF (heart failure with reduced ejection fraction)       Dear Spencer Saeed MD:    Subjective     History of Present Illness:    Chief Complaint   Patient presents with    Follow-up     routine       Damián Myers is a pleasant 68 y.o. male with a past medical history significant for coronary artery disease with PCI to LAD on 11/8/2019, ischemic cardiomyopathy that had improved however was recently found to be reduced at 41-45% likely now related to mitral valve replacement, moderate to severe mitral valve regurgitation status post mitral valve replacement in September 2022 with 33 mm Magna Ease tissue mitral valve, paroxysmal atrial fibrillation with ANGELICA ligation, hyperlipidemia, COPD, and hypertension. Ed comes in today for routine cardiology follow up      Ed reports some indigestion particularly when he eats later at night and lays down.  He denies any exertional chest pains he is able to walk regularly and even cycles on occasion.  Blood pressure is very well-controlled today.  He denies any worsening dyspnea from baseline he does regularly use his Bumex.  Denies any syncope or near  "syncope.    Allergies   Allergen Reactions    Other Unknown - High Severity     Pt had a reaction to anesthesia when placing stents       Percocet [Oxycodone-Acetaminophen] Other (See Comments)     Reports they gave him too much and he had respiratory depression   Reports he has tolerated since then with no issues.    :      Current Outpatient Medications:     ascorbic acid (VITAMIN C) 500 MG tablet, Take 1 tablet by mouth Daily., Disp: , Rfl:     aspirin 81 MG chewable tablet, Chew 1 tablet Daily., Disp: , Rfl:     bumetanide (BUMEX) 1 MG tablet, Take 1 tablet by mouth Daily., Disp: 90 tablet, Rfl: 3    losartan (COZAAR) 50 MG tablet, Take 1 tablet by mouth Daily., Disp: 90 tablet, Rfl: 3    spironolactone (ALDACTONE) 25 MG tablet, Take 0.5 tablets by mouth Daily., Disp: 45 tablet, Rfl: 2  No current facility-administered medications for this visit.    Facility-Administered Medications Ordered in Other Visits:     Chlorhexidine Gluconate Cloth 2 % pads 1 application, 1 application , Topical, Q12H PRN, Magaly King, SREEKANTH    The following portions of the patient's history were reviewed and updated as appropriate: allergies, current medications, past family history, past medical history, past social history, past surgical history and problem list.    Social History     Tobacco Use    Smoking status: Never     Passive exposure: Never    Smokeless tobacco: Never   Vaping Use    Vaping status: Never Used   Substance Use Topics    Alcohol use: No    Drug use: No         Objective   Vitals:    03/20/24 0834   BP: 108/73   Pulse: 77   SpO2: 94%   Weight: 120 kg (264 lb 9.6 oz)   Height: 190.5 cm (75\")     Body mass index is 33.07 kg/m².    ROS    Constitutional:       General: Not in acute distress.     Appearance: Healthy appearance. Well-developed and not in distress. Not diaphoretic.   Eyes:      Conjunctiva/sclera: Conjunctivae normal.      Pupils: Pupils are equal, round, and reactive to light.   HENT:      Head: " "Normocephalic and atraumatic.   Neck:      Vascular: No carotid bruit or JVD.   Pulmonary:      Effort: Pulmonary effort is normal. No respiratory distress.      Breath sounds: Normal breath sounds.   Cardiovascular:      Normal rate. Regular rhythm.   Edema:     Peripheral edema absent.   Skin:     General: Skin is cool.   Neurological:      Mental Status: Alert, oriented to person, place, and time and oriented to person, place and time.         Lab Results   Component Value Date     11/18/2023    K 4.4 11/18/2023     11/18/2023    CO2 31.2 (H) 11/18/2023    BUN 31 (H) 11/18/2023    CREATININE 1.79 (H) 11/18/2023    GLUCOSE 120 (H) 11/18/2023    CALCIUM 9.2 11/18/2023    AST 30 11/18/2023    ALT 16 11/18/2023    ALKPHOS 90 11/18/2023     Lab Results   Component Value Date    CKTOTAL 72 07/19/2023     Lab Results   Component Value Date    WBC 6.85 11/18/2023    HGB 15.2 11/18/2023    HCT 49.7 11/18/2023     (L) 11/18/2023     Lab Results   Component Value Date    INR 0.96 11/18/2023    INR 0.97 07/19/2023    INR 1.10 09/19/2022     Lab Results   Component Value Date    MG 2.0 07/20/2023     Lab Results   Component Value Date    TSH 3.760 07/19/2023    PSA 0.433 11/06/2019    TRIG 85 06/25/2022    HDL 36 (L) 06/25/2022     (H) 06/25/2022      No results found for: \"BNP\"    During this visit the following were done:  Labs Reviewed []    Labs Ordered []    Radiology Reports Reviewed []    Radiology Ordered []    PCP Records Reviewed []    Referring Provider Records Reviewed []    ER Records Reviewed []    Hospital Records Reviewed []    History Obtained From Family []    Radiology Images Reviewed []    Other Reviewed []    Records Requested []       Procedures    Assessment & Plan    Diagnosis Plan   1. ASCVD (arteriosclerotic cardiovascular disease)        2. Chronic HFrEF (heart failure with reduced ejection fraction)                 Recommendations:  ASCVD  Denies any exertional chest pains " or other symptoms concerning for angina.  He does report some indigestion that only comes on at night if he eats shortly before laying down.  Chronic HFrEF  Appears euvolemic continue losartan, spironolactone, and Bumex.  Essential hypertension  Well-controlled    No follow-ups on file.    As always, I appreciate very much the opportunity to participate in the cardiovascular care of your patients.      With Best Regards,    Yasmany Leos PA-C

## 2024-05-10 RX ORDER — SPIRONOLACTONE 25 MG/1
12.5 TABLET ORAL DAILY
Qty: 45 TABLET | Refills: 2 | Status: SHIPPED | OUTPATIENT
Start: 2024-05-10

## 2024-06-06 ENCOUNTER — TELEPHONE (OUTPATIENT)
Dept: CARDIOLOGY | Facility: CLINIC | Age: 69
End: 2024-06-06
Payer: MEDICARE

## 2024-06-06 NOTE — TELEPHONE ENCOUNTER
Patient called stating he has had a couple nosebleeds.  Monday and this morning last abut 5 minutes about 5 minutes each time.  He did recently stop the us of night O2 apox a week ago.  He is not taking ane meds that are not on his list (triple verified) and the only blood thiner he is taking is ASA 81mg.    He was advised that his nasal cavity may dry due to the O2 use and he can use a humidifer, steam or saline spray to help with this and if it continues to see his PCP for further eval,  he has an appoint with them this week.      He also mentioned he had recent labs at his PCP-I called twice to request them with no answer

## 2024-06-07 NOTE — TELEPHONE ENCOUNTER
He has been on aspirin now for a couple years it does sound like he likely has been getting dry nasal passageways.  If it becomes recurrent ask him to let us know

## 2024-09-24 ENCOUNTER — TELEPHONE (OUTPATIENT)
Dept: CARDIOLOGY | Facility: CLINIC | Age: 69
End: 2024-09-24

## 2024-09-24 ENCOUNTER — OFFICE VISIT (OUTPATIENT)
Dept: CARDIOLOGY | Facility: CLINIC | Age: 69
End: 2024-09-24
Payer: MEDICARE

## 2024-09-24 VITALS
HEART RATE: 80 BPM | BODY MASS INDEX: 31.04 KG/M2 | DIASTOLIC BLOOD PRESSURE: 75 MMHG | OXYGEN SATURATION: 92 % | HEIGHT: 75 IN | SYSTOLIC BLOOD PRESSURE: 112 MMHG | WEIGHT: 249.6 LBS

## 2024-09-24 DIAGNOSIS — I48.0 PAROXYSMAL ATRIAL FIBRILLATION: ICD-10-CM

## 2024-09-24 DIAGNOSIS — I10 ESSENTIAL HYPERTENSION: ICD-10-CM

## 2024-09-24 DIAGNOSIS — I25.10 ASCVD (ARTERIOSCLEROTIC CARDIOVASCULAR DISEASE): ICD-10-CM

## 2024-09-24 DIAGNOSIS — E78.5 HYPERLIPIDEMIA LDL GOAL <70: ICD-10-CM

## 2024-09-24 DIAGNOSIS — I50.22 CHRONIC HFREF (HEART FAILURE WITH REDUCED EJECTION FRACTION): Primary | ICD-10-CM

## 2024-09-24 PROCEDURE — 3074F SYST BP LT 130 MM HG: CPT | Performed by: PHYSICIAN ASSISTANT

## 2024-09-24 PROCEDURE — 1159F MED LIST DOCD IN RCRD: CPT | Performed by: PHYSICIAN ASSISTANT

## 2024-09-24 PROCEDURE — 3078F DIAST BP <80 MM HG: CPT | Performed by: PHYSICIAN ASSISTANT

## 2024-09-24 PROCEDURE — 1160F RVW MEDS BY RX/DR IN RCRD: CPT | Performed by: PHYSICIAN ASSISTANT

## 2024-09-24 PROCEDURE — 99214 OFFICE O/P EST MOD 30 MIN: CPT | Performed by: PHYSICIAN ASSISTANT

## 2024-09-24 RX ORDER — SPIRONOLACTONE 25 MG/1
12.5 TABLET ORAL DAILY
Qty: 45 TABLET | Refills: 2 | Status: SHIPPED | OUTPATIENT
Start: 2024-09-24

## 2024-09-24 RX ORDER — BUMETANIDE 1 MG/1
1 TABLET ORAL DAILY
Qty: 90 TABLET | Refills: 3 | Status: SHIPPED | OUTPATIENT
Start: 2024-09-24

## 2024-09-24 RX ORDER — LOSARTAN POTASSIUM 50 MG/1
50 TABLET ORAL DAILY
Qty: 90 TABLET | Refills: 3 | Status: SHIPPED | OUTPATIENT
Start: 2024-09-24

## 2024-09-24 NOTE — TELEPHONE ENCOUNTER
Called pt back and stated he does want to have the stress test done. I advised him the hospital will call him to get it scheduled. He expressed understanding.

## 2024-09-24 NOTE — TELEPHONE ENCOUNTER
"  Caller: Damián Myers \"Ed\"    Relationship: Self    Best call back number: ,[phone]        What is the best time to reach you: ANYTIME    Who are you requesting to speak with (clinical staff, provider,  specific staff member): CLINICAL    Do you know the name of the person who called: N/A    What was the call regarding: PATIENT WAS IN OFFICE TODAY AND WOULD LIKE TO SPEAK TO NILSON HOLLAND OR HIS NURSE. PLEASE REACH OUT.     Is it okay if the provider responds through [facility]:  CALL        "

## 2024-09-25 DIAGNOSIS — I25.10 ASCVD (ARTERIOSCLEROTIC CARDIOVASCULAR DISEASE): ICD-10-CM

## 2024-09-25 DIAGNOSIS — I50.22 CHRONIC HFREF (HEART FAILURE WITH REDUCED EJECTION FRACTION): Primary | ICD-10-CM

## 2024-09-25 DIAGNOSIS — R07.2 PRECORDIAL PAIN: ICD-10-CM

## 2024-09-26 ENCOUNTER — TELEPHONE (OUTPATIENT)
Dept: CARDIOLOGY | Facility: CLINIC | Age: 69
End: 2024-09-26
Payer: MEDICARE

## 2024-09-28 ENCOUNTER — APPOINTMENT (OUTPATIENT)
Dept: GENERAL RADIOLOGY | Facility: HOSPITAL | Age: 69
End: 2024-09-28
Payer: MEDICARE

## 2024-09-28 ENCOUNTER — HOSPITAL ENCOUNTER (EMERGENCY)
Facility: HOSPITAL | Age: 69
Discharge: HOME | End: 2024-09-28
Attending: EMERGENCY MEDICINE
Payer: MEDICARE

## 2024-09-28 VITALS
HEIGHT: 75 IN | DIASTOLIC BLOOD PRESSURE: 80 MMHG | TEMPERATURE: 97.3 F | RESPIRATION RATE: 14 BRPM | SYSTOLIC BLOOD PRESSURE: 109 MMHG | HEART RATE: 64 BPM | WEIGHT: 245 LBS | OXYGEN SATURATION: 91 % | BODY MASS INDEX: 30.46 KG/M2

## 2024-09-28 DIAGNOSIS — R07.9 NONSPECIFIC CHEST PAIN: Primary | ICD-10-CM

## 2024-09-28 LAB
ALBUMIN SERPL-MCNC: 3.8 G/DL (ref 3.5–5.2)
ALBUMIN/GLOB SERPL: 1.4 G/DL
ALP SERPL-CCNC: 72 U/L (ref 39–117)
ALT SERPL W P-5'-P-CCNC: 18 U/L (ref 1–41)
ANION GAP SERPL CALCULATED.3IONS-SCNC: 8.1 MMOL/L (ref 5–15)
AST SERPL-CCNC: 23 U/L (ref 1–40)
BASOPHILS # BLD AUTO: 0.05 10*3/MM3 (ref 0–0.2)
BASOPHILS NFR BLD AUTO: 0.8 % (ref 0–1.5)
BILIRUB SERPL-MCNC: 0.6 MG/DL (ref 0–1.2)
BUN SERPL-MCNC: 20 MG/DL (ref 8–23)
BUN/CREAT SERPL: 19 (ref 7–25)
CALCIUM SPEC-SCNC: 8.9 MG/DL (ref 8.6–10.5)
CHLORIDE SERPL-SCNC: 101 MMOL/L (ref 98–107)
CO2 SERPL-SCNC: 26.9 MMOL/L (ref 22–29)
CREAT SERPL-MCNC: 1.05 MG/DL (ref 0.76–1.27)
DEPRECATED RDW RBC AUTO: 43.8 FL (ref 37–54)
EGFRCR SERPLBLD CKD-EPI 2021: 76.8 ML/MIN/1.73
EOSINOPHIL # BLD AUTO: 0.13 10*3/MM3 (ref 0–0.4)
EOSINOPHIL NFR BLD AUTO: 2.1 % (ref 0.3–6.2)
ERYTHROCYTE [DISTWIDTH] IN BLOOD BY AUTOMATED COUNT: 12.7 % (ref 12.3–15.4)
GEN 5 2HR TROPONIN T REFLEX: 29 NG/L
GLOBULIN UR ELPH-MCNC: 2.8 GM/DL
GLUCOSE SERPL-MCNC: 97 MG/DL (ref 65–99)
HCT VFR BLD AUTO: 52.5 % (ref 37.5–51)
HGB BLD-MCNC: 16.4 G/DL (ref 13–17.7)
HOLD SPECIMEN: NORMAL
HOLD SPECIMEN: NORMAL
IMM GRANULOCYTES # BLD AUTO: 0.01 10*3/MM3 (ref 0–0.05)
IMM GRANULOCYTES NFR BLD AUTO: 0.2 % (ref 0–0.5)
LYMPHOCYTES # BLD AUTO: 1.83 10*3/MM3 (ref 0.7–3.1)
LYMPHOCYTES NFR BLD AUTO: 29.3 % (ref 19.6–45.3)
MAGNESIUM SERPL-MCNC: 2.1 MG/DL (ref 1.6–2.4)
MCH RBC QN AUTO: 29.2 PG (ref 26.6–33)
MCHC RBC AUTO-ENTMCNC: 31.2 G/DL (ref 31.5–35.7)
MCV RBC AUTO: 93.4 FL (ref 79–97)
MONOCYTES # BLD AUTO: 0.76 10*3/MM3 (ref 0.1–0.9)
MONOCYTES NFR BLD AUTO: 12.2 % (ref 5–12)
NEUTROPHILS NFR BLD AUTO: 3.46 10*3/MM3 (ref 1.7–7)
NEUTROPHILS NFR BLD AUTO: 55.4 % (ref 42.7–76)
NRBC BLD AUTO-RTO: 0 /100 WBC (ref 0–0.2)
NT-PROBNP SERPL-MCNC: 269.3 PG/ML (ref 0–900)
PLATELET # BLD AUTO: 163 10*3/MM3 (ref 140–450)
PMV BLD AUTO: 10.5 FL (ref 6–12)
POTASSIUM SERPL-SCNC: 4.3 MMOL/L (ref 3.5–5.2)
PROT SERPL-MCNC: 6.6 G/DL (ref 6–8.5)
QT INTERVAL: 376 MS
QTC INTERVAL: 414 MS
RBC # BLD AUTO: 5.62 10*6/MM3 (ref 4.14–5.8)
SODIUM SERPL-SCNC: 136 MMOL/L (ref 136–145)
T4 FREE SERPL-MCNC: 1.06 NG/DL (ref 0.92–1.68)
TROPONIN T DELTA: -4 NG/L
TROPONIN T SERPL HS-MCNC: 33 NG/L
TSH SERPL DL<=0.05 MIU/L-ACNC: 3.03 UIU/ML (ref 0.27–4.2)
WBC NRBC COR # BLD AUTO: 6.24 10*3/MM3 (ref 3.4–10.8)
WHOLE BLOOD HOLD COAG: NORMAL
WHOLE BLOOD HOLD SPECIMEN: NORMAL

## 2024-09-28 PROCEDURE — 84439 ASSAY OF FREE THYROXINE: CPT | Performed by: EMERGENCY MEDICINE

## 2024-09-28 PROCEDURE — 71045 X-RAY EXAM CHEST 1 VIEW: CPT

## 2024-09-28 PROCEDURE — 84443 ASSAY THYROID STIM HORMONE: CPT | Performed by: EMERGENCY MEDICINE

## 2024-09-28 PROCEDURE — 71045 X-RAY EXAM CHEST 1 VIEW: CPT | Performed by: RADIOLOGY

## 2024-09-28 PROCEDURE — 25810000003 SODIUM CHLORIDE 0.9 % SOLUTION: Performed by: EMERGENCY MEDICINE

## 2024-09-28 PROCEDURE — 80053 COMPREHEN METABOLIC PANEL: CPT | Performed by: EMERGENCY MEDICINE

## 2024-09-28 PROCEDURE — 83880 ASSAY OF NATRIURETIC PEPTIDE: CPT | Performed by: EMERGENCY MEDICINE

## 2024-09-28 PROCEDURE — 85025 COMPLETE CBC W/AUTO DIFF WBC: CPT | Performed by: EMERGENCY MEDICINE

## 2024-09-28 PROCEDURE — 99284 EMERGENCY DEPT VISIT MOD MDM: CPT

## 2024-09-28 PROCEDURE — 93010 ELECTROCARDIOGRAM REPORT: CPT | Performed by: SPECIALIST

## 2024-09-28 PROCEDURE — 83735 ASSAY OF MAGNESIUM: CPT | Performed by: EMERGENCY MEDICINE

## 2024-09-28 PROCEDURE — 93005 ELECTROCARDIOGRAM TRACING: CPT | Performed by: EMERGENCY MEDICINE

## 2024-09-28 PROCEDURE — 36415 COLL VENOUS BLD VENIPUNCTURE: CPT

## 2024-09-28 PROCEDURE — 84484 ASSAY OF TROPONIN QUANT: CPT | Performed by: EMERGENCY MEDICINE

## 2024-09-28 RX ORDER — ASPIRIN 81 MG/1
324 TABLET, CHEWABLE ORAL ONCE
Status: COMPLETED | OUTPATIENT
Start: 2024-09-28 | End: 2024-09-28

## 2024-09-28 RX ORDER — NITROGLYCERIN 0.4 MG/1
0.4 TABLET SUBLINGUAL
Status: DISCONTINUED | OUTPATIENT
Start: 2024-09-28 | End: 2024-09-28 | Stop reason: HOSPADM

## 2024-09-28 RX ORDER — SODIUM CHLORIDE 0.9 % (FLUSH) 0.9 %
10 SYRINGE (ML) INJECTION AS NEEDED
Status: DISCONTINUED | OUTPATIENT
Start: 2024-09-28 | End: 2024-09-28 | Stop reason: HOSPADM

## 2024-09-28 RX ADMIN — SODIUM CHLORIDE 500 ML: 9 INJECTION, SOLUTION INTRAVENOUS at 12:52

## 2024-09-28 RX ADMIN — NITROGLYCERIN 0.4 MG: 0.4 TABLET, ORALLY DISINTEGRATING SUBLINGUAL at 12:52

## 2024-09-28 RX ADMIN — ASPIRIN 243 MG: 81 TABLET, CHEWABLE ORAL at 12:46

## 2024-09-30 NOTE — ED PROVIDER NOTES
Subjective     History provided by:  Patient   used: No    Chest Pain  Pain location:  Substernal area  Pain quality: aching and dull    Pain radiates to:  Does not radiate  Pain severity:  Mild  Onset quality:  Gradual  Duration:  12 hours  Timing:  Intermittent  Progression:  Waxing and waning  Chronicity:  Recurrent  Context: not breathing, not drug use, not eating, not intercourse, not lifting, not movement, not raising an arm, not at rest, not stress and not trauma    Relieved by:  Nothing  Worsened by:  Nothing  Ineffective treatments:  None tried  Associated symptoms: no abdominal pain, no AICD problem, no altered mental status, no anorexia, no anxiety, no back pain, no claudication, no cough, no diaphoresis, no dizziness, no dysphagia, no fatigue, no fever, no headache, no heartburn, no lower extremity edema, no nausea, no near-syncope, no numbness, no orthopnea, no palpitations, no PND, no shortness of breath, no syncope, no vomiting and no weakness    Risk factors: coronary artery disease, high cholesterol, hypertension and male sex    Risk factors: no aortic disease, no diabetes mellitus, no Brenda-Danlos syndrome, no immobilization, no Marfan's syndrome, not obese, no prior DVT/PE, no smoking and no surgery        Review of Systems   Constitutional:  Negative for activity change, appetite change, chills, diaphoresis, fatigue and fever.   HENT:  Negative for congestion, ear pain, sore throat and trouble swallowing.    Eyes:  Negative for redness.   Respiratory:  Negative for cough, chest tightness, shortness of breath and wheezing.    Cardiovascular:  Positive for chest pain. Negative for palpitations, orthopnea, claudication, leg swelling, syncope, PND and near-syncope.   Gastrointestinal:  Negative for abdominal pain, anorexia, diarrhea, heartburn, nausea and vomiting.   Genitourinary:  Negative for dysuria and urgency.   Musculoskeletal:  Negative for arthralgias, back pain,  myalgias and neck pain.   Skin:  Negative for pallor, rash and wound.   Neurological:  Negative for dizziness, speech difficulty, weakness, numbness and headaches.   Psychiatric/Behavioral:  Negative for agitation, behavioral problems, confusion and decreased concentration.    All other systems reviewed and are negative.      Past Medical History:   Diagnosis Date    CAD (coronary artery disease)     Cardiac abnormality     CHF (congestive heart failure)     Coma of unknown cause 1984    Pt states he was in a coma for one week, unknown cause    COVID-19 10/25/2021    Debility 09/14/2022    Diastolic CHF due to valvular disease 06/25/2022    Echo (9/10/2022):LVEF = 55%.  33 mm Medtronic Magna Ease bioprosthetic valve present.  Mean gradient 8 mmHg.  Moderate 1-2 cm pericardial effusion.  No tamponade.  Left pleural effusion present.    History of stomach ulcers     Hypertension     Mitral regurgitation     Obesity (BMI 30-39.9) 11/04/2019    Paroxysmal atrial fibrillation 09/10/2022    Atrial fibrillation status post MVR [id number] ANGELICA ligated at time of surgery with 35 mm Atricure clip- KENNEY confirms occluded ANGELICA with no residual flow   Successful KENNEY/ECV 9/7, on amiodarone Was on Eliquis but discontinued on 9/10/2022 due to severe anemia Back into atrial fibrillation on 9/11/2022    Post-op pericardial effusion 09/12/2022    Echo for post-op hypotension (9/10/2022): Normal LVEF.  Moderate pericardial effusion.  No evidence of tamponade seen.    Pulmonary arterial hypertension 10/2019    Severe mitral regurgitation status post bioprosthetic MVR, 9/2/2022 08/03/2022    Echo 6/26/22: EF 36-40%, global hypokinesis, mild bileaflet MVP with moderate MR; grade III diastolic dysfunction, severe PAH with RVSP 67mmHg KENNEY 6/28/22: LVEF 56-60%, MVP of posterior leaflet with mod-severe MR, modTR with RVSP 52mmHg MVR 9/2/22 Dr. Chan with 33 mm Magna Ease mitral valve  Echo (9/10/2022):LVEF = 55%.  33 mm Medtronic Magna Ease  bioprosthetic valve present.  Mean gradient 8 mm    Spinal headache 1984       Allergies   Allergen Reactions    Other Unknown - High Severity     Pt had a reaction to anesthesia when placing stents       Percocet [Oxycodone-Acetaminophen] Other (See Comments)     Reports they gave him too much and he had respiratory depression   Reports he has tolerated since then with no issues.        Past Surgical History:   Procedure Laterality Date    CARDIAC CATHETERIZATION N/A 11/08/2019    Procedure: Left Heart Cath;  Surgeon: Sherrell Hart MD;  Location:  [facility];  Service: Cardiology    CARDIAC CATHETERIZATION N/A 06/27/2022    Procedure: Left Heart Cath;  Surgeon: Greyson Balderas MD;  Location:  [facility];  Service: Cardiology;  Laterality: N/A;    CARDIAC VALVE REPLACEMENT      COLONOSCOPY      CORONARY STENT PLACEMENT  2019    ENDOSCOPY N/A 09/12/2022    Procedure: ESOPHAGOGASTRODUODENOSCOPY;  Surgeon: Leann Fitzpatrick MD;  Location:  [facility];  Service: Gastroenterology;  Laterality: N/A;    MITRAL VALVE REPAIR/REPLACEMENT N/A 09/02/2022    Procedure: MEDIAN STERNOTOMY MITRAL VALVE REPLACEMENT, LEFT ATRIAL APPENDAGE LIGATION AND KENNEY PER ANESTHESIA;  Surgeon: Lyle Chan MD;  Location:  [facility];  Service: Cardiothoracic;  Laterality: N/A;       Family History   Problem Relation Age of Onset    Heart valve disorder Mother     Heart valve disorder Brother     Heart valve disorder Maternal Grandfather     Heart disease Maternal Grandfather     Diabetes type I Son        Social History     Socioeconomic History    Marital status:     Number of children: 7   Tobacco Use    Smoking status: Never     Passive exposure: Never    Smokeless tobacco: Never   Vaping Use    Vaping status: Never Used   Substance and Sexual Activity    Alcohol use: No    Drug use: No    Sexual activity: Not Currently     Partners: Female           Objective   Physical Exam  Vitals  and nursing note reviewed.   Constitutional:       General: He is not in acute distress.     Appearance: Normal appearance. He is well-developed. He is not toxic-appearing or diaphoretic.   HENT:      Head: Normocephalic and atraumatic.      Right Ear: External ear normal.      Left Ear: External ear normal.      Nose: Nose normal.      Mouth/Throat:      Pharynx: No oropharyngeal exudate.      Tonsils: No tonsillar exudate.   Eyes:      General: Lids are normal.      Conjunctiva/sclera: Conjunctivae normal.      Pupils: Pupils are equal, round, and reactive to light.   Neck:      Thyroid: No thyromegaly.   Cardiovascular:      Rate and Rhythm: Normal rate and regular rhythm.      Pulses: Normal pulses.      Heart sounds: Normal heart sounds, S1 normal and S2 normal.   Pulmonary:      Effort: Pulmonary effort is normal. No tachypnea or respiratory distress.      Breath sounds: Normal breath sounds. No decreased breath sounds, wheezing or rales.   Chest:      Chest wall: No tenderness.   Abdominal:      General: Bowel sounds are normal. There is no distension.      Palpations: Abdomen is soft.      Tenderness: There is no abdominal tenderness. There is no guarding or rebound.   Musculoskeletal:         General: No tenderness or deformity. Normal range of motion.      Cervical back: Full passive range of motion without pain, normal range of motion and neck supple.   Lymphadenopathy:      Cervical: No cervical adenopathy.   Skin:     General: Skin is warm and dry.      Coloration: Skin is not pale.      Findings: No erythema or rash.   Neurological:      Mental Status: He is alert and oriented to person, place, and time.      GCS: GCS eye subscore is 4. GCS verbal subscore is 5. GCS motor subscore is 6.      Cranial Nerves: No cranial nerve deficit.      Sensory: No sensory deficit.   Psychiatric:         Speech: Speech normal.         Behavior: Behavior normal.         Thought Content: Thought content normal.          Judgment: Judgment normal.         Procedures           ED Course  ED Course as of 09/30/24 1837   Sat Sep 28, 2024   1548 ECG 12 Lead ED Triage Standing Order; Chest Pain  Vent. Rate :  73 BPM     Atrial Rate : 258 BPM     P-R Int : 162 ms          QRS Dur :  80 ms      QT Int : 376 ms       P-R-T Axes :  20  24  87 degrees     QTc Int : 414 ms     Normal sinus rhythm  Cannot rule out Anterior infarct , age undetermined  Abnormal ECG  When compared with ECG of 18-NOV-2023 22:49,  Current undetermined rhythm precludes rhythm comparison, needs review   [ES]   1624 XR Chest 1 View  IMPRESSION:  No radiographic evidence of acute cardiac or pulmonary disease.   [ES]   1718 Offered admission for completion of chest pain rule out with stress test.  Patient refused at this time and reports he has a stress test scheduled with Dr. Amador.  Patient does not want to stay in the hospital at this time for further evaluation and workup.  Understands all of his risks and wants to be discharged at this time to follow-up outpatient. [ES]      ED Course User Index  [ES] Hang Gale MD                HEART Score: 4                              Medical Decision Making  Problems Addressed:  Nonspecific chest pain: complicated acute illness or injury    Amount and/or Complexity of Data Reviewed  Labs: ordered.  Radiology: ordered. Decision-making details documented in ED Course.  ECG/medicine tests: ordered. Decision-making details documented in ED Course.    Risk  OTC drugs.        Final diagnoses:   Nonspecific chest pain       ED Disposition  ED Disposition       ED Disposition   Discharge    Condition   Stable    Comment   --               Mahendra Amador MD  [address] [place] [address] [place] [address]  [phone]    Schedule an appointment as soon as possible for a visit in 1 day  EVALUATE         Medication List      No changes were made to your prescriptions during this visit.            Hang Gale,  MD  09/30/24 0437

## 2024-10-04 ENCOUNTER — HOSPITAL ENCOUNTER (OUTPATIENT)
Dept: NUCLEAR MEDICINE | Facility: HOSPITAL | Age: 69
Discharge: HOME | End: 2024-10-04
Payer: MEDICARE

## 2024-10-04 ENCOUNTER — HOSPITAL ENCOUNTER (OUTPATIENT)
Dept: CARDIOLOGY | Facility: HOSPITAL | Age: 69
Discharge: HOME | End: 2024-10-04
Payer: MEDICARE

## 2024-10-04 DIAGNOSIS — I25.10 ASCVD (ARTERIOSCLEROTIC CARDIOVASCULAR DISEASE): ICD-10-CM

## 2024-10-04 DIAGNOSIS — R07.2 PRECORDIAL PAIN: ICD-10-CM

## 2024-10-04 LAB
BH CV NUCLEAR PRIOR STUDY: 1
BH CV REST NUCLEAR ISOTOPE DOSE: 9.8 MCI
BH CV STRESS DURATION MIN STAGE 1: 3
BH CV STRESS DURATION SEC STAGE 1: 0
BH CV STRESS GRADE STAGE 1: 10
BH CV STRESS HR STAGE 1: 122
BH CV STRESS METS STAGE 1: 5
BH CV STRESS NUCLEAR ISOTOPE DOSE: 29.5 MCI
BH CV STRESS PROTOCOL 1: NORMAL
BH CV STRESS RECOVERY BP: NORMAL MMHG
BH CV STRESS RECOVERY HR: 90 BPM
BH CV STRESS SPEED STAGE 1: 1.7
BH CV STRESS STAGE 1: 1
LV EF NUC BP: 43 %
MAXIMAL PREDICTED HEART RATE: 151 BPM
PERCENT MAX PREDICTED HR: 90.07 %
STRESS BASELINE BP: NORMAL MMHG
STRESS BASELINE HR: 88 BPM
STRESS PERCENT HR: 106 %
STRESS POST ESTIMATED WORKLOAD: 7 METS
STRESS POST EXERCISE DUR MIN: 5 MIN
STRESS POST EXERCISE DUR SEC: 0 SEC
STRESS POST PEAK BP: NORMAL MMHG
STRESS POST PEAK HR: 136 BPM
STRESS TARGET HR: 128 BPM

## 2024-10-04 PROCEDURE — 93017 CV STRESS TEST TRACING ONLY: CPT

## 2024-10-04 PROCEDURE — 78452 HT MUSCLE IMAGE SPECT MULT: CPT

## 2024-10-04 PROCEDURE — A9500 TC99M SESTAMIBI: HCPCS | Performed by: PHYSICIAN ASSISTANT

## 2024-10-04 PROCEDURE — 0 TECHNETIUM SESTAMIBI: Performed by: PHYSICIAN ASSISTANT

## 2024-10-04 RX ADMIN — TECHNETIUM TC 99M SESTAMIBI 1 DOSE: 1 INJECTION INTRAVENOUS at 09:10

## 2024-10-04 RX ADMIN — TECHNETIUM TC 99M SESTAMIBI 1 DOSE: 1 INJECTION INTRAVENOUS at 10:26

## 2024-10-14 ENCOUNTER — TELEPHONE (OUTPATIENT)
Dept: CARDIOLOGY | Facility: CLINIC | Age: 69
End: 2024-10-14

## 2024-10-14 ENCOUNTER — PREP FOR SURGERY (OUTPATIENT)
Dept: OTHER | Facility: HOSPITAL | Age: 69
End: 2024-10-14
Payer: MEDICARE

## 2024-10-14 ENCOUNTER — OFFICE VISIT (OUTPATIENT)
Dept: CARDIOLOGY | Facility: CLINIC | Age: 69
End: 2024-10-14
Payer: MEDICARE

## 2024-10-14 VITALS
DIASTOLIC BLOOD PRESSURE: 71 MMHG | TEMPERATURE: 98.1 F | WEIGHT: 245 LBS | BODY MASS INDEX: 31.44 KG/M2 | SYSTOLIC BLOOD PRESSURE: 115 MMHG | OXYGEN SATURATION: 91 % | HEART RATE: 64 BPM | RESPIRATION RATE: 18 BRPM | HEIGHT: 74 IN

## 2024-10-14 VITALS
HEIGHT: 74 IN | OXYGEN SATURATION: 95 % | RESPIRATION RATE: 16 BRPM | SYSTOLIC BLOOD PRESSURE: 95 MMHG | DIASTOLIC BLOOD PRESSURE: 63 MMHG | BODY MASS INDEX: 32.11 KG/M2 | HEART RATE: 77 BPM | WEIGHT: 250.2 LBS

## 2024-10-14 DIAGNOSIS — I42.0 CARDIOMYOPATHY, DILATED: ICD-10-CM

## 2024-10-14 DIAGNOSIS — R09.02 HYPOXIA: ICD-10-CM

## 2024-10-14 DIAGNOSIS — I25.10 ASCVD (ARTERIOSCLEROTIC CARDIOVASCULAR DISEASE): ICD-10-CM

## 2024-10-14 DIAGNOSIS — Z95.2 H/O MITRAL VALVE REPLACEMENT: Primary | ICD-10-CM

## 2024-10-14 DIAGNOSIS — R94.39 ABNORMAL STRESS TEST: ICD-10-CM

## 2024-10-14 DIAGNOSIS — R07.2 PRECORDIAL PAIN: Primary | ICD-10-CM

## 2024-10-14 DIAGNOSIS — I10 ESSENTIAL HYPERTENSION: ICD-10-CM

## 2024-10-14 DIAGNOSIS — I50.23 ACUTE ON CHRONIC HFREF (HEART FAILURE WITH REDUCED EJECTION FRACTION): ICD-10-CM

## 2024-10-14 PROCEDURE — 93005 ELECTROCARDIOGRAM TRACING: CPT | Performed by: STUDENT IN AN ORGANIZED HEALTH CARE EDUCATION/TRAINING PROGRAM

## 2024-10-14 PROCEDURE — 99211 OFF/OP EST MAY X REQ PHY/QHP: CPT

## 2024-10-14 PROCEDURE — 93010 ELECTROCARDIOGRAM REPORT: CPT | Performed by: SPECIALIST

## 2024-10-14 PROCEDURE — 3074F SYST BP LT 130 MM HG: CPT | Performed by: PHYSICIAN ASSISTANT

## 2024-10-14 PROCEDURE — 3078F DIAST BP <80 MM HG: CPT | Performed by: PHYSICIAN ASSISTANT

## 2024-10-14 PROCEDURE — 99214 OFFICE O/P EST MOD 30 MIN: CPT | Performed by: PHYSICIAN ASSISTANT

## 2024-10-14 PROCEDURE — G2211 COMPLEX E/M VISIT ADD ON: HCPCS | Performed by: PHYSICIAN ASSISTANT

## 2024-10-14 RX ORDER — ASPIRIN 81 MG/1
324 TABLET, CHEWABLE ORAL ONCE
Status: DISCONTINUED | OUTPATIENT
Start: 2024-10-14 | End: 2024-10-15 | Stop reason: HOSPADM

## 2024-10-14 RX ORDER — NITROGLYCERIN 0.4 MG/1
TABLET SUBLINGUAL
Qty: 100 TABLET | Refills: 11 | Status: SHIPPED | OUTPATIENT
Start: 2024-10-14

## 2024-10-14 RX ORDER — SODIUM CHLORIDE 0.9 % (FLUSH) 0.9 %
10 SYRINGE (ML) INJECTION AS NEEDED
Status: DISCONTINUED | OUTPATIENT
Start: 2024-10-14 | End: 2024-10-15 | Stop reason: HOSPADM

## 2024-10-14 NOTE — TELEPHONE ENCOUNTER
PER NILSON:    can you call patient and let him know that the orders are in for his cath and they should be in contact soon. also tell him I want him to drink plenty of water the day before of his cath and a lot afterward.

## 2024-10-14 NOTE — H&P (VIEW-ONLY)
Spencer Saeed MD  Damián Myers  1955  10/14/2024    Patient Active Problem List   Diagnosis    Obesity (BMI 30-39.9)    Chronic HFrEF (heart failure with reduced ejection fraction)    Coronary artery disease involving native coronary artery of native heart with angina pectoris    Stroke (cerebrum)    Essential hypertension    Hyperlipidemia LDL goal <70    Diastolic CHF due to valvular disease    Paroxysmal atrial fibrillation    Debility    Respiratory failure with hypoxia and hypercapnia    COPD exacerbation    CKD (chronic kidney disease) stage 3, GFR 30-59 ml/min    Gastrointestinal hemorrhage with melena    ASCVD (arteriosclerotic cardiovascular disease)    H/O mitral valve replacement    Cardiomyopathy, dilated    Heart failure with reduced ejection fraction and diastolic dysfunction    Acute on chronic HFrEF (heart failure with reduced ejection fraction)       Dear Spencer Saeed MD:    Subjective     History of Present Illness:    Chief Complaint   Patient presents with    Follow-up     Stress findings    Med Management     Verba;       Damián Myers is a pleasant 69 y.o. male with a past medical history significant for  coronary artery disease with PCI to LAD on 11/8/2019, ischemic cardiomyopathy that had improved however was recently found to be reduced at 41-45% likely now related to mitral valve replacement, moderate to severe mitral valve regurgitation status post mitral valve replacement in September 2022 with 33 mm Magna Ease tissue mitral valve, paroxysmal atrial fibrillation with ANGELICA ligation, hyperlipidemia, COPD, and hypertension. Ed comes in today for routine cardiology follow up.     Ed comes in today after having stress test performed which showed mild degree moderate to large size of ischemia in the anterior, apex, lateral, and inferior wall consistent with high risk study.  Clinically at still reports he is having chest pains that he describes as a tightness in the center of his chest and  radiating throughout his whole chest.  He reports that this is occurring on a near daily basis.  He reports that these pains are lasting somewhere between 15 to 30 minutes on average and is associated with numbness in his left arm.    Allergies   Allergen Reactions    Other Unknown - High Severity     Pt had a reaction to anesthesia when placing stents       Percocet [Oxycodone-Acetaminophen] Other (See Comments)     Reports they gave him too much and he had respiratory depression   Reports he has tolerated since then with no issues.    :      Current Outpatient Medications:     ascorbic acid (VITAMIN C) 500 MG tablet, Take 1 tablet by mouth Daily., Disp: , Rfl:     aspirin 81 MG chewable tablet, Chew 1 tablet Daily., Disp: , Rfl:     bumetanide (BUMEX) 1 MG tablet, Take 1 tablet by mouth Daily. (Patient taking differently: Take 1 tablet by mouth As Needed.), Disp: 90 tablet, Rfl: 3    losartan (COZAAR) 50 MG tablet, Take 1 tablet by mouth Daily., Disp: 90 tablet, Rfl: 3    spironolactone (ALDACTONE) 25 MG tablet, Take 0.5 tablets by mouth Daily., Disp: 45 tablet, Rfl: 2    nitroglycerin (NITROSTAT) 0.4 MG SL tablet, 1 under the tongue as needed for angina, may repeat q5mins for up three doses, Disp: 100 tablet, Rfl: 11  No current facility-administered medications for this visit.    Facility-Administered Medications Ordered in Other Visits:     Chlorhexidine Gluconate Cloth 2 % pads 1 application, 1 application , Topical, Q12H PRN, Fernando, Magaly, APRN    The following portions of the patient's history were reviewed and updated as appropriate: allergies, current medications, past family history, past medical history, past social history, past surgical history and problem list.    Social History     Tobacco Use    Smoking status: Never     Passive exposure: Never    Smokeless tobacco: Never   Vaping Use    Vaping status: Never Used   Substance Use Topics    Alcohol use: No    Drug use: No         Objective  "  Vitals:    10/14/24 1023   BP: 95/63   Pulse: 77   Resp: 16   SpO2: 95%   Weight: 113 kg (250 lb 3.2 oz)   Height: 188 cm (74\")     Body mass index is 32.12 kg/m².    ROS    Constitutional:       General: Not in acute distress.     Appearance: Healthy appearance. Well-developed and not in distress. Not diaphoretic.   Eyes:      Conjunctiva/sclera: Conjunctivae normal.      Pupils: Pupils are equal, round, and reactive to light.   HENT:      Head: Normocephalic and atraumatic.   Neck:      Vascular: No carotid bruit or JVD.   Pulmonary:      Effort: Pulmonary effort is normal. No respiratory distress.      Breath sounds: Normal breath sounds.   Cardiovascular:      Normal rate. Regular rhythm.   Edema:     Peripheral edema absent.   Skin:     General: Skin is cool.   Neurological:      Mental Status: Alert, oriented to person, place, and time and oriented to person, place and time.         Lab Results   Component Value Date     09/28/2024    K 4.3 09/28/2024     09/28/2024    CO2 26.9 09/28/2024    BUN 20 09/28/2024    CREATININE 1.05 09/28/2024    GLUCOSE 97 09/28/2024    CALCIUM 8.9 09/28/2024    AST 23 09/28/2024    ALT 18 09/28/2024    ALKPHOS 72 09/28/2024     Lab Results   Component Value Date    CKTOTAL 72 07/19/2023     Lab Results   Component Value Date    WBC 6.24 09/28/2024    HGB 16.4 09/28/2024    HCT 52.5 (H) 09/28/2024     09/28/2024     Lab Results   Component Value Date    INR 0.96 11/18/2023    INR 0.97 07/19/2023    INR 1.10 09/19/2022     Lab Results   Component Value Date    MG 2.1 09/28/2024     Lab Results   Component Value Date    TSH 3.030 09/28/2024    PSA 0.433 11/06/2019    TRIG 85 06/25/2022    HDL 36 (L) 06/25/2022     (H) 06/25/2022      No results found for: \"BNP\"    During this visit the following were done:  Labs Reviewed []    Labs Ordered []    Radiology Reports Reviewed []    Radiology Ordered []    PCP Records Reviewed []    Referring Provider Records " Reviewed []    ER Records Reviewed []    Hospital Records Reviewed []    History Obtained From Family []    Radiology Images Reviewed []    Other Reviewed []    Records Requested []       Procedures    Assessment & Plan    Diagnosis Plan   1. H/O mitral valve replacement  Adult Transthoracic Echo Complete W/ Cont if Necessary Per Protocol      2. ASCVD (arteriosclerotic cardiovascular disease)  Oxygen Therapy      3. Hypoxia  Oxygen Therapy      4. Acute on chronic HFrEF (heart failure with reduced ejection fraction)        5. Cardiomyopathy, dilated        6. Essential hypertension                 Recommendations:  Angina class II-III with abnormal stress test  I discussed results of stress test with add which was considered intermediate to high risk study.  Given its previous history of coronary artery disease, typical anginal symptoms, and other risk factors I did recommend proceeding with left heart catheterization.  I discussed the case with Dr. Amador who did agree.  Will try to set this up in the next week or 2  Continue aspirin 81 mg.  I also prescribed him nitroglycerin to take as needed for severe chest pains and educated him on appropriate use of taking these.  Nosebleed  He reports he has recently been having a few nosebleeds these thankfully have able to be stopped fairly easily.  Advised him to use a humidifier at night.  Nocturnal hypoxia  Will prescribe him oxygen I suspect he has underlying sleep apnea as well and encouraged him to speak with PCP for this  Mitral valve disease  Will order echocardiogram for routine evaluation  History of CKD/DAMIAN  Last creatinine I have does show creatinine of 1.24, if possible he may benefit from prehydration during left heart catheterization however with current IV fluid crisis not sure if this is possible.  I will encourage patient to increase fluid intake the days leading into the heart cath.    No follow-ups on file.    As always, I appreciate very much the  opportunity to participate in the cardiovascular care of your patients.      With Best Regards,    Yasmany Leos PA-C

## 2024-10-14 NOTE — TELEPHONE ENCOUNTER
"  Caller: Damián Myers \"Ed\"    Relationship: Self    Best call back number: [phone]    What is the best time to reach you: ANY    What was the call regarding: PATIENT IS REQUESTING TEST RESULTS BE SENT TO DR. PARSONS HIS PCP.     Is it okay if the provider responds through MyChart: NO    "

## 2024-10-15 ENCOUNTER — APPOINTMENT (OUTPATIENT)
Dept: GENERAL RADIOLOGY | Facility: HOSPITAL | Age: 69
End: 2024-10-15
Payer: MEDICARE

## 2024-10-15 ENCOUNTER — HOSPITAL ENCOUNTER (EMERGENCY)
Facility: HOSPITAL | Age: 69
Discharge: LEFT BEFORE DISCHARGE | End: 2024-10-15
Payer: MEDICARE

## 2024-10-15 LAB
QT INTERVAL: 432 MS
QTC INTERVAL: 442 MS

## 2024-10-21 PROBLEM — R07.2 PRECORDIAL PAIN: Status: ACTIVE | Noted: 2024-10-14

## 2024-10-21 PROBLEM — R94.39 ABNORMAL STRESS TEST: Status: ACTIVE | Noted: 2024-10-14

## 2024-10-22 ENCOUNTER — HOSPITAL ENCOUNTER (OUTPATIENT)
Facility: HOSPITAL | Age: 69
Discharge: HOME | End: 2024-10-22
Admitting: PHYSICIAN ASSISTANT
Payer: MEDICARE

## 2024-10-22 DIAGNOSIS — Z95.2 H/O MITRAL VALVE REPLACEMENT: ICD-10-CM

## 2024-10-22 LAB
ASCENDING AORTA: 3.5 CM
BH CV ECHO MEAS - ACS: 2.04 CM
BH CV ECHO MEAS - AO MAX PG: 3.6 MMHG
BH CV ECHO MEAS - AO MEAN PG: 2.6 MMHG
BH CV ECHO MEAS - AO ROOT DIAM: 4.1 CM
BH CV ECHO MEAS - AO V2 MAX: 95 CM/SEC
BH CV ECHO MEAS - AO V2 VTI: 19.3 CM
BH CV ECHO MEAS - EDV(MOD-SP2): 152.1 ML
BH CV ECHO MEAS - EDV(MOD-SP4): 103.6 ML
BH CV ECHO MEAS - EF(MOD-SP2): 38.5 %
BH CV ECHO MEAS - EF(MOD-SP4): 31.5 %
BH CV ECHO MEAS - EPSS: 1.96 CM
BH CV ECHO MEAS - ESV(MOD-SP2): 93.6 ML
BH CV ECHO MEAS - ESV(MOD-SP4): 71 ML
BH CV ECHO MEAS - IVS/LVPW: 1.17 CM
BH CV ECHO MEAS - IVSD: 1.71 CM
BH CV ECHO MEAS - LA DIMENSION: 4.9 CM
BH CV ECHO MEAS - LAT PEAK E' VEL: 12.1 CM/SEC
BH CV ECHO MEAS - LV DIASTOLIC VOL/BSA (35-75): 43.7 CM2
BH CV ECHO MEAS - LV MAX PG: 1.85 MMHG
BH CV ECHO MEAS - LV MEAN PG: 1 MMHG
BH CV ECHO MEAS - LV SYSTOLIC VOL/BSA (12-30): 30 CM2
BH CV ECHO MEAS - LV V1 MAX: 68 CM/SEC
BH CV ECHO MEAS - LV V1 VTI: 13.7 CM
BH CV ECHO MEAS - LVIDD: 5.1 CM
BH CV ECHO MEAS - LVIDS: 4 CM
BH CV ECHO MEAS - LVOT DIAM: 2.11 CM
BH CV ECHO MEAS - LVPWD: 1.46 CM
BH CV ECHO MEAS - MED PEAK E' VEL: 6.5 CM/SEC
BH CV ECHO MEAS - PA ACC TIME: 0.07 SEC
BH CV ECHO MEAS - PA V2 MAX: 71 CM/SEC
BH CV ECHO MEAS - PULM A REVS VEL: 15 CM/SEC
BH CV ECHO MEAS - PULM DIAS VEL: 55 CM/SEC
BH CV ECHO MEAS - PULM S/D: 1.09
BH CV ECHO MEAS - PULM SYS VEL: 60 CM/SEC
BH CV ECHO MEAS - RAP SYSTOLE: 10 MMHG
BH CV ECHO MEAS - RVDD: 4.5 CM
BH CV ECHO MEAS - RVSP: 23.3 MMHG
BH CV ECHO MEAS - SV(MOD-SP2): 58.5 ML
BH CV ECHO MEAS - SV(MOD-SP4): 32.6 ML
BH CV ECHO MEAS - SVI(MOD-SP2): 24.7 ML/M2
BH CV ECHO MEAS - SVI(MOD-SP4): 13.8 ML/M2
BH CV ECHO MEAS - TAPSE (>1.6): 1.37 CM
BH CV ECHO MEAS - TR MAX PG: 13.3 MMHG
BH CV ECHO MEAS - TR MAX VEL: 131.7 CM/SEC
STJ: 3.7 CM

## 2024-10-22 PROCEDURE — 93306 TTE W/DOPPLER COMPLETE: CPT | Performed by: INTERNAL MEDICINE

## 2024-10-22 PROCEDURE — 93306 TTE W/DOPPLER COMPLETE: CPT

## 2024-10-28 ENCOUNTER — HOSPITAL ENCOUNTER (OUTPATIENT)
Facility: HOSPITAL | Age: 69
Discharge: HOME | End: 2024-10-28
Payer: MEDICARE

## 2024-10-28 DIAGNOSIS — R94.39 ABNORMAL STRESS TEST: ICD-10-CM

## 2024-10-28 DIAGNOSIS — R07.2 PRECORDIAL PAIN: ICD-10-CM

## 2024-10-28 LAB
ANION GAP SERPL CALCULATED.3IONS-SCNC: 9.5 MMOL/L (ref 5–15)
BASOPHILS # BLD AUTO: 0.03 10*3/MM3 (ref 0–0.2)
BASOPHILS NFR BLD AUTO: 0.5 % (ref 0–1.5)
BUN SERPL-MCNC: 20 MG/DL (ref 8–23)
BUN/CREAT SERPL: 17.2 (ref 7–25)
CALCIUM SPEC-SCNC: 9.5 MG/DL (ref 8.6–10.5)
CHLORIDE SERPL-SCNC: 99 MMOL/L (ref 98–107)
CO2 SERPL-SCNC: 31.5 MMOL/L (ref 22–29)
CREAT SERPL-MCNC: 1.16 MG/DL (ref 0.76–1.27)
DEPRECATED RDW RBC AUTO: 45.1 FL (ref 37–54)
EGFRCR SERPLBLD CKD-EPI 2021: 68.2 ML/MIN/1.73
EOSINOPHIL # BLD AUTO: 0.1 10*3/MM3 (ref 0–0.4)
EOSINOPHIL NFR BLD AUTO: 1.6 % (ref 0.3–6.2)
ERYTHROCYTE [DISTWIDTH] IN BLOOD BY AUTOMATED COUNT: 12.9 % (ref 12.3–15.4)
GLUCOSE SERPL-MCNC: 100 MG/DL (ref 65–99)
HCT VFR BLD AUTO: 53.1 % (ref 37.5–51)
HGB BLD-MCNC: 16.1 G/DL (ref 13–17.7)
IMM GRANULOCYTES # BLD AUTO: 0.02 10*3/MM3 (ref 0–0.05)
IMM GRANULOCYTES NFR BLD AUTO: 0.3 % (ref 0–0.5)
LYMPHOCYTES # BLD AUTO: 1.99 10*3/MM3 (ref 0.7–3.1)
LYMPHOCYTES NFR BLD AUTO: 32 % (ref 19.6–45.3)
MCH RBC QN AUTO: 28.8 PG (ref 26.6–33)
MCHC RBC AUTO-ENTMCNC: 30.3 G/DL (ref 31.5–35.7)
MCV RBC AUTO: 94.8 FL (ref 79–97)
MONOCYTES # BLD AUTO: 0.65 10*3/MM3 (ref 0.1–0.9)
MONOCYTES NFR BLD AUTO: 10.5 % (ref 5–12)
NEUTROPHILS NFR BLD AUTO: 3.42 10*3/MM3 (ref 1.7–7)
NEUTROPHILS NFR BLD AUTO: 55.1 % (ref 42.7–76)
NRBC BLD AUTO-RTO: 0 /100 WBC (ref 0–0.2)
PLATELET # BLD AUTO: 149 10*3/MM3 (ref 140–450)
PMV BLD AUTO: 10.9 FL (ref 6–12)
POTASSIUM SERPL-SCNC: 4 MMOL/L (ref 3.5–5.2)
RBC # BLD AUTO: 5.6 10*6/MM3 (ref 4.14–5.8)
SODIUM SERPL-SCNC: 140 MMOL/L (ref 136–145)
WBC NRBC COR # BLD AUTO: 6.21 10*3/MM3 (ref 3.4–10.8)

## 2024-10-28 PROCEDURE — 85025 COMPLETE CBC W/AUTO DIFF WBC: CPT | Performed by: PHYSICIAN ASSISTANT

## 2024-10-28 PROCEDURE — 80048 BASIC METABOLIC PNL TOTAL CA: CPT | Performed by: PHYSICIAN ASSISTANT

## 2024-10-28 PROCEDURE — 36415 COLL VENOUS BLD VENIPUNCTURE: CPT | Performed by: PHYSICIAN ASSISTANT

## 2024-10-29 ENCOUNTER — HOSPITAL ENCOUNTER (OUTPATIENT)
Facility: HOSPITAL | Age: 69
Discharge: HOME | End: 2024-10-30
Attending: STUDENT IN AN ORGANIZED HEALTH CARE EDUCATION/TRAINING PROGRAM | Admitting: INTERNAL MEDICINE
Payer: MEDICARE

## 2024-10-29 ENCOUNTER — APPOINTMENT (OUTPATIENT)
Dept: GENERAL RADIOLOGY | Facility: HOSPITAL | Age: 69
End: 2024-10-29
Payer: MEDICARE

## 2024-10-29 DIAGNOSIS — R94.39 ABNORMAL STRESS TEST: ICD-10-CM

## 2024-10-29 DIAGNOSIS — R07.2 PRECORDIAL PAIN: ICD-10-CM

## 2024-10-29 PROCEDURE — 36415 COLL VENOUS BLD VENIPUNCTURE: CPT

## 2024-10-29 PROCEDURE — 96366 THER/PROPH/DIAG IV INF ADDON: CPT

## 2024-10-29 PROCEDURE — 71045 X-RAY EXAM CHEST 1 VIEW: CPT

## 2024-10-29 PROCEDURE — 99285 EMERGENCY DEPT VISIT HI MDM: CPT

## 2024-10-29 PROCEDURE — 93005 ELECTROCARDIOGRAM TRACING: CPT | Performed by: STUDENT IN AN ORGANIZED HEALTH CARE EDUCATION/TRAINING PROGRAM

## 2024-10-29 PROCEDURE — 96376 TX/PRO/DX INJ SAME DRUG ADON: CPT

## 2024-10-29 PROCEDURE — 71045 X-RAY EXAM CHEST 1 VIEW: CPT | Performed by: RADIOLOGY

## 2024-10-29 PROCEDURE — 96365 THER/PROPH/DIAG IV INF INIT: CPT

## 2024-10-29 RX ORDER — SODIUM CHLORIDE 0.9 % (FLUSH) 0.9 %
10 SYRINGE (ML) INJECTION AS NEEDED
Status: DISCONTINUED | OUTPATIENT
Start: 2024-10-29 | End: 2024-10-31 | Stop reason: HOSPADM

## 2024-10-29 RX ORDER — ASPIRIN 81 MG/1
324 TABLET, CHEWABLE ORAL ONCE
Status: DISCONTINUED | OUTPATIENT
Start: 2024-10-29 | End: 2024-10-30

## 2024-10-29 NOTE — Clinical Note
Hemostasis started on the right radial artery. R-Band was used in achieving hemostasis. Radial compression device applied to vessel. Hemostasis achieved successfully. Closure device additional comment: TR band applied to rt radial 12cc of air

## 2024-10-29 NOTE — Clinical Note
03/01/21 1453   Oxygen Therapy   O2 Sat (%) 91 %   Pulse via Oximetry 82 beats per minute   O2 Device Room air   O2 Flow Rate (L/min) 0 l/min   Incentive Spirometry Treatment   Actual Volume (ml) 3500 ml   Patient encouraged to do 10 breaths every hour while awake-patient agreed and demonstrated. No shortness of breath or distress noted. BS are clear b/l. [facility] notes reviewed- Sat monitor order noted HS. guidewire removed.

## 2024-10-30 VITALS
RESPIRATION RATE: 18 BRPM | TEMPERATURE: 98.4 F | OXYGEN SATURATION: 90 % | HEART RATE: 81 BPM | SYSTOLIC BLOOD PRESSURE: 90 MMHG | HEIGHT: 75 IN | BODY MASS INDEX: 30.09 KG/M2 | WEIGHT: 242 LBS | DIASTOLIC BLOOD PRESSURE: 59 MMHG

## 2024-10-30 PROBLEM — I25.10 CAD (CORONARY ARTERY DISEASE): Status: ACTIVE | Noted: 2024-10-30

## 2024-10-30 LAB
ALBUMIN SERPL-MCNC: 4.2 G/DL (ref 3.5–5.2)
ALBUMIN/GLOB SERPL: 1.4 G/DL
ALP SERPL-CCNC: 78 U/L (ref 39–117)
ALT SERPL W P-5'-P-CCNC: 16 U/L (ref 1–41)
ANION GAP SERPL CALCULATED.3IONS-SCNC: 9.9 MMOL/L (ref 5–15)
APTT PPP: 33.8 SECONDS (ref 26.5–34.5)
AST SERPL-CCNC: 25 U/L (ref 1–40)
BASOPHILS # BLD AUTO: 0.04 10*3/MM3 (ref 0–0.2)
BASOPHILS NFR BLD AUTO: 0.6 % (ref 0–1.5)
BILIRUB SERPL-MCNC: 0.7 MG/DL (ref 0–1.2)
BUN SERPL-MCNC: 23 MG/DL (ref 8–23)
BUN/CREAT SERPL: 18.4 (ref 7–25)
CALCIUM SPEC-SCNC: 9.6 MG/DL (ref 8.6–10.5)
CHLORIDE SERPL-SCNC: 98 MMOL/L (ref 98–107)
CK SERPL-CCNC: 62 U/L (ref 20–200)
CO2 SERPL-SCNC: 30.1 MMOL/L (ref 22–29)
CREAT SERPL-MCNC: 1.25 MG/DL (ref 0.76–1.27)
DEPRECATED RDW RBC AUTO: 43.6 FL (ref 37–54)
EGFRCR SERPLBLD CKD-EPI 2021: 62.3 ML/MIN/1.73
EOSINOPHIL # BLD AUTO: 0.17 10*3/MM3 (ref 0–0.4)
EOSINOPHIL NFR BLD AUTO: 2.4 % (ref 0.3–6.2)
ERYTHROCYTE [DISTWIDTH] IN BLOOD BY AUTOMATED COUNT: 12.7 % (ref 12.3–15.4)
GEN 5 2HR TROPONIN T REFLEX: 34 NG/L
GLOBULIN UR ELPH-MCNC: 3.1 GM/DL
GLUCOSE SERPL-MCNC: 105 MG/DL (ref 65–99)
HCT VFR BLD AUTO: 50.4 % (ref 37.5–51)
HGB BLD-MCNC: 15.7 G/DL (ref 13–17.7)
HOLD SPECIMEN: NORMAL
HOLD SPECIMEN: NORMAL
IMM GRANULOCYTES # BLD AUTO: 0.02 10*3/MM3 (ref 0–0.05)
IMM GRANULOCYTES NFR BLD AUTO: 0.3 % (ref 0–0.5)
INR PPP: 1.02 (ref 0.9–1.1)
LYMPHOCYTES # BLD AUTO: 2.03 10*3/MM3 (ref 0.7–3.1)
LYMPHOCYTES NFR BLD AUTO: 28.4 % (ref 19.6–45.3)
MAGNESIUM SERPL-MCNC: 2.1 MG/DL (ref 1.6–2.4)
MCH RBC QN AUTO: 29 PG (ref 26.6–33)
MCHC RBC AUTO-ENTMCNC: 31.2 G/DL (ref 31.5–35.7)
MCV RBC AUTO: 93 FL (ref 79–97)
MONOCYTES # BLD AUTO: 0.72 10*3/MM3 (ref 0.1–0.9)
MONOCYTES NFR BLD AUTO: 10.1 % (ref 5–12)
NEUTROPHILS NFR BLD AUTO: 4.17 10*3/MM3 (ref 1.7–7)
NEUTROPHILS NFR BLD AUTO: 58.2 % (ref 42.7–76)
NRBC BLD AUTO-RTO: 0 /100 WBC (ref 0–0.2)
NT-PROBNP SERPL-MCNC: 358.8 PG/ML (ref 0–900)
PLATELET # BLD AUTO: 143 10*3/MM3 (ref 140–450)
PMV BLD AUTO: 10 FL (ref 6–12)
POTASSIUM SERPL-SCNC: 4.5 MMOL/L (ref 3.5–5.2)
PROT SERPL-MCNC: 7.3 G/DL (ref 6–8.5)
PROTHROMBIN TIME: 13.5 SECONDS (ref 12.1–14.7)
QT INTERVAL: 382 MS
QT INTERVAL: 418 MS
QTC INTERVAL: 440 MS
QTC INTERVAL: 473 MS
RBC # BLD AUTO: 5.42 10*6/MM3 (ref 4.14–5.8)
SODIUM SERPL-SCNC: 138 MMOL/L (ref 136–145)
TROPONIN T DELTA: 1 NG/L
TROPONIN T SERPL HS-MCNC: 33 NG/L
UFH PPP CHRO-ACNC: <0.1 IU/ML (ref 0.3–0.7)
UFH PPP CHRO-ACNC: <0.1 IU/ML (ref 0.3–0.7)
WBC NRBC COR # BLD AUTO: 7.15 10*3/MM3 (ref 3.4–10.8)
WHOLE BLOOD HOLD COAG: NORMAL
WHOLE BLOOD HOLD SPECIMEN: NORMAL

## 2024-10-30 PROCEDURE — 25510000001 IOPAMIDOL PER 1 ML: Performed by: INTERNAL MEDICINE

## 2024-10-30 PROCEDURE — 25010000002 HEPARIN (PORCINE) PER 1000 UNITS: Performed by: INTERNAL MEDICINE

## 2024-10-30 PROCEDURE — 25010000002 HEPARIN (PORCINE) PER 1000 UNITS: Performed by: PHYSICIAN ASSISTANT

## 2024-10-30 PROCEDURE — 85025 COMPLETE CBC W/AUTO DIFF WBC: CPT | Performed by: STUDENT IN AN ORGANIZED HEALTH CARE EDUCATION/TRAINING PROGRAM

## 2024-10-30 PROCEDURE — 83735 ASSAY OF MAGNESIUM: CPT | Performed by: PHYSICIAN ASSISTANT

## 2024-10-30 PROCEDURE — 25010000002 HEPARIN (PORCINE) 25000-0.45 UT/250ML-% SOLUTION

## 2024-10-30 PROCEDURE — C1887 CATHETER, GUIDING: HCPCS | Performed by: INTERNAL MEDICINE

## 2024-10-30 PROCEDURE — 96366 THER/PROPH/DIAG IV INF ADDON: CPT

## 2024-10-30 PROCEDURE — C1894 INTRO/SHEATH, NON-LASER: HCPCS | Performed by: INTERNAL MEDICINE

## 2024-10-30 PROCEDURE — 85730 THROMBOPLASTIN TIME PARTIAL: CPT | Performed by: PHYSICIAN ASSISTANT

## 2024-10-30 PROCEDURE — C1769 GUIDE WIRE: HCPCS | Performed by: INTERNAL MEDICINE

## 2024-10-30 PROCEDURE — 96376 TX/PRO/DX INJ SAME DRUG ADON: CPT

## 2024-10-30 PROCEDURE — 84484 ASSAY OF TROPONIN QUANT: CPT | Performed by: STUDENT IN AN ORGANIZED HEALTH CARE EDUCATION/TRAINING PROGRAM

## 2024-10-30 PROCEDURE — 80053 COMPREHEN METABOLIC PANEL: CPT | Performed by: STUDENT IN AN ORGANIZED HEALTH CARE EDUCATION/TRAINING PROGRAM

## 2024-10-30 PROCEDURE — 93005 ELECTROCARDIOGRAM TRACING: CPT | Performed by: STUDENT IN AN ORGANIZED HEALTH CARE EDUCATION/TRAINING PROGRAM

## 2024-10-30 PROCEDURE — 25810000003 SODIUM CHLORIDE 0.9 % SOLUTION: Performed by: EMERGENCY MEDICINE

## 2024-10-30 PROCEDURE — 25010000002 FENTANYL CITRATE (PF) 50 MCG/ML SOLUTION: Performed by: INTERNAL MEDICINE

## 2024-10-30 PROCEDURE — 25010000002 LIDOCAINE 2% SOLUTION: Performed by: INTERNAL MEDICINE

## 2024-10-30 PROCEDURE — 93571 IV DOP VEL&/PRESS C FLO 1ST: CPT | Performed by: INTERNAL MEDICINE

## 2024-10-30 PROCEDURE — 99235 HOSP IP/OBS SAME DATE MOD 70: CPT | Performed by: INTERNAL MEDICINE

## 2024-10-30 PROCEDURE — 83880 ASSAY OF NATRIURETIC PEPTIDE: CPT | Performed by: PHYSICIAN ASSISTANT

## 2024-10-30 PROCEDURE — 96365 THER/PROPH/DIAG IV INF INIT: CPT

## 2024-10-30 PROCEDURE — 93458 L HRT ARTERY/VENTRICLE ANGIO: CPT | Performed by: INTERNAL MEDICINE

## 2024-10-30 PROCEDURE — 85520 HEPARIN ASSAY: CPT | Performed by: STUDENT IN AN ORGANIZED HEALTH CARE EDUCATION/TRAINING PROGRAM

## 2024-10-30 PROCEDURE — 25010000002 HEPARIN (PORCINE) PER 1000 UNITS

## 2024-10-30 PROCEDURE — 25010000002 HEPARIN (PORCINE) 25000-0.45 UT/250ML-% SOLUTION: Performed by: PHYSICIAN ASSISTANT

## 2024-10-30 PROCEDURE — 85610 PROTHROMBIN TIME: CPT | Performed by: PHYSICIAN ASSISTANT

## 2024-10-30 PROCEDURE — 93799 UNLISTED CV SVC/PROCEDURE: CPT | Performed by: INTERNAL MEDICINE

## 2024-10-30 PROCEDURE — 85520 HEPARIN ASSAY: CPT | Performed by: PHYSICIAN ASSISTANT

## 2024-10-30 PROCEDURE — 82550 ASSAY OF CK (CPK): CPT | Performed by: PHYSICIAN ASSISTANT

## 2024-10-30 PROCEDURE — 25010000002 MIDAZOLAM PER 1 MG: Performed by: INTERNAL MEDICINE

## 2024-10-30 PROCEDURE — 25810000003 SODIUM CHLORIDE 0.9 % SOLUTION: Performed by: INTERNAL MEDICINE

## 2024-10-30 RX ORDER — VERAPAMIL HYDROCHLORIDE 2.5 MG/ML
INJECTION, SOLUTION INTRAVENOUS
Status: DISCONTINUED | OUTPATIENT
Start: 2024-10-30 | End: 2024-10-30 | Stop reason: HOSPADM

## 2024-10-30 RX ORDER — ATORVASTATIN CALCIUM 40 MG/1
40 TABLET, FILM COATED ORAL NIGHTLY
Qty: 90 TABLET | Refills: 2 | Status: SHIPPED | OUTPATIENT
Start: 2024-10-31

## 2024-10-30 RX ORDER — HEPARIN SODIUM 5000 [USP'U]/ML
4000 INJECTION, SOLUTION INTRAVENOUS; SUBCUTANEOUS ONCE
Status: COMPLETED | OUTPATIENT
Start: 2024-10-30 | End: 2024-10-30

## 2024-10-30 RX ORDER — ISOSORBIDE MONONITRATE 30 MG/1
30 TABLET, EXTENDED RELEASE ORAL DAILY
Qty: 30 TABLET | Refills: 11 | Status: SHIPPED | OUTPATIENT
Start: 2024-10-30

## 2024-10-30 RX ORDER — ASPIRIN 81 MG/1
81 TABLET, CHEWABLE ORAL ONCE
Status: COMPLETED | OUTPATIENT
Start: 2024-10-30 | End: 2024-10-30

## 2024-10-30 RX ORDER — HEPARIN SODIUM 5000 [USP'U]/ML
4000 INJECTION, SOLUTION INTRAVENOUS; SUBCUTANEOUS AS NEEDED
Status: DISCONTINUED | OUTPATIENT
Start: 2024-10-30 | End: 2024-10-30

## 2024-10-30 RX ORDER — IOPAMIDOL 755 MG/ML
INJECTION, SOLUTION INTRAVASCULAR
Status: DISCONTINUED | OUTPATIENT
Start: 2024-10-30 | End: 2024-10-30 | Stop reason: HOSPADM

## 2024-10-30 RX ORDER — ISOSORBIDE MONONITRATE 30 MG/1
30 TABLET, EXTENDED RELEASE ORAL
Qty: 30 TABLET | Refills: 0 | Status: SHIPPED | OUTPATIENT
Start: 2024-10-30 | End: 2024-11-29

## 2024-10-30 RX ORDER — NITROGLYCERIN 0.4 MG/1
0.4 TABLET SUBLINGUAL
Status: CANCELLED | OUTPATIENT
Start: 2024-10-30

## 2024-10-30 RX ORDER — MIDAZOLAM HYDROCHLORIDE 1 MG/ML
INJECTION, SOLUTION INTRAMUSCULAR; INTRAVENOUS
Status: DISCONTINUED | OUTPATIENT
Start: 2024-10-30 | End: 2024-10-30 | Stop reason: HOSPADM

## 2024-10-30 RX ORDER — HEPARIN SODIUM 1000 [USP'U]/ML
INJECTION, SOLUTION INTRAVENOUS; SUBCUTANEOUS
Status: DISCONTINUED | OUTPATIENT
Start: 2024-10-30 | End: 2024-10-30 | Stop reason: HOSPADM

## 2024-10-30 RX ORDER — FENTANYL CITRATE 50 UG/ML
INJECTION, SOLUTION INTRAMUSCULAR; INTRAVENOUS
Status: DISCONTINUED | OUTPATIENT
Start: 2024-10-30 | End: 2024-10-30 | Stop reason: HOSPADM

## 2024-10-30 RX ORDER — BUMETANIDE 1 MG/1
1 TABLET ORAL DAILY PRN
COMMUNITY

## 2024-10-30 RX ORDER — HEPARIN SODIUM 5000 [USP'U]/ML
2000 INJECTION, SOLUTION INTRAVENOUS; SUBCUTANEOUS AS NEEDED
Status: DISCONTINUED | OUTPATIENT
Start: 2024-10-30 | End: 2024-10-30

## 2024-10-30 RX ORDER — SPIRONOLACTONE 25 MG/1
12.5 TABLET ORAL ONCE
Status: COMPLETED | OUTPATIENT
Start: 2024-10-30 | End: 2024-10-30

## 2024-10-30 RX ORDER — LOSARTAN POTASSIUM 50 MG/1
50 TABLET ORAL DAILY
Status: CANCELLED | OUTPATIENT
Start: 2024-10-30

## 2024-10-30 RX ORDER — SODIUM CHLORIDE 9 MG/ML
200 INJECTION, SOLUTION INTRAVENOUS CONTINUOUS
Status: ACTIVE | OUTPATIENT
Start: 2024-10-30 | End: 2024-10-30

## 2024-10-30 RX ORDER — ACETAMINOPHEN 325 MG/1
650 TABLET ORAL EVERY 4 HOURS PRN
Status: DISCONTINUED | OUTPATIENT
Start: 2024-10-30 | End: 2024-10-31 | Stop reason: HOSPADM

## 2024-10-30 RX ORDER — LIDOCAINE HYDROCHLORIDE 20 MG/ML
INJECTION, SOLUTION INFILTRATION; PERINEURAL
Status: DISCONTINUED | OUTPATIENT
Start: 2024-10-30 | End: 2024-10-30 | Stop reason: HOSPADM

## 2024-10-30 RX ORDER — NITROGLYCERIN 0.4 MG/1
0.4 TABLET SUBLINGUAL
Status: DISCONTINUED | OUTPATIENT
Start: 2024-10-30 | End: 2024-10-31 | Stop reason: HOSPADM

## 2024-10-30 RX ORDER — ATORVASTATIN CALCIUM 40 MG/1
40 TABLET, FILM COATED ORAL NIGHTLY
Status: DISCONTINUED | OUTPATIENT
Start: 2024-10-30 | End: 2024-10-31 | Stop reason: HOSPADM

## 2024-10-30 RX ORDER — SODIUM CHLORIDE 9 MG/ML
INJECTION, SOLUTION INTRAVENOUS
Status: COMPLETED | OUTPATIENT
Start: 2024-10-30 | End: 2024-10-30

## 2024-10-30 RX ORDER — HEPARIN SODIUM 10000 [USP'U]/100ML
12.09 INJECTION, SOLUTION INTRAVENOUS
Status: DISCONTINUED | OUTPATIENT
Start: 2024-10-30 | End: 2024-10-31 | Stop reason: HOSPADM

## 2024-10-30 RX ORDER — BUMETANIDE 1 MG/1
1 TABLET ORAL DAILY PRN
Status: CANCELLED | OUTPATIENT
Start: 2024-10-30

## 2024-10-30 RX ORDER — HEPARIN SODIUM 10000 [USP'U]/100ML
9.09 INJECTION, SOLUTION INTRAVENOUS
Status: DISCONTINUED | OUTPATIENT
Start: 2024-10-30 | End: 2024-10-30

## 2024-10-30 RX ADMIN — SODIUM CHLORIDE 200 ML/HR: 9 INJECTION, SOLUTION INTRAVENOUS at 09:56

## 2024-10-30 RX ADMIN — HEPARIN SODIUM 4000 UNITS: 5000 INJECTION INTRAVENOUS; SUBCUTANEOUS at 10:54

## 2024-10-30 RX ADMIN — ASPIRIN 81 MG: 81 TABLET, CHEWABLE ORAL at 09:59

## 2024-10-30 RX ADMIN — SPIRONOLACTONE 12.5 MG: 25 TABLET, FILM COATED ORAL at 10:16

## 2024-10-30 RX ADMIN — HEPARIN SODIUM 4000 UNITS: 5000 INJECTION INTRAVENOUS; SUBCUTANEOUS at 04:10

## 2024-10-30 RX ADMIN — ATORVASTATIN CALCIUM 40 MG: 40 TABLET, FILM COATED ORAL at 20:55

## 2024-10-30 RX ADMIN — HEPARIN SODIUM 12.09 UNITS/KG/HR: 10000 INJECTION, SOLUTION INTRAVENOUS at 10:58

## 2024-10-30 RX ADMIN — HEPARIN SODIUM 9.09 UNITS/KG/HR: 10000 INJECTION, SOLUTION INTRAVENOUS at 04:10

## 2024-10-30 NOTE — ED NOTES
Called transport for a hospital bed    he said he will get us one but it will be a little bit they have 2 transporters and 10 pt on their list

## 2024-10-30 NOTE — CASE MANAGEMENT/SOCIAL WORK
Discharge Planning Assessment   [facility]     Patient Name: Damián Myers  MRN: [id number]  Today's Date: 10/30/2024    Admit Date: 10/29/2024    Plan: Spoke with patient at bedside. Patient lives at home with spouse and will return at discharge. Patient has no POA or Living will on file, does have HCS on file. Patient has no DME,Oxygen or HH. Patiients PCP [facility] and [facility], Patient denies any needs and spouse will transport home at discharge.   Discharge Needs Assessment       Row Name 10/30/24 1155       Living Environment    People in Home spouse    Name(s) of People in Home MICHELLE MYERS Spouse   [phone]    Current Living Arrangements home    Potentially Unsafe Housing Conditions none    In the past 12 months has the electric, gas, oil, or water company threatened to shut off services in your home? No    Primary Care Provided by self    Provides Primary Care For no one    Family Caregiver if Needed spouse    Family Caregiver Names MICHELLE MYERS Spouse   [phone]    Quality of Family Relationships helpful;involved;supportive    Able to Return to Prior Arrangements yes       Resource/Environmental Concerns    Resource/Environmental Concerns none    Transportation Concerns none       Transportation Needs    In the past 12 months, has lack of transportation kept you from medical appointments or from getting medications? no    In the past 12 months, has lack of transportation kept you from meetings, work, or from getting things needed for daily living? No       Food Insecurity    Within the past 12 months, you worried that your food would run out before you got the money to buy more. Never true    Within the past 12 months, the food you bought just didn't last and you didn't have money to get more. Never true       Transition Planning    Patient/Family Anticipates Transition to home with family    Patient/Family Anticipated Services at Transition none    Transportation Anticipated family or  friend will provide       Discharge Needs Assessment    Readmission Within the Last 30 Days no previous admission in last 30 days    Equipment Currently Used at Home none    Concerns to be Addressed discharge planning    Do you want help finding or keeping work or a job? I do not need or want help    Do you want help with school or training? For example, starting or completing job training or getting a high school diploma, GED or equivalent No    Anticipated Changes Related to Illness none    Equipment Needed After Discharge none                   Discharge Plan       Row Name 10/30/24 4529       Plan    Plan Spoke with patient at bedside. Patient lives at home with spouse and will return at discharge. Patient has no POA or Living will on file, does have HCS on file. Patient has no DME,Oxygen or HH. Patiients PCP [facility] and [facility], Patient denies any needs and spouse will transport home at discharge.    Patient/Family in Agreement with Plan yes                 Alma Jacobo

## 2024-10-30 NOTE — ED PROVIDER NOTES
Subjective   History of Present Illness  69-year-old male presents to the ER chief complaint of chest pain.  Onset of symptoms were today.  Known CAD.  Recent stress test on October 4 show multivessel disease and he was scheduled for heart cath.  Patient verbalized he did take to 325 mg aspirin prior to arrival.  States that he does not have any nitro at home to take.  Verbalized that his oxygen does drop when he sleeps however no history of sleep apnea.        Review of Systems    Past Medical History:   Diagnosis Date    CAD (coronary artery disease)     Cardiac abnormality     CHF (congestive heart failure)     Coma of unknown cause 1984    Pt states he was in a coma for one week, unknown cause    COVID-19 10/25/2021    Debility 09/14/2022    Diastolic CHF due to valvular disease 06/25/2022    Echo (9/10/2022):LVEF = 55%.  33 mm Medtronic Magna Ease bioprosthetic valve present.  Mean gradient 8 mmHg.  Moderate 1-2 cm pericardial effusion.  No tamponade.  Left pleural effusion present.    History of stomach ulcers     Hypertension     Mitral regurgitation     Obesity (BMI 30-39.9) 11/04/2019    Paroxysmal atrial fibrillation 09/10/2022    Atrial fibrillation status post MVR [id number] ANGELICA ligated at time of surgery with 35 mm Atricure clip- KENNEY confirms occluded ANGELICA with no residual flow   Successful KENNEY/ECV 9/7, on amiodarone Was on Eliquis but discontinued on 9/10/2022 due to severe anemia Back into atrial fibrillation on 9/11/2022    Post-op pericardial effusion 09/12/2022    Echo for post-op hypotension (9/10/2022): Normal LVEF.  Moderate pericardial effusion.  No evidence of tamponade seen.    Pulmonary arterial hypertension 10/2019    Severe mitral regurgitation status post bioprosthetic MVR, 9/2/2022 08/03/2022    Echo 6/26/22: EF 36-40%, global hypokinesis, mild bileaflet MVP with moderate MR; grade III diastolic dysfunction, severe PAH with RVSP 67mmHg KENNEY 6/28/22: LVEF 56-60%, MVP of posterior  leaflet with mod-severe MR, modTR with RVSP 52mmHg MVR 9/2/22 Dr. Chan with 33 mm Magna Ease mitral valve  Echo (9/10/2022):LVEF = 55%.  33 mm Medtronic Magna Ease bioprosthetic valve present.  Mean gradient 8 mm    Spinal headache 1984       Allergies   Allergen Reactions    Other Unknown - High Severity     Pt had a reaction to anesthesia when placing stents          Past Surgical History:   Procedure Laterality Date    CARDIAC CATHETERIZATION N/A 11/08/2019    Procedure: Left Heart Cath;  Surgeon: Sherrell Hart MD;  Location:  [facility];  Service: Cardiology    CARDIAC CATHETERIZATION N/A 06/27/2022    Procedure: Left Heart Cath;  Surgeon: Greyson Balderas MD;  Location:  [facility];  Service: Cardiology;  Laterality: N/A;    CARDIAC CATHETERIZATION N/A 10/30/2024    Procedure: Coronary angiography;  Surgeon: Lam Barragan MD;  Location:  [facility];  Service: Cardiovascular;  Laterality: N/A;    CARDIAC VALVE REPLACEMENT      COLONOSCOPY      CORONARY STENT PLACEMENT  2019    ENDOSCOPY N/A 09/12/2022    Procedure: ESOPHAGOGASTRODUODENOSCOPY;  Surgeon: Leann Fitzpatrick MD;  Location:  [facility];  Service: Gastroenterology;  Laterality: N/A;    MITRAL VALVE REPAIR/REPLACEMENT N/A 09/02/2022    Procedure: MEDIAN STERNOTOMY MITRAL VALVE REPLACEMENT, LEFT ATRIAL APPENDAGE LIGATION AND KENNEY PER ANESTHESIA;  Surgeon: Lyle Chan MD;  Location:  [facility];  Service: Cardiothoracic;  Laterality: N/A;       Family History   Problem Relation Age of Onset    Heart valve disorder Mother     Heart valve disorder Brother     Heart valve disorder Maternal Grandfather     Heart disease Maternal Grandfather     Diabetes type I Son        Social History     Socioeconomic History    Marital status:     Number of children: 7   Tobacco Use    Smoking status: Never     Passive exposure: Never    Smokeless tobacco: Never   Vaping Use    Vaping  status: Never Used   Substance and Sexual Activity    Alcohol use: No    Drug use: No    Sexual activity: Not Currently     Partners: Female           Objective   Physical Exam    Procedures           ED Course  ED Course as of 11/03/24 2244   Tue Oct 29, 2024   2251 EKG notes sinus rhythm.  80 bpm.  I directly evaluated the EKG of this patient and noted no acute abnormalities. Electronically signed by Patrice Herman DO, 10/29/24, 10:51 PM EDT.   [SF]   Wed Oct 30, 2024   0150 XR chest rad interpreted:  1.  Enlarged heart size.  2.  Sternotomy.  3.  Central pulmonary vascular congestion.  4.  Mild atelectasis at the right lung base.  5.  Mild elevated right hemidiaphragm.  6.  No pleural effusion. No pneumothorax   [RB]   0617 Patient is to go from ED to Cath Lab this morning. [RB]   0713 Apparently patient cannot go to the Cath Lab from the emergency department.  Patient will need to go from the floor.  Did discuss this with on-call interventional cardiologist Dr. Barragan who is requesting the patient be admitted [RB]   0742 Hospitalist team will discuss with Cath Lab and cardiologist to to get patient his heart catheter today.  Patient will continue be boarded in the ER still this happens. [RB]   1225 Care assumed from KATHRINE Mesa, at shift change.  Patient is asymptomatic at this time.  Hemodynamically stable.  Awaiting cardiac cath.  Due to history of renal insufficiency the plan was for him to be brought to preop early and well-hydrated prior to receiving contrast.  Currently, he is receiving IV fluids and plan for cath today. [BC]      ED Course User Index  [BC] Jace Hawkins MD  [RB] Damián Garcia II, PA  [SF] Patrice Herman DO                                               Medical Decision Making      Final diagnoses:   Precordial pain   Abnormal stress test       ED Disposition  ED Disposition       ED Disposition   Intended Admit    Condition   --    Comment   --               Spencer Saeed,  MD  [address] [place] [address]  [phone]      Office closed will return call to PT on 10/31/24 with appt information.         Medication List        New Prescriptions      atorvastatin 40 MG tablet  Commonly known as: LIPITOR  Take 1 tablet by mouth Every Night.     * isosorbide mononitrate 30 MG 24 hr tablet  Commonly known as: IMDUR  Take 1 tablet by mouth Daily.     * isosorbide mononitrate 30 MG 24 hr tablet  Commonly known as: IMDUR  Take 1 tablet by mouth Daily for 30 days.           * This list has 2 medication(s) that are the same as other medications prescribed for you. Read the directions carefully, and ask your doctor or other care provider to review them with you.                   Where to Get Your Medications        These medications were sent to [id number] - [place], [place] - [address] AT [facility] [address] - [phone]  - [phone]   [address] [place] [address]      Phone: [phone]   atorvastatin 40 MG tablet  isosorbide mononitrate 30 MG 24 hr tablet  isosorbide mononitrate 30 MG 24 hr tablet            Damián Garcia II, PA  11/03/24 0278     isosorbide mononitrate 30 MG 24 hr tablet  Commonly known as: IMDUR  Take 1 tablet by mouth Daily.     * isosorbide mononitrate 30 MG 24 hr tablet  Commonly known as: IMDUR  Take 1 tablet by mouth Daily for 30 days.           * This list has 2 medication(s) that are the same as other medications prescribed for you. Read the directions carefully, and ask your doctor or other care provider to review them with you.                   Where to Get Your Medications        These medications were sent to [id number] - [place], [place] - [address] [facility] [address] - [phone]  - [phone]   [address] [place] [address]      Phone: [phone]   atorvastatin 40 MG tablet  isosorbide mononitrate 30 MG 24 hr tablet  isosorbide mononitrate 30 MG 24 hr tablet            Damián Garcia II, PA  11/03/24 1233       Damián Garcia II, PA  11/11/24 6736

## 2024-10-30 NOTE — PLAN OF CARE
Goal Outcome Evaluation:   Patient recovering from heart cath this shift. TR band in place and site checked and monitored. No complaints or concerns at this time. Plan of care reviewed with the patient and family at bedside. No questions at this time.

## 2024-10-30 NOTE — PROGRESS NOTES
[facility] INFUSION  Damián Myers is a  69 y.o. male receiving heparin infusion.     Therapy for (VTE/Cardiac):   Cardiac  Patient Dosing Weight: 110 kg  Initial Bolus (Y/N):   Y  Any Bolus (Y/N):   Y        Signs or Symptoms of Bleeding: N    Cardiac or Other (Not VTE)   Initial Bolus: 60 units/kg (Max 4,000 units)  Initial rate: 12 units/kg/hr (Max 1,000 units/hr)   Anti Xa Rebolus Infusion Hold time Change infusion Dose (Units/kg/hr) Next Anti Xa or aPTT Level Due   < 0.11 50 Units/kg  (4000 Units Max) None Increase by  3 Units/kg/hr 6 hours   0.11- 0.19 25 Units/kg  (2000 Units Max) None Increase by  2 Units/kg/hr 6 hours   0.2 - 0.29 0 None Increase by  1 Units/kg/hr 6 hours   0.3 - 0.5 0 None No Change 6 hours (after 2 consecutive levels in range check q24h @0700)   0.51 - 0.6 0 None Decrease by  1 Units/kg/hr 6 hours   0.61 - 0.8 0 30 Minutes Decrease by  2 Units/kg/hr 6 hours   0.81 - 1 0 60 Minutes Decrease by  3 Units/kg/hr 6 hours   >1 0 Hold  After Anti Xa less than 0.5 decrease previous rate by  4 Units/kg/hr  Every 2 hours until Anti Xa  less than 0.5 then when infusion restarts in 6 hours         Recommend anti-Xa every 6 hours.          Date   Time   Anti-Xa Current Rate (Unit/kg/hr) Bolus   (Units) Rate Change   (Unit/kg/hr) New Rate (Unit/kg/hr) Next   Anti-Xa Comments  Pump Check Daily   10/30 0339 <0.1 - 4000 +9.09 9.09 1000 Spoke with PARDEEP Sosa about starting the drip with initial bolus. No s/s of bleeding at baseline.    10/30 1025 < 0.1 9.09 4000 +3 12.09 1700 Spoke with PARDEEP Sales. No s/s bleeding. Bolus and increase rate.                                                                                                                                                                                                                       Pharmacy will continue to follow anti-Xa results and monitor for signs and symptoms of bleeding or thrombosis.    Thank you.  Faustina Red,  Pharm.D.  10/30/2024  10:29 EDT

## 2024-10-31 NOTE — NURSING NOTE
PT discharged at this time. Peripheral IV removed. Telemetry D/C'd. Discharge instructions provided.

## 2024-10-31 NOTE — DISCHARGE SUMMARY
Patient Identification:  Name:  Damián Myers  Age:  69 y.o.  Sex:  male  :  1955  MRN:  [id number]  Visit Number:  [id number]    Date of Admission: 10/29/2024  Date of Discharge:       PCP: Spencer Saeed MD    DISCHARGE DIAGNOSIS  1.Non obstructive CAD.  2.VHD.    CONSULTS   None.    PROCEDURES PERFORMED  1.Left heart cath.  2.Coronary angiogram.  3.iFR of LAD.    HOSPITAL COURSE  Patient is a 69 y.o. male presented to [facility] complaining of Chest pain.  Please see the admitting history and physical for further details.  He underwent coronary angiogram that showed 60% proximal LAD with an iFR of 0.92. Pt was started on imdur and lipitor and discharged home. He was chest pain free at the time of discharge.    VITAL SIGNS:      10/29/24  2240   Weight: 110 kg (242 lb)     Body mass index is 30.25 kg/m².    PHYSICAL EXAM:  Constitutional:  Well-developed and well-nourished.  No respiratory distress.      HENT:  Head: Normocephalic and atraumatic.  Mouth:  Moist mucous membranes.    Neck:  Neck supple.  No JVD present.    Cardiovascular:  Normal rate, regular rhythm and normal heart sounds with no murmur.  Pulmonary/Chest:  No respiratory distress, no wheezes, no crackles, with normal breath sounds and good air movement.  Abdominal:  Soft.  Bowel sounds are normal.  No distension and no tenderness.   Musculoskeletal:  No edema, no tenderness, and no deformity.  No red or swollen joints anywhere.    Neurological:  Alert and oriented to person, place, and time.  No cranial nerve deficit.  No tongue deviation.  No facial droop.  No slurred speech.   Skin:  Skin is warm and dry.  No rash noted.  No pallor.   Psychiatric:  Normal mood and affect.  Behavior is normal.  Judgment and thought content normal.   Peripheral vascular:  No edema and strong pulses on all 4 extremities.    DISCHARGE DISPOSITION   Stable    DISCHARGE MEDICATIONS:     Discharge Medications        New Medications        Instructions  Start Date   atorvastatin 40 MG tablet  Commonly known as: LIPITOR   40 mg, Oral, Nightly   Start Date: October 31, 2024     isosorbide mononitrate 30 MG 24 hr tablet  Commonly known as: IMDUR   30 mg, Oral, Daily      isosorbide mononitrate 30 MG 24 hr tablet  Commonly known as: IMDUR   30 mg, Oral, Every 24 Hours Scheduled             Continue These Medications        Instructions Start Date   aspirin 81 MG chewable tablet   81 mg, Daily      bumetanide 1 MG tablet  Commonly known as: BUMEX   1 mg, Daily PRN      losartan 50 MG tablet  Commonly known as: COZAAR   50 mg, Oral, Daily      spironolactone 25 MG tablet  Commonly known as: ALDACTONE   12.5 mg, Oral, Daily               Results for orders placed during the hospital encounter of 10/25/21    SCANNED - TELEMETRY     Results for orders placed during the hospital encounter of 10/22/24    Adult Transthoracic Echo Complete W/ Cont if Necessary Per Protocol    Interpretation Summary    Normal left ventricular cavity size and wall thickness noted. All left ventricular wall segments contract normally.    Left ventricular ejection fraction appears to be 56 - 60%.    The aortic valve exhibits sclerosis. The aortic valve was poorly visualized but appears trileaflet.    No significant aortic valve regurgitation is present. No hemodynamically significant aortic valve stenosis is present.    Mild dilation of the aortic root is present.    There is a bioprosthetic mitral valve present.    Mild mitral valve regurgitation is present. No significant mitral valve stenosis is present.    There is no evidence of pericardial effusion    Comments: LV systolic function seem to have improved since the last study on 6/12/2023.   Results for orders placed during the hospital encounter of 10/04/24    Stress Test With Myocardial Perfusion One Day    Interpretation Summary  Images from the original result were not included.      Stress Procedure    A stress test was performed following  the Spencer protocol.    Exercise duration 5 min, Estimated workload7 METS    Baseline HR88 bpm, Baseline /88 mmHg, Peak Stress VitalsPeak  bpm, Peak /72 mmHg, Recovery VitalsRecovery HR90 bpm,Recovery /93 mmHg: Exercise DataTarget HR (85%)128 bpmMax. Pred. HR (100%)151 bpmPercent Max Pred HR90.07 %    Patient denied any chest discomfort during exercise    Non-specific ST-T wave changes noted during stress.    Findings consistent with an indeterminate ECG stress test.    Nuclear Perfusion Findings    Myocardial perfusion imaging indicates a mild degree ,moderate to large size ischemia located in the anterior wall, apex, inferior wall and lateral wall.    Abnormal LV wall motion consistent with mild global hypokinesis.    Left ventricular ejection fraction is mildly reduced (Calculated EF = 43%).    Impressions are consistent with an intermediate to high risk study.    Comments: May consider further cardiac evaluation with coronary angiography if clinically warranted given the size and extent of the myocardial ischemia involving multiple territories.   Results for orders placed during the hospital encounter of 10/04/24    Stress Test With Myocardial Perfusion One Day    Interpretation Summary  Images from the original result were not included.      Stress Procedure    A stress test was performed following the Spencer protocol.    Exercise duration 5 min, Estimated workload7 METS    Baseline HR88 bpm, Baseline /88 mmHg, Peak Stress VitalsPeak  bpm, Peak /72 mmHg, Recovery VitalsRecovery HR90 bpm,Recovery /93 mmHg: Exercise DataTarget HR (85%)128 bpmMax. Pred. HR (100%)151 bpmPercent Max Pred HR90.07 %    Patient denied any chest discomfort during exercise    Non-specific ST-T wave changes noted during stress.    Findings consistent with an indeterminate ECG stress test.    Nuclear Perfusion Findings    Myocardial perfusion imaging indicates a mild degree ,moderate to large size  ischemia located in the anterior wall, apex, inferior wall and lateral wall.    Abnormal LV wall motion consistent with mild global hypokinesis.    Left ventricular ejection fraction is mildly reduced (Calculated EF = 43%).    Impressions are consistent with an intermediate to high risk study.    Comments: May consider further cardiac evaluation with coronary angiography if clinically warranted given the size and extent of the myocardial ischemia involving multiple territories.   Results for orders placed during the hospital encounter of 10/29/24    Cardiac Catheterization/Vascular Study    Conclusion  Conclusion:  1.  Right dominant circulation.  2.  Moderate nonobstructive disease of proximal LAD by IFR-0.92.          Recommendation:  1.  Start statin.  2.  Optimize antianginal medication.  3.  Routine post-cath care.    Procedures       Your Scheduled Appointments      Nov 12, 2024 8:30 AM  Follow Up with Yasmany Leos PA-C  [facility] ([place]) [address] [place] [address]  [phone]   -Bring photo ID, insurance card, and list of medications to appointment  -If testing was completed outside of [facility] then patient must bring images on a disc  -Copay will be collected at time of appointment  -Established patients should arrive 10 minutes prior to appointment         Mar 26, 2025 8:15 AM  Follow Up with Yasmany Leos PA-C  [facility] ([place]) [address] [place] [address]  [phone]   -Bring photo ID, insurance card, and list of medications to appointment  -If testing was completed outside of [facility] then patient must bring images on a disc  -Copay will be collected at time of appointment  -Established patients should arrive 10 minutes prior to appointment                 Follow-up Information       Spencer Saeed MD .    Specialty: Family Medicine  Why: Office closed will return call to PT on 10/31/24 with appt  information.  Contact information:  [address] [place] [address]  [phone]                                This document has been electronically signed by Lam Barragan MD Interventional Cardiology  October 30, 2024 21:58 EDT    Please note that this discharge summary required more than 30 minutes to complete.

## 2024-10-31 NOTE — PLAN OF CARE
Goal Outcome Evaluation:                 Pt discharge at this time. Discharge instructions explained.

## 2024-11-12 ENCOUNTER — TELEPHONE (OUTPATIENT)
Dept: CARDIOLOGY | Facility: CLINIC | Age: 69
End: 2024-11-12

## 2024-11-12 ENCOUNTER — OFFICE VISIT (OUTPATIENT)
Dept: CARDIOLOGY | Facility: CLINIC | Age: 69
End: 2024-11-12
Payer: MEDICARE

## 2024-11-12 VITALS
BODY MASS INDEX: 30.81 KG/M2 | SYSTOLIC BLOOD PRESSURE: 119 MMHG | DIASTOLIC BLOOD PRESSURE: 84 MMHG | WEIGHT: 247.8 LBS | HEIGHT: 75 IN | RESPIRATION RATE: 18 BRPM | OXYGEN SATURATION: 92 % | HEART RATE: 77 BPM

## 2024-11-12 DIAGNOSIS — I25.83 CORONARY ARTERY DISEASE DUE TO LIPID RICH PLAQUE: ICD-10-CM

## 2024-11-12 DIAGNOSIS — I25.10 CORONARY ARTERY DISEASE DUE TO LIPID RICH PLAQUE: ICD-10-CM

## 2024-11-12 DIAGNOSIS — I25.10 ASCVD (ARTERIOSCLEROTIC CARDIOVASCULAR DISEASE): Primary | ICD-10-CM

## 2024-11-12 DIAGNOSIS — I10 ESSENTIAL HYPERTENSION: ICD-10-CM

## 2024-11-12 PROBLEM — I25.119 CORONARY ARTERY DISEASE INVOLVING NATIVE CORONARY ARTERY OF NATIVE HEART WITH ANGINA PECTORIS: Status: RESOLVED | Noted: 2019-11-22 | Resolved: 2024-11-12

## 2024-11-12 PROBLEM — I50.23 ACUTE ON CHRONIC HFREF (HEART FAILURE WITH REDUCED EJECTION FRACTION): Status: RESOLVED | Noted: 2023-07-20 | Resolved: 2024-11-12

## 2024-11-12 PROCEDURE — 3074F SYST BP LT 130 MM HG: CPT | Performed by: PHYSICIAN ASSISTANT

## 2024-11-12 PROCEDURE — 99214 OFFICE O/P EST MOD 30 MIN: CPT | Performed by: PHYSICIAN ASSISTANT

## 2024-11-12 PROCEDURE — 1159F MED LIST DOCD IN RCRD: CPT | Performed by: PHYSICIAN ASSISTANT

## 2024-11-12 PROCEDURE — 3079F DIAST BP 80-89 MM HG: CPT | Performed by: PHYSICIAN ASSISTANT

## 2024-11-12 PROCEDURE — 1160F RVW MEDS BY RX/DR IN RCRD: CPT | Performed by: PHYSICIAN ASSISTANT

## 2024-11-12 RX ORDER — SPIRONOLACTONE 25 MG/1
12.5 TABLET ORAL DAILY
Qty: 45 TABLET | Refills: 2 | Status: SHIPPED | OUTPATIENT
Start: 2024-11-12

## 2024-11-12 NOTE — PROGRESS NOTES
Spencer Saeed MD  Damián Myers  1955 11/12/2024    Patient Active Problem List   Diagnosis    Obesity (BMI 30-39.9)    Chronic HFrEF (heart failure with reduced ejection fraction)    Stroke (cerebrum)    Essential hypertension    Hyperlipidemia LDL goal <70    Diastolic CHF due to valvular disease    Paroxysmal atrial fibrillation    Debility    Respiratory failure with hypoxia and hypercapnia    COPD exacerbation    CKD (chronic kidney disease) stage 3, GFR 30-59 ml/min    Gastrointestinal hemorrhage with melena    ASCVD (arteriosclerotic cardiovascular disease)    H/O mitral valve replacement    Cardiomyopathy, dilated    Heart failure with reduced ejection fraction and diastolic dysfunction    Precordial pain    Abnormal stress test    CAD (coronary artery disease)       Dear Spencer Saeed MD:    Subjective     History of Present Illness:    Chief Complaint   Patient presents with    Follow-up     Cath on [id number]       Damián Myers is a pleasant 69 y.o. male with a past medical history significant for coronary artery disease with PCI to LAD on 11/8/2019, ischemic cardiomyopathy that had improved however was recently found to be reduced at 41-45% likely now related to mitral valve replacement, moderate to severe mitral valve regurgitation status post mitral valve replacement in September 2022 with 33 mm Magna Ease tissue mitral valve, paroxysmal atrial fibrillation with ANGELICA ligation, hyperlipidemia, COPD, and hypertension. Ed comes in today for routine cardiology follow up.     Ed comes in after recent LHC showing nonobstructive disease with 60% stenosis in LAD with IFR of 0.92.  Clinically he reports his chest pains have improved since heart cath not having an episode in the last few weeks.  He reports that his chest pains were likely stress-induced as he had a family ember who was sick in the hospital.  He denies any worsening shortness of breath from baseline, palpitations, syncope, or near  "syncope    Allergies   Allergen Reactions    Other Unknown - High Severity     Pt had a reaction to anesthesia when placing stents      :      Current Outpatient Medications:     aspirin 81 MG chewable tablet, Chew 1 tablet Daily., Disp: , Rfl:     atorvastatin (LIPITOR) 40 MG tablet, Take 1 tablet by mouth Every Night., Disp: 90 tablet, Rfl: 2    bumetanide (BUMEX) 1 MG tablet, Take 1 tablet by mouth Daily As Needed (fluid/swelling)., Disp: , Rfl:     losartan (COZAAR) 50 MG tablet, Take 1 tablet by mouth Daily., Disp: 90 tablet, Rfl: 3    spironolactone (ALDACTONE) 25 MG tablet, Take 0.5 tablets by mouth Daily., Disp: 45 tablet, Rfl: 2    isosorbide mononitrate (IMDUR) 30 MG 24 hr tablet, Take 1 tablet by mouth Daily. (Patient not taking: Reported on 11/12/2024), Disp: 30 tablet, Rfl: 11    isosorbide mononitrate (IMDUR) 30 MG 24 hr tablet, Take 1 tablet by mouth Daily for 30 days. (Patient not taking: Reported on 11/12/2024), Disp: 30 tablet, Rfl: 0  No current facility-administered medications for this visit.    Facility-Administered Medications Ordered in Other Visits:     Chlorhexidine Gluconate Cloth 2 % pads 1 application, 1 application , Topical, Q12H PRN, Fernando, Magaly, APRN    The following portions of the patient's history were reviewed and updated as appropriate: allergies, current medications, past family history, past medical history, past social history, past surgical history and problem list.    Social History     Tobacco Use    Smoking status: Never     Passive exposure: Never    Smokeless tobacco: Never   Vaping Use    Vaping status: Never Used   Substance Use Topics    Alcohol use: No    Drug use: No         Objective   Vitals:    11/12/24 0824   BP: 119/84   BP Location: Right arm   Patient Position: Sitting   Cuff Size: Adult   Pulse: 77   Resp: 18   SpO2: 92%   Weight: 112 kg (247 lb 12.8 oz)   Height: 190.5 cm (75\")     Body mass index is 30.97 kg/m².    ROS    Constitutional:       " "General: Not in acute distress.     Appearance: Healthy appearance. Well-developed and not in distress. Not diaphoretic.   Eyes:      Conjunctiva/sclera: Conjunctivae normal.      Pupils: Pupils are equal, round, and reactive to light.   HENT:      Head: Normocephalic and atraumatic.   Neck:      Vascular: No carotid bruit or JVD.   Pulmonary:      Effort: Pulmonary effort is normal. No respiratory distress.      Breath sounds: Normal breath sounds.   Cardiovascular:      Normal rate. Regular rhythm.   Edema:     Peripheral edema absent.   Skin:     General: Skin is cool.   Neurological:      Mental Status: Alert, oriented to person, place, and time and oriented to person, place and time.         Lab Results   Component Value Date     10/30/2024    K 4.5 10/30/2024    CL 98 10/30/2024    CO2 30.1 (H) 10/30/2024    BUN 23 10/30/2024    CREATININE 1.25 10/30/2024    GLUCOSE 105 (H) 10/30/2024    CALCIUM 9.6 10/30/2024    AST 25 10/30/2024    ALT 16 10/30/2024    ALKPHOS 78 10/30/2024     Lab Results   Component Value Date    CKTOTAL 62 10/30/2024     Lab Results   Component Value Date    WBC 7.15 10/30/2024    HGB 15.7 10/30/2024    HCT 50.4 10/30/2024     10/30/2024     Lab Results   Component Value Date    INR 1.02 10/30/2024    INR 0.96 11/18/2023    INR 0.97 07/19/2023     Lab Results   Component Value Date    MG 2.1 10/30/2024     Lab Results   Component Value Date    TSH 3.030 09/28/2024    PSA 0.433 11/06/2019    TRIG 85 06/25/2022    HDL 36 (L) 06/25/2022     (H) 06/25/2022      No results found for: \"BNP\"    During this visit the following were done:  Labs Reviewed []    Labs Ordered []    Radiology Reports Reviewed []    Radiology Ordered []    PCP Records Reviewed []    Referring Provider Records Reviewed []    ER Records Reviewed []    Hospital Records Reviewed []    History Obtained From Family []    Radiology Images Reviewed []    Other Reviewed []    Records Requested []     "   Procedures    Assessment & Plan    Diagnosis Plan   1. ASCVD (arteriosclerotic cardiovascular disease)        2. Coronary artery disease due to lipid rich plaque        3. Essential hypertension                 Recommendations:  Nonobstructive coronary artery disease  Discussed left heart catheterization results with Ed.  Emphasized the importance of risk factor control.  Emphasize importance of controlling cholesterol.  Will try to get lipid panel from his PCP.  Admits he is not taking Lipitor I encouraged him to start taking emphasizes importance in reducing risk of MI  He also has not started Imdur and wishes to hold off on this for now.  Continue aspirin, losartan, spironolactone.  Chronic HFpEF  Appears euvolemic.    No follow-ups on file.    As always, I appreciate very much the opportunity to participate in the cardiovascular care of your patients.      With Best Regards,    Yasmany Leos PA-C

## 2024-11-12 NOTE — TELEPHONE ENCOUNTER
----- Message from Yasmany Leos sent at 11/12/2024  9:01 AM EST -----  Can we get lipid panel from pcp

## 2025-03-26 ENCOUNTER — OFFICE VISIT (OUTPATIENT)
Dept: CARDIOLOGY | Facility: CLINIC | Age: 70
End: 2025-03-26
Payer: MEDICARE

## 2025-03-26 VITALS
DIASTOLIC BLOOD PRESSURE: 85 MMHG | HEIGHT: 75 IN | WEIGHT: 243 LBS | HEART RATE: 73 BPM | BODY MASS INDEX: 30.21 KG/M2 | OXYGEN SATURATION: 93 % | SYSTOLIC BLOOD PRESSURE: 122 MMHG

## 2025-03-26 DIAGNOSIS — E78.5 HYPERLIPIDEMIA LDL GOAL <70: ICD-10-CM

## 2025-03-26 DIAGNOSIS — I50.22 CHRONIC HFREF (HEART FAILURE WITH REDUCED EJECTION FRACTION): Primary | ICD-10-CM

## 2025-03-26 DIAGNOSIS — I10 ESSENTIAL HYPERTENSION: ICD-10-CM

## 2025-03-26 DIAGNOSIS — I48.0 PAROXYSMAL ATRIAL FIBRILLATION: ICD-10-CM

## 2025-03-26 RX ORDER — ISOSORBIDE MONONITRATE 30 MG/1
30 TABLET, EXTENDED RELEASE ORAL
Qty: 30 TABLET | Refills: 2 | Status: SHIPPED | OUTPATIENT
Start: 2025-03-26 | End: 2025-04-25

## 2025-03-26 NOTE — PROGRESS NOTES
Spencer Saeed MD  Damián Myers  1955 03/26/2025    Patient Active Problem List   Diagnosis    Obesity (BMI 30-39.9)    Chronic HFrEF (heart failure with reduced ejection fraction)    Stroke (cerebrum)    Essential hypertension    Hyperlipidemia LDL goal <70    Diastolic CHF due to valvular disease    Paroxysmal atrial fibrillation    Debility    Respiratory failure with hypoxia and hypercapnia    COPD exacerbation    CKD (chronic kidney disease) stage 3, GFR 30-59 ml/min    Gastrointestinal hemorrhage with melena    ASCVD (arteriosclerotic cardiovascular disease)    H/O mitral valve replacement    Cardiomyopathy, dilated    Heart failure with reduced ejection fraction and diastolic dysfunction    Precordial pain    Abnormal stress test    CAD (coronary artery disease)       Dear Spencer Saeed MD:    Subjective     History of Present Illness:    No chief complaint on file.      Damián Myers is a pleasant 69 y.o. male with a past medical history significant for coronary artery disease with PCI to LAD on 11/8/2019, ischemic cardiomyopathy that had improved however was recently found to be reduced at 41-45% likely now related to mitral valve replacement, moderate to severe mitral valve regurgitation status post mitral valve replacement in September 2022 with 33 mm Magna Ease tissue mitral valve, paroxysmal atrial fibrillation with ANGELICA ligation, hyperlipidemia, COPD, and hypertension. Ed comes in today for routine cardiology follow up.     Ed comes in today for follow-up he reports that he has recently been having more chest pains particularly on March 11 he had a severe episode requiring 1 dose of nitroglycerin and subsequently had near immediate resolution of his chest pains.  He had no reoccurrence until this past week where his had chest pains for the last couple days and took another dose of sublingual nitroglycerin yesterday where he took 1 dose in the morning with resolution of chest pains however the pain  "reoccurred in the evening requiring another dose which again resolved the pain.  He does report left arm numbness as well however reports this is more low 24/7 in duration and does not believe this numbness worsens when he exerts himself.      Allergies   Allergen Reactions    Other Unknown - High Severity     Pt had a reaction to anesthesia when placing stents      :      Current Outpatient Medications:     aspirin 81 MG chewable tablet, Chew 1 tablet Daily., Disp: , Rfl:     bumetanide (BUMEX) 1 MG tablet, Take 1 tablet by mouth Daily As Needed (fluid/swelling)., Disp: , Rfl:     isosorbide mononitrate (IMDUR) 30 MG 24 hr tablet, Take 1 tablet by mouth Daily for 30 days., Disp: 30 tablet, Rfl: 2    losartan (COZAAR) 50 MG tablet, Take 1 tablet by mouth Daily., Disp: 90 tablet, Rfl: 3    spironolactone (ALDACTONE) 25 MG tablet, Take 0.5 tablets by mouth Daily., Disp: 45 tablet, Rfl: 2    atorvastatin (LIPITOR) 40 MG tablet, Take 1 tablet by mouth Every Night. (Patient not taking: Reported on 3/26/2025), Disp: 90 tablet, Rfl: 2  No current facility-administered medications for this visit.    Facility-Administered Medications Ordered in Other Visits:     Chlorhexidine Gluconate Cloth 2 % pads 1 application, 1 application , Topical, Q12H PRN, Fernando, Magaly, APRN    The following portions of the patient's history were reviewed and updated as appropriate: allergies, current medications, past family history, past medical history, past social history, past surgical history and problem list.    Social History     Tobacco Use    Smoking status: Never     Passive exposure: Never    Smokeless tobacco: Never   Vaping Use    Vaping status: Never Used   Substance Use Topics    Alcohol use: No    Drug use: No         Objective   Vitals:    03/26/25 1020   BP: 122/85   Pulse: 73   SpO2: 93%   Weight: 110 kg (243 lb)   Height: 190.5 cm (75\")     Body mass index is 30.37 kg/m².    ROS    Physical Exam    Lab Results   Component " "Value Date     10/30/2024    K 4.5 10/30/2024    CL 98 10/30/2024    CO2 30.1 (H) 10/30/2024    BUN 23 10/30/2024    CREATININE 1.25 10/30/2024    GLUCOSE 105 (H) 10/30/2024    CALCIUM 9.6 10/30/2024    AST 25 10/30/2024    ALT 16 10/30/2024    ALKPHOS 78 10/30/2024     Lab Results   Component Value Date    CKTOTAL 62 10/30/2024     Lab Results   Component Value Date    WBC 7.15 10/30/2024    HGB 15.7 10/30/2024    HCT 50.4 10/30/2024     10/30/2024     Lab Results   Component Value Date    INR 1.02 10/30/2024    INR 0.96 11/18/2023    INR 0.97 07/19/2023     Lab Results   Component Value Date    MG 2.1 10/30/2024     Lab Results   Component Value Date    TSH 3.030 09/28/2024    PSA 0.433 11/06/2019    TRIG 85 06/25/2022    HDL 36 (L) 06/25/2022     (H) 06/25/2022      No results found for: \"BNP\"    During this visit the following were done:  Labs Reviewed []    Labs Ordered []    Radiology Reports Reviewed []    Radiology Ordered []    PCP Records Reviewed []    Referring Provider Records Reviewed []    ER Records Reviewed []    Hospital Records Reviewed []    History Obtained From Family []    Radiology Images Reviewed []    Other Reviewed []    Records Requested []       Procedures    Assessment & Plan    Diagnosis Plan   1. Chronic HFrEF (heart failure with reduced ejection fraction)        2. Essential hypertension        3. Paroxysmal atrial fibrillation        4. Hyperlipidemia LDL goal <70                 Recommendations:  Coronary artery disease with stable angina  With his ongoing symptoms and recent heart cath showing nonobstructive CAD I did recommend pursuing GDMT with initiation of isosorbide mononitrate in the past was reluctant to start this however after discussing with him he was agreeable.  I did offer referring for second opinion over his LAD lesion for now he is happy to continue with GDMT  Continue aspirin, losartan, Lipitor.  Essential hypertension  Currently very " well-controlled  Dyslipidemia  On Lipitor    No follow-ups on file.    As always, I appreciate very much the opportunity to participate in the cardiovascular care of your patients.      With Best Regards,    Yasmany Leos PA-C

## 2025-04-24 ENCOUNTER — TELEPHONE (OUTPATIENT)
Dept: CARDIOLOGY | Facility: CLINIC | Age: 70
End: 2025-04-24
Payer: MEDICARE

## 2025-04-29 ENCOUNTER — OFFICE VISIT (OUTPATIENT)
Dept: CARDIOLOGY | Facility: CLINIC | Age: 70
End: 2025-04-29
Payer: MEDICARE

## 2025-04-29 VITALS
RESPIRATION RATE: 16 BRPM | BODY MASS INDEX: 30.54 KG/M2 | HEART RATE: 71 BPM | HEIGHT: 75 IN | SYSTOLIC BLOOD PRESSURE: 117 MMHG | DIASTOLIC BLOOD PRESSURE: 78 MMHG | OXYGEN SATURATION: 94 % | WEIGHT: 245.6 LBS

## 2025-04-29 DIAGNOSIS — I10 ESSENTIAL HYPERTENSION: ICD-10-CM

## 2025-04-29 DIAGNOSIS — I25.10 ASCVD (ARTERIOSCLEROTIC CARDIOVASCULAR DISEASE): Primary | ICD-10-CM

## 2025-04-29 DIAGNOSIS — I48.0 PAROXYSMAL ATRIAL FIBRILLATION: ICD-10-CM

## 2025-04-29 DIAGNOSIS — I50.22 CHRONIC HFREF (HEART FAILURE WITH REDUCED EJECTION FRACTION): ICD-10-CM

## 2025-04-29 PROBLEM — R07.2 PRECORDIAL PAIN: Status: RESOLVED | Noted: 2024-10-14 | Resolved: 2025-04-29

## 2025-04-29 PROCEDURE — 3078F DIAST BP <80 MM HG: CPT | Performed by: PHYSICIAN ASSISTANT

## 2025-04-29 PROCEDURE — 3074F SYST BP LT 130 MM HG: CPT | Performed by: PHYSICIAN ASSISTANT

## 2025-04-29 PROCEDURE — 99214 OFFICE O/P EST MOD 30 MIN: CPT | Performed by: PHYSICIAN ASSISTANT

## 2025-04-29 PROCEDURE — G2211 COMPLEX E/M VISIT ADD ON: HCPCS | Performed by: PHYSICIAN ASSISTANT

## 2025-04-29 NOTE — PROGRESS NOTES
Spencer Saeed MD  Damián Myers  1955 04/29/2025    Patient Active Problem List   Diagnosis    Obesity (BMI 30-39.9)    Chronic HFrEF (heart failure with reduced ejection fraction)    Stroke (cerebrum)    Essential hypertension    Hyperlipidemia LDL goal <70    Diastolic CHF due to valvular disease    Paroxysmal atrial fibrillation    Debility    Respiratory failure with hypoxia and hypercapnia    COPD exacerbation    CKD (chronic kidney disease) stage 3, GFR 30-59 ml/min    Gastrointestinal hemorrhage with melena    ASCVD (arteriosclerotic cardiovascular disease)    H/O mitral valve replacement    Cardiomyopathy, dilated    Heart failure with reduced ejection fraction and diastolic dysfunction    Abnormal stress test    CAD (coronary artery disease)       Dear Spencer Saeed MD:    Subjective     History of Present Illness:    Chief Complaint   Patient presents with    Follow-up     6 MOS    Med Management     VERBAL       Damián Myers is a pleasant 69 y.o. male with a past medical history significant for coronary artery disease with PCI to LAD on 11/8/2019, ischemic cardiomyopathy that had improved however was recently found to be reduced at 41-45% likely now related to mitral valve replacement, moderate to severe mitral valve regurgitation status post mitral valve replacement in September 2022 with 33 mm Magna Ease tissue mitral valve, paroxysmal atrial fibrillation with ANGELICA ligation, hyperlipidemia, COPD, and hypertension. Ed comes in today for routine cardiology follow up.       Ed reports that he took Imdur on 1 occasion and this caused a headache and he subsequently taking this.  However he did start taking a homeopathic supplement with folate and choline inside of it.  He reports since doing this that he has been chest pain-free.  He denies any worsening shortness of breath from baseline, orthopnea, or PND.  He also reports blood pressure is controlled and is controlled today as well.  Denies any syncope  "or near syncope.      Allergies   Allergen Reactions    Other Unknown - High Severity     Pt had a reaction to anesthesia when placing stents      :      Current Outpatient Medications:     aspirin 81 MG chewable tablet, Chew 1 tablet Daily., Disp: , Rfl:     bumetanide (BUMEX) 1 MG tablet, Take 1 tablet by mouth Daily As Needed (fluid/swelling)., Disp: , Rfl:     losartan (COZAAR) 50 MG tablet, Take 1 tablet by mouth Daily., Disp: 90 tablet, Rfl: 3    spironolactone (ALDACTONE) 25 MG tablet, Take 0.5 tablets by mouth Daily., Disp: 45 tablet, Rfl: 2  No current facility-administered medications for this visit.    Facility-Administered Medications Ordered in Other Visits:     Chlorhexidine Gluconate Cloth 2 % pads 1 application, 1 application , Topical, Q12H PRN, Magaly King, APRN    The following portions of the patient's history were reviewed and updated as appropriate: allergies, current medications, past family history, past medical history, past social history, past surgical history and problem list.    Social History     Tobacco Use    Smoking status: Never     Passive exposure: Never    Smokeless tobacco: Never   Vaping Use    Vaping status: Never Used   Substance Use Topics    Alcohol use: No    Drug use: No         Objective   Vitals:    04/29/25 0906   BP: 117/78   Pulse: 71   Resp: 16   SpO2: 94%   Weight: 111 kg (245 lb 9.6 oz)   Height: 190.5 cm (75\")     Body mass index is 30.7 kg/m².    ROS    Constitutional:       General: Not in acute distress.     Appearance: Healthy appearance. Well-developed and not in distress. Not diaphoretic.   Eyes:      Conjunctiva/sclera: Conjunctivae normal.      Pupils: Pupils are equal, round, and reactive to light.   HENT:      Head: Normocephalic and atraumatic.   Neck:      Vascular: No carotid bruit or JVD.   Pulmonary:      Effort: Pulmonary effort is normal. No respiratory distress.      Breath sounds: Normal breath sounds.   Cardiovascular:      Normal rate. " "Regular rhythm.   Edema:     Peripheral edema absent.   Skin:     General: Skin is cool.   Neurological:      Mental Status: Alert, oriented to person, place, and time and oriented to person, place and time.         Lab Results   Component Value Date     10/30/2024    K 4.5 10/30/2024    CL 98 10/30/2024    CO2 30.1 (H) 10/30/2024    BUN 23 10/30/2024    CREATININE 1.25 10/30/2024    GLUCOSE 105 (H) 10/30/2024    CALCIUM 9.6 10/30/2024    AST 25 10/30/2024    ALT 16 10/30/2024    ALKPHOS 78 10/30/2024     Lab Results   Component Value Date    CKTOTAL 62 10/30/2024     Lab Results   Component Value Date    WBC 7.15 10/30/2024    HGB 15.7 10/30/2024    HCT 50.4 10/30/2024     10/30/2024     Lab Results   Component Value Date    INR 1.02 10/30/2024    INR 0.96 11/18/2023    INR 0.97 07/19/2023     Lab Results   Component Value Date    MG 2.1 10/30/2024     Lab Results   Component Value Date    TSH 3.030 09/28/2024    PSA 0.433 11/06/2019    TRIG 85 06/25/2022    HDL 36 (L) 06/25/2022     (H) 06/25/2022      No results found for: \"BNP\"    During this visit the following were done:  Labs Reviewed []    Labs Ordered []    Radiology Reports Reviewed []    Radiology Ordered []    PCP Records Reviewed []    Referring Provider Records Reviewed []    ER Records Reviewed []    Hospital Records Reviewed []    History Obtained From Family []    Radiology Images Reviewed []    Other Reviewed []    Records Requested []       Procedures    Assessment & Plan    Diagnosis Plan   1. ASCVD (arteriosclerotic cardiovascular disease)        2. Chronic HFrEF (heart failure with reduced ejection fraction)        3. Essential hypertension        4. Paroxysmal atrial fibrillation                 Recommendations:  Coronary artery disease  Now denying any anginal symptoms.  Continue aspirin, Cozaar, spironolactone.  I have on multiple visits discussed recommendations of statin therapy he has declined statins.  Chronic HFrEF " with cardiomyopathy  Euvolemic continue regimen mentioned above including Bumex.  Essential hypertension  Very well-controlled.  Paroxysmal atrial fibrillation  Denies any awareness of arrhythmia not anticoagulated due to history of ANGELICA ligation    No follow-ups on file.    As always, I appreciate very much the opportunity to participate in the cardiovascular care of your patients.      With Best Regards,    Yasmany Leos PA-C

## (undated) DEVICE — GLIDESHEATH SLENDER STAINLESS STEEL KIT: Brand: GLIDESHEATH SLENDER

## (undated) DEVICE — GW INQWIRE FC PTFE J/3MM .035 180

## (undated) DEVICE — ST INF PRI SMRTSTE 20DRP 2VLV 24ML 117

## (undated) DEVICE — THE BITE BLOCK MAXI, LATEX FREE STRAP IS USED TO PROTECT THE ENDOSCOPE INSERTION TUBE FROM BEING BITTEN BY THE PATIENT.

## (undated) DEVICE — TR BAND RADIAL ARTERY COMPRESSION DEVICE: Brand: TR BAND

## (undated) DEVICE — CATH F5 INF JL 3.5 100CM: Brand: INFINITI

## (undated) DEVICE — KT VLV HEMO MAP ACC PLS LG/BORE MTL/INTRO W/TORQ/DEV

## (undated) DEVICE — CATH F5 INF JL 5 100CM: Brand: INFINITI

## (undated) DEVICE — FLTR RESERV PERFUS INTERSEPT 02 STRL

## (undated) DEVICE — SYR LUERLOK 30CC

## (undated) DEVICE — SUT SILK 2 SUTUPAK TIE 60IN SA8H 2STRAND

## (undated) DEVICE — INTRO ACCSR BLNT TP

## (undated) DEVICE — DRSNG SURESITE WNDW 4X4.5

## (undated) DEVICE — "MH-443 SUCTION VALVE F/EVIS140 EVIS160": Brand: SUCTION VALVE

## (undated) DEVICE — DRN WND CH RND FUL/FLUT NO/TROC 3/8IN 28F

## (undated) DEVICE — MODEL AT P65, P/N 701554-001KIT CONTENTS: HAND CONTROLLER, 3-WAY HIGH-PRESSURE STOPCOCK WITH ROTATING END AND PREMIUM HIGH-PRESSURE TUBING: Brand: ANGIOTOUCH® KIT

## (undated) DEVICE — PAD ARMBRD SURG CONVOL 7.5X20X2IN

## (undated) DEVICE — CATH F6INF TL JR 4 100CM: Brand: INFINITI

## (undated) DEVICE — PK CATH CARD 70

## (undated) DEVICE — ADAPT PERFUS DLP BARB/CONN M/LL 0.25X2IN

## (undated) DEVICE — RUNTHROUGH NS EXTRA FLOPPY PTCA GUIDEWIRE: Brand: RUNTHROUGH

## (undated) DEVICE — ADULT, RADIOTRANSPARENT ELEMENT, COMPATIBLE W/ ZOLL: Brand: DEFIBRILLATION ELECTRODES

## (undated) DEVICE — TP UMB COTN 30IN U11T

## (undated) DEVICE — DEV INFL MONARCH 25W

## (undated) DEVICE — BLD SCLPL BEAVR MINI STR 2BVL 180D LF

## (undated) DEVICE — ADHS SKIN PREMIERPRO EXOFIN TOPICAL HI/VISC .5ML

## (undated) DEVICE — THE CARR-LOCKE INJECTION NEEDLE IS A SINGLE USE, DISPOSABLE, FLEXIBLE SHEATH INJECTION NEEDLE USED FOR THE INJECTION OF VARIOUS TYPES OF MEDIA THROUGH FLEXIBLE ENDOSCOPES.

## (undated) DEVICE — CVR PROB ULTRASND/TRANSD W/GEL 18X120CM STRL

## (undated) DEVICE — CANN AORT ROOT DLP VNT 14G 7F

## (undated) DEVICE — AVID DUAL STAGE VENOUS DRAINAGE CANNULA: Brand: AVID DUAL STAGE VENOUS DRAINAGE CANNULA

## (undated) DEVICE — TBG SXN INTRACARD RIDGID FLUT 24F .25X13IN A/

## (undated) DEVICE — Device

## (undated) DEVICE — CATH FOL LUBRISIL IC 2WY 20F 5CC

## (undated) DEVICE — COPILOT BLEEDBACK CONTROL VALVE: Brand: COPILOT

## (undated) DEVICE — PK PERFUS CUST W/CARDIOPLEGIA

## (undated) DEVICE — LN FLTR ORL/NASL MICROSTREAM NONINTUB A/LNG

## (undated) DEVICE — SUT PROLN CARDIO V5 3/0 36IN 8936H

## (undated) DEVICE — A2000 MULTI-USE SYRINGE KIT, P/N 701277-003KIT CONTENTS: 100ML CONTRAST RESERVOIR AND TUBING WITH CONTRAST SPIKE AND CLAMP: Brand: A2000 MULTI-USE SYRINGE KIT

## (undated) DEVICE — CANNULA,OXY,ADULT,SUPER SOFT,W/14'TUB,UC: Brand: MEDLINE INDUSTRIES, INC.

## (undated) DEVICE — SUT PROLN 3/0 V7 D/A 36IN 8976H

## (undated) DEVICE — 8 FOOT DISPOSABLE EXTENSION CABLE WITH SAFE CONNECT / ALLIGATOR CLIP

## (undated) DEVICE — DRAPE, RADIAL, STERILE: Brand: MEDLINE

## (undated) DEVICE — ADULT DISPOSABLE SINGLE-PATIENT USE PULSE OXIMETER SENSOR: Brand: NONIN

## (undated) DEVICE — PATIENT RETURN ELECTRODE, SINGLE-USE, CONTACT QUALITY MONITORING, ADULT, WITH 9FT CORD, FOR PATIENTS WEIGING OVER 33LBS. (15KG): Brand: MEGADYNE

## (undated) DEVICE — ST EXT IV SMARTSITE 2VLV SP M LL 5ML IV1

## (undated) DEVICE — 12 FOOT DISPOSABLE EXTENSION CABLE WITH SAFE CONNECT / SCREW-DOWN

## (undated) DEVICE — PENCL ROCKRSWCH MEGADYNE W/HOLSTR 10FT SS

## (undated) DEVICE — SUT SILK 2/0 CT1 CR8 18IN C022D

## (undated) DEVICE — TRAP FLD MINIVAC MEGADYNE 100ML

## (undated) DEVICE — ST EXT IV SMRTSTE 2VLV FIX M LL 6ML 41

## (undated) DEVICE — CONTN GRAD MEAS TRIANG 32OZ BLK

## (undated) DEVICE — GLV SURG BIOGELULTRATOUCH POLYISPRN PF LF SZ7 STRL

## (undated) DEVICE — PINNACLE INTRODUCER SHEATH: Brand: PINNACLE

## (undated) DEVICE — 6F .070 XB 3.5 100CM: Brand: VISTA BRITE TIP

## (undated) DEVICE — TUBING,OXYGEN,CRUSH RES,7',CLEAR,UC: Brand: MEDLINE INDUSTRIES, INC.

## (undated) DEVICE — XIENCE SIERRA™ EVEROLIMUS ELUTING CORONARY STENT SYSTEM 3.00 MM X 15 MM / RAPID-EXCHANGE
Type: IMPLANTABLE DEVICE | Status: NON-FUNCTIONAL
Brand: XIENCE SIERRA™

## (undated) DEVICE — TUBING, SUCTION, 1/4" X 10', STRAIGHT: Brand: MEDLINE

## (undated) DEVICE — LEVEL SENSORS PADS ARE USED TO ATTACH THE LEVEL SENSORS TO A HARD SHELL RESERVOIR. INCLUDES COUPLING GEL.: Brand: TERUMO® ADVANCED PERFUSION SYSTEM 1

## (undated) DEVICE — ANTIBACTERIAL UNDYED BRAIDED (POLYGLACTIN 910), SYNTHETIC ABSORBABLE SUTURE: Brand: COATED VICRYL

## (undated) DEVICE — SUT ETHIB 1 CT1 30IN  X425H

## (undated) DEVICE — KT BIOGUARD SXN VLV AIR/H20 4PC DISP

## (undated) DEVICE — ORGANIZER SUT GABBAY FRATER GFS10A PK/3

## (undated) DEVICE — SHROD PENCL MEGADYNE ATTACHAVAC W/CONN/22MM

## (undated) DEVICE — Device: Brand: AIR/WATER CHANNEL CLEANING ADAPTER

## (undated) DEVICE — BIPOLAR ELECTROHEMOSTASIS CATHETER: Brand: GOLD PROBE 350CM

## (undated) DEVICE — RADIAL RUNWAY TOP PADS: Brand: RADIAL RUNWAY TOP PADS

## (undated) DEVICE — OASIS DRAIN, SINGLE, INLINE & ATS COMPATIBLE: Brand: OASIS

## (undated) DEVICE — GW INQW FIX/CORE PTFE J/3MM .035 260CM

## (undated) DEVICE — CATH F5 INF JR 4 100CM: Brand: INFINITI

## (undated) DEVICE — "MH-438 A/W VLVE F/140 EVIS-140": Brand: AIR/WATER VALVE

## (undated) DEVICE — CATH F6INF TL JL 4 100CM: Brand: INFINITI

## (undated) DEVICE — 3M™ MEDIPORE™ H SOFT CLOTH SURGICAL TAPE, 2863, 3 IN X 10 YD, 12/CASE: Brand: 3M™ MEDIPORE™

## (undated) DEVICE — ANGIO-SEAL VIP VASCULAR CLOSURE DEVICE: Brand: ANGIO-SEAL

## (undated) DEVICE — TBG PENCL TELESCP MEGADYNE SMOKE EVAC 10FT

## (undated) DEVICE — LN INJ CONTRST FLXCIL HP F/M LL 1200PSI10

## (undated) DEVICE — SUT SILK 0/0 CT2 18IN C027D

## (undated) DEVICE — RADIFOCUS OPTITORQUE ANGIOGRAPHIC CATHETER: Brand: OPTITORQUE

## (undated) DEVICE — 6F .070 JL5 100CM: Brand: CORDIS

## (undated) DEVICE — CATH F5 INF PIG145 110CM 6SH: Brand: INFINITI

## (undated) DEVICE — RUNWAY RADL W/TOP PAD

## (undated) DEVICE — ADAPT ANTEGRADE RETRGR

## (undated) DEVICE — ENDOGATOR HYBRID TUBING KIT FOR USE WITH ENDOGATOR IRRIGATION PUMP, OLYMPUS PUMP, GI4000 ESU, AND TORRENT IRRIGATION PUMP.: Brand: ENDOGATOR KIT

## (undated) DEVICE — EZ GLIDE AORTIC CANNULA: Brand: EDWARDS LIFESCIENCES EZ GLIDE AORTIC CANNULA

## (undated) DEVICE — PK HEART OPN 10

## (undated) DEVICE — SYR LUERLOK 50ML

## (undated) DEVICE — CATHETER,FOLEY,100%SILICONE,18FR,10ML,LF: Brand: MEDLINE

## (undated) DEVICE — SENSR CERBRL O2 PK/2

## (undated) DEVICE — PK ATS CUST W CARDIOTOMY RESEVOIR

## (undated) DEVICE — ASMBL SPK CONTRST CONTRL

## (undated) DEVICE — CLTH CLENS READYCLEANSE PERI CARE PK/5

## (undated) DEVICE — Device: Brand: MEDEX

## (undated) DEVICE — Device: Brand: OMNIWIRE PRESSURE GUIDE WIRE

## (undated) DEVICE — SUCTION CANISTER 2500CC: Brand: DEROYAL